# Patient Record
Sex: FEMALE | Race: WHITE | NOT HISPANIC OR LATINO | Employment: OTHER | URBAN - METROPOLITAN AREA
[De-identification: names, ages, dates, MRNs, and addresses within clinical notes are randomized per-mention and may not be internally consistent; named-entity substitution may affect disease eponyms.]

---

## 2017-01-24 ENCOUNTER — OFFICE VISIT (OUTPATIENT)
Dept: INTERNAL MEDICINE | Facility: CLINIC | Age: 82
End: 2017-01-24

## 2017-01-24 VITALS
WEIGHT: 128 LBS | RESPIRATION RATE: 16 BRPM | DIASTOLIC BLOOD PRESSURE: 62 MMHG | HEIGHT: 63 IN | TEMPERATURE: 96.6 F | BODY MASS INDEX: 22.68 KG/M2 | SYSTOLIC BLOOD PRESSURE: 158 MMHG | HEART RATE: 68 BPM

## 2017-01-24 DIAGNOSIS — J43.8 OTHER EMPHYSEMA (HCC): ICD-10-CM

## 2017-01-24 DIAGNOSIS — I65.23 BILATERAL CAROTID ARTERY STENOSIS: ICD-10-CM

## 2017-01-24 DIAGNOSIS — Z12.31 ENCOUNTER FOR SCREENING MAMMOGRAM FOR BREAST CANCER: Primary | ICD-10-CM

## 2017-01-24 DIAGNOSIS — I10 ESSENTIAL HYPERTENSION: ICD-10-CM

## 2017-01-24 PROCEDURE — G0438 PPPS, INITIAL VISIT: HCPCS | Performed by: FAMILY MEDICINE

## 2017-01-24 PROCEDURE — 99214 OFFICE O/P EST MOD 30 MIN: CPT | Performed by: FAMILY MEDICINE

## 2017-01-24 NOTE — MR AVS SNAPSHOT
Lizzy Hicks   1/24/2017 10:15 AM   Office Visit    Dept Phone:  [phone]   Encounter #:  [id number]    Provider:  Deric Mendosa Jr., MD   Department:  [facility] INTERNAL MEDICINE                Your Full Care Plan              Today's Medication Changes          These changes are accurate as of: 1/24/17 12:03 PM.  If you have any questions, ask your nurse or doctor.               Medication(s)that have changed:     * amLODIPine 5 MG tablet   Commonly known as:  NORVASC   Take 1.5 tablets daily   What changed:    - how much to take  - how to take this  - when to take this  - additional instructions       * amLODIPine 2.5 MG tablet   Commonly known as:  NORVASC   Take 1 tablet by mouth daily.   What changed:  Another medication with the same name was changed. Make sure you understand how and when to take each.       * MULTIVITAMIN ADULTS 50+ PO   Take  by mouth.   What changed:  Another medication with the same name was removed. Continue taking this medication, and follow the directions you see here.       * ICAPS AREDS FORMULA PO   Take  by mouth 2 (Two) Times a Day.   What changed:  Another medication with the same name was removed. Continue taking this medication, and follow the directions you see here.       * Notice:  This list has 4 medication(s) that are the same as other medications prescribed for you. Read the directions carefully, and ask your doctor or other care provider to review them with you.      Stop taking medication(s)listed here:     albuterol 108 (90 BASE) MCG/ACT inhaler   Commonly known as:  PROVENTIL HFA;VENTOLIN HFA           azithromycin 250 MG tablet   Commonly known as:  ZITHROMAX Z-PRESTON                      Your Updated Medication List          This list is accurate as of: 1/24/17 12:03 PM.  Always use your most recent med list.                ADVAIR DISKUS 100-50 MCG/DOSE DISKUS   Generic drug:  fluticasone-salmeterol   INHALE ONE PUFF BY  MOUTH TWICE A DAY       * amLODIPine 5 MG tablet   Commonly known as:  NORVASC   Take 1.5 tablets daily       * amLODIPine 2.5 MG tablet   Commonly known as:  NORVASC       aspirin 81 MG EC tablet       atenolol 50 MG tablet   Commonly known as:  TENORMIN   TAKE ONE TABLET BY MOUTH DAILY       ibandronate 150 MG tablet   Commonly known as:  BONIVA   TAKE ONE TABLET BY MOUTH ON THE SAME DATE EVERY MONTH, ON AN EMPTY STOMACH. REMAIN UPRIGHT FOR 60 MINUTES       losartan 100 MG tablet   Commonly known as:  COZAAR   TAKE ONE TABLET BY MOUTH DAILY       * MULTIVITAMIN ADULTS 50+ PO       * ICAPS AREDS FORMULA PO       oyster shell calcium/vitamin d 250-125 MG-UNIT tablet tablet       * Notice:  This list has 4 medication(s) that are the same as other medications prescribed for you. Read the directions carefully, and ask your doctor or other care provider to review them with you.            You Were Diagnosed With        Codes Comments    Encounter for screening mammogram for breast cancer    -  Primary ICD-10-CM: Z12.31  ICD-9-CM: V76.12       Instructions      Medicare Wellness  Personal Prevention Plan of Service     Date of Office Visit:  2017  Encounter Provider:  Deric Mendosa Jr., MD  Place of Service:  [facility] INTERNAL MEDICINE  Patient Name: Lizzy Hicks  :  1935    As part of the Medicare Wellness portion of your visit today, we are providing you with this personalized preventive plan of services (PPPS). This plan is based upon recommendations of the [place] Preventive Services Task Force (USPSTF) and the Advisory Committee on Immunization Practices (ACIP).    This lists the preventive care services that should be considered, and provides dates of when you are due. Items listed as completed are up-to-date and do not require any further intervention.    Health Maintenance   Topic Date Due   • PNEUMOCOCCAL VACCINES (65+ LOW/MEDIUM RISK) (2 of 2 - PPSV23) 2017   •  "INFLUENZA VACCINE  08/01/2016   • MEDICARE ANNUAL WELLNESS  01/20/2017   • MAMMOGRAM  01/28/2018   • DXA SCAN  10/10/2018   • TDAP/TD VACCINES (2 - Td) 09/20/2024   • ZOSTER VACCINE  Completed                 Patient Instructions History      Upcoming Appointments     Visit Type Date Time Department    SUBSEQUENT MEDICARE WELLNESS 1/24/2017 10:15 AM RANDOLPH PICKARD LUIS ARMANDO [phone]      MyChart Signup     Our records indicate that you have declined Cyt[employer]nhart signup. If you would like to sign up for wishkicker, please email [email or web address] or call [phone] to obtain an activation code.             Other Info from Your Visit           Allergies     No Known Allergies      Reason for Visit     Hypertension     COPD     Annual Exam           Vital Signs     Blood Pressure Pulse Temperature Respirations Height Weight    158/62 (BP Location: Left arm, Patient Position: Sitting, Cuff Size: Adult) 68 96.6 °F (35.9 °C) (Tympanic) 16 63\" (160 cm) 128 lb (58.1 kg)    Body Mass Index Smoking Status                22.67 kg/m2 Former Smoker          Problems and Diagnoses Noted     Encounter for screening mammogram for breast cancer    -  Primary        "

## 2017-01-24 NOTE — PROGRESS NOTES
Subjective   Lizzy Hicks is a 81 y.o. female.     Chief Complaint   Patient presents with   • Hypertension   • COPD   • Annual Exam         History of Present Illness   Patient is a new patient to the office after having seen Dr. Rodrigues with history of hypertension, carotid stenosis, COPD, osteoarthritis, she has evidence of hyperlipidemia on labs as well but is not been treated with a statin currently.      The following portions of the patient's history were reviewed and updated as appropriate: allergies, current medications, past social history and problem list.    Review of Systems   Constitutional: Negative.    HENT: Negative.    Eyes: Negative.    Respiratory: Positive for shortness of breath.    Cardiovascular: Negative.    Gastrointestinal: Negative.    Endocrine: Negative.    Genitourinary: Negative.    Musculoskeletal: Negative.    Skin: Negative.    Allergic/Immunologic: Negative.    Neurological: Negative.    Hematological: Negative.    Psychiatric/Behavioral: Negative.        Objective   Vitals:    01/24/17 1103   BP: 158/62   Pulse: 68   Resp: 16   Temp: 96.6 °F (35.9 °C)     Physical Exam   Constitutional: She is oriented to person, place, and time. She appears well-developed and well-nourished.   HENT:   Head: Normocephalic and atraumatic.   Right Ear: Tympanic membrane and external ear normal.   Left Ear: Tympanic membrane and external ear normal.   Nose: Nose normal.   Mouth/Throat: Oropharynx is clear and moist.   Eyes: Conjunctivae and EOM are normal. Pupils are equal, round, and reactive to light.   Neck: Normal range of motion. Neck supple. No JVD present. No thyromegaly present.   Cardiovascular: Normal rate, regular rhythm, normal heart sounds and intact distal pulses.    Pulmonary/Chest: Effort normal and breath sounds normal.   Abdominal: Soft. Bowel sounds are normal.   Musculoskeletal: Normal range of motion.   Lymphadenopathy:     She has no cervical adenopathy.   Neurological:  She is alert and oriented to person, place, and time. No cranial nerve deficit. Coordination normal.   Skin: Skin is warm and dry. No rash noted.   Psychiatric: She has a normal mood and affect. Her behavior is normal. Judgment and thought content normal.   Vitals reviewed.      Assessment/Plan   Problem List Items Addressed This Visit        Cardiovascular and Mediastinum    Essential hypertension    Carotid artery stenosis       Respiratory    COPD (chronic obstructive pulmonary disease)      Other Visit Diagnoses     Encounter for screening mammogram for breast cancer    -  Primary    Relevant Orders    Mammo Screening Bilateral With CAD       plan: Medications are continued recheck in 6 months.  Labs that time.  Referral for screening mammograms.  Discussion about routine medical follow-up.

## 2017-01-24 NOTE — PROGRESS NOTES
Subjective  here for establishment of new physician and also follow-up for COPD hypertension hyperlipidemia carotid stenosis and subsequent Medicare annual exam.    Lizzy Hicks is a 81 y.o. female who presents for an annual wellness visit as well as check up of COPD,hypertension hyperlipidemia carotid stenosis .      History of Present Illness   Patient is a new patient to the office after having seen Dr. Rodrigues with history of hypertension, carotid stenosis, COPD, osteoarthritis, she has evidence of hyperlipidemia on labs as well but is not been treated with a statin currently.    Review of Systems   Constitutional: Negative.    HENT: Negative.    Eyes: Negative.    Respiratory: Positive for shortness of breath.    Cardiovascular: Negative.    Gastrointestinal: Negative.    Endocrine: Negative.    Genitourinary: Negative.    Musculoskeletal: Negative.    Skin: Negative.    Allergic/Immunologic: Negative.    Neurological: Negative.    Hematological: Negative.    Psychiatric/Behavioral: Negative.        The following portions of the patient's history were reviewed and updated as appropriate: allergies, current medications, past family history, past medical history, past social history, past surgical history and problem list.     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 Baseline Na 128-130 for last 3 years.     • Microalbuminuria 08/15/2016   • Right carotid bruit 07/20/2016   • Essential hypertension 01/20/2016   • COPD (chronic obstructive pulmonary disease) 01/20/2016   • Carotid artery stenosis 01/20/2016   • Osteoarthritis 01/20/2016   • Osteoporosis 01/20/2016   • Swelling of right lower extremity 01/20/2016   • Seborrheic dermatitis 01/20/2016       Past Medical History   Diagnosis Date    • COPD (chronic obstructive pulmonary disease)    • Hypertension        No past surgical history on file.    No family history on file.    Social History     Social History   • Marital status:      Spouse name: N/A   • Number of children: N/A   • Years of education: N/A     Occupational History   • Not on file.     Social History Main Topics   • Smoking status: Former Smoker     Types: Cigarettes   • Smokeless tobacco: Never Used   • Alcohol use 4.8 oz/week     8 Cans of beer per week   • Drug use: No   • Sexual activity: Not on file     Other Topics Concern   • Not on file     Social History Narrative       Current Outpatient Prescriptions on File Prior to Visit   Medication Sig Dispense Refill   • ADVAIR DISKUS 100-50 MCG/DOSE DISKUS INHALE ONE PUFF BY MOUTH TWICE A DAY 60 each 5   • amLODIPine (NORVASC) 2.5 MG tablet Take 1 tablet by mouth daily.     • amLODIPine (NORVASC) 5 MG tablet Take 1.5 tablets daily (Patient taking differently: Take 5 mg by mouth daily. Take 1.5 tablets daily) 135 tablet 3   • aspirin 81 MG EC tablet Take 81 mg by mouth daily.     • atenolol (TENORMIN) 50 MG tablet TAKE ONE TABLET BY MOUTH DAILY 30 tablet 5   • ibandronate (BONIVA) 150 MG tablet TAKE ONE TABLET BY MOUTH ON THE SAME DATE EVERY MONTH, ON AN EMPTY STOMACH. REMAIN UPRIGHT FOR 60 MINUTES 1 tablet 5   • losartan (COZAAR) 100 MG tablet TAKE ONE TABLET BY MOUTH DAILY 90 tablet 3   • [DISCONTINUED] Multiple Vitamins-Minerals (OCUVITE PO) Take 1 tablet by mouth daily.     • [DISCONTINUED] albuterol (PROVENTIL HFA;VENTOLIN HFA) 108 (90 BASE) MCG/ACT inhaler Inhale 2 puffs every 4 (four) hours as needed for wheezing. 1 inhaler 11   • [DISCONTINUED] azithromycin (ZITHROMAX Z-PRESTON) 250 MG tablet Take 2 tablets the first day, then 1 tablet daily for 4 days. 1 tablet 0   • [DISCONTINUED] azithromycin (ZITHROMAX Z-PRESTON) 250 MG tablet Take 2 tablets the first day, then 1 tablet daily for 4 days. 1 tablet 0     No current  "facility-administered medications on file prior to visit.        No Known Allergies    Immunization History   Administered Date(s) Administered   • Influenza Split High Dose Preservative Free IM 10/02/2015   • Pneumococcal Conjugate 10/20/2003   • Pneumococcal Conjugate 13-Valent 01/21/2016   • Tdap 09/20/2014   • Zoster 10/20/2003       Objective     Visit Vitals   • /62 (BP Location: Left arm, Patient Position: Sitting, Cuff Size: Adult)   • Pulse 68   • Temp 96.6 °F (35.9 °C) (Tympanic)   • Resp 16   • Ht 63\" (160 cm)   • Wt 128 lb (58.1 kg)   • BMI 22.67 kg/m2       Physical Exam   Constitutional: She is oriented to person, place, and time. She appears well-developed and well-nourished.   HENT:   Head: Normocephalic and atraumatic.   Right Ear: Tympanic membrane and external ear normal.   Left Ear: Tympanic membrane and external ear normal.   Nose: Nose normal.   Mouth/Throat: Oropharynx is clear and moist.   Eyes: Conjunctivae and EOM are normal. Pupils are equal, round, and reactive to light.   Neck: Normal range of motion. Neck supple. No JVD present. No thyromegaly present.   Cardiovascular: Normal rate, regular rhythm, normal heart sounds and intact distal pulses.    Pulmonary/Chest: Effort normal and breath sounds normal.   Abdominal: Soft. Bowel sounds are normal.   Musculoskeletal: Normal range of motion.   Lymphadenopathy:     She has no cervical adenopathy.   Neurological: She is alert and oriented to person, place, and time. No cranial nerve deficit. Coordination normal.   Skin: Skin is warm and dry. No rash noted.   Psychiatric: She has a normal mood and affect. Her behavior is normal. Judgment and thought content normal.   Vitals reviewed.      Assessment/Plan   Lizzy was seen today for hypertension, copd and annual exam.    Diagnoses and all orders for this visit:    Encounter for screening mammogram for breast cancer  -     Mammo Screening Bilateral With CAD; Future    Essential " hypertension    Bilateral carotid artery stenosis    Other emphysema        Discussion  Referral for screening mammogram.  Discussion about routine medical follow-up.  Medications are reviewed.  Continue same medications for now.      AWV.  See scanned forms for morgan history, PHQ-9, functional ability questionnaire, cognitive impairment screening and preventive services check list.  These were all reviewed with the patient and the patient was provided with a copy of the preventative services checklist.            No future appointments.

## 2017-01-24 NOTE — PATIENT INSTRUCTIONS
Medicare Wellness  Personal Prevention Plan of Service     Date of Office Visit:  2017  Encounter Provider:  Deric Mendosa Jr., MD  Place of Service:  [facility] INTERNAL MEDICINE  Patient Name: Lizzy Hicks  :  1935    As part of the Medicare Wellness portion of your visit today, we are providing you with this personalized preventive plan of services (PPPS). This plan is based upon recommendations of the [place] Preventive Services Task Force (USPSTF) and the Advisory Committee on Immunization Practices (ACIP).    This lists the preventive care services that should be considered, and provides dates of when you are due. Items listed as completed are up-to-date and do not require any further intervention.    Health Maintenance   Topic Date Due   • PNEUMOCOCCAL VACCINES (65+ LOW/MEDIUM RISK) (2 of 2 - PPSV23) 2017   • INFLUENZA VACCINE  2016   • MEDICARE ANNUAL WELLNESS  2017   • MAMMOGRAM  2018   • DXA SCAN  10/10/2018   • TDAP/TD VACCINES (2 - Td) 2024   • ZOSTER VACCINE  Completed

## 2017-01-25 ENCOUNTER — TRANSCRIBE ORDERS (OUTPATIENT)
Dept: ADMINISTRATIVE | Facility: HOSPITAL | Age: 82
End: 2017-01-25

## 2017-01-25 DIAGNOSIS — Z12.31 VISIT FOR SCREENING MAMMOGRAM: Primary | ICD-10-CM

## 2017-02-21 ENCOUNTER — HOSPITAL ENCOUNTER (OUTPATIENT)
Dept: MAMMOGRAPHY | Facility: HOSPITAL | Age: 82
Discharge: HOME | End: 2017-02-21
Admitting: FAMILY MEDICINE

## 2017-02-21 DIAGNOSIS — Z12.31 VISIT FOR SCREENING MAMMOGRAM: ICD-10-CM

## 2017-02-21 PROCEDURE — G0202 SCR MAMMO BI INCL CAD: HCPCS

## 2017-02-21 PROCEDURE — 77063 BREAST TOMOSYNTHESIS BI: CPT

## 2017-02-28 DIAGNOSIS — Z00.00 MEDICARE ANNUAL WELLNESS VISIT, SUBSEQUENT: ICD-10-CM

## 2017-02-28 RX ORDER — IBANDRONATE SODIUM 150 MG/1
150 TABLET, FILM COATED ORAL
Qty: 1 TABLET | Refills: 3 | Status: SHIPPED | OUTPATIENT
Start: 2017-02-28 | End: 2017-06-26 | Stop reason: SDUPTHER

## 2017-03-21 DIAGNOSIS — I10 ESSENTIAL HYPERTENSION: ICD-10-CM

## 2017-03-21 RX ORDER — ATENOLOL 50 MG/1
50 TABLET ORAL DAILY
Qty: 30 TABLET | Refills: 5 | Status: SHIPPED | OUTPATIENT
Start: 2017-03-21 | End: 2017-09-17 | Stop reason: SDUPTHER

## 2017-06-26 DIAGNOSIS — Z00.00 MEDICARE ANNUAL WELLNESS VISIT, SUBSEQUENT: ICD-10-CM

## 2017-06-26 RX ORDER — IBANDRONATE SODIUM 150 MG/1
TABLET, FILM COATED ORAL
Qty: 1 TABLET | Refills: 2 | Status: SHIPPED | OUTPATIENT
Start: 2017-06-26 | End: 2017-10-23 | Stop reason: SDUPTHER

## 2017-07-17 RX ORDER — AMLODIPINE BESYLATE 2.5 MG/1
2.5 TABLET ORAL DAILY
Qty: 90 TABLET | Refills: 0 | Status: SHIPPED | OUTPATIENT
Start: 2017-07-17 | End: 2017-07-21

## 2017-07-21 DIAGNOSIS — I10 ESSENTIAL HYPERTENSION: ICD-10-CM

## 2017-07-21 RX ORDER — AMLODIPINE BESYLATE 5 MG/1
7.5 TABLET ORAL DAILY
Qty: 135 TABLET | Refills: 1 | Status: SHIPPED | OUTPATIENT
Start: 2017-07-21 | End: 2017-09-13 | Stop reason: DRUGHIGH

## 2017-09-13 ENCOUNTER — OFFICE VISIT (OUTPATIENT)
Dept: INTERNAL MEDICINE | Facility: CLINIC | Age: 82
End: 2017-09-13

## 2017-09-13 VITALS
HEART RATE: 85 BPM | OXYGEN SATURATION: 92 % | WEIGHT: 137 LBS | SYSTOLIC BLOOD PRESSURE: 166 MMHG | TEMPERATURE: 98.4 F | BODY MASS INDEX: 24.27 KG/M2 | DIASTOLIC BLOOD PRESSURE: 66 MMHG | HEIGHT: 63 IN

## 2017-09-13 DIAGNOSIS — E55.9 VITAMIN D DEFICIENCY: ICD-10-CM

## 2017-09-13 DIAGNOSIS — R63.5 WEIGHT GAIN: ICD-10-CM

## 2017-09-13 DIAGNOSIS — L30.8 OTHER ECZEMA: ICD-10-CM

## 2017-09-13 DIAGNOSIS — I10 ESSENTIAL HYPERTENSION: Primary | ICD-10-CM

## 2017-09-13 DIAGNOSIS — E87.1 HYPONATREMIA: ICD-10-CM

## 2017-09-13 PROCEDURE — 99214 OFFICE O/P EST MOD 30 MIN: CPT | Performed by: FAMILY MEDICINE

## 2017-09-13 RX ORDER — AMLODIPINE BESYLATE 10 MG/1
10 TABLET ORAL DAILY
Qty: 90 TABLET | Refills: 3 | Status: SHIPPED | OUTPATIENT
Start: 2017-09-13 | End: 2018-08-20 | Stop reason: DRUGHIGH

## 2017-09-13 NOTE — PROGRESS NOTES
Subjective   Lizzy Hicks is a 81 y.o. female.     Chief Complaint   Patient presents with   • Obesity   • Hypertension   • Eczema         History of Present Illness   Patient seen here with 70 pound weight gain over the past year also history of hypertension with isolated systolic hypertension itchy skin over the left anterior ankle.  She hasn't had bronchitis normal she's had a history of some chronic bronchitis.  We discussed possibilities on the weight gain.  Really get lab work and screening labs before we prescribe any new medicines..  Previous labs done in [place] reviewed from 2 years ago.      The following portions of the patient's history were reviewed and updated as appropriate: allergies, current medications, past social history and problem list.    Review of Systems   Constitutional: Positive for unexpected weight change.   HENT: Negative.    Eyes: Negative.    Respiratory: Negative.    Cardiovascular: Negative.    Gastrointestinal: Negative.    Endocrine: Negative.    Genitourinary: Negative.    Musculoskeletal: Negative.    Skin: Negative.    Allergic/Immunologic: Negative.    Neurological: Negative.    Hematological: Negative.    Psychiatric/Behavioral: Negative.        Objective   Vitals:    09/13/17 1308   BP: 166/66   Pulse:    Temp:    SpO2:      Physical Exam   Constitutional: She is oriented to person, place, and time. She appears well-developed and well-nourished.   HENT:   Head: Normocephalic and atraumatic.   Right Ear: Tympanic membrane and external ear normal.   Left Ear: Tympanic membrane and external ear normal.   Nose: Nose normal.   Mouth/Throat: Oropharynx is clear and moist.   Eyes: Conjunctivae and EOM are normal. Pupils are equal, round, and reactive to light.   Neck: Normal range of motion. Neck supple. No JVD present. No thyromegaly present.   Cardiovascular: Normal rate, regular rhythm, normal heart sounds and intact distal pulses.    Pulmonary/Chest: Effort normal and  breath sounds normal.   Abdominal: Soft. Bowel sounds are normal.   Musculoskeletal: Normal range of motion.   Lymphadenopathy:     She has no cervical adenopathy.   Neurological: She is alert and oriented to person, place, and time. No cranial nerve deficit. Coordination normal.   Skin: Skin is warm and dry. No rash noted.        Psychiatric: She has a normal mood and affect. Her behavior is normal. Judgment and thought content normal.   Vitals reviewed.      Assessment/Plan   Problem List Items Addressed This Visit        Cardiovascular and Mediastinum    Essential hypertension - Primary    Relevant Medications    amLODIPine (NORVASC) 10 MG tablet    Other Relevant Orders    CBC & Differential    Comprehensive Metabolic Panel    Lipid Panel With / Chol / HDL Ratio    TSH    T4, Free    T3, Free    Vitamin D 25 Hydroxy      Other Visit Diagnoses     Hyponatremia        Relevant Orders    CBC & Differential    Comprehensive Metabolic Panel    Lipid Panel With / Chol / HDL Ratio    TSH    T4, Free    T3, Free    Vitamin D 25 Hydroxy    Weight gain        Relevant Orders    CBC & Differential    Comprehensive Metabolic Panel    Lipid Panel With / Chol / HDL Ratio    TSH    T4, Free    T3, Free    Vitamin D 25 Hydroxy    Other eczema        Relevant Orders    CBC & Differential    Comprehensive Metabolic Panel    Lipid Panel With / Chol / HDL Ratio    TSH    T4, Free    T3, Free    Vitamin D 25 Hydroxy    Vitamin D deficiency         Relevant Orders    Vitamin D 25 Hydroxy      Plan: Screening labs and meds unchanged with the increase amlodipine 10 mg daily.  Follow-up in 6 months sooner if needed.  Try OTC moisturizing cream over the left ankle if no better consider dermatologic referral.

## 2017-09-14 LAB
25(OH)D3+25(OH)D2 SERPL-MCNC: 50.4 NG/ML (ref 30–100)
ALBUMIN SERPL-MCNC: 4.9 G/DL (ref 3.5–5.2)
ALBUMIN/GLOB SERPL: 1.6 G/DL
ALP SERPL-CCNC: 113 U/L (ref 39–117)
ALT SERPL-CCNC: 18 U/L (ref 1–33)
AST SERPL-CCNC: 27 U/L (ref 1–32)
BASOPHILS # BLD AUTO: 0.08 10*3/MM3 (ref 0–0.2)
BASOPHILS NFR BLD AUTO: 0.9 % (ref 0–1.5)
BILIRUB SERPL-MCNC: 0.5 MG/DL (ref 0.1–1.2)
BUN SERPL-MCNC: 11 MG/DL (ref 8–23)
BUN/CREAT SERPL: 14.9 (ref 7–25)
CALCIUM SERPL-MCNC: 10.6 MG/DL (ref 8.6–10.5)
CHLORIDE SERPL-SCNC: 88 MMOL/L (ref 98–107)
CHOLEST SERPL-MCNC: 296 MG/DL (ref 0–200)
CHOLEST/HDLC SERPL: 1.61 {RATIO}
CO2 SERPL-SCNC: 25.9 MMOL/L (ref 22–29)
CREAT SERPL-MCNC: 0.74 MG/DL (ref 0.57–1)
DIFFERENTIAL COMMENT: NORMAL
EOSINOPHIL # BLD AUTO: 0.28 10*3/MM3 (ref 0–0.7)
EOSINOPHIL NFR BLD AUTO: 3.2 % (ref 0.3–6.2)
ERYTHROCYTE [DISTWIDTH] IN BLOOD BY AUTOMATED COUNT: 13.9 % (ref 11.7–13)
GLOBULIN SER CALC-MCNC: 3 GM/DL
GLUCOSE SERPL-MCNC: 119 MG/DL (ref 65–99)
HCT VFR BLD AUTO: 38.4 % (ref 35.6–45.5)
HDLC SERPL-MCNC: 184 MG/DL (ref 40–60)
HGB BLD-MCNC: 13.1 G/DL (ref 11.9–15.5)
IMM GRANULOCYTES # BLD: 0.03 10*3/MM3 (ref 0–0.03)
IMM GRANULOCYTES NFR BLD: 0.3 % (ref 0–0.5)
LDLC SERPL CALC-MCNC: 98 MG/DL (ref 0–100)
LYMPHOCYTES # BLD AUTO: 1.66 10*3/MM3 (ref 0.9–4.8)
LYMPHOCYTES NFR BLD AUTO: 19 % (ref 19.6–45.3)
MCH RBC QN AUTO: 34.1 PG (ref 26.9–32)
MCHC RBC AUTO-ENTMCNC: 34.1 G/DL (ref 32.4–36.3)
MCV RBC AUTO: 100 FL (ref 80.5–98.2)
MONOCYTES # BLD AUTO: 0.78 10*3/MM3 (ref 0.2–1.2)
MONOCYTES NFR BLD AUTO: 8.9 % (ref 5–12)
NEUTROPHILS # BLD AUTO: 5.92 10*3/MM3 (ref 1.9–8.1)
NEUTROPHILS NFR BLD AUTO: 67.7 % (ref 42.7–76)
PLATELET # BLD AUTO: 290 10*3/MM3 (ref 140–500)
PLATELET BLD QL SMEAR: NORMAL
POTASSIUM SERPL-SCNC: 5.1 MMOL/L (ref 3.5–5.2)
PROT SERPL-MCNC: 7.9 G/DL (ref 6–8.5)
RBC # BLD AUTO: 3.84 10*6/MM3 (ref 3.9–5.2)
RBC MORPH BLD: NORMAL
SODIUM SERPL-SCNC: 133 MMOL/L (ref 136–145)
T3FREE SERPL-MCNC: 2.8 PG/ML (ref 2–4.4)
T4 FREE SERPL-MCNC: 1.26 NG/DL (ref 0.93–1.7)
TRIGL SERPL-MCNC: 68 MG/DL (ref 0–150)
TSH SERPL DL<=0.005 MIU/L-ACNC: 3.89 MIU/ML (ref 0.27–4.2)
VLDLC SERPL CALC-MCNC: 13.6 MG/DL (ref 5–40)
WBC # BLD AUTO: 8.75 10*3/MM3 (ref 4.5–10.7)

## 2017-09-17 DIAGNOSIS — I10 ESSENTIAL HYPERTENSION: ICD-10-CM

## 2017-09-18 RX ORDER — ATENOLOL 50 MG/1
TABLET ORAL
Qty: 30 TABLET | Refills: 4 | Status: SHIPPED | OUTPATIENT
Start: 2017-09-18 | End: 2018-02-13 | Stop reason: SDUPTHER

## 2017-10-13 RX ORDER — AMLODIPINE BESYLATE 2.5 MG/1
TABLET ORAL
Qty: 90 TABLET | Refills: 0 | Status: SHIPPED | OUTPATIENT
Start: 2017-10-13 | End: 2018-08-20

## 2017-10-23 DIAGNOSIS — Z00.00 MEDICARE ANNUAL WELLNESS VISIT, SUBSEQUENT: ICD-10-CM

## 2017-10-23 RX ORDER — IBANDRONATE SODIUM 150 MG/1
TABLET, FILM COATED ORAL
Qty: 1 TABLET | Refills: 1 | Status: SHIPPED | OUTPATIENT
Start: 2017-10-23 | End: 2017-12-23 | Stop reason: SDUPTHER

## 2017-11-16 DIAGNOSIS — I10 ESSENTIAL HYPERTENSION: ICD-10-CM

## 2017-11-16 RX ORDER — LOSARTAN POTASSIUM 100 MG/1
100 TABLET ORAL DAILY
Qty: 90 TABLET | Refills: 1 | Status: SHIPPED | OUTPATIENT
Start: 2017-11-16 | End: 2018-05-11 | Stop reason: SDUPTHER

## 2017-12-23 DIAGNOSIS — Z00.00 MEDICARE ANNUAL WELLNESS VISIT, SUBSEQUENT: ICD-10-CM

## 2017-12-24 RX ORDER — AZITHROMYCIN 250 MG/1
TABLET, FILM COATED ORAL
Qty: 6 TABLET | Refills: 0 | Status: SHIPPED | OUTPATIENT
Start: 2017-12-24 | End: 2018-07-03

## 2017-12-26 RX ORDER — IBANDRONATE SODIUM 150 MG/1
TABLET, FILM COATED ORAL
Qty: 1 TABLET | Refills: 0 | Status: SHIPPED | OUTPATIENT
Start: 2017-12-26 | End: 2018-01-21 | Stop reason: SDUPTHER

## 2018-01-17 DIAGNOSIS — J44.9 CHRONIC OBSTRUCTIVE PULMONARY DISEASE, UNSPECIFIED COPD TYPE (HCC): ICD-10-CM

## 2018-01-21 DIAGNOSIS — Z00.00 MEDICARE ANNUAL WELLNESS VISIT, SUBSEQUENT: ICD-10-CM

## 2018-01-22 RX ORDER — IBANDRONATE SODIUM 150 MG/1
TABLET, FILM COATED ORAL
Qty: 1 TABLET | Refills: 0 | Status: SHIPPED | OUTPATIENT
Start: 2018-01-22 | End: 2018-02-18 | Stop reason: SDUPTHER

## 2018-02-13 DIAGNOSIS — I10 ESSENTIAL HYPERTENSION: ICD-10-CM

## 2018-02-13 RX ORDER — ATENOLOL 50 MG/1
TABLET ORAL
Qty: 30 TABLET | Refills: 3 | Status: SHIPPED | OUTPATIENT
Start: 2018-02-13 | End: 2018-06-16 | Stop reason: SDUPTHER

## 2018-02-18 DIAGNOSIS — Z00.00 MEDICARE ANNUAL WELLNESS VISIT, SUBSEQUENT: ICD-10-CM

## 2018-02-19 RX ORDER — IBANDRONATE SODIUM 150 MG/1
TABLET, FILM COATED ORAL
Qty: 1 TABLET | Refills: 0 | Status: SHIPPED | OUTPATIENT
Start: 2018-02-19 | End: 2018-03-17 | Stop reason: SDUPTHER

## 2018-02-27 ENCOUNTER — TRANSCRIBE ORDERS (OUTPATIENT)
Dept: ADMINISTRATIVE | Facility: HOSPITAL | Age: 83
End: 2018-02-27

## 2018-02-27 DIAGNOSIS — Z12.31 VISIT FOR SCREENING MAMMOGRAM: Primary | ICD-10-CM

## 2018-03-17 DIAGNOSIS — Z00.00 MEDICARE ANNUAL WELLNESS VISIT, SUBSEQUENT: ICD-10-CM

## 2018-03-19 RX ORDER — IBANDRONATE SODIUM 150 MG/1
TABLET, FILM COATED ORAL
Qty: 1 TABLET | Refills: 2 | Status: SHIPPED | OUTPATIENT
Start: 2018-03-19 | End: 2018-06-16 | Stop reason: SDUPTHER

## 2018-03-20 ENCOUNTER — HOSPITAL ENCOUNTER (OUTPATIENT)
Dept: MAMMOGRAPHY | Facility: HOSPITAL | Age: 83
Discharge: HOME | End: 2018-03-20
Admitting: FAMILY MEDICINE

## 2018-03-20 DIAGNOSIS — Z12.31 VISIT FOR SCREENING MAMMOGRAM: ICD-10-CM

## 2018-03-20 PROCEDURE — 77063 BREAST TOMOSYNTHESIS BI: CPT

## 2018-03-20 PROCEDURE — 77067 SCR MAMMO BI INCL CAD: CPT

## 2018-05-11 DIAGNOSIS — I10 ESSENTIAL HYPERTENSION: ICD-10-CM

## 2018-05-11 RX ORDER — LOSARTAN POTASSIUM 100 MG/1
TABLET ORAL
Qty: 90 TABLET | Refills: 0 | Status: SHIPPED | OUTPATIENT
Start: 2018-05-11 | End: 2018-08-08 | Stop reason: SDUPTHER

## 2018-06-16 DIAGNOSIS — I10 ESSENTIAL HYPERTENSION: ICD-10-CM

## 2018-06-16 DIAGNOSIS — Z00.00 MEDICARE ANNUAL WELLNESS VISIT, SUBSEQUENT: ICD-10-CM

## 2018-06-18 RX ORDER — IBANDRONATE SODIUM 150 MG/1
TABLET, FILM COATED ORAL
Qty: 1 TABLET | Refills: 1 | Status: SHIPPED | OUTPATIENT
Start: 2018-06-18 | End: 2018-08-15 | Stop reason: SDUPTHER

## 2018-06-18 RX ORDER — ATENOLOL 50 MG/1
TABLET ORAL
Qty: 30 TABLET | Refills: 2 | Status: SHIPPED | OUTPATIENT
Start: 2018-06-18 | End: 2018-09-17 | Stop reason: SDUPTHER

## 2018-07-03 ENCOUNTER — OFFICE VISIT (OUTPATIENT)
Dept: INTERNAL MEDICINE | Facility: CLINIC | Age: 83
End: 2018-07-03

## 2018-07-03 VITALS
TEMPERATURE: 97.9 F | WEIGHT: 143.6 LBS | HEIGHT: 63 IN | OXYGEN SATURATION: 90 % | DIASTOLIC BLOOD PRESSURE: 65 MMHG | HEART RATE: 83 BPM | SYSTOLIC BLOOD PRESSURE: 166 MMHG | BODY MASS INDEX: 25.45 KG/M2

## 2018-07-03 DIAGNOSIS — E23.3 RESET OSMOSTAT SYNDROME (HCC): ICD-10-CM

## 2018-07-03 DIAGNOSIS — R63.5 WEIGHT GAIN: ICD-10-CM

## 2018-07-03 DIAGNOSIS — I10 ESSENTIAL HYPERTENSION: Primary | ICD-10-CM

## 2018-07-03 DIAGNOSIS — E66.09 CLASS 1 OBESITY DUE TO EXCESS CALORIES WITH SERIOUS COMORBIDITY IN ADULT, UNSPECIFIED BMI: ICD-10-CM

## 2018-07-03 DIAGNOSIS — E55.9 VITAMIN D DEFICIENCY: ICD-10-CM

## 2018-07-03 DIAGNOSIS — Z00.00 MEDICARE ANNUAL WELLNESS VISIT, SUBSEQUENT: ICD-10-CM

## 2018-07-03 DIAGNOSIS — J41.0 SIMPLE CHRONIC BRONCHITIS (HCC): ICD-10-CM

## 2018-07-03 DIAGNOSIS — I65.23 ATHEROSCLEROSIS OF BOTH CAROTID ARTERIES: ICD-10-CM

## 2018-07-03 PROCEDURE — 99214 OFFICE O/P EST MOD 30 MIN: CPT | Performed by: FAMILY MEDICINE

## 2018-07-03 PROCEDURE — G0439 PPPS, SUBSEQ VISIT: HCPCS | Performed by: FAMILY MEDICINE

## 2018-07-03 RX ORDER — HEPATITIS A VACCINE 1440 [IU]/ML
INJECTION, SUSPENSION INTRAMUSCULAR
COMMUNITY
Start: 2018-05-25 | End: 2018-08-20

## 2018-07-03 RX ORDER — PHENTERMINE HYDROCHLORIDE 15 MG/1
15 CAPSULE ORAL EVERY MORNING
Qty: 30 CAPSULE | Refills: 2 | Status: SHIPPED | OUTPATIENT
Start: 2018-07-03 | End: 2018-08-20

## 2018-07-03 NOTE — PATIENT INSTRUCTIONS
Medicare Wellness  Personal Prevention Plan of Service     Date of Office Visit:  2018  Encounter Provider:  Deric Mendosa Jr., MD  Place of Service:  [facility] INTERNAL MEDICINE  Patient Name: Lizzy Hicks  :  1935    As part of the Medicare Wellness portion of your visit today, we are providing you with this personalized preventive plan of services (PPPS). This plan is based upon recommendations of the [place] Preventive Services Task Force (USPSTF) and the Advisory Committee on Immunization Practices (ACIP).    This lists the preventive care services that should be considered, and provides dates of when you are due. Items listed as completed are up-to-date and do not require any further intervention.    Health Maintenance   Topic Date Due   • ZOSTER VACCINE (2 of 3) 12/15/2003   • PNEUMOCOCCAL VACCINES (65+ LOW/MEDIUM RISK) (2 of 2 - PPSV23) 2017   • MEDICARE ANNUAL WELLNESS  2018   • INFLUENZA VACCINE  2018   • DXA SCAN  10/10/2018   • MAMMOGRAM  2020   • TDAP/TD VACCINES (2 - Td) 2024       No orders of the defined types were placed in this encounter.      No Follow-up on file.

## 2018-07-03 NOTE — PROGRESS NOTES
Subjective   Lizzy Hicks is a 82 y.o. female.     Chief Complaint   Patient presents with   • Hyperlipidemia   • Hypertension   • Obesity   • Annual Exam         History of Present Illness   Patient is upset because she has steadily gained weight over the past 2 years.  We will recheck labs.  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  There is a question of minimal carotid stenosis in the past.  She is very upset about the weight gain.  We discussed options and will give her a very low-dose of phentermine 15 mg every morning with warnings precautions reviewed with her.  If she stays on this medicine she will #a controlled substance agreement.  Would like to see her back in 3 months.  Further management of hyperlipidemia hypertension is reviewed.      The following portions of the patient's history were reviewed and updated as appropriate: allergies, current medications, past social history and problem list.    Review of Systems   Constitutional: Positive for unexpected weight change.   HENT: Negative.    Respiratory: Negative.    Gastrointestinal: Negative.    All other systems reviewed and are negative.      Objective   Vitals:    07/03/18 1432   BP: 166/65   Pulse: 83   Temp: 97.9 °F (36.6 °C)   SpO2: 90%     Physical Exam   Constitutional: She is oriented to person, place, and time. She appears well-developed and well-nourished.   HENT:   Head: Normocephalic.   Right Ear: External ear normal.   Left Ear: External ear normal.   Mouth/Throat: Oropharynx is clear and moist.   Neck: Normal range of motion. Neck supple.   Cardiovascular: Normal rate, regular rhythm and normal heart sounds.    Pulmonary/Chest: Effort normal and breath sounds normal.   Abdominal: Soft. Bowel sounds are normal.   Musculoskeletal: Normal range of motion.   Neurological: She is alert and oriented to person, place, and time.   Skin: Skin is warm and dry.   Psychiatric: She has a normal mood and affect.   Vitals  reviewed.      Assessment/Plan   Problem List Items Addressed This Visit        Cardiovascular and Mediastinum    Atherosclerosis of both carotid arteries    Relevant Orders    CBC & Differential    Comprehensive Metabolic Panel    Lipid Panel With / Chol / HDL Ratio    TSH    T4, Free    T3, Free    Vitamin B12    Urinalysis With Culture If Indicated - Urine, Clean Catch    Essential hypertension - Primary    Relevant Orders    CBC & Differential    Comprehensive Metabolic Panel    Lipid Panel With / Chol / HDL Ratio    TSH    T4, Free    T3, Free    Vitamin B12    Urinalysis With Culture If Indicated - Urine, Clean Catch       Respiratory    COPD (chronic obstructive pulmonary disease) (CMS/[facility])    Relevant Orders    CBC & Differential    Comprehensive Metabolic Panel    Lipid Panel With / Chol / HDL Ratio    TSH    T4, Free    T3, Free    Vitamin B12    Urinalysis With Culture If Indicated - Urine, Clean Catch       Endocrine    Reset osmostat syndrome (CMS/[facility])    Relevant Orders    CBC & Differential    Comprehensive Metabolic Panel    Lipid Panel With / Chol / HDL Ratio    TSH    T4, Free    T3, Free    Vitamin B12    Urinalysis With Culture If Indicated - Urine, Clean Catch      Other Visit Diagnoses     Weight gain        Relevant Orders    CBC & Differential    Comprehensive Metabolic Panel    Lipid Panel With / Chol / HDL Ratio    TSH    T4, Free    T3, Free    Vitamin B12    Urinalysis With Culture If Indicated - Urine, Clean Catch    Class 1 obesity due to excess calories with serious comorbidity in adult, unspecified BMI        Vitamin D deficiency        Medicare annual wellness visit, subsequent          Plan: Screening labs recheck in 2 months trial of phentermine 15 mg daily instructions on diet and exercise.

## 2018-07-03 NOTE — PROGRESS NOTES
QUICK REFERENCE INFORMATION:  The ABCs of the Annual Wellness Visit    Subsequent Medicare Wellness Visit    HEALTH RISK ASSESSMENT    1935    Recent Hospitalizations:  No hospitalization(s) within the last year..        Current Medical Providers:  Patient Care Team:  Deric Mendosa Jr., MD as PCP - General (Family Medicine)        Smoking Status:  History   Smoking Status   • Former Smoker   • Types: Cigarettes   Smokeless Tobacco   • Never Used       Alcohol Consumption:  History   Alcohol Use   • 4.8 oz/week   • 8 Cans of beer per week       Depression Screen:   PHQ-2/PHQ-9 Depression Screening 7/3/2018   Little interest or pleasure in doing things 0   Feeling down, depressed, or hopeless 0   Trouble falling or staying asleep, or sleeping too much -   Feeling tired or having little energy -   Poor appetite or overeating -   Feeling bad about yourself - or that you are a failure or have let yourself or your family down -   Trouble concentrating on things, such as reading the newspaper or watching television -   Moving or speaking so slowly that other people could have noticed. Or the opposite - being so fidgety or restless that you have been moving around a lot more than usual -   Thoughts that you would be better off dead, or of hurting yourself in some way -   Total Score 0   If you checked off any problems, how difficult have these problems made it for you to do your work, take care of things at home, or get along with other people? -       Health Habits and Functional and Cognitive Screening:  Functional & Cognitive Status 7/3/2018   Do you have difficulty preparing food and eating? No   Do you have difficulty bathing yourself, getting dressed or grooming yourself? No   Do you have difficulty using the toilet? No   Do you have difficulty moving around from place to place? No   Do you have trouble with steps or getting out of a bed or a chair? No   In the past year have you fallen or experienced a near fall?  No   Current Diet Well Balanced Diet   Dental Exam Up to date   Eye Exam Up to date   Exercise (times per week) 4 times per week   Current Exercise Activities Include Walking   Do you need help using the phone?  No   Are you deaf or do you have serious difficulty hearing?  No   Do you need help with transportation? No   Do you need help shopping? No   Do you need help preparing meals?  No   Do you need help with housework?  No   Do you need help with laundry? No   Do you need help taking your medications? No   Do you need help managing money? No   Do you ever drive or ride in a car without wearing a seat belt? No   Do you have difficulty concentrating, remembering or making decisions? -           Does the patient have evidence of cognitive impairment? No    Aspirin use counseling: Taking ASA appropriately as indicated      Recent Lab Results:  CMP:  Lab Results   Component Value Date     (H) 09/13/2017    BUN 11 09/13/2017    CREATININE 0.74 09/13/2017    EGFRIFNONA 76 09/13/2017    EGFRIFAFRI 92 09/13/2017    BCR 14.9 09/13/2017     (L) 09/13/2017    K 5.1 09/13/2017    CO2 25.9 09/13/2017    CALCIUM 10.6 (H) 09/13/2017    PROTENTOTREF 7.9 09/13/2017    ALBUMIN 4.90 09/13/2017    LABGLOBREF 3.0 09/13/2017    LABIL2 1.6 09/13/2017    BILITOT 0.5 09/13/2017    ALKPHOS 113 09/13/2017    AST 27 09/13/2017    ALT 18 09/13/2017     Lipid Panel:  Lab Results   Component Value Date    TRIG 68 09/13/2017     (H) 09/13/2017    VLDL 13.6 09/13/2017     HbA1c:       Visual Acuity:  No exam data present    Age-appropriate Screening Schedule:  Refer to the list below for future screening recommendations based on patient's age, sex and/or medical conditions. Orders for these recommended tests are listed in the plan section. The patient has been provided with a written plan.    Health Maintenance   Topic Date Due   • ZOSTER VACCINE (2 of 3) 12/15/2003   • PNEUMOCOCCAL VACCINES (65+ LOW/MEDIUM RISK) (2 of 2 -  PPSV23) 01/21/2017   • INFLUENZA VACCINE  08/01/2018   • DXA SCAN  10/10/2018   • MAMMOGRAM  03/20/2020   • TDAP/TD VACCINES (2 - Td) 09/20/2024        Subjective   History of Present Illness    Lizzy Hicks is a 82 y.o. female who presents for an Subsequent Wellness Visit.    The following portions of the patient's history were reviewed and updated as appropriate: allergies, current medications, past family history, past medical history, past social history, past surgical history and problem list.    Outpatient Medications Prior to Visit   Medication Sig Dispense Refill   • ADVAIR DISKUS 100-50 MCG/DOSE DISKUS Inhale 1 puff 2 (Two) Times a Day. 60 each 5   • amLODIPine (NORVASC) 10 MG tablet Take 1 tablet by mouth Daily. 90 tablet 3   • amLODIPine (NORVASC) 2.5 MG tablet TAKE ONE TABLET BY MOUTH DAILY WITH 5MG 90 tablet 0   • aspirin 81 MG EC tablet Take 81 mg by mouth daily.     • atenolol (TENORMIN) 50 MG tablet TAKE ONE TABLET BY MOUTH DAILY 30 tablet 2   • Calcium Carb-Cholecalciferol (OYSTER SHELL CALCIUM/VITAMIN D) 250-125 MG-UNIT tablet tablet Take  by mouth 2 (Two) Times a Day.     • ibandronate (BONIVA) 150 MG tablet TAKE ONE TABLET BY MOUTH ON THE SAME DATE EVERY MONTH, ON AN EMPTY STOMACH. REMAIN UPRIGHT FOR 60 MINUTES 1 tablet 1   • losartan (COZAAR) 100 MG tablet TAKE ONE TABLET BY MOUTH DAILY 90 tablet 0   • Multiple Vitamins-Minerals (ICAPS AREDS FORMULA PO) Take  by mouth 2 (Two) Times a Day.     • Multiple Vitamins-Minerals (MULTIVITAMIN ADULTS 50+ PO) Take  by mouth.     • azithromycin (ZITHROMAX Z-PRESTON) 250 MG tablet Take 2 tablets the first day, then 1 tablet daily for 4 days. 6 tablet 0     No facility-administered medications prior to visit.        Patient Active Problem List   Diagnosis   • Essential hypertension   • COPD (chronic obstructive pulmonary disease) (CMS/[facility])   • Carotid artery stenosis   • Osteoarthritis   • Osteoporosis   • Swelling of right lower extremity   • Seborrheic  "dermatitis   • Right carotid bruit   • Atherosclerosis of both carotid arteries   • Reset osmostat syndrome (CMS/HCC)   • Microalbuminuria   • ERRONEOUS ENCOUNTER--DISREGARD       Advance Care Planning:  has an advance directive - a copy has been provided and is in file    Identification of Risk Factors:  Risk factors include: cardiovascular risk.    Review of Systems    Compared to one year ago, the patient feels her physical health is the same.  Compared to one year ago, the patient feels her mental health is the same.    Objective     Physical Exam    Vitals:    07/03/18 1432   BP: 166/65   Pulse: 83   Temp: 97.9 °F (36.6 °C)   SpO2: 90%   Weight: 65.1 kg (143 lb 9.6 oz)   Height: 160 cm (62.99\")   PainSc: 0-No pain       Patient's Body mass index is 25.44 kg/m². BMI is above normal parameters. Recommendations include: educational material.      Assessment/Plan   Patient Self-Management and Personalized Health Advice  The patient has been provided with information about: diet and preventive services including:   · Counseling for cardiovascular disease risk reduction.    Visit Diagnoses:    ICD-10-CM ICD-9-CM   1. Essential hypertension I10 401.9   2. Atherosclerosis of both carotid arteries I65.23 433.10     433.30   3. Simple chronic bronchitis (CMS/HCC) J41.0 491.0   4. Weight gain R63.5 783.1       No orders of the defined types were placed in this encounter.      Outpatient Encounter Prescriptions as of 7/3/2018   Medication Sig Dispense Refill   • ADVAIR DISKUS 100-50 MCG/DOSE DISKUS Inhale 1 puff 2 (Two) Times a Day. 60 each 5   • amLODIPine (NORVASC) 10 MG tablet Take 1 tablet by mouth Daily. 90 tablet 3   • amLODIPine (NORVASC) 2.5 MG tablet TAKE ONE TABLET BY MOUTH DAILY WITH 5MG 90 tablet 0   • aspirin 81 MG EC tablet Take 81 mg by mouth daily.     • atenolol (TENORMIN) 50 MG tablet TAKE ONE TABLET BY MOUTH DAILY 30 tablet 2   • Calcium Carb-Cholecalciferol (OYSTER SHELL CALCIUM/VITAMIN D) 250-125 " MG-UNIT tablet tablet Take  by mouth 2 (Two) Times a Day.     • ibandronate (BONIVA) 150 MG tablet TAKE ONE TABLET BY MOUTH ON THE SAME DATE EVERY MONTH, ON AN EMPTY STOMACH. REMAIN UPRIGHT FOR 60 MINUTES 1 tablet 1   • losartan (COZAAR) 100 MG tablet TAKE ONE TABLET BY MOUTH DAILY 90 tablet 0   • Multiple Vitamins-Minerals (ICAPS AREDS FORMULA PO) Take  by mouth 2 (Two) Times a Day.     • Multiple Vitamins-Minerals (MULTIVITAMIN ADULTS 50+ PO) Take  by mouth.     • [DISCONTINUED] azithromycin (ZITHROMAX Z-PRESTON) 250 MG tablet Take 2 tablets the first day, then 1 tablet daily for 4 days. 6 tablet 0   • HAVRIX 1440 EL U/ML vaccine      • SHINGRIX 50 MCG reconstituted suspension        No facility-administered encounter medications on file as of 7/3/2018.        Reviewed use of high risk medication in the elderly: not applicable  Reviewed for potential of harmful drug interactions in the elderly: not applicable    Follow Up:  No Follow-up on file.     An After Visit Summary and PPPS with all of these plans were given to the patient.

## 2018-07-04 LAB
ALBUMIN SERPL-MCNC: 4.9 G/DL (ref 3.5–5.2)
ALBUMIN/GLOB SERPL: 1.9 G/DL
ALP SERPL-CCNC: 114 U/L (ref 39–117)
ALT SERPL-CCNC: 21 U/L (ref 1–33)
AST SERPL-CCNC: 28 U/L (ref 1–32)
BASOPHILS # BLD AUTO: 0.05 10*3/MM3 (ref 0–0.2)
BASOPHILS NFR BLD AUTO: 0.5 % (ref 0–1.5)
BILIRUB SERPL-MCNC: 0.6 MG/DL (ref 0.1–1.2)
BUN SERPL-MCNC: 18 MG/DL (ref 8–23)
BUN/CREAT SERPL: 15.9 (ref 7–25)
CALCIUM SERPL-MCNC: 9.9 MG/DL (ref 8.6–10.5)
CHLORIDE SERPL-SCNC: 91 MMOL/L (ref 98–107)
CHOLEST SERPL-MCNC: 267 MG/DL (ref 0–200)
CHOLEST/HDLC SERPL: 1.84 {RATIO}
CO2 SERPL-SCNC: 24.1 MMOL/L (ref 22–29)
CREAT SERPL-MCNC: 1.13 MG/DL (ref 0.57–1)
EOSINOPHIL # BLD AUTO: 0.21 10*3/MM3 (ref 0–0.7)
EOSINOPHIL NFR BLD AUTO: 2 % (ref 0.3–6.2)
ERYTHROCYTE [DISTWIDTH] IN BLOOD BY AUTOMATED COUNT: 13.1 % (ref 11.7–13)
GLOBULIN SER CALC-MCNC: 2.6 GM/DL
GLUCOSE SERPL-MCNC: 110 MG/DL (ref 65–99)
HCT VFR BLD AUTO: 38.5 % (ref 35.6–45.5)
HDLC SERPL-MCNC: 145 MG/DL (ref 40–60)
HGB BLD-MCNC: 13.2 G/DL (ref 11.9–15.5)
IMM GRANULOCYTES # BLD: 0.03 10*3/MM3 (ref 0–0.03)
IMM GRANULOCYTES NFR BLD: 0.3 % (ref 0–0.5)
LDLC SERPL CALC-MCNC: 107 MG/DL (ref 0–100)
LYMPHOCYTES # BLD AUTO: 2.03 10*3/MM3 (ref 0.9–4.8)
LYMPHOCYTES NFR BLD AUTO: 19.8 % (ref 19.6–45.3)
MCH RBC QN AUTO: 33.5 PG (ref 26.9–32)
MCHC RBC AUTO-ENTMCNC: 34.3 G/DL (ref 32.4–36.3)
MCV RBC AUTO: 97.7 FL (ref 80.5–98.2)
MONOCYTES # BLD AUTO: 0.95 10*3/MM3 (ref 0.2–1.2)
MONOCYTES NFR BLD AUTO: 9.3 % (ref 5–12)
NEUTROPHILS # BLD AUTO: 7.02 10*3/MM3 (ref 1.9–8.1)
NEUTROPHILS NFR BLD AUTO: 68.4 % (ref 42.7–76)
PLATELET # BLD AUTO: 311 10*3/MM3 (ref 140–500)
POTASSIUM SERPL-SCNC: 4.4 MMOL/L (ref 3.5–5.2)
PROT SERPL-MCNC: 7.5 G/DL (ref 6–8.5)
RBC # BLD AUTO: 3.94 10*6/MM3 (ref 3.9–5.2)
SODIUM SERPL-SCNC: 132 MMOL/L (ref 136–145)
T3FREE SERPL-MCNC: 3.1 PG/ML (ref 2–4.4)
T4 FREE SERPL-MCNC: 1.28 NG/DL (ref 0.93–1.7)
TRIGL SERPL-MCNC: 76 MG/DL (ref 0–150)
TSH SERPL DL<=0.005 MIU/L-ACNC: 5.55 MIU/ML (ref 0.27–4.2)
VIT B12 SERPL-MCNC: 698 PG/ML (ref 211–946)
VLDLC SERPL CALC-MCNC: 15.2 MG/DL (ref 5–40)
WBC # BLD AUTO: 10.26 10*3/MM3 (ref 4.5–10.7)

## 2018-07-16 DIAGNOSIS — J44.9 CHRONIC OBSTRUCTIVE PULMONARY DISEASE, UNSPECIFIED COPD TYPE (HCC): ICD-10-CM

## 2018-08-08 DIAGNOSIS — I10 ESSENTIAL HYPERTENSION: ICD-10-CM

## 2018-08-08 RX ORDER — LOSARTAN POTASSIUM 100 MG/1
TABLET ORAL
Qty: 90 TABLET | Refills: 0 | Status: SHIPPED | OUTPATIENT
Start: 2018-08-08 | End: 2018-08-20 | Stop reason: SINTOL

## 2018-08-13 ENCOUNTER — TELEPHONE (OUTPATIENT)
Dept: INTERNAL MEDICINE | Facility: CLINIC | Age: 83
End: 2018-08-13

## 2018-08-13 NOTE — TELEPHONE ENCOUNTER
Pt states that she has been feeling weakness for a while she thinks it may be because of her phentermine

## 2018-08-15 DIAGNOSIS — Z00.00 MEDICARE ANNUAL WELLNESS VISIT, SUBSEQUENT: ICD-10-CM

## 2018-08-15 RX ORDER — IBANDRONATE SODIUM 150 MG/1
TABLET, FILM COATED ORAL
Qty: 1 TABLET | Refills: 0 | Status: SHIPPED | OUTPATIENT
Start: 2018-08-15 | End: 2018-09-11 | Stop reason: SDUPTHER

## 2018-08-20 ENCOUNTER — HOSPITAL ENCOUNTER (OUTPATIENT)
Dept: GENERAL RADIOLOGY | Facility: HOSPITAL | Age: 83
Discharge: HOME | End: 2018-08-20
Admitting: FAMILY MEDICINE

## 2018-08-20 ENCOUNTER — OFFICE VISIT (OUTPATIENT)
Dept: INTERNAL MEDICINE | Facility: CLINIC | Age: 83
End: 2018-08-20

## 2018-08-20 VITALS
DIASTOLIC BLOOD PRESSURE: 50 MMHG | BODY MASS INDEX: 23.92 KG/M2 | WEIGHT: 135 LBS | OXYGEN SATURATION: 94 % | TEMPERATURE: 96 F | SYSTOLIC BLOOD PRESSURE: 108 MMHG | HEART RATE: 82 BPM

## 2018-08-20 DIAGNOSIS — R07.81 PLEURITIC CHEST PAIN: ICD-10-CM

## 2018-08-20 DIAGNOSIS — I95.0 IDIOPATHIC HYPOTENSION: Primary | ICD-10-CM

## 2018-08-20 DIAGNOSIS — J41.0 SIMPLE CHRONIC BRONCHITIS (HCC): ICD-10-CM

## 2018-08-20 DIAGNOSIS — I10 ESSENTIAL HYPERTENSION: ICD-10-CM

## 2018-08-20 PROCEDURE — 71046 X-RAY EXAM CHEST 2 VIEWS: CPT

## 2018-08-20 PROCEDURE — 99214 OFFICE O/P EST MOD 30 MIN: CPT | Performed by: FAMILY MEDICINE

## 2018-08-20 PROCEDURE — 93000 ELECTROCARDIOGRAM COMPLETE: CPT | Performed by: FAMILY MEDICINE

## 2018-08-20 RX ORDER — AMLODIPINE BESYLATE 5 MG/1
5 TABLET ORAL DAILY
Qty: 30 TABLET | Refills: 2 | Status: SHIPPED | OUTPATIENT
Start: 2018-08-20 | End: 2019-08-04 | Stop reason: SDUPTHER

## 2018-08-20 NOTE — PROGRESS NOTES
Subjective   Lizzy Hicks is a 82 y.o. female.     Chief Complaint   Patient presents with   • Nausea     Weakness    History of Present Illness     Patient is seen with the nausea and lightheadedness for the past 2 weeks after taking some phentermine 15 mg daily.  She had no chest pain but has had some slight pleuritic pain on deep breathing on the left side.  With a history of COPD/asthma.  She denies chest pain sweating or syncope.  EKG performed in the office show some ST depression inferiorly and anterolaterally.  There are no Q waves.  She is not having chest pain at this moment.    The following portions of the patient's history were reviewed and updated as appropriate: allergies, current medications, past social history and problem list.    Review of Systems   Constitutional: Positive for fatigue.   HENT: Negative.    Eyes: Negative.    Respiratory: Negative.    Cardiovascular: Positive for chest pain.   Gastrointestinal: Negative.    Endocrine: Negative.    Genitourinary: Negative.    Skin: Negative.    Allergic/Immunologic: Negative.    Neurological: Positive for weakness and light-headedness.   Hematological: Negative.    Psychiatric/Behavioral: Negative.        Objective   Vitals:    08/20/18 1505   BP: 108/50   Pulse: 82   Temp: 96 °F (35.6 °C)   SpO2: 94%     Physical Exam   Constitutional: She is oriented to person, place, and time. She appears well-developed.   Patient is in no distress and states she has been feeling better since off the phentermine.  She has relatively low blood pressure versus diastolic of 50.   HENT:   Head: Normocephalic.   Right Ear: External ear normal.   Left Ear: External ear normal.   Mouth/Throat: Oropharynx is clear and moist.   Eyes: Pupils are equal, round, and reactive to light.   Neck: Normal range of motion. Neck supple.   Cardiovascular: Normal rate, regular rhythm and normal heart sounds.    Pulmonary/Chest: Effort normal and breath sounds normal.   Abdominal:  Soft. Bowel sounds are normal.   Musculoskeletal: Normal range of motion.   Neurological: She is alert and oriented to person, place, and time.   Skin: Skin is warm and dry.   Psychiatric: She has a normal mood and affect.   Vitals reviewed.      Assessment/Plan   Problem List Items Addressed This Visit        Cardiovascular and Mediastinum    Essential hypertension    Relevant Medications    amLODIPine (NORVASC) 5 MG tablet    Other Relevant Orders    Basic Metabolic Panel    CBC & Differential    Ambulatory Referral to Cardiology    ECG 12 Lead       Respiratory    COPD (chronic obstructive pulmonary disease) (CMS/HCC)    Relevant Orders    XR Chest 2 View      Other Visit Diagnoses     Idiopathic hypotension    -  Primary    Relevant Orders    Basic Metabolic Panel    CBC & Differential    Ambulatory Referral to Cardiology    ECG 12 Lead    Pleuritic chest pain        Relevant Orders    XR Chest 2 View    ECG 12 Lead      I'm concerned about the EKG with ST depression.  She is off phentermine low-dose.  She has a relative hypotension.  We'll get an urgent referral to cardiology for an echocardiogram and evaluation otherwise BMP CBC and chest x-ray PA lateral today.  Stop low Mari continue atenolol 50 mg daily reduce amlodipine to 5 mg daily.  Return visit about 2 weeks.  She has any further difficulty she is to get seen in the ER.

## 2018-08-20 NOTE — PROGRESS NOTES
Procedure     ECG 12 Lead  Date/Time: 8/20/2018 3:53 PM  Performed by: ADDY ESTEBAN JR.  Authorized by: ADDY ESTEBAN JR.   Comparison: not compared with previous ECG   Previous ECG: no previous ECG available  Rhythm: sinus rhythm  ST Depression: III, aVF, V3, V4, V5 and V6  T Waves: T waves normal  Other: no other findings  Clinical impression: abnormal ECG

## 2018-08-21 LAB
BASOPHILS # BLD AUTO: 0.1 X10E3/UL (ref 0–0.2)
BASOPHILS NFR BLD AUTO: 0 %
BUN SERPL-MCNC: 33 MG/DL (ref 8–27)
BUN/CREAT SERPL: 31 (ref 12–28)
CALCIUM SERPL-MCNC: 9.8 MG/DL (ref 8.7–10.3)
CHLORIDE SERPL-SCNC: 85 MMOL/L (ref 96–106)
CO2 SERPL-SCNC: 27 MMOL/L (ref 20–29)
CREAT SERPL-MCNC: 1.08 MG/DL (ref 0.57–1)
EOSINOPHIL # BLD AUTO: 0.3 X10E3/UL (ref 0–0.4)
EOSINOPHIL NFR BLD AUTO: 3 %
ERYTHROCYTE [DISTWIDTH] IN BLOOD BY AUTOMATED COUNT: 13.2 % (ref 12.3–15.4)
GLUCOSE SERPL-MCNC: 123 MG/DL (ref 65–99)
HCT VFR BLD AUTO: 35.1 % (ref 34–46.6)
HGB BLD-MCNC: 12.2 G/DL (ref 11.1–15.9)
IMM GRANULOCYTES # BLD: 0 X10E3/UL (ref 0–0.1)
IMM GRANULOCYTES NFR BLD: 0 %
LYMPHOCYTES # BLD AUTO: 1.6 X10E3/UL (ref 0.7–3.1)
LYMPHOCYTES NFR BLD AUTO: 13 %
MCH RBC QN AUTO: 32.7 PG (ref 26.6–33)
MCHC RBC AUTO-ENTMCNC: 34.8 G/DL (ref 31.5–35.7)
MCV RBC AUTO: 94 FL (ref 79–97)
MONOCYTES # BLD AUTO: 1.1 X10E3/UL (ref 0.1–0.9)
MONOCYTES NFR BLD AUTO: 9 %
NEUTROPHILS # BLD AUTO: 9.1 X10E3/UL (ref 1.4–7)
NEUTROPHILS NFR BLD AUTO: 75 %
PLATELET # BLD AUTO: 337 X10E3/UL (ref 150–379)
POTASSIUM SERPL-SCNC: 4.5 MMOL/L (ref 3.5–5.2)
RBC # BLD AUTO: 3.73 X10E6/UL (ref 3.77–5.28)
SODIUM SERPL-SCNC: 129 MMOL/L (ref 134–144)
WBC # BLD AUTO: 12.2 X10E3/UL (ref 3.4–10.8)

## 2018-08-21 NOTE — PROGRESS NOTES
Date of Office Visit: 2018  Encounter Provider: Emil Raymond MD  Place of Service: [facility] CARDIOLOGY  Patient Name: Lizzy Hicks  :1935    Chief Complaint   Patient presents with   • Hypertension   :     HPI: Lizzy Hicks is a 82 y.o. female who presents today at the request of Dr. Mendosa regarding an abnormal EKG and low blood pressure.  She previously lived in [place], and moved here a few years ago.  She has chronic hyponatremia, and reportedly underwent a thorough workup, with a subsequent diagnosis of reset osmostat.  She has chronic bronchitis.  She thinks she had an echocardiogram in .  She had a carotid duplex in 2016 that showed moderate disease on the left, and mild disease on the right.   She has never had a stroke or TIA.  She denies any history of CAD, CHF, or arrhythmia.  She has a long history of hypertension, which required progressive escalation of therapy over the last several years.    A few months ago, she was placed on phentermine.  Within a few weeks, she was unsteady on her feet, fatigued, mildly lightheaded, and just not herself.  She went to see Dr. Mendosa, and her SBP was 108 mm Hg.  Her losartan and phentermine were stopped, and amlodipine was cut in half.  She has progressively felt better each day since then and is almost back ot normal.    During that period, she had a sharp pain in the left shoulder and below the left breast, but only with deep breaths.  This has resolved.  She did not have exertional dyspnea or chest pain.  She denies orthopnea, PND, palpitations, syncope, or edema.     Dr. Mendosa checked an EKG, and it was abnormal.    Past Medical History:   Diagnosis Date   • Carotid artery disease (CMS/HCC)     10/2016: 16-49% right, 60-69% left   • COPD (chronic obstructive pulmonary disease) (CMS/HCC)    • Hypertension    • Osteoarthritis 2016   • Osteoporosis 2016   • Reset osmostat syndrome (CMS/HCC) 08/15/2016     Worked up by prior PCP in [facility]. Baseline Na 128-130 since 2013.   • Seborrheic dermatitis 1/20/2016       Past Surgical History:   Procedure Laterality Date   • TONSILLECTOMY         Social History     Social History   • Marital status:      Spouse name: N/A   • Number of children: N/A   • Years of education: N/A     Occupational History   • Not on file.     Social History Main Topics   • Smoking status: Former Smoker     Types: Cigarettes   • Smokeless tobacco: Never Used      Comment: caffeine use: 3 cups daily.    • Alcohol use 6.0 oz/week     8 Cans of beer, 1 Shots of liquor, 1 Glasses of wine per week      Comment: one alchollic beverage daily.    • Drug use: No   • Sexual activity: Not on file     Other Topics Concern   • Not on file     Social History Narrative   • No narrative on file       History reviewed. No pertinent family history.    Review of Systems   Constitution: Positive for malaise/fatigue and weight gain.   Cardiovascular: Negative for chest pain and palpitations.   Respiratory: Positive for cough.    Neurological: Negative for light-headedness.   All other systems reviewed and are negative.      No Known Allergies      Current Outpatient Prescriptions:   •  ADVAIR DISKUS 100-50 MCG/DOSE DISKUS, INHALE ONE PUFF BY MOUTH TWICE A DAY, Disp: 60 each, Rfl: 4  •  amLODIPine (NORVASC) 5 MG tablet, Take 1 tablet by mouth Daily., Disp: 30 tablet, Rfl: 2  •  aspirin 81 MG EC tablet, Take 81 mg by mouth daily., Disp: , Rfl:   •  atenolol (TENORMIN) 50 MG tablet, TAKE ONE TABLET BY MOUTH DAILY, Disp: 30 tablet, Rfl: 2  •  Calcium Carb-Cholecalciferol (OYSTER SHELL CALCIUM/VITAMIN D) 250-125 MG-UNIT tablet tablet, Take  by mouth 2 (Two) Times a Day., Disp: , Rfl:   •  ibandronate (BONIVA) 150 MG tablet, TAKE ONE TABLET BY MOUTH ON THE SAME DATE EVERY MONTH, ON AN EMPTY STOMACH. REMAIN UPRIGHT FOR 60 MINUES, Disp: 1 tablet, Rfl: 0  •  Multiple Vitamins-Minerals (ICAPS AREDS FORMULA PO), Take  by  "mouth 2 (Two) Times a Day., Disp: , Rfl:   •  Multiple Vitamins-Minerals (MULTIVITAMIN ADULTS 50+ PO), Take  by mouth., Disp: , Rfl:       Objective:     Vitals:    08/22/18 1415   BP: 130/80   BP Location: Left arm   Pulse: 82   Weight: 61.2 kg (135 lb)   Height: 160 cm (62.99\")     Body mass index is 23.92 kg/m².    Physical Exam   Constitutional: She is oriented to person, place, and time.   Obese   HENT:   Head: Normocephalic.   Nose: Nose normal.   Mouth/Throat: Oropharynx is clear and moist.   Eyes: Pupils are equal, round, and reactive to light. Conjunctivae and EOM are normal.   Neck: Normal range of motion.   Cannot assess for JVD due to habitus   Cardiovascular: Normal rate, regular rhythm, normal heart sounds and intact distal pulses.    No murmur heard.  Pulses:       Carotid pulses are on the right side with bruit, and on the left side with bruit.  Pulmonary/Chest: Effort normal.   Abdominal: Soft.   Obesity limits abdominal exam   Musculoskeletal: Normal range of motion. She exhibits no edema.   Neurological: She is alert and oriented to person, place, and time. No cranial nerve deficit.   Skin: Skin is warm and dry. No rash noted.   Psychiatric: She has a normal mood and affect. Her behavior is normal. Judgment and thought content normal.   Vitals reviewed.        ECG 12 Lead  Date/Time: 8/22/2018 2:19 PM  Performed by: DEYA SYLVESTER  Authorized by: DEYA SYLVESTER   Comparison: compared with previous ECG   Similar to previous ECG  Rhythm: sinus rhythm  Conduction: conduction normal  ST segment depression noted on lead: Diffuse ST depression, anterior and lateral leads.  T Waves: T waves normal  QRS axis: normal  Other: no other findings  Clinical impression: abnormal ECG              Assessment:       Diagnosis Plan   1. Tiredness     2. Pleuritic chest pain  Adult Transthoracic Echo Complete W/ Cont if Necessary Per Protocol   3. Abnormal EKG  Adult Transthoracic Echo Complete W/ Cont if Necessary Per " Protocol   4. Essential hypertension     5. Bilateral carotid artery stenosis  Duplex Carotid Ultrasound CAR          Plan:       She had generalized fatigue, pleuritic left sided thoracic pain, and low blood pressure of an unclear cause.  It started shortly after taking phentermine.  All symptoms have resolved since her antihypertensive regimen was decreased (her BP is now within goal) and since phentermine was discontinued. Her EKG is abnormal in that she has diffuse ST depressions, but an EKG today is unchanged from the one at Dr. Mendosa's office.  She isn't really having any true anginal complaints.    We will try to get a copy of the echo she had in [place] five years ago, and I will repeat an echocardiogram at this time.  She could potentially need a stress test in the future.    It has been almost two years since her last carotid duplex.  She has bilateral bruits. I will reassess this.     Sincerely,       Emil Raymond MD

## 2018-08-22 ENCOUNTER — OFFICE VISIT (OUTPATIENT)
Dept: CARDIOLOGY | Facility: CLINIC | Age: 83
End: 2018-08-22

## 2018-08-22 VITALS
WEIGHT: 135 LBS | SYSTOLIC BLOOD PRESSURE: 130 MMHG | HEART RATE: 82 BPM | DIASTOLIC BLOOD PRESSURE: 80 MMHG | HEIGHT: 63 IN | BODY MASS INDEX: 23.92 KG/M2

## 2018-08-22 DIAGNOSIS — I10 ESSENTIAL HYPERTENSION: ICD-10-CM

## 2018-08-22 DIAGNOSIS — R07.81 PLEURITIC CHEST PAIN: ICD-10-CM

## 2018-08-22 DIAGNOSIS — R94.31 ABNORMAL EKG: ICD-10-CM

## 2018-08-22 DIAGNOSIS — I65.23 BILATERAL CAROTID ARTERY STENOSIS: ICD-10-CM

## 2018-08-22 DIAGNOSIS — R53.83 TIREDNESS: Primary | ICD-10-CM

## 2018-08-22 PROCEDURE — 93000 ELECTROCARDIOGRAM COMPLETE: CPT | Performed by: INTERNAL MEDICINE

## 2018-08-22 PROCEDURE — 99204 OFFICE O/P NEW MOD 45 MIN: CPT | Performed by: INTERNAL MEDICINE

## 2018-08-31 ENCOUNTER — HOSPITAL ENCOUNTER (OUTPATIENT)
Dept: CARDIOLOGY | Facility: HOSPITAL | Age: 83
Discharge: HOME | End: 2018-08-31
Attending: INTERNAL MEDICINE

## 2018-08-31 ENCOUNTER — HOSPITAL ENCOUNTER (OUTPATIENT)
Dept: CARDIOLOGY | Facility: HOSPITAL | Age: 83
Discharge: HOME | End: 2018-08-31
Attending: INTERNAL MEDICINE | Admitting: INTERNAL MEDICINE

## 2018-08-31 VITALS
WEIGHT: 134.92 LBS | HEART RATE: 84 BPM | HEIGHT: 63 IN | BODY MASS INDEX: 23.91 KG/M2 | SYSTOLIC BLOOD PRESSURE: 120 MMHG | DIASTOLIC BLOOD PRESSURE: 40 MMHG

## 2018-08-31 DIAGNOSIS — I65.23 BILATERAL CAROTID ARTERY STENOSIS: ICD-10-CM

## 2018-08-31 DIAGNOSIS — I65.22 STENOSIS OF LEFT CAROTID ARTERY: Primary | ICD-10-CM

## 2018-08-31 DIAGNOSIS — R07.81 PLEURITIC CHEST PAIN: ICD-10-CM

## 2018-08-31 DIAGNOSIS — R94.31 ABNORMAL EKG: ICD-10-CM

## 2018-08-31 LAB
BH CV ECHO MEAS - ACS: 1.5 CM
BH CV ECHO MEAS - AO MAX PG (FULL): 6.3 MMHG
BH CV ECHO MEAS - AO MAX PG: 8.5 MMHG
BH CV ECHO MEAS - AO MEAN PG (FULL): 4 MMHG
BH CV ECHO MEAS - AO MEAN PG: 5 MMHG
BH CV ECHO MEAS - AO ROOT AREA: 9.1 CM^2
BH CV ECHO MEAS - AO ROOT DIAM: 3.4 CM
BH CV ECHO MEAS - AO V2 MAX: 146 CM/SEC
BH CV ECHO MEAS - AO V2 MEAN: 102 CM/SEC
BH CV ECHO MEAS - AO V2 VTI: 33.8 CM
BH CV ECHO MEAS - AVA(I,A): 1.3 CM^2
BH CV ECHO MEAS - AVA(I,D): 1.3 CM^2
BH CV ECHO MEAS - AVA(V,A): 1.2 CM^2
BH CV ECHO MEAS - AVA(V,D): 1.2 CM^2
BH CV ECHO MEAS - EDV(CUBED): 116.2 ML
BH CV ECHO MEAS - EDV(MOD-SP2): 49 ML
BH CV ECHO MEAS - EDV(MOD-SP4): 43 ML
BH CV ECHO MEAS - EDV(TEICH): 111.7 ML
BH CV ECHO MEAS - EF(CUBED): 86.7 %
BH CV ECHO MEAS - EF(MOD-BP): 63 %
BH CV ECHO MEAS - EF(MOD-SP2): 69.4 %
BH CV ECHO MEAS - EF(MOD-SP4): 62.8 %
BH CV ECHO MEAS - EF(TEICH): 80.2 %
BH CV ECHO MEAS - ESV(CUBED): 15.4 ML
BH CV ECHO MEAS - ESV(MOD-SP2): 15 ML
BH CV ECHO MEAS - ESV(MOD-SP4): 16 ML
BH CV ECHO MEAS - ESV(TEICH): 22.1 ML
BH CV ECHO MEAS - IVS/LVPW: 1
BH CV ECHO MEAS - IVSD: 0.92 CM
BH CV ECHO MEAS - LAT PEAK E' VEL: 8 CM/SEC
BH CV ECHO MEAS - LV MASS(C)D: 151.6 GRAMS
BH CV ECHO MEAS - LV MAX PG: 2.2 MMHG
BH CV ECHO MEAS - LV MEAN PG: 1 MMHG
BH CV ECHO MEAS - LV V1 MAX: 74.4 CM/SEC
BH CV ECHO MEAS - LV V1 MEAN: 54.7 CM/SEC
BH CV ECHO MEAS - LV V1 VTI: 19.1 CM
BH CV ECHO MEAS - LVIDD: 4.9 CM
BH CV ECHO MEAS - LVIDS: 2.5 CM
BH CV ECHO MEAS - LVLD AP2: 6.2 CM
BH CV ECHO MEAS - LVLD AP4: 5.9 CM
BH CV ECHO MEAS - LVLS AP2: 5 CM
BH CV ECHO MEAS - LVLS AP4: 5.2 CM
BH CV ECHO MEAS - LVOT AREA (M): 2.3 CM^2
BH CV ECHO MEAS - LVOT AREA: 2.3 CM^2
BH CV ECHO MEAS - LVOT DIAM: 1.7 CM
BH CV ECHO MEAS - LVPWD: 0.88 CM
BH CV ECHO MEAS - MED PEAK E' VEL: 7 CM/SEC
BH CV ECHO MEAS - MR MAX PG: 33.6 MMHG
BH CV ECHO MEAS - MR MAX VEL: 290 CM/SEC
BH CV ECHO MEAS - MV A DUR: 0.13 SEC
BH CV ECHO MEAS - MV A MAX VEL: 97.2 CM/SEC
BH CV ECHO MEAS - MV DEC SLOPE: 717.3 CM/SEC^2
BH CV ECHO MEAS - MV DEC TIME: 0.14 SEC
BH CV ECHO MEAS - MV E MAX VEL: 115 CM/SEC
BH CV ECHO MEAS - MV E/A: 1.2
BH CV ECHO MEAS - MV MAX PG: 5.6 MMHG
BH CV ECHO MEAS - MV MEAN PG: 3 MMHG
BH CV ECHO MEAS - MV P1/2T MAX VEL: 119 CM/SEC
BH CV ECHO MEAS - MV P1/2T: 48.6 MSEC
BH CV ECHO MEAS - MV V2 MAX: 118.5 CM/SEC
BH CV ECHO MEAS - MV V2 MEAN: 85.9 CM/SEC
BH CV ECHO MEAS - MV V2 VTI: 26.6 CM
BH CV ECHO MEAS - MVA P1/2T LCG: 1.8 CM^2
BH CV ECHO MEAS - MVA(P1/2T): 4.5 CM^2
BH CV ECHO MEAS - MVA(VTI): 1.6 CM^2
BH CV ECHO MEAS - PA MAX PG (FULL): 2.3 MMHG
BH CV ECHO MEAS - PA MAX PG: 3.6 MMHG
BH CV ECHO MEAS - PA V2 MAX: 95 CM/SEC
BH CV ECHO MEAS - PULM A REVS DUR: 0.17 SEC
BH CV ECHO MEAS - PULM A REVS VEL: 29.1 CM/SEC
BH CV ECHO MEAS - PULM DIAS VEL: 43.4 CM/SEC
BH CV ECHO MEAS - PULM S/D: 1
BH CV ECHO MEAS - PULM SYS VEL: 43.4 CM/SEC
BH CV ECHO MEAS - PVA(V,A): 1.2 CM^2
BH CV ECHO MEAS - PVA(V,D): 1.2 CM^2
BH CV ECHO MEAS - QP/QS: 0.54
BH CV ECHO MEAS - RAP SYSTOLE: 3 MMHG
BH CV ECHO MEAS - RV MAX PG: 1.3 MMHG
BH CV ECHO MEAS - RV MEAN PG: 1 MMHG
BH CV ECHO MEAS - RV V1 MAX: 56.4 CM/SEC
BH CV ECHO MEAS - RV V1 MEAN: 39.7 CM/SEC
BH CV ECHO MEAS - RV V1 VTI: 11.6 CM
BH CV ECHO MEAS - RVOT AREA: 2 CM^2
BH CV ECHO MEAS - RVOT DIAM: 1.6 CM
BH CV ECHO MEAS - RVSP: 45 MMHG
BH CV ECHO MEAS - SUP REN AO DIAM: 1.4 CM
BH CV ECHO MEAS - SV(AO): 306.9 ML
BH CV ECHO MEAS - SV(CUBED): 100.8 ML
BH CV ECHO MEAS - SV(LVOT): 43.4 ML
BH CV ECHO MEAS - SV(MOD-SP2): 34 ML
BH CV ECHO MEAS - SV(MOD-SP4): 27 ML
BH CV ECHO MEAS - SV(RVOT): 23.3 ML
BH CV ECHO MEAS - SV(TEICH): 89.6 ML
BH CV ECHO MEAS - TAPSE (>1.6): 2.3 CM2
BH CV ECHO MEAS - TR MAX VEL: 324 CM/SEC
BH CV ECHO MEASUREMENTS AVERAGE E/E' RATIO: 15.33
BH CV XLRA - RV BASE: 2.2 CM
BH CV XLRA - TDI S': 10 CM/SEC
BH CV XLRA MEAS LEFT DIST ICA EDV: 23 CM/SEC
BH CV XLRA MEAS LEFT DIST ICA PSV: 113 CM/SEC
BH CV XLRA MEAS LEFT ICA/CCA RATIO: 6.69
BH CV XLRA MEAS LEFT MID ICA EDV: 89 CM/SEC
BH CV XLRA MEAS LEFT MID ICA PSV: 352 CM/SEC
BH CV XLRA MEAS LEFT PROX CCA EDV: 12 CM/SEC
BH CV XLRA MEAS LEFT PROX CCA PSV: 68 CM/SEC
BH CV XLRA MEAS LEFT PROX ECA PSV: 74 CM/SEC
BH CV XLRA MEAS LEFT PROX ICA EDV: 84 CM/SEC
BH CV XLRA MEAS LEFT PROX ICA PSV: 455 CM/SEC
BH CV XLRA MEAS LEFT PROX SCLA PSV: 107 CM/SEC
BH CV XLRA MEAS RIGHT DIST ICA EDV: 18 CM/SEC
BH CV XLRA MEAS RIGHT DIST ICA PSV: 98 CM/SEC
BH CV XLRA MEAS RIGHT ICA/CCA RATIO: 1.65
BH CV XLRA MEAS RIGHT MID ICA EDV: 15 CM/SEC
BH CV XLRA MEAS RIGHT MID ICA PSV: 105 CM/SEC
BH CV XLRA MEAS RIGHT PROX CCA EDV: 9 CM/SEC
BH CV XLRA MEAS RIGHT PROX CCA PSV: 66 CM/SEC
BH CV XLRA MEAS RIGHT PROX ECA PSV: 309 CM/SEC
BH CV XLRA MEAS RIGHT PROX ICA EDV: 18 CM/SEC
BH CV XLRA MEAS RIGHT PROX ICA PSV: 109 CM/SEC
BH CV XLRA MEAS RIGHT PROX SCLA PSV: 90 CM/SEC
LEFT ATRIUM VOLUME INDEX: 16 ML/M2
LV EF 2D ECHO EST: 63 %
MAXIMAL PREDICTED HEART RATE: 140 BPM
STRESS TARGET HR: 119 BPM

## 2018-08-31 PROCEDURE — 93306 TTE W/DOPPLER COMPLETE: CPT | Performed by: INTERNAL MEDICINE

## 2018-08-31 PROCEDURE — 93306 TTE W/DOPPLER COMPLETE: CPT

## 2018-08-31 PROCEDURE — 93880 EXTRACRANIAL BILAT STUDY: CPT | Performed by: INTERNAL MEDICINE

## 2018-08-31 PROCEDURE — 93880 EXTRACRANIAL BILAT STUDY: CPT

## 2018-09-04 ENCOUNTER — TELEPHONE (OUTPATIENT)
Dept: CARDIOLOGY | Facility: CLINIC | Age: 83
End: 2018-09-04

## 2018-09-04 NOTE — TELEPHONE ENCOUNTER
Pt's daughter called regarding her results on her carotid test. She would like some more information about this and would like to speak to you about what they should do. Jasmin(daughter) c/b is [phone].

## 2018-09-06 ENCOUNTER — TRANSCRIBE ORDERS (OUTPATIENT)
Dept: ADMINISTRATIVE | Facility: HOSPITAL | Age: 83
End: 2018-09-06

## 2018-09-06 DIAGNOSIS — I65.23 CAROTID STENOSIS, NON-SYMPTOMATIC, BILATERAL: Primary | ICD-10-CM

## 2018-09-07 ENCOUNTER — HOSPITAL ENCOUNTER (OUTPATIENT)
Dept: CT IMAGING | Facility: HOSPITAL | Age: 83
Discharge: HOME | End: 2018-09-07
Admitting: UROLOGY

## 2018-09-07 DIAGNOSIS — I65.23 CAROTID STENOSIS, NON-SYMPTOMATIC, BILATERAL: ICD-10-CM

## 2018-09-07 LAB — CREAT BLDA-MCNC: 1 MG/DL (ref 0.6–1.3)

## 2018-09-07 PROCEDURE — 70498 CT ANGIOGRAPHY NECK: CPT

## 2018-09-07 PROCEDURE — 70496 CT ANGIOGRAPHY HEAD: CPT

## 2018-09-07 PROCEDURE — 25010000002 IOPAMIDOL 61 % SOLUTION: Performed by: UROLOGY

## 2018-09-07 PROCEDURE — 82565 ASSAY OF CREATININE: CPT

## 2018-09-07 RX ADMIN — IOPAMIDOL 95 ML: 612 INJECTION, SOLUTION INTRAVENOUS at 15:06

## 2018-09-11 DIAGNOSIS — Z00.00 MEDICARE ANNUAL WELLNESS VISIT, SUBSEQUENT: ICD-10-CM

## 2018-09-11 RX ORDER — IBANDRONATE SODIUM 150 MG/1
TABLET, FILM COATED ORAL
Qty: 1 TABLET | Refills: 0 | Status: SHIPPED | OUTPATIENT
Start: 2018-09-11 | End: 2018-09-19

## 2018-09-14 RX ORDER — AMLODIPINE BESYLATE 10 MG/1
TABLET ORAL
Qty: 90 TABLET | Refills: 2 | Status: SHIPPED | OUTPATIENT
Start: 2018-09-14 | End: 2018-09-14 | Stop reason: DRUGHIGH

## 2018-09-17 DIAGNOSIS — I10 ESSENTIAL HYPERTENSION: ICD-10-CM

## 2018-09-17 RX ORDER — ATENOLOL 50 MG/1
TABLET ORAL
Qty: 30 TABLET | Refills: 1 | Status: SHIPPED | OUTPATIENT
Start: 2018-09-17 | End: 2018-09-19

## 2018-09-19 ENCOUNTER — APPOINTMENT (OUTPATIENT)
Dept: PREADMISSION TESTING | Facility: HOSPITAL | Age: 83
End: 2018-09-19

## 2018-09-19 ENCOUNTER — HOSPITAL ENCOUNTER (OUTPATIENT)
Dept: GENERAL RADIOLOGY | Facility: HOSPITAL | Age: 83
Discharge: HOME | End: 2018-09-19
Admitting: SURGERY

## 2018-09-19 VITALS
HEART RATE: 94 BPM | RESPIRATION RATE: 16 BRPM | WEIGHT: 130 LBS | TEMPERATURE: 97.8 F | SYSTOLIC BLOOD PRESSURE: 149 MMHG | DIASTOLIC BLOOD PRESSURE: 71 MMHG | BODY MASS INDEX: 23.04 KG/M2 | OXYGEN SATURATION: 96 % | HEIGHT: 63 IN

## 2018-09-19 LAB
ALBUMIN SERPL-MCNC: 4.4 G/DL (ref 3.5–5.2)
ALBUMIN/GLOB SERPL: 1.3 G/DL
ALP SERPL-CCNC: 107 U/L (ref 39–117)
ALT SERPL W P-5'-P-CCNC: 16 U/L (ref 1–33)
ANION GAP SERPL CALCULATED.3IONS-SCNC: 17 MMOL/L
APTT PPP: 38.1 SECONDS (ref 22.7–35.4)
AST SERPL-CCNC: 21 U/L (ref 1–32)
BACTERIA UR QL AUTO: NORMAL /HPF
BILIRUB SERPL-MCNC: 0.6 MG/DL (ref 0.1–1.2)
BILIRUB UR QL STRIP: NEGATIVE
BUN BLD-MCNC: 13 MG/DL (ref 8–23)
BUN/CREAT SERPL: 15.1 (ref 7–25)
CALCIUM SPEC-SCNC: 9.8 MG/DL (ref 8.6–10.5)
CHLORIDE SERPL-SCNC: 90 MMOL/L (ref 98–107)
CLARITY UR: ABNORMAL
CO2 SERPL-SCNC: 25 MMOL/L (ref 22–29)
COLOR UR: YELLOW
CREAT BLD-MCNC: 0.86 MG/DL (ref 0.57–1)
DEPRECATED RDW RBC AUTO: 47.6 FL (ref 37–54)
ERYTHROCYTE [DISTWIDTH] IN BLOOD BY AUTOMATED COUNT: 13.3 % (ref 11.7–13)
GFR SERPL CREATININE-BSD FRML MDRD: 63 ML/MIN/1.73
GLOBULIN UR ELPH-MCNC: 3.4 GM/DL
GLUCOSE BLD-MCNC: 122 MG/DL (ref 65–99)
GLUCOSE UR STRIP-MCNC: NEGATIVE MG/DL
HCT VFR BLD AUTO: 36.2 % (ref 35.6–45.5)
HGB BLD-MCNC: 11.8 G/DL (ref 11.9–15.5)
HGB UR QL STRIP.AUTO: NEGATIVE
HYALINE CASTS UR QL AUTO: NORMAL /LPF
INR PPP: 1.07 (ref 0.9–1.1)
KETONES UR QL STRIP: NEGATIVE
LEUKOCYTE ESTERASE UR QL STRIP.AUTO: ABNORMAL
MCH RBC QN AUTO: 32 PG (ref 26.9–32)
MCHC RBC AUTO-ENTMCNC: 32.6 G/DL (ref 32.4–36.3)
MCV RBC AUTO: 98.1 FL (ref 80.5–98.2)
NITRITE UR QL STRIP: NEGATIVE
PH UR STRIP.AUTO: 6.5 [PH] (ref 5–8)
PLATELET # BLD AUTO: 279 10*3/MM3 (ref 140–500)
PMV BLD AUTO: 8.8 FL (ref 6–12)
POTASSIUM BLD-SCNC: 4.1 MMOL/L (ref 3.5–5.2)
PROT SERPL-MCNC: 7.8 G/DL (ref 6–8.5)
PROT UR QL STRIP: NEGATIVE
PROTHROMBIN TIME: 13.7 SECONDS (ref 11.7–14.2)
RBC # BLD AUTO: 3.69 10*6/MM3 (ref 3.9–5.2)
RBC # UR: NORMAL /HPF
REF LAB TEST METHOD: NORMAL
SODIUM BLD-SCNC: 132 MMOL/L (ref 136–145)
SP GR UR STRIP: 1.01 (ref 1–1.03)
SQUAMOUS #/AREA URNS HPF: NORMAL /HPF
UROBILINOGEN UR QL STRIP: ABNORMAL
WBC NRBC COR # BLD: 9.86 10*3/MM3 (ref 4.5–10.7)
WBC UR QL AUTO: NORMAL /HPF

## 2018-09-19 PROCEDURE — 81001 URINALYSIS AUTO W/SCOPE: CPT | Performed by: SURGERY

## 2018-09-19 PROCEDURE — 85610 PROTHROMBIN TIME: CPT | Performed by: SURGERY

## 2018-09-19 PROCEDURE — 71046 X-RAY EXAM CHEST 2 VIEWS: CPT

## 2018-09-19 PROCEDURE — 80053 COMPREHEN METABOLIC PANEL: CPT | Performed by: SURGERY

## 2018-09-19 PROCEDURE — 85027 COMPLETE CBC AUTOMATED: CPT | Performed by: SURGERY

## 2018-09-19 PROCEDURE — 36415 COLL VENOUS BLD VENIPUNCTURE: CPT

## 2018-09-19 PROCEDURE — 85730 THROMBOPLASTIN TIME PARTIAL: CPT | Performed by: SURGERY

## 2018-09-19 RX ORDER — IBANDRONATE SODIUM 150 MG/1
150 TABLET, FILM COATED ORAL
COMMUNITY
End: 2018-10-10 | Stop reason: SDUPTHER

## 2018-09-19 RX ORDER — ATENOLOL 50 MG/1
50 TABLET ORAL DAILY
COMMUNITY
End: 2019-04-15 | Stop reason: SDUPTHER

## 2018-09-19 NOTE — DISCHARGE INSTRUCTIONS
Take the following medications the morning of surgery with a small sip of water: ADVAIR, ATENOLOL, AMLODIPINE        General Instructions:  • Do not eat solid food after midnight the night before surgery.  • You may drink clear liquids day of surgery but must stop at least one hour before your hospital arrival time.  • It is beneficial for you to have a clear drink that contains carbohydrates the day of surgery.  We suggest a 12 to 20 ounce bottle of Gatorade or Powerade for non-diabetic patients or a 12 to 20 ounce bottle of G2 or Powerade Zero for diabetic patients.     Clear liquids are liquids you can see through.  Nothing red in color.     Plain water                               Sports drinks  Sodas                                   Gelatin (Jell-O)  Fruit juices without pulp such as white grape juice and apple juice  Popsicles that contain no fruit or yogurt  Tea or coffee (no cream or milk added)  Gatorade / Powerade  G2 / Powerade Zero    • Bring any papers given to you in the doctor’s office.  • Wear clean comfortable clothes and socks.  • Do not wear contact lenses or make-up.  Bring a case for your glasses.   • Remove all piercings.  Leave jewelry and any other valuables at home.  • The Pre-Admission Testing nurse will instruct you to bring medications if unable to obtain an accurate list in Pre-Admission Testing.            Preventing a Surgical Site Infection:  • For 2 to 3 days before surgery, avoid shaving with a razor because the razor can irritate skin and make it easier to develop an infection.    • Any areas of open skin can increase the risk of a post-operative wound infection by allowing bacteria to enter and travel throughout the body.  Notify your surgeon if you have any skin wounds / rashes even if it is not near the expected surgical site.  The area will need assessed to determine if surgery should be delayed until it is healed.  • The night prior to surgery sleep in a clean bed with clean  clothing.  Do not allow pets to sleep with you.  • Shower on the morning of surgery using a fresh bar of anti-bacterial soap (such as Dial) and clean washcloth.  Dry with a clean towel and dress in clean clothing.  • Ask your surgeon if you will be receiving antibiotics prior to surgery.  • Make sure you, your family, and all healthcare providers clean their hands with soap and water or an alcohol based hand  before caring for you or your wound.    Day of surgery: 9/24/2018. MAIN OR. ARRIVAL TIME 930 AM  Upon arrival, a Pre-op nurse and Anesthesiologist will review your health history, obtain vital signs, and answer questions you may have.  The only belongings needed at this time will be your home medications and if applicable your C-PAP/BI-PAP machine.  If you are staying overnight your family can leave the rest of your belongings in the car and bring them to your room later.  A Pre-op nurse will start an IV and you may receive medication in preparation for surgery, including something to help you relax.  Your family will be able to see you in the Pre-op area.  While you are in surgery your family should notify the waiting room  if they leave the waiting room area and provide a contact phone number.    Please be aware that surgery does come with discomfort.  We want to make every effort to control your discomfort so please discuss any uncontrolled symptoms with your nurse.   Your doctor will most likely have prescribed pain medications.          If you are staying overnight following surgery, you will be transported to your hospital room following the recovery period.  [facility] has all private rooms.    You have received a list of surgical assistants for your reference.  If you have any questions please call Pre-Admission Testing at [phone].  Deductibles and co-payments are collected on the day of service. Please be prepared to pay the required co-pay, deductible or deposit on  the day of service as defined by your plan.

## 2018-09-24 ENCOUNTER — APPOINTMENT (OUTPATIENT)
Dept: CARDIOLOGY | Facility: HOSPITAL | Age: 83
End: 2018-09-24

## 2018-09-24 ENCOUNTER — ANESTHESIA EVENT (OUTPATIENT)
Dept: PERIOP | Facility: HOSPITAL | Age: 83
End: 2018-09-24

## 2018-09-24 ENCOUNTER — HOSPITAL ENCOUNTER (INPATIENT)
Facility: HOSPITAL | Age: 83
LOS: 1 days | Discharge: HOME | End: 2018-09-25
Attending: SURGERY | Admitting: SURGERY

## 2018-09-24 ENCOUNTER — ANESTHESIA (OUTPATIENT)
Dept: PERIOP | Facility: HOSPITAL | Age: 83
End: 2018-09-24

## 2018-09-24 PROBLEM — I65.21 CAROTID STENOSIS, ASYMPTOMATIC, RIGHT: Status: ACTIVE | Noted: 2018-09-24

## 2018-09-24 LAB
BH CV XLRA MEAS RIGHT DIST CCA EDV: 8 CM/SEC
BH CV XLRA MEAS RIGHT DIST CCA PSV: 50 CM/SEC
BH CV XLRA MEAS RIGHT PROX ECA EDV: 1 CM/SEC
BH CV XLRA MEAS RIGHT PROX ECA PSV: 57 CM/SEC
BH CV XLRA MEAS RIGHT PROX ICA EDV: 17 CM/SEC
BH CV XLRA MEAS RIGHT PROX ICA PSV: 117 CM/SEC

## 2018-09-24 PROCEDURE — 25010000002 FENTANYL CITRATE (PF) 100 MCG/2ML SOLUTION: Performed by: ANESTHESIOLOGY

## 2018-09-24 PROCEDURE — 03CK0ZZ EXTIRPATION OF MATTER FROM RIGHT INTERNAL CAROTID ARTERY, OPEN APPROACH: ICD-10-PCS | Performed by: SURGERY

## 2018-09-24 PROCEDURE — 25010000002 PROPOFOL 10 MG/ML EMULSION: Performed by: NURSE ANESTHETIST, CERTIFIED REGISTERED

## 2018-09-24 PROCEDURE — 25010000002 HEPARIN (PORCINE) PER 1000 UNITS: Performed by: SURGERY

## 2018-09-24 PROCEDURE — C1768 GRAFT, VASCULAR: HCPCS | Performed by: SURGERY

## 2018-09-24 PROCEDURE — 25010000002 PROTAMINE SULFATE PER 10 MG: Performed by: NURSE ANESTHETIST, CERTIFIED REGISTERED

## 2018-09-24 PROCEDURE — 25010000002 HEPARIN (PORCINE) PER 1000 UNITS: Performed by: NURSE ANESTHETIST, CERTIFIED REGISTERED

## 2018-09-24 PROCEDURE — 25010000003 CEFAZOLIN IN DEXTROSE 2-4 GM/100ML-% SOLUTION: Performed by: SURGERY

## 2018-09-24 PROCEDURE — 93882 EXTRACRANIAL UNI/LTD STUDY: CPT

## 2018-09-24 PROCEDURE — 25010000002 ONDANSETRON PER 1 MG: Performed by: NURSE ANESTHETIST, CERTIFIED REGISTERED

## 2018-09-24 PROCEDURE — 25010000003 CEFAZOLIN PER 500 MG: Performed by: SURGERY

## 2018-09-24 PROCEDURE — 25010000002 DEXAMETHASONE PER 1 MG: Performed by: NURSE ANESTHETIST, CERTIFIED REGISTERED

## 2018-09-24 PROCEDURE — 85347 COAGULATION TIME ACTIVATED: CPT

## 2018-09-24 DEVICE — VASCU-GUARD PERIPHERAL VASCULAR PATCH IS PREPARED FROM BOVINE PERICARDIUM WHICH IS CROSS-LINKED WITH GLUTARALDEHYDE. THE PERICARDIUM IS PROCURED FROM CATTLE ORIGINATING IN THE UNITED STATES. VASCU-GUARD PERIPHERAL VASCULAR PATCH IS CHEMICALLY STERILIZED USING ETHANOL AND PROPYLENE OXIDE. VASCU-GUARD PERIPHERAL VASCULAR PATCH HAS BEEN TREATED WITH 1 MOLAR SODIUM HYDROXIDE FOR A MINIMUM OF 60 MINUTES AT 20 - 25°C.  VASCU-GUARD PERIPHERAL VASCULAR PATCH IS PACKAGED IN A CONTAINER FILLED WITH STERILE, NON-PYROGENIC WATER CONTAINING PROPYLENE OXIDE. THE CONTENTS OF THE UNOPENED, UNDAMAGED CONTAINER ARE STERILE.
Type: IMPLANTABLE DEVICE | Site: CAROTID | Status: FUNCTIONAL
Brand: VASCU-GUARD PERIPHERAL VASCULAR PATCH

## 2018-09-24 RX ORDER — DEXAMETHASONE SODIUM PHOSPHATE 10 MG/ML
INJECTION INTRAMUSCULAR; INTRAVENOUS AS NEEDED
Status: DISCONTINUED | OUTPATIENT
Start: 2018-09-24 | End: 2018-09-24 | Stop reason: SURG

## 2018-09-24 RX ORDER — FENTANYL CITRATE 50 UG/ML
50 INJECTION, SOLUTION INTRAMUSCULAR; INTRAVENOUS
Status: DISCONTINUED | OUTPATIENT
Start: 2018-09-24 | End: 2018-09-24 | Stop reason: HOSPADM

## 2018-09-24 RX ORDER — PROMETHAZINE HYDROCHLORIDE 25 MG/1
12.5 TABLET ORAL ONCE AS NEEDED
Status: DISCONTINUED | OUTPATIENT
Start: 2018-09-24 | End: 2018-09-24 | Stop reason: HOSPADM

## 2018-09-24 RX ORDER — SODIUM CHLORIDE 9 MG/ML
100 INJECTION, SOLUTION INTRAVENOUS CONTINUOUS
Status: DISCONTINUED | OUTPATIENT
Start: 2018-09-24 | End: 2018-09-25

## 2018-09-24 RX ORDER — CEFAZOLIN SODIUM 2 G/100ML
2 INJECTION, SOLUTION INTRAVENOUS EVERY 8 HOURS
Status: COMPLETED | OUTPATIENT
Start: 2018-09-24 | End: 2018-09-25

## 2018-09-24 RX ORDER — BUDESONIDE AND FORMOTEROL FUMARATE DIHYDRATE 80; 4.5 UG/1; UG/1
2 AEROSOL RESPIRATORY (INHALATION)
Status: DISCONTINUED | OUTPATIENT
Start: 2018-09-24 | End: 2018-09-25 | Stop reason: HOSPADM

## 2018-09-24 RX ORDER — PROMETHAZINE HYDROCHLORIDE 25 MG/ML
12.5 INJECTION, SOLUTION INTRAMUSCULAR; INTRAVENOUS ONCE AS NEEDED
Status: DISCONTINUED | OUTPATIENT
Start: 2018-09-24 | End: 2018-09-24 | Stop reason: HOSPADM

## 2018-09-24 RX ORDER — PROPOFOL 10 MG/ML
VIAL (ML) INTRAVENOUS AS NEEDED
Status: DISCONTINUED | OUTPATIENT
Start: 2018-09-24 | End: 2018-09-24 | Stop reason: SURG

## 2018-09-24 RX ORDER — BUPIVACAINE HYDROCHLORIDE AND EPINEPHRINE 2.5; 5 MG/ML; UG/ML
INJECTION, SOLUTION INFILTRATION; PERINEURAL AS NEEDED
Status: DISCONTINUED | OUTPATIENT
Start: 2018-09-24 | End: 2018-09-24 | Stop reason: HOSPADM

## 2018-09-24 RX ORDER — SODIUM CHLORIDE 0.9 % (FLUSH) 0.9 %
1-10 SYRINGE (ML) INJECTION AS NEEDED
Status: DISCONTINUED | OUTPATIENT
Start: 2018-09-24 | End: 2018-09-24 | Stop reason: HOSPADM

## 2018-09-24 RX ORDER — SODIUM CHLORIDE, SODIUM LACTATE, POTASSIUM CHLORIDE, CALCIUM CHLORIDE 600; 310; 30; 20 MG/100ML; MG/100ML; MG/100ML; MG/100ML
9 INJECTION, SOLUTION INTRAVENOUS CONTINUOUS
Status: DISCONTINUED | OUTPATIENT
Start: 2018-09-24 | End: 2018-09-25

## 2018-09-24 RX ORDER — AMLODIPINE BESYLATE 5 MG/1
5 TABLET ORAL DAILY
Status: DISCONTINUED | OUTPATIENT
Start: 2018-09-25 | End: 2018-09-25 | Stop reason: HOSPADM

## 2018-09-24 RX ORDER — HEPARIN SODIUM 1000 [USP'U]/ML
INJECTION, SOLUTION INTRAVENOUS; SUBCUTANEOUS AS NEEDED
Status: DISCONTINUED | OUTPATIENT
Start: 2018-09-24 | End: 2018-09-24 | Stop reason: SURG

## 2018-09-24 RX ORDER — TRAMADOL HYDROCHLORIDE 50 MG/1
50 TABLET ORAL EVERY 6 HOURS PRN
Status: DISCONTINUED | OUTPATIENT
Start: 2018-09-24 | End: 2018-09-25 | Stop reason: HOSPADM

## 2018-09-24 RX ORDER — LIDOCAINE HYDROCHLORIDE 20 MG/ML
INJECTION, SOLUTION INFILTRATION; PERINEURAL AS NEEDED
Status: DISCONTINUED | OUTPATIENT
Start: 2018-09-24 | End: 2018-09-24 | Stop reason: SURG

## 2018-09-24 RX ORDER — OXYCODONE AND ACETAMINOPHEN 7.5; 325 MG/1; MG/1
1 TABLET ORAL ONCE AS NEEDED
Status: DISCONTINUED | OUTPATIENT
Start: 2018-09-24 | End: 2018-09-24 | Stop reason: HOSPADM

## 2018-09-24 RX ORDER — PROMETHAZINE HYDROCHLORIDE 25 MG/1
25 TABLET ORAL ONCE AS NEEDED
Status: DISCONTINUED | OUTPATIENT
Start: 2018-09-24 | End: 2018-09-24 | Stop reason: HOSPADM

## 2018-09-24 RX ORDER — ONDANSETRON 4 MG/1
4 TABLET, FILM COATED ORAL EVERY 6 HOURS PRN
Status: DISCONTINUED | OUTPATIENT
Start: 2018-09-24 | End: 2018-09-25 | Stop reason: HOSPADM

## 2018-09-24 RX ORDER — HYDROCODONE BITARTRATE AND ACETAMINOPHEN 7.5; 325 MG/1; MG/1
1 TABLET ORAL ONCE AS NEEDED
Status: DISCONTINUED | OUTPATIENT
Start: 2018-09-24 | End: 2018-09-24 | Stop reason: HOSPADM

## 2018-09-24 RX ORDER — DOPAMINE HYDROCHLORIDE 160 MG/100ML
2-20 INJECTION, SOLUTION INTRAVENOUS
Status: DISCONTINUED | OUTPATIENT
Start: 2018-09-24 | End: 2018-09-25

## 2018-09-24 RX ORDER — SODIUM CHLORIDE, SODIUM LACTATE, POTASSIUM CHLORIDE, CALCIUM CHLORIDE 600; 310; 30; 20 MG/100ML; MG/100ML; MG/100ML; MG/100ML
INJECTION, SOLUTION INTRAVENOUS CONTINUOUS PRN
Status: DISCONTINUED | OUTPATIENT
Start: 2018-09-24 | End: 2018-09-24 | Stop reason: SURG

## 2018-09-24 RX ORDER — LABETALOL HYDROCHLORIDE 5 MG/ML
5 INJECTION, SOLUTION INTRAVENOUS
Status: DISCONTINUED | OUTPATIENT
Start: 2018-09-24 | End: 2018-09-24 | Stop reason: HOSPADM

## 2018-09-24 RX ORDER — ONDANSETRON 2 MG/ML
INJECTION INTRAMUSCULAR; INTRAVENOUS AS NEEDED
Status: DISCONTINUED | OUTPATIENT
Start: 2018-09-24 | End: 2018-09-24 | Stop reason: SURG

## 2018-09-24 RX ORDER — PROMETHAZINE HYDROCHLORIDE 25 MG/1
25 SUPPOSITORY RECTAL ONCE AS NEEDED
Status: DISCONTINUED | OUTPATIENT
Start: 2018-09-24 | End: 2018-09-24 | Stop reason: HOSPADM

## 2018-09-24 RX ORDER — CEFAZOLIN SODIUM 2 G/100ML
2 INJECTION, SOLUTION INTRAVENOUS ONCE
Status: DISCONTINUED | OUTPATIENT
Start: 2018-09-24 | End: 2018-09-25 | Stop reason: HOSPADM

## 2018-09-24 RX ORDER — LIDOCAINE HYDROCHLORIDE 40 MG/ML
SOLUTION TOPICAL AS NEEDED
Status: DISCONTINUED | OUTPATIENT
Start: 2018-09-24 | End: 2018-09-24 | Stop reason: SURG

## 2018-09-24 RX ORDER — ROCURONIUM BROMIDE 10 MG/ML
INJECTION, SOLUTION INTRAVENOUS AS NEEDED
Status: DISCONTINUED | OUTPATIENT
Start: 2018-09-24 | End: 2018-09-24 | Stop reason: SURG

## 2018-09-24 RX ORDER — ONDANSETRON 2 MG/ML
4 INJECTION INTRAMUSCULAR; INTRAVENOUS EVERY 6 HOURS PRN
Status: DISCONTINUED | OUTPATIENT
Start: 2018-09-24 | End: 2018-09-25 | Stop reason: HOSPADM

## 2018-09-24 RX ORDER — MORPHINE SULFATE 2 MG/ML
1 INJECTION, SOLUTION INTRAMUSCULAR; INTRAVENOUS EVERY 4 HOURS PRN
Status: DISCONTINUED | OUTPATIENT
Start: 2018-09-24 | End: 2018-09-25 | Stop reason: HOSPADM

## 2018-09-24 RX ORDER — FLUMAZENIL 0.1 MG/ML
0.2 INJECTION INTRAVENOUS AS NEEDED
Status: DISCONTINUED | OUTPATIENT
Start: 2018-09-24 | End: 2018-09-24 | Stop reason: HOSPADM

## 2018-09-24 RX ORDER — ONDANSETRON 4 MG/1
4 TABLET, ORALLY DISINTEGRATING ORAL EVERY 6 HOURS PRN
Status: DISCONTINUED | OUTPATIENT
Start: 2018-09-24 | End: 2018-09-25 | Stop reason: HOSPADM

## 2018-09-24 RX ORDER — NALOXONE HCL 0.4 MG/ML
0.2 VIAL (ML) INJECTION AS NEEDED
Status: DISCONTINUED | OUTPATIENT
Start: 2018-09-24 | End: 2018-09-24 | Stop reason: HOSPADM

## 2018-09-24 RX ORDER — FAMOTIDINE 10 MG/ML
20 INJECTION, SOLUTION INTRAVENOUS ONCE
Status: COMPLETED | OUTPATIENT
Start: 2018-09-24 | End: 2018-09-24

## 2018-09-24 RX ORDER — ASPIRIN 81 MG/1
81 TABLET ORAL DAILY
Status: DISCONTINUED | OUTPATIENT
Start: 2018-09-25 | End: 2018-09-25 | Stop reason: HOSPADM

## 2018-09-24 RX ORDER — PROTAMINE SULFATE 10 MG/ML
INJECTION, SOLUTION INTRAVENOUS AS NEEDED
Status: DISCONTINUED | OUTPATIENT
Start: 2018-09-24 | End: 2018-09-24 | Stop reason: SURG

## 2018-09-24 RX ORDER — LIDOCAINE HYDROCHLORIDE 10 MG/ML
0.5 INJECTION, SOLUTION EPIDURAL; INFILTRATION; INTRACAUDAL; PERINEURAL ONCE AS NEEDED
Status: DISCONTINUED | OUTPATIENT
Start: 2018-09-24 | End: 2018-09-24 | Stop reason: HOSPADM

## 2018-09-24 RX ORDER — EPHEDRINE SULFATE 50 MG/ML
5 INJECTION, SOLUTION INTRAVENOUS ONCE AS NEEDED
Status: DISCONTINUED | OUTPATIENT
Start: 2018-09-24 | End: 2018-09-24 | Stop reason: HOSPADM

## 2018-09-24 RX ORDER — SCOLOPAMINE TRANSDERMAL SYSTEM 1 MG/1
1 PATCH, EXTENDED RELEASE TRANSDERMAL ONCE
Status: DISCONTINUED | OUTPATIENT
Start: 2018-09-24 | End: 2018-09-25

## 2018-09-24 RX ORDER — NALOXONE HCL 0.4 MG/ML
0.4 VIAL (ML) INJECTION
Status: DISCONTINUED | OUTPATIENT
Start: 2018-09-24 | End: 2018-09-25 | Stop reason: HOSPADM

## 2018-09-24 RX ORDER — ATENOLOL 50 MG/1
50 TABLET ORAL DAILY
Status: DISCONTINUED | OUTPATIENT
Start: 2018-09-25 | End: 2018-09-25 | Stop reason: HOSPADM

## 2018-09-24 RX ORDER — ONDANSETRON 2 MG/ML
4 INJECTION INTRAMUSCULAR; INTRAVENOUS ONCE AS NEEDED
Status: DISCONTINUED | OUTPATIENT
Start: 2018-09-24 | End: 2018-09-24 | Stop reason: HOSPADM

## 2018-09-24 RX ADMIN — PROPOFOL 130 MG: 10 INJECTION, EMULSION INTRAVENOUS at 12:24

## 2018-09-24 RX ADMIN — SUGAMMADEX 200 MG: 100 INJECTION, SOLUTION INTRAVENOUS at 13:53

## 2018-09-24 RX ADMIN — SCOPALAMINE 1 PATCH: 1 PATCH, EXTENDED RELEASE TRANSDERMAL at 10:41

## 2018-09-24 RX ADMIN — FAMOTIDINE 20 MG: 10 INJECTION, SOLUTION INTRAVENOUS at 10:40

## 2018-09-24 RX ADMIN — BUDESONIDE AND FORMOTEROL FUMARATE DIHYDRATE 2 PUFF: 80; 4.5 AEROSOL RESPIRATORY (INHALATION) at 21:30

## 2018-09-24 RX ADMIN — FENTANYL CITRATE 25 MCG: 50 INJECTION INTRAMUSCULAR; INTRAVENOUS at 10:56

## 2018-09-24 RX ADMIN — FENTANYL CITRATE 25 MCG: 50 INJECTION INTRAMUSCULAR; INTRAVENOUS at 10:40

## 2018-09-24 RX ADMIN — PROPOFOL 40 MG: 10 INJECTION, EMULSION INTRAVENOUS at 12:41

## 2018-09-24 RX ADMIN — ONDANSETRON 4 MG: 2 INJECTION INTRAMUSCULAR; INTRAVENOUS at 13:46

## 2018-09-24 RX ADMIN — CEFAZOLIN SODIUM 2 G: 2 INJECTION, SOLUTION INTRAVENOUS at 20:18

## 2018-09-24 RX ADMIN — FENTANYL CITRATE 25 MCG: 50 INJECTION INTRAMUSCULAR; INTRAVENOUS at 12:24

## 2018-09-24 RX ADMIN — LIDOCAINE HYDROCHLORIDE 1 EACH: 40 SOLUTION TOPICAL at 12:26

## 2018-09-24 RX ADMIN — ROCURONIUM BROMIDE 30 MG: 10 INJECTION INTRAVENOUS at 12:24

## 2018-09-24 RX ADMIN — DEXAMETHASONE SODIUM PHOSPHATE 4 MG: 10 INJECTION INTRAMUSCULAR; INTRAVENOUS at 12:56

## 2018-09-24 RX ADMIN — LIDOCAINE HYDROCHLORIDE 80 MG: 20 INJECTION, SOLUTION INFILTRATION; PERINEURAL at 12:24

## 2018-09-24 RX ADMIN — SODIUM CHLORIDE, POTASSIUM CHLORIDE, SODIUM LACTATE AND CALCIUM CHLORIDE: 600; 310; 30; 20 INJECTION, SOLUTION INTRAVENOUS at 11:32

## 2018-09-24 RX ADMIN — FENTANYL CITRATE 25 MCG: 50 INJECTION INTRAMUSCULAR; INTRAVENOUS at 12:44

## 2018-09-24 RX ADMIN — PROTAMINE SULFATE 30 MG: 10 INJECTION, SOLUTION INTRAVENOUS at 13:46

## 2018-09-24 RX ADMIN — HEPARIN SODIUM 7500 UNITS: 1000 INJECTION, SOLUTION INTRAVENOUS; SUBCUTANEOUS at 12:46

## 2018-09-24 RX ADMIN — SODIUM CHLORIDE 100 ML/HR: 9 INJECTION, SOLUTION INTRAVENOUS at 21:30

## 2018-09-24 RX ADMIN — SODIUM CHLORIDE, POTASSIUM CHLORIDE, SODIUM LACTATE AND CALCIUM CHLORIDE 9 ML/HR: 600; 310; 30; 20 INJECTION, SOLUTION INTRAVENOUS at 10:30

## 2018-09-24 NOTE — ANESTHESIA POSTPROCEDURE EVALUATION
Patient: Lizzy Hicks    Procedure Summary     Date:  09/24/18 Room / Location:  [facility] OR 09 / [facility] MAIN OR    Anesthesia Start:  1218 Anesthesia Stop:  1414    Procedure:  RT CAROTID ENDARTERECTOMY WITH INTRA OPERATIVE CAROTID ARTERY DUPLEX SCAN (Right Neck) Diagnosis:      Surgeon:  Mikaela Galicia Jr., MD Provider:  Blaine Sotelo MD    Anesthesia Type:  general ASA Status:  3          Anesthesia Type: general  Last vitals  BP   108/51 (09/24/18 1740)   Temp   36.8 °C (98.3 °F) (09/24/18 1411)   Pulse   65 (09/24/18 1740)   Resp   14 (09/24/18 1740)     SpO2   98 % (09/24/18 1740)     Post Anesthesia Care and Evaluation    Patient location during evaluation: bedside  Patient participation: complete - patient participated  Level of consciousness: awake  Pain management: adequate  Airway patency: patent  Anesthetic complications: No anesthetic complications    Cardiovascular status: acceptable  Respiratory status: acceptable  Hydration status: acceptable    Comments: */51   Pulse 65   Temp 36.8 °C (98.3 °F) (Oral)   Resp 14   LMP  (LMP Unknown)   SpO2 98%

## 2018-09-24 NOTE — ANESTHESIA PREPROCEDURE EVALUATION
Anesthesia Evaluation     history of anesthetic complications: PONV  NPO Solid Status: > 8 hours             Airway   Mallampati: II  Dental    (+) edentulous    Pulmonary - normal exam   (+) a smoker Former, COPD,   (-) sleep apnea  Cardiovascular - normal exam    (+) hypertension, PVD,  carotid artery disease      Neuro/Psych  GI/Hepatic/Renal/Endo    (-) GERD    Musculoskeletal     Abdominal    Substance History      OB/GYN          Other                        Anesthesia Plan    ASA 3     general   (Fire safety precautions  )  Anesthetic plan, all risks, benefits, and alternatives have been provided, discussed and informed consent has been obtained with: patient.

## 2018-09-24 NOTE — ANESTHESIA PROCEDURE NOTES
Arterial Line      Patient location during procedure: holding area  Start time: 9/24/2018 10:52 AM  Stop Time:9/24/2018 11:06 AM       Line placed for hemodynamic monitoring.  Performed By   Anesthesiologist: GILLIAN MCLAUGHLIN  Preanesthetic Checklist  Completed: patient identified and risks and benefits discussed  Arterial Line Prep   Sterile Tech: gloves  Prep: ChloraPrep  Patient monitoring: blood pressure monitoring, continuous pulse oximetry and EKG  Arterial Line Procedure   Laterality:right  Location:  radial artery  Catheter size: 20 G   Guidance: ultrasound guided  PROCEDURE NOTE/ULTRASOUND INTERPRETATION.  Using ultrasound guidance the potential vascular sites for insertion of the catheter were visualized to determine the patency of the vessel to be used for vascular access.  After selecting the appropriate site for insertion, the needle was visualized under ultrasound being inserted into the radial artery, followed by ultrasound confirmation of wire and catheter placement. There were no abnormalities seen on ultrasound; an image was taken; and the patient tolerated the procedure with no complications.   Successful placement: yes          Post Assessment   Dressing Type: occlusive dressing applied and secured with tape.   Complications no  Patient Tolerance: patient tolerated the procedure well with no apparent complications  Additional Notes  Using ultrasound guidance the potential vascular sites for insertion of the catheter were visualized to determine the patency of the vessel to be used for vascular access.  After selecting the appropriate site for insertion, the needle was visualized under ultrasound being inserted into the radial artery, followed by ultrasound confirmation of wire and catheter placement.  There were no abnormalities seen on ultrasound; an image was taken/ and the patient tolerated the procedure with no complications.    1st attempt on left, unable to thread catheter

## 2018-09-24 NOTE — ANESTHESIA PROCEDURE NOTES
Airway  Urgency: elective    Date/Time: 9/24/2018 12:27 PM  Airway not difficult    General Information and Staff    Patient location during procedure: OR  Anesthesiologist: GILLIAN MCLAUGHLIN  CRNA: LAUREL FROST    Indications and Patient Condition    Preoxygenated: yes  MILS not maintained throughout  Mask difficulty assessment: 1 - vent by mask    Final Airway Details  Final airway type: endotracheal airway      Successful airway: ETT  Cuffed: yes   Successful intubation technique: direct laryngoscopy  Endotracheal tube insertion site: oral  Blade: Yulia  Blade size: 3  ETT size: 7.0 mm  Cormack-Lehane Classification: grade I - full view of glottis  Placement verified by: chest auscultation   Cuff volume (mL): 5  Measured from: lips  ETT to lips (cm): 19  Number of attempts at approach: 1    Additional Comments  Pre O2, SIAI

## 2018-09-25 VITALS
TEMPERATURE: 98.6 F | OXYGEN SATURATION: 97 % | HEART RATE: 72 BPM | DIASTOLIC BLOOD PRESSURE: 58 MMHG | RESPIRATION RATE: 18 BRPM | SYSTOLIC BLOOD PRESSURE: 134 MMHG

## 2018-09-25 PROCEDURE — 25010000003 CEFAZOLIN IN DEXTROSE 2-4 GM/100ML-% SOLUTION: Performed by: SURGERY

## 2018-09-25 RX ADMIN — CEFAZOLIN SODIUM 2 G: 2 INJECTION, SOLUTION INTRAVENOUS at 02:09

## 2018-09-25 RX ADMIN — ASPIRIN 81 MG: 81 TABLET, DELAYED RELEASE ORAL at 08:07

## 2018-09-25 RX ADMIN — ATENOLOL 50 MG: 50 TABLET ORAL at 08:07

## 2018-09-25 RX ADMIN — AMLODIPINE BESYLATE 5 MG: 5 TABLET ORAL at 08:07

## 2018-09-26 ENCOUNTER — EPISODE CHANGES (OUTPATIENT)
Dept: CASE MANAGEMENT | Facility: OTHER | Age: 83
End: 2018-09-26

## 2018-09-26 ENCOUNTER — READMISSION MANAGEMENT (OUTPATIENT)
Dept: CALL CENTER | Facility: HOSPITAL | Age: 83
End: 2018-09-26

## 2018-09-26 NOTE — OUTREACH NOTE
Prep Survey      Responses   Facility patient discharged from?  [facility]   Is patient eligible?  Yes   Discharge diagnosis  Carotid stenosis   Does the patient have one of the following disease processes/diagnoses(primary or secondary)?  General Surgery   Does the patient have Home health ordered?  No   Is there a DME ordered?  No   Prep survey completed?  Yes          Farida Bustos RN

## 2018-10-01 ENCOUNTER — READMISSION MANAGEMENT (OUTPATIENT)
Dept: CALL CENTER | Facility: HOSPITAL | Age: 83
End: 2018-10-01

## 2018-10-01 NOTE — OUTREACH NOTE
General Surgery Week 1 Survey      Responses   Facility patient discharged from?  [facility]   Does the patient have one of the following disease processes/diagnoses(primary or secondary)?  General Surgery   Is there a successful TCM telephone encounter documented?  No   Week 1 attempt successful?  Yes   Call start time  1203   Call end time  1211   Discharge diagnosis  Carotid stenosis   Meds reviewed with patient/caregiver?  Yes   Is the patient having any side effects they believe may be caused by any medication additions or changes?  No   Does the patient have all medications related to this admission filled (includes all antibiotics, pain medications, etc.)  Yes   Is the patient taking all medications as directed (includes completed medication regime)?  Yes   Does the patient have a follow up appointment scheduled with their surgeon?  Yes [Has appt on 10/10/2018 with PCP. ]   Has the patient kept scheduled appointments due by today?  Yes   Psychosocial issues?  No   Comments  Patient reports small amount of edema to surgical site. No redness or drainage. Reminded of lifting precautions . Patient verbalized understanding of above.    Did the patient receive a copy of their discharge instructions?  Yes   Nursing interventions  Reviewed instructions with patient   What is the patient's perception of their health status since discharge?  Improving   Nursing interventions  Nurse provided patient education   Is the patient /caregiver able to teach back basic post-op care?  Take showers only when approved by MD-sponge bathe until then, No tub bath, swimming, or hot tub until instructed by MD, Lifting as instructed by MD in discharge instructions, Drive as instructed by MD in discharge instructions, Keep incision areas clean,dry and protected   Is the patient/caregiver able to teach back signs and symptoms of incisional infection?  Increased redness, swelling or pain at the incisonal site, Increased drainage or  bleeding, Incisional warmth, Pus or odor from incision, Fever   Is the patient/caregiver able to teach back steps to recovery at home?  Set small, achievable goals for return to baseline health, Rest and rebuild strength, gradually increase activity, Make a list of questions for surgeon's appointment   Is the patient/caregiver able to teach back the hierarchy of who to call/visit for symptoms/problems? PCP, Specialist, Home health nurse, Urgent Care, ED, 911  Yes   Week 1 call completed?  Yes          Larry Nicholson RN

## 2018-10-10 ENCOUNTER — OFFICE VISIT (OUTPATIENT)
Dept: INTERNAL MEDICINE | Facility: CLINIC | Age: 83
End: 2018-10-10

## 2018-10-10 ENCOUNTER — READMISSION MANAGEMENT (OUTPATIENT)
Dept: CALL CENTER | Facility: HOSPITAL | Age: 83
End: 2018-10-10

## 2018-10-10 VITALS
WEIGHT: 134 LBS | SYSTOLIC BLOOD PRESSURE: 142 MMHG | DIASTOLIC BLOOD PRESSURE: 68 MMHG | HEART RATE: 90 BPM | BODY MASS INDEX: 23.74 KG/M2 | TEMPERATURE: 96.5 F | OXYGEN SATURATION: 97 %

## 2018-10-10 DIAGNOSIS — Z00.00 MEDICARE ANNUAL WELLNESS VISIT, SUBSEQUENT: ICD-10-CM

## 2018-10-10 DIAGNOSIS — I65.23 BILATERAL CAROTID ARTERY STENOSIS: Primary | ICD-10-CM

## 2018-10-10 DIAGNOSIS — I10 ESSENTIAL HYPERTENSION: ICD-10-CM

## 2018-10-10 PROCEDURE — G0008 ADMIN INFLUENZA VIRUS VAC: HCPCS | Performed by: FAMILY MEDICINE

## 2018-10-10 PROCEDURE — 90662 IIV NO PRSV INCREASED AG IM: CPT | Performed by: FAMILY MEDICINE

## 2018-10-10 PROCEDURE — 99213 OFFICE O/P EST LOW 20 MIN: CPT | Performed by: FAMILY MEDICINE

## 2018-10-10 RX ORDER — IBANDRONATE SODIUM 150 MG/1
TABLET, FILM COATED ORAL
Qty: 1 TABLET | Refills: 5 | Status: SHIPPED | OUTPATIENT
Start: 2018-10-10 | End: 2019-03-29

## 2018-10-10 NOTE — PROGRESS NOTES
Subjective   Lizzy Hicks is a 83 y.o. female.     Chief Complaint   Patient presents with   • Hypertension   Carotid surgery      History of Present Illness   Current outpatient and discharge medications have been reconciled for the patient.  Reviewed by: Deric Mendosa Jr., MD  Patient has done very well with right carotid endarterectomy is on less antihypertensive.  We'll continue on same medicines and see her back in 6 months she's given a flu shot today history of surgeries reviewed.  She's done very well.      The following portions of the patient's history were reviewed and updated as appropriate: allergies, current medications, past social history and problem list.    Review of Systems   Constitutional: Negative.    Eyes: Negative.    Respiratory: Negative.    Cardiovascular: Negative.    Gastrointestinal: Negative.    Endocrine: Negative.    Genitourinary: Negative.    Musculoskeletal: Negative.    Skin: Negative.    Allergic/Immunologic: Negative.    Neurological: Negative.    Hematological: Negative.    Psychiatric/Behavioral: Negative.        Objective   Vitals:    10/10/18 1351   BP: 142/68   Pulse: 90   Temp: 96.5 °F (35.8 °C)   SpO2: 97%     Physical Exam   Constitutional: She is oriented to person, place, and time. She appears well-developed and well-nourished.   HENT:   Head: Normocephalic.   Right Ear: External ear normal.   Left Ear: External ear normal.   Mouth/Throat: Oropharynx is clear and moist.   Eyes: Pupils are equal, round, and reactive to light.   Neck: Normal range of motion. Neck supple.       Cardiovascular: Normal rate, regular rhythm and normal heart sounds.    Pulmonary/Chest: Effort normal and breath sounds normal.   Abdominal: Soft. Bowel sounds are normal.   Musculoskeletal: Normal range of motion.   Neurological: She is alert and oriented to person, place, and time.   Skin: Skin is warm and dry.   Psychiatric: She has a normal mood and affect.   Vitals  reviewed.      Assessment/Plan   Problem List Items Addressed This Visit        Cardiovascular and Mediastinum    Essential hypertension    Carotid artery stenosis - Primary      Plan: Knee current medications high-dose flu shot today recheck in 6 months.

## 2018-10-10 NOTE — OUTREACH NOTE
General Surgery Week 2 Survey      Responses   Facility patient discharged from?  [place]   Does the patient have one of the following disease processes/diagnoses(primary or secondary)?  General Surgery   Week 2 attempt successful?  Yes   Call start time  1013   Call end time  1026   Discharge diagnosis  Carotid stenosis   Meds reviewed with patient/caregiver?  Yes   Is the patient taking all medications as directed (includes completed medication regime)?  Yes   Has the patient kept scheduled appointments due by today?  Yes   Psychosocial issues?  No   Comments  Appetite has returned to baseline. Incision is SUZETTE no s/sx of inf. Slight numb feeling on small area on right side chin, remains, MD aware per her report. Denies issues swallowing or speaking.    What is the patient's perception of their health status since discharge?  Returned to baseline/stable   Nursing interventions  Nurse provided patient education   Is the patient /caregiver able to teach back basic post-op care?  Drive as instructed by MD in discharge instructions, Take showers only when approved by MD-sponge bathe until then, Keep incision areas clean,dry and protected, Lifting as instructed by MD in discharge instructions   Is the patient/caregiver able to teach back signs and symptoms of incisional infection?  Increased redness, swelling or pain at the incisonal site, Increased drainage or bleeding, Incisional warmth, Pus or odor from incision, Fever   Is the patient/caregiver able to teach back the hierarchy of who to call/visit for symptoms/problems? PCP, Specialist, Home health nurse, Urgent Care, ED, 911  Yes   Week 2 call completed?  Yes   Revoked  No further contact(revokes)-requires comment   Graduated/Revoked comments  Pt feels returned to baseline.          Sadia Malhotra RN

## 2018-10-17 LAB
ACT BLD: 136 SECONDS (ref 82–152)
ACT BLD: 142 SECONDS (ref 82–152)
ACT BLD: 147 SECONDS (ref 82–152)
ACT BLD: 340 SECONDS (ref 82–152)

## 2018-10-19 ENCOUNTER — EPISODE CHANGES (OUTPATIENT)
Dept: CASE MANAGEMENT | Facility: OTHER | Age: 83
End: 2018-10-19

## 2018-11-10 DIAGNOSIS — I10 ESSENTIAL HYPERTENSION: ICD-10-CM

## 2018-11-12 RX ORDER — LOSARTAN POTASSIUM 100 MG/1
TABLET ORAL
Qty: 90 TABLET | Refills: 0 | Status: SHIPPED | OUTPATIENT
Start: 2018-11-12 | End: 2019-03-29

## 2018-11-16 DIAGNOSIS — I10 ESSENTIAL HYPERTENSION: ICD-10-CM

## 2018-11-19 RX ORDER — ATENOLOL 50 MG/1
TABLET ORAL
Qty: 30 TABLET | Refills: 4 | Status: SHIPPED | OUTPATIENT
Start: 2018-11-19 | End: 2019-03-29

## 2019-02-26 ENCOUNTER — TRANSCRIBE ORDERS (OUTPATIENT)
Dept: ADMINISTRATIVE | Facility: HOSPITAL | Age: 84
End: 2019-02-26

## 2019-02-26 DIAGNOSIS — Z12.31 VISIT FOR SCREENING MAMMOGRAM: Primary | ICD-10-CM

## 2019-03-28 ENCOUNTER — HOSPITAL ENCOUNTER (OUTPATIENT)
Dept: MAMMOGRAPHY | Facility: HOSPITAL | Age: 84
Discharge: HOME | End: 2019-03-28
Admitting: FAMILY MEDICINE

## 2019-03-28 DIAGNOSIS — Z12.31 VISIT FOR SCREENING MAMMOGRAM: ICD-10-CM

## 2019-03-28 PROCEDURE — 77063 BREAST TOMOSYNTHESIS BI: CPT

## 2019-03-28 PROCEDURE — 77067 SCR MAMMO BI INCL CAD: CPT

## 2019-03-29 ENCOUNTER — TELEPHONE (OUTPATIENT)
Dept: INTERNAL MEDICINE | Facility: CLINIC | Age: 84
End: 2019-03-29

## 2019-03-29 ENCOUNTER — APPOINTMENT (OUTPATIENT)
Dept: PREADMISSION TESTING | Facility: HOSPITAL | Age: 84
End: 2019-03-29

## 2019-03-29 ENCOUNTER — HOSPITAL ENCOUNTER (OUTPATIENT)
Dept: GENERAL RADIOLOGY | Facility: HOSPITAL | Age: 84
Discharge: HOME | End: 2019-03-29
Admitting: SURGERY

## 2019-03-29 VITALS
RESPIRATION RATE: 18 BRPM | DIASTOLIC BLOOD PRESSURE: 76 MMHG | TEMPERATURE: 100.4 F | BODY MASS INDEX: 24.1 KG/M2 | HEIGHT: 63 IN | HEART RATE: 95 BPM | WEIGHT: 136 LBS | SYSTOLIC BLOOD PRESSURE: 171 MMHG | OXYGEN SATURATION: 96 %

## 2019-03-29 LAB
ALBUMIN SERPL-MCNC: 4.5 G/DL (ref 3.5–5.2)
ALBUMIN/GLOB SERPL: 1.4 G/DL
ALP SERPL-CCNC: 103 U/L (ref 39–117)
ALT SERPL W P-5'-P-CCNC: 24 U/L (ref 1–33)
ANION GAP SERPL CALCULATED.3IONS-SCNC: 16.5 MMOL/L
APTT PPP: 35 SECONDS (ref 22.7–35.4)
AST SERPL-CCNC: 39 U/L (ref 1–32)
BACTERIA UR QL AUTO: ABNORMAL /HPF
BILIRUB SERPL-MCNC: 0.4 MG/DL (ref 0.2–1.2)
BILIRUB UR QL STRIP: NEGATIVE
BUN BLD-MCNC: 11 MG/DL (ref 8–23)
BUN/CREAT SERPL: 12.5 (ref 7–25)
CALCIUM SPEC-SCNC: 9.4 MG/DL (ref 8.6–10.5)
CHLORIDE SERPL-SCNC: 87 MMOL/L (ref 98–107)
CLARITY UR: CLEAR
CO2 SERPL-SCNC: 26.5 MMOL/L (ref 22–29)
COLOR UR: YELLOW
CREAT BLD-MCNC: 0.88 MG/DL (ref 0.57–1)
DEPRECATED RDW RBC AUTO: 50.5 FL (ref 37–54)
ERYTHROCYTE [DISTWIDTH] IN BLOOD BY AUTOMATED COUNT: 13.7 % (ref 12.3–15.4)
GFR SERPL CREATININE-BSD FRML MDRD: 62 ML/MIN/1.73
GLOBULIN UR ELPH-MCNC: 3.3 GM/DL
GLUCOSE BLD-MCNC: 130 MG/DL (ref 65–99)
GLUCOSE UR STRIP-MCNC: NEGATIVE MG/DL
HCT VFR BLD AUTO: 41.6 % (ref 34–46.6)
HGB BLD-MCNC: 14.2 G/DL (ref 12–15.9)
HGB UR QL STRIP.AUTO: NEGATIVE
HYALINE CASTS UR QL AUTO: ABNORMAL /LPF
INR PPP: 1.04 (ref 0.9–1.1)
KETONES UR QL STRIP: ABNORMAL
LEUKOCYTE ESTERASE UR QL STRIP.AUTO: ABNORMAL
MCH RBC QN AUTO: 33.6 PG (ref 26.6–33)
MCHC RBC AUTO-ENTMCNC: 34.1 G/DL (ref 31.5–35.7)
MCV RBC AUTO: 98.6 FL (ref 79–97)
NITRITE UR QL STRIP: NEGATIVE
PH UR STRIP.AUTO: 8 [PH] (ref 5–8)
PLATELET # BLD AUTO: 250 10*3/MM3 (ref 140–450)
PMV BLD AUTO: 9.3 FL (ref 6–12)
POTASSIUM BLD-SCNC: 4 MMOL/L (ref 3.5–5.2)
PROT SERPL-MCNC: 7.8 G/DL (ref 6–8.5)
PROT UR QL STRIP: ABNORMAL
PROTHROMBIN TIME: 13.4 SECONDS (ref 11.7–14.2)
RBC # BLD AUTO: 4.22 10*6/MM3 (ref 3.77–5.28)
RBC # UR: ABNORMAL /HPF
REF LAB TEST METHOD: ABNORMAL
SODIUM BLD-SCNC: 130 MMOL/L (ref 136–145)
SP GR UR STRIP: 1.01 (ref 1–1.03)
SQUAMOUS #/AREA URNS HPF: ABNORMAL /HPF
UROBILINOGEN UR QL STRIP: ABNORMAL
WBC NRBC COR # BLD: 5.84 10*3/MM3 (ref 3.4–10.8)
WBC UR QL AUTO: ABNORMAL /HPF

## 2019-03-29 PROCEDURE — 71046 X-RAY EXAM CHEST 2 VIEWS: CPT

## 2019-03-29 PROCEDURE — 85027 COMPLETE CBC AUTOMATED: CPT | Performed by: SURGERY

## 2019-03-29 PROCEDURE — 85610 PROTHROMBIN TIME: CPT | Performed by: SURGERY

## 2019-03-29 PROCEDURE — 93005 ELECTROCARDIOGRAM TRACING: CPT

## 2019-03-29 PROCEDURE — 81001 URINALYSIS AUTO W/SCOPE: CPT | Performed by: SURGERY

## 2019-03-29 PROCEDURE — 93010 ELECTROCARDIOGRAM REPORT: CPT | Performed by: INTERNAL MEDICINE

## 2019-03-29 PROCEDURE — 80053 COMPREHEN METABOLIC PANEL: CPT | Performed by: SURGERY

## 2019-03-29 PROCEDURE — 85730 THROMBOPLASTIN TIME PARTIAL: CPT | Performed by: SURGERY

## 2019-03-29 PROCEDURE — 36415 COLL VENOUS BLD VENIPUNCTURE: CPT

## 2019-03-29 RX ORDER — AZITHROMYCIN 250 MG/1
TABLET, FILM COATED ORAL
Qty: 6 TABLET | Refills: 0 | Status: SHIPPED | OUTPATIENT
Start: 2019-03-29 | End: 2019-04-11

## 2019-03-29 RX ORDER — IBANDRONATE SODIUM 150 MG/1
150 TABLET, FILM COATED ORAL
COMMUNITY
End: 2019-08-20 | Stop reason: SDUPTHER

## 2019-03-29 NOTE — TELEPHONE ENCOUNTER
Pt went for her pre-op testing and had a slight fever and today has a scratchy throat.  She is requesting a Zpack  I sent it to the pharmacy

## 2019-04-04 ENCOUNTER — HOSPITAL ENCOUNTER (INPATIENT)
Facility: HOSPITAL | Age: 84
LOS: 1 days | Discharge: HOME | End: 2019-04-05
Attending: SURGERY | Admitting: SURGERY

## 2019-04-04 ENCOUNTER — ANESTHESIA EVENT (OUTPATIENT)
Dept: PERIOP | Facility: HOSPITAL | Age: 84
End: 2019-04-04

## 2019-04-04 ENCOUNTER — APPOINTMENT (OUTPATIENT)
Dept: CARDIOLOGY | Facility: HOSPITAL | Age: 84
End: 2019-04-04

## 2019-04-04 ENCOUNTER — ANESTHESIA (OUTPATIENT)
Dept: PERIOP | Facility: HOSPITAL | Age: 84
End: 2019-04-04

## 2019-04-04 PROBLEM — I65.22 ASYMPTOMATIC CAROTID ARTERY STENOSIS, LEFT: Status: ACTIVE | Noted: 2019-04-04

## 2019-04-04 LAB
BH CV XLRA MEAS LEFT DIST CCA EDV: 6 CM/SEC
BH CV XLRA MEAS LEFT DIST CCA PSV: 58 CM/SEC
BH CV XLRA MEAS LEFT PROX ECA EDV: 4 CM/SEC
BH CV XLRA MEAS LEFT PROX ECA PSV: 38 CM/SEC
BH CV XLRA MEAS LEFT PROX ICA EDV: 16 CM/SEC
BH CV XLRA MEAS LEFT PROX ICA PSV: 69 CM/SEC

## 2019-04-04 PROCEDURE — 03UL0KZ SUPPLEMENT LEFT INTERNAL CAROTID ARTERY WITH NONAUTOLOGOUS TISSUE SUBSTITUTE, OPEN APPROACH: ICD-10-PCS | Performed by: SURGERY

## 2019-04-04 PROCEDURE — 25010000003 CEFAZOLIN PER 500 MG: Performed by: SURGERY

## 2019-04-04 PROCEDURE — 25010000002 FENTANYL CITRATE (PF) 100 MCG/2ML SOLUTION: Performed by: NURSE ANESTHETIST, CERTIFIED REGISTERED

## 2019-04-04 PROCEDURE — 25010000003 CEFAZOLIN IN DEXTROSE 2-4 GM/100ML-% SOLUTION: Performed by: SURGERY

## 2019-04-04 PROCEDURE — 25010000002 HEPARIN (PORCINE) PER 1000 UNITS: Performed by: SURGERY

## 2019-04-04 PROCEDURE — 85347 COAGULATION TIME ACTIVATED: CPT

## 2019-04-04 PROCEDURE — B347ZZ3 ULTRASONOGRAPHY OF LEFT INTERNAL CAROTID ARTERY, INTRAVASCULAR: ICD-10-PCS | Performed by: SURGERY

## 2019-04-04 PROCEDURE — 25010000002 DEXAMETHASONE PER 1 MG: Performed by: NURSE ANESTHETIST, CERTIFIED REGISTERED

## 2019-04-04 PROCEDURE — B344ZZ3 ULTRASONOGRAPHY OF LEFT COMMON CAROTID ARTERY, INTRAVASCULAR: ICD-10-PCS | Performed by: SURGERY

## 2019-04-04 PROCEDURE — 25010000002 PROPOFOL 10 MG/ML EMULSION: Performed by: NURSE ANESTHETIST, CERTIFIED REGISTERED

## 2019-04-04 PROCEDURE — 25010000002 PHENYLEPHRINE PER 1 ML: Performed by: NURSE ANESTHETIST, CERTIFIED REGISTERED

## 2019-04-04 PROCEDURE — 03CN0Z6 EXTIRPATE MATTER FROM L EXT CAROTID, BIFURC, OPEN: ICD-10-PCS | Performed by: SURGERY

## 2019-04-04 PROCEDURE — 25010000002 MIDAZOLAM PER 1 MG: Performed by: ANESTHESIOLOGY

## 2019-04-04 PROCEDURE — C1768 GRAFT, VASCULAR: HCPCS | Performed by: SURGERY

## 2019-04-04 PROCEDURE — 25010000002 FENTANYL CITRATE (PF) 100 MCG/2ML SOLUTION: Performed by: ANESTHESIOLOGY

## 2019-04-04 PROCEDURE — 25010000002 ONDANSETRON PER 1 MG: Performed by: NURSE ANESTHETIST, CERTIFIED REGISTERED

## 2019-04-04 PROCEDURE — 93882 EXTRACRANIAL UNI/LTD STUDY: CPT

## 2019-04-04 PROCEDURE — 25010000002 PROTAMINE SULFATE PER 10 MG: Performed by: NURSE ANESTHETIST, CERTIFIED REGISTERED

## 2019-04-04 PROCEDURE — 25010000002 HEPARIN (PORCINE) PER 1000 UNITS: Performed by: NURSE ANESTHETIST, CERTIFIED REGISTERED

## 2019-04-04 DEVICE — VASCU-GUARD PERIPHERAL VASCULAR PATCH IS PREPARED FROM BOVINE PERICARDIUM WHICH IS CROSS-LINKED WITH GLUTARALDEHYDE. THE PERICARDIUM IS PROCURED FROM CATTLE ORIGINATING IN THE UNITED STATES. VASCU-GUARD PERIPHERAL VASCULAR PATCH IS CHEMICALLY STERILIZED USING ETHANOL AND PROPYLENE OXIDE. VASCU-GUARD PERIPHERAL VASCULAR PATCH HAS BEEN TREATED WITH 1 MOLAR SODIUM HYDROXIDE FOR A MINIMUM OF 60 MINUTES AT 20 - 25°C.  VASCU-GUARD PERIPHERAL VASCULAR PATCH IS PACKAGED IN A CONTAINER FILLED WITH STERILE, NON-PYROGENIC WATER CONTAINING PROPYLENE OXIDE. THE CONTENTS OF THE UNOPENED, UNDAMAGED CONTAINER ARE STERILE.
Type: IMPLANTABLE DEVICE | Status: FUNCTIONAL
Brand: VASCU-GUARD PERIPHERAL VASCULAR PATCH

## 2019-04-04 RX ORDER — ONDANSETRON 2 MG/ML
4 INJECTION INTRAMUSCULAR; INTRAVENOUS EVERY 6 HOURS PRN
Status: DISCONTINUED | OUTPATIENT
Start: 2019-04-04 | End: 2019-04-05 | Stop reason: HOSPADM

## 2019-04-04 RX ORDER — ONDANSETRON 4 MG/1
4 TABLET, ORALLY DISINTEGRATING ORAL EVERY 6 HOURS PRN
Status: DISCONTINUED | OUTPATIENT
Start: 2019-04-04 | End: 2019-04-05 | Stop reason: HOSPADM

## 2019-04-04 RX ORDER — AZITHROMYCIN 250 MG/1
250 TABLET, FILM COATED ORAL
Status: DISCONTINUED | OUTPATIENT
Start: 2019-04-04 | End: 2019-04-05 | Stop reason: HOSPADM

## 2019-04-04 RX ORDER — PROMETHAZINE HYDROCHLORIDE 25 MG/1
25 TABLET ORAL ONCE AS NEEDED
Status: DISCONTINUED | OUTPATIENT
Start: 2019-04-04 | End: 2019-04-04 | Stop reason: HOSPADM

## 2019-04-04 RX ORDER — LABETALOL HYDROCHLORIDE 5 MG/ML
5 INJECTION, SOLUTION INTRAVENOUS
Status: DISCONTINUED | OUTPATIENT
Start: 2019-04-04 | End: 2019-04-04 | Stop reason: HOSPADM

## 2019-04-04 RX ORDER — DIPHENHYDRAMINE HYDROCHLORIDE 50 MG/ML
12.5 INJECTION INTRAMUSCULAR; INTRAVENOUS
Status: DISCONTINUED | OUTPATIENT
Start: 2019-04-04 | End: 2019-04-04 | Stop reason: HOSPADM

## 2019-04-04 RX ORDER — ONDANSETRON 4 MG/1
4 TABLET, FILM COATED ORAL EVERY 6 HOURS PRN
Status: DISCONTINUED | OUTPATIENT
Start: 2019-04-04 | End: 2019-04-05 | Stop reason: HOSPADM

## 2019-04-04 RX ORDER — PROMETHAZINE HYDROCHLORIDE 25 MG/1
25 SUPPOSITORY RECTAL ONCE AS NEEDED
Status: DISCONTINUED | OUTPATIENT
Start: 2019-04-04 | End: 2019-04-04 | Stop reason: HOSPADM

## 2019-04-04 RX ORDER — BUDESONIDE AND FORMOTEROL FUMARATE DIHYDRATE 80; 4.5 UG/1; UG/1
2 AEROSOL RESPIRATORY (INHALATION)
Status: DISCONTINUED | OUTPATIENT
Start: 2019-04-04 | End: 2019-04-05 | Stop reason: HOSPADM

## 2019-04-04 RX ORDER — SODIUM CHLORIDE 0.9 % (FLUSH) 0.9 %
1-10 SYRINGE (ML) INJECTION AS NEEDED
Status: DISCONTINUED | OUTPATIENT
Start: 2019-04-04 | End: 2019-04-04 | Stop reason: HOSPADM

## 2019-04-04 RX ORDER — FLUMAZENIL 0.1 MG/ML
0.2 INJECTION INTRAVENOUS AS NEEDED
Status: DISCONTINUED | OUTPATIENT
Start: 2019-04-04 | End: 2019-04-04 | Stop reason: HOSPADM

## 2019-04-04 RX ORDER — DEXAMETHASONE SODIUM PHOSPHATE 10 MG/ML
INJECTION INTRAMUSCULAR; INTRAVENOUS AS NEEDED
Status: DISCONTINUED | OUTPATIENT
Start: 2019-04-04 | End: 2019-04-04 | Stop reason: SURG

## 2019-04-04 RX ORDER — PROPOFOL 10 MG/ML
VIAL (ML) INTRAVENOUS AS NEEDED
Status: DISCONTINUED | OUTPATIENT
Start: 2019-04-04 | End: 2019-04-04 | Stop reason: SURG

## 2019-04-04 RX ORDER — LIDOCAINE HYDROCHLORIDE 20 MG/ML
INJECTION, SOLUTION INFILTRATION; PERINEURAL AS NEEDED
Status: DISCONTINUED | OUTPATIENT
Start: 2019-04-04 | End: 2019-04-04 | Stop reason: SURG

## 2019-04-04 RX ORDER — OXYCODONE AND ACETAMINOPHEN 7.5; 325 MG/1; MG/1
1 TABLET ORAL ONCE AS NEEDED
Status: DISCONTINUED | OUTPATIENT
Start: 2019-04-04 | End: 2019-04-04 | Stop reason: HOSPADM

## 2019-04-04 RX ORDER — CEFAZOLIN SODIUM 2 G/100ML
2 INJECTION, SOLUTION INTRAVENOUS EVERY 8 HOURS
Status: COMPLETED | OUTPATIENT
Start: 2019-04-04 | End: 2019-04-05

## 2019-04-04 RX ORDER — ASPIRIN 81 MG/1
81 TABLET ORAL DAILY
Status: DISCONTINUED | OUTPATIENT
Start: 2019-04-04 | End: 2019-04-05 | Stop reason: HOSPADM

## 2019-04-04 RX ORDER — EPHEDRINE SULFATE 50 MG/ML
INJECTION, SOLUTION INTRAVENOUS AS NEEDED
Status: DISCONTINUED | OUTPATIENT
Start: 2019-04-04 | End: 2019-04-04 | Stop reason: SURG

## 2019-04-04 RX ORDER — HEPARIN SODIUM 1000 [USP'U]/ML
INJECTION, SOLUTION INTRAVENOUS; SUBCUTANEOUS AS NEEDED
Status: DISCONTINUED | OUTPATIENT
Start: 2019-04-04 | End: 2019-04-04 | Stop reason: SURG

## 2019-04-04 RX ORDER — MIDAZOLAM HYDROCHLORIDE 1 MG/ML
1 INJECTION INTRAMUSCULAR; INTRAVENOUS
Status: DISCONTINUED | OUTPATIENT
Start: 2019-04-04 | End: 2019-04-04 | Stop reason: HOSPADM

## 2019-04-04 RX ORDER — HYDROCODONE BITARTRATE AND ACETAMINOPHEN 7.5; 325 MG/1; MG/1
1 TABLET ORAL ONCE AS NEEDED
Status: DISCONTINUED | OUTPATIENT
Start: 2019-04-04 | End: 2019-04-04 | Stop reason: HOSPADM

## 2019-04-04 RX ORDER — FENTANYL CITRATE 50 UG/ML
INJECTION, SOLUTION INTRAMUSCULAR; INTRAVENOUS AS NEEDED
Status: DISCONTINUED | OUTPATIENT
Start: 2019-04-04 | End: 2019-04-04 | Stop reason: SURG

## 2019-04-04 RX ORDER — ROCURONIUM BROMIDE 10 MG/ML
INJECTION, SOLUTION INTRAVENOUS AS NEEDED
Status: DISCONTINUED | OUTPATIENT
Start: 2019-04-04 | End: 2019-04-04 | Stop reason: SURG

## 2019-04-04 RX ORDER — ACETAMINOPHEN 325 MG/1
650 TABLET ORAL ONCE AS NEEDED
Status: DISCONTINUED | OUTPATIENT
Start: 2019-04-04 | End: 2019-04-04 | Stop reason: HOSPADM

## 2019-04-04 RX ORDER — BUPIVACAINE HYDROCHLORIDE 2.5 MG/ML
INJECTION, SOLUTION EPIDURAL; INFILTRATION; INTRACAUDAL AS NEEDED
Status: DISCONTINUED | OUTPATIENT
Start: 2019-04-04 | End: 2019-04-04 | Stop reason: HOSPADM

## 2019-04-04 RX ORDER — EPHEDRINE SULFATE 50 MG/ML
5 INJECTION, SOLUTION INTRAVENOUS ONCE AS NEEDED
Status: DISCONTINUED | OUTPATIENT
Start: 2019-04-04 | End: 2019-04-04 | Stop reason: HOSPADM

## 2019-04-04 RX ORDER — PROMETHAZINE HYDROCHLORIDE 25 MG/ML
12.5 INJECTION, SOLUTION INTRAMUSCULAR; INTRAVENOUS ONCE AS NEEDED
Status: DISCONTINUED | OUTPATIENT
Start: 2019-04-04 | End: 2019-04-04 | Stop reason: HOSPADM

## 2019-04-04 RX ORDER — HYDROMORPHONE HYDROCHLORIDE 1 MG/ML
0.5 INJECTION, SOLUTION INTRAMUSCULAR; INTRAVENOUS; SUBCUTANEOUS
Status: DISCONTINUED | OUTPATIENT
Start: 2019-04-04 | End: 2019-04-04 | Stop reason: HOSPADM

## 2019-04-04 RX ORDER — NALOXONE HCL 0.4 MG/ML
0.2 VIAL (ML) INJECTION AS NEEDED
Status: DISCONTINUED | OUTPATIENT
Start: 2019-04-04 | End: 2019-04-04 | Stop reason: HOSPADM

## 2019-04-04 RX ORDER — CEFAZOLIN SODIUM 2 G/100ML
2 INJECTION, SOLUTION INTRAVENOUS ONCE
Status: COMPLETED | OUTPATIENT
Start: 2019-04-04 | End: 2019-04-04

## 2019-04-04 RX ORDER — AMLODIPINE BESYLATE 5 MG/1
5 TABLET ORAL DAILY
Status: DISCONTINUED | OUTPATIENT
Start: 2019-04-04 | End: 2019-04-05 | Stop reason: HOSPADM

## 2019-04-04 RX ORDER — SODIUM CHLORIDE, SODIUM LACTATE, POTASSIUM CHLORIDE, CALCIUM CHLORIDE 600; 310; 30; 20 MG/100ML; MG/100ML; MG/100ML; MG/100ML
9 INJECTION, SOLUTION INTRAVENOUS CONTINUOUS
Status: DISCONTINUED | OUTPATIENT
Start: 2019-04-04 | End: 2019-04-05

## 2019-04-04 RX ORDER — NALOXONE HCL 0.4 MG/ML
0.4 VIAL (ML) INJECTION
Status: DISCONTINUED | OUTPATIENT
Start: 2019-04-04 | End: 2019-04-05 | Stop reason: HOSPADM

## 2019-04-04 RX ORDER — MORPHINE SULFATE 2 MG/ML
1 INJECTION, SOLUTION INTRAMUSCULAR; INTRAVENOUS EVERY 4 HOURS PRN
Status: DISCONTINUED | OUTPATIENT
Start: 2019-04-04 | End: 2019-04-05 | Stop reason: HOSPADM

## 2019-04-04 RX ORDER — FAMOTIDINE 10 MG/ML
20 INJECTION, SOLUTION INTRAVENOUS ONCE
Status: COMPLETED | OUTPATIENT
Start: 2019-04-04 | End: 2019-04-04

## 2019-04-04 RX ORDER — DIPHENHYDRAMINE HCL 25 MG
25 CAPSULE ORAL
Status: DISCONTINUED | OUTPATIENT
Start: 2019-04-04 | End: 2019-04-04 | Stop reason: HOSPADM

## 2019-04-04 RX ORDER — MIDAZOLAM HYDROCHLORIDE 1 MG/ML
2 INJECTION INTRAMUSCULAR; INTRAVENOUS
Status: DISCONTINUED | OUTPATIENT
Start: 2019-04-04 | End: 2019-04-04 | Stop reason: HOSPADM

## 2019-04-04 RX ORDER — FENTANYL CITRATE 50 UG/ML
50 INJECTION, SOLUTION INTRAMUSCULAR; INTRAVENOUS
Status: DISCONTINUED | OUTPATIENT
Start: 2019-04-04 | End: 2019-04-04 | Stop reason: HOSPADM

## 2019-04-04 RX ORDER — SODIUM CHLORIDE 9 MG/ML
100 INJECTION, SOLUTION INTRAVENOUS CONTINUOUS
Status: DISCONTINUED | OUTPATIENT
Start: 2019-04-04 | End: 2019-04-05

## 2019-04-04 RX ORDER — ATENOLOL 50 MG/1
50 TABLET ORAL DAILY
Status: DISCONTINUED | OUTPATIENT
Start: 2019-04-04 | End: 2019-04-05 | Stop reason: HOSPADM

## 2019-04-04 RX ORDER — ONDANSETRON 2 MG/ML
INJECTION INTRAMUSCULAR; INTRAVENOUS AS NEEDED
Status: DISCONTINUED | OUTPATIENT
Start: 2019-04-04 | End: 2019-04-04 | Stop reason: SURG

## 2019-04-04 RX ORDER — PROTAMINE SULFATE 10 MG/ML
INJECTION, SOLUTION INTRAVENOUS AS NEEDED
Status: DISCONTINUED | OUTPATIENT
Start: 2019-04-04 | End: 2019-04-04 | Stop reason: SURG

## 2019-04-04 RX ORDER — LIDOCAINE HYDROCHLORIDE 10 MG/ML
0.5 INJECTION, SOLUTION EPIDURAL; INFILTRATION; INTRACAUDAL; PERINEURAL ONCE AS NEEDED
Status: DISCONTINUED | OUTPATIENT
Start: 2019-04-04 | End: 2019-04-04 | Stop reason: HOSPADM

## 2019-04-04 RX ORDER — ONDANSETRON 2 MG/ML
4 INJECTION INTRAMUSCULAR; INTRAVENOUS ONCE AS NEEDED
Status: DISCONTINUED | OUTPATIENT
Start: 2019-04-04 | End: 2019-04-04 | Stop reason: HOSPADM

## 2019-04-04 RX ORDER — TRAMADOL HYDROCHLORIDE 50 MG/1
50 TABLET ORAL EVERY 6 HOURS PRN
Status: DISCONTINUED | OUTPATIENT
Start: 2019-04-04 | End: 2019-04-05 | Stop reason: HOSPADM

## 2019-04-04 RX ORDER — HYDRALAZINE HYDROCHLORIDE 20 MG/ML
5 INJECTION INTRAMUSCULAR; INTRAVENOUS
Status: DISCONTINUED | OUTPATIENT
Start: 2019-04-04 | End: 2019-04-04 | Stop reason: HOSPADM

## 2019-04-04 RX ORDER — DOPAMINE HYDROCHLORIDE 160 MG/100ML
2-20 INJECTION, SOLUTION INTRAVENOUS
Status: DISCONTINUED | OUTPATIENT
Start: 2019-04-04 | End: 2019-04-05

## 2019-04-04 RX ADMIN — PHENYLEPHRINE HYDROCHLORIDE 100 MCG: 10 INJECTION INTRAVENOUS at 11:11

## 2019-04-04 RX ADMIN — MIDAZOLAM 0.5 MG: 1 INJECTION INTRAMUSCULAR; INTRAVENOUS at 09:51

## 2019-04-04 RX ADMIN — SODIUM CHLORIDE, POTASSIUM CHLORIDE, SODIUM LACTATE AND CALCIUM CHLORIDE: 600; 310; 30; 20 INJECTION, SOLUTION INTRAVENOUS at 12:34

## 2019-04-04 RX ADMIN — FENTANYL CITRATE 25 MCG: 50 INJECTION INTRAMUSCULAR; INTRAVENOUS at 11:20

## 2019-04-04 RX ADMIN — SODIUM CHLORIDE, POTASSIUM CHLORIDE, SODIUM LACTATE AND CALCIUM CHLORIDE 9 ML/HR: 600; 310; 30; 20 INJECTION, SOLUTION INTRAVENOUS at 09:51

## 2019-04-04 RX ADMIN — SUGAMMADEX 130 MG: 100 INJECTION, SOLUTION INTRAVENOUS at 12:37

## 2019-04-04 RX ADMIN — HEPARIN SODIUM 7500 UNITS: 1000 INJECTION, SOLUTION INTRAVENOUS; SUBCUTANEOUS at 11:28

## 2019-04-04 RX ADMIN — PHENYLEPHRINE HYDROCHLORIDE 100 MCG: 10 INJECTION INTRAVENOUS at 11:32

## 2019-04-04 RX ADMIN — FAMOTIDINE 20 MG: 10 INJECTION INTRAVENOUS at 09:51

## 2019-04-04 RX ADMIN — FENTANYL CITRATE 50 MCG: 50 INJECTION INTRAMUSCULAR; INTRAVENOUS at 11:00

## 2019-04-04 RX ADMIN — FENTANYL CITRATE 50 MCG: 50 INJECTION, SOLUTION INTRAMUSCULAR; INTRAVENOUS at 10:15

## 2019-04-04 RX ADMIN — MIDAZOLAM 0.5 MG: 1 INJECTION INTRAMUSCULAR; INTRAVENOUS at 10:14

## 2019-04-04 RX ADMIN — CEFAZOLIN SODIUM 2 G: 2 INJECTION, SOLUTION INTRAVENOUS at 17:06

## 2019-04-04 RX ADMIN — PROPOFOL 30 MG: 10 INJECTION, EMULSION INTRAVENOUS at 12:35

## 2019-04-04 RX ADMIN — FENTANYL CITRATE 50 MCG: 50 INJECTION INTRAMUSCULAR; INTRAVENOUS at 13:37

## 2019-04-04 RX ADMIN — CEFAZOLIN SODIUM 2 G: 2 INJECTION, SOLUTION INTRAVENOUS at 11:06

## 2019-04-04 RX ADMIN — EPHEDRINE SULFATE 5 MG: 50 INJECTION INTRAMUSCULAR; INTRAVENOUS; SUBCUTANEOUS at 12:08

## 2019-04-04 RX ADMIN — ONDANSETRON 4 MG: 2 INJECTION INTRAMUSCULAR; INTRAVENOUS at 12:18

## 2019-04-04 RX ADMIN — PROTAMINE SULFATE 30 MG: 10 INJECTION, SOLUTION INTRAVENOUS at 12:15

## 2019-04-04 RX ADMIN — FENTANYL CITRATE 50 MCG: 50 INJECTION INTRAMUSCULAR; INTRAVENOUS at 13:16

## 2019-04-04 RX ADMIN — PROPOFOL 20 MG: 10 INJECTION, EMULSION INTRAVENOUS at 12:12

## 2019-04-04 RX ADMIN — LIDOCAINE HYDROCHLORIDE 60 MG: 20 INJECTION, SOLUTION INFILTRATION; PERINEURAL at 11:00

## 2019-04-04 RX ADMIN — PROPOFOL 30 MG: 10 INJECTION, EMULSION INTRAVENOUS at 12:03

## 2019-04-04 RX ADMIN — ROCURONIUM BROMIDE 35 MG: 10 INJECTION INTRAVENOUS at 11:00

## 2019-04-04 RX ADMIN — DEXAMETHASONE SODIUM PHOSPHATE 8 MG: 10 INJECTION INTRAMUSCULAR; INTRAVENOUS at 11:21

## 2019-04-04 RX ADMIN — EPHEDRINE SULFATE 5 MG: 50 INJECTION INTRAMUSCULAR; INTRAVENOUS; SUBCUTANEOUS at 12:28

## 2019-04-04 RX ADMIN — EPHEDRINE SULFATE 5 MG: 50 INJECTION INTRAMUSCULAR; INTRAVENOUS; SUBCUTANEOUS at 11:45

## 2019-04-04 RX ADMIN — PROPOFOL 150 MG: 10 INJECTION, EMULSION INTRAVENOUS at 11:00

## 2019-04-04 RX ADMIN — FENTANYL CITRATE 25 MCG: 50 INJECTION INTRAMUSCULAR; INTRAVENOUS at 11:22

## 2019-04-04 RX ADMIN — SODIUM CHLORIDE 100 ML/HR: 9 INJECTION, SOLUTION INTRAVENOUS at 14:52

## 2019-04-04 NOTE — OP NOTE
Brief op note    04/04/19   Mikaela Galicia Jr., MD      Preoperative Diagnosis: critical stenosis L ICA, asymptomatic    Postoperative Diagnosis: same as above    Procedure Performed: left carotid endarterectomy with bovine pericardial patch angioplasty and intraoperative duplex ultrasonography    Surgeon: Mikaela Galicia Jr., MD    Assistant: Salty DAVILA and they provided critical assistance during the case including suctioning, exposure, retraction, and reduction of blood loss.    Anesthesia: General Anesthesia via Endotracheal Tube    Estimated Blood Loss: 75-80 cc    Specimen: A: plaque, not sent    Complications: None    Dispo: awakened from anesthesia, extubated and taken to the recovery room in a stable condition, having suffered no apparent untoward event.    Indication for procedure: As in preoperative diagnosis          Procedure:  The patient was placed on the operating table in the supine position.  After satisfactory general endotracheal anesthesia was achieved a roll was placed under the patient's shoulder and his neck slightly hyperextended and the chin turned toward the left.  Anterior chest and neck were prepped using ChloraPrep and draped in the usual sterile fashion.  An incision was made along the anterior aspect of the sternocleidomastoid muscle and the electrocautery was used to dissected through the subcutaneous tissue and platysma muscle.  The sternocleidomastoid muscle was retracted laterally.  The common facial vein was identified, isolated, clamped divided and suture ligated with 3-0 silk suture.  The common carotid artery was then identified and isolated.  An umbilical tape was passed around this structure.  7500 units of heparin was then given intravenously.  An ACT was checked and was therapeutic.  Using meticulous sharp dissection the carotid bulb and internal carotid artery, external carotid artery, and superior thyroid arteries were all isolated and vessel  loops passed around these structures.  Of note, the hypoglossal nerve and vagus nerves were identified and care taken to preserve these.  After 5 minutes circulation time with a heparin vascular control was obtained by tightening the vessel loops and the common carotid artery was clamped.  A lateral arteriotomy was made in the common carotid artery and extended into the internal carotid artery.  A shunt was placed into the internal carotid artery, back flushed, placed into the common carotid artery.  Flow was established from the common carotid artery through the shunt and into the internal carotid artery.  The umbilical tape was secured around the common carotid artery.  An endarterectomy was then performed by transecting the plaque proximally in the common carotid artery and feathering off distally in the internal carotid artery.  An eversion endarterectomy was performed as far as possible up the external carotid artery.  All feathery media and debris were meticulously removed.  The lumen was irrigated heparinized saline solution and again all feathery media and debris were removed.  The bovine pericardial patch was brought into the field and cut to the appropriate length and shape and a patch angioplasty was performed using a running 6-0 Prolene suture beginning at each end and tying on the side.  Prior to completion of this the shunt was removed and the arteries were flushed in turn and the lumen irrigated with heparinized saline solution.  A partial occlusion clamp was then placed on the incompleted portion of the patch angioplasty and flow established from the common carotid artery into the external carotid artery and after several heartbeats into the internal carotid artery.  The patch angioplasty was then completed and the partial occlusion clamp removed.  Hemostasis was checked and noted be satisfactory.  Of note, clamp time for insertion of the shunt and removal of the shunt were less than 1 minute.  An  intraoperative duplex scan was performed and this demonstrated a widely patent endarterectomy site with normal flow in the common internal and external carotid arteries.   30 mg of intravenous protamine. was then given intravenously.  Hemostasis was once again checked and noted be satisfactory.  The skin is subcutaneous tissue were then injected with 0.25% Marcaine with epinephrine.  The wound was copiously irrigated with antibiotic solution and dried.  The platysma muscle was closed with a running 3-0 Vicryl suture and the skin closed with a running 4-0 Vicryl subcuticular fashion.  Dermabond was placed over this.  Sponge, needle, and instrument counts were all reported as correct.  The patient was extubated and transported to recovery room, moving all 4 extremities equally and tongue protrusion was midline.

## 2019-04-04 NOTE — INTERVAL H&P NOTE
H&P updated. The patient was examined and the following changes are noted:  EKG has been evaluated by her cardiologist, Dr Raymond, and felt to be not problematic.  She recommended to proceed as planned.

## 2019-04-04 NOTE — BRIEF OP NOTE
Urgency:  Elective    Anesthesia:  General    Type of endarterectomy performed:  Conventional    Side:  Left    Patch Type:  Prosthetic - Synovis ( Vascu-Guard Pericardial Bovine)    Shunt:  Yes (Routine)    Skin Prep:  Chlorhexidine with alcohol (ChloroPrep)    Drain:  No    Heparin:  Yes    Protamine:  Yes    Dextran:  No     Re-exploration after closure:  No    Completion Duplex:  Yes    Monitoring:  EEG, No and Stump pressures, No

## 2019-04-04 NOTE — ANESTHESIA PREPROCEDURE EVALUATION
Anesthesia Evaluation     Patient summary reviewed   NPO Solid Status: > 8 hours  NPO Liquid Status: > 2 hours           Airway   Mallampati: II  TM distance: >3 FB  Neck ROM: full  No difficulty expected  Dental    (+) upper dentures and lower dentures    Pulmonary     breath sounds clear to auscultation  Cardiovascular   Exercise tolerance: good (4-7 METS)    ECG reviewed  Rhythm: regular  Rate: normal        Neuro/Psych  GI/Hepatic/Renal/Endo      Musculoskeletal     Abdominal    Substance History      OB/GYN          Other                        Anesthesia Plan    ASA 3     general     intravenous induction   Anesthetic plan, all risks, benefits, and alternatives have been provided, discussed and informed consent has been obtained with: patient.

## 2019-04-04 NOTE — ANESTHESIA PROCEDURE NOTES
Airway  Urgency: elective    Airway not difficult    General Information and Staff    Patient location during procedure: OR  Anesthesiologist: Traci Bundy MD  CRNA: Pati Simms CRNA    Indications and Patient Condition  Indications for airway management: airway protection    Preoxygenated: yes  Mask difficulty assessment: 1 - vent by mask    Final Airway Details  Final airway type: endotracheal airway      Successful airway: ETT and WM tube  Cuffed: yes   Successful intubation technique: direct laryngoscopy  Facilitating devices/methods: intubating stylet  Endotracheal tube insertion site: oral  Blade: Parks  Blade size: 2  ETT size (mm): 7.0  Cormack-Lehane Classification: grade I - full view of glottis  Placement verified by: chest auscultation and capnometry   Measured from: lips  ETT to lips (cm): 20  Number of attempts at approach: 1    Additional Comments  Atraumatic, MOP to cuff, BSBE, no change to dentition, secured with tape

## 2019-04-04 NOTE — ANESTHESIA POSTPROCEDURE EVALUATION
"Patient: Lizzy Hicks    Procedure Summary     Date:  04/04/19 Room / Location:  [facility] OR [address] [place] [address] MAIN OR    Anesthesia Start:  1050 Anesthesia Stop:  1258    Procedure:  LEFT CAROTID ENDARTERECTOMY (Left Neck) Diagnosis:      Surgeon:  Mikaela Galicia Jr., MD Provider:  Traci Bundy MD    Anesthesia Type:  general ASA Status:  3          Anesthesia Type: general  Last vitals  BP   146/64 (04/04/19 1355)   Temp   36.9 °C (98.5 °F) (04/04/19 1254)   Pulse   62 (04/04/19 1355)   Resp   14 (04/04/19 1355)     SpO2   99 % (04/04/19 1355)     Post Anesthesia Care and Evaluation    Patient location during evaluation: PACU  Patient participation: complete - patient participated  Level of consciousness: awake and alert  Pain score: 0  Pain management: adequate  Airway patency: patent  Anesthetic complications: No anesthetic complications    Cardiovascular status: acceptable  Respiratory status: acceptable  Hydration status: acceptable    Comments: /64   Pulse 62   Temp 36.9 °C (98.5 °F) (Oral)   Resp 14   Ht 160 cm (63\")   Wt 61.5 kg (135 lb 8 oz)   LMP  (LMP Unknown)   SpO2 99%   BMI 24.00 kg/m²       "

## 2019-04-04 NOTE — ANESTHESIA PROCEDURE NOTES
Arterial Line    Pre-sedation assessment completed: 4/4/2019 10:25 AM    Start time: 4/4/2019 10:13 AM   Line placed for hemodynamic monitoring.  Performed By   Anesthesiologist: Yung Hair MD  Arterial Line Prep   Sterile Tech: gloves  Prep: ChloraPrep  Arterial Line Procedure   Laterality:right  Location:  radial artery  Catheter size: 20 G   Guidance: palpation technique  Number of attempts: 2  Successful placement: yes          Post Assessment   Dressing Type: occlusive dressing applied.   Complications yes

## 2019-04-04 NOTE — PLAN OF CARE
Problem: Patient Care Overview  Goal: Plan of Care Review  Outcome: Ongoing (interventions implemented as appropriate)   04/04/19 1804   Coping/Psychosocial   Plan of Care Reviewed With patient   OTHER   Outcome Summary VSS; Neuro intact. Patient post carotid endart. No complaints of pain. IV antibiotics given       Problem: Pain, Acute (Adult)  Goal: Identify Related Risk Factors and Signs and Symptoms  Outcome: Ongoing (interventions implemented as appropriate)   04/04/19 1804   Pain, Acute (Adult)   Related Risk Factors (Acute Pain) surgery   Signs and Symptoms (Acute Pain) verbalization of pain descriptors     Goal: Acceptable Pain Control/Comfort Level  Outcome: Ongoing (interventions implemented as appropriate)

## 2019-04-05 VITALS
TEMPERATURE: 97.3 F | SYSTOLIC BLOOD PRESSURE: 160 MMHG | OXYGEN SATURATION: 94 % | HEART RATE: 81 BPM | RESPIRATION RATE: 18 BRPM | HEIGHT: 63 IN | BODY MASS INDEX: 24.01 KG/M2 | WEIGHT: 135.5 LBS | DIASTOLIC BLOOD PRESSURE: 74 MMHG

## 2019-04-05 LAB
ACT BLD: 136 SECONDS (ref 82–152)
ACT BLD: 136 SECONDS (ref 82–152)
ACT BLD: 290 SECONDS (ref 82–152)

## 2019-04-05 PROCEDURE — 25010000003 CEFAZOLIN IN DEXTROSE 2-4 GM/100ML-% SOLUTION: Performed by: SURGERY

## 2019-04-05 RX ADMIN — CEFAZOLIN SODIUM 2 G: 2 INJECTION, SOLUTION INTRAVENOUS at 00:30

## 2019-04-05 NOTE — PROGRESS NOTES
Case Management Discharge Note    Final Note: Pt discharged home, no known needs.  LAN Chapa RN    Destination      No service has been selected for the patient.      Durable Medical Equipment      No service has been selected for the patient.      Dialysis/Infusion      No service has been selected for the patient.      Home Medical Care      No service has been selected for the patient.      Therapy      No service has been selected for the patient.      Community Resources      No service has been selected for the patient.        Transportation Services  Private: Car    Final Discharge Disposition Code: 01 - home or self-care

## 2019-04-05 NOTE — PLAN OF CARE
Problem: Patient Care Overview  Goal: Plan of Care Review  Outcome: Ongoing (interventions implemented as appropriate)   04/05/19 0501   Coping/Psychosocial   Plan of Care Reviewed With patient   OTHER   Outcome Summary VSS, no c/o pain. Incision CDI, some bruising. Trach midline no difficulty swallowing. Rested through night, will continue to monitor   Plan of Care Review   Progress improving       Problem: Pain, Acute (Adult)  Goal: Identify Related Risk Factors and Signs and Symptoms  Outcome: Ongoing (interventions implemented as appropriate)    Goal: Acceptable Pain Control/Comfort Level  Outcome: Ongoing (interventions implemented as appropriate)      Problem: Carotid Endarterectomy (Adult)  Goal: Signs and Symptoms of Listed Potential Problems Will be Absent, Minimized or Managed (Carotid Endarterectomy)  Outcome: Ongoing (interventions implemented as appropriate)

## 2019-04-05 NOTE — DISCHARGE SUMMARY
Name: Lizzy Hicks ADMIT: 2019   : 1935  PCP: Deric Mendosa Jr., MD    MRN: [id number] LOS: 1 days   AGE/SEX: 83 y.o. female  ROOM: [facility] [place]     Date of Admission: 2019  Date of Discharge:  2019    PCP: Deric Mendosa Jr., MD      DISCHARGE DIAGNOSIS  Active Hospital Problems    Diagnosis  POA   • Asymptomatic carotid artery stenosis, left [I65.22]  Yes      Resolved Hospital Problems   No resolved problems to display.       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. Baseline Na 128-130 since .   • Seborrheic dermatitis 2016   • Swallowing difficulty     D/T INADVERTANT REMOVAL OF UVULA AS CHILD WITH T AND A       CONSULTS   Consults     No orders found for last 30 day(s).          PROCEDURES PERFORMED    Date: 2019, left carotid endarterectomy with patch angioplasty, intraoperative duplex ultrasonography    HOSPITAL COURSE  Patient is a 83 y.o. female presented to [facility] admitted for asymptomatic critical left carotid stenosis.  Please see the admitting history and physical for further details.  Patient was admitted and taken to the operating room where she underwent the above-mentioned procedure.  Postoperative course following this was uneventful.  She maintained a normal neurologic examination normal hemodynamic status.  She was started on a diet which she tolerated well.  She was able to void adequately.  She was discharged home on her first postoperative day.      VITAL SIGNS  /50 (BP Location: Left arm, Patient  "Position: Lying)   Pulse 82   Temp 97.4 °F (36.3 °C) (Oral)   Resp 18   Ht 160 cm (63\")   Wt 61.5 kg (135 lb 8 oz)   LMP  (LMP Unknown)   SpO2 94%   BMI 24.00 kg/m²   Objective: Left neck incision is healing satisfactorily.  There is minimal ecchymosis along the incision line.  No hematoma noted.  Trachea is midline.  Neurologic examination is normal.  She has very slight pulling on the left side of the mouth secondary to her local anesthetic injection around her incision.  CONDITION ON DISCHARGE  Stable.      DISCHARGE DISPOSITION   Home or Self Care      DISCHARGE MEDICATIONS     Discharge Medications      Continue These Medications      Instructions Start Date   amLODIPine 5 MG tablet  Commonly known as:  NORVASC   5 mg, Oral, Daily      aspirin 81 MG EC tablet   81 mg, Oral, Daily, WILL FOLLOW MD ORDERS      atenolol 50 MG tablet  Commonly known as:  TENORMIN   50 mg, Oral, Daily      azithromycin 250 MG tablet  Commonly known as:  ZITHROMAX Z-PRESTON   Take 2 tablets the first day, then 1 tablet daily for 4 days.      calcium carbonate-cholecalciferol 500-400 MG-UNIT tablet tablet   1 tablet, Oral, 2 Times Daily      fluticasone-salmeterol 100-50 MCG/DOSE DISKUS  Commonly known as:  ADVAIR   1 puff, Inhalation, 2 Times Daily - RT      ibandronate 150 MG tablet  Commonly known as:  BONIVA   150 mg, Oral, Every 30 Days      ICAPS AREDS 2 PO   1 tablet, Oral, 2 Times Daily, HOLD PRIOR TO SURG      MULTIVITAMIN ADULTS 50+ PO   1 tablet, Oral, Daily, HOLD PRIOR TO SURG              Activity Instructions     Discharge Activity Restrictions      No lifting more than 20 pounds for 1 week.  No driving a car for 1 week.  Patient may begin showering today.        Future Appointments   Date Time Provider Department Center   4/11/2019 11:45 AM Deric Mendosa Jr., MD [id number] None     Additional Instructions for the Follow-ups that You Need to Schedule     Discharge Follow-up with Specified Provider: Have patient call " [phone] to arrange; 3 Weeks   As directed      To:  Have patient call [phone] to arrange    Follow Up:  3 Weeks           Follow-up Information     Deric Mendosa Jr., MD .    Specialty:  Family Medicine  Contact information:  [address] [address]  [phone]                   TEST  RESULTS PENDING AT DISCHARGE       [place], [facility]    [place] Jeff Galicia Jr., MD  Office Number [phone]    19  7:08 AM

## 2019-04-05 NOTE — PLAN OF CARE
Problem: Patient Care Overview  Goal: Plan of Care Review  Outcome: Outcome(s) achieved Date Met: 04/05/19      Problem: Pain, Acute (Adult)  Goal: Identify Related Risk Factors and Signs and Symptoms  Outcome: Outcome(s) achieved Date Met: 04/05/19    Goal: Acceptable Pain Control/Comfort Level  Outcome: Ongoing (interventions implemented as appropriate)

## 2019-04-05 NOTE — PROGRESS NOTES
The patient is 1 day status post left carotid endarterectomy with patch angioplasty for asymptomatic critical stenosis.  She is doing satisfactorily with no significant issues.  She has had no hemodynamic issues and no focal neurologic problems.  She was started on a diet which she tolerated well.  She is voiding adequately.    Left neck incision is healing satisfactorily.  Trachea is midline.  Tongue protrusion is midline.  There is a slight pulling on the left side of the mouth related to her local anesthesia injection.  This will resolve over the next few days to a week or so.    She is doing satisfactorily following her carotid endarterectomy.  She will be discharged home today.  Instructions have been given.

## 2019-04-06 ENCOUNTER — READMISSION MANAGEMENT (OUTPATIENT)
Dept: CALL CENTER | Facility: HOSPITAL | Age: 84
End: 2019-04-06

## 2019-04-06 NOTE — OUTREACH NOTE
Prep Survey      Responses   Facility patient discharged from?  [facility]   Is patient eligible?  Yes   Discharge diagnosis  Asymptomatic carotid artery stenosis,    left carotid endarterectomy with patch angioplasty, intraoperative duplex ultrasonography   Does the patient have one of the following disease processes/diagnoses(primary or secondary)?  Other   Does the patient have Home health ordered?  No   Is there a DME ordered?  No   Prep survey completed?  Yes          Kasey Garza RN

## 2019-04-10 ENCOUNTER — READMISSION MANAGEMENT (OUTPATIENT)
Dept: CALL CENTER | Facility: HOSPITAL | Age: 84
End: 2019-04-10

## 2019-04-10 NOTE — OUTREACH NOTE
Medical Week 1 Survey      Responses   Facility patient discharged from?  [facility]   Does the patient have one of the following disease processes/diagnoses(primary or secondary)?  Other   Is there a successful TCM telephone encounter documented?  No   Week 1 attempt successful?  Yes   Call start time  1207   Call end time  1214   Discharge diagnosis  Asymptomatic carotid artery stenosis,    left carotid endarterectomy with patch angioplasty, intraoperative duplex ultrasonography   Person spoke with today (if not patient) and relationship  kelli Castellanos reviewed with patient/caregiver?  Yes   Is the patient having any side effects they believe may be caused by any medication additions or changes?  No   Does the patient have all medications ordered at discharge?  N/A   Is the patient taking all medications as directed (includes completed medication regime)?  N/A   Does the patient have a primary care provider?   Yes   Does the patient have an appointment with their PCP within 7 days of discharge?  Yes   Has the patient kept scheduled appointments due by today?  N/A   Has home health visited the patient within 72 hours of discharge?  N/A   Psychosocial issues?  No   Comments  kelliFeliciaJasmin states pt's appetite is good, swallowing without difficulty, states righ side of mouth particularly  drooping, side effect of endarterectomy, will [facility]   Did the patient receive a copy of their discharge instructions?  Yes   Nursing interventions  Reviewed instructions with patient   What is the patient's perception of their health status since discharge?  Improving   Is the patient/caregiver able to teach back signs and symptoms related to disease process for when to call PCP?  Yes   Is the patient/caregiver able to teach back signs and symptoms related to disease process for when to call 911?  Yes   Is the patient/caregiver able to teach back the hierarchy of who to call/visit for  symptoms/problems? PCP, Specialist, Home health nurse, Urgent Care, ED, 911  Yes   Week 1 call completed?  Yes          Jennifer Brito RN

## 2019-04-11 ENCOUNTER — OFFICE VISIT (OUTPATIENT)
Dept: INTERNAL MEDICINE | Facility: CLINIC | Age: 84
End: 2019-04-11

## 2019-04-11 VITALS
OXYGEN SATURATION: 93 % | HEART RATE: 79 BPM | TEMPERATURE: 98.3 F | RESPIRATION RATE: 16 BRPM | DIASTOLIC BLOOD PRESSURE: 76 MMHG | BODY MASS INDEX: 24.45 KG/M2 | SYSTOLIC BLOOD PRESSURE: 170 MMHG | HEIGHT: 63 IN | WEIGHT: 138 LBS

## 2019-04-11 DIAGNOSIS — E23.3 RESET OSMOSTAT SYNDROME (HCC): ICD-10-CM

## 2019-04-11 DIAGNOSIS — R29.810 DROOPING OF MOUTH: ICD-10-CM

## 2019-04-11 DIAGNOSIS — I65.22 STENOSIS OF LEFT CAROTID ARTERY: ICD-10-CM

## 2019-04-11 DIAGNOSIS — I10 ESSENTIAL HYPERTENSION: Primary | ICD-10-CM

## 2019-04-11 PROCEDURE — 99213 OFFICE O/P EST LOW 20 MIN: CPT | Performed by: FAMILY MEDICINE

## 2019-04-15 DIAGNOSIS — I10 ESSENTIAL HYPERTENSION: ICD-10-CM

## 2019-04-15 RX ORDER — ATENOLOL 50 MG/1
TABLET ORAL
Qty: 30 TABLET | Refills: 3 | Status: SHIPPED | OUTPATIENT
Start: 2019-04-15 | End: 2019-08-18 | Stop reason: SDUPTHER

## 2019-04-19 ENCOUNTER — READMISSION MANAGEMENT (OUTPATIENT)
Dept: CALL CENTER | Facility: HOSPITAL | Age: 84
End: 2019-04-19

## 2019-04-19 NOTE — OUTREACH NOTE
Medical Week 2 Survey      Responses   Facility patient discharged from?  [facility]   Does the patient have one of the following disease processes/diagnoses(primary or secondary)?  Other   Week 2 attempt successful?  No   Unsuccessful attempts  Attempt 1          Farida Bustos RN

## 2019-04-24 ENCOUNTER — TELEPHONE (OUTPATIENT)
Dept: INTERNAL MEDICINE | Facility: CLINIC | Age: 84
End: 2019-04-24

## 2019-06-12 DIAGNOSIS — J44.9 CHRONIC OBSTRUCTIVE PULMONARY DISEASE, UNSPECIFIED COPD TYPE (HCC): ICD-10-CM

## 2019-08-05 RX ORDER — AMLODIPINE BESYLATE 5 MG/1
TABLET ORAL
Qty: 30 TABLET | Refills: 1 | Status: SHIPPED | OUTPATIENT
Start: 2019-08-05 | End: 2019-10-01 | Stop reason: SDUPTHER

## 2019-08-18 DIAGNOSIS — I10 ESSENTIAL HYPERTENSION: ICD-10-CM

## 2019-08-19 RX ORDER — ATENOLOL 50 MG/1
TABLET ORAL
Qty: 30 TABLET | Refills: 2 | Status: SHIPPED | OUTPATIENT
Start: 2019-08-19 | End: 2019-11-15 | Stop reason: SDUPTHER

## 2019-08-20 DIAGNOSIS — Z00.00 MEDICARE ANNUAL WELLNESS VISIT, SUBSEQUENT: ICD-10-CM

## 2019-08-20 RX ORDER — IBANDRONATE SODIUM 150 MG/1
TABLET, FILM COATED ORAL
Qty: 1 TABLET | Refills: 4 | Status: SHIPPED | OUTPATIENT
Start: 2019-08-20 | End: 2020-01-27

## 2019-10-01 RX ORDER — AMLODIPINE BESYLATE 5 MG/1
TABLET ORAL
Qty: 30 TABLET | Refills: 5 | Status: SHIPPED | OUTPATIENT
Start: 2019-10-01 | End: 2020-03-26

## 2019-10-11 ENCOUNTER — OFFICE VISIT (OUTPATIENT)
Dept: INTERNAL MEDICINE | Facility: CLINIC | Age: 84
End: 2019-10-11

## 2019-10-11 VITALS
HEART RATE: 86 BPM | OXYGEN SATURATION: 93 % | SYSTOLIC BLOOD PRESSURE: 134 MMHG | BODY MASS INDEX: 24.45 KG/M2 | WEIGHT: 138 LBS | TEMPERATURE: 97.1 F | DIASTOLIC BLOOD PRESSURE: 72 MMHG

## 2019-10-11 DIAGNOSIS — I49.1: ICD-10-CM

## 2019-10-11 DIAGNOSIS — E78.2 MIXED HYPERLIPIDEMIA: ICD-10-CM

## 2019-10-11 DIAGNOSIS — R73.09 ELEVATED GLUCOSE: ICD-10-CM

## 2019-10-11 DIAGNOSIS — E55.9 VITAMIN D DEFICIENCY: ICD-10-CM

## 2019-10-11 DIAGNOSIS — I10 ESSENTIAL HYPERTENSION: Primary | ICD-10-CM

## 2019-10-11 DIAGNOSIS — I65.22 STENOSIS OF LEFT CAROTID ARTERY: ICD-10-CM

## 2019-10-11 DIAGNOSIS — J41.0 SIMPLE CHRONIC BRONCHITIS (HCC): ICD-10-CM

## 2019-10-11 DIAGNOSIS — I49.1 PAC (PREMATURE ATRIAL CONTRACTION): ICD-10-CM

## 2019-10-11 PROCEDURE — 99214 OFFICE O/P EST MOD 30 MIN: CPT | Performed by: FAMILY MEDICINE

## 2019-10-11 PROCEDURE — 93000 ELECTROCARDIOGRAM COMPLETE: CPT | Performed by: FAMILY MEDICINE

## 2019-10-11 NOTE — PROGRESS NOTES
Subjective   Lizzy Hicks is a 84 y.o. female.     Chief Complaint   Patient presents with   • Hyperlipidemia   • Hypertension   Carotid stenosis.      History of Present Illness   Delightful lady with a history of carotid stenosis repaired with carotid endarterectomy in the left.  She is doing very well and asymptomatic.  [place] and health healthy living activities are discussed.  She has some extrasystoles which are checked with EKG today and found to be occasional PVCs.  She is asymptomatic in this regard.    Treatment of hypertension and COPD are reviewed with no changes.  We will get labs in the spring and review cardiovascular risk factors.      The following portions of the patient's history were reviewed and updated as appropriate: allergies, current medications, past social history and problem list.    Review of Systems   Constitutional: Positive for fatigue.   HENT: Negative.    Eyes: Negative.    Respiratory: Negative.    Cardiovascular: Negative.    Gastrointestinal: Negative.    Endocrine: Negative.    Genitourinary: Negative.    Musculoskeletal: Negative.    Skin: Negative.    Allergic/Immunologic: Negative.    Neurological: Negative.    Hematological: Negative.    Psychiatric/Behavioral: Negative.        Objective   Vitals:    10/11/19 1113   BP: 134/72   Pulse: 86   Temp: 97.1 °F (36.2 °C)   SpO2: 93%     Physical Exam   Constitutional: She is oriented to person, place, and time. She appears well-nourished.   HENT:   Head: Normocephalic and atraumatic.   Right Ear: Tympanic membrane and external ear normal.   Left Ear: Tympanic membrane and external ear normal.   Mouth/Throat: Oropharynx is clear and moist.   Eyes: Pupils are equal, round, and reactive to light.   Neck: Normal range of motion. Neck supple.   Cardiovascular: Regular rhythm and normal heart sounds. Frequent extrasystoles are present.   Pulmonary/Chest: Effort normal and breath sounds normal.   Abdominal: Soft. Bowel sounds  are normal. There is no tenderness.   Musculoskeletal: Normal range of motion.   Neurological: She is alert and oriented to person, place, and time. Coordination normal.   Skin: Skin is warm and dry.   Psychiatric: She has a normal mood and affect.   Vitals reviewed.      Assessment/Plan   Problem List Items Addressed This Visit        Cardiovascular and Mediastinum    Essential hypertension - Primary    Relevant Orders    CBC & Differential    Comprehensive Metabolic Panel    Hemoglobin A1c    Lipid Panel With / Chol / HDL Ratio    TSH    T4, Free    T3, Free    Urinalysis With Microscopic If Indicated (No Culture) - Urine, Clean Catch    Vitamin D 25 Hydroxy    Carotid artery stenosis    Relevant Orders    CBC & Differential    Comprehensive Metabolic Panel    Hemoglobin A1c    Lipid Panel With / Chol / HDL Ratio    TSH    T4, Free    T3, Free    Urinalysis With Microscopic If Indicated (No Culture) - Urine, Clean Catch    Vitamin D 25 Hydroxy       Respiratory    COPD (chronic obstructive pulmonary disease) (CMS/[facility])      Other Visit Diagnoses     PAC (premature atrial contraction)        Relevant Orders    ECG 12 Lead    CBC & Differential    Comprehensive Metabolic Panel    Hemoglobin A1c    Lipid Panel With / Chol / HDL Ratio    TSH    T4, Free    T3, Free    Urinalysis With Microscopic If Indicated (No Culture) - Urine, Clean Catch    Vitamin D 25 Hydroxy    Extrasystole, atrial        Relevant Orders    ECG 12 Lead    CBC & Differential    Comprehensive Metabolic Panel    Hemoglobin A1c    Lipid Panel With / Chol / HDL Ratio    TSH    T4, Free    T3, Free    Urinalysis With Microscopic If Indicated (No Culture) - Urine, Clean Catch    Vitamin D 25 Hydroxy    Mixed hyperlipidemia        Relevant Orders    CBC & Differential    Comprehensive Metabolic Panel    Hemoglobin A1c    Lipid Panel With / Chol / HDL Ratio    TSH    T4, Free    T3, Free    Urinalysis With Microscopic If Indicated (No Culture) - Urine,  Clean Catch    Vitamin D 25 Hydroxy    Elevated glucose         Relevant Orders    Hemoglobin A1c    Vitamin D deficiency         Relevant Orders    Vitamin D 25 Hydroxy      Plan: EKG shows occasional PVCs.  She is asymptomatic.  We will recheck labs in the spring with Medicare wellness.

## 2019-10-11 NOTE — PROGRESS NOTES
Procedure     ECG 12 Lead  Date/Time: 10/11/2019 11:57 AM  Performed by: Deric Mendosa Jr., MD  Authorized by: Deric Mendosa Jr., MD   Comparison: compared with previous ECG from 3/29/2019  Similar to previous ECG  Rhythm: sinus rhythm  Ectopy: multifocal PVCs  Rate: normal  Conduction: conduction normal  ST Segments: ST segments normal  T Waves: T waves normal  QRS axis: normal  Other: no other findings    Clinical impression: non-specific ECG

## 2019-11-15 DIAGNOSIS — I10 ESSENTIAL HYPERTENSION: ICD-10-CM

## 2019-11-15 RX ORDER — ATENOLOL 50 MG/1
TABLET ORAL
Qty: 30 TABLET | Refills: 4 | Status: SHIPPED | OUTPATIENT
Start: 2019-11-15 | End: 2020-02-17

## 2020-01-25 DIAGNOSIS — Z00.00 MEDICARE ANNUAL WELLNESS VISIT, SUBSEQUENT: ICD-10-CM

## 2020-01-27 RX ORDER — IBANDRONATE SODIUM 150 MG/1
TABLET, FILM COATED ORAL
Qty: 1 TABLET | Refills: 3 | Status: SHIPPED | OUTPATIENT
Start: 2020-01-27 | End: 2020-05-29 | Stop reason: SDUPTHER

## 2020-02-14 DIAGNOSIS — I10 ESSENTIAL HYPERTENSION: ICD-10-CM

## 2020-02-17 RX ORDER — ATENOLOL 50 MG/1
TABLET ORAL
Qty: 90 TABLET | Refills: 3 | Status: SHIPPED | OUTPATIENT
Start: 2020-02-17 | End: 2021-02-11

## 2020-03-16 DIAGNOSIS — J44.9 CHRONIC OBSTRUCTIVE PULMONARY DISEASE, UNSPECIFIED COPD TYPE (HCC): ICD-10-CM

## 2020-03-26 RX ORDER — AMLODIPINE BESYLATE 5 MG/1
TABLET ORAL
Qty: 30 TABLET | Refills: 4 | Status: SHIPPED | OUTPATIENT
Start: 2020-03-26 | End: 2020-08-24

## 2020-04-11 ENCOUNTER — RESULTS ENCOUNTER (OUTPATIENT)
Dept: INTERNAL MEDICINE | Facility: CLINIC | Age: 85
End: 2020-04-11

## 2020-04-11 DIAGNOSIS — I65.22 STENOSIS OF LEFT CAROTID ARTERY: ICD-10-CM

## 2020-04-11 DIAGNOSIS — I49.1: ICD-10-CM

## 2020-04-11 DIAGNOSIS — I49.1 PAC (PREMATURE ATRIAL CONTRACTION): ICD-10-CM

## 2020-04-11 DIAGNOSIS — I10 ESSENTIAL HYPERTENSION: ICD-10-CM

## 2020-04-11 DIAGNOSIS — R73.09 ELEVATED GLUCOSE: ICD-10-CM

## 2020-04-11 DIAGNOSIS — E78.2 MIXED HYPERLIPIDEMIA: ICD-10-CM

## 2020-04-11 DIAGNOSIS — E55.9 VITAMIN D DEFICIENCY: ICD-10-CM

## 2020-05-29 DIAGNOSIS — Z00.00 MEDICARE ANNUAL WELLNESS VISIT, SUBSEQUENT: ICD-10-CM

## 2020-05-29 RX ORDER — IBANDRONATE SODIUM 150 MG/1
150 TABLET, FILM COATED ORAL
Qty: 1 TABLET | Refills: 3 | Status: SHIPPED | OUTPATIENT
Start: 2020-05-29 | End: 2020-09-23

## 2020-07-11 DIAGNOSIS — J44.9 CHRONIC OBSTRUCTIVE PULMONARY DISEASE, UNSPECIFIED COPD TYPE (HCC): ICD-10-CM

## 2020-07-17 ENCOUNTER — OFFICE VISIT (OUTPATIENT)
Dept: INTERNAL MEDICINE | Facility: CLINIC | Age: 85
End: 2020-07-17

## 2020-07-17 VITALS
BODY MASS INDEX: 24.98 KG/M2 | OXYGEN SATURATION: 96 % | DIASTOLIC BLOOD PRESSURE: 80 MMHG | WEIGHT: 141 LBS | HEART RATE: 96 BPM | HEIGHT: 63 IN | SYSTOLIC BLOOD PRESSURE: 152 MMHG

## 2020-07-17 DIAGNOSIS — E55.9 VITAMIN D DEFICIENCY: ICD-10-CM

## 2020-07-17 DIAGNOSIS — J41.0 SIMPLE CHRONIC BRONCHITIS (HCC): ICD-10-CM

## 2020-07-17 DIAGNOSIS — Z00.00 MEDICARE ANNUAL WELLNESS VISIT, SUBSEQUENT: ICD-10-CM

## 2020-07-17 DIAGNOSIS — M15.3 OTHER SECONDARY OSTEOARTHRITIS OF MULTIPLE SITES: ICD-10-CM

## 2020-07-17 DIAGNOSIS — H35.30 MACULAR DEGENERATION OF BOTH EYES, UNSPECIFIED TYPE: ICD-10-CM

## 2020-07-17 DIAGNOSIS — Z12.31 ENCOUNTER FOR SCREENING MAMMOGRAM FOR BREAST CANCER: ICD-10-CM

## 2020-07-17 DIAGNOSIS — I65.29 STENOSIS OF CAROTID ARTERY, UNSPECIFIED LATERALITY: Primary | ICD-10-CM

## 2020-07-17 DIAGNOSIS — E78.2 MIXED HYPERLIPIDEMIA: ICD-10-CM

## 2020-07-17 DIAGNOSIS — I10 ESSENTIAL HYPERTENSION: ICD-10-CM

## 2020-07-17 DIAGNOSIS — R73.09 ELEVATED GLUCOSE: ICD-10-CM

## 2020-07-17 DIAGNOSIS — R63.5 WEIGHT GAIN: ICD-10-CM

## 2020-07-17 PROBLEM — I77.9 CAROTID ARTERY DISEASE: Status: ACTIVE | Noted: 2019-04-04

## 2020-07-17 PROCEDURE — G0439 PPPS, SUBSEQ VISIT: HCPCS | Performed by: FAMILY MEDICINE

## 2020-07-17 PROCEDURE — 99214 OFFICE O/P EST MOD 30 MIN: CPT | Performed by: FAMILY MEDICINE

## 2020-07-17 NOTE — PROGRESS NOTES
Subjective   Lizzy Hicks is a 84 y.o. female.     Chief Complaint   Patient presents with   • Medicare Wellness-subsequent         History of Present Illness   Very pleasant lady here for Medicare wellness.  Concerns include follow-up for carotid artery disease and history of carotid artery surgery.  She will call make an appoint with Dr. Mikaela Galicia.    Otherwise we will set her up for screening mammograms.    She is getting injections for macular degeneration and she is careful as far as fall risk and walking.    History of COPD appears stable with current dose of Advair.    Osteopenia/osteoporosis treated with Boniva 150 mg every 30 days.    We discussed supplements weight loss that are safe.      The following portions of the patient's history were reviewed and updated as appropriate: allergies, current medications, past social history and problem list.    Review of Systems   Constitutional: Negative.    HENT: Negative.    Eyes: Positive for visual disturbance.   Respiratory: Negative.    Cardiovascular: Negative.    Gastrointestinal: Negative.    Endocrine: Negative.    Genitourinary: Negative.    Musculoskeletal: Positive for arthralgias.   Skin: Negative.    Allergic/Immunologic: Negative.    Neurological: Negative.    Hematological: Negative.    Psychiatric/Behavioral: Negative.        Objective   Vitals:    07/17/20 0910   BP: 152/80   Pulse: 96   SpO2: 96%     Physical Exam   Constitutional: She is oriented to person, place, and time. She appears well-developed and well-nourished.   HENT:   Head: Normocephalic and atraumatic.   Right Ear: Tympanic membrane and external ear normal.   Left Ear: Tympanic membrane and external ear normal.   Nose: Nose normal.   Mouth/Throat: Oropharynx is clear and moist.   Eyes: Pupils are equal, round, and reactive to light. Conjunctivae and EOM are normal.   Neck: Normal range of motion. Neck supple. No JVD present. No thyromegaly present.   Cardiovascular: Normal  rate, regular rhythm, normal heart sounds and intact distal pulses.   Pulmonary/Chest: Effort normal and breath sounds normal.   Abdominal: Soft. Bowel sounds are normal.   Musculoskeletal:        Lumbar back: She exhibits decreased range of motion.   Lymphadenopathy:     She has no cervical adenopathy.   Neurological: She is alert and oriented to person, place, and time. No cranial nerve deficit. Coordination normal.   Skin: Skin is warm and dry. No rash noted.   Psychiatric: She has a normal mood and affect. Her behavior is normal. Judgment and thought content normal.   Vitals reviewed.      Assessment/Plan   Problem List Items Addressed This Visit        Cardiovascular and Mediastinum    Essential hypertension    Carotid artery stenosis - Primary       Respiratory    COPD (chronic obstructive pulmonary disease) (CMS/[facility])       Musculoskeletal and Integument    Osteoarthritis       Other    Macular degeneration     Currently seeing ophthalmology for injections            Other Visit Diagnoses     Medicare annual wellness visit, subsequent        Weight gain        Encounter for screening mammogram for breast cancer        Relevant Orders    Mammo Screening Bilateral With CAD      Plan: Labs are already in the computer system and are pending.  We will get labs today.  Recheck in year sooner if needed.  Follow-up with vascular surgery follow-up for mammograms.

## 2020-07-17 NOTE — PROGRESS NOTES
The ABCs of the Annual Wellness Visit  Subsequent Medicare Wellness Visit    Chief Complaint   Patient presents with   • Medicare Wellness-subsequent       Subjective   History of Present Illness:  Lizzy Hicks is a 84 y.o. female who presents for a Subsequent Medicare Wellness Visit.    HEALTH RISK ASSESSMENT    Recent Hospitalizations:  No hospitalization(s) within the last year.    Current Medical Providers:  Patient Care Team:  Deric Mendosa Jr., MD as PCP - General (Family Medicine)    Smoking Status:  Social History     Tobacco Use   Smoking Status Former Smoker   • Packs/day: 0.25   • Years: 40.00   • Pack years: 10.00   • Types: Cigarettes   Smokeless Tobacco Never Used   Tobacco Comment    quit 6 yr ago       Alcohol Consumption:  Social History     Substance and Sexual Activity   Alcohol Use Yes    Comment: 1-2 drinks day       Depression Screen:   PHQ-2/PHQ-9 Depression Screening 7/17/2020   Little interest or pleasure in doing things 0   Feeling down, depressed, or hopeless 0   Trouble falling or staying asleep, or sleeping too much 0   Feeling tired or having little energy 0   Poor appetite or overeating 0   Feeling bad about yourself - or that you are a failure or have let yourself or your family down 0   Trouble concentrating on things, such as reading the newspaper or watching television 0   Moving or speaking so slowly that other people could have noticed. Or the opposite - being so fidgety or restless that you have been moving around a lot more than usual 0   Thoughts that you would be better off dead, or of hurting yourself in some way 0   Total Score 0   If you checked off any problems, how difficult have these problems made it for you to do your work, take care of things at home, or get along with other people? -       Fall Risk Screen:  STEADI Fall Risk Assessment was completed, and patient is at LOW risk for falls.Assessment completed on:7/17/2020    Health Habits and Functional and  Cognitive Screening:  Functional & Cognitive Status 7/17/2020   Do you have difficulty preparing food and eating? No   Do you have difficulty bathing yourself, getting dressed or grooming yourself? No   Do you have difficulty using the toilet? No   Do you have difficulty moving around from place to place? No   Do you have trouble with steps or getting out of a bed or a chair? No   Current Diet Well Balanced Diet   Dental Exam Up to date   Eye Exam Up to date   Exercise (times per week) 0 times per week   Current Exercise Activities Include -   Do you need help using the phone?  No   Are you deaf or do you have serious difficulty hearing?  No   Do you need help with transportation? No   Do you need help shopping? No   Do you need help preparing meals?  No   Do you need help with housework?  No   Do you need help with laundry? No   Do you need help taking your medications? No   Do you need help managing money? No   Do you ever drive or ride in a car without wearing a seat belt? No   Have you felt unusual stress, anger or loneliness in the last month? No   Who do you live with? Community   If you need help, do you have trouble finding someone available to you? No   Have you been bothered in the last four weeks by sexual problems? No   Do you have difficulty concentrating, remembering or making decisions? No         Does the patient have evidence of cognitive impairment? No    Asprin use counseling:Taking ASA appropriately as indicated    Age-appropriate Screening Schedule:  Refer to the list below for future screening recommendations based on patient's age, sex and/or medical conditions. Orders for these recommended tests are listed in the plan section. The patient has been provided with a written plan.    Health Maintenance   Topic Date Due   • ZOSTER VACCINE (3 of 3) 09/19/2018   • DXA SCAN  10/10/2018   • INFLUENZA VACCINE  08/01/2020   • MAMMOGRAM  03/28/2021   • TDAP/TD VACCINES (2 - Td) 09/20/2024          The  following portions of the patient's history were reviewed and updated as appropriate: allergies, current medications, past family history, past medical history, past social history, past surgical history and problem list.    Outpatient Medications Prior to Visit   Medication Sig Dispense Refill   • amLODIPine (NORVASC) 5 MG tablet TAKE ONE TABLET BY MOUTH DAILY 30 tablet 4   • aspirin 81 MG EC tablet Take 81 mg by mouth Daily. WILL FOLLOW MD ORDERS     • atenolol (TENORMIN) 50 MG tablet TAKE ONE TABLET BY MOUTH DAILY 90 tablet 3   • calcium carbonate-cholecalciferol 500-400 MG-UNIT tablet tablet Take 1 tablet by mouth 2 (Two) Times a Day.     • fluticasone-salmeterol (ADVAIR) 100-50 MCG/DOSE DISKUS INHALE ONE PUFF BY MOUTH TWICE A DAY 60 each 2   • ibandronate (BONIVA) 150 MG tablet Take 1 tablet by mouth Every 30 (Thirty) Days. 1 tablet 3   • Multiple Vitamins-Minerals (ICAPS AREDS 2 PO) Take 1 tablet by mouth 2 (Two) Times a Day. HOLD PRIOR TO SURG     • Multiple Vitamins-Minerals (MULTIVITAMIN ADULTS 50+ PO) Take 1 tablet by mouth Daily. HOLD PRIOR TO SURG       No facility-administered medications prior to visit.        Patient Active Problem List   Diagnosis   • Essential hypertension   • COPD (chronic obstructive pulmonary disease) (CMS/HCC)   • Carotid artery stenosis   • Osteoarthritis   • Osteoporosis   • Reset osmostat syndrome (CMS/HCC)   • Carotid stenosis, asymptomatic, right   • Asymptomatic carotid artery stenosis, left       Advanced Care Planning:  ACP discussion was held with the patient during this visit. Patient has an advance directive in EMR which is still valid.     Review of Systems    Compared to one year ago, the patient feels her physical health is the same.  Compared to one year ago, the patient feels her mental health is the same.    Reviewed chart for potential of high risk medication in the elderly: not applicable  Reviewed chart for potential of harmful drug interactions in the  "elderly:not applicable    Objective         Vitals:    07/17/20 0910   BP: 152/80   Pulse: 96   SpO2: 96%   Weight: 64 kg (141 lb)   Height: 159.4 cm (62.75\")       Body mass index is 25.18 kg/m².  Discussed the patient's BMI with her. The BMI is in the acceptable range.    Physical Exam          Assessment/Plan   Medicare Risks and Personalized Health Plan  CMS Preventative Services Quick Reference  Cardiovascular risk  Fall Risk    The above risks/problems have been discussed with the patient.  Pertinent information has been shared with the patient in the After Visit Summary.  Follow up plans and orders are seen below in the Assessment/Plan Section.    There are no diagnoses linked to this encounter.  Follow Up:  No follow-ups on file.     An After Visit Summary and PPPS were given to the patient.             "

## 2020-07-18 LAB
25(OH)D3+25(OH)D2 SERPL-MCNC: 46.9 NG/ML (ref 30–100)
ALBUMIN SERPL-MCNC: 4.8 G/DL (ref 3.5–5.2)
ALBUMIN/GLOB SERPL: 1.8 G/DL
ALP SERPL-CCNC: 105 U/L (ref 39–117)
ALT SERPL-CCNC: 20 U/L (ref 1–33)
APPEARANCE UR: CLEAR
AST SERPL-CCNC: 30 U/L (ref 1–32)
BACTERIA #/AREA URNS HPF: NORMAL /HPF
BASOPHILS # BLD AUTO: ABNORMAL 10*3/UL
BILIRUB SERPL-MCNC: 0.5 MG/DL (ref 0–1.2)
BILIRUB UR QL STRIP: NEGATIVE
BUN SERPL-MCNC: 10 MG/DL (ref 8–23)
BUN/CREAT SERPL: 13.2 (ref 7–25)
CALCIUM SERPL-MCNC: 9.8 MG/DL (ref 8.6–10.5)
CASTS URNS MICRO: NORMAL
CHLORIDE SERPL-SCNC: 92 MMOL/L (ref 98–107)
CHOLEST SERPL-MCNC: 289 MG/DL (ref 0–200)
CHOLEST/HDLC SERPL: 1.84 {RATIO}
CO2 SERPL-SCNC: 25.4 MMOL/L (ref 22–29)
COLOR UR: YELLOW
CREAT SERPL-MCNC: 0.76 MG/DL (ref 0.57–1)
DIFFERENTIAL COMMENT: NORMAL
EOSINOPHIL # BLD AUTO: ABNORMAL 10*3/UL
EOSINOPHIL # BLD MANUAL: 0.17 10*3/MM3 (ref 0–0.4)
EOSINOPHIL NFR BLD AUTO: ABNORMAL %
EOSINOPHIL NFR BLD MANUAL: 3 % (ref 0.3–6.2)
EPI CELLS #/AREA URNS HPF: NORMAL /HPF
ERYTHROCYTE [DISTWIDTH] IN BLOOD BY AUTOMATED COUNT: 12.8 % (ref 12.3–15.4)
GLOBULIN SER CALC-MCNC: 2.6 GM/DL
GLUCOSE SERPL-MCNC: 122 MG/DL (ref 65–99)
GLUCOSE UR QL: NEGATIVE
HBA1C MFR BLD: 7.9 % (ref 4.8–5.6)
HCT VFR BLD AUTO: 33.8 % (ref 34–46.6)
HDLC SERPL-MCNC: 157 MG/DL (ref 40–60)
HGB BLD-MCNC: 13.5 G/DL (ref 12–15.9)
HGB UR QL STRIP: NEGATIVE
KETONES UR QL STRIP: ABNORMAL
LDLC SERPL CALC-MCNC: 116 MG/DL (ref 0–100)
LEUKOCYTE ESTERASE UR QL STRIP: NEGATIVE
LYMPHOCYTES # BLD AUTO: ABNORMAL 10*3/UL
LYMPHOCYTES # BLD MANUAL: 1.6 10*3/MM3 (ref 0.7–3.1)
LYMPHOCYTES NFR BLD AUTO: ABNORMAL %
LYMPHOCYTES NFR BLD MANUAL: 28.3 % (ref 19.6–45.3)
MCH RBC QN AUTO: 39.2 PG (ref 26.6–33)
MCHC RBC AUTO-ENTMCNC: 39.9 G/DL (ref 31.5–35.7)
MCV RBC AUTO: 98.3 FL (ref 79–97)
MONOCYTES # BLD MANUAL: 0.46 10*3/MM3 (ref 0.1–0.9)
MONOCYTES NFR BLD AUTO: ABNORMAL %
MONOCYTES NFR BLD MANUAL: 8.1 % (ref 5–12)
NEUTROPHILS # BLD MANUAL: 3.44 10*3/MM3 (ref 1.7–7)
NEUTROPHILS NFR BLD AUTO: ABNORMAL %
NEUTROPHILS NFR BLD MANUAL: 60.6 % (ref 42.7–76)
NITRITE UR QL STRIP: NEGATIVE
PH UR STRIP: 7 [PH] (ref 5–8)
PLATELET # BLD AUTO: 189 10*3/MM3 (ref 140–450)
PLATELET BLD QL SMEAR: NORMAL
POTASSIUM SERPL-SCNC: 4.6 MMOL/L (ref 3.5–5.2)
PROT SERPL-MCNC: 7.4 G/DL (ref 6–8.5)
PROT UR QL STRIP: ABNORMAL
RBC # BLD AUTO: 3.44 10*6/MM3 (ref 3.77–5.28)
RBC #/AREA URNS HPF: NORMAL /HPF
RBC MORPH BLD: NORMAL
SODIUM SERPL-SCNC: 135 MMOL/L (ref 136–145)
SP GR UR: 1.01 (ref 1–1.03)
T3FREE SERPL-MCNC: 2.6 PG/ML (ref 2–4.4)
T4 FREE SERPL-MCNC: 1.22 NG/DL (ref 0.93–1.7)
TRIGL SERPL-MCNC: 81 MG/DL (ref 0–150)
TSH SERPL DL<=0.005 MIU/L-ACNC: 4.03 UIU/ML (ref 0.27–4.2)
UROBILINOGEN UR STRIP-MCNC: ABNORMAL MG/DL
VLDLC SERPL CALC-MCNC: 16.2 MG/DL
WBC # BLD AUTO: 5.67 10*3/MM3 (ref 3.4–10.8)
WBC #/AREA URNS HPF: NORMAL /HPF

## 2020-08-24 RX ORDER — AMLODIPINE BESYLATE 5 MG/1
TABLET ORAL
Qty: 30 TABLET | Refills: 3 | Status: SHIPPED | OUTPATIENT
Start: 2020-08-24 | End: 2020-12-20

## 2020-09-22 DIAGNOSIS — Z00.00 MEDICARE ANNUAL WELLNESS VISIT, SUBSEQUENT: ICD-10-CM

## 2020-09-23 RX ORDER — IBANDRONATE SODIUM 150 MG/1
TABLET, FILM COATED ORAL
Qty: 1 TABLET | Refills: 2 | Status: SHIPPED | OUTPATIENT
Start: 2020-09-23 | End: 2020-12-20

## 2020-09-25 ENCOUNTER — HOSPITAL ENCOUNTER (OUTPATIENT)
Dept: MAMMOGRAPHY | Facility: HOSPITAL | Age: 85
Discharge: HOME | End: 2020-09-25
Admitting: FAMILY MEDICINE

## 2020-09-25 DIAGNOSIS — Z12.31 ENCOUNTER FOR SCREENING MAMMOGRAM FOR BREAST CANCER: ICD-10-CM

## 2020-09-25 PROCEDURE — 77063 BREAST TOMOSYNTHESIS BI: CPT

## 2020-09-25 PROCEDURE — 77067 SCR MAMMO BI INCL CAD: CPT

## 2020-10-12 DIAGNOSIS — J44.9 CHRONIC OBSTRUCTIVE PULMONARY DISEASE, UNSPECIFIED COPD TYPE (HCC): ICD-10-CM

## 2020-12-12 DIAGNOSIS — J44.9 CHRONIC OBSTRUCTIVE PULMONARY DISEASE, UNSPECIFIED COPD TYPE (HCC): ICD-10-CM

## 2020-12-20 DIAGNOSIS — Z00.00 MEDICARE ANNUAL WELLNESS VISIT, SUBSEQUENT: ICD-10-CM

## 2020-12-20 RX ORDER — IBANDRONATE SODIUM 150 MG/1
TABLET, FILM COATED ORAL
Qty: 1 TABLET | Refills: 1 | Status: SHIPPED | OUTPATIENT
Start: 2020-12-20 | End: 2021-02-15

## 2020-12-20 RX ORDER — AMLODIPINE BESYLATE 5 MG/1
TABLET ORAL
Qty: 30 TABLET | Refills: 2 | Status: SHIPPED | OUTPATIENT
Start: 2020-12-20 | End: 2021-03-23

## 2021-01-16 DIAGNOSIS — J44.9 CHRONIC OBSTRUCTIVE PULMONARY DISEASE, UNSPECIFIED COPD TYPE (HCC): ICD-10-CM

## 2021-02-11 DIAGNOSIS — I10 ESSENTIAL HYPERTENSION: ICD-10-CM

## 2021-02-11 RX ORDER — ATENOLOL 50 MG/1
TABLET ORAL
Qty: 90 TABLET | Refills: 2 | Status: SHIPPED | OUTPATIENT
Start: 2021-02-11 | End: 2021-11-08

## 2021-02-14 DIAGNOSIS — Z00.00 MEDICARE ANNUAL WELLNESS VISIT, SUBSEQUENT: ICD-10-CM

## 2021-02-15 RX ORDER — IBANDRONATE SODIUM 150 MG/1
TABLET, FILM COATED ORAL
Qty: 1 TABLET | Refills: 0 | Status: SHIPPED | OUTPATIENT
Start: 2021-02-15 | End: 2021-03-23

## 2021-03-22 DIAGNOSIS — Z00.00 MEDICARE ANNUAL WELLNESS VISIT, SUBSEQUENT: ICD-10-CM

## 2021-03-23 RX ORDER — AMLODIPINE BESYLATE 5 MG/1
TABLET ORAL
Qty: 30 TABLET | Refills: 1 | Status: SHIPPED | OUTPATIENT
Start: 2021-03-23 | End: 2021-05-21

## 2021-03-23 RX ORDER — IBANDRONATE SODIUM 150 MG/1
TABLET, FILM COATED ORAL
Qty: 1 TABLET | Refills: 0 | Status: SHIPPED | OUTPATIENT
Start: 2021-03-23 | End: 2021-04-19

## 2021-04-19 DIAGNOSIS — Z00.00 MEDICARE ANNUAL WELLNESS VISIT, SUBSEQUENT: ICD-10-CM

## 2021-04-19 RX ORDER — IBANDRONATE SODIUM 150 MG/1
TABLET, FILM COATED ORAL
Qty: 1 TABLET | Refills: 2 | Status: SHIPPED | OUTPATIENT
Start: 2021-04-19 | End: 2021-07-19

## 2021-05-21 RX ORDER — AMLODIPINE BESYLATE 5 MG/1
TABLET ORAL
Qty: 30 TABLET | Refills: 0 | Status: SHIPPED | OUTPATIENT
Start: 2021-05-21 | End: 2021-06-17

## 2021-06-17 RX ORDER — AMLODIPINE BESYLATE 5 MG/1
TABLET ORAL
Qty: 30 TABLET | Refills: 4 | Status: SHIPPED | OUTPATIENT
Start: 2021-06-17 | End: 2021-09-16

## 2021-07-18 DIAGNOSIS — Z00.00 MEDICARE ANNUAL WELLNESS VISIT, SUBSEQUENT: ICD-10-CM

## 2021-07-19 RX ORDER — IBANDRONATE SODIUM 150 MG/1
TABLET, FILM COATED ORAL
Qty: 1 TABLET | Refills: 1 | Status: SHIPPED | OUTPATIENT
Start: 2021-07-19 | End: 2021-09-15

## 2021-07-23 ENCOUNTER — OFFICE VISIT (OUTPATIENT)
Dept: INTERNAL MEDICINE | Facility: CLINIC | Age: 86
End: 2021-07-23

## 2021-07-23 VITALS
WEIGHT: 129 LBS | OXYGEN SATURATION: 91 % | DIASTOLIC BLOOD PRESSURE: 74 MMHG | HEART RATE: 81 BPM | BODY MASS INDEX: 22.86 KG/M2 | HEIGHT: 63 IN | TEMPERATURE: 98.2 F | SYSTOLIC BLOOD PRESSURE: 126 MMHG

## 2021-07-23 DIAGNOSIS — E55.9 VITAMIN D DEFICIENCY: ICD-10-CM

## 2021-07-23 DIAGNOSIS — R73.09 ELEVATED GLUCOSE: ICD-10-CM

## 2021-07-23 DIAGNOSIS — H35.30 MACULAR DEGENERATION OF BOTH EYES, UNSPECIFIED TYPE: Primary | ICD-10-CM

## 2021-07-23 DIAGNOSIS — J41.0 SIMPLE CHRONIC BRONCHITIS (HCC): ICD-10-CM

## 2021-07-23 DIAGNOSIS — I10 ESSENTIAL HYPERTENSION: ICD-10-CM

## 2021-07-23 DIAGNOSIS — Z00.00 MEDICARE ANNUAL WELLNESS VISIT, SUBSEQUENT: ICD-10-CM

## 2021-07-23 DIAGNOSIS — I77.9 CAROTID ARTERY DISEASE, UNSPECIFIED LATERALITY, UNSPECIFIED TYPE (HCC): ICD-10-CM

## 2021-07-23 DIAGNOSIS — E78.2 MIXED HYPERLIPIDEMIA: ICD-10-CM

## 2021-07-23 PROCEDURE — 99214 OFFICE O/P EST MOD 30 MIN: CPT | Performed by: FAMILY MEDICINE

## 2021-07-23 PROCEDURE — G0439 PPPS, SUBSEQ VISIT: HCPCS | Performed by: FAMILY MEDICINE

## 2021-07-23 NOTE — PROGRESS NOTES
"Chief Complaint  Medicare Wellness-subsequent    Subjective          Lizzy Hicks presents to [facility]  Delightful lady here with a history of fairly well-controlled COPD and precautions and fall risk.  She is treated for macular degeneration fairly successfully over the past 2 years and still able to drive and her vision has been certainly adequate.  Treatment includes active management hypertension and also monitoring of hyperlipidemia.  She receives Boniva for osteoporosis prevention.    Reviewed and near fall she experienced with some strain of the left wrist and a bruise of the left knee and that is all improved.      Objective   Vital Signs:   /74   Pulse 81   Temp 98.2 °F (36.8 °C)   Ht 158.8 cm (62.5\") Comment: current height  Wt 58.5 kg (129 lb)   SpO2 91%   BMI 23.22 kg/m²     Physical Exam  Vitals reviewed.   Constitutional:       Appearance: She is well-developed.   HENT:      Head: Normocephalic and atraumatic.      Right Ear: Tympanic membrane and external ear normal.      Left Ear: Tympanic membrane and external ear normal.      Nose: Nose normal.   Eyes:      Conjunctiva/sclera: Conjunctivae normal.      Pupils: Pupils are equal, round, and reactive to light.   Neck:      Thyroid: No thyromegaly.      Vascular: No JVD.   Cardiovascular:      Rate and Rhythm: Normal rate and regular rhythm.      Heart sounds: Normal heart sounds.   Pulmonary:      Effort: Pulmonary effort is normal.      Breath sounds: Normal breath sounds.   Abdominal:      General: Bowel sounds are normal.      Palpations: Abdomen is soft.   Musculoskeletal:      Left wrist: Decreased range of motion.      Cervical back: Normal range of motion and neck supple.   Lymphadenopathy:      Cervical: No cervical adenopathy.   Skin:     General: Skin is warm and dry.      Findings: No rash.   Neurological:      Mental Status: She is alert and oriented to person, place, and time.      Cranial " Nerves: No cranial nerve deficit.      Coordination: Coordination normal.   Psychiatric:         Behavior: Behavior normal.         Thought Content: Thought content normal.         Judgment: Judgment normal.        Result Review :                 Assessment and Plan {CC Problem List  Visit Diagnosis   ROS  Review (Popup)  Health Maintenance  Quality  BestPractice  Medications  SmartSets  SnapShot Encounters  Media :23}   Diagnoses and all orders for this visit:    1. Macular degeneration of both eyes, unspecified type (Primary)    2. Essential hypertension  Comments:  No change in treatment  Orders:  -     CBC & Differential  -     Comprehensive Metabolic Panel  -     Folate  -     Vitamin B12  -     Hemoglobin A1c  -     Lipid Panel With / Chol / HDL Ratio  -     Vitamin D 25 Hydroxy  -     Urinalysis With Microscopic If Indicated (No Culture) - Urine, Clean Catch  -     TSH  -     T4, Free  -     T3, Free    3. Carotid artery disease, unspecified laterality, unspecified type (CMS/[facility])  Comments:  Continue to monitor    4. Simple chronic bronchitis (CMS/[facility])  Comments:  Continue current inhalers    5. Medicare annual wellness visit, subsequent    6. Elevated glucose  Comments:  Recheck A1c  Orders:  -     CBC & Differential  -     Comprehensive Metabolic Panel  -     Folate  -     Vitamin B12  -     Hemoglobin A1c  -     Lipid Panel With / Chol / HDL Ratio  -     Vitamin D 25 Hydroxy  -     Urinalysis With Microscopic If Indicated (No Culture) - Urine, Clean Catch  -     TSH  -     T4, Free  -     T3, Free    7. Mixed hyperlipidemia  Comments:  Lipid panel pending    8. Vitamin D deficiency  -     Vitamin D 25 Hydroxy        Follow Up   Return in about 1 year (around 7/23/2022) for Recheck, Medicare Wellness.  Patient was given instructions and counseling regarding her condition or for health maintenance advice. Please see specific information pulled into the AVS if appropriate.

## 2021-07-23 NOTE — PROGRESS NOTES
The ABCs of the Annual Wellness Visit  Subsequent Medicare Wellness Visit    Chief Complaint   Patient presents with   • Medicare Wellness-subsequent       Subjective   History of Present Illness:  Lizzy Hicks is a 85 y.o. female who presents for a Subsequent Medicare Wellness Visit.    HEALTH RISK ASSESSMENT    Recent Hospitalizations:  No hospitalization(s) within the last year.    Current Medical Providers:  Patient Care Team:  Deric Mendosa Jr., MD as PCP - General (Family Medicine)    Smoking Status:  Social History     Tobacco Use   Smoking Status Former Smoker   • Packs/day: 0.25   • Years: 40.00   • Pack years: 10.00   • Types: Cigarettes   Smokeless Tobacco Never Used   Tobacco Comment    quit 6 yr ago       Alcohol Consumption:  Social History     Substance and Sexual Activity   Alcohol Use Yes    Comment: 1-2 drinks day       Depression Screen:   PHQ-2/PHQ-9 Depression Screening 7/23/2021   Little interest or pleasure in doing things 0   Feeling down, depressed, or hopeless 0   Trouble falling or staying asleep, or sleeping too much 0   Feeling tired or having little energy 0   Poor appetite or overeating 0   Feeling bad about yourself - or that you are a failure or have let yourself or your family down 0   Trouble concentrating on things, such as reading the newspaper or watching television 0   Moving or speaking so slowly that other people could have noticed. Or the opposite - being so fidgety or restless that you have been moving around a lot more than usual 0   Thoughts that you would be better off dead, or of hurting yourself in some way 0   Total Score 0   If you checked off any problems, how difficult have these problems made it for you to do your work, take care of things at home, or get along with other people? Not difficult at all       Fall Risk Screen:  STEADI Fall Risk Assessment has not been completed.    Health Habits and Functional and Cognitive Screening:  Functional & Cognitive  Status 7/23/2021   Do you have difficulty preparing food and eating? No   Do you have difficulty bathing yourself, getting dressed or grooming yourself? No   Do you have difficulty using the toilet? No   Do you have difficulty moving around from place to place? No   Do you have trouble with steps or getting out of a bed or a chair? No   Current Diet Well Balanced Diet   Dental Exam Up to date   Eye Exam Up to date   Exercise (times per week) 4 times per week        Exercise Frequency Comment -   Current Exercises Include Walking   Current Exercise Activities Include -   Do you need help using the phone?  No   Are you deaf or do you have serious difficulty hearing?  No   Do you need help with transportation? No   Do you need help shopping? No   Do you need help preparing meals?  No   Do you need help with housework?  No   Do you need help with laundry? No   Do you need help taking your medications? No   Do you need help managing money? No   Do you ever drive or ride in a car without wearing a seat belt? No   Have you felt unusual stress, anger or loneliness in the last month? No   Who do you live with? Alone   If you need help, do you have trouble finding someone available to you? No   Have you been bothered in the last four weeks by sexual problems? No   Do you have difficulty concentrating, remembering or making decisions? No         Does the patient have evidence of cognitive impairment? No    Asprin use counseling:Taking ASA appropriately as indicated    Age-appropriate Screening Schedule:  Refer to the list below for future screening recommendations based on patient's age, sex and/or medical conditions. Orders for these recommended tests are listed in the plan section. The patient has been provided with a written plan.    Health Maintenance   Topic Date Due   • ZOSTER VACCINE (3 of 3) 09/19/2018   • DXA SCAN  10/10/2018   • INFLUENZA VACCINE  10/01/2021   • MAMMOGRAM  09/25/2022   • TDAP/TD VACCINES (2 - Td or  Tdap) 09/20/2024          The following portions of the patient's history were reviewed and updated as appropriate: allergies, current medications, past family history, past medical history, past social history, past surgical history and problem list.    Outpatient Medications Prior to Visit   Medication Sig Dispense Refill   • amLODIPine (NORVASC) 5 MG tablet TAKE ONE TABLET BY MOUTH DAILY 30 tablet 4   • aspirin 81 MG EC tablet Take 81 mg by mouth Daily. WILL FOLLOW MD ORDERS     • atenolol (TENORMIN) 50 MG tablet TAKE ONE TABLET BY MOUTH DAILY 90 tablet 2   • calcium carbonate-cholecalciferol 500-400 MG-UNIT tablet tablet Take 1 tablet by mouth 2 (Two) Times a Day.     • fluticasone-salmeterol (ADVAIR) 100-50 MCG/DOSE DISKUS INHALE ONE PUFF BY MOUTH TWICE A DAY 60 each 2   • ibandronate (BONIVA) 150 MG tablet TAKE 1 TABLET BY MOUTH ONCE A MONTHON AN EMPTY STOMACH BEFORE BREAKFAST. REMAIN UPRIGHT FOR 30 MINUTES  TAKE WITH 8 OUNCES OF WATER 1 tablet 1   • Multiple Vitamins-Minerals (ICAPS AREDS 2 PO) Take 1 tablet by mouth 2 (Two) Times a Day. HOLD PRIOR TO SURG     • Multiple Vitamins-Minerals (MULTIVITAMIN ADULTS 50+ PO) Take 1 tablet by mouth Daily. HOLD PRIOR TO SURG       No facility-administered medications prior to visit.       Patient Active Problem List   Diagnosis   • Essential hypertension   • COPD (chronic obstructive pulmonary disease) (CMS/HCC)   • Carotid artery stenosis   • Osteoarthritis   • Osteoporosis   • Reset osmostat syndrome (CMS/HCC)   • Carotid stenosis, asymptomatic, right   • Carotid artery disease (CMS/HCC)   • Macular degeneration       Advanced Care Planning:  ACP discussion was held with the patient during this visit. Patient has an advance directive (not in EMR), copy requested.    Review of Systems    Compared to one year ago, the patient feels her physical health is the same.  Compared to one year ago, the patient feels her mental health is the same.    Reviewed chart for  "potential of high risk medication in the elderly: not applicable  Reviewed chart for potential of harmful drug interactions in the elderly:not applicable    Objective         Vitals:    07/23/21 1015   BP: 126/74   Pulse: 81   Temp: 98.2 °F (36.8 °C)   SpO2: 91%   Weight: 58.5 kg (129 lb)   Height: 158.8 cm (62.5\")  Comment: current height   PainSc: 0-No pain  Comment: left wrist hurts at times       Body mass index is 23.22 kg/m².  Discussed the patient's BMI with her. The BMI is in the acceptable range.    Physical Exam          Assessment/Plan   Medicare Risks and Personalized Health Plan  CMS Preventative Services Quick Reference  Cardiovascular risk  Fall Risk    The above risks/problems have been discussed with the patient.  Pertinent information has been shared with the patient in the After Visit Summary.  Follow up plans and orders are seen below in the Assessment/Plan Section.    There are no diagnoses linked to this encounter.  Follow Up:  No follow-ups on file.     An After Visit Summary and PPPS were given to the patient.               "

## 2021-07-23 NOTE — PATIENT INSTRUCTIONS
Medicare Wellness  Personal Prevention Plan of Service     Date of Office Visit:  2021  Encounter Provider:  Deric Mendosa Jr., MD  Place of Service:  [facility]  Patient Name: Lizzy Hicks  :  1935    As part of the Medicare Wellness portion of your visit today, we are providing you with this personalized preventive plan of services (PPPS). This plan is based upon recommendations of the [place] Preventive Services Task Force (USPSTF) and the Advisory Committee on Immunization Practices (ACIP).    This lists the preventive care services that should be considered, and provides dates of when you are due. Items listed as completed are up-to-date and do not require any further intervention.    Health Maintenance   Topic Date Due   • Pneumococcal Vaccine 65+ (1 of 2 - PPSV23) 2016   • ZOSTER VACCINE (3 of 3) 2018   • DXA SCAN  10/10/2018   • ANNUAL WELLNESS VISIT  2021   • LIPID PANEL  2021   • INFLUENZA VACCINE  10/01/2021   • MAMMOGRAM  2022   • TDAP/TD VACCINES (2 - Td or Tdap) 2024   • COVID-19 Vaccine  Completed       Orders Placed This Encounter   Procedures   • Comprehensive Metabolic Panel     Order Specific Question:   Release to patient     Answer:   Immediate   • Folate     Order Specific Question:   Release to patient     Answer:   Immediate   • Vitamin B12     Order Specific Question:   Release to patient     Answer:   Immediate   • Hemoglobin A1c     Order Specific Question:   Release to patient     Answer:   Immediate   • Lipid Panel With / Chol / HDL Ratio     Order Specific Question:   Release to patient     Answer:   Immediate   • Vitamin D 25 Hydroxy     Order Specific Question:   Release to patient     Answer:   Immediate   • Urinalysis With Microscopic If Indicated (No Culture) - Urine, Clean Catch     Order Specific Question:   Release to patient     Answer:   Immediate   • TSH     Order Specific Question:    Release to patient     Answer:   Immediate   • T4, Free     Order Specific Question:   Release to patient     Answer:   Immediate   • T3, Free     Order Specific Question:   Release to patient     Answer:   Immediate   • CBC & Differential     Order Specific Question:   Manual Differential     Answer:   No       Return in about 1 year (around 7/23/2022) for Recheck, Medicare Wellness.

## 2021-07-24 LAB
25(OH)D3+25(OH)D2 SERPL-MCNC: 48 NG/ML (ref 30–100)
ALBUMIN SERPL-MCNC: 4.5 G/DL (ref 3.6–4.6)
ALBUMIN/GLOB SERPL: 1.7 {RATIO} (ref 1.2–2.2)
ALP SERPL-CCNC: 98 IU/L (ref 48–121)
ALT SERPL-CCNC: 15 IU/L (ref 0–32)
APPEARANCE UR: CLEAR
AST SERPL-CCNC: 27 IU/L (ref 0–40)
BACTERIA #/AREA URNS HPF: NORMAL /[HPF]
BASOPHILS # BLD AUTO: 0.1 X10E3/UL (ref 0–0.2)
BASOPHILS NFR BLD AUTO: 1 %
BILIRUB SERPL-MCNC: 0.5 MG/DL (ref 0–1.2)
BILIRUB UR QL STRIP: NEGATIVE
BUN SERPL-MCNC: 9 MG/DL (ref 8–27)
BUN/CREAT SERPL: 11 (ref 12–28)
CALCIUM SERPL-MCNC: 10.3 MG/DL (ref 8.7–10.3)
CASTS URNS QL MICRO: NORMAL /LPF
CHLORIDE SERPL-SCNC: 90 MMOL/L (ref 96–106)
CHOLEST SERPL-MCNC: 291 MG/DL (ref 100–199)
CHOLEST/HDLC SERPL: 2 RATIO (ref 0–4.4)
CO2 SERPL-SCNC: 25 MMOL/L (ref 20–29)
COLOR UR: YELLOW
CREAT SERPL-MCNC: 0.79 MG/DL (ref 0.57–1)
EOSINOPHIL # BLD AUTO: 0.1 X10E3/UL (ref 0–0.4)
EOSINOPHIL NFR BLD AUTO: 1 %
EPI CELLS #/AREA URNS HPF: NORMAL /HPF (ref 0–10)
ERYTHROCYTE [DISTWIDTH] IN BLOOD BY AUTOMATED COUNT: 11.9 % (ref 11.7–15.4)
FOLATE SERPL-MCNC: >20 NG/ML
GLOBULIN SER CALC-MCNC: 2.6 G/DL (ref 1.5–4.5)
GLUCOSE SERPL-MCNC: 115 MG/DL (ref 65–99)
GLUCOSE UR QL: NEGATIVE
HBA1C MFR BLD: 5.6 % (ref 4.8–5.6)
HCT VFR BLD AUTO: 39 % (ref 34–46.6)
HDLC SERPL-MCNC: 147 MG/DL
HGB BLD-MCNC: 13.4 G/DL (ref 11.1–15.9)
HGB UR QL STRIP: NEGATIVE
IMM GRANULOCYTES # BLD AUTO: 0 X10E3/UL (ref 0–0.1)
IMM GRANULOCYTES NFR BLD AUTO: 0 %
KETONES UR QL STRIP: ABNORMAL
LDLC SERPL CALC-MCNC: 132 MG/DL (ref 0–99)
LEUKOCYTE ESTERASE UR QL STRIP: NEGATIVE
LYMPHOCYTES # BLD AUTO: 1.5 X10E3/UL (ref 0.7–3.1)
LYMPHOCYTES NFR BLD AUTO: 20 %
MCH RBC QN AUTO: 32.8 PG (ref 26.6–33)
MCHC RBC AUTO-ENTMCNC: 34.4 G/DL (ref 31.5–35.7)
MCV RBC AUTO: 96 FL (ref 79–97)
MICRO URNS: ABNORMAL
MONOCYTES # BLD AUTO: 0.8 X10E3/UL (ref 0.1–0.9)
MONOCYTES NFR BLD AUTO: 10 %
NEUTROPHILS # BLD AUTO: 5.2 X10E3/UL (ref 1.4–7)
NEUTROPHILS NFR BLD AUTO: 68 %
NITRITE UR QL STRIP: NEGATIVE
PH UR STRIP: 7.5 [PH] (ref 5–7.5)
PLATELET # BLD AUTO: 252 X10E3/UL (ref 150–450)
POTASSIUM SERPL-SCNC: 5 MMOL/L (ref 3.5–5.2)
PROT SERPL-MCNC: 7.1 G/DL (ref 6–8.5)
PROT UR QL STRIP: ABNORMAL
RBC # BLD AUTO: 4.08 X10E6/UL (ref 3.77–5.28)
RBC #/AREA URNS HPF: NORMAL /HPF (ref 0–2)
SODIUM SERPL-SCNC: 133 MMOL/L (ref 134–144)
SP GR UR: 1.01 (ref 1–1.03)
T3FREE SERPL-MCNC: 2.6 PG/ML (ref 2–4.4)
T4 FREE SERPL-MCNC: 1.3 NG/DL (ref 0.82–1.77)
TRIGL SERPL-MCNC: 81 MG/DL (ref 0–149)
TSH SERPL DL<=0.005 MIU/L-ACNC: 2.6 UIU/ML (ref 0.45–4.5)
UROBILINOGEN UR STRIP-MCNC: 0.2 MG/DL (ref 0.2–1)
VIT B12 SERPL-MCNC: 1095 PG/ML (ref 232–1245)
VLDLC SERPL CALC-MCNC: 12 MG/DL (ref 5–40)
WBC # BLD AUTO: 7.7 X10E3/UL (ref 3.4–10.8)
WBC #/AREA URNS HPF: NORMAL /HPF (ref 0–5)

## 2021-09-15 DIAGNOSIS — Z00.00 MEDICARE ANNUAL WELLNESS VISIT, SUBSEQUENT: ICD-10-CM

## 2021-09-15 RX ORDER — IBANDRONATE SODIUM 150 MG/1
TABLET, FILM COATED ORAL
Qty: 1 TABLET | Refills: 1 | Status: SHIPPED | OUTPATIENT
Start: 2021-09-15 | End: 2021-11-16

## 2021-09-16 RX ORDER — AMLODIPINE BESYLATE 5 MG/1
TABLET ORAL
Qty: 90 TABLET | Refills: 3 | Status: SHIPPED | OUTPATIENT
Start: 2021-09-16 | End: 2022-09-14

## 2021-10-14 ENCOUNTER — TELEPHONE (OUTPATIENT)
Dept: INTERNAL MEDICINE | Facility: CLINIC | Age: 86
End: 2021-10-14

## 2021-10-14 NOTE — TELEPHONE ENCOUNTER
Pt called back and she is willing to try a low dose of cholesterol medication and would like that sent to [facility]     She is also asking if she continue her low dose aspirin

## 2021-10-14 NOTE — TELEPHONE ENCOUNTER
Caller: Lizzy Hicks    Relationship: Self    Best call back number: [phone]     What is the best time to reach you: ANY TIME    Who are you requesting to speak with (clinical staff, provider,  specific staff member): DR. ESTEBAN OR FRANCISCO    What was the call regarding: PATIENT WOULD LIKE TO SPEAK WITH DR. ESTEBAN OR FRANCISCO REGARDING A MEDICATION QUESTION.     Do you require a callback: YES

## 2021-10-19 ENCOUNTER — TRANSCRIBE ORDERS (OUTPATIENT)
Dept: ADMINISTRATIVE | Facility: HOSPITAL | Age: 86
End: 2021-10-19

## 2021-10-19 DIAGNOSIS — Z12.31 SCREENING MAMMOGRAM, ENCOUNTER FOR: Primary | ICD-10-CM

## 2021-10-24 RX ORDER — ROSUVASTATIN CALCIUM 5 MG/1
5 TABLET, COATED ORAL DAILY
Qty: 90 TABLET | Refills: 3 | Status: SHIPPED | OUTPATIENT
Start: 2021-10-24 | End: 2022-09-20

## 2021-10-26 DIAGNOSIS — E78.2 MIXED HYPERLIPIDEMIA: Primary | ICD-10-CM

## 2021-11-07 DIAGNOSIS — I10 ESSENTIAL HYPERTENSION: ICD-10-CM

## 2021-11-08 RX ORDER — ATENOLOL 50 MG/1
TABLET ORAL
Qty: 90 TABLET | Refills: 2 | Status: SHIPPED | OUTPATIENT
Start: 2021-11-08 | End: 2022-07-29 | Stop reason: SDUPTHER

## 2021-11-16 DIAGNOSIS — Z00.00 MEDICARE ANNUAL WELLNESS VISIT, SUBSEQUENT: ICD-10-CM

## 2021-11-16 RX ORDER — IBANDRONATE SODIUM 150 MG/1
TABLET, FILM COATED ORAL
Qty: 1 TABLET | Refills: 1 | Status: SHIPPED | OUTPATIENT
Start: 2021-11-16 | End: 2022-01-17

## 2021-12-20 ENCOUNTER — TELEPHONE (OUTPATIENT)
Dept: INTERNAL MEDICINE | Facility: CLINIC | Age: 86
End: 2021-12-20

## 2021-12-20 NOTE — TELEPHONE ENCOUNTER
PATIENT HAS A COUGH. SHE WAS NEAR HER SON WHO HAD A COLD. SINCE SHE HAS COPD SHE WAS WANTING TO KNOW IF YOU COULD CALL IN A ZPAC AND SOMETHING FOR THE COUGH.    PLEASE ADVISE-OK TO LEAVE MESSAGE  [phone]     [facility] [place] [address] - [place], [place] - [facility]– [place] [address] AT [facility]. [address] - [phone]  - [phone]   [phone]

## 2021-12-20 NOTE — TELEPHONE ENCOUNTER
Pt advised she would need to be evaluated for her symptoms, she will proceed to her local urgent care

## 2022-01-15 DIAGNOSIS — Z00.00 MEDICARE ANNUAL WELLNESS VISIT, SUBSEQUENT: ICD-10-CM

## 2022-01-17 RX ORDER — IBANDRONATE SODIUM 150 MG/1
TABLET, FILM COATED ORAL
Qty: 1 TABLET | Refills: 1 | Status: SHIPPED | OUTPATIENT
Start: 2022-01-17 | End: 2022-01-21

## 2022-01-19 ENCOUNTER — TELEPHONE (OUTPATIENT)
Dept: INTERNAL MEDICINE | Facility: CLINIC | Age: 87
End: 2022-01-19

## 2022-01-19 NOTE — TELEPHONE ENCOUNTER
Caller: Lizzy Hicks    Relationship: Self    Best call back number:[phone]    What medication are you requesting: ALENDRONATE 70MG     What are your current symptoms: ibandronate (BONIVA) 150 MG tablet IS THE MEDICATION SHE IS CURRENTLY TAKING BUT IT WENT UP QUITE A BIT AND PATIENT CANNOT AFFORD THIS ANYMORE.       If a prescription is needed, what is your preferred pharmacy and phone number:    [facility] [place] [address] [address] AT [facility]. & [address] - [phone]  - [phone]   [phone]    Additional notes:PATIENT WOULD LIKE THIS CALLED IN NOT DUE TO TAKE FOR ANOTHER WEEK.

## 2022-01-21 RX ORDER — ALENDRONATE SODIUM 70 MG/1
70 TABLET ORAL
Qty: 13 TABLET | Refills: 3 | Status: SHIPPED | OUTPATIENT
Start: 2022-01-21 | End: 2022-12-22

## 2022-02-03 ENCOUNTER — HOSPITAL ENCOUNTER (OUTPATIENT)
Dept: MAMMOGRAPHY | Facility: HOSPITAL | Age: 87
Discharge: HOME | End: 2022-02-03
Admitting: FAMILY MEDICINE

## 2022-02-03 DIAGNOSIS — Z12.31 SCREENING MAMMOGRAM, ENCOUNTER FOR: ICD-10-CM

## 2022-02-03 PROCEDURE — 77067 SCR MAMMO BI INCL CAD: CPT

## 2022-02-03 PROCEDURE — 77063 BREAST TOMOSYNTHESIS BI: CPT

## 2022-02-07 DIAGNOSIS — J44.9 CHRONIC OBSTRUCTIVE PULMONARY DISEASE, UNSPECIFIED COPD TYPE: ICD-10-CM

## 2022-05-08 DIAGNOSIS — J44.9 CHRONIC OBSTRUCTIVE PULMONARY DISEASE, UNSPECIFIED COPD TYPE: ICD-10-CM

## 2022-05-09 RX ORDER — FLUTICASONE PROPIONATE AND SALMETEROL 100; 50 UG/1; UG/1
POWDER RESPIRATORY (INHALATION)
Qty: 60 EACH | Refills: 2 | Status: SHIPPED | OUTPATIENT
Start: 2022-05-09 | End: 2023-01-16

## 2022-07-29 ENCOUNTER — OFFICE VISIT (OUTPATIENT)
Dept: INTERNAL MEDICINE | Facility: CLINIC | Age: 87
End: 2022-07-29

## 2022-07-29 VITALS
OXYGEN SATURATION: 91 % | DIASTOLIC BLOOD PRESSURE: 70 MMHG | SYSTOLIC BLOOD PRESSURE: 168 MMHG | BODY MASS INDEX: 24.3 KG/M2 | WEIGHT: 135 LBS | HEART RATE: 90 BPM | TEMPERATURE: 96.9 F

## 2022-07-29 DIAGNOSIS — E78.89 ELEVATED HDL: ICD-10-CM

## 2022-07-29 DIAGNOSIS — R73.09 ELEVATED GLUCOSE: ICD-10-CM

## 2022-07-29 DIAGNOSIS — R79.89 LOW VITAMIN D LEVEL: ICD-10-CM

## 2022-07-29 DIAGNOSIS — I10 ESSENTIAL HYPERTENSION: Primary | ICD-10-CM

## 2022-07-29 DIAGNOSIS — I65.29 STENOSIS OF CAROTID ARTERY, UNSPECIFIED LATERALITY: ICD-10-CM

## 2022-07-29 DIAGNOSIS — M15.3 OTHER SECONDARY OSTEOARTHRITIS OF MULTIPLE SITES: ICD-10-CM

## 2022-07-29 DIAGNOSIS — E55.9 HYPOVITAMINOSIS D: ICD-10-CM

## 2022-07-29 PROCEDURE — 1170F FXNL STATUS ASSESSED: CPT | Performed by: FAMILY MEDICINE

## 2022-07-29 PROCEDURE — 1159F MED LIST DOCD IN RCRD: CPT | Performed by: FAMILY MEDICINE

## 2022-07-29 PROCEDURE — G0439 PPPS, SUBSEQ VISIT: HCPCS | Performed by: FAMILY MEDICINE

## 2022-07-29 PROCEDURE — 1126F AMNT PAIN NOTED NONE PRSNT: CPT | Performed by: FAMILY MEDICINE

## 2022-07-29 PROCEDURE — 99214 OFFICE O/P EST MOD 30 MIN: CPT | Performed by: FAMILY MEDICINE

## 2022-07-29 RX ORDER — ATENOLOL 50 MG/1
100 TABLET ORAL DAILY
Qty: 180 TABLET | Refills: 3 | Status: SHIPPED | OUTPATIENT
Start: 2022-07-29 | End: 2022-08-09

## 2022-07-29 NOTE — PROGRESS NOTES
The ABCs of the Annual Wellness Visit  Subsequent Medicare Wellness Visit    Chief Complaint   Patient presents with   • Medicare Wellness-subsequent      Subjective    History of Present Illness:  Lizzy Hicks is a 86 y.o. female who presents for a Subsequent Medicare Wellness Visit.    The following portions of the patient's history were reviewed and   updated as appropriate: allergies, current medications, past family history, past medical history, past social history, past surgical history and problem list.    Compared to one year ago, the patient feels her physical   health is the same.    Compared to one year ago, the patient feels her mental   health is the same.    Recent Hospitalizations:  She was not admitted to the hospital during the last year.       Current Medical Providers:  Patient Care Team:  Deric Mendosa MD as PCP - General (Family Medicine)    Outpatient Medications Prior to Visit   Medication Sig Dispense Refill   • alendronate (Fosamax) 70 MG tablet Take 1 tablet by mouth Every 7 (Seven) Days. 13 tablet 3   • amLODIPine (NORVASC) 5 MG tablet TAKE ONE TABLET BY MOUTH DAILY 90 tablet 3   • aspirin 81 MG EC tablet Take 81 mg by mouth Daily. WILL FOLLOW MD ORDERS     • calcium carbonate-cholecalciferol 500-400 MG-UNIT tablet tablet Take 1 tablet by mouth 2 (Two) Times a Day.     • Fluticasone-Salmeterol (ADVAIR) 100-50 MCG/ACT DISKUS INHALE ONE PUFF BY MOUTH TWICE A DAY 60 each 2   • Multiple Vitamins-Minerals (ICAPS AREDS 2 PO) Take 1 tablet by mouth 2 (Two) Times a Day. HOLD PRIOR TO SURG     • Multiple Vitamins-Minerals (MULTIVITAMIN ADULTS 50+ PO) Take 1 tablet by mouth Daily. HOLD PRIOR TO SURG     • rosuvastatin (Crestor) 5 MG tablet Take 1 tablet by mouth Daily. 90 tablet 3   • atenolol (TENORMIN) 50 MG tablet TAKE ONE TABLET BY MOUTH DAILY 90 tablet 2   • azithromycin (ZITHROMAX) 250 MG tablet Take 2 tablets the first day, then 1 tablet daily for 4 days. 6 tablet 0   •  "promethazine-dextromethorphan (PROMETHAZINE-DM) 6.25-15 MG/5ML solution Take 5 mL by mouth At Night As Needed for Cough. 118 mL 0     No facility-administered medications prior to visit.       No opioid medication identified on active medication list. I have reviewed chart for other potential  high risk medication/s and harmful drug interactions in the elderly.          Aspirin is on active medication list. Aspirin use is indicated based on review of current medical condition/s. Pros and cons of this therapy have been discussed today. Benefits of this medication outweigh potential harm.  Patient has been encouraged to continue taking this medication.  .      Patient Active Problem List   Diagnosis   • Essential hypertension   • COPD (chronic obstructive pulmonary disease) (HCC)   • Carotid artery stenosis   • Osteoarthritis   • Osteoporosis   • Reset osmostat syndrome (HCC)   • Carotid stenosis, asymptomatic, right   • Carotid artery disease (HCC)   • Macular degeneration     Advance Care Planning  Advance Directive is not on file.  ACP discussion was held with the patient during this visit. Patient has an advance directive (not in EMR), copy requested.          Objective    Vitals:    07/29/22 1158   BP: 168/70   BP Location: Left arm   Patient Position: Sitting   Cuff Size: Adult   Pulse: 90   Temp: 96.9 °F (36.1 °C)   TempSrc: Tympanic   SpO2: 91%   Weight: 61.2 kg (135 lb)   PainSc: 0-No pain     Estimated body mass index is 24.3 kg/m² as calculated from the following:    Height as of 7/23/21: 158.8 cm (62.5\").    Weight as of this encounter: 61.2 kg (135 lb).          Does the patient have evidence of cognitive impairment? No    Physical Exam            HEALTH RISK ASSESSMENT    Smoking Status:  Social History     Tobacco Use   Smoking Status Former Smoker   • Packs/day: 0.25   • Years: 40.00   • Pack years: 10.00   • Types: Cigarettes   Smokeless Tobacco Never Used   Tobacco Comment    quit 6 yr ago "     Alcohol Consumption:  Social History     Substance and Sexual Activity   Alcohol Use Yes    Comment: 1-2 drinks day     Fall Risk Screen:    DAVIDSON Fall Risk Assessment was completed, and patient is at LOW risk for falls.Assessment completed on:7/29/2022    Depression Screening:  PHQ-2/PHQ-9 Depression Screening 7/29/2022   Retired PHQ-9 Total Score -   Retired Total Score -   Little Interest or Pleasure in Doing Things 0-->not at all   Feeling Down, Depressed or Hopeless 0-->not at all   PHQ-9: Brief Depression Severity Measure Score 0       Health Habits and Functional and Cognitive Screening:  Functional & Cognitive Status 7/29/2022   Do you have difficulty preparing food and eating? No   Do you have difficulty bathing yourself, getting dressed or grooming yourself? No   Do you have difficulty using the toilet? No   Do you have difficulty moving around from place to place? No   Do you have trouble with steps or getting out of a bed or a chair? No   Current Diet Well Balanced Diet   Dental Exam Up to date   Eye Exam Up to date   Exercise (times per week) 0 times per week        Exercise Frequency Comment -   Current Exercises Include No Regular Exercise   Current Exercise Activities Include -   Do you need help using the phone?  No   Are you deaf or do you have serious difficulty hearing?  No   Do you need help with transportation? No   Do you need help shopping? No   Do you need help preparing meals?  No   Do you need help with housework?  No   Do you need help with laundry? No   Do you need help taking your medications? No   Do you need help managing money? No   Do you ever drive or ride in a car without wearing a seat belt? No   Have you felt unusual stress, anger or loneliness in the last month? No   Who do you live with? Community   If you need help, do you have trouble finding someone available to you? No   Have you been bothered in the last four weeks by sexual problems? No   Do you have difficulty  concentrating, remembering or making decisions? No       Age-appropriate Screening Schedule:  Refer to the list below for future screening recommendations based on patient's age, sex and/or medical conditions. Orders for these recommended tests are listed in the plan section. The patient has been provided with a written plan.    Health Maintenance   Topic Date Due   • ZOSTER VACCINE (3 of 3) 09/19/2018   • DXA SCAN  10/10/2018   • LIPID PANEL  07/23/2022   • INFLUENZA VACCINE  10/01/2022   • MAMMOGRAM  02/03/2024   • TDAP/TD VACCINES (2 - Td or Tdap) 09/20/2024              Assessment & Plan   CMS Preventative Services Quick Reference  Risk Factors Identified During Encounter  Cardiovascular Disease  Fall Risk-High or Moderate  The above risks/problems have been discussed with the patient.  Follow up actions/plans if indicated are seen below in the Assessment/Plan Section.  Pertinent information has been shared with the patient in the After Visit Summary.    Diagnoses and all orders for this visit:    1. Essential hypertension (Primary)  -     Urinalysis With Microscopic If Indicated (No Culture) - Urine, Clean Catch  -     TSH  -     T4, Free  -     Lipid Panel With / Chol / HDL Ratio  -     CBC & Differential  -     Comprehensive Metabolic Panel  -     Hemoglobin A1c  -     Vitamin B12  -     Vitamin D 25 Hydroxy  -     Folate  -     atenolol (TENORMIN) 50 MG tablet; Take 2 tablets by mouth Daily.  Dispense: 180 tablet; Refill: 3    2. Stenosis of carotid artery, unspecified laterality  -     Urinalysis With Microscopic If Indicated (No Culture) - Urine, Clean Catch  -     TSH  -     T4, Free  -     Lipid Panel With / Chol / HDL Ratio  -     CBC & Differential  -     Comprehensive Metabolic Panel  -     Hemoglobin A1c  -     Vitamin B12  -     Vitamin D 25 Hydroxy  -     Folate    3. Other secondary osteoarthritis of multiple sites  -     Urinalysis With Microscopic If Indicated (No Culture) - Urine, Clean  Catch  -     TSH  -     T4, Free  -     Lipid Panel With / Chol / HDL Ratio  -     CBC & Differential  -     Comprehensive Metabolic Panel  -     Hemoglobin A1c  -     Vitamin B12  -     Vitamin D 25 Hydroxy  -     Folate    4. Elevated HDL  -     Lipid Panel With / Chol / HDL Ratio    5. Low vitamin D level  -     Vitamin D 25 Hydroxy    6. Elevated glucose   -     Hemoglobin A1c    7. Hypovitaminosis D   -     Vitamin D 25 Hydroxy        Follow Up:   No follow-ups on file.     An After Visit Summary and PPPS were made available to the patient.

## 2022-07-29 NOTE — PATIENT INSTRUCTIONS
Medicare Wellness  Personal Prevention Plan of Service     Date of Office Visit:    Encounter Provider:  Deric Mendosa MD  Place of Service:  [facility]  Patient Name: Lizzy Hicks  :  1935    As part of the Medicare Wellness portion of your visit today, we are providing you with this personalized preventive plan of services (PPPS). This plan is based upon recommendations of the [place] Preventive Services Task Force (USPSTF) and the Advisory Committee on Immunization Practices (ACIP).    This lists the preventive care services that should be considered, and provides dates of when you are due. Items listed as completed are up-to-date and do not require any further intervention.    Health Maintenance   Topic Date Due    ZOSTER VACCINE (3 of 3) 2018    Pneumococcal Vaccine 65+ (2 - PPSV23 or PCV20) 2018    DXA SCAN  10/10/2018    ANNUAL WELLNESS VISIT  2022    LIPID PANEL  2022    INFLUENZA VACCINE  10/01/2022    MAMMOGRAM  2024    TDAP/TD VACCINES (2 - Td or Tdap) 2024    COVID-19 Vaccine  Completed       Orders Placed This Encounter   Procedures    Urinalysis With Microscopic If Indicated (No Culture) - Urine, Clean Catch     Order Specific Question:   Release to patient     Answer:   Immediate    TSH     Order Specific Question:   Release to patient     Answer:   Immediate    T4, Free     Order Specific Question:   Release to patient     Answer:   Immediate    Lipid Panel With / Chol / HDL Ratio     Order Specific Question:   Release to patient     Answer:   Immediate    Comprehensive Metabolic Panel     Order Specific Question:   Release to patient     Answer:   Immediate    Hemoglobin A1c     Order Specific Question:   Release to patient     Answer:   Immediate    Vitamin B12     Order Specific Question:   Release to patient     Answer:   Immediate    Vitamin D 25 Hydroxy     Order Specific Question:   Release to patient      Answer:   Immediate    Folate     Order Specific Question:   Release to patient     Answer:   Immediate    CBC & Differential     Order Specific Question:   Manual Differential     Answer:   No       Return in about 1 year (around 7/29/2023) for Medicare Wellness.

## 2022-07-29 NOTE — PROGRESS NOTES
"Chief Complaint  Medicare Wellness-subsequent    Subjective        Lizzy Hicks presents to [facility]  Delightful lady here for Medicare wellness and yearly follow-up.  Her blood pressure is not controlled and she agrees for  to increase atenolol to 100 mg daily taking to 50 mg tablets daily.  She has evidence of isolated systolic hypertension.    History of carotid artery disease reviewed as well as carotid stenosis.  Labs are pending including rechecking lipid panel.    She is also noted to have osteoarthritis in multiple sites.      Objective   Vital Signs:  /70 (BP Location: Left arm, Patient Position: Sitting, Cuff Size: Adult)   Pulse 90   Temp 96.9 °F (36.1 °C) (Tympanic)   Wt 61.2 kg (135 lb)   SpO2 91%   BMI 24.30 kg/m²   Estimated body mass index is 24.3 kg/m² as calculated from the following:    Height as of 7/23/21: 158.8 cm (62.5\").    Weight as of this encounter: 61.2 kg (135 lb).          Physical Exam  Vitals reviewed.   Constitutional:       Appearance: She is well-developed.   HENT:      Head: Normocephalic and atraumatic.      Right Ear: Tympanic membrane and external ear normal.      Left Ear: Tympanic membrane and external ear normal.   Eyes:      Conjunctiva/sclera: Conjunctivae normal.      Pupils: Pupils are equal, round, and reactive to light.   Neck:      Thyroid: No thyromegaly.      Vascular: No JVD.   Cardiovascular:      Rate and Rhythm: Normal rate and regular rhythm.      Heart sounds: Normal heart sounds.   Pulmonary:      Effort: Pulmonary effort is normal.      Breath sounds: Normal breath sounds.   Abdominal:      General: Bowel sounds are normal.      Palpations: Abdomen is soft.   Musculoskeletal:      Cervical back: Normal range of motion and neck supple.      Lumbar back: Decreased range of motion.   Lymphadenopathy:      Cervical: No cervical adenopathy.   Skin:     General: Skin is warm and dry.      Findings: No rash. "   Neurological:      Mental Status: She is alert and oriented to person, place, and time.      Motor: Motor function is intact.      Coordination: Coordination abnormal.   Psychiatric:         Behavior: Behavior normal.         Thought Content: Thought content normal.         Judgment: Judgment normal.        Result Review :  The following data was reviewed by: Deric Mendosa MD on 07/29/2022:                Assessment and Plan   Diagnoses and all orders for this visit:    1. Essential hypertension (Primary)  -     Urinalysis With Microscopic If Indicated (No Culture) - Urine, Clean Catch  -     TSH  -     T4, Free  -     Lipid Panel With / Chol / HDL Ratio  -     CBC & Differential  -     Comprehensive Metabolic Panel  -     Hemoglobin A1c  -     Vitamin B12  -     Vitamin D 25 Hydroxy  -     Folate  -     atenolol (TENORMIN) 50 MG tablet; Take 2 tablets by mouth Daily.  Dispense: 180 tablet; Refill: 3    2. Stenosis of carotid artery, unspecified laterality  -     Urinalysis With Microscopic If Indicated (No Culture) - Urine, Clean Catch  -     TSH  -     T4, Free  -     Lipid Panel With / Chol / HDL Ratio  -     CBC & Differential  -     Comprehensive Metabolic Panel  -     Hemoglobin A1c  -     Vitamin B12  -     Vitamin D 25 Hydroxy  -     Folate    3. Other secondary osteoarthritis of multiple sites  -     Urinalysis With Microscopic If Indicated (No Culture) - Urine, Clean Catch  -     TSH  -     T4, Free  -     Lipid Panel With / Chol / HDL Ratio  -     CBC & Differential  -     Comprehensive Metabolic Panel  -     Hemoglobin A1c  -     Vitamin B12  -     Vitamin D 25 Hydroxy  -     Folate    4. Elevated HDL  -     Lipid Panel With / Chol / HDL Ratio    5. Low vitamin D level  -     Vitamin D 25 Hydroxy    6. Elevated glucose   -     Hemoglobin A1c    7. Hypovitaminosis D   -     Vitamin D 25 Hydroxy             Follow Up   No follow-ups on file.  Patient was given instructions and counseling regarding her  condition or for health maintenance advice. Please see specific information pulled into the AVS if appropriate.

## 2022-07-30 LAB
25(OH)D3+25(OH)D2 SERPL-MCNC: 57.8 NG/ML (ref 30–100)
ALBUMIN SERPL-MCNC: 4.8 G/DL (ref 3.6–4.6)
ALBUMIN/GLOB SERPL: 1.6 {RATIO} (ref 1.2–2.2)
ALP SERPL-CCNC: 107 IU/L (ref 44–121)
ALT SERPL-CCNC: 20 IU/L (ref 0–32)
APPEARANCE UR: CLEAR
AST SERPL-CCNC: 32 IU/L (ref 0–40)
BACTERIA #/AREA URNS HPF: NORMAL /[HPF]
BASOPHILS # BLD AUTO: 0.1 X10E3/UL (ref 0–0.2)
BASOPHILS NFR BLD AUTO: 1 %
BILIRUB SERPL-MCNC: 0.6 MG/DL (ref 0–1.2)
BILIRUB UR QL STRIP: NEGATIVE
BUN SERPL-MCNC: 10 MG/DL (ref 8–27)
BUN/CREAT SERPL: 14 (ref 12–28)
CALCIUM SERPL-MCNC: 10.1 MG/DL (ref 8.7–10.3)
CASTS URNS QL MICRO: NORMAL /LPF
CHLORIDE SERPL-SCNC: 90 MMOL/L (ref 96–106)
CHOLEST SERPL-MCNC: 273 MG/DL (ref 100–199)
CHOLEST/HDLC SERPL: 1.7 RATIO (ref 0–4.4)
CO2 SERPL-SCNC: 24 MMOL/L (ref 20–29)
COLOR UR: YELLOW
CREAT SERPL-MCNC: 0.73 MG/DL (ref 0.57–1)
EGFRCR SERPLBLD CKD-EPI 2021: 81 ML/MIN/1.73
EOSINOPHIL # BLD AUTO: 0.1 X10E3/UL (ref 0–0.4)
EOSINOPHIL NFR BLD AUTO: 1 %
EPI CELLS #/AREA URNS HPF: NORMAL /HPF (ref 0–10)
ERYTHROCYTE [DISTWIDTH] IN BLOOD BY AUTOMATED COUNT: 12.5 % (ref 11.7–15.4)
FOLATE SERPL-MCNC: >20 NG/ML
GLOBULIN SER CALC-MCNC: 3 G/DL (ref 1.5–4.5)
GLUCOSE SERPL-MCNC: 121 MG/DL (ref 65–99)
GLUCOSE UR QL STRIP: NEGATIVE
HBA1C MFR BLD: 6 % (ref 4.8–5.6)
HCT VFR BLD AUTO: 41.3 % (ref 34–46.6)
HDLC SERPL-MCNC: 157 MG/DL
HGB BLD-MCNC: 14.2 G/DL (ref 11.1–15.9)
HGB UR QL STRIP: NEGATIVE
IMM GRANULOCYTES # BLD AUTO: 0 X10E3/UL (ref 0–0.1)
IMM GRANULOCYTES NFR BLD AUTO: 0 %
KETONES UR QL STRIP: NEGATIVE
LDLC SERPL CALC-MCNC: 104 MG/DL (ref 0–99)
LEUKOCYTE ESTERASE UR QL STRIP: NEGATIVE
LYMPHOCYTES # BLD AUTO: 2 X10E3/UL (ref 0.7–3.1)
LYMPHOCYTES NFR BLD AUTO: 24 %
MCH RBC QN AUTO: 32.6 PG (ref 26.6–33)
MCHC RBC AUTO-ENTMCNC: 34.4 G/DL (ref 31.5–35.7)
MCV RBC AUTO: 95 FL (ref 79–97)
MICRO URNS: ABNORMAL
MONOCYTES # BLD AUTO: 1 X10E3/UL (ref 0.1–0.9)
MONOCYTES NFR BLD AUTO: 11 %
NEUTROPHILS # BLD AUTO: 5.5 X10E3/UL (ref 1.4–7)
NEUTROPHILS NFR BLD AUTO: 63 %
NITRITE UR QL STRIP: NEGATIVE
PH UR STRIP: 7.5 [PH] (ref 5–7.5)
PLATELET # BLD AUTO: 252 X10E3/UL (ref 150–450)
POTASSIUM SERPL-SCNC: 4.3 MMOL/L (ref 3.5–5.2)
PROT SERPL-MCNC: 7.8 G/DL (ref 6–8.5)
PROT UR QL STRIP: ABNORMAL
RBC # BLD AUTO: 4.36 X10E6/UL (ref 3.77–5.28)
RBC #/AREA URNS HPF: NORMAL /HPF (ref 0–2)
SODIUM SERPL-SCNC: 132 MMOL/L (ref 134–144)
SP GR UR STRIP: 1.01 (ref 1–1.03)
T4 FREE SERPL-MCNC: 1.34 NG/DL (ref 0.82–1.77)
TRIGL SERPL-MCNC: 75 MG/DL (ref 0–149)
TSH SERPL DL<=0.005 MIU/L-ACNC: 4.09 UIU/ML (ref 0.45–4.5)
UROBILINOGEN UR STRIP-MCNC: 0.2 MG/DL (ref 0.2–1)
VIT B12 SERPL-MCNC: 949 PG/ML (ref 232–1245)
VLDLC SERPL CALC-MCNC: 12 MG/DL (ref 5–40)
WBC # BLD AUTO: 8.7 X10E3/UL (ref 3.4–10.8)
WBC #/AREA URNS HPF: NORMAL /HPF (ref 0–5)

## 2022-08-07 DIAGNOSIS — I10 ESSENTIAL HYPERTENSION: ICD-10-CM

## 2022-08-09 RX ORDER — ATENOLOL 50 MG/1
TABLET ORAL
Qty: 90 TABLET | Refills: 1 | Status: SHIPPED | OUTPATIENT
Start: 2022-08-09

## 2022-09-14 DIAGNOSIS — I10 ESSENTIAL HYPERTENSION: Primary | ICD-10-CM

## 2022-09-14 RX ORDER — AMLODIPINE BESYLATE 5 MG/1
TABLET ORAL
Qty: 90 TABLET | Refills: 3 | Status: SHIPPED | OUTPATIENT
Start: 2022-09-14

## 2022-09-20 DIAGNOSIS — E78.2 MIXED HYPERLIPIDEMIA: Primary | ICD-10-CM

## 2022-09-20 RX ORDER — ROSUVASTATIN CALCIUM 5 MG/1
TABLET, COATED ORAL
Qty: 90 TABLET | Refills: 3 | Status: SHIPPED | OUTPATIENT
Start: 2022-09-20

## 2022-12-02 ENCOUNTER — TELEPHONE (OUTPATIENT)
Dept: INTERNAL MEDICINE | Facility: CLINIC | Age: 87
End: 2022-12-02

## 2022-12-02 NOTE — TELEPHONE ENCOUNTER
Pt called to ask if she needs any Pneumonia vaccine, I advised her to get the Prevnar 20 at her pharmacy and also the Shingix vaccines.

## 2022-12-02 NOTE — TELEPHONE ENCOUNTER
Caller: Lizzy Hicks    Relationship: Self    Best call back number: [phone]    What is the best time to reach you: BEFORE 1:30 PM 12-2-22 IF POSSIBLE. BUT SHE IS IN AND OUT.    Who are you requesting to speak with (clinical staff, provider,  specific staff member): CLINICAL STAFF    Do you know the name of the person who called: PATIENT    What was the call regarding: VACCINATION STATUS, ALSO HAS QUESTIONS REGARDING ACTIVE MEDICATION LIST.    Do you require a callback: YES

## 2022-12-22 RX ORDER — ALENDRONATE SODIUM 70 MG/1
TABLET ORAL
Qty: 12 TABLET | Refills: 1 | Status: SHIPPED | OUTPATIENT
Start: 2022-12-22

## 2023-01-15 DIAGNOSIS — J44.9 CHRONIC OBSTRUCTIVE PULMONARY DISEASE, UNSPECIFIED COPD TYPE: ICD-10-CM

## 2023-01-16 RX ORDER — FLUTICASONE PROPIONATE AND SALMETEROL 100; 50 UG/1; UG/1
POWDER RESPIRATORY (INHALATION)
Qty: 60 EACH | Refills: 2 | Status: SHIPPED | OUTPATIENT
Start: 2023-01-16

## 2023-01-19 NOTE — PROGRESS NOTES
Subjective   Lizzy Hicks is a 83 y.o. female.     Chief Complaint   Patient presents with   • Hypertension   Drooping of the mouth on the right side, recent left carotid endarterectomy.      History of Present Illness   Current outpatient and discharge medications have been reconciled for the patient.  Reviewed by: Deric Mendosa Jr., MD  Patient has had a significant left carotid endarterectomy without obvious complication however she has developed some drooping of the lower lip on the right when she smiles.  She does not have any other symptoms on the right side.  She has evidence of weakness of the lower part of the orbicularis oris area on the right side of the mouth.  I discussed with her about workup but she is going to see Dr. Galicia postoperatively to discuss the symptom.  Otherwise she is maintained on aspirin 81 mg daily along with her antihypertensives.    She has had a history of borderline low sodium.      The following portions of the patient's history were reviewed and updated as appropriate: allergies, current medications, past social history and problem list.    Review of Systems   HENT: Negative.    Eyes: Negative.    Respiratory: Negative.    Cardiovascular: Negative.    Gastrointestinal: Negative.    Endocrine: Negative.    Genitourinary: Negative.    Musculoskeletal: Negative.    Skin: Negative.    Allergic/Immunologic: Negative.    Neurological: Positive for weakness.   Hematological: Negative.    Psychiatric/Behavioral: Negative.        Objective   Vitals:    04/11/19 1224   BP: 170/76   Pulse: 79   Resp: 16   Temp: 98.3 °F (36.8 °C)   SpO2: 93%     Physical Exam   Constitutional: She is oriented to person, place, and time. She appears well-developed and well-nourished.   HENT:   Head: Normocephalic and atraumatic.       Right Ear: Tympanic membrane and external ear normal.   Left Ear: Tympanic membrane and external ear normal.   Nose: Nose normal.   Mouth/Throat: Oropharynx is clear and  moist.   Eyes: Conjunctivae and EOM are normal. Pupils are equal, round, and reactive to light.   Neck: Normal range of motion. Neck supple. No JVD present. No thyromegaly present.       Cardiovascular: Normal rate, regular rhythm, normal heart sounds and intact distal pulses.   Pulmonary/Chest: Effort normal and breath sounds normal.   Abdominal: Soft. Bowel sounds are normal.   Musculoskeletal: Normal range of motion.   Lymphadenopathy:     She has no cervical adenopathy.   Neurological: She is alert and oriented to person, place, and time. No cranial nerve deficit. Coordination normal.   Drooping of left lower lip with smiling.   Skin: Skin is warm and dry. No rash noted.   Psychiatric: She has a normal mood and affect. Her behavior is normal. Judgment and thought content normal.   Vitals reviewed.      Assessment/Plan   Problem List Items Addressed This Visit        Cardiovascular and Mediastinum    Essential hypertension - Primary    Carotid artery stenosis       Endocrine    Reset osmostat syndrome (CMS/HCC)      Other Visit Diagnoses     Drooping of mouth          Patient will follow up with vascular surgery Dr. Galicia.  Follow-up here in 6 months if she requires neurologic referral will take care of it and consider getting an MRI of the brain.        no vomiting/no diarrhea/no abdominal pain

## 2023-01-27 ENCOUNTER — TRANSCRIBE ORDERS (OUTPATIENT)
Dept: ADMINISTRATIVE | Facility: HOSPITAL | Age: 88
End: 2023-01-27
Payer: MEDICARE

## 2023-01-27 DIAGNOSIS — R92.8 MAMMOGRAM ABNORMAL: Primary | ICD-10-CM

## 2023-02-08 ENCOUNTER — HOSPITAL ENCOUNTER (OUTPATIENT)
Dept: MAMMOGRAPHY | Facility: HOSPITAL | Age: 88
Discharge: HOME | End: 2023-02-08
Admitting: FAMILY MEDICINE
Payer: MEDICARE

## 2023-02-08 DIAGNOSIS — R92.8 MAMMOGRAM ABNORMAL: ICD-10-CM

## 2023-02-08 PROCEDURE — 77067 SCR MAMMO BI INCL CAD: CPT

## 2023-02-08 PROCEDURE — 77063 BREAST TOMOSYNTHESIS BI: CPT

## 2023-03-20 ENCOUNTER — TELEPHONE (OUTPATIENT)
Dept: INTERNAL MEDICINE | Facility: CLINIC | Age: 88
End: 2023-03-20
Payer: MEDICARE

## 2023-03-20 NOTE — TELEPHONE ENCOUNTER
Caller: Lizzy Hicks    Relationship: Self    Best call back number: [phone]    What medication are you requesting: Z-PACK    What are your current symptoms: CHEST CONGESTION    How long have you been experiencing symptoms: 3/18/21    Have you had these symptoms before:    [x] Yes  [] No    Have you been treated for these symptoms before:   [x] Yes  [] No    If a prescription is needed, what is your preferred pharmacy and phone number: [facility] [id number] - [place] [address] [address] AT [address] - [phone]  - [phone] FX     Additional notes: PATIENT STATES THAT SHE HAS BEEN TREATING SYMPTOMS WITH OVER THE COUNTER MEDICATION, BUT MENTIONED SHE HAS A HISTORY OF BRONCHITIS AND DR ESTEBAN USUALLY PRESCRIBES ANTIBIOTICS FOR THAT

## 2023-03-20 NOTE — TELEPHONE ENCOUNTER
Pt advised she would need to be evaluated-she declined appt with NP and advised she would go to the clinic near her home.

## 2023-05-01 ENCOUNTER — HOSPITAL ENCOUNTER (INPATIENT)
Facility: HOSPITAL | Age: 88
LOS: 3 days | Discharge: NURSING, REHAB OR LONG-TERM CARE | DRG: 190 | End: 2023-05-04
Attending: EMERGENCY MEDICINE | Admitting: INTERNAL MEDICINE
Payer: MEDICARE

## 2023-05-01 ENCOUNTER — APPOINTMENT (OUTPATIENT)
Dept: GENERAL RADIOLOGY | Facility: HOSPITAL | Age: 88
DRG: 190 | End: 2023-05-01
Payer: MEDICARE

## 2023-05-01 ENCOUNTER — OFFICE VISIT (OUTPATIENT)
Dept: INTERNAL MEDICINE | Facility: CLINIC | Age: 88
End: 2023-05-01
Payer: MEDICARE

## 2023-05-01 VITALS — WEIGHT: 128 LBS | HEART RATE: 101 BPM | OXYGEN SATURATION: 77 % | BODY MASS INDEX: 22.67 KG/M2

## 2023-05-01 DIAGNOSIS — B34.8 PARAINFLUENZA INFECTION: ICD-10-CM

## 2023-05-01 DIAGNOSIS — E87.1 HYPONATREMIA: ICD-10-CM

## 2023-05-01 DIAGNOSIS — R09.02 HYPOXIA: ICD-10-CM

## 2023-05-01 DIAGNOSIS — R06.03 ACUTE RESPIRATORY DISTRESS: Primary | ICD-10-CM

## 2023-05-01 DIAGNOSIS — J44.1 ACUTE EXACERBATION OF CHRONIC OBSTRUCTIVE PULMONARY DISEASE (COPD): Primary | ICD-10-CM

## 2023-05-01 LAB
ALBUMIN SERPL-MCNC: 4.4 G/DL (ref 3.5–5.2)
ALBUMIN/GLOB SERPL: 1.3 G/DL
ALP SERPL-CCNC: 107 U/L (ref 39–117)
ALT SERPL W P-5'-P-CCNC: 28 U/L (ref 1–33)
ANION GAP SERPL CALCULATED.3IONS-SCNC: 16 MMOL/L (ref 5–15)
AST SERPL-CCNC: 47 U/L (ref 1–32)
B PARAPERT DNA SPEC QL NAA+PROBE: NOT DETECTED
B PERT DNA SPEC QL NAA+PROBE: NOT DETECTED
BASOPHILS # BLD AUTO: 0.03 10*3/MM3 (ref 0–0.2)
BASOPHILS NFR BLD AUTO: 0.5 % (ref 0–1.5)
BILIRUB SERPL-MCNC: 0.3 MG/DL (ref 0–1.2)
BUN SERPL-MCNC: 17 MG/DL (ref 8–23)
BUN/CREAT SERPL: 27.4 (ref 7–25)
C PNEUM DNA NPH QL NAA+NON-PROBE: NOT DETECTED
CALCIUM SPEC-SCNC: 9.4 MG/DL (ref 8.6–10.5)
CHLORIDE SERPL-SCNC: 84 MMOL/L (ref 98–107)
CO2 SERPL-SCNC: 25 MMOL/L (ref 22–29)
CREAT SERPL-MCNC: 0.62 MG/DL (ref 0.57–1)
DEPRECATED RDW RBC AUTO: 43.4 FL (ref 37–54)
EGFRCR SERPLBLD CKD-EPI 2021: 87.4 ML/MIN/1.73
EOSINOPHIL # BLD AUTO: 0.01 10*3/MM3 (ref 0–0.4)
EOSINOPHIL NFR BLD AUTO: 0.2 % (ref 0.3–6.2)
ERYTHROCYTE [DISTWIDTH] IN BLOOD BY AUTOMATED COUNT: 12.6 % (ref 12.3–15.4)
FLUAV SUBTYP SPEC NAA+PROBE: NOT DETECTED
FLUBV RNA ISLT QL NAA+PROBE: NOT DETECTED
GEN 5 2HR TROPONIN T REFLEX: 12 NG/L
GLOBULIN UR ELPH-MCNC: 3.4 GM/DL
GLUCOSE SERPL-MCNC: 109 MG/DL (ref 65–99)
HADV DNA SPEC NAA+PROBE: NOT DETECTED
HCOV 229E RNA SPEC QL NAA+PROBE: NOT DETECTED
HCOV HKU1 RNA SPEC QL NAA+PROBE: NOT DETECTED
HCOV NL63 RNA SPEC QL NAA+PROBE: NOT DETECTED
HCOV OC43 RNA SPEC QL NAA+PROBE: NOT DETECTED
HCT VFR BLD AUTO: 40.5 % (ref 34–46.6)
HGB BLD-MCNC: 14.2 G/DL (ref 12–15.9)
HMPV RNA NPH QL NAA+NON-PROBE: NOT DETECTED
HPIV1 RNA ISLT QL NAA+PROBE: NOT DETECTED
HPIV2 RNA SPEC QL NAA+PROBE: NOT DETECTED
HPIV3 RNA NPH QL NAA+PROBE: DETECTED
HPIV4 P GENE NPH QL NAA+PROBE: NOT DETECTED
IMM GRANULOCYTES # BLD AUTO: 0.04 10*3/MM3 (ref 0–0.05)
IMM GRANULOCYTES NFR BLD AUTO: 0.6 % (ref 0–0.5)
LYMPHOCYTES # BLD AUTO: 1.1 10*3/MM3 (ref 0.7–3.1)
LYMPHOCYTES NFR BLD AUTO: 16.8 % (ref 19.6–45.3)
M PNEUMO IGG SER IA-ACNC: NOT DETECTED
MCH RBC QN AUTO: 32.4 PG (ref 26.6–33)
MCHC RBC AUTO-ENTMCNC: 35.1 G/DL (ref 31.5–35.7)
MCV RBC AUTO: 92.5 FL (ref 79–97)
MONOCYTES # BLD AUTO: 0.77 10*3/MM3 (ref 0.1–0.9)
MONOCYTES NFR BLD AUTO: 11.8 % (ref 5–12)
NEUTROPHILS NFR BLD AUTO: 4.6 10*3/MM3 (ref 1.7–7)
NEUTROPHILS NFR BLD AUTO: 70.1 % (ref 42.7–76)
NRBC BLD AUTO-RTO: 0 /100 WBC (ref 0–0.2)
NT-PROBNP SERPL-MCNC: 2370 PG/ML (ref 0–1800)
PLATELET # BLD AUTO: 203 10*3/MM3 (ref 140–450)
PMV BLD AUTO: 8.9 FL (ref 6–12)
POTASSIUM SERPL-SCNC: 3.8 MMOL/L (ref 3.5–5.2)
PROT SERPL-MCNC: 7.8 G/DL (ref 6–8.5)
RBC # BLD AUTO: 4.38 10*6/MM3 (ref 3.77–5.28)
RHINOVIRUS RNA SPEC NAA+PROBE: NOT DETECTED
RSV RNA NPH QL NAA+NON-PROBE: NOT DETECTED
SARS-COV-2 RNA NPH QL NAA+NON-PROBE: NOT DETECTED
SODIUM SERPL-SCNC: 125 MMOL/L (ref 136–145)
TROPONIN T DELTA: 0 NG/L
TROPONIN T SERPL HS-MCNC: 12 NG/L
WBC NRBC COR # BLD: 6.55 10*3/MM3 (ref 3.4–10.8)

## 2023-05-01 PROCEDURE — 93005 ELECTROCARDIOGRAM TRACING: CPT | Performed by: EMERGENCY MEDICINE

## 2023-05-01 PROCEDURE — 25010000002 METHYLPREDNISOLONE PER 125 MG: Performed by: EMERGENCY MEDICINE

## 2023-05-01 PROCEDURE — 94799 UNLISTED PULMONARY SVC/PX: CPT

## 2023-05-01 PROCEDURE — 94761 N-INVAS EAR/PLS OXIMETRY MLT: CPT

## 2023-05-01 PROCEDURE — 83880 ASSAY OF NATRIURETIC PEPTIDE: CPT | Performed by: EMERGENCY MEDICINE

## 2023-05-01 PROCEDURE — 25010000002 ENOXAPARIN PER 10 MG: Performed by: INTERNAL MEDICINE

## 2023-05-01 PROCEDURE — 84484 ASSAY OF TROPONIN QUANT: CPT | Performed by: EMERGENCY MEDICINE

## 2023-05-01 PROCEDURE — 80053 COMPREHEN METABOLIC PANEL: CPT | Performed by: EMERGENCY MEDICINE

## 2023-05-01 PROCEDURE — 99213 OFFICE O/P EST LOW 20 MIN: CPT | Performed by: FAMILY MEDICINE

## 2023-05-01 PROCEDURE — 93010 ELECTROCARDIOGRAM REPORT: CPT | Performed by: INTERNAL MEDICINE

## 2023-05-01 PROCEDURE — 94640 AIRWAY INHALATION TREATMENT: CPT

## 2023-05-01 PROCEDURE — 0202U NFCT DS 22 TRGT SARS-COV-2: CPT | Performed by: EMERGENCY MEDICINE

## 2023-05-01 PROCEDURE — 94760 N-INVAS EAR/PLS OXIMETRY 1: CPT

## 2023-05-01 PROCEDURE — 36415 COLL VENOUS BLD VENIPUNCTURE: CPT

## 2023-05-01 PROCEDURE — 71046 X-RAY EXAM CHEST 2 VIEWS: CPT

## 2023-05-01 PROCEDURE — 99285 EMERGENCY DEPT VISIT HI MDM: CPT

## 2023-05-01 PROCEDURE — 85025 COMPLETE CBC W/AUTO DIFF WBC: CPT | Performed by: EMERGENCY MEDICINE

## 2023-05-01 PROCEDURE — 94664 DEMO&/EVAL PT USE INHALER: CPT

## 2023-05-01 RX ORDER — IPRATROPIUM BROMIDE AND ALBUTEROL SULFATE 2.5; .5 MG/3ML; MG/3ML
3 SOLUTION RESPIRATORY (INHALATION)
Status: DISCONTINUED | OUTPATIENT
Start: 2023-05-01 | End: 2023-05-04 | Stop reason: HOSPADM

## 2023-05-01 RX ORDER — ONDANSETRON 2 MG/ML
4 INJECTION INTRAMUSCULAR; INTRAVENOUS EVERY 6 HOURS PRN
Status: DISCONTINUED | OUTPATIENT
Start: 2023-05-01 | End: 2023-05-04 | Stop reason: HOSPADM

## 2023-05-01 RX ORDER — PREDNISOLONE ACETATE 10 MG/ML
1 SUSPENSION/ DROPS OPHTHALMIC EVERY 12 HOURS SCHEDULED
Status: DISCONTINUED | OUTPATIENT
Start: 2023-05-01 | End: 2023-05-03

## 2023-05-01 RX ORDER — ASPIRIN 81 MG/1
81 TABLET ORAL DAILY
Status: DISCONTINUED | OUTPATIENT
Start: 2023-05-02 | End: 2023-05-04 | Stop reason: HOSPADM

## 2023-05-01 RX ORDER — ENOXAPARIN SODIUM 100 MG/ML
40 INJECTION SUBCUTANEOUS NIGHTLY
Status: DISCONTINUED | OUTPATIENT
Start: 2023-05-01 | End: 2023-05-04 | Stop reason: HOSPADM

## 2023-05-01 RX ORDER — ACETAMINOPHEN 325 MG/1
650 TABLET ORAL EVERY 4 HOURS PRN
Status: DISCONTINUED | OUTPATIENT
Start: 2023-05-01 | End: 2023-05-04 | Stop reason: HOSPADM

## 2023-05-01 RX ORDER — ROSUVASTATIN CALCIUM 5 MG/1
5 TABLET, COATED ORAL DAILY
Status: DISCONTINUED | OUTPATIENT
Start: 2023-05-02 | End: 2023-05-04 | Stop reason: HOSPADM

## 2023-05-01 RX ORDER — METHYLPREDNISOLONE SODIUM SUCCINATE 125 MG/2ML
125 INJECTION, POWDER, LYOPHILIZED, FOR SOLUTION INTRAMUSCULAR; INTRAVENOUS ONCE
Status: COMPLETED | OUTPATIENT
Start: 2023-05-01 | End: 2023-05-01

## 2023-05-01 RX ORDER — AMLODIPINE BESYLATE 5 MG/1
5 TABLET ORAL DAILY
Status: DISCONTINUED | OUTPATIENT
Start: 2023-05-02 | End: 2023-05-04 | Stop reason: HOSPADM

## 2023-05-01 RX ORDER — METHYLPREDNISOLONE SODIUM SUCCINATE 40 MG/ML
40 INJECTION, POWDER, LYOPHILIZED, FOR SOLUTION INTRAMUSCULAR; INTRAVENOUS EVERY 8 HOURS
Status: DISCONTINUED | OUTPATIENT
Start: 2023-05-02 | End: 2023-05-02

## 2023-05-01 RX ORDER — ALBUTEROL SULFATE 2.5 MG/3ML
2.5 SOLUTION RESPIRATORY (INHALATION) ONCE
Status: COMPLETED | OUTPATIENT
Start: 2023-05-01 | End: 2023-05-01

## 2023-05-01 RX ORDER — ONDANSETRON 4 MG/1
4 TABLET, FILM COATED ORAL EVERY 6 HOURS PRN
Status: DISCONTINUED | OUTPATIENT
Start: 2023-05-01 | End: 2023-05-04 | Stop reason: HOSPADM

## 2023-05-01 RX ORDER — CALCIUM CARBONATE 500(1250)
500 TABLET ORAL DAILY
Status: DISCONTINUED | OUTPATIENT
Start: 2023-05-02 | End: 2023-05-04 | Stop reason: HOSPADM

## 2023-05-01 RX ORDER — ATENOLOL 50 MG/1
50 TABLET ORAL DAILY
Status: DISCONTINUED | OUTPATIENT
Start: 2023-05-02 | End: 2023-05-04 | Stop reason: HOSPADM

## 2023-05-01 RX ORDER — UREA 10 %
3 LOTION (ML) TOPICAL NIGHTLY PRN
Status: DISCONTINUED | OUTPATIENT
Start: 2023-05-01 | End: 2023-05-04 | Stop reason: HOSPADM

## 2023-05-01 RX ORDER — MULTIPLE VITAMINS W/ MINERALS TAB 9MG-400MCG
1 TAB ORAL DAILY
Status: DISCONTINUED | OUTPATIENT
Start: 2023-05-02 | End: 2023-05-04 | Stop reason: HOSPADM

## 2023-05-01 RX ORDER — IPRATROPIUM BROMIDE AND ALBUTEROL SULFATE 2.5; .5 MG/3ML; MG/3ML
3 SOLUTION RESPIRATORY (INHALATION) ONCE
Status: COMPLETED | OUTPATIENT
Start: 2023-05-01 | End: 2023-05-01

## 2023-05-01 RX ORDER — BUDESONIDE AND FORMOTEROL FUMARATE DIHYDRATE 160; 4.5 UG/1; UG/1
1 AEROSOL RESPIRATORY (INHALATION)
Status: DISCONTINUED | OUTPATIENT
Start: 2023-05-02 | End: 2023-05-04 | Stop reason: HOSPADM

## 2023-05-01 RX ORDER — ALBUTEROL SULFATE 2.5 MG/3ML
2.5 SOLUTION RESPIRATORY (INHALATION) EVERY 6 HOURS PRN
Status: DISCONTINUED | OUTPATIENT
Start: 2023-05-01 | End: 2023-05-04 | Stop reason: HOSPADM

## 2023-05-01 RX ADMIN — IPRATROPIUM BROMIDE AND ALBUTEROL SULFATE 3 ML: .5; 3 SOLUTION RESPIRATORY (INHALATION) at 16:50

## 2023-05-01 RX ADMIN — ALBUTEROL SULFATE 2.5 MG: 2.5 SOLUTION RESPIRATORY (INHALATION) at 16:50

## 2023-05-01 RX ADMIN — IPRATROPIUM BROMIDE AND ALBUTEROL SULFATE 3 ML: .5; 3 SOLUTION RESPIRATORY (INHALATION) at 23:12

## 2023-05-01 RX ADMIN — ENOXAPARIN SODIUM 40 MG: 100 INJECTION SUBCUTANEOUS at 22:17

## 2023-05-01 RX ADMIN — METHYLPREDNISOLONE SODIUM SUCCINATE 125 MG: 125 INJECTION, POWDER, FOR SOLUTION INTRAMUSCULAR; INTRAVENOUS at 18:08

## 2023-05-01 NOTE — ED TRIAGE NOTES
Pt to ED via PV. Pt seen at PCP today and sent to ED for eval due to low O2. Pt 80% on RA on arrival to ED. Pt c/o SOB and cough that started on Saturday. Pt hx COPD.   MRI showed osteomyelitis distal fibula, synovitis, reactive osteitis  S/p debridement in OR, currently POD #5  Continue fluconazole 200mg PO daily, and Vancomycin  IR - Left Internal Jugular PICC line placed, ready for use  X Ray Right knee - Severe bicompartmental osteoarthritis  US R lower Ext: No evidence of DVT  PT with WBAT

## 2023-05-01 NOTE — ED PROVIDER NOTES
EMERGENCY DEPARTMENT ENCOUNTER    Room Number:  32/32  Date seen:  5/1/2023  PCP: Deric Mendosa MD  Historian: Patient      HPI:  Chief Complaint: Shortness of breath  A complete HPI/ROS/PMH/PSH/SH/FH are unobtainable due to: None  Context: Lizzy Hicks is a 87 y.o. female who presents to the ED c/o shortness of breath.  Patient states she has history of COPD.  Does not wear oxygen.  Patient states increasing shortness of breath since Saturday.  Cough.  Has had no fevers.  Did have 1 episode of dry heaves.  Patient has had no chest pain pressure tightness.  No lower extremity swelling.            PAST MEDICAL HISTORY  Active Ambulatory Problems     Diagnosis Date Noted   • Essential hypertension 01/20/2016   • COPD (chronic obstructive pulmonary disease) 01/20/2016   • Carotid artery stenosis 01/20/2016   • Osteoarthritis 01/20/2016   • Osteoporosis 01/20/2016   • Reset osmostat syndrome 08/15/2016   • Carotid stenosis, asymptomatic, right 09/24/2018   • Carotid artery disease 04/04/2019   • Macular degeneration 07/17/2020     Resolved Ambulatory Problems     Diagnosis Date Noted   • Cataract 01/20/2016   • Hip fracture 01/20/2016   • Hyponatremia 01/20/2016   • Medicare annual wellness visit, subsequent 01/20/2016   • Swelling of right lower extremity 01/20/2016   • Seborrheic dermatitis 01/20/2016     Past Medical History:   Diagnosis Date   • Anesthesia complication    • Carotid stenosis    • History of bronchitis    • Hypertension    • Swallowing difficulty          PAST SURGICAL HISTORY  Past Surgical History:   Procedure Laterality Date   • CAROTID ENDARTERECTOMY Right 9/24/2018    Procedure: RT CAROTID ENDARTERECTOMY WITH INTRA OPERATIVE CAROTID ARTERY DUPLEX SCAN;  Surgeon: Mikaela Galicia Jr., MD;  Location: [facility];  Service: Vascular   • CAROTID ENDARTERECTOMY Left 4/4/2019    Procedure: LEFT CAROTID ENDARTERECTOMY;  Surgeon: Mikaela Galicia Jr., MD;  Location: [facility];   Service: Vascular   • CATARACT EXTRACTION WITH INTRAOCULAR LENS IMPLANT      LEFT AND RIGHT   • ORIF FEMUR FRACTURE Right     HARDWARE   • TONSILLECTOMY AND ADENOIDECTOMY      INADVERTANTLY TOOK UVULA AT THIS TIME         FAMILY HISTORY  Family History   Problem Relation Age of Onset   • Malig Hyperthermia Neg Hx          SOCIAL HISTORY  Social History     Socioeconomic History   • Marital status:    Tobacco Use   • Smoking status: Former     Packs/day: 0.25     Years: 40.00     Pack years: 10.00     Types: Cigarettes   • Smokeless tobacco: Never   • Tobacco comments:     quit 6 yr ago   Vaping Use   • Vaping Use: Never used   Substance and Sexual Activity   • Alcohol use: Yes     Comment: 1-2 drinks day   • Drug use: No   • Sexual activity: Defer         ALLERGIES  Bee venom        REVIEW OF SYSTEMS  Review of Systems   Shortness of breath cough      PHYSICAL EXAM  ED Triage Vitals   Temp Heart Rate Resp BP SpO2   05/01/23 1558 05/01/23 1558 05/01/23 1558 05/01/23 1608 05/01/23 1558   96 °F (35.6 °C) 106 20 (!) 192/73 (!) 86 %      Temp src Heart Rate Source Patient Position BP Location FiO2 (%)   05/01/23 1558 -- -- -- --   Tympanic           Physical Exam      GENERAL: no acute distress  HENT: nares patent  EYES: no scleral icterus  CV: regular rhythm, normal rate  RESPIRATORY: Tachypnea, diminished breath sounds bilaterally  ABDOMEN: soft  MUSCULOSKELETAL: no deformity  NEURO: alert, moves all extremities, follows commands  PSYCH:  calm, cooperative  SKIN: warm, dry    Vital signs and nursing notes reviewed.          LAB RESULTS  Recent Results (from the past 24 hour(s))   Comprehensive Metabolic Panel    Collection Time: 05/01/23  4:20 PM    Specimen: Blood   Result Value Ref Range    Glucose 109 (H) 65 - 99 mg/dL    BUN 17 8 - 23 mg/dL    Creatinine 0.62 0.57 - 1.00 mg/dL    Sodium 125 (L) 136 - 145 mmol/L    Potassium 3.8 3.5 - 5.2 mmol/L    Chloride 84 (L) 98 - 107 mmol/L    CO2 25.0 22.0 - 29.0  mmol/L    Calcium 9.4 8.6 - 10.5 mg/dL    Total Protein 7.8 6.0 - 8.5 g/dL    Albumin 4.4 3.5 - 5.2 g/dL    ALT (SGPT) 28 1 - 33 U/L    AST (SGOT) 47 (H) 1 - 32 U/L    Alkaline Phosphatase 107 39 - 117 U/L    Total Bilirubin 0.3 0.0 - 1.2 mg/dL    Globulin 3.4 gm/dL    A/G Ratio 1.3 g/dL    BUN/Creatinine Ratio 27.4 (H) 7.0 - 25.0    Anion Gap 16.0 (H) 5.0 - 15.0 mmol/L    eGFR 87.4 >60.0 mL/min/1.73   BNP    Collection Time: 05/01/23  4:20 PM    Specimen: Blood   Result Value Ref Range    proBNP 2,370.0 (H) 0.0 - 1,800.0 pg/mL   High Sensitivity Troponin T    Collection Time: 05/01/23  4:20 PM    Specimen: Blood   Result Value Ref Range    HS Troponin T 12 (H) <10 ng/L   CBC Auto Differential    Collection Time: 05/01/23  4:20 PM    Specimen: Blood   Result Value Ref Range    WBC 6.55 3.40 - 10.80 10*3/mm3    RBC 4.38 3.77 - 5.28 10*6/mm3    Hemoglobin 14.2 12.0 - 15.9 g/dL    Hematocrit 40.5 34.0 - 46.6 %    MCV 92.5 79.0 - 97.0 fL    MCH 32.4 26.6 - 33.0 pg    MCHC 35.1 31.5 - 35.7 g/dL    RDW 12.6 12.3 - 15.4 %    RDW-SD 43.4 37.0 - 54.0 fl    MPV 8.9 6.0 - 12.0 fL    Platelets 203 140 - 450 10*3/mm3    Neutrophil % 70.1 42.7 - 76.0 %    Lymphocyte % 16.8 (L) 19.6 - 45.3 %    Monocyte % 11.8 5.0 - 12.0 %    Eosinophil % 0.2 (L) 0.3 - 6.2 %    Basophil % 0.5 0.0 - 1.5 %    Immature Grans % 0.6 (H) 0.0 - 0.5 %    Neutrophils, Absolute 4.60 1.70 - 7.00 10*3/mm3    Lymphocytes, Absolute 1.10 0.70 - 3.10 10*3/mm3    Monocytes, Absolute 0.77 0.10 - 0.90 10*3/mm3    Eosinophils, Absolute 0.01 0.00 - 0.40 10*3/mm3    Basophils, Absolute 0.03 0.00 - 0.20 10*3/mm3    Immature Grans, Absolute 0.04 0.00 - 0.05 10*3/mm3    nRBC 0.0 0.0 - 0.2 /100 WBC   Respiratory Panel PCR w/COVID-19(SARS-CoV-2) LUIS CARLOS/JAIMEE/JOI/PAD/COR/MAD/KARLEE In-House, NP Swab in UT/VTM, 3-4 HR TAT - Swab, Nasopharynx    Collection Time: 05/01/23  4:21 PM    Specimen: Nasopharynx; Swab   Result Value Ref Range    ADENOVIRUS, PCR Not Detected Not Detected     Coronavirus 229E Not Detected Not Detected    Coronavirus HKU1 Not Detected Not Detected    Coronavirus NL63 Not Detected Not Detected    Coronavirus OC43 Not Detected Not Detected    COVID19 Not Detected Not Detected - Ref. Range    Human Metapneumovirus Not Detected Not Detected    Human Rhinovirus/Enterovirus Not Detected Not Detected    Influenza A PCR Not Detected Not Detected    Influenza B PCR Not Detected Not Detected    Parainfluenza Virus 1 Not Detected Not Detected    Parainfluenza Virus 2 Not Detected Not Detected    Parainfluenza Virus 3 Detected (A) Not Detected    Parainfluenza Virus 4 Not Detected Not Detected    RSV, PCR Not Detected Not Detected    Bordetella pertussis pcr Not Detected Not Detected    Bordetella parapertussis PCR Not Detected Not Detected    Chlamydophila pneumoniae PCR Not Detected Not Detected    Mycoplasma pneumo by PCR Not Detected Not Detected   ECG 12 Lead Dyspnea    Collection Time: 05/01/23  4:21 PM   Result Value Ref Range    QT Interval 394 ms   High Sensitivity Troponin T 2Hr    Collection Time: 05/01/23  6:12 PM    Specimen: Blood   Result Value Ref Range    HS Troponin T 12 (H) <10 ng/L    Troponin T Delta 0 >=-4 - <+4 ng/L       Ordered the above labs and reviewed the results.        RADIOLOGY  XR Chest 2 View    Result Date: 5/1/2023  CHEST: 2 VIEWS  HISTORY: Shortness of air  COMPARISON: Two-view chest 03/29/2019.  FINDINGS:Cardiomediastinal silhouette is within normal limits. Mitral annulus calcifications are noted. There are also atherosclerotic vascular involving the thoracic aorta. There are chronic-appearing increased interstitial markings. The lungs appear clear of focal airspace disease and there is no evidence for pulmonary edema or pleural effusion. A calcified nodule superimposes the posterior lower lobes on the lateral view.      No evidence for active disease in the chest.  This report was finalized on 5/1/2023 5:59 PM by Dr. Isiah Fuentes M.D.         Ordered the above noted radiological studies.  Is fine chest x-ray independently interpreted by me and shows no evidence of pneumonia          PROCEDURES  Procedures      EKG          EKG time: 1621  Rhythm/Rate: Normal sinus rhythm 98  P waves and TX: Normal P waves  QRS, axis: Normal QRS  ST and T waves: Some ST depressions    Interpreted Contemporaneously by me, independently viewed  Changed compared to prior 3/29/2019.  ST depressions are little more prominent.      MEDICATIONS GIVEN IN ER  Medications   acetaminophen (TYLENOL) tablet 650 mg (has no administration in time range)   ondansetron (ZOFRAN) tablet 4 mg (has no administration in time range)     Or   ondansetron (ZOFRAN) injection 4 mg (has no administration in time range)   melatonin tablet 3 mg (has no administration in time range)   Pharmacy to Dose enoxaparin (LOVENOX) (has no administration in time range)   Enoxaparin Sodium (LOVENOX) syringe 40 mg (has no administration in time range)   ipratropium-albuterol (DUO-NEB) nebulizer solution 3 mL (3 mL Nebulization Given 5/1/23 1650)   albuterol (PROVENTIL) nebulizer solution 0.083% 2.5 mg/3mL (2.5 mg Nebulization Given 5/1/23 1650)   methylPREDNISolone sodium succinate (SOLU-Medrol) injection 125 mg (125 mg Intravenous Given 5/1/23 1808)                   MEDICAL DECISION MAKING, PROGRESS, and CONSULTS    Discussion below represents my analysis of pertinent findings related to patient's condition, differential diagnosis, treatment plan and final disposition.      Additional sources:  - Discussed/ obtained information from independent historians: None    - External (non-ED) record review: Epic reviewed and patient was seen 3/21/2023 in urgent care for acute exacerbation of COPD.    - Chronic or social conditions impacting care: None    - Shared decision making: None      Orders placed during this visit:  Orders Placed This Encounter   Procedures   • Respiratory Panel PCR w/COVID-19(SARS-CoV-2)  LUIS CARLOS/JAIMEE/JOI/PAD/COR/MAD/KARLEE In-House, NP Swab in UTM/VTM, 3-4 HR TAT - Swab, Nasopharynx   • XR Chest 2 View   • Comprehensive Metabolic Panel   • BNP   • High Sensitivity Troponin T   • CBC Auto Differential   • High Sensitivity Troponin T 2Hr   • Basic Metabolic Panel   • CBC (No Diff)   • Diet: Cardiac Diets; Healthy Heart (2-3 Na+); Texture: Regular Texture (IDDSI 7); Fluid Consistency: Thin (IDDSI 0)   • Vital Signs   • Cardiac Monitoring   • Up with assistance   • Daily Weights   • Strict Intake & Output   • Oral Care   • Code Status and Medical Interventions:   • LHA (on-call MD unless specified) Details   • ECG 12 Lead Dyspnea   • Inpatient Admission   • Inpatient Admission   • CBC & Differential         Additional orders considered but not ordered:  None        Differential diagnosis includes but is not limited to:    COPD exacerbation, pneumonia, CHF      Independent interpretation of labs, radiology studies, and discussions with consultants:  ED Course as of 05/01/23 1924   Mon May 01, 2023   1820 18:21 EDT  Patient presents for evaluation of shortness of breath.  Patient is hypoxic.  Patient has been given breathing treatments and steroids.  Patient is positive for parainfluenza.  No evidence of pneumonia.  Patient has been given albuterol and steroids.  Patient will be admitted.  Discussed with Dr. Guillen who will admit [SL]      ED Course User Index  [SL] Jose Alfredo Nieto MD                 DIAGNOSIS  Final diagnoses:   Acute exacerbation of chronic obstructive pulmonary disease (COPD)   Hypoxia   Hyponatremia         DISPOSITION  admit            Latest Documented Vital Signs:  As of 19:24 EDT  BP- 143/72 HR- 92 Temp- 96 °F (35.6 °C) (Tympanic) O2 sat- 95%              --    Please note that portions of this were completed with a voice recognition program.       Note Disclaimer: At [facility], we believe that sharing information builds trust and better relationships. You are receiving this note  because you are receiving care at [facility] or recently visited. It is possible you will see health information before a provider has talked with you about it. This kind of information can be easy to misunderstand. To help you fully understand what it means for your health, we urge you to discuss this note with your provider.           Jose Alfredo Nieto MD  05/01/23 1925

## 2023-05-01 NOTE — PROGRESS NOTES
"[facility] Clinical Pharmacy Services: Enoxaparin Consult    Lizzy Hicks has a pharmacy consult to dose prophylactic enoxaparin per Dr. Guillen's request.     Indication: VTE Prophylaxis  Home Anticoagulation: N/A     Relevant clinical data and objective history reviewed:  87 y.o. female 160 cm (63\") 63 kg (139 lb)   Body mass index is 24.62 kg/m².   Results from last 7 days   Lab Units 05/01/23  1620   PLATELETS 10*3/mm3 203     Estimated Creatinine Clearance: 59.4 mL/min (by C-G formula based on SCr of 0.62 mg/dL).    Assessment/Plan    Will start patient on 40mg subcutaneous every 24 hours, adjusted for renal function. Consult order will be discontinued but pharmacy will continue to follow.     Naga Lal III, [facility]  Clinical Pharmacist    "

## 2023-05-01 NOTE — Clinical Note
Level of Care: Telemetry [5]   Diagnosis: COPD exacerbation [403967]   Admitting Physician: ANGELA BARLOW [[id number]]   Attending Physician: ANGELA BARLOW [[id number]]   Certification: I certify that inpatient services are medically necessary for this patient for a duration of greater than two midnights. See H&P and MD Progress Notes for additional information about the patient's course of treatment.

## 2023-05-01 NOTE — ED NOTES
Pt c/o shortness of breath and cough that started a few days ago. Pt doesn't normally require oxygen. Pt arrived with low O2 sat, so O2 was placed on pt in triage. Pt has no other complaints. Pt A&Ox4.

## 2023-05-01 NOTE — ED NOTES
Nursing report ED to floor  Lizzy Hicks  87 y.o.  female    HPI :   Chief Complaint   Patient presents with    Shortness of Breath    Cough       Admitting doctor:   Shanelle Guillen MD    Admitting diagnosis:   The primary encounter diagnosis was Acute exacerbation of chronic obstructive pulmonary disease (COPD). Diagnoses of Hypoxia and Hyponatremia were also pertinent to this visit.    Code status:   Current Code Status       Date Active Code Status Order ID Comments User Context       5/1/2023 1813 CPR (Attempt to Resuscitate) [id number]  Shanelle Guillen MD ED        Question Answer    Code Status (Patient has no pulse and is not breathing) CPR (Attempt to Resuscitate)    Medical Interventions (Patient has pulse or is breathing) Full                    Allergies:   Bee venom    Isolation:   Enhanced Droplet/Contact     Intake and Output  No intake or output data in the 24 hours ending 05/01/23 1826    Weight:       05/01/23  1605   Weight: 63 kg (139 lb)       Most recent vitals:   Vitals:    05/01/23 1641 05/01/23 1653 05/01/23 1741 05/01/23 1811   BP: (!) 184/81  176/71 155/76   Pulse: 98 91 97 94   Resp:  20     Temp:       TempSrc:       SpO2: 98% 98% 95% 94%   Weight:       Height:           Active LDAs/IV Access:   Lines, Drains & Airways       Active LDAs       Name Placement date Placement time Site Days    Peripheral IV 05/01/23 1619 Right Antecubital 05/01/23  1619  Antecubital  less than 1                    Labs (abnormal labs have a star):   Labs Reviewed   RESPIRATORY PANEL PCR W/ COVID-19 (SARS-COV-2) LUIS CARLOS/JAIMEE/JOI/PAD/COR/MAD/KARLEE IN-HOUSE, NP SWAB IN UTM/VTP, 3-4 HR TAT - Abnormal; Notable for the following components:       Result Value    Parainfluenza Virus 3 Detected (*)     All other components within normal limits    Narrative:     In the setting of a positive respiratory panel with a viral infection PLUS a negative procalcitonin without other underlying concern for bacterial  infection, consider observing off antibiotics or discontinuation of antibiotics and continue supportive care. If the respiratory panel is positive for atypical bacterial infection (Bordetella pertussis, Chlamydophila pneumoniae, or Mycoplasma pneumoniae), consider antibiotic de-escalation to target atypical bacterial infection.   COMPREHENSIVE METABOLIC PANEL - Abnormal; Notable for the following components:    Glucose 109 (*)     Sodium 125 (*)     Chloride 84 (*)     AST (SGOT) 47 (*)     BUN/Creatinine Ratio 27.4 (*)     Anion Gap 16.0 (*)     All other components within normal limits    Narrative:     GFR Normal >60  Chronic Kidney Disease <60  Kidney Failure <15    The GFR formula is only valid for adults with stable renal function between ages 18 and 70.   BNP (IN-HOUSE) - Abnormal; Notable for the following components:    proBNP 2,370.0 (*)     All other components within normal limits    Narrative:     Among patients with dyspnea, NT-proBNP is highly sensitive for the detection of acute congestive heart failure. In addition NT-proBNP of <300 pg/ml effectively rules out acute congestive heart failure with 99% negative predictive value.    Results may be falsely decreased if patient taking Biotin.     TROPONIN - Abnormal; Notable for the following components:    HS Troponin T 12 (*)     All other components within normal limits    Narrative:     High Sensitive Troponin T Reference Range:  <10.0 ng/L- Negative Female for AMI  <15.0 ng/L- Negative Male for AMI  >=10 - Abnormal Female indicating possible myocardial injury.  >=15 - Abnormal Male indicating possible myocardial injury.   Clinicians would have to utilize clinical acumen, EKG, Troponin, and serial changes to determine if it is an Acute Myocardial Infarction or myocardial injury due to an underlying chronic condition.        CBC WITH AUTO DIFFERENTIAL - Abnormal; Notable for the following components:    Lymphocyte % 16.8 (*)     Eosinophil % 0.2 (*)      Immature Grans % 0.6 (*)     All other components within normal limits   HIGH SENSITIVITIY TROPONIN T 2HR   CBC AND DIFFERENTIAL    Narrative:     The following orders were created for panel order CBC & Differential.  Procedure                               Abnormality         Status                     ---------                               -----------         ------                     CBC Auto Differential[[id number]]        Abnormal            Final result                 Please view results for these tests on the individual orders.       EKG:   ECG 12 Lead Dyspnea   Preliminary Result   HEART RATE= 98  bpm   RR Interval= 612  ms   OH Interval= 177  ms   P Horizontal Axis= -22  deg   P Front Axis= 70  deg   QRSD Interval= 100  ms   QT Interval= 394  ms   QRS Axis= 78  deg   T Wave Axis= 42  deg   - ABNORMAL ECG -   Sinus rhythm   Probable left atrial enlargement   Borderline ST depression, diffuse leads   Prolonged QT interval   Electronically Signed By:    Date and Time of Study: 2023-05-01 16:21:13          Meds given in ED:   Medications   acetaminophen (TYLENOL) tablet 650 mg (has no administration in time range)   ondansetron (ZOFRAN) tablet 4 mg (has no administration in time range)     Or   ondansetron (ZOFRAN) injection 4 mg (has no administration in time range)   melatonin tablet 3 mg (has no administration in time range)   Pharmacy to Dose enoxaparin (LOVENOX) (has no administration in time range)   ipratropium-albuterol (DUO-NEB) nebulizer solution 3 mL (3 mL Nebulization Given 5/1/23 1650)   albuterol (PROVENTIL) nebulizer solution 0.083% 2.5 mg/3mL (2.5 mg Nebulization Given 5/1/23 1650)   methylPREDNISolone sodium succinate (SOLU-Medrol) injection 125 mg (125 mg Intravenous Given 5/1/23 1808)       Imaging results:  XR Chest 2 View    Result Date: 5/1/2023  No evidence for active disease in the chest.  This report was finalized on 5/1/2023 5:59 PM by Dr. Isiah Fuentes M.D.       Ambulatory  status:   - assist    Social issues:   Social History     Socioeconomic History    Marital status:    Tobacco Use    Smoking status: Former     Packs/day: 0.25     Years: 40.00     Pack years: 10.00     Types: Cigarettes    Smokeless tobacco: Never    Tobacco comments:     quit 6 yr ago   Vaping Use    Vaping Use: Never used   Substance and Sexual Activity    Alcohol use: Yes     Comment: 1-2 drinks day    Drug use: No    Sexual activity: Defer       NIH Stroke Scale:         Jacqueline Echavarria RN  05/01/23 18:26 EDT

## 2023-05-01 NOTE — PROGRESS NOTES
"Chief Complaint  Hoarse, Cough, Fatigue, and Shortness of Breath    Subjective        Lizzy Hicks presents to [facility]  History of Present Illness  Patient was seen briefly in the office but triage was aborted given the fact that we could not get her O2 sat above about 76.  She developed some shortness of breath Saturday evening going into Sunday and yesterday with today being the worst day.  She denies fever or chills.  He has difficulty breathing and is in some manner of respiratory distress in the office.  She is lucid and alert.  She is able to talk except for occasional dyspnea while talking.  We were unable to complete a diagnostic swab in the office and took her to the emergency room based on respiratory distress.  Her daughter is with her.  Hoarse  Associated symptoms include coughing and fatigue.   Cough  Associated symptoms include shortness of breath.   Fatigue  Associated symptoms include coughing and fatigue.   Shortness of Breath        Objective   Vital Signs:  Pulse 101   Wt 58.1 kg (128 lb)   SpO2 (!) 77%   BMI 22.67 kg/m²   Estimated body mass index is 22.67 kg/m² as calculated from the following:    Height as of 3/21/23: 160 cm (63\").    Weight as of this encounter: 58.1 kg (128 lb).       BMI is within normal parameters. No other follow-up for BMI required.      Physical Exam  Constitutional:       General: She is in acute distress.      Appearance: She is ill-appearing.   HENT:      Head: Normocephalic.      Right Ear: Tympanic membrane normal.   Cardiovascular:      Rate and Rhythm: Normal rate and regular rhythm.      Heart sounds: Normal heart sounds.   Pulmonary:      Breath sounds: Examination of the right-middle field reveals decreased breath sounds. Examination of the left-middle field reveals decreased breath sounds. Examination of the right-lower field reveals decreased breath sounds. Examination of the left-lower field reveals decreased breath " sounds. Decreased breath sounds present.   Neurological:      Mental Status: She is alert.      Cranial Nerves: Cranial nerves 2-12 are intact.        Result Review :                   Assessment and Plan   Diagnoses and all orders for this visit:    1. Acute respiratory distress (Primary)  Comments:  With persistent hypoxia    Patient is transferred via wheelchair to the emergency room for further work-up.         Follow Up   No follow-ups on file.  Patient was given instructions and counseling regarding her condition or for health maintenance advice. Please see specific information pulled into the AVS if appropriate.

## 2023-05-02 PROBLEM — B34.8 PARAINFLUENZA INFECTION: Status: ACTIVE | Noted: 2023-05-02

## 2023-05-02 LAB
ANION GAP SERPL CALCULATED.3IONS-SCNC: 17.1 MMOL/L (ref 5–15)
BUN SERPL-MCNC: 20 MG/DL (ref 8–23)
BUN/CREAT SERPL: 30.3 (ref 7–25)
CALCIUM SPEC-SCNC: 9.7 MG/DL (ref 8.6–10.5)
CHLORIDE SERPL-SCNC: 87 MMOL/L (ref 98–107)
CO2 SERPL-SCNC: 26.9 MMOL/L (ref 22–29)
CREAT SERPL-MCNC: 0.66 MG/DL (ref 0.57–1)
DEPRECATED RDW RBC AUTO: 40.9 FL (ref 37–54)
EGFRCR SERPLBLD CKD-EPI 2021: 86.1 ML/MIN/1.73
ERYTHROCYTE [DISTWIDTH] IN BLOOD BY AUTOMATED COUNT: 12.3 % (ref 12.3–15.4)
GLUCOSE SERPL-MCNC: 156 MG/DL (ref 65–99)
HCT VFR BLD AUTO: 39.1 % (ref 34–46.6)
HGB BLD-MCNC: 13.6 G/DL (ref 12–15.9)
MCH RBC QN AUTO: 31.9 PG (ref 26.6–33)
MCHC RBC AUTO-ENTMCNC: 34.8 G/DL (ref 31.5–35.7)
MCV RBC AUTO: 91.6 FL (ref 79–97)
PLATELET # BLD AUTO: 203 10*3/MM3 (ref 140–450)
PMV BLD AUTO: 9.2 FL (ref 6–12)
POTASSIUM SERPL-SCNC: 3.9 MMOL/L (ref 3.5–5.2)
QT INTERVAL: 394 MS
QT INTERVAL: 410 MS
RBC # BLD AUTO: 4.27 10*6/MM3 (ref 3.77–5.28)
SODIUM SERPL-SCNC: 131 MMOL/L (ref 136–145)
WBC NRBC COR # BLD: 3.04 10*3/MM3 (ref 3.4–10.8)

## 2023-05-02 PROCEDURE — 80048 BASIC METABOLIC PNL TOTAL CA: CPT | Performed by: INTERNAL MEDICINE

## 2023-05-02 PROCEDURE — 94799 UNLISTED PULMONARY SVC/PX: CPT

## 2023-05-02 PROCEDURE — 94664 DEMO&/EVAL PT USE INHALER: CPT

## 2023-05-02 PROCEDURE — 25010000002 ENOXAPARIN PER 10 MG: Performed by: INTERNAL MEDICINE

## 2023-05-02 PROCEDURE — 94761 N-INVAS EAR/PLS OXIMETRY MLT: CPT

## 2023-05-02 PROCEDURE — 93005 ELECTROCARDIOGRAM TRACING: CPT | Performed by: HOSPITALIST

## 2023-05-02 PROCEDURE — 93010 ELECTROCARDIOGRAM REPORT: CPT | Performed by: INTERNAL MEDICINE

## 2023-05-02 PROCEDURE — 94760 N-INVAS EAR/PLS OXIMETRY 1: CPT

## 2023-05-02 PROCEDURE — 25010000002 METHYLPREDNISOLONE PER 40 MG: Performed by: INTERNAL MEDICINE

## 2023-05-02 PROCEDURE — 85027 COMPLETE CBC AUTOMATED: CPT | Performed by: INTERNAL MEDICINE

## 2023-05-02 PROCEDURE — 97530 THERAPEUTIC ACTIVITIES: CPT

## 2023-05-02 PROCEDURE — 36415 COLL VENOUS BLD VENIPUNCTURE: CPT | Performed by: INTERNAL MEDICINE

## 2023-05-02 PROCEDURE — 97161 PT EVAL LOW COMPLEX 20 MIN: CPT

## 2023-05-02 PROCEDURE — 63710000001 PREDNISONE PER 1 MG: Performed by: HOSPITALIST

## 2023-05-02 RX ORDER — GUAIFENESIN 200 MG/10ML
100 LIQUID ORAL ONCE
Status: COMPLETED | OUTPATIENT
Start: 2023-05-02 | End: 2023-05-02

## 2023-05-02 RX ORDER — SILICONE ADHESIVE 1.5" X 3"
1 SHEET (EA) TOPICAL 4 TIMES DAILY
Status: DISCONTINUED | OUTPATIENT
Start: 2023-05-02 | End: 2023-05-04 | Stop reason: HOSPADM

## 2023-05-02 RX ORDER — MOXIFLOXACIN 5 MG/ML
1 SOLUTION/ DROPS OPHTHALMIC 2 TIMES DAILY
COMMUNITY

## 2023-05-02 RX ORDER — SILICONE ADHESIVE 1.5" X 3"
1 SHEET (EA) TOPICAL 4 TIMES DAILY
COMMUNITY

## 2023-05-02 RX ORDER — PREDNISONE 20 MG/1
40 TABLET ORAL
Status: DISCONTINUED | OUTPATIENT
Start: 2023-05-02 | End: 2023-05-04 | Stop reason: HOSPADM

## 2023-05-02 RX ORDER — BENZONATATE 100 MG/1
100 CAPSULE ORAL ONCE
Status: COMPLETED | OUTPATIENT
Start: 2023-05-02 | End: 2023-05-02

## 2023-05-02 RX ORDER — MOXIFLOXACIN 5 MG/ML
1 SOLUTION/ DROPS OPHTHALMIC 2 TIMES DAILY
Status: DISCONTINUED | OUTPATIENT
Start: 2023-05-02 | End: 2023-05-04 | Stop reason: HOSPADM

## 2023-05-02 RX ADMIN — ATENOLOL 50 MG: 50 TABLET ORAL at 08:57

## 2023-05-02 RX ADMIN — GUAIFENESIN 100 MG: 200 SOLUTION ORAL at 06:01

## 2023-05-02 RX ADMIN — IPRATROPIUM BROMIDE AND ALBUTEROL SULFATE 3 ML: .5; 3 SOLUTION RESPIRATORY (INHALATION) at 14:55

## 2023-05-02 RX ADMIN — PREDNISOLONE ACETATE 1 DROP: 10 SUSPENSION/ DROPS OPHTHALMIC at 20:53

## 2023-05-02 RX ADMIN — ROSUVASTATIN CALCIUM 5 MG: 5 TABLET, FILM COATED ORAL at 08:57

## 2023-05-02 RX ADMIN — METHYLPREDNISOLONE SODIUM SUCCINATE 40 MG: 40 INJECTION, POWDER, FOR SOLUTION INTRAMUSCULAR; INTRAVENOUS at 01:06

## 2023-05-02 RX ADMIN — METHYLPREDNISOLONE SODIUM SUCCINATE 40 MG: 40 INJECTION, POWDER, FOR SOLUTION INTRAMUSCULAR; INTRAVENOUS at 09:05

## 2023-05-02 RX ADMIN — SODIUM CHLORIDE 1 DROP: 50 SOLUTION OPHTHALMIC at 20:53

## 2023-05-02 RX ADMIN — SODIUM CHLORIDE 1 DROP: 50 SOLUTION OPHTHALMIC at 17:43

## 2023-05-02 RX ADMIN — ASPIRIN 81 MG: 81 TABLET, COATED ORAL at 08:57

## 2023-05-02 RX ADMIN — IPRATROPIUM BROMIDE AND ALBUTEROL SULFATE 3 ML: .5; 3 SOLUTION RESPIRATORY (INHALATION) at 11:05

## 2023-05-02 RX ADMIN — BENZONATATE 100 MG: 100 CAPSULE ORAL at 01:31

## 2023-05-02 RX ADMIN — ENOXAPARIN SODIUM 40 MG: 100 INJECTION SUBCUTANEOUS at 20:53

## 2023-05-02 RX ADMIN — Medication 500 MG: at 08:57

## 2023-05-02 RX ADMIN — PREDNISONE 40 MG: 20 TABLET ORAL at 15:12

## 2023-05-02 RX ADMIN — MULTIPLE VITAMINS W/ MINERALS TAB 1 TABLET: TAB at 08:57

## 2023-05-02 RX ADMIN — AMLODIPINE BESYLATE 5 MG: 5 TABLET ORAL at 08:57

## 2023-05-02 RX ADMIN — MOXIFLOXACIN HYDROCHLORIDE 0.05 ML: 5 SOLUTION/ DROPS OPHTHALMIC at 20:53

## 2023-05-02 RX ADMIN — BUDESONIDE AND FORMOTEROL FUMARATE DIHYDRATE 1 PUFF: 160; 4.5 AEROSOL RESPIRATORY (INHALATION) at 20:17

## 2023-05-02 RX ADMIN — BUDESONIDE AND FORMOTEROL FUMARATE DIHYDRATE 1 PUFF: 160; 4.5 AEROSOL RESPIRATORY (INHALATION) at 07:13

## 2023-05-02 NOTE — H&P
HISTORY AND PHYSICAL   [facility]        Date of Admission: 2023  Patient Identification:  Name: Lizzy Hicks  Age: 87 y.o.  Sex: female  :  1935  MRN: [id number]                     Primary Care Physician: Deric Mendosa MD    Chief Complaint:  87 year old female who presented to the emergency room with shortness of breath, cough which started two days ago; she was seen in her pcp office and hypoxic so she was transported to the ED; she denies fever or chills; she is feeling better after being placed on oxygen    History of Present Illness:   As above    Past Medical History:  Past Medical History:   Diagnosis Date   • Anesthesia complication     pt states was groggy for a week after surgery   • Carotid artery disease     left   • Carotid stenosis     RIGHT   • COPD (chronic obstructive pulmonary disease)    • History of bronchitis    • Hypertension    • Macular degeneration    • Osteoarthritis 2016   • Osteoporosis 2016   • 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. Baseline Na 128-130 since .   • Seborrheic dermatitis 2016   • Swallowing difficulty     D/T INADVERTANT REMOVAL OF UVULA AS CHILD WITH T AND A     Past Surgical History:  Past Surgical History:   Procedure Laterality Date   • CAROTID ENDARTERECTOMY Right 2018    Procedure: RT CAROTID ENDARTERECTOMY WITH INTRA OPERATIVE CAROTID ARTERY DUPLEX SCAN;  Surgeon: Mikaela Galicia Jr., MD;  Location: [facility];  Service: Vascular   • CAROTID ENDARTERECTOMY Left 2019    Procedure: LEFT CAROTID ENDARTERECTOMY;  Surgeon: Mikaela Galicia Jr., MD;  Location: [facility];  Service: Vascular   • CATARACT EXTRACTION WITH INTRAOCULAR LENS IMPLANT      LEFT AND RIGHT   • ORIF FEMUR FRACTURE Right     HARDWARE   • TONSILLECTOMY AND ADENOIDECTOMY      INADVERTANTLY TOOK UVULA AT THIS TIME      Home Meds:  Medications Prior to Admission   Medication Sig Dispense Refill  Last Dose   • alendronate (FOSAMAX) 70 MG tablet TAKE 1 TABLET BY MOUTH ONCE WEEKLY ON AN EMPTY STOMACH BEFORE BREAKFAST. REMAIN UPRIGHT FOR 30 MINUTES AND TAKE WITH 8 OUNCES OF WATER 12 tablet 1 Past Week   • amLODIPine (NORVASC) 5 MG tablet TAKE ONE TABLET BY MOUTH DAILY 90 tablet 3 5/1/2023   • aspirin 81 MG EC tablet Take 1 tablet by mouth Daily. WILL FOLLOW MD ORDERS   5/1/2023   • atenolol (TENORMIN) 50 MG tablet TAKE ONE TABLET BY MOUTH DAILY 90 tablet 1 5/1/2023   • calcium carbonate-cholecalciferol 500-400 MG-UNIT tablet tablet Take 1 tablet by mouth 2 (Two) Times a Day.   5/1/2023   • Fluticasone-Salmeterol (ADVAIR/WIXELA) 100-50 MCG/ACT DISKUS INHALE ONE PUFF BY MOUTH TWICE A DAY 60 each 2 5/1/2023   • Multiple Vitamins-Minerals (ICAPS AREDS 2 PO) Take 1 tablet by mouth 2 (Two) Times a Day. HOLD PRIOR TO SURG   5/1/2023   • Multiple Vitamins-Minerals (MULTIVITAMIN ADULTS 50+ PO) Take 1 tablet by mouth Daily. HOLD PRIOR TO SURG   5/1/2023   • prednisoLONE acetate (PRED FORTE) 1 % ophthalmic suspension    5/1/2023   • rosuvastatin (CRESTOR) 5 MG tablet TAKE ONE TABLET BY MOUTH DAILY 90 tablet 3 5/1/2023       Allergies:  Allergies   Allergen Reactions   • Bee Venom Swelling     Immunizations:  Immunization History   Administered Date(s) Administered   • COVID-19 (PFIZER) BIVALENT 12+YRS 10/31/2022   • COVID-19 (PFIZER) Purple Cap Monovalent 01/17/2021, 02/10/2021   • Fluzone High Dose =>65 Years ([employer] ONLY) 10/02/2015, 10/10/2018, 10/02/2019   • Fluzone High-Dose 65+yrs 09/21/2021   • Pneumococcal Conjugate 13-Valent (PCV13) 01/21/2016   • Pneumococcal, Unspecified 10/20/2003, 09/20/2017   • Shingrix 07/25/2018   • Tdap 09/20/2014   • Zostavax 10/20/2003     Social History:   Social History     Social History Narrative   • Not on file     Social History     Socioeconomic History   • Marital status:    Tobacco Use   • Smoking status: Former     Packs/day: 0.25     Years: 40.00     Pack years: 10.00  "    Types: Cigarettes   • Smokeless tobacco: Never   • Tobacco comments:     quit 6 yr ago   Vaping Use   • Vaping Use: Never used   Substance and Sexual Activity   • Alcohol use: Yes     Comment: 1-2 drinks day   • Drug use: No   • Sexual activity: Defer       Family History:  Family History   Problem Relation Age of Onset   • Malig Hyperthermia Neg Hx         Review of Systems  See history of present illness and past medical history.  Patient denies headache, dizziness, syncope, falls, trauma, change in vision, change in hearing, change in taste, changes in weight, changes in appetite, focal weakness, numbness, or paresthesia.  Patient denies chest pain, palpitations sinus pressure, rhinorrhea, epistaxis, hemoptysis, nausea, vomiting,hematemesis, diarrhea, constipation or hematchezia.  Denies cold or heat intolerance, polydipsia, polyuria, polyphagia. Denies hematuria, pyuria, dysuria, hesitancy, frequency or urgency. Denies consumption of raw and under cooked meats foods or change in water source.  Denies fever, chills, sweats, night sweats.  Denies missing any routine medications. Remainder of ROS is negative.    Objective:  T Max 24 hrs: Temp (24hrs), Av.3 °F (36.3 °C), Min:96 °F (35.6 °C), Max:98.2 °F (36.8 °C)    Vitals Ranges:   Temp:  [96 °F (35.6 °C)-98.2 °F (36.8 °C)] 97.7 °F (36.5 °C)  Heart Rate:  [] 91  Resp:  [16-20] 16  BP: (143-192)/(68-87) 169/68      Exam:  /68   Pulse 91   Temp 97.7 °F (36.5 °C) (Oral)   Resp 16   Ht 160 cm (63\")   Wt 57.9 kg (127 lb 10.3 oz)   LMP  (LMP Unknown)   SpO2 98%   BMI 22.61 kg/m²     General Appearance:    Alert, cooperative, no distress, appears stated age; chronically ill appearing   Head:    Normocephalic, without obvious abnormality, atraumatic   Eyes:    PERRL, conjunctivae/corneas clear, EOM's intact, both eyes   Ears:    Normal external ear canals, both ears   Nose:   Nares normal, septum midline, mucosa normal, no drainage    or sinus " tenderness   Throat:   Lips, mucosa, and tongue normal   Neck:   Supple, symmetrical, trachea midline, no adenopathy;     thyroid:  no enlargement/tenderness/nodules; no carotid    bruit or JVD   Back:     Symmetric, no curvature, ROM normal, no CVA tenderness   Lungs:     Decreased breath sounds bilaterally, respirations unlabored   Chest Wall:    No tenderness or deformity    Heart:    Regular rate and rhythm, S1 and S2 normal, no murmur, rub   or gallop   Abdomen:     Soft, nontender, bowel sounds active all four quadrants,     no masses, no hepatomegaly, no splenomegaly   Extremities:   Extremities normal, atraumatic, no cyanosis or edema   Pulses:   2+ and symmetric all extremities   Skin:   Skin color, texture, turgor normal, no rashes or lesions               .    Data Review:  Labs in chart were reviewed.  WBC   Date Value Ref Range Status   05/01/2023 6.55 3.40 - 10.80 10*3/mm3 Final     Hemoglobin   Date Value Ref Range Status   05/01/2023 14.2 12.0 - 15.9 g/dL Final     Hematocrit   Date Value Ref Range Status   05/01/2023 40.5 34.0 - 46.6 % Final     Platelets   Date Value Ref Range Status   05/01/2023 203 140 - 450 10*3/mm3 Final     Sodium   Date Value Ref Range Status   05/01/2023 125 (L) 136 - 145 mmol/L Final     Potassium   Date Value Ref Range Status   05/01/2023 3.8 3.5 - 5.2 mmol/L Final     Chloride   Date Value Ref Range Status   05/01/2023 84 (L) 98 - 107 mmol/L Final     CO2   Date Value Ref Range Status   05/01/2023 25.0 22.0 - 29.0 mmol/L Final     BUN   Date Value Ref Range Status   05/01/2023 17 8 - 23 mg/dL Final     Creatinine   Date Value Ref Range Status   05/01/2023 0.62 0.57 - 1.00 mg/dL Final     Glucose   Date Value Ref Range Status   05/01/2023 109 (H) 65 - 99 mg/dL Final     Calcium   Date Value Ref Range Status   05/01/2023 9.4 8.6 - 10.5 mg/dL Final     AST (SGOT)   Date Value Ref Range Status   05/01/2023 47 (H) 1 - 32 U/L Final     ALT (SGPT)   Date Value Ref Range Status    05/01/2023 28 1 - 33 U/L Final     Alkaline Phosphatase   Date Value Ref Range Status   05/01/2023 107 39 - 117 U/L Final                Imaging Results (All)     Procedure Component Value Units Date/Time    XR Chest 2 View [[id number]] Collected: 05/01/23 1658     Updated: 05/01/23 1802    Narrative:      CHEST: 2 VIEWS     HISTORY: Shortness of air     COMPARISON: Two-view chest 03/29/2019.      FINDINGS:Cardiomediastinal silhouette is within normal limits. Mitral  annulus calcifications are noted. There are also atherosclerotic  vascular involving the thoracic aorta. There are chronic-appearing  increased interstitial markings. The lungs appear clear of focal  airspace disease and there is no evidence for pulmonary edema or pleural  effusion. A calcified nodule superimposes the posterior lower lobes on  the lateral view.       Impression:      No evidence for active disease in the chest.     This report was finalized on 5/1/2023 5:59 PM by Dr. Isiah Fuentes M.D.               Assessment:  Active Hospital Problems    Diagnosis  POA   • **COPD exacerbation [J44.1]  Yes   • Acute exacerbation of chronic obstructive pulmonary disease (COPD) [J44.1]  Yes      Resolved Hospital Problems   No resolved problems to display.   acute hypoxic respiratory failure  Hyponatremia  hyperglycemia  Parainfluenza   Plan:  Will continue steroids mininebs  Trend sodium  Monitor on telemetry  Wean oxygen as tolerated  [place] patient, family and ED provider  Shanelle Guillen MD  5/1/2023  21:39 EDT

## 2023-05-02 NOTE — DISCHARGE PLACEMENT REQUEST
"Lizzy Hicks (87 y.o. Female)     Date of Birth   1935    Social Security Number       Address   [address] [facility] [place] [address]    Home Phone   [phone]    MRN   [id number]       [place]   None    Marital Status                               Admission Date   5/1/23    Admission Type   Emergency    Admitting Provider   Shanelle Guillen MD    Attending Provider   Jim Edge MD    Department, Room/Bed   [address], [address]/1       Discharge Date       Discharge Disposition       Discharge Destination                               Attending Provider: Jim Edge MD    Allergies: Bee Venom    Isolation: None   Infection: None   Code Status: CPR    Ht: 160 cm (63\")   Wt: 57.9 kg (127 lb 10.3 oz)    Admission Cmt: None   Principal Problem: COPD exacerbation [J44.1]                 Active Insurance as of 5/1/2023     Primary Coverage     Payor Plan Insurance Group Employer/Plan Group    MEDICARE MEDICARE A & B      Payor Plan Address Payor Plan Phone Number Payor Plan Fax Number Effective Dates    [address] [phone]  9/1/2002 - None Entered    [facility] [id number]       Subscriber Name Subscriber Birth Date Member ID       LIZZY HICKS W 1935 [id number]           Secondary Coverage     Payor Plan Insurance Group Employer/Plan Group    [facility] SUPP [id number]     Payor Plan Address Payor Plan Phone Number Payor Plan Fax Number Effective Dates    [address]   12/1/2016 - None Entered    [facility] [id number]       Subscriber Name Subscriber Birth Date Member ID       LIZZY HICKS W 1935 [id number]                 Emergency Contacts      (Rel.) Home Phone Work Phone Mobile Phone    Jasmin Lauren (Daughter) [phone] -- [phone]              "

## 2023-05-02 NOTE — PROGRESS NOTES
Name: Lizzy Hicks ADMIT: 2023   : 1935  PCP: Deric Mendosa MD    MRN: [id number] LOS: 1 days   AGE/SEX: 87 y.o. female  ROOM: [facility]     Subjective   Subjective   feeling overall better but had trouble sleeping due to cough.      Objective   Objective   Vital Signs  Temp:  [96 °F (35.6 °C)-98.2 °F (36.8 °C)] 97.7 °F (36.5 °C)  Heart Rate:  [] 79  Resp:  [16-20] 18  BP: (143-192)/(67-87) 164/67  SpO2:  [77 %-100 %] 97 %  on  Flow (L/min):  [2-3] 3;   Device (Oxygen Therapy): nasal cannula  Body mass index is 22.61 kg/m².    Physical Exam  Constitutional:       General: She is not in acute distress.     Appearance: Normal appearance. She is ill-appearing. She is not toxic-appearing.   HENT:      Head: Normocephalic and atraumatic.   Eyes:      Extraocular Movements: Extraocular movements intact.   Cardiovascular:      Rate and Rhythm: Normal rate and regular rhythm.   Pulmonary:      Effort: Pulmonary effort is normal. No respiratory distress.      Breath sounds: Decreased breath sounds present. No wheezing.   Abdominal:      General: Bowel sounds are normal.      Palpations: Abdomen is soft.      Tenderness: There is no abdominal tenderness.   Musculoskeletal:         General: No swelling.      Cervical back: Normal range of motion.      Right lower leg: No edema.      Left lower leg: No edema.   Skin:     General: Skin is warm and dry.   Neurological:      General: No focal deficit present.      Mental Status: She is alert and oriented to person, place, and time.   Psychiatric:         Mood and Affect: Mood normal.         Behavior: Behavior normal.         Thought Content: Thought content normal.     Results Review  I reviewed the patient's new clinical results.  Results from last 7 days   Lab Units 23  0638 23  1620   WBC 10*3/mm3 3.04* 6.55   HEMOGLOBIN g/dL 13.6 14.2   PLATELETS 10*3/mm3 203 203     Results from last 7 days   Lab Units 23  0638 23  1620   SODIUM  mmol/L 131* 125*   POTASSIUM mmol/L 3.9 3.8   CHLORIDE mmol/L 87* 84*   CO2 mmol/L 26.9 25.0   BUN mg/dL 20 17   CREATININE mg/dL 0.66 0.62   GLUCOSE mg/dL 156* 109*     Lab Results   Component Value Date    ANIONGAP 17.1 (H) 05/02/2023     Estimated Creatinine Clearance: 57 mL/min (by C-G formula based on SCr of 0.66 mg/dL).    Results from last 7 days   Lab Units 05/01/23  1620   ALBUMIN g/dL 4.4   BILIRUBIN mg/dL 0.3   ALK PHOS U/L 107   AST (SGOT) U/L 47*   ALT (SGPT) U/L 28     Results from last 7 days   Lab Units 05/02/23  0638 05/01/23  1620   CALCIUM mg/dL 9.7 9.4   ALBUMIN g/dL  --  4.4       No results found for: HGBA1C, POCGLU    XR Chest 2 View    Result Date: 5/1/2023  No evidence for active disease in the chest.  This report was finalized on 5/1/2023 5:59 PM by Dr. Isiha Feuntes M.D.        Scheduled Meds  amLODIPine, 5 mg, Oral, Daily  aspirin, 81 mg, Oral, Daily  atenolol, 50 mg, Oral, Daily  budesonide-formoterol, 1 puff, Inhalation, BID - RT  calcium carbonate (oyster shell), 500 mg, Oral, Daily  enoxaparin, 40 mg, Subcutaneous, Nightly  ipratropium-albuterol, 3 mL, Nebulization, 4x Daily - RT  methylPREDNISolone sodium succinate, 40 mg, Intravenous, Q8H  multivitamin with minerals, 1 tablet, Oral, Daily  prednisoLONE acetate, 1 drop, Both Eyes, Q12H  rosuvastatin, 5 mg, Oral, Daily    Continuous Infusions   PRN Meds  •  acetaminophen  •  albuterol  •  melatonin  •  ondansetron **OR** ondansetron     Diet  Diet: Cardiac Diets; Healthy Heart (2-3 Na+); Texture: Regular Texture (IDDSI 7); Fluid Consistency: Thin (IDDSI 0)    I have personally reviewed:  [x]  Medications  [x]  Laboratory   [x]  Microbiology   [x]  Radiology   [x]  EKG/Telemetry sinus on telemetry Will repeat EKG  []  Cardiology/Vascular   []  Pathology   []  Records     Assessment/Plan     Active Hospital Problems    Diagnosis  POA   • **Acute exacerbation of chronic obstructive pulmonary disease (COPD) [J44.1]  Yes   •  Parainfluenza infection [B34.8]  Unknown   • Acute respiratory failure with hypoxia [J96.01]  Unknown   • Hypertension [I10]  Unknown   • Hyponatremia [E87.1]  Unknown      Resolved Hospital Problems   No resolved problems to display.     87 y.o. female admitted with shortness of breath and hypoxia. History of COPD    Acute hypoxic respiratory failure  Acute exacerbation COPD  Parainfluenza infection  -Continue supportive care  -Flow (L/min):  [2-3] 3 wean as tolerated  -Currently not wheezing switch steroids to p.o.  -Discussed with family they would like to see pulmonology for follow-up    Hyponatremia  -Improved. Probably due to above  -Continue to monitor    Hypertension  Hyperlipidemia  -BP acceptable acutely  -Continue to monitor    Lovenox 40 mg SC daily for DVT prophylaxis    Discussed with patient, family, nursing staff and care team on multidisciplinary rounds    Discharge: Probably a couple of days    Jim Edge MD  [facility]  05/02/23

## 2023-05-02 NOTE — PLAN OF CARE
Goal Outcome Evaluation:         Pt. Arrived to this unit from ER with problems:Hoarse, cough, fatigue, and short of air, transferred to bed self from w/c with 2 staffs standby, alert, oriented x4, v/s stable, 02 sats 93 to 94% with o2 inhal. @3l/m per n/c, no voiced c/o pain, lungs not congested, breath sounds diminished, when encounter at first time, not much coughing noted, breathing tx done By RT around 2100, but coughing progressively worsening, started dry coughing, later slightly congested, no production noted, [facility] called , new order noted, Tessalon 100mg capsule given at 0131, but continuously coughing, noted Pt. Could not get into sleep, [facility] called again, new order noted, cough syrup 5ml may be given once, waiting for cough syrup from PCA now, no s/s acute distress noted, will continue to monitor          Please call the patient's insurance. I need to know why he still ARV's. Costella Daft and not being covered and need to appeal it.   Thanks

## 2023-05-02 NOTE — CONSULTS
"[facility]  [phone]  Dr. Nate Breen      Subjective   LOS: 1 day     Thank you for this consultation.  87-year-old female with a diagnosis of COPD for some length of time.  Follows with her primary care physician.  Smoker\" 2012 of half to 1 packet of cigarettes a day.  Started smoking age 24.  Approximately 30 pack years.  She has Advair prescribed and uses once a day.  No rescue inhaler.  States she has she gets bronchitis few times a year and usually treated with azithromycin with improvement.  From questioning her it does not appear that she has been on systemic steroids in the past.  She has never seen a pulmonologist.  She has not been admitted for her lungs in the past.  She did have an admission for carotid artery stenting by vascular surgery.  She is currently requiring oxygen.  She has been feeling unwell for the last 4 days and has been having cough with phlegm and increased shortness of breath.    Lizzy Hicks  reports current alcohol use.,  reports that she has quit smoking. Her smoking use included cigarettes. She has a 10.00 pack-year smoking history. She has never used smokeless tobacco.     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  Surg Hx:  has a past surgical history that includes Cataract extraction w/  intraocular lens implant; ORIF femur fracture (Right); Tonsillectomy and adenoidectomy; Carotid Endarterectomy (Right, 9/24/2018); and Carotid Endarterectomy (Left, 4/4/2019).  FH: family history is not on file.  SH:  reports that she has quit smoking. Her smoking use included cigarettes. She has a 10.00 pack-year smoking history. She has never used " smokeless tobacco. She reports current alcohol use. She reports that she does not use drugs.    Medications Prior to Admission   Medication Sig Dispense Refill Last Dose   • alendronate (FOSAMAX) 70 MG tablet TAKE 1 TABLET BY MOUTH ONCE WEEKLY ON AN EMPTY STOMACH BEFORE BREAKFAST. REMAIN UPRIGHT FOR 30 MINUTES AND TAKE WITH 8 OUNCES OF WATER 12 tablet 1 Past Week   • amLODIPine (NORVASC) 5 MG tablet TAKE ONE TABLET BY MOUTH DAILY 90 tablet 3 5/1/2023   • aspirin 81 MG EC tablet Take 1 tablet by mouth Daily. WILL FOLLOW MD ORDERS   5/1/2023   • atenolol (TENORMIN) 50 MG tablet TAKE ONE TABLET BY MOUTH DAILY 90 tablet 1 5/1/2023   • calcium carbonate-cholecalciferol 500-400 MG-UNIT tablet tablet Take 1 tablet by mouth 2 (Two) Times a Day.   5/1/2023   • Fluticasone-Salmeterol (ADVAIR/WIXELA) 100-50 MCG/ACT DISKUS INHALE ONE PUFF BY MOUTH TWICE A DAY 60 each 2 5/1/2023   • Multiple Vitamins-Minerals (ICAPS AREDS 2 PO) Take 1 tablet by mouth 2 (Two) Times a Day. HOLD PRIOR TO SURG   5/1/2023   • Multiple Vitamins-Minerals (MULTIVITAMIN ADULTS 50+ PO) Take 1 tablet by mouth Daily. HOLD PRIOR TO SURG   5/1/2023   • prednisoLONE acetate (PRED FORTE) 1 % ophthalmic suspension Administer  to the right eye 4 (Four) Times a Day.   5/1/2023   • rosuvastatin (CRESTOR) 5 MG tablet TAKE ONE TABLET BY MOUTH DAILY 90 tablet 3 5/1/2023   • moxifloxacin (VIGAMOX) 0.5 % ophthalmic solution Administer 1 drop to the right eye 2 (Two) Times a Day.      • sodium chloride (GREGORY 128) 5 % ophthalmic solution Administer 1 drop to the right eye 4 (Four) Times a Day.        Allergies   Allergen Reactions   • Bee Venom Swelling       Review of Systems   Constitutional: Negative for chills and fever.   HENT: Positive for congestion and sore throat.    Respiratory: Positive for cough and shortness of breath. Negative for wheezing.    Cardiovascular: Negative for chest pain and leg swelling.   Gastrointestinal: Negative for abdominal pain, nausea  and vomiting.   Genitourinary: Negative for dysuria and hematuria.   Musculoskeletal: Negative for arthralgias and back pain.   Skin: Negative for pallor and rash.   Neurological: Negative for seizures.   Psychiatric/Behavioral: Negative for agitation and confusion.     Vital Signs past 24hrs  BP range: BP: (143-176)/(67-76) 151/74  Pulse range: Heart Rate:  [79-97] 89  Resp rate range: Resp:  [16-18] 18  Temp range: Temp (24hrs), Av.2 °F (36.8 °C), Min:97.7 °F (36.5 °C), Max:98.6 °F (37 °C)    Oxygen range: SpO2:  [87 %-100 %] 87 %; Flow (L/min):  [3] 3;   Device (Oxygen Therapy): room air  57.9 kg (127 lb 10.3 oz); Body mass index is 22.61 kg/m².  Net IO Since Admission: No IO data has been entered for this period [23 1712]      Adult female who is sitting up in bed.  She seems to run out of air with longer conversations.  Currently on 3 L oxygen with adequate saturation of 94%.  Pupils equal and react to light.  Oropharynx moist class II Mallampati airway no posterior pharyngeal discharge.  Nasopharynx without discharge septum midline.  JVP not elevated trachea midline thyroid not enlarged.  Lungs reveal bilateral diminished breath sounds with very mild wheeze.  No rales.  Percussion note resonant chest expansion equal no chest wall deformity or tenderness.  Heart examination S1-S2 present rhythm regular.  No murmur noted.  No edema lower extremities.  Abdomen is soft nontender bowel sounds present no liver spleen enlargement.  No peripheral cyanosis clubbing.  Moves all 4 extremities sensorimotor intact.  No cervical, axillary, inguinal adenopathy.    Results Review:    I have reviewed the laboratory and imaging data from current admission. My annotations are as noted in assessment and plan.  Result Review:  I have personally reviewed the results from the time of this admission to 2023 17:12 EDT and agree with these findings:  [x]  Laboratory list / accordion  [x]  Microbiology  [x]  Radiology  []   EKG/Telemetry   []  Cardiology/Vascular   []  Pathology  [x]  Old records  []  Other:      Medication Review:  I have reviewed the current MAR. My annotations are as noted in assessment and plan.       Lines, Drains & Airways     Active LDAs     Name Placement date Placement time Site Days    Peripheral IV 05/01/23 1619 Right Antecubital 05/01/23  1619  Antecubital  1    Peripheral IV 05/02/23 1028 Anterior;Left Forearm 05/02/23  1028  Forearm  less than 1              Diet Orders (active) (From admission, onward)     Start     Ordered    05/01/23 1813  Diet: Cardiac Diets; Healthy Heart (2-3 Na+); Texture: Regular Texture (IDDSI 7); Fluid Consistency: Thin (IDDSI 0)  Diet Effective Now         05/01/23 1813              No active isolations  PCCM Problems  Acute exacerbation COPD  Parainfluenza virus infection  Ex cigarette smoker, 35 pack years  Acute hypoxic respiratory failure        Plan of Treatment    Treating for exacerbation COPD caused by viral bronchitis and now on oral prednisone.  Will require prednisone taper on discharge.  Continue with Lara and recommend prescription for nebulizer on discharge.  She is on Advair at home and needs to be instructed to use it twice a day.  She also requires an albuterol rescue inhaler on discharge.    Underlying COPD and may benefit from getting PFTs with regular follow-up with pulmonary if she so desires.  We will be happy to see in our office.    Hypoxia noted.  We will get ABG on room air in the morning.  Also get walking oximetry to determine home oxygen requirements.    Thank you for this consultation.  We will see again tomorrow    Electronically signed by Nate Breen MD, 05/02/23, 5:12 PM EDT.      Part of this note may be an electronic transcription/translation of spoken language to printed text using the Dragon Dictation System.

## 2023-05-02 NOTE — PLAN OF CARE
Goal Outcome Evaluation:   Denies pain, denies nausea, beatriz meals, vitals stable, 3L NC, steroids give per order

## 2023-05-02 NOTE — THERAPY EVALUATION
Patient Name: Lizzy Hicks  : 1935    MRN: [id number]                              Today's Date: 2023       Admit Date: 2023    Visit Dx:     ICD-10-CM ICD-9-CM   1. Acute exacerbation of chronic obstructive pulmonary disease (COPD)  J44.1 491.21   2. Hypoxia  R09.02 799.02   3. Hyponatremia  E87.1 276.1     Patient Active Problem List   Diagnosis   • Hypertension   • COPD (chronic obstructive pulmonary disease)   • Carotid artery stenosis   • Osteoarthritis   • Osteoporosis   • Hyponatremia   • Reset osmostat syndrome   • Carotid stenosis, asymptomatic, right   • Carotid artery disease   • Macular degeneration   • COPD exacerbation   • Acute exacerbation of chronic obstructive pulmonary disease (COPD)   • Parainfluenza infection   • Acute respiratory failure with hypoxia     Past Medical History:   Diagnosis Date   • Anesthesia complication     pt states was groggy for a week after surgery   • Carotid artery disease     left   • Carotid stenosis     RIGHT   • COPD (chronic obstructive pulmonary disease)    • History of bronchitis    • Hypertension    • Macular degeneration    • Osteoarthritis 2016   • Osteoporosis 2016   • Reset osmostat syndrome 08/15/2016    Worked up by prior PCP in [facility]. Baseline Na 128-130 since .   • Seborrheic dermatitis 2016   • Swallowing difficulty     D/T INADVERTANT REMOVAL OF UVULA AS CHILD WITH T AND A     Past Surgical History:   Procedure Laterality Date   • CAROTID ENDARTERECTOMY Right 2018    Procedure: RT CAROTID ENDARTERECTOMY WITH INTRA OPERATIVE CAROTID ARTERY DUPLEX SCAN;  Surgeon: Mikaela Galicia Jr., MD;  Location: [facility] OR;  Service: Vascular   • CAROTID ENDARTERECTOMY Left 2019    Procedure: LEFT CAROTID ENDARTERECTOMY;  Surgeon: Mikaela Galicia Jr., MD;  Location: [facility] OR;  Service: Vascular   • CATARACT EXTRACTION WITH INTRAOCULAR LENS IMPLANT      LEFT AND RIGHT   • ORIF FEMUR FRACTURE Right      HARDWARE   • TONSILLECTOMY AND ADENOIDECTOMY      INADVERTANTLY TOOK UVULA AT THIS TIME      General Information     Row Name 05/02/23 1520          Physical Therapy Time and Intention    Document Type evaluation  Pt. admitted with COPD exacerbation  -MS     Mode of Treatment physical therapy;individual therapy  -MS     Row Name 05/02/23 1520          General Information    Patient Profile Reviewed yes  -MS     Prior Level of Function independent:  Occ. use of cane and Rwx per pt. report  -MS     Existing Precautions/Restrictions fall;oxygen therapy device and L/min   Exit alarm  -MS     Barriers to Rehab none identified  -MS     Row Name 05/02/23 1520          Cognition    Orientation Status (Cognition) oriented x 3  -MS     Row Name 05/02/23 1520          Safety Issues, Functional Mobility    Comment, Safety Issues/Impairments (Mobility) Gait belt used for safety.  -MS           User Key  (r) = Recorded By, (t) = Taken By, (c) = Cosigned By    Initials Name Provider Type    MS HerbertIsidoro, PT Physical Therapist               Mobility     Row Name 05/02/23 1520          Bed Mobility    Bed Mobility supine-sit;sit-supine  -MS     Supine-Sit [place] (Bed Mobility) standby assist  -MS     Sit-Supine [place] (Bed Mobility) standby assist  -MS     Row Name 05/02/23 1520          Sit-Stand Transfer    Sit-Stand [place] (Transfers) contact guard  -MS     Row Name 05/02/23 1520          Gait/Stairs (Locomotion)    [place] Level (Gait) contact guard;2 person assist  -MS     Assistive Device (Gait) --  HHA x 1  -MS     Distance in Feet (Gait) 150 feet  -MS     Deviations/Abnormal Patterns (Gait) trav decreased  -MS     Bilateral Gait Deviations forward flexed posture  -MS     Comment, (Gait/Stairs) Verbal/tactile cues given for posture correction.  Limited in gait distance due to fatigue and SOA.  -MS           User Key  (r) = Recorded By, (t) = Taken By, (c) = Cosigned By    Initials Name  Provider Type    Marilee Liugeorges MCFADDEN, PT Physical Therapist               Obj/Interventions     Row Name 05/02/23 1521          Range of Motion Comprehensive    Comment, General Range of Motion BUE/LE (WFL's)  -MS     Row Name 05/02/23 1521          Strength Comprehensive (MMT)    Comment, General Manual Muscle Testing (MMT) Assessment BUE/LE (4-/5)  -MS           User Key  (r) = Recorded By, (t) = Taken By, (c) = Cosigned By    Initials Name Provider Type    Isidoro Liu LESA, PT Physical Therapist               Goals/Plan     Row Name 05/02/23 1522          Bed Mobility Goal 1 (PT)    Activity/Assistive Device (Bed Mobility Goal 1, PT) bed mobility activities, all  -MS     [place] Level/Cues Needed (Bed Mobility Goal 1, PT) independent  -MS     Time Frame (Bed Mobility Goal 1, PT) long term goal (LTG);1 week  -MS     Row Name 05/02/23 1522          Transfer Goal 1 (PT)    Activity/Assistive Device (Transfer Goal 1, PT) transfers, all  -MS     [place] Level/Cues Needed (Transfer Goal 1, PT) independent  -MS     Time Frame (Transfer Goal 1, PT) long term goal (LTG);1 week  -MS     Row Name 05/02/23 1522          Gait Training Goal 1 (PT)    Activity/Assistive Device (Gait Training Goal 1, PT) gait (walking locomotion)  With or without AAD  -MS     [place] Level (Gait Training Goal 1, PT) independent  -MS     Distance (Gait Training Goal 1, PT) 200 feet  -MS     Time Frame (Gait Training Goal 1, PT) long term goal (LTG);1 week  -MS     Row Name 05/02/23 1522          Therapy Assessment/Plan (PT)    Planned Therapy Interventions (PT) balance training;bed mobility training;gait training;home exercise program;patient/family education;postural re-education;transfer training;strengthening  -MS           User Key  (r) = Recorded By, (t) = Taken By, (c) = Cosigned By    Initials Name Provider Type    MS Godinez Isidoro MCFADDEN, PT Physical Therapist               Clinical Impression     Row Name 05/02/23 1522           Pain    Pretreatment Pain Rating 0/10 - no pain  -MS     Posttreatment Pain Rating 0/10 - no pain  -MS     Row Name 05/02/23 1522          Plan of Care Review    Plan of Care Reviewed With patient;family  -MS     Row Name 05/02/23 1522          Therapy Assessment/Plan (PT)    Rehab Potential (PT) good, to achieve stated therapy goals  -MS     Criteria for Skilled Interventions Met (PT) meets criteria  -MS     Therapy Frequency (PT) 6 times/wk  -MS     Row Name 05/02/23 1522          Positioning and Restraints    Pre-Treatment Position in bed  -MS     Post Treatment Position bed  -MS     In Bed notified nsg;supine;call light within reach;encouraged to call for assist;exit alarm on;with family/caregiver  All lines intact. V.S.S.  -MS           User Key  (r) = Recorded By, (t) = Taken By, (c) = Cosigned By    Initials Name Provider Type    Isidoro Liu PT Physical Therapist               Outcome Measures     Row Name 05/02/23 1523          How much help from another person do you currently need...    Turning from your back to your side while in flat bed without using bedrails? 3  -MS     Moving from lying on back to sitting on the side of a flat bed without bedrails? 3  -MS     Moving to and from a bed to a chair (including a wheelchair)? 3  -MS     Standing up from a chair using your arms (e.g., wheelchair, bedside chair)? 3  -MS     Climbing 3-5 steps with a railing? 3  -MS     To walk in hospital room? 3  -MS     AM-PAC 6 Clicks Score (PT) 18  -MS     Highest level of mobility 6 --> Walked 10 steps or more  -MS     Row Name 05/02/23 1523          Functional Assessment    Outcome Measure Options AM-PAC 6 Clicks Basic Mobility (PT)  -MS           User Key  (r) = Recorded By, (t) = Taken By, (c) = Cosigned By    Initials Name Provider Type    Isidoro Liu PT Physical Therapist                             Physical Therapy Education     Title: PT OT SLP Therapies (Done)     Topic: Physical  Therapy (Done)     Point: Mobility training (Done)     Learning Progress Summary           Patient Acceptance, E,D, VU,NR by MS at 5/2/2023 1523                   Point: Home exercise program (Done)     Learning Progress Summary           Patient Acceptance, E,D, VU,NR by MS at 5/2/2023 1523                   Point: Body mechanics (Done)     Learning Progress Summary           Patient Acceptance, E,D, VU,NR by MS at 5/2/2023 1523                   Point: Precautions (Done)     Learning Progress Summary           Patient Acceptance, E,D, VU,NR by MS at 5/2/2023 1523                               User Key     Initials Effective Dates Name Provider Type Discipline    MS 06/16/21 -  Isidoro Godinez, PT Physical Therapist PT              PT Recommendation and Plan  Planned Therapy Interventions (PT): balance training, bed mobility training, gait training, home exercise program, patient/family education, postural re-education, transfer training, strengthening  Plan of Care Reviewed With: patient  Outcome Evaluation: Pt. is an 87 year old Female admitted to the hospital with Acute excerbation of COPD.  Pt. reports that prior to admission she was independent with functional mobility and occ. used a cane/Rwx for ambulation.  Pt. currently presents with decreased strength, decreased balance, and decreased tolerance to functional activity. This PM, pt. able to ambulate 150 feet, CGA x 2, with HHA x 1.  Pt. requires SBA x 1 for bed mobility and CGA x 1 for sit <-> stand transfers. Pt. will benefit from skilled inpt. P.T. to address her functional deficits and to assist pt. in regaining her maximum level of independence with functional mobility.     Time Calculation:    PT Charges     Row Name 05/02/23 1526             Time Calculation    Start Time 1320  -MS      Stop Time 1340  -MS      Time Calculation (min) 20 min  -MS      PT Received On 05/02/23  -MS      PT - Next Appointment 05/03/23  -MS      PT Goal Re-Cert Due Date  05/09/23  -MS         Time Calculation- PT    Total Timed Code Minutes- PT 19 minute(s)  -MS            User Key  (r) = Recorded By, (t) = Taken By, (c) = Cosigned By    Initials Name Provider Type    Isidoro Liu, PT Physical Therapist              Therapy Charges for Today     Code Description Service Date Service Provider Modifiers Qty    01138584527 HC PT EVAL LOW COMPLEXITY 2 5/2/2023 Isidoro Godinez, PT GP 1    71267498768 HC PT THERAPEUTIC ACT EA 15 MIN 5/2/2023 Isidoro Godinez, PT GP 1          PT G-Codes  Outcome Measure Options: AM-PAC 6 Clicks Basic Mobility (PT)  AM-PAC 6 Clicks Score (PT): 18  PT Discharge Summary  Anticipated Discharge Disposition (PT): home with assist, home with home health    Isidoro Godinez, PT  5/2/2023

## 2023-05-02 NOTE — PLAN OF CARE
Goal Outcome Evaluation:  Plan of Care Reviewed With: patient           Outcome Evaluation: Pt. is an 87 year old Female admitted to the hospital with Acute excerbation of COPD.  Pt. reports that prior to admission she was independent with functional mobility and occ. used a cane/Rwx for ambulation.  Pt. currently presents with decreased strength, decreased balance, and decreased tolerance to functional activity. This PM, pt. able to ambulate 150 feet, CGA x 2, with HHA x 1.  Pt. requires SBA x 1 for bed mobility and CGA x 1 for sit <-> stand transfers. Pt. will benefit from skilled inpt. P.T. to address her functional deficits and to assist pt. in regaining her maximum level of independence with functional mobility.    Patient was not wearing a face mask during this therapy encounter. Therapist used appropriate personal protective equipment including eye protection, mask, and gloves.  Mask used was standard procedure mask. Appropriate PPE was worn during the entire therapy session. Hand hygiene was completed before and after therapy session. Patient is not in enhanced droplet precautions.

## 2023-05-02 NOTE — CASE MANAGEMENT/SOCIAL WORK
Discharge Planning Assessment  [facility]     Patient Name: Lizzy Hicks  MRN: [id number]  Today's Date: 5/2/2023    Admit Date: 5/1/2023    Plan: Plan home to [facility].    JODI Rowe RN   Discharge Needs Assessment     Row Name 05/02/23 1029       Living Environment    People in Home facility resident    Current Living Arrangements assisted living facility    Primary Care Provided by self    Provides Primary Care For no one    Family Caregiver if Needed child(ananda), adult    Family Caregiver Names Daughter  ( Jasmin [phone])    Quality of Family Relationships involved;supportive    Able to Return to Prior Arrangements yes    Living Arrangement Comments Pt lives alone in assisted living at [facility].       Resource/Environmental Concerns    Resource/Environmental Concerns none    Transportation Concerns none       Transition Planning    Patient/Family Anticipates Transition to home    Patient/Family Anticipated Services at Transition none    Transportation Anticipated family or friend will provide       Discharge Needs Assessment    Readmission Within the Last 30 Days no previous admission in last 30 days    Equipment Currently Used at Home cane, straight;grab bar;walker, rolling    Concerns to be Addressed no discharge needs identified;denies needs/concerns at this time    Anticipated Changes Related to Illness none    Equipment Needed After Discharge cane, straight;grab bar, tub/shower;walker, rolling               Discharge Plan     Row Name 05/02/23 1035       Plan    Plan Plan home to [facility].    JODI Rowe RN    Patient/Family in Agreement with Plan yes    Plan Comments FACE SHEET VERIFIED/ IM LETTER SIGNED.  Spoke with pt at bedside.  Pt's PCP is Dr. Deric Crook.  Pt's next of kin is her daughter  (Jasmin Lauren [phone]).  Pt lives at [facility] in the Independent Section.  Pt is independent with ADLs.  Pt has a  cane, grab bar, and rolling walker for home use if needed.  Pt gets her prescriptions at [facility] on [address].  Pt is not current with HH.  Pt has been in SNF in [place].  Pt denies any discharge needs.  Pt's family will transport pt home.  Plan home to [facility].  JODI Rowe RN              Continued Care and Services - Admitted Since 5/1/2023     Destination     Service Provider Request Status Selected Services Address Phone Fax Patient Preferred    [facility] Pending - Request Sent N/A [address] [facility] [address] [phone] --              Expected Discharge Date and Time     Expected Discharge Date Expected Discharge Time    May 8, 2023          Demographic Summary     Row Name 05/02/23 1029       General Information    Admission Type inpatient    Arrived From emergency department    Required Notices Provided Important Message from Medicare    Referral Source admission list    Reason for Consult discharge planning    Preferred Language English               Functional Status     Row Name 05/02/23 1029       Functional Status    Usual Activity Tolerance good    Current Activity Tolerance moderate       Functional Status, IADL    Medications independent    Meal Preparation assistive person    Housekeeping assistive person    Laundry assistive person    Shopping assistive person       Mental Status    General Appearance WDL WDL               Psychosocial    No documentation.                Abuse/Neglect    No documentation.                Legal    No documentation.                Substance Abuse    No documentation.                Patient Forms    No documentation.                   Kailey Rowe, SHAVON

## 2023-05-02 NOTE — CASE MANAGEMENT/SOCIAL WORK
Continued Stay Note  [facility]     Patient Name: Lizzy Hicks  MRN: [id number]  Today's Date: 5/2/2023    Admit Date: 5/1/2023    Plan: Plan home to [facility].    JODI Rowe RN   Discharge Plan     Row Name 05/02/23 1035       Plan    Plan Plan home to [facility].    JODI Rowe RN    Patient/Family in Agreement with Plan yes    Plan Comments FACE SHEET VERIFIED/ IM LETTER SIGNED.  Spoke with pt at bedside.  Pt's PCP is Dr. Deric Crook.  Pt's next of kin is her daughter  (Jasmin Lauren [phone]).  Pt lives at [facility] in the Independent Section.  Pt is independent with ADLs.  Pt has a cane, grab bar, and rolling walker for home use if needed.  Pt gets her prescriptions at [facility] on [address].  Pt is not current with .  Pt has been in SNF in [place].  Pt denies any discharge needs.  Pt's family will transport pt home.  Plan home to [facility].  JODI Rowe RN               Discharge Codes    No documentation.               Expected Discharge Date and Time     Expected Discharge Date Expected Discharge Time    May 8, 2023             Kailey Rowe RN

## 2023-05-03 LAB
ANION GAP SERPL CALCULATED.3IONS-SCNC: 11 MMOL/L (ref 5–15)
ARTERIAL PATENCY WRIST A: ABNORMAL
ATMOSPHERIC PRESS: 740 MMHG
BASE EXCESS BLDA CALC-SCNC: 8.6 MMOL/L (ref 0–2)
BDY SITE: ABNORMAL
BUN SERPL-MCNC: 19 MG/DL (ref 8–23)
BUN/CREAT SERPL: 27.9 (ref 7–25)
CALCIUM SPEC-SCNC: 9 MG/DL (ref 8.6–10.5)
CHLORIDE SERPL-SCNC: 88 MMOL/L (ref 98–107)
CO2 SERPL-SCNC: 30 MMOL/L (ref 22–29)
CREAT SERPL-MCNC: 0.68 MG/DL (ref 0.57–1)
DEPRECATED RDW RBC AUTO: 41.2 FL (ref 37–54)
EGFRCR SERPLBLD CKD-EPI 2021: 85.5 ML/MIN/1.73
ERYTHROCYTE [DISTWIDTH] IN BLOOD BY AUTOMATED COUNT: 12.3 % (ref 12.3–15.4)
GLUCOSE SERPL-MCNC: 203 MG/DL (ref 65–99)
HCO3 BLDA-SCNC: 34 MMOL/L (ref 22–28)
HCT VFR BLD AUTO: 39.8 % (ref 34–46.6)
HGB BLD-MCNC: 14.1 G/DL (ref 12–15.9)
INHALED O2 CONCENTRATION: 21 %
MCH RBC QN AUTO: 32.6 PG (ref 26.6–33)
MCHC RBC AUTO-ENTMCNC: 35.4 G/DL (ref 31.5–35.7)
MCV RBC AUTO: 92.1 FL (ref 79–97)
MODALITY: ABNORMAL
O2 A-A PPRESDIFF RESPIRATORY: 0.6 MMHG
PCO2 BLDA: 49 MM HG (ref 35–45)
PH BLDA: 7.45 PH UNITS (ref 7.35–7.45)
PLATELET # BLD AUTO: 203 10*3/MM3 (ref 140–450)
PMV BLD AUTO: 9.3 FL (ref 6–12)
PO2 BLDA: 56.7 MM HG (ref 80–100)
POTASSIUM SERPL-SCNC: 3.6 MMOL/L (ref 3.5–5.2)
RBC # BLD AUTO: 4.32 10*6/MM3 (ref 3.77–5.28)
SAO2 % BLDCOA: 89.8 % (ref 92–99)
SODIUM SERPL-SCNC: 129 MMOL/L (ref 136–145)
TOTAL RATE: 16 BREATHS/MINUTE
WBC NRBC COR # BLD: 16.2 10*3/MM3 (ref 3.4–10.8)

## 2023-05-03 PROCEDURE — 25010000002 ENOXAPARIN PER 10 MG: Performed by: INTERNAL MEDICINE

## 2023-05-03 PROCEDURE — 94799 UNLISTED PULMONARY SVC/PX: CPT

## 2023-05-03 PROCEDURE — 80048 BASIC METABOLIC PNL TOTAL CA: CPT | Performed by: HOSPITALIST

## 2023-05-03 PROCEDURE — 97530 THERAPEUTIC ACTIVITIES: CPT

## 2023-05-03 PROCEDURE — 94761 N-INVAS EAR/PLS OXIMETRY MLT: CPT

## 2023-05-03 PROCEDURE — 36600 WITHDRAWAL OF ARTERIAL BLOOD: CPT

## 2023-05-03 PROCEDURE — 94760 N-INVAS EAR/PLS OXIMETRY 1: CPT

## 2023-05-03 PROCEDURE — 94664 DEMO&/EVAL PT USE INHALER: CPT

## 2023-05-03 PROCEDURE — 97110 THERAPEUTIC EXERCISES: CPT

## 2023-05-03 PROCEDURE — 85027 COMPLETE CBC AUTOMATED: CPT | Performed by: HOSPITALIST

## 2023-05-03 PROCEDURE — 82803 BLOOD GASES ANY COMBINATION: CPT

## 2023-05-03 PROCEDURE — 63710000001 PREDNISONE PER 1 MG: Performed by: HOSPITALIST

## 2023-05-03 PROCEDURE — 25010000002 METHYLPREDNISOLONE PER 125 MG: Performed by: INTERNAL MEDICINE

## 2023-05-03 RX ORDER — METHYLPREDNISOLONE SODIUM SUCCINATE 125 MG/2ML
125 INJECTION, POWDER, LYOPHILIZED, FOR SOLUTION INTRAMUSCULAR; INTRAVENOUS ONCE
Status: COMPLETED | OUTPATIENT
Start: 2023-05-03 | End: 2023-05-03

## 2023-05-03 RX ORDER — PREDNISOLONE ACETATE 10 MG/ML
1 SUSPENSION/ DROPS OPHTHALMIC EVERY 6 HOURS SCHEDULED
Status: DISCONTINUED | OUTPATIENT
Start: 2023-05-03 | End: 2023-05-03

## 2023-05-03 RX ORDER — PREDNISOLONE ACETATE 10 MG/ML
1 SUSPENSION/ DROPS OPHTHALMIC 4 TIMES DAILY
Status: DISCONTINUED | OUTPATIENT
Start: 2023-05-03 | End: 2023-05-04 | Stop reason: HOSPADM

## 2023-05-03 RX ADMIN — ROSUVASTATIN CALCIUM 5 MG: 5 TABLET, FILM COATED ORAL at 08:12

## 2023-05-03 RX ADMIN — MULTIPLE VITAMINS W/ MINERALS TAB 1 TABLET: TAB at 08:12

## 2023-05-03 RX ADMIN — ASPIRIN 81 MG: 81 TABLET, COATED ORAL at 08:12

## 2023-05-03 RX ADMIN — SODIUM CHLORIDE 1 DROP: 50 SOLUTION OPHTHALMIC at 20:39

## 2023-05-03 RX ADMIN — PREDNISOLONE ACETATE 1 DROP: 10 SUSPENSION/ DROPS OPHTHALMIC at 08:12

## 2023-05-03 RX ADMIN — BUDESONIDE AND FORMOTEROL FUMARATE DIHYDRATE 1 PUFF: 160; 4.5 AEROSOL RESPIRATORY (INHALATION) at 20:31

## 2023-05-03 RX ADMIN — PREDNISOLONE ACETATE 1 DROP: 10 SUSPENSION/ DROPS OPHTHALMIC at 20:39

## 2023-05-03 RX ADMIN — IPRATROPIUM BROMIDE AND ALBUTEROL SULFATE 3 ML: .5; 3 SOLUTION RESPIRATORY (INHALATION) at 11:01

## 2023-05-03 RX ADMIN — AMLODIPINE BESYLATE 5 MG: 5 TABLET ORAL at 08:12

## 2023-05-03 RX ADMIN — MOXIFLOXACIN HYDROCHLORIDE 0.05 ML: 5 SOLUTION/ DROPS OPHTHALMIC at 20:39

## 2023-05-03 RX ADMIN — ENOXAPARIN SODIUM 40 MG: 100 INJECTION SUBCUTANEOUS at 20:38

## 2023-05-03 RX ADMIN — MOXIFLOXACIN HYDROCHLORIDE 0.05 ML: 5 SOLUTION/ DROPS OPHTHALMIC at 08:12

## 2023-05-03 RX ADMIN — Medication 500 MG: at 08:12

## 2023-05-03 RX ADMIN — IPRATROPIUM BROMIDE AND ALBUTEROL SULFATE 3 ML: .5; 3 SOLUTION RESPIRATORY (INHALATION) at 14:57

## 2023-05-03 RX ADMIN — PREDNISOLONE ACETATE 1 DROP: 10 SUSPENSION/ DROPS OPHTHALMIC at 17:29

## 2023-05-03 RX ADMIN — SODIUM CHLORIDE 1 DROP: 50 SOLUTION OPHTHALMIC at 17:29

## 2023-05-03 RX ADMIN — BUDESONIDE AND FORMOTEROL FUMARATE DIHYDRATE 1 PUFF: 160; 4.5 AEROSOL RESPIRATORY (INHALATION) at 06:31

## 2023-05-03 RX ADMIN — SODIUM CHLORIDE 1 DROP: 50 SOLUTION OPHTHALMIC at 08:12

## 2023-05-03 RX ADMIN — ATENOLOL 50 MG: 50 TABLET ORAL at 08:12

## 2023-05-03 RX ADMIN — PREDNISONE 40 MG: 20 TABLET ORAL at 08:12

## 2023-05-03 RX ADMIN — SODIUM CHLORIDE 1 DROP: 50 SOLUTION OPHTHALMIC at 12:41

## 2023-05-03 RX ADMIN — METHYLPREDNISOLONE SODIUM SUCCINATE 125 MG: 125 INJECTION, POWDER, FOR SOLUTION INTRAMUSCULAR; INTRAVENOUS at 10:45

## 2023-05-03 NOTE — PROGRESS NOTES
[facility]  [phone]  Dr. Nate Breen    Subjective:  LOS: 2    Chief Complaint:  COPD    She is concerned that she may be sent home too early. Denies new complaints.    Objective   Vital Signs past 24hrs  Temp range: Temp (24hrs), Av.9 °F (36.6 °C), Min:97.5 °F (36.4 °C), Max:98.6 °F (37 °C)    BP range: BP: ()/(67-90) 139/90  Pulse range: Heart Rate:  [68-89] 68  Resp rate range: Resp:  [16-18] 16  Device (Oxygen Therapy): nasal cannulaFlow (L/min):  [1-3] 1  Oxygen range:SpO2:  [87 %-97 %] 93 %     Physical Exam  Eyes:      Pupils: Pupils are equal, round, and reactive to light.   Cardiovascular:      Rate and Rhythm: Normal rate and regular rhythm.      Heart sounds: No murmur heard.  Pulmonary:      Effort: Pulmonary effort is normal.      Breath sounds: Decreased breath sounds and wheezing (scattered occasional) present.   Abdominal:      General: Bowel sounds are normal.      Palpations: Abdomen is soft. There is no mass.      Tenderness: There is no abdominal tenderness.   Musculoskeletal:         General: No swelling.   Neurological:      Mental Status: She is alert.       Results Review:    I have reviewed the laboratory and imaging data since the last note by [facility] physician.  My annotations are noted in assessment and plan.      Result Review:  I have personally reviewed the results from last note by [facility] physician to 5/3/2023 08:59 EDT and agree with these findings:  [x]  Laboratory list / accordion  [x]  Microbiology  [x]  Radiology  []  EKG/Telemetry   []  Cardiology/Vascular   []  Pathology  []  Old records  []  Other:    Medication Review:  I have reviewed the current MAR.  My annotations are noted in assessment and plan.    amLODIPine, 5 mg, Oral, Daily  aspirin, 81 mg, Oral, Daily  atenolol, 50 mg, Oral, Daily  budesonide-formoterol, 1 puff, Inhalation, BID - RT  calcium carbonate (oyster shell), 500 mg, Oral, Daily  enoxaparin, 40 mg, Subcutaneous,  Nightly  ipratropium-albuterol, 3 mL, Nebulization, 4x Daily - RT  moxifloxacin, 1 drop, Right Eye, BID  multivitamin with minerals, 1 tablet, Oral, Daily  prednisoLONE acetate, 1 drop, Both Eyes, Q12H  predniSONE, 40 mg, Oral, Daily With Breakfast  rosuvastatin, 5 mg, Oral, Daily  sodium chloride, 1 drop, Right Eye, 4x Daily           Lines, Drains & Airways     Active LDAs     Name Placement date Placement time Site Days    Peripheral IV 05/01/23 1619 Right Antecubital 05/01/23  1619  Antecubital  1    Peripheral IV 05/02/23 1028 Anterior;Left Forearm 05/02/23  1028  Forearm  less than 1              No active isolations  Diet Orders (active) (From admission, onward)     Start     Ordered    05/01/23 1813  Diet: Cardiac Diets; Healthy Heart (2-3 Na+); Texture: Regular Texture (IDDSI 7); Fluid Consistency: Thin (IDDSI 0)  Diet Effective Now         05/01/23 1813                PCCM Problems  Acute exacerbation COPD  Parainfluenza virus infection  Ex cigarette smoker, 35 pack years  Acute hypoxic respiratory failure    Plan of Treatment    Note ABG. Probably qualifies for O2 with exertion and sleep but does not need at rest.  Some hypercapnia noted, but presently compensated.    Wheezing still present. Now on oral prednisone. Will give x 1 dose iv solumedrol today. On nebs qid. Should improve but needs 10 day prednisone taper on discharge.    Supportive treatment for parainfluenza infection.    Patient does not wish to be discharged today.    Nate Breen MD  05/03/23  08:59 EDT      Part of this note may be an electronic transcription/translation of spoken language to printed text using the Dragon Dictation System.

## 2023-05-03 NOTE — PLAN OF CARE
Goal Outcome Evaluation:      Pt resting in bed quietly. VSS. NSR on tele. On 2.5L O2 this AM. ABGs on RA completed this AM. Up to the restroom w/ walker and assist x1. Will continue to monitor.

## 2023-05-03 NOTE — PLAN OF CARE
Goal Outcome Evaluation:  Plan of Care Reviewed With: patient        Progress: improving  Outcome Evaluation: pt aox4, vss, on 1L nc, family at bedside, no c/o pain at this time

## 2023-05-03 NOTE — PROGRESS NOTES
Name: Lizzy Hicks ADMIT: 2023   : 1935  PCP: Deric Mendosa MD    MRN: [id number] LOS: 2 days   AGE/SEX: 87 y.o. female  ROOM: [facility]     Subjective   Subjective   doesn't feel as well today and doesn't want to go home today.     Objective   Objective   Vital Signs  Temp:  [97.5 °F (36.4 °C)-97.9 °F (36.6 °C)] 97.9 °F (36.6 °C)  Heart Rate:  [68-89] 82  Resp:  [16-18] 18  BP: ()/(58-90) 142/58  SpO2:  [89 %-97 %] 92 %  on  Flow (L/min):  [1-3] 1;   Device (Oxygen Therapy): room air  Body mass index is 22.61 kg/m².    Physical Exam  Constitutional:       General: She is not in acute distress.     Appearance: Normal appearance. She is ill-appearing. She is not toxic-appearing.   HENT:      Head: Normocephalic and atraumatic.   Eyes:      Extraocular Movements: Extraocular movements intact.   Cardiovascular:      Rate and Rhythm: Normal rate and regular rhythm.   Pulmonary:      Effort: Pulmonary effort is normal. No respiratory distress.      Breath sounds: Decreased breath sounds and wheezing present.   Abdominal:      General: Bowel sounds are normal.      Palpations: Abdomen is soft.      Tenderness: There is no abdominal tenderness.   Musculoskeletal:         General: No swelling.      Cervical back: Normal range of motion.      Right lower leg: No edema.      Left lower leg: No edema.   Skin:     General: Skin is warm and dry.   Neurological:      General: No focal deficit present.      Mental Status: She is alert and oriented to person, place, and time.   Psychiatric:         Mood and Affect: Mood normal.         Behavior: Behavior normal.         Thought Content: Thought content normal.     Results Review  I reviewed the patient's new clinical results.  Results from last 7 days   Lab Units 23  0638 23  1620   WBC 10*3/mm3 3.04* 6.55   HEMOGLOBIN g/dL 13.6 14.2   PLATELETS 10*3/mm3 203 203     Results from last 7 days   Lab Units 23  0638 23  1620   SODIUM mmol/L  131* 125*   POTASSIUM mmol/L 3.9 3.8   CHLORIDE mmol/L 87* 84*   CO2 mmol/L 26.9 25.0   BUN mg/dL 20 17   CREATININE mg/dL 0.66 0.62   GLUCOSE mg/dL 156* 109*     Lab Results   Component Value Date    ANIONGAP 17.1 (H) 05/02/2023     Estimated Creatinine Clearance: 57 mL/min (by C-G formula based on SCr of 0.66 mg/dL).    Results from last 7 days   Lab Units 05/01/23  1620   ALBUMIN g/dL 4.4   BILIRUBIN mg/dL 0.3   ALK PHOS U/L 107   AST (SGOT) U/L 47*   ALT (SGPT) U/L 28     Results from last 7 days   Lab Units 05/02/23  0638 05/01/23  1620   CALCIUM mg/dL 9.7 9.4   ALBUMIN g/dL  --  4.4       No results found for: HGBA1C, POCGLU    XR Chest 2 View    Result Date: 5/1/2023  No evidence for active disease in the chest.  This report was finalized on 5/1/2023 5:59 PM by Dr. Isiah Fuentes M.D.        Scheduled Meds  amLODIPine, 5 mg, Oral, Daily  aspirin, 81 mg, Oral, Daily  atenolol, 50 mg, Oral, Daily  budesonide-formoterol, 1 puff, Inhalation, BID - RT  calcium carbonate (oyster shell), 500 mg, Oral, Daily  enoxaparin, 40 mg, Subcutaneous, Nightly  ipratropium-albuterol, 3 mL, Nebulization, 4x Daily - RT  moxifloxacin, 1 drop, Right Eye, BID  multivitamin with minerals, 1 tablet, Oral, Daily  prednisoLONE acetate, 1 drop, Right Eye, 4x Daily  predniSONE, 40 mg, Oral, Daily With Breakfast  rosuvastatin, 5 mg, Oral, Daily  sodium chloride, 1 drop, Right Eye, 4x Daily    Continuous Infusions   PRN Meds  •  acetaminophen  •  albuterol  •  melatonin  •  ondansetron **OR** ondansetron     Diet  Diet: Cardiac Diets; Healthy Heart (2-3 Na+); Texture: Regular Texture (IDDSI 7); Fluid Consistency: Thin (IDDSI 0)    I have personally reviewed:  [x]  Medications  [x]  Laboratory   [x]  Microbiology   [x]  Radiology   [x]  EKG/Telemetry sinus on telemetry Will repeat EKG  []  Cardiology/Vascular   []  Pathology   []  Records     Assessment/Plan     Active Hospital Problems    Diagnosis  POA   • **Acute exacerbation of chronic  obstructive pulmonary disease (COPD) [J44.1]  Yes   • Parainfluenza infection [B34.8]  Unknown   • Acute respiratory failure with hypoxia [J96.01]  Unknown   • COPD exacerbation [J44.1]  Yes   • Hypertension [I10]  Unknown   • Hyponatremia [E87.1]  Unknown      Resolved Hospital Problems   No resolved problems to display.     87 y.o. female admitted with shortness of breath and hypoxia. History of COPD    Acute hypoxic respiratory failure  Acute exacerbation COPD  Parainfluenza infection  -Continue supportive care  -Flow (L/min):  [1-3] 1 wean as tolerated probably needs O2 at dc  -Some mild wheezing today continue steroids. pulm ordering a dose of solumedrol  -Appreciate pulmonology    Hyponatremia  -Improved. Probably due to above  -Continue to monitor (lab pending)    Hypertension  Hyperlipidemia  -BP acceptable acutely  -Continue to monitor    Lovenox 40 mg SC daily for DVT prophylaxis    Discussed with patient, family, nursing staff and care team on multidisciplinary rounds    Discharge: Probably a couple of days. PT following prob need SNF    [facility] [place] MD  [facility]  05/03/23

## 2023-05-03 NOTE — PLAN OF CARE
Goal Outcome Evaluation:  Plan of Care Reviewed With: patient        Progress: improving  Outcome Evaluation: Pt seen by PT this date for treatment. Pt stood and amb around bottom of bed w/ unsteadiness when PTA entered room. FWW placed in front of pt w/ steadiness improving. Pt seated on bed for gait belt donning. Pt then stood and amb around nsg station w/ CGA and use of fww. Pt coughing freq w/ incr unsteadiness when doing and req CGA and use of fww. Pt returned to room to perform ther ex for strengthening. Pt returned to bed w/ SBA. PT will prog as pt beatriz. Rec SNF to address deficits after dc.

## 2023-05-03 NOTE — THERAPY TREATMENT NOTE
Patient Name: Lizzy Hicks  : 1935    MRN: [id number]                              Today's Date: 5/3/2023       Admit Date: 2023    Visit Dx:     ICD-10-CM ICD-9-CM   1. Acute exacerbation of chronic obstructive pulmonary disease (COPD)  J44.1 491.21   2. Hypoxia  R09.02 799.02   3. Hyponatremia  E87.1 276.1     Patient Active Problem List   Diagnosis   • Hypertension   • COPD (chronic obstructive pulmonary disease)   • Carotid artery stenosis   • Osteoarthritis   • Osteoporosis   • Hyponatremia   • Reset osmostat syndrome   • Carotid stenosis, asymptomatic, right   • Carotid artery disease   • Macular degeneration   • COPD exacerbation   • Acute exacerbation of chronic obstructive pulmonary disease (COPD)   • Parainfluenza infection   • Acute respiratory failure with hypoxia     Past Medical History:   Diagnosis Date   • Anesthesia complication     pt states was groggy for a week after surgery   • Carotid artery disease     left   • Carotid stenosis     RIGHT   • COPD (chronic obstructive pulmonary disease)    • History of bronchitis    • Hypertension    • Macular degeneration    • Osteoarthritis 2016   • Osteoporosis 2016   • Reset osmostat syndrome 08/15/2016    Worked up by prior PCP in [facility]. Baseline Na 128-130 since .   • Seborrheic dermatitis 2016   • Swallowing difficulty     D/T INADVERTANT REMOVAL OF UVULA AS CHILD WITH T AND A     Past Surgical History:   Procedure Laterality Date   • CAROTID ENDARTERECTOMY Right 2018    Procedure: RT CAROTID ENDARTERECTOMY WITH INTRA OPERATIVE CAROTID ARTERY DUPLEX SCAN;  Surgeon: Mikaela Galicia Jr., MD;  Location: [facility] OR;  Service: Vascular   • CAROTID ENDARTERECTOMY Left 2019    Procedure: LEFT CAROTID ENDARTERECTOMY;  Surgeon: Mikaela Galicia Jr., MD;  Location: [facility] OR;  Service: Vascular   • CATARACT EXTRACTION WITH INTRAOCULAR LENS IMPLANT      LEFT AND RIGHT   • ORIF FEMUR FRACTURE Right      HARDWARE   • TONSILLECTOMY AND ADENOIDECTOMY      INADVERTANTLY TOOK UVULA AT THIS TIME      General Information     Row Name 05/03/23 1542          Physical Therapy Time and Intention    Document Type therapy note (daily note)  -     Mode of Treatment physical therapy  -     Row Name 05/03/23 1542          General Information    Existing Precautions/Restrictions fall  -     Row Name 05/03/23 1542          Safety Issues, Functional Mobility    Impairments Affecting Function (Mobility) balance;endurance/activity tolerance;strength  -     Comment, Safety Issues/Impairments (Mobility) gt belt and non skid socks donned  -           User Key  (r) = Recorded By, (t) = Taken By, (c) = Cosigned By    Initials Name Provider Type    PH Miriam Sprague PTA Physical Therapist Assistant               Mobility     Row Name 05/03/23 1542          Bed Mobility    Sit-Supine [place] (Bed Mobility) standby assist  -[facility] Name 05/03/23 1542          Transfers    Comment, (Transfers) Pt stood at beg of session and beg amb toward PTA. Pt very unsteady and w/o gt belt req min A. Fww placed in front of pt w/ pt reseated for donning of gt belt.  -     Row Name 05/03/23 1542          Sit-Stand Transfer    Sit-Stand [place] (Transfers) contact guard;verbal cues  -PH     Assistive Device (Sit-Stand Transfers) walker, front-wheeled  -PH     Comment, (Sit-Stand Transfer) incr steadiness w/ use of fww when standing.  -     Row Name 05/03/23 1542          Gait/Stairs (Locomotion)    [place] Level (Gait) contact guard;verbal cues;nonverbal cues (demo/gesture)  -PH     Assistive Device (Gait) walker, front-wheeled  -PH     Distance in Feet (Gait) 150'  -PH     Deviations/Abnormal Patterns (Gait) trav decreased  -PH     Bilateral Gait Deviations forward flexed posture  -PH     Comment, (Gait/Stairs) freq coughing w/ pt becoming unsteady although no overt LOB. Pt limited in distance by fatigue and  reporting SOA. Incr steadiness w/ use of fww during gait.  -PH           User Key  (r) = Recorded By, (t) = Taken By, (c) = Cosigned By    Initials Name Provider Type    PH Miriam Sprague PTA Physical Therapist Assistant               Obj/Interventions     Row Name 05/03/23 1548          Motor Skills    Therapeutic Exercise other (see comments)  BAP, LAQ, seated march, punches; x 15-20 reps all  -PH     Row Name 05/03/23 1548          Balance    Balance Assessment sitting static balance;standing static balance  -PH     Static Sitting Balance standby assist  -PH     Static Standing Balance supervision  -PH     Position/Device Used, Standing Balance walker, front-wheeled  -PH     Comment, Balance stand bal steady w/ use of fww.  -PH           User Key  (r) = Recorded By, (t) = Taken By, (c) = Cosigned By    Initials Name Provider Type    PH Miriam Sprague PTA Physical Therapist Assistant               Goals/Plan    No documentation.                Clinical Impression     Row Name 05/03/23 1549          Pain    Pretreatment Pain Rating 0/10 - no pain  -PH     Posttreatment Pain Rating 0/10 - no pain  -PH     Additional Documentation Pain Scale: Numbers Pre/Post-Treatment (Group)  -PH     Row Name 05/03/23 1549          Plan of Care Review    Plan of Care Reviewed With patient  -PH     Progress improving  -PH     Outcome Evaluation Pt seen by PT this date for treatment. Pt stood and amb around bottom of bed w/ unsteadiness when PTA entered room. FWW placed in front of pt w/ steadiness improving. Pt seated on bed for gait belt donning. Pt then stood and amb around nsg station w/ CGA and use of fww. Pt coughing freq w/ incr unsteadiness when doing and req CGA and use of fww. Pt returned to room to perform ther ex for strengthening. Pt returned to bed w/ SBA. PT will prog as pt beatriz. Rec SNF to address deficits after dc.  -PH     Row Name 05/03/23 1549          Vital Signs    O2 Delivery Pre Treatment room  air  -PH     Intra SpO2 (%) 90  -PH     O2 Delivery Intra Treatment room air  -PH     Post SpO2 (%) 88  -PH     O2 Delivery Post Treatment room air  -PH     Row Name 05/03/23 1549          Positioning and Restraints    Pre-Treatment Position other (comment)  Sitting EOB  -PH     Post Treatment Position bed  -PH     In Bed fowlers;call light within reach;encouraged to call for assist;exit alarm on;with family/caregiver;notified nsg  -PH           User Key  (r) = Recorded By, (t) = Taken By, (c) = Cosigned By    Initials Name Provider Type     Miriam Sprague PTA Physical Therapist Assistant               Outcome Measures     Row Name 05/03/23 1553 05/03/23 0800       How much help from another person do you currently need...    Turning from your back to your side while in flat bed without using bedrails? 4  -PH 3  -JK    Moving from lying on back to sitting on the side of a flat bed without bedrails? 3  -PH 3  -JK    Moving to and from a bed to a chair (including a wheelchair)? 3  -PH 3  -JK    Standing up from a chair using your arms (e.g., wheelchair, bedside chair)? 3  -PH 3  -JK    Climbing 3-5 steps with a railing? 2  -PH 3  -JK    To walk in hospital room? 3  -PH 3  -JK    AM-PAC 6 Clicks Score (PT) 18  -PH 18  -JK    Highest level of mobility 6 --> Walked 10 steps or more  -PH 6 --> Walked 10 steps or more  -JK    Row Name 05/03/23 1553          Functional Assessment    Outcome Measure Options AM-PAC 6 Clicks Basic Mobility (PT)  -PH           User Key  (r) = Recorded By, (t) = Taken By, (c) = Cosigned By    Initials Name Provider Type     Miriam Sprague PTA Physical Therapist Assistant    Kelli Rose RN Registered Nurse                             Physical Therapy Education     Title: PT OT SLP Therapies (Done)     Topic: Physical Therapy (Done)     Point: Mobility training (Done)     Learning Progress Summary           Patient Acceptance, E,TB,D, VU,NR by  at 5/3/2023 1552     Acceptance, E,TB, VU by JK at 5/3/2023 0816    Acceptance, E,TB, VU by SS at 5/3/2023 0414    Acceptance, E,D, VU,NR by MS at 5/2/2023 1523                   Point: Home exercise program (Done)     Learning Progress Summary           Patient Acceptance, E,TB,D, VU,NR by  at 5/3/2023 1553    Acceptance, E,TB, VU by JK at 5/3/2023 0816    Acceptance, E,TB, VU by SS at 5/3/2023 0414    Acceptance, E,D, VU,NR by MS at 5/2/2023 1523                   Point: Body mechanics (Done)     Learning Progress Summary           Patient Acceptance, E,TB,D, VU,NR by  at 5/3/2023 1553    Acceptance, E,TB, VU by JK at 5/3/2023 0816    Acceptance, E,TB, VU by SS at 5/3/2023 0414    Acceptance, E,D, VU,NR by MS at 5/2/2023 1523                   Point: Precautions (Done)     Learning Progress Summary           Patient Acceptance, E,TB,D, VU,NR by  at 5/3/2023 1553    Acceptance, E,TB, VU by JK at 5/3/2023 0816    Acceptance, E,TB, VU by  at 5/3/2023 0414    Acceptance, E,D, VU,NR by MS at 5/2/2023 1523                               User Key     Initials Effective Dates Name Provider Type Discipline    MS 06/16/21 -  Isidoro Godinez PT Physical Therapist PT     06/16/21 -  Mriiam Sprague PTA Physical Therapist Assistant PT    JK 06/10/22 -  Kelli Cantu, RN Registered Nurse Nurse     02/03/22 -  Matyt Flaherty, RN Registered Nurse Nurse              PT Recommendation and Plan     Plan of Care Reviewed With: patient  Progress: improving  Outcome Evaluation: Pt seen by PT this date for treatment. Pt stood and amb around bottom of bed w/ unsteadiness when PTA entered room. FWW placed in front of pt w/ steadiness improving. Pt seated on bed for gait belt donning. Pt then stood and amb around nsg station w/ CGA and use of fww. Pt coughing freq w/ incr unsteadiness when doing and req CGA and use of fww. Pt returned to room to perform ther ex for strengthening. Pt returned to bed w/ SBA. PT will prog as pt beatriz. Rec  SNF to address deficits after dc.     Time Calculation:    PT Charges     Row Name 05/03/23 1554             Time Calculation    Start Time 1515  -PH      Stop Time 1540  -PH      Time Calculation (min) 25 min  -PH      PT Received On 05/03/23  -PH      PT - Next Appointment 05/04/23  -PH         Timed Charges    05881 - PT Therapeutic Exercise Minutes 10  -PH      42881 - PT Therapeutic Activity Minutes 15  -PH         Total Minutes    Timed Charges Total Minutes 25  -PH       Total Minutes 25  -PH            User Key  (r) = Recorded By, (t) = Taken By, (c) = Cosigned By    Initials Name Provider Type    PH Miriam Sprague PTA Physical Therapist Assistant              Therapy Charges for Today     Code Description Service Date Service Provider Modifiers Qty    21815345061 HC PT THER PROC EA 15 MIN 5/3/2023 Miriam Sprague PTA GP 1    66836569268 HC PT THERAPEUTIC ACT EA 15 MIN 5/3/2023 Miriam Sprague PTA GP 1          PT G-Codes  Outcome Measure Options: AM-PAC 6 Clicks Basic Mobility (PT)  AM-PAC 6 Clicks Score (PT): 18  PT Discharge Summary  Anticipated Discharge Disposition (PT): skilled nursing facility    Miriam Sprague PTA  5/3/2023

## 2023-05-03 NOTE — CASE MANAGEMENT/SOCIAL WORK
Continued Stay Note  [facility]     Patient Name: Lizzy Hicks  MRN: [id number]  Today's Date: 5/3/2023    Admit Date: 5/1/2023    Plan: Plan [facility] for skilled care.   JODI Rowe RN   Discharge Plan     Row Name 05/03/23 1225       Plan    Plan Plan [facility] for skilled care.   JODI Rowe RN    Patient/Family in Agreement with Plan yes    Plan Comments Spoke with pt , pt's son (Yung [phone]) and daughter  (Jasmin Laurne [phone]) at bedside.  Pt is requesting [facility] for skilled care.  Radha  ( [phone]) called and states she will have a bed for pt at discharge at [place].  Plan [facility] for skilled care.   JODI Rowe RN               Discharge Codes    No documentation.               Expected Discharge Date and Time     Expected Discharge Date Expected Discharge Time    May 4, 2023             Kailey Rowe RN

## 2023-05-04 ENCOUNTER — TELEPHONE (OUTPATIENT)
Dept: INTERNAL MEDICINE | Facility: CLINIC | Age: 88
End: 2023-05-04
Payer: MEDICARE

## 2023-05-04 VITALS
BODY MASS INDEX: 22.85 KG/M2 | HEART RATE: 74 BPM | SYSTOLIC BLOOD PRESSURE: 146 MMHG | DIASTOLIC BLOOD PRESSURE: 61 MMHG | WEIGHT: 128.97 LBS | RESPIRATION RATE: 16 BRPM | HEIGHT: 63 IN | TEMPERATURE: 98.1 F | OXYGEN SATURATION: 88 %

## 2023-05-04 LAB
ANION GAP SERPL CALCULATED.3IONS-SCNC: 10.8 MMOL/L (ref 5–15)
BUN SERPL-MCNC: 21 MG/DL (ref 8–23)
BUN/CREAT SERPL: 26.6 (ref 7–25)
CALCIUM SPEC-SCNC: 9.5 MG/DL (ref 8.6–10.5)
CHLORIDE SERPL-SCNC: 90 MMOL/L (ref 98–107)
CO2 SERPL-SCNC: 32.2 MMOL/L (ref 22–29)
CREAT SERPL-MCNC: 0.79 MG/DL (ref 0.57–1)
DEPRECATED RDW RBC AUTO: 39.7 FL (ref 37–54)
DEPRECATED RDW RBC AUTO: 41.3 FL (ref 37–54)
EGFRCR SERPLBLD CKD-EPI 2021: 73.4 ML/MIN/1.73
ERYTHROCYTE [DISTWIDTH] IN BLOOD BY AUTOMATED COUNT: 12.3 % (ref 12.3–15.4)
ERYTHROCYTE [DISTWIDTH] IN BLOOD BY AUTOMATED COUNT: 12.4 % (ref 12.3–15.4)
GLUCOSE SERPL-MCNC: 126 MG/DL (ref 65–99)
HCT VFR BLD AUTO: 38.1 % (ref 34–46.6)
HCT VFR BLD AUTO: 40.9 % (ref 34–46.6)
HGB BLD-MCNC: 12.9 G/DL (ref 12–15.9)
HGB BLD-MCNC: 14.3 G/DL (ref 12–15.9)
MCH RBC QN AUTO: 31.2 PG (ref 26.6–33)
MCH RBC QN AUTO: 31.6 PG (ref 26.6–33)
MCHC RBC AUTO-ENTMCNC: 33.9 G/DL (ref 31.5–35.7)
MCHC RBC AUTO-ENTMCNC: 35 G/DL (ref 31.5–35.7)
MCV RBC AUTO: 90.5 FL (ref 79–97)
MCV RBC AUTO: 92 FL (ref 79–97)
PLATELET # BLD AUTO: 203 10*3/MM3 (ref 140–450)
PLATELET # BLD AUTO: 237 10*3/MM3 (ref 140–450)
PMV BLD AUTO: 9.1 FL (ref 6–12)
PMV BLD AUTO: 9.3 FL (ref 6–12)
POTASSIUM SERPL-SCNC: 3.6 MMOL/L (ref 3.5–5.2)
RBC # BLD AUTO: 4.14 10*6/MM3 (ref 3.77–5.28)
RBC # BLD AUTO: 4.52 10*6/MM3 (ref 3.77–5.28)
SARS-COV-2 RNA RESP QL NAA+PROBE: NOT DETECTED
SODIUM SERPL-SCNC: 133 MMOL/L (ref 136–145)
WBC NRBC COR # BLD: 13.29 10*3/MM3 (ref 3.4–10.8)
WBC NRBC COR # BLD: 13.59 10*3/MM3 (ref 3.4–10.8)

## 2023-05-04 PROCEDURE — 94799 UNLISTED PULMONARY SVC/PX: CPT

## 2023-05-04 PROCEDURE — U0003 INFECTIOUS AGENT DETECTION BY NUCLEIC ACID (DNA OR RNA); SEVERE ACUTE RESPIRATORY SYNDROME CORONAVIRUS 2 (SARS-COV-2) (CORONAVIRUS DISEASE [COVID-19]), AMPLIFIED PROBE TECHNIQUE, MAKING USE OF HIGH THROUGHPUT TECHNOLOGIES AS DESCRIBED BY CMS-2020-01-R: HCPCS | Performed by: STUDENT IN AN ORGANIZED HEALTH CARE EDUCATION/TRAINING PROGRAM

## 2023-05-04 PROCEDURE — U0005 INFEC AGEN DETEC AMPLI PROBE: HCPCS | Performed by: STUDENT IN AN ORGANIZED HEALTH CARE EDUCATION/TRAINING PROGRAM

## 2023-05-04 PROCEDURE — 94618 PULMONARY STRESS TESTING: CPT

## 2023-05-04 PROCEDURE — 63710000001 PREDNISONE PER 1 MG: Performed by: HOSPITALIST

## 2023-05-04 PROCEDURE — 85027 COMPLETE CBC AUTOMATED: CPT | Performed by: STUDENT IN AN ORGANIZED HEALTH CARE EDUCATION/TRAINING PROGRAM

## 2023-05-04 PROCEDURE — 80048 BASIC METABOLIC PNL TOTAL CA: CPT | Performed by: HOSPITALIST

## 2023-05-04 PROCEDURE — 85027 COMPLETE CBC AUTOMATED: CPT | Performed by: HOSPITALIST

## 2023-05-04 PROCEDURE — 97530 THERAPEUTIC ACTIVITIES: CPT

## 2023-05-04 PROCEDURE — 94664 DEMO&/EVAL PT USE INHALER: CPT

## 2023-05-04 PROCEDURE — 94761 N-INVAS EAR/PLS OXIMETRY MLT: CPT

## 2023-05-04 RX ORDER — PREDNISONE 10 MG/1
TABLET ORAL
Qty: 20 TABLET | Refills: 0 | Status: SHIPPED | OUTPATIENT
Start: 2023-05-04 | End: 2023-05-14

## 2023-05-04 RX ADMIN — Medication 500 MG: at 09:15

## 2023-05-04 RX ADMIN — ATENOLOL 50 MG: 50 TABLET ORAL at 09:15

## 2023-05-04 RX ADMIN — ASPIRIN 81 MG: 81 TABLET, COATED ORAL at 09:15

## 2023-05-04 RX ADMIN — SODIUM CHLORIDE 1 DROP: 50 SOLUTION OPHTHALMIC at 09:15

## 2023-05-04 RX ADMIN — MOXIFLOXACIN HYDROCHLORIDE 0.05 ML: 5 SOLUTION/ DROPS OPHTHALMIC at 09:15

## 2023-05-04 RX ADMIN — BUDESONIDE AND FORMOTEROL FUMARATE DIHYDRATE 1 PUFF: 160; 4.5 AEROSOL RESPIRATORY (INHALATION) at 11:17

## 2023-05-04 RX ADMIN — SODIUM CHLORIDE 1 DROP: 50 SOLUTION OPHTHALMIC at 11:47

## 2023-05-04 RX ADMIN — AMLODIPINE BESYLATE 5 MG: 5 TABLET ORAL at 09:15

## 2023-05-04 RX ADMIN — MULTIPLE VITAMINS W/ MINERALS TAB 1 TABLET: TAB at 09:15

## 2023-05-04 RX ADMIN — ROSUVASTATIN CALCIUM 5 MG: 5 TABLET, FILM COATED ORAL at 09:15

## 2023-05-04 RX ADMIN — PREDNISOLONE ACETATE 1 DROP: 10 SUSPENSION/ DROPS OPHTHALMIC at 11:47

## 2023-05-04 RX ADMIN — PREDNISONE 40 MG: 20 TABLET ORAL at 09:15

## 2023-05-04 RX ADMIN — PREDNISOLONE ACETATE 1 DROP: 10 SUSPENSION/ DROPS OPHTHALMIC at 09:15

## 2023-05-04 RX ADMIN — IPRATROPIUM BROMIDE AND ALBUTEROL SULFATE 3 ML: .5; 3 SOLUTION RESPIRATORY (INHALATION) at 07:47

## 2023-05-04 NOTE — TELEPHONE ENCOUNTER
Patient's son called asking about pneumococcal vaccines, patient was going to be released from hospital they questioned if she needed one. I informed him she had one in 2003 and a booster 2017 and as well a prevnar 13 in 2016 she is up to date

## 2023-05-04 NOTE — DISCHARGE SUMMARY
Patient Name: Lizzy Hicks  : 1935  MRN: [id number]    Date of Admission: 2023  Date of Discharge:  2023  Primary Care Physician: Deric Mendosa MD      Chief Complaint:   Shortness of Breath and Cough      Discharge Diagnoses     Active Hospital Problems    Diagnosis  POA   • **Acute exacerbation of chronic obstructive pulmonary disease (COPD) [J44.1]  Yes   • Parainfluenza infection [B34.8]  Unknown   • Acute respiratory failure with hypoxia [J96.01]  Unknown   • COPD exacerbation [J44.1]  Yes   • Hypertension [I10]  Unknown   • Hyponatremia [E87.1]  Unknown      Resolved Hospital Problems   No resolved problems to display.        Brief Admitting HPI     87 year old female who presented to the emergency room with shortness of breath, cough which started two days ago; she was seen in her pcp office and hypoxic so she was transported to the ED; she denies fever or chills; she is feeling better after being placed on oxygen    Hospital Course     Pt admitted for COPD exacerbation/hypoxemia in setting of parainfluenza virus infection.  She was seen in consultation with pulmonology.  She received symptomatic treatment with steroids and nebulizers with improvement in her symptoms.  She was evaluated by physical therapy who recommend discharge to SNF.  She will discharge to SNF portion of [place], with hopes to eventually return to assisted living.  She was evaluated with walking oximetry prior to discharge and will have 2 L of oxygen with exertion at this time.  All of this was discussed with patient and her son and they were in agreement with treatment plan without additional questions at this time.    Discharge Plan     Hypoxemia  Acute exacerbation COPD  Parainfluenza infection  -Evaluated by pulmonology-recommend follow-up with PFTs  -10-day steroid taper on discharge per pulm    Day of Discharge     Subjective:  Resting comfortably in bed.  States that her shortness of breath has improved  considerably.    Physical Exam:  Temp:  [97.6 °F (36.4 °C)-98.1 °F (36.7 °C)] 98.1 °F (36.7 °C)  Heart Rate:  [71-89] 74  Resp:  [16-18] 16  BP: (135-146)/(58-69) 146/61  Body mass index is 22.85 kg/m².  Physical Exam  Constitutional:       Appearance: Normal appearance. She is normal weight.   Cardiovascular:      Rate and Rhythm: Normal rate and regular rhythm.      Pulses: Normal pulses.      Heart sounds: No murmur heard.    No friction rub.   Pulmonary:      Effort: Pulmonary effort is normal. No respiratory distress.      Breath sounds: No stridor. No wheezing or rhonchi.   Abdominal:      General: Abdomen is flat.      Palpations: Abdomen is soft.   Musculoskeletal:         General: Normal range of motion.      Right lower leg: No edema.      Left lower leg: No edema.   Neurological:      General: No focal deficit present.      Mental Status: She is alert.         Consultants     Consult Orders (all) (From admission, onward)     Start     Ordered    05/02/23 1245  Inpatient Pulmonology Consult  Once        Specialty:  Pulmonary Disease  Provider:  Giuliano Pathak Jr., MD    05/02/23 1247    05/01/23 1756  [facility] (on-call MD unless specified) Details  Once        Specialty:  Hospitalist  Provider:  Shanelle Guillen MD    05/01/23 1755              Procedures     * Surgery not found *      Imaging Results (All)     Procedure Component Value Units Date/Time    XR Chest 2 View [[id number]] Collected: 05/01/23 1658     Updated: 05/01/23 1802    Narrative:      CHEST: 2 VIEWS     HISTORY: Shortness of air     COMPARISON: Two-view chest 03/29/2019.      FINDINGS:Cardiomediastinal silhouette is within normal limits. Mitral  annulus calcifications are noted. There are also atherosclerotic  vascular involving the thoracic aorta. There are chronic-appearing  increased interstitial markings. The lungs appear clear of focal  airspace disease and there is no evidence for pulmonary edema or pleural  effusion. A  calcified nodule superimposes the posterior lower lobes on  the lateral view.       Impression:      No evidence for active disease in the chest.     This report was finalized on 5/1/2023 5:59 PM by Dr. Isiah Fuentes M.D.           Results for orders placed in visit on 09/25/19    SCANNED VASCULAR STUDIES    Results for orders placed during the hospital encounter of 08/31/18    Adult Transthoracic Echo Complete W/ Cont if Necessary Per Protocol    Interpretation Summary  · Calculated right ventricular systolic pressure from tricuspid regurgitation is 45 mmHg.  · Left ventricular systolic function is normal. Estimated EF = 63%.    Pertinent Labs     Results from last 7 days   Lab Units 05/04/23  0658 05/04/23  0116 05/03/23 1825 05/02/23 0638   WBC 10*3/mm3 13.59* 13.29* 16.20* 3.04*   HEMOGLOBIN g/dL 14.3 12.9 14.1 13.6   PLATELETS 10*3/mm3 237 203 203 203     Results from last 7 days   Lab Units 05/04/23  0658 05/03/23 1825 05/02/23 0638 05/01/23  1620   SODIUM mmol/L 133* 129* 131* 125*   POTASSIUM mmol/L 3.6 3.6 3.9 3.8   CHLORIDE mmol/L 90* 88* 87* 84*   CO2 mmol/L 32.2* 30.0* 26.9 25.0   BUN mg/dL 21 19 20 17   CREATININE mg/dL 0.79 0.68 0.66 0.62   GLUCOSE mg/dL 126* 203* 156* 109*   EGFR mL/min/1.73 73.4 85.5 86.1 87.4     Results from last 7 days   Lab Units 05/01/23  1620   ALBUMIN g/dL 4.4   BILIRUBIN mg/dL 0.3   ALK PHOS U/L 107   AST (SGOT) U/L 47*   ALT (SGPT) U/L 28     Results from last 7 days   Lab Units 05/04/23 0658 05/03/23 1825 05/02/23 0638 05/01/23  1620   CALCIUM mg/dL 9.5 9.0 9.7 9.4   ALBUMIN g/dL  --   --   --  4.4       Results from last 7 days   Lab Units 05/01/23  1812 05/01/23  1620   HSTROP T ng/L 12* 12*   PROBNP pg/mL  --  2,370.0*           Invalid input(s): LDLCALC      Results from last 7 days   Lab Units 05/01/23  1621   COVID19  Not Detected       Test Results Pending at Discharge       Discharge Details        Discharge Medications      New Medications       Instructions Start Date   predniSONE 10 MG tablet  Commonly known as: DELTASONE   Take 4 tablets by mouth Daily for 2 days, THEN 2 tablets Daily for 4 days, THEN 1 tablet Daily for 4 days.   Start Date: May 4, 2023        Continue These Medications      Instructions Start Date   alendronate 70 MG tablet  Commonly known as: FOSAMAX   TAKE 1 TABLET BY MOUTH ONCE WEEKLY ON AN EMPTY STOMACH BEFORE BREAKFAST. REMAIN UPRIGHT FOR 30 MINUTES AND TAKE WITH 8 OUNCES OF WATER      amLODIPine 5 MG tablet  Commonly known as: NORVASC   TAKE ONE TABLET BY MOUTH DAILY      aspirin 81 MG EC tablet   81 mg, Oral, Daily, WILL FOLLOW MD ORDERS      atenolol 50 MG tablet  Commonly known as: TENORMIN   TAKE ONE TABLET BY MOUTH DAILY      calcium carbonate-cholecalciferol 500-400 MG-UNIT tablet tablet   1 tablet, Oral, 2 Times Daily      Fluticasone-Salmeterol 100-50 MCG/ACT DISKUS  Commonly known as: ADVAIR/WIXELA   INHALE ONE PUFF BY MOUTH TWICE A DAY      moxifloxacin 0.5 % ophthalmic solution  Commonly known as: VIGAMOX   1 drop, Right Eye, 2 Times Daily      ICAPS AREDS 2 PO   1 tablet, Oral, 2 Times Daily, HOLD PRIOR TO SURG      multivitamin with minerals tablet tablet   1 tablet, Oral, Daily, HOLD PRIOR TO SURG      prednisoLONE acetate 1 % ophthalmic suspension  Commonly known as: PRED FORTE   Right Eye, 4 Times Daily      rosuvastatin 5 MG tablet  Commonly known as: CRESTOR   TAKE ONE TABLET BY MOUTH DAILY      sodium chloride 5 % ophthalmic solution  Commonly known as: GREGORY 128   1 drop, Right Eye, 4 Times Daily             Allergies   Allergen Reactions   • Bee Venom Swelling       Discharge Disposition:  Skilled Nursing Facility (DC - External)      Discharge Diet:  Diet Order   Procedures   • Diet: Cardiac Diets; Healthy Heart (2-3 Na+); Texture: Regular Texture (IDDSI 7); Fluid Consistency: Thin (IDDSI 0)       Discharge Activity:   Activity Instructions     Activity as Tolerated            CODE STATUS:    Code Status and  Medical Interventions:   Ordered at: 05/01/23 1813     Code Status (Patient has no pulse and is not breathing):    CPR (Attempt to Resuscitate)     Medical Interventions (Patient has pulse or is breathing):    Full       Future Appointments   Date Time Provider Department Center   8/4/2023 11:00 AM Deric Mendosa MD [facility]      Follow-up Information     Deric Mendosa MD .    Specialty: Family Medicine  Contact information:  [address]  [place] [id number] [phone]             Ntae Breen MD Follow up in 4 week(s).    Specialties: Sleep Medicine, Pulmonary Disease  Contact information:  [address] [facility] [address] [facility] [address]  [place] [id number] [phone]                         Time Spent on Discharge:  Greater than 30 minutes spent on discharge management including final examination, discussion of hospital stay and patient education, preparation of records, medication reconciliation, follow up planning      Parveen Lilly MD  [facility]  05/04/23  09:03 EDT

## 2023-05-04 NOTE — THERAPY TREATMENT NOTE
Patient Name: Lizzy Hicks  : 1935    MRN: [id number]                              Today's Date: 2023       Admit Date: 2023    Visit Dx:     ICD-10-CM ICD-9-CM   1. Acute exacerbation of chronic obstructive pulmonary disease (COPD)  J44.1 491.21   2. Hypoxia  R09.02 799.02   3. Hyponatremia  E87.1 276.1     Patient Active Problem List   Diagnosis   • Hypertension   • COPD (chronic obstructive pulmonary disease)   • Carotid artery stenosis   • Osteoarthritis   • Osteoporosis   • Hyponatremia   • Reset osmostat syndrome   • Carotid stenosis, asymptomatic, right   • Carotid artery disease   • Macular degeneration   • COPD exacerbation   • Acute exacerbation of chronic obstructive pulmonary disease (COPD)   • Parainfluenza infection   • Acute respiratory failure with hypoxia     Past Medical History:   Diagnosis Date   • Anesthesia complication     pt states was groggy for a week after surgery   • Carotid artery disease     left   • Carotid stenosis     RIGHT   • COPD (chronic obstructive pulmonary disease)    • History of bronchitis    • Hypertension    • Macular degeneration    • Osteoarthritis 2016   • Osteoporosis 2016   • Reset osmostat syndrome 08/15/2016    Worked up by prior PCP in [facility]. Baseline Na 128-130 since .   • Seborrheic dermatitis 2016   • Swallowing difficulty     D/T INADVERTANT REMOVAL OF [facility] AS CHILD WITH T AND A     Past Surgical History:   Procedure Laterality Date   • CAROTID ENDARTERECTOMY Right 2018    Procedure: RT CAROTID ENDARTERECTOMY WITH INTRA OPERATIVE CAROTID ARTERY DUPLEX SCAN;  Surgeon: Mikaela Galicia Jr., MD;  Location: [facility] OR;  Service: Vascular   • CAROTID ENDARTERECTOMY Left 2019    Procedure: LEFT CAROTID ENDARTERECTOMY;  Surgeon: iMkaela Galicia Jr., MD;  Location: [facility] OR;  Service: Vascular   • CATARACT EXTRACTION WITH INTRAOCULAR LENS IMPLANT      LEFT AND RIGHT   • ORIF FEMUR FRACTURE Right      HARDWARE   • TONSILLECTOMY AND ADENOIDECTOMY      INADVERTANTLY TOOK UV[facility]A AT THIS TIME      General Information     Row Name 05/04/23 0936          Physical Therapy Time and Intention    Document Type therapy note (daily note)  -MG     Mode of Treatment physical therapy  -MG     Row Name 05/04/23 0936          General Information    Patient Profile Reviewed yes  -MG     Existing Precautions/Restrictions fall;oxygen therapy device and L/min  Currently on 0.5L  -MG     Barriers to Rehab none identified  -MG     Row Name 05/04/23 0936          Cognition    Orientation Status (Cognition) oriented x 3  -MG     Row Name 05/04/23 0936          Safety Issues, Functional Mobility    Impairments Affecting Function (Mobility) balance;endurance/activity tolerance;strength  -MG     Comment, Safety Issues/Impairments (Mobility) Gait belt and non-skid socks donned  -MG           User Key  (r) = Recorded By, (t) = Taken By, (c) = Cosigned By    Initials Name Provider Type    MG Josseline Porras, PT Physical Therapist               Mobility     Row Name 05/04/23 0937          Bed Mobility    Supine-Sit [place] (Bed Mobility) standby assist  -MG     Sit-Supine [place] (Bed Mobility) standby assist  -MG     Assistive Device (Bed Mobility) head of bed elevated  -MG     Row Name 05/04/23 0937          Transfers    Comment, (Transfers) Pt was SBA for balance only during donning/doffing of LE clothing and pericare in bathroom.  -MG     Row Name 05/04/23 0937          Sit-Stand Transfer    Sit-Stand [place] (Transfers) contact guard;verbal cues  -MG     Assistive Device (Sit-Stand Transfers) walker, front-wheeled  -MG     Comment, (Sit-Stand Transfer) x1 EOB and x1 commode  -MG     Row Name 05/04/23 0937          Gait/Stairs (Locomotion)    [place] Level (Gait) contact guard;verbal cues;nonverbal cues (demo/gesture)  -MG     Assistive Device (Gait) walker, front-wheeled  -MG     Distance in Feet (Gait) 25'  -MG      "Deviations/Abnormal Patterns (Gait) trav decreased;gait speed decreased  -MG     Bilateral Gait Deviations forward flexed posture  -MG     Comment, (Gait/Stairs) Pt agreeable to ambulation from bed<>bathroom only as she is DCing soon and wanting to save energy. No overt LOB noted. Pt stated feeling \"weak\" on return to bed, but not lightheaded.  -MG           User Key  (r) = Recorded By, (t) = Taken By, (c) = Cosigned By    Initials Name Provider Type    MG Josseline Porras, PT Physical Therapist               Obj/Interventions     Row Name 05/04/23 0944          Motor Skills    Therapeutic Exercise other (see comments)  AP, LAQ, HF x20  -MG     Row Name 05/04/23 0944          Balance    Static Sitting Balance standby assist  -MG     Position, Sitting Balance unsupported;sitting edge of bed  -MG     Static Standing Balance supervision  -MG     Position/Device Used, Standing Balance supported;walker, front-wheeled  -MG     Comment, Balance No overt LOB noted.  -MG           User Key  (r) = Recorded By, (t) = Taken By, (c) = Cosigned By    Initials Name Provider Type    MG Josseline Porras, PT Physical Therapist               Goals/Plan    No documentation.                Clinical Impression     Row Name 05/04/23 0945          Pain    Pretreatment Pain Rating 0/10 - no pain  -MG     Posttreatment Pain Rating 0/10 - no pain  -MG     Pain Intervention(s) Medication (See MAR);Ambulation/increased activity;Repositioned;Rest  -MG     Row Name 05/04/23 0945          Plan of Care Review    Plan of Care Reviewed With patient  -MG     Progress improving  -MG     Outcome Evaluation Pt seen this date for PT tx, requesting to just ambulate to bathroom and perform seated exercises as she is DCing this date and wanting to conserve energy. Pt performed bed mobility w/ SBA, STS w/ CGA and use of RW, ambulation of 25' w/ CGA and RW and was SBA for balance only during donning/doffing of LE clothing and pericare. Pt performed LE ther-ex " while seated EOB prior to return to supine. Pt ambulated while on 0.5L O2 satting 92% prior to mobility, 88% post mobility and quick recovery to 93% w/ PLB and rest.  -MG     Row Name 05/04/23 0945          Therapy Assessment/Plan (PT)    Rehab Potential (PT) good, to achieve stated therapy goals  -MG     Criteria for Skilled Interventions Met (PT) meets criteria  -MG     Therapy Frequency (PT) 5 times/wk  -MG     Row Name 05/04/23 0945          Vital Signs    Pretreatment Heart Rate (beats/min) 93  -MG     Posttreatment Heart Rate (beats/min) 98  -MG     Pre SpO2 (%) 92  0.5L  -MG     O2 Delivery Pre Treatment nasal cannula  -MG     Intra SpO2 (%) 88  0.5L  -MG     O2 Delivery Intra Treatment nasal cannula  -MG     Post SpO2 (%) 93  0.5L  -MG     O2 Delivery Post Treatment nasal cannula  -MG     Pre Patient Position Supine  -MG     Intra Patient Position Standing  -MG     Post Patient Position Supine  -MG     Row Name 05/04/23 0945          Positioning and Restraints    Pre-Treatment Position in bed  -MG     Post Treatment Position bed  -MG     In Bed notified nsg;supine;fowlers;call light within reach;encouraged to call for assist;exit alarm on;with family/caregiver  -MG           User Key  (r) = Recorded By, (t) = Taken By, (c) = Cosigned By    Initials Name Provider Type    MG Josseline Porras, PT Physical Therapist               Outcome Measures     Row Name 05/04/23 0948          How much help from another person do you currently need...    Turning from your back to your side while in flat bed without using bedrails? 4  -MG     Moving from lying on back to sitting on the side of a flat bed without bedrails? 3  -MG     Moving to and from a bed to a chair (including a wheelchair)? 3  -MG     Standing up from a chair using your arms (e.g., wheelchair, bedside chair)? 3  -MG     Climbing 3-5 steps with a railing? 2  -MG     To walk in hospital room? 3  -MG     AM-PAC 6 Clicks Score (PT) 18  -MG     Highest level of  mobility 6 --> Walked 10 steps or more  -           User Key  (r) = Recorded By, (t) = Taken By, (c) = Cosigned By    Initials Name Provider Type    Josseline Ruiz, PT Physical Therapist                             Physical Therapy Education     Title: PT OT SLP Therapies (Done)     Topic: Physical Therapy (Done)     Point: Mobility training (Done)     Learning Progress Summary           Patient Acceptance, E,TB,D, VU,DU by MG at 5/4/2023 0948    Acceptance, E,TB, VU by SS at 5/4/2023 0357    Acceptance, E,TB,D, VU,NR by PH at 5/3/2023 1553    Acceptance, E,TB, VU by JK at 5/3/2023 0816    Acceptance, E,TB, VU by SS at 5/3/2023 0414    Acceptance, E,D, VU,NR by MS at 5/2/2023 1523                   Point: Home exercise program (Done)     Learning Progress Summary           Patient Acceptance, E,TB,D, VU,DU by MG at 5/4/2023 0948    Acceptance, E,TB, VU by SS at 5/4/2023 0357    Acceptance, E,TB,D, VU,NR by PH at 5/3/2023 1553    Acceptance, E,TB, VU by JK at 5/3/2023 0816    Acceptance, E,TB, VU by SS at 5/3/2023 0414    Acceptance, E,D, VU,NR by MS at 5/2/2023 1523                   Point: Body mechanics (Done)     Learning Progress Summary           Patient Acceptance, E,TB,D, VU,DU by MG at 5/4/2023 0948    Acceptance, E,TB, VU by SS at 5/4/2023 0357    Acceptance, E,TB,D, VU,NR by PH at 5/3/2023 1553    Acceptance, E,TB, VU by JK at 5/3/2023 0816    Acceptance, E,TB, VU by SS at 5/3/2023 0414    Acceptance, E,D, VU,NR by MS at 5/2/2023 1523                   Point: Precautions (Done)     Learning Progress Summary           Patient Acceptance, E,TB,D, VU,DU by MG at 5/4/2023 0948    Acceptance, E,TB, VU by SS at 5/4/2023 0357    Acceptance, E,TB,D, VU,NR by PH at 5/3/2023 1553    Acceptance, E,TB, VU by MEENA at 5/3/2023 0816    Acceptance, E,TB, VU by SS at 5/3/2023 0414    Acceptance, E,D, VU,NR by MS at 5/2/2023 1523                               User Key     Initials Effective Dates Name Provider Type  Discipline    MS 06/16/21 -  Isidoro Godinez PT Physical Therapist PT    MG 05/24/22 -  Josseline Porras, PT Physical Therapist PT    PH 06/16/21 -  Miriam Sprague PTA Physical Therapist Assistant PT    JK 06/10/22 -  Kelli Cantu, RN Registered Nurse Nurse    SS 02/03/22 -  Matty Flaherty, RN Registered Nurse Nurse              PT Recommendation and Plan     Plan of Care Reviewed With: patient  Progress: improving  Outcome Evaluation: Pt seen this date for PT tx, requesting to just ambulate to bathroom and perform seated exercises as she is DCing this date and wanting to conserve energy. Pt performed bed mobility w/ SBA, STS w/ CGA and use of RW, ambulation of 25' w/ CGA and RW and was SBA for balance only during donning/doffing of LE clothing and pericare. Pt performed LE ther-ex while seated EOB prior to return to supine. Pt ambulated while on 0.5L O2 satting 92% prior to mobility, 88% post mobility and quick recovery to 93% w/ PLB and rest.     Time Calculation:    PT Charges     Row Name 05/04/23 0948             Time Calculation    Start Time 0927  -MG      Stop Time 0941  -MG      Time Calculation (min) 14 min  -MG      PT Received On 05/04/23  -MG      PT - Next Appointment 05/05/23  -MG            User Key  (r) = Recorded By, (t) = Taken By, (c) = Cosigned By    Initials Name Provider Type     Josseline Porras, PT Physical Therapist              Therapy Charges for Today     Code Description Service Date Service Provider Modifiers Qty    [id number]  PT THERAPEUTIC ACT EA 15 MIN 5/4/2023 Josseline Porras, PT GP 1          PT G-Codes  Outcome Measure Options: AM-PAC 6 Clicks Basic Mobility (PT)  AM-PAC 6 Clicks Score (PT): 18  PT Discharge Summary  Anticipated Discharge Disposition (PT): skilled nursing facility    Josseline Porras PT  5/4/2023

## 2023-05-04 NOTE — PROGRESS NOTES
Patient required 2 L O2 to ambulate around nurses station 3 times to maintain sats at or above 90%.

## 2023-05-04 NOTE — PLAN OF CARE
Goal Outcome Evaluation:  Plan of Care Reviewed With: patient        Progress: improving  Outcome Evaluation: Pt seen this date for PT tx, requesting to just ambulate to bathroom and perform seated exercises as she is DCing this date and wanting to conserve energy. Pt performed bed mobility w/ SBA, STS w/ CGA and use of RW, ambulation of 25' w/ CGA and RW and was SBA for balance only during donning/doffing of LE clothing and pericare. Pt performed LE ther-ex while seated EOB prior to return to supine. Pt ambulated while on 0.5L O2 satting 92% prior to mobility, 88% post mobility and quick recovery to 93% w/ PLB and rest.

## 2023-05-04 NOTE — CASE MANAGEMENT/SOCIAL WORK
Continued Stay Note  [facility]     Patient Name: Lizzy Hicks  MRN: [id number]  Today's Date: 5/4/2023    Admit Date: 5/1/2023    Plan: Plan [facility] for skilled care.   JODI [facility] RN   Discharge Plan     Row Name 05/04/23 1236       Plan    Plan Plan [facility] for skilled care.   JODI [facility] RN    Patient/Family in Agreement with Plan yes    Plan Comments Per Radha  ( [phone]) pt has a bed and can be accepted at [facility] Room 115 after 1330 today.  Daughter will transport pt.  Discharge Summary to be printed per Radha.  Discharge Summary in packet.  Prescriptions sent to facility pharmacy.  Packet to RN.  Plan [facility] for skilled care.   JODI Row[facility] RN               Discharge Codes    No documentation.               Expected Discharge Date and Time     Expected Discharge Date Expected Discharge Time    May 4, 2023             Kailey [facility] RN

## 2023-05-04 NOTE — PLAN OF CARE
Goal Outcome Evaluation:      Pt resting in bed. VSS. On 0.5L O2 this am. No acute distress noted.

## 2023-05-04 NOTE — CASE MANAGEMENT/SOCIAL WORK
Case Management Discharge Note      Final Note: Pt discharged to [facility] for skilled care.   JODI [facility] RN         Selected Continued Care - Admitted Since 5/1/2023     Destination Coordination complete.    Service Provider Selected Services Address Phone Fax Patient Preferred    [facility] [address] [place] [address] DRIVE, [facility] [address] [phone] --          Durable Medical Equipment    No services have been selected for the patient.              Dialysis/Infusion    No services have been selected for the patient.              Home Medical Care    No services have been selected for the patient.              Therapy    No services have been selected for the patient.              Community Resources    No services have been selected for the patient.              Community & DME    No services have been selected for the patient.                  Transportation Services  Private: Car    Final Discharge Disposition Code: 03 - skilled nursing facility (SNF)

## 2023-05-21 ENCOUNTER — HOSPITAL ENCOUNTER (INPATIENT)
Facility: HOSPITAL | Age: 88
LOS: 9 days | Discharge: NURSING, REHAB OR LONG-TERM CARE | DRG: 190 | End: 2023-05-30
Attending: EMERGENCY MEDICINE | Admitting: INTERNAL MEDICINE
Payer: MEDICARE

## 2023-05-21 ENCOUNTER — APPOINTMENT (OUTPATIENT)
Dept: GENERAL RADIOLOGY | Facility: HOSPITAL | Age: 88
DRG: 190 | End: 2023-05-21
Payer: MEDICARE

## 2023-05-21 DIAGNOSIS — J96.01 ACUTE RESPIRATORY FAILURE WITH HYPOXIA AND HYPERCAPNIA: Primary | ICD-10-CM

## 2023-05-21 DIAGNOSIS — J96.02 ACUTE RESPIRATORY FAILURE WITH HYPOXIA AND HYPERCAPNIA: Primary | ICD-10-CM

## 2023-05-21 DIAGNOSIS — J44.9 CHRONIC OBSTRUCTIVE PULMONARY DISEASE (COPD): ICD-10-CM

## 2023-05-21 DIAGNOSIS — J44.1 COPD WITH ACUTE EXACERBATION: ICD-10-CM

## 2023-05-21 DIAGNOSIS — J96.10 CHRONIC RESPIRATORY FAILURE: ICD-10-CM

## 2023-05-21 PROBLEM — E78.2 MIXED HYPERLIPIDEMIA: Status: ACTIVE | Noted: 2023-05-21

## 2023-05-21 PROBLEM — J18.9 PNEUMONIA OF RIGHT LOWER LOBE DUE TO INFECTIOUS ORGANISM: Status: ACTIVE | Noted: 2023-05-21

## 2023-05-21 LAB
ALBUMIN SERPL-MCNC: 3.5 G/DL (ref 3.5–5.2)
ALBUMIN/GLOB SERPL: 1.2 G/DL
ALP SERPL-CCNC: 69 U/L (ref 39–117)
ALT SERPL W P-5'-P-CCNC: 20 U/L (ref 1–33)
ANION GAP SERPL CALCULATED.3IONS-SCNC: 4.6 MMOL/L (ref 5–15)
ANION GAP SERPL CALCULATED.3IONS-SCNC: 5.2 MMOL/L (ref 5–15)
APTT PPP: 29.9 SECONDS (ref 22.7–35.4)
ARTERIAL PATENCY WRIST A: POSITIVE
AST SERPL-CCNC: 15 U/L (ref 1–32)
ATMOSPHERIC PRESS: 752.2 MMHG
B PARAPERT DNA SPEC QL NAA+PROBE: NOT DETECTED
B PERT DNA SPEC QL NAA+PROBE: NOT DETECTED
BASE EXCESS BLDA CALC-SCNC: 11 MMOL/L (ref 0–2)
BASOPHILS # BLD AUTO: 0.06 10*3/MM3 (ref 0–0.2)
BASOPHILS NFR BLD AUTO: 0.5 % (ref 0–1.5)
BDY SITE: ABNORMAL
BILIRUB SERPL-MCNC: 0.3 MG/DL (ref 0–1.2)
BUN SERPL-MCNC: 23 MG/DL (ref 8–23)
BUN SERPL-MCNC: 25 MG/DL (ref 8–23)
BUN/CREAT SERPL: 40.3 (ref 7–25)
BUN/CREAT SERPL: 46 (ref 7–25)
C PNEUM DNA NPH QL NAA+NON-PROBE: NOT DETECTED
CALCIUM SPEC-SCNC: 10.3 MG/DL (ref 8.6–10.5)
CALCIUM SPEC-SCNC: 10.4 MG/DL (ref 8.6–10.5)
CHLORIDE SERPL-SCNC: 92 MMOL/L (ref 98–107)
CHLORIDE SERPL-SCNC: 92 MMOL/L (ref 98–107)
CO2 SERPL-SCNC: 36.8 MMOL/L (ref 22–29)
CO2 SERPL-SCNC: 37.4 MMOL/L (ref 22–29)
CREAT SERPL-MCNC: 0.5 MG/DL (ref 0.57–1)
CREAT SERPL-MCNC: 0.62 MG/DL (ref 0.57–1)
D-LACTATE SERPL-SCNC: 0.9 MMOL/L (ref 0.5–2)
DEPRECATED RDW RBC AUTO: 39.3 FL (ref 37–54)
DEPRECATED RDW RBC AUTO: 39.6 FL (ref 37–54)
EGFRCR SERPLBLD CKD-EPI 2021: 87.4 ML/MIN/1.73
EGFRCR SERPLBLD CKD-EPI 2021: 92 ML/MIN/1.73
EOSINOPHIL # BLD AUTO: 0.24 10*3/MM3 (ref 0–0.4)
EOSINOPHIL NFR BLD AUTO: 2 % (ref 0.3–6.2)
ERYTHROCYTE [DISTWIDTH] IN BLOOD BY AUTOMATED COUNT: 11.6 % (ref 12.3–15.4)
ERYTHROCYTE [DISTWIDTH] IN BLOOD BY AUTOMATED COUNT: 11.8 % (ref 12.3–15.4)
FLUAV SUBTYP SPEC NAA+PROBE: NOT DETECTED
FLUBV RNA ISLT QL NAA+PROBE: NOT DETECTED
GLOBULIN UR ELPH-MCNC: 3 GM/DL
GLUCOSE SERPL-MCNC: 142 MG/DL (ref 65–99)
GLUCOSE SERPL-MCNC: 157 MG/DL (ref 65–99)
HADV DNA SPEC NAA+PROBE: NOT DETECTED
HCO3 BLDA-SCNC: 39.4 MMOL/L (ref 22–28)
HCOV 229E RNA SPEC QL NAA+PROBE: NOT DETECTED
HCOV HKU1 RNA SPEC QL NAA+PROBE: NOT DETECTED
HCOV NL63 RNA SPEC QL NAA+PROBE: NOT DETECTED
HCOV OC43 RNA SPEC QL NAA+PROBE: NOT DETECTED
HCT VFR BLD AUTO: 33.4 % (ref 34–46.6)
HCT VFR BLD AUTO: 36.3 % (ref 34–46.6)
HGB BLD-MCNC: 11.6 G/DL (ref 12–15.9)
HGB BLD-MCNC: 12.4 G/DL (ref 12–15.9)
HMPV RNA NPH QL NAA+NON-PROBE: NOT DETECTED
HPIV1 RNA ISLT QL NAA+PROBE: NOT DETECTED
HPIV2 RNA SPEC QL NAA+PROBE: NOT DETECTED
HPIV3 RNA NPH QL NAA+PROBE: NOT DETECTED
HPIV4 P GENE NPH QL NAA+PROBE: NOT DETECTED
IMM GRANULOCYTES # BLD AUTO: 0.03 10*3/MM3 (ref 0–0.05)
IMM GRANULOCYTES NFR BLD AUTO: 0.2 % (ref 0–0.5)
INHALED O2 CONCENTRATION: 30 %
INR PPP: 0.99 (ref 0.9–1.1)
LYMPHOCYTES # BLD AUTO: 1.93 10*3/MM3 (ref 0.7–3.1)
LYMPHOCYTES NFR BLD AUTO: 15.8 % (ref 19.6–45.3)
M PNEUMO IGG SER IA-ACNC: NOT DETECTED
MCH RBC QN AUTO: 31.4 PG (ref 26.6–33)
MCH RBC QN AUTO: 31.7 PG (ref 26.6–33)
MCHC RBC AUTO-ENTMCNC: 34.2 G/DL (ref 31.5–35.7)
MCHC RBC AUTO-ENTMCNC: 34.7 G/DL (ref 31.5–35.7)
MCV RBC AUTO: 91.3 FL (ref 79–97)
MCV RBC AUTO: 91.9 FL (ref 79–97)
MODALITY: ABNORMAL
MONOCYTES # BLD AUTO: 1.06 10*3/MM3 (ref 0.1–0.9)
MONOCYTES NFR BLD AUTO: 8.7 % (ref 5–12)
NEUTROPHILS NFR BLD AUTO: 72.8 % (ref 42.7–76)
NEUTROPHILS NFR BLD AUTO: 8.9 10*3/MM3 (ref 1.7–7)
NRBC BLD AUTO-RTO: 0 /100 WBC (ref 0–0.2)
NT-PROBNP SERPL-MCNC: 1653 PG/ML (ref 0–1800)
O2 A-A PPRESDIFF RESPIRATORY: 0.8 MMHG
PCO2 BLDA: 67.1 MM HG (ref 35–45)
PH BLDA: 7.38 PH UNITS (ref 7.35–7.45)
PLATELET # BLD AUTO: 241 10*3/MM3 (ref 140–450)
PLATELET # BLD AUTO: 246 10*3/MM3 (ref 140–450)
PMV BLD AUTO: 8.7 FL (ref 6–12)
PMV BLD AUTO: 8.8 FL (ref 6–12)
PO2 BLDA: 113.8 MM HG (ref 80–100)
POTASSIUM SERPL-SCNC: 3.9 MMOL/L (ref 3.5–5.2)
POTASSIUM SERPL-SCNC: 4.4 MMOL/L (ref 3.5–5.2)
PROCALCITONIN SERPL-MCNC: 0.13 NG/ML (ref 0–0.25)
PROT SERPL-MCNC: 6.5 G/DL (ref 6–8.5)
PROTHROMBIN TIME: 13.2 SECONDS (ref 11.7–14.2)
RBC # BLD AUTO: 3.66 10*6/MM3 (ref 3.77–5.28)
RBC # BLD AUTO: 3.95 10*6/MM3 (ref 3.77–5.28)
RHINOVIRUS RNA SPEC NAA+PROBE: NOT DETECTED
RSV RNA NPH QL NAA+NON-PROBE: NOT DETECTED
SAO2 % BLDCOA: 98.1 % (ref 92–99)
SARS-COV-2 RNA NPH QL NAA+NON-PROBE: NOT DETECTED
SET MECH RESP RATE: 18
SODIUM SERPL-SCNC: 134 MMOL/L (ref 136–145)
SODIUM SERPL-SCNC: 134 MMOL/L (ref 136–145)
TOTAL RATE: 18 BREATHS/MINUTE
TROPONIN T SERPL HS-MCNC: 16 NG/L
WBC NRBC COR # BLD: 11.23 10*3/MM3 (ref 3.4–10.8)
WBC NRBC COR # BLD: 12.22 10*3/MM3 (ref 3.4–10.8)

## 2023-05-21 PROCEDURE — 94761 N-INVAS EAR/PLS OXIMETRY MLT: CPT

## 2023-05-21 PROCEDURE — 94799 UNLISTED PULMONARY SVC/PX: CPT

## 2023-05-21 PROCEDURE — 0202U NFCT DS 22 TRGT SARS-COV-2: CPT | Performed by: EMERGENCY MEDICINE

## 2023-05-21 PROCEDURE — 93005 ELECTROCARDIOGRAM TRACING: CPT | Performed by: EMERGENCY MEDICINE

## 2023-05-21 PROCEDURE — 99285 EMERGENCY DEPT VISIT HI MDM: CPT

## 2023-05-21 PROCEDURE — 85730 THROMBOPLASTIN TIME PARTIAL: CPT | Performed by: EMERGENCY MEDICINE

## 2023-05-21 PROCEDURE — 84145 PROCALCITONIN (PCT): CPT | Performed by: EMERGENCY MEDICINE

## 2023-05-21 PROCEDURE — 25010000002 CEFTRIAXONE PER 250 MG: Performed by: INTERNAL MEDICINE

## 2023-05-21 PROCEDURE — 93010 ELECTROCARDIOGRAM REPORT: CPT | Performed by: INTERNAL MEDICINE

## 2023-05-21 PROCEDURE — 94664 DEMO&/EVAL PT USE INHALER: CPT

## 2023-05-21 PROCEDURE — 83605 ASSAY OF LACTIC ACID: CPT | Performed by: EMERGENCY MEDICINE

## 2023-05-21 PROCEDURE — 25010000002 METHYLPREDNISOLONE PER 40 MG: Performed by: INTERNAL MEDICINE

## 2023-05-21 PROCEDURE — 80053 COMPREHEN METABOLIC PANEL: CPT | Performed by: EMERGENCY MEDICINE

## 2023-05-21 PROCEDURE — 85027 COMPLETE CBC AUTOMATED: CPT | Performed by: NURSE PRACTITIONER

## 2023-05-21 PROCEDURE — 85610 PROTHROMBIN TIME: CPT | Performed by: EMERGENCY MEDICINE

## 2023-05-21 PROCEDURE — 83880 ASSAY OF NATRIURETIC PEPTIDE: CPT | Performed by: EMERGENCY MEDICINE

## 2023-05-21 PROCEDURE — 85025 COMPLETE CBC W/AUTO DIFF WBC: CPT | Performed by: EMERGENCY MEDICINE

## 2023-05-21 PROCEDURE — 87040 BLOOD CULTURE FOR BACTERIA: CPT | Performed by: INTERNAL MEDICINE

## 2023-05-21 PROCEDURE — 36600 WITHDRAWAL OF ARTERIAL BLOOD: CPT

## 2023-05-21 PROCEDURE — 84484 ASSAY OF TROPONIN QUANT: CPT | Performed by: EMERGENCY MEDICINE

## 2023-05-21 PROCEDURE — 94640 AIRWAY INHALATION TREATMENT: CPT

## 2023-05-21 PROCEDURE — 82803 BLOOD GASES ANY COMBINATION: CPT

## 2023-05-21 PROCEDURE — 36415 COLL VENOUS BLD VENIPUNCTURE: CPT | Performed by: NURSE PRACTITIONER

## 2023-05-21 PROCEDURE — 94760 N-INVAS EAR/PLS OXIMETRY 1: CPT

## 2023-05-21 PROCEDURE — 71045 X-RAY EXAM CHEST 1 VIEW: CPT

## 2023-05-21 PROCEDURE — 94660 CPAP INITIATION&MGMT: CPT

## 2023-05-21 PROCEDURE — 25010000002 ENOXAPARIN PER 10 MG: Performed by: INTERNAL MEDICINE

## 2023-05-21 RX ORDER — CALCIUM CARBONATE 500 MG/1
2 TABLET, CHEWABLE ORAL 2 TIMES DAILY PRN
Status: DISCONTINUED | OUTPATIENT
Start: 2023-05-21 | End: 2023-05-30 | Stop reason: HOSPADM

## 2023-05-21 RX ORDER — ONDANSETRON 2 MG/ML
4 INJECTION INTRAMUSCULAR; INTRAVENOUS EVERY 6 HOURS PRN
Status: DISCONTINUED | OUTPATIENT
Start: 2023-05-21 | End: 2023-05-30 | Stop reason: HOSPADM

## 2023-05-21 RX ORDER — ACETAMINOPHEN 650 MG/1
650 SUPPOSITORY RECTAL EVERY 4 HOURS PRN
Status: DISCONTINUED | OUTPATIENT
Start: 2023-05-21 | End: 2023-05-30 | Stop reason: HOSPADM

## 2023-05-21 RX ORDER — METHYLPREDNISOLONE SODIUM SUCCINATE 125 MG/2ML
60 INJECTION, POWDER, LYOPHILIZED, FOR SOLUTION INTRAMUSCULAR; INTRAVENOUS EVERY 12 HOURS
Status: DISCONTINUED | OUTPATIENT
Start: 2023-05-21 | End: 2023-05-21

## 2023-05-21 RX ORDER — AMLODIPINE BESYLATE 5 MG/1
5 TABLET ORAL DAILY
Status: DISCONTINUED | OUTPATIENT
Start: 2023-05-21 | End: 2023-05-30 | Stop reason: HOSPADM

## 2023-05-21 RX ORDER — IPRATROPIUM BROMIDE AND ALBUTEROL SULFATE 2.5; .5 MG/3ML; MG/3ML
3 SOLUTION RESPIRATORY (INHALATION) EVERY 4 HOURS PRN
Status: DISCONTINUED | OUTPATIENT
Start: 2023-05-21 | End: 2023-05-30 | Stop reason: HOSPADM

## 2023-05-21 RX ORDER — SODIUM CHLORIDE 9 MG/ML
40 INJECTION, SOLUTION INTRAVENOUS AS NEEDED
Status: DISCONTINUED | OUTPATIENT
Start: 2023-05-21 | End: 2023-05-30 | Stop reason: HOSPADM

## 2023-05-21 RX ORDER — DOCUSATE SODIUM 100 MG/1
100 CAPSULE, LIQUID FILLED ORAL 2 TIMES DAILY PRN
Status: DISCONTINUED | OUTPATIENT
Start: 2023-05-21 | End: 2023-05-30 | Stop reason: HOSPADM

## 2023-05-21 RX ORDER — SODIUM CHLORIDE 0.9 % (FLUSH) 0.9 %
10 SYRINGE (ML) INJECTION AS NEEDED
Status: DISCONTINUED | OUTPATIENT
Start: 2023-05-21 | End: 2023-05-30 | Stop reason: HOSPADM

## 2023-05-21 RX ORDER — ASPIRIN 81 MG/1
81 TABLET ORAL DAILY
Status: DISCONTINUED | OUTPATIENT
Start: 2023-05-21 | End: 2023-05-30 | Stop reason: HOSPADM

## 2023-05-21 RX ORDER — SODIUM CHLORIDE 0.9 % (FLUSH) 0.9 %
3 SYRINGE (ML) INJECTION EVERY 12 HOURS SCHEDULED
Status: DISCONTINUED | OUTPATIENT
Start: 2023-05-21 | End: 2023-05-30 | Stop reason: HOSPADM

## 2023-05-21 RX ORDER — ONDANSETRON 4 MG/1
4 TABLET, FILM COATED ORAL EVERY 6 HOURS PRN
Status: DISCONTINUED | OUTPATIENT
Start: 2023-05-21 | End: 2023-05-30 | Stop reason: HOSPADM

## 2023-05-21 RX ORDER — ROSUVASTATIN CALCIUM 5 MG/1
5 TABLET, COATED ORAL DAILY
Status: DISCONTINUED | OUTPATIENT
Start: 2023-05-21 | End: 2023-05-30 | Stop reason: HOSPADM

## 2023-05-21 RX ORDER — IPRATROPIUM BROMIDE AND ALBUTEROL SULFATE 2.5; .5 MG/3ML; MG/3ML
3 SOLUTION RESPIRATORY (INHALATION)
Status: DISCONTINUED | OUTPATIENT
Start: 2023-05-21 | End: 2023-05-21

## 2023-05-21 RX ORDER — ALBUTEROL SULFATE 2.5 MG/3ML
2.5 SOLUTION RESPIRATORY (INHALATION)
Status: DISPENSED | OUTPATIENT
Start: 2023-05-21 | End: 2023-05-21

## 2023-05-21 RX ORDER — MULTIPLE VITAMINS W/ MINERALS TAB 9MG-400MCG
1 TAB ORAL DAILY
Status: DISCONTINUED | OUTPATIENT
Start: 2023-05-21 | End: 2023-05-30 | Stop reason: HOSPADM

## 2023-05-21 RX ORDER — ACETAMINOPHEN 325 MG/1
650 TABLET ORAL EVERY 4 HOURS PRN
Status: DISCONTINUED | OUTPATIENT
Start: 2023-05-21 | End: 2023-05-30 | Stop reason: HOSPADM

## 2023-05-21 RX ORDER — METHYLPREDNISOLONE SODIUM SUCCINATE 40 MG/ML
40 INJECTION, POWDER, LYOPHILIZED, FOR SOLUTION INTRAMUSCULAR; INTRAVENOUS EVERY 12 HOURS
Status: DISCONTINUED | OUTPATIENT
Start: 2023-05-21 | End: 2023-05-23

## 2023-05-21 RX ORDER — FAMOTIDINE 20 MG/1
20 TABLET, FILM COATED ORAL 2 TIMES DAILY PRN
Status: DISCONTINUED | OUTPATIENT
Start: 2023-05-21 | End: 2023-05-30 | Stop reason: HOSPADM

## 2023-05-21 RX ORDER — PREDNISOLONE ACETATE 10 MG/ML
1 SUSPENSION/ DROPS OPHTHALMIC EVERY 6 HOURS SCHEDULED
Status: DISCONTINUED | OUTPATIENT
Start: 2023-05-21 | End: 2023-05-30 | Stop reason: HOSPADM

## 2023-05-21 RX ORDER — SILICONE ADHESIVE 1.5" X 3"
1 SHEET (EA) TOPICAL 4 TIMES DAILY
Status: DISCONTINUED | OUTPATIENT
Start: 2023-05-21 | End: 2023-05-30 | Stop reason: HOSPADM

## 2023-05-21 RX ORDER — ACETAMINOPHEN 160 MG/5ML
650 SOLUTION ORAL EVERY 4 HOURS PRN
Status: DISCONTINUED | OUTPATIENT
Start: 2023-05-21 | End: 2023-05-30 | Stop reason: HOSPADM

## 2023-05-21 RX ORDER — UREA 10 %
3 LOTION (ML) TOPICAL NIGHTLY PRN
Status: DISCONTINUED | OUTPATIENT
Start: 2023-05-21 | End: 2023-05-30 | Stop reason: HOSPADM

## 2023-05-21 RX ORDER — ENOXAPARIN SODIUM 100 MG/ML
30 INJECTION SUBCUTANEOUS DAILY
Status: DISCONTINUED | OUTPATIENT
Start: 2023-05-21 | End: 2023-05-30 | Stop reason: HOSPADM

## 2023-05-21 RX ORDER — MOXIFLOXACIN 5 MG/ML
1 SOLUTION/ DROPS OPHTHALMIC 2 TIMES DAILY
Status: DISCONTINUED | OUTPATIENT
Start: 2023-05-21 | End: 2023-05-30 | Stop reason: HOSPADM

## 2023-05-21 RX ORDER — IPRATROPIUM BROMIDE AND ALBUTEROL SULFATE 2.5; .5 MG/3ML; MG/3ML
3 SOLUTION RESPIRATORY (INHALATION)
Status: DISCONTINUED | OUTPATIENT
Start: 2023-05-21 | End: 2023-05-30 | Stop reason: HOSPADM

## 2023-05-21 RX ORDER — NITROGLYCERIN 0.4 MG/1
0.4 TABLET SUBLINGUAL
Status: DISCONTINUED | OUTPATIENT
Start: 2023-05-21 | End: 2023-05-30 | Stop reason: HOSPADM

## 2023-05-21 RX ORDER — SODIUM CHLORIDE 0.9 % (FLUSH) 0.9 %
3-10 SYRINGE (ML) INJECTION AS NEEDED
Status: DISCONTINUED | OUTPATIENT
Start: 2023-05-21 | End: 2023-05-30 | Stop reason: HOSPADM

## 2023-05-21 RX ORDER — SODIUM CHLORIDE 0.9 % (FLUSH) 0.9 %
10 SYRINGE (ML) INJECTION EVERY 12 HOURS SCHEDULED
Status: DISCONTINUED | OUTPATIENT
Start: 2023-05-21 | End: 2023-05-30 | Stop reason: HOSPADM

## 2023-05-21 RX ORDER — ATENOLOL 50 MG/1
50 TABLET ORAL DAILY
Status: DISCONTINUED | OUTPATIENT
Start: 2023-05-21 | End: 2023-05-30 | Stop reason: HOSPADM

## 2023-05-21 RX ADMIN — MOXIFLOXACIN HYDROCHLORIDE 0.05 ML: 5 SOLUTION/ DROPS OPHTHALMIC at 22:07

## 2023-05-21 RX ADMIN — IPRATROPIUM BROMIDE AND ALBUTEROL SULFATE 3 ML: 2.5; .5 SOLUTION RESPIRATORY (INHALATION) at 15:31

## 2023-05-21 RX ADMIN — IPRATROPIUM BROMIDE AND ALBUTEROL SULFATE 3 ML: 2.5; .5 SOLUTION RESPIRATORY (INHALATION) at 10:58

## 2023-05-21 RX ADMIN — CEFTRIAXONE SODIUM 1 G: 1 INJECTION, POWDER, FOR SOLUTION INTRAMUSCULAR; INTRAVENOUS at 07:45

## 2023-05-21 RX ADMIN — PREDNISOLONE ACETATE 1 DROP: 10 SUSPENSION/ DROPS OPHTHALMIC at 15:21

## 2023-05-21 RX ADMIN — MULTIPLE VITAMINS W/ MINERALS TAB 1 TABLET: TAB at 15:21

## 2023-05-21 RX ADMIN — Medication 3 ML: at 13:17

## 2023-05-21 RX ADMIN — Medication 10 ML: at 22:07

## 2023-05-21 RX ADMIN — IPRATROPIUM BROMIDE AND ALBUTEROL SULFATE 3 ML: 2.5; .5 SOLUTION RESPIRATORY (INHALATION) at 21:22

## 2023-05-21 RX ADMIN — SODIUM CHLORIDE 1 DROP: 50 SOLUTION OPHTHALMIC at 22:16

## 2023-05-21 RX ADMIN — AMLODIPINE BESYLATE 5 MG: 5 TABLET ORAL at 15:21

## 2023-05-21 RX ADMIN — MOXIFLOXACIN HYDROCHLORIDE 0.05 ML: 5 SOLUTION/ DROPS OPHTHALMIC at 15:21

## 2023-05-21 RX ADMIN — IPRATROPIUM BROMIDE AND ALBUTEROL SULFATE 3 ML: 2.5; .5 SOLUTION RESPIRATORY (INHALATION) at 07:22

## 2023-05-21 RX ADMIN — METHYLPREDNISOLONE SODIUM SUCCINATE 40 MG: 40 INJECTION, POWDER, FOR SOLUTION INTRAMUSCULAR; INTRAVENOUS at 22:06

## 2023-05-21 RX ADMIN — ALBUTEROL SULFATE 2.5 MG: 2.5 SOLUTION RESPIRATORY (INHALATION) at 01:33

## 2023-05-21 RX ADMIN — ENOXAPARIN SODIUM 30 MG: 100 INJECTION SUBCUTANEOUS at 15:21

## 2023-05-21 RX ADMIN — METHYLPREDNISOLONE SODIUM SUCCINATE 40 MG: 40 INJECTION, POWDER, FOR SOLUTION INTRAMUSCULAR; INTRAVENOUS at 10:09

## 2023-05-21 RX ADMIN — PREDNISOLONE ACETATE 1 DROP: 10 SUSPENSION/ DROPS OPHTHALMIC at 22:16

## 2023-05-21 RX ADMIN — ALBUTEROL SULFATE 2.5 MG: 2.5 SOLUTION RESPIRATORY (INHALATION) at 01:28

## 2023-05-21 RX ADMIN — Medication 3 ML: at 22:11

## 2023-05-21 RX ADMIN — Medication 10 ML: at 10:09

## 2023-05-21 RX ADMIN — SODIUM CHLORIDE 1 DROP: 50 SOLUTION OPHTHALMIC at 15:21

## 2023-05-21 RX ADMIN — ASPIRIN 81 MG: 81 TABLET, COATED ORAL at 15:21

## 2023-05-21 RX ADMIN — ATENOLOL 50 MG: 50 TABLET ORAL at 15:21

## 2023-05-21 RX ADMIN — ROSUVASTATIN CALCIUM 5 MG: 5 TABLET, FILM COATED ORAL at 15:21

## 2023-05-21 NOTE — ED PROVIDER NOTES
EMERGENCY DEPARTMENT ENCOUNTER    Room Number:  11/11  Date seen:  5/21/2023  PCP: Deric Mendosa MD  Historian(s): Patient, paramedics      HPI:  Chief Complaint: Respiratory distress  A complete HPI/ROS/PMH/PSH/SH/FH are unobtainable / limited due to: Patient's respiratory distress  Context: Lizzy Hicks is a 87 y.o. female who presents to the ED via EMS from nursing home for evaluation of acute respiratory distress.  Patient has a history of COPD.  She has been wearing nasal cannula in the facility and has been trying to manage worsening COPD symptoms for the past 3 days.  However tonight over the course of 1 hour, she had a significant worsening in condition with signs of respiratory distress and worsening hypoxia.  When paramedics arrived, they found her to be 85% on her usual 2 L nasal cannula.  They placed her on CPAP and gave a DuoNeb therapy with Solu-Medrol IV.  This did seem to improve a little bit.  Patient denies any chest pain.  She says her breathing still feels labored and she has had some cough and congestion symptoms tonight.        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   • Carotid stenosis, asymptomatic, right 09/24/2018   • Carotid artery disease 04/04/2019   • Macular degeneration 07/17/2020   • COPD exacerbation 05/01/2023   • Acute exacerbation of chronic obstructive pulmonary disease (COPD) 05/01/2023   • Parainfluenza infection 05/02/2023   • Acute respiratory failure with hypoxia      Resolved Ambulatory Problems     Diagnosis Date Noted   • Cataract 01/20/2016   • Hip fracture 01/20/2016   • Medicare annual wellness visit, subsequent 01/20/2016   • Swelling of right lower extremity 01/20/2016   •  Seborrheic dermatitis 01/20/2016     Past Medical History:   Diagnosis Date   • Anesthesia complication    • Carotid stenosis    • History of bronchitis    • Swallowing difficulty          PAST SURGICAL HISTORY  Past Surgical History:   Procedure Laterality Date   • CAROTID ENDARTERECTOMY Right 9/24/2018    Procedure: RT CAROTID ENDARTERECTOMY WITH INTRA OPERATIVE CAROTID ARTERY DUPLEX SCAN;  Surgeon: Mikaela Galicia Jr., MD;  Location: [facility] OR;  Service: Vascular   • CAROTID ENDARTERECTOMY Left 4/4/2019    Procedure: LEFT CAROTID ENDARTERECTOMY;  Surgeon: Mikaela Galicia Jr., MD;  Location: [facility] OR;  Service: Vascular   • CATARACT EXTRACTION WITH INTRAOCULAR LENS IMPLANT      LEFT AND RIGHT   • ORIF FEMUR FRACTURE Right     HARDWARE   • TONSILLECTOMY AND ADENOIDECTOMY      INADVERTANTLY TOOK UVULA AT THIS TIME         FAMILY HISTORY  Family History   Problem Relation Age of Onset   • Malig Hyperthermia Neg Hx          SOCIAL HISTORY  Social History     Socioeconomic History   • Marital status:    Tobacco Use   • Smoking status: Former     Packs/day: 0.25     Years: 40.00     Pack years: 10.00     Types: Cigarettes   • Smokeless tobacco: Never   • Tobacco comments:     quit 6 yr ago   Vaping Use   • Vaping Use: Never used   Substance and Sexual Activity   • Alcohol use: Yes     Comment: 1-2 drinks day   • Drug use: No   • Sexual activity: Defer         ALLERGIES  Bee venom        REVIEW OF SYSTEMS  Review of Systems   Unable to perform ROS: Severe respiratory distress   HENT: Positive for congestion.    Respiratory: Positive for cough and shortness of breath.             PHYSICAL EXAM  ED Triage Vitals [05/21/23 0059]   Temp Heart Rate Resp BP SpO2   -- 97 22 (!) 188/81 97 %      Temp src Heart Rate Source Patient Position BP Location FiO2 (%)   -- Monitor Sitting Right arm --       Physical Exam      GENERAL: Elderly lady, appears ill no diaphoresis  HENT: nares patent,  normocephalic and atraumatic, mucous membranes dry  EYES: no scleral icterus, EOMI, normal conjunctiva  CV: regular rhythm, normal rate around 100 bpm, normal distal pulses  RESPIRATORY: Moderate accessory muscle use notable, diminished breath sounds bilaterally, no stridor, bilateral scattered wheezes and rhonchi notable.  ABDOMEN: soft, nontender in all quadrants  MUSCULOSKELETAL: no deformity, no asymmetry, no edema  NEURO: alert, moves all extremities, follows commands  PSYCH:  calm, cooperative  SKIN: warm, dry    Vital signs and nursing notes reviewed.        LAB RESULTS  Recent Results (from the past 24 hour(s))   ECG 12 Lead Dyspnea    Collection Time: 05/21/23  1:04 AM   Result Value Ref Range    QT Interval 354 ms   Comprehensive Metabolic Panel    Collection Time: 05/21/23  1:11 AM    Specimen: Blood   Result Value Ref Range    Glucose 142 (H) 65 - 99 mg/dL    BUN 25 (H) 8 - 23 mg/dL    Creatinine 0.62 0.57 - 1.00 mg/dL    Sodium 134 (L) 136 - 145 mmol/L    Potassium 3.9 3.5 - 5.2 mmol/L    Chloride 92 (L) 98 - 107 mmol/L    CO2 37.4 (H) 22.0 - 29.0 mmol/L    Calcium 10.4 8.6 - 10.5 mg/dL    Total Protein 6.5 6.0 - 8.5 g/dL    Albumin 3.5 3.5 - 5.2 g/dL    ALT (SGPT) 20 1 - 33 U/L    AST (SGOT) 15 1 - 32 U/L    Alkaline Phosphatase 69 39 - 117 U/L    Total Bilirubin 0.3 0.0 - 1.2 mg/dL    Globulin 3.0 gm/dL    A/G Ratio 1.2 g/dL    BUN/Creatinine Ratio 40.3 (H) 7.0 - 25.0    Anion Gap 4.6 (L) 5.0 - 15.0 mmol/L    eGFR 87.4 >60.0 mL/min/1.73   Protime-INR    Collection Time: 05/21/23  1:11 AM    Specimen: Blood   Result Value Ref Range    Protime 13.2 11.7 - 14.2 Seconds    INR 0.99 0.90 - 1.10   aPTT    Collection Time: 05/21/23  1:11 AM    Specimen: Blood   Result Value Ref Range    PTT 29.9 22.7 - 35.4 seconds   BNP    Collection Time: 05/21/23  1:11 AM    Specimen: Blood   Result Value Ref Range    proBNP 1,653.0 0.0 - 1,800.0 pg/mL   Single High Sensitivity Troponin T    Collection Time: 05/21/23   1:11 AM    Specimen: Blood   Result Value Ref Range    HS Troponin T 16 (H) <10 ng/L   CBC Auto Differential    Collection Time: 05/21/23  1:11 AM    Specimen: Blood   Result Value Ref Range    WBC 12.22 (H) 3.40 - 10.80 10*3/mm3    RBC 3.95 3.77 - 5.28 10*6/mm3    Hemoglobin 12.4 12.0 - 15.9 g/dL    Hematocrit 36.3 34.0 - 46.6 %    MCV 91.9 79.0 - 97.0 fL    MCH 31.4 26.6 - 33.0 pg    MCHC 34.2 31.5 - 35.7 g/dL    RDW 11.8 (L) 12.3 - 15.4 %    RDW-SD 39.6 37.0 - 54.0 fl    MPV 8.8 6.0 - 12.0 fL    Platelets 246 140 - 450 10*3/mm3    Neutrophil % 72.8 42.7 - 76.0 %    Lymphocyte % 15.8 (L) 19.6 - 45.3 %    Monocyte % 8.7 5.0 - 12.0 %    Eosinophil % 2.0 0.3 - 6.2 %    Basophil % 0.5 0.0 - 1.5 %    Immature Grans % 0.2 0.0 - 0.5 %    Neutrophils, Absolute 8.90 (H) 1.70 - 7.00 10*3/mm3    Lymphocytes, Absolute 1.93 0.70 - 3.10 10*3/mm3    Monocytes, Absolute 1.06 (H) 0.10 - 0.90 10*3/mm3    Eosinophils, Absolute 0.24 0.00 - 0.40 10*3/mm3    Basophils, Absolute 0.06 0.00 - 0.20 10*3/mm3    Immature Grans, Absolute 0.03 0.00 - 0.05 10*3/mm3    nRBC 0.0 0.0 - 0.2 /100 WBC   Procalcitonin    Collection Time: 05/21/23  1:11 AM    Specimen: Blood   Result Value Ref Range    Procalcitonin 0.13 0.00 - 0.25 ng/mL   Lactic Acid, Plasma    Collection Time: 05/21/23  1:11 AM    Specimen: Blood   Result Value Ref Range    Lactate 0.9 0.5 - 2.0 mmol/L   Blood Gas, Arterial -    Collection Time: 05/21/23  1:12 AM    Specimen: Arterial Blood   Result Value Ref Range    Site Arterial: left radial     Lukasz's Test Positive     pH, Arterial 7.377 7.350 - 7.450 pH units    pCO2, Arterial 67.1 (C) 35.0 - 45.0 mm Hg    pO2, Arterial 113.8 (H) 80.0 - 100.0 mm Hg    HCO3, Arterial 39.4 (H) 22.0 - 28.0 mmol/L    Base Excess, Arterial 11.0 (H) 0.0 - 2.0 mmol/L    O2 Saturation Calculated 98.1 92.0 - 99.0 %    A-a DO2 0.8 mmHg    Barometric Pressure for Blood Gas 752.2 mmHg    Modality BiPap     FIO2 30 %    [facility] 18     Rate 18  Breaths/minute       Ordered the above labs and reviewed the results.        RADIOLOGY  XR Chest 1 View    Result Date: 5/21/2023  Patient: ROCÍO REYNOSO  Time Out: 01:59 Exam(s): XR CXR 1 VIEW EXAM:   XR Chest, 1 View CLINICAL HISTORY:    Reason for exam: Dyspnea. TECHNIQUE:   Frontal view of the chest. COMPARISON:   No previous studies. FINDINGS:   Lungs:  Mild COPD.  No consolidative changes.   Pleural space:  Unremarkable.  No pneumothorax.  No pleural effusions.   Heart:  Cardiomegaly.   Mediastinum:  Unremarkable.   Bones joints:  Osteopenia. IMPRESSION:     1.  Mild COPD. 2.  Cardiomegaly. 3.  Osteopenia. 4.  Atherosclerotic disease.     Electronically signed by Abhijit Black MD on 05-21-23 at 0159      Ordered the above noted radiological studies. Reviewed by me in PACS.      PROCEDURES  Critical Care  Performed by: Franki Rodrigues MD  Authorized by: Jim Edge MD     Critical care provider statement:     Critical care time (minutes):  40    Critical care time was exclusive of:  Separately billable procedures and treating other patients and teaching time    Critical care was necessary to treat or prevent imminent or life-threatening deterioration of the following conditions:  Respiratory failure    Critical care was time spent personally by me on the following activities:  Development of treatment plan with patient or surrogate, discussions with consultants, evaluation of patient's response to treatment, examination of patient, interpretation of cardiac output measurements, obtaining history from patient or surrogate, ordering and performing treatments and interventions, ordering and review of laboratory studies, ordering and review of radiographic studies, pulse oximetry, re-evaluation of patient's condition and review of old charts    I assumed direction of critical care for this patient from another provider in my specialty: no      Care discussed with: admitting provider          EKG            EKG time/Interp time: 0104/0105  Rhythm/Rate: Sinus rhythm, 91 bpm  P waves and NM: Present, 159 ms  QRS, axis: 90 ms, borderline right axis deviation  ST and T waves: No ST segment elevations are notable.  However interpretation is somewhat limited because of respiratory motion artifact.    Independently interpreted by me contemporaneously with treatment      MEDICATIONS GIVEN IN ER  Medications   sodium chloride 0.9 % flush 10 mL (has no administration in time range)   albuterol (PROVENTIL) nebulizer solution 0.083% 2.5 mg/3mL (2.5 mg Nebulization Given 5/21/23 0133)           MEDICAL DECISION MAKING, PROGRESS, and CONSULTS    All labs have been independently reviewed by me.  All radiology studies have been reviewed by me and I have also reviewed the radiology report.   EKG's independently viewed and interpreted by me.  Discussion below represents my analysis of pertinent findings related to patient's condition, differential diagnosis, treatment plan and final disposition.      Additional sources:  - Discussed/ obtained information from independent historians:  I discussed with paramedics to receive report on patient's condition on arrival and treatments initiated in route to the hospital      - External (non-ED) record review: I reviewed most recent hospital discharge summary from May 4, 2023 when patient was admitted here for acute exacerbation of COPD in the context of parainfluenza infection.  Pulmonology was consulted at that time    - Chronic or social conditions impacting care: Elderly patient with COPD, currently in a nursing care facility        Orders placed during this visit:  Orders Placed This Encounter   Procedures   • Critical Care   • Respiratory Panel PCR w/COVID-19(SARS-CoV-2) LUIS CARLOS/JAIMEE/JOI/PAD/[facility]/KARLEE In-House, NP Swab in UTM/VTM, 3-4 HR TAT - Swab, Nasopharynx   • XR Chest 1 View   • Comprehensive Metabolic Panel   • Protime-INR   • aPTT   • BNP   • Blood Gas, Arterial -   • Single High  Sensitivity Troponin T   • CBC Auto Differential   • Procalcitonin   • Lactic Acid, Plasma   • Blood Gas, Arterial -   • Monitor Blood Pressure   • Pulse Oximetry, Continuous   • [facility] (on-call MD unless specified) Details   • ECG 12 Lead Dyspnea   • Insert Peripheral IV   • Inpatient Admission   • CBC & Differential           Differential diagnosis includes but is not limited to:    COPD exacerbation, pneumonia, pneumothorax, pulmonary embolus, respiratory failure, CHF exacerbation      Independent interpretation of labs, radiology studies, and discussions with consultants:  ED Course as of 05/21/23 0226   Sun May 21, 2023   0102 I just evaluated the patient in the room immediately after she arrived via EMS.  She shows signs of respiratory failure and therefore we are placing on BiPAP for positive pressure ventilation support. [SAMINA]   0103 pCO2, Arterial(!!): 67.1 [SAMINA]   0214 Patient doing much better clinically at this time on BiPAP.  I think the steroids and nebulized treatments have opened her up significantly.  Her work of breathing is much more normal now. [SAMINA]   0215 I just spoke with Jasmin from [facility] about this patient.  She agrees to accept her to the hospitalist service for further management tonight. [SAMINA]   0215 I independently interpreted the chest x-ray and my findings are: COPD features, no pneumothorax, no infiltrate [SAMINA]      ED Course User Index  [SAMINA] Franki Rodrigues MD         DIAGNOSIS  Final diagnoses:   Acute respiratory failure with hypoxia and hypercapnia   COPD with acute exacerbation         DISPOSITION  Admit to [facility], telemetry        Latest Documented Vital Signs:  As of 02:26 EDT  BP- 138/83 HR- 83 Temp- 96.9 °F (36.1 °C) (Tympanic) O2 sat- 98%              --    Please note that portions of this were completed with a voice recognition program.       Note Disclaimer: At [facility], we believe that sharing information builds trust and better relationships. You are receiving this note  because you are receiving care at [facility] or recently visited. It is possible you will see health information before a provider has talked with you about it. This kind of information can be easy to misunderstand. To help you fully understand what it means for your health, we urge you to discuss this note with your provider.           Franki Rodrigues MD  05/21/23 0226

## 2023-05-21 NOTE — CONSULTS
Patient Care Team:  Deric Mendosa MD as PCP - General (Family Medicine)      Subjective     Patient is a 87 y.o. female.  Asked to see for acute respiratory failure with hypoxia.  Former smoker who smoked for many years starting at age 24 quit in 2012 anywhere from a quarter to a pack a day.  She was admitted back earlier this month for an acute exacerbation with parainfluenza virus infection and respiratory failure.  Patient was brought in today to the ER by EMS from a nursing home for shortness of breath.  Apparently 3 days of worsening symptoms at the nursing home but more acute decline this evening apparently EMS found her 85% saturations on her home 2 L O2 they put her on CPAP and gave her a DuoNeb therapy and IV Solu-Medrol and brought her in.  Blood gas here on BiPAP showed a pH of 7.38 PCO2 67 PO2 of 114 looking back on her previous admission she also was CO2 retaining then some.  And it looked to be partly chronic because her serum bicarb was up then as well.  I found the patient up in her floor room on 100% nonrebreather facemask barely arousable mumbling and basically incoherent.  She had a noninvasive ventilator sitting next to her she been using it in the ER apparently they transported her up on a nonrebreather facemask and she was left on this.    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 Baseline Na 128-130 since 2013.   • Seborrheic dermatitis 01/20/2016   • Swallowing difficulty     D/T INADVERTANT REMOVAL OF UVULA AS CHILD WITH T AND A     Past  Surgical History:   Procedure Laterality Date   • CAROTID ENDARTERECTOMY Right 9/24/2018    Procedure: RT CAROTID ENDARTERECTOMY WITH INTRA OPERATIVE CAROTID ARTERY DUPLEX SCAN;  Surgeon: Mikaela Galicia Jr., MD;  Location: [facility] OR;  Service: Vascular   • CAROTID ENDARTERECTOMY Left 4/4/2019    Procedure: LEFT CAROTID ENDARTERECTOMY;  Surgeon: Mikaela Galicia Jr., MD;  Location: [facility] OR;  Service: Vascular   • CATARACT EXTRACTION WITH INTRAOCULAR LENS IMPLANT      LEFT AND RIGHT   • ORIF FEMUR FRACTURE Right     HARDWARE   • TONSILLECTOMY AND ADENOIDECTOMY      INADVERTANTLY TOOK UVULA AT THIS TIME     Social History     Socioeconomic History   • Marital status:    Tobacco Use   • Smoking status: Former     Packs/day: 0.25     Years: 40.00     Pack years: 10.00     Types: Cigarettes   • Smokeless tobacco: Never   • Tobacco comments:     quit 6 yr ago   Vaping Use   • Vaping Use: Never used   Substance and Sexual Activity   • Alcohol use: Yes     Comment: 1-2 drinks day   • Drug use: No   • Sexual activity: Defer     Family History   Problem Relation Age of Onset   • Malig Hyperthermia Neg Hx          Allergies:  Bee venom    Medications:  Prior to Admission medications    Medication Sig Start Date End Date Taking? Authorizing Provider   amLODIPine (NORVASC) 5 MG tablet TAKE ONE TABLET BY MOUTH DAILY 9/14/22  Yes Deric Mendosa MD   aspirin 81 MG EC tablet Take 1 tablet by mouth Daily. WILL FOLLOW MD ORDERS   Yes Angie Espino MD   atenolol (TENORMIN) 50 MG tablet TAKE ONE TABLET BY MOUTH DAILY 8/9/22  Yes Deric Mendosa MD   calcium carbonate-cholecalciferol 500-400 MG-UNIT tablet tablet Take 1 tablet by mouth 2 (Two) Times a Day.   Yes Angie Espino MD   Fluticasone-Salmeterol (ADVAIR/WIXELA) 100-50 MCG/ACT DISKUS INHALE ONE PUFF BY MOUTH TWICE A DAY 1/16/23  Yes Deric Mendosa MD   moxifloxacin (VIGAMOX) 0.5 % ophthalmic solution Administer 1 drop to the right eye 2  "(Two) Times a Day.   Yes Angie Espino MD   Multiple Vitamins-Minerals (ICAPS AREDS 2 PO) Take 1 tablet by mouth 2 (Two) Times a Day. HOLD PRIOR TO SURG   Yes Angie Espino MD   Multiple Vitamins-Minerals (MULTIVITAMIN ADULTS 50+ PO) Take 1 tablet by mouth Daily. HOLD PRIOR TO SURG   Yes Angie Espino MD   rosuvastatin (CRESTOR) 5 MG tablet TAKE ONE TABLET BY MOUTH DAILY 9/20/22  Yes Deric Mendosa MD   alendronate (FOSAMAX) 70 MG tablet TAKE 1 TABLET BY MOUTH ONCE WEEKLY ON AN EMPTY STOMACH BEFORE BREAKFAST. REMAIN UPRIGHT FOR 30 MINUTES AND TAKE WITH 8 OUNCES OF WATER 12/22/22   Deric Mendosa MD   prednisoLONE acetate (PRED FORTE) 1 % ophthalmic suspension Administer  to the right eye 4 (Four) Times a Day. 3/9/23   Angie Esipno MD   sodium chloride (GREGORY 128) 5 % ophthalmic solution Administer 1 drop to the right eye 4 (Four) Times a Day.    Angie Espino MD     ipratropium-albuterol, 3 mL, Nebulization, Q6H While Awake - RT  methylPREDNISolone sodium succinate, 60 mg, Intravenous, Q12H  sodium chloride, 10 mL, Intravenous, Q12H           Objective     Vital Signs  Vital Sign Min/Max for last 24 hours  Temp  Min: 96.9 °F (36.1 °C)  Max: 97.7 °F (36.5 °C)   BP  Min: 138/83  Max: 188/81   Pulse  Min: 82  Max: 97   Resp  Min: 19  Max: 24   SpO2  Min: 94 %  Max: 100 %   Flow (L/min)  Min: 15  Max: 15   Weight  Min: 57.2 kg (126 lb 1.7 oz)  Max: 58.1 kg (128 lb)     No intake or output data in the 24 hours ending 05/21/23 0501  No intake/output data recorded.  Last Weight and Admission Weight        05/21/23  0336   Weight: 57.2 kg (126 lb 1.7 oz)     Flowsheet Rows    Flowsheet Row First Filed Value   Admission Height 160 cm (63\") Documented at 05/21/2023 0059   Admission Weight 58.1 kg (128 lb) Documented at 05/21/2023 0059          Body mass index is 22.34 kg/m².           Physical Exam:  General Appearance: Elderly white female lying in bed on 100% nonrebreather facemask " saturations 100% extremely difficult to arouse and when awake just mumbles a little bit incoherently.  I did place her on noninvasive ventilation and discussed with the nurse.  Eyes: Conjunctiva are clear and anicteric pupils are about 4 mm equal reactive to light  ENT: Oral mucous membranes are little dry no erythema no exudates Mallampati type II airway and nasal septum midline  Neck: No adenopathy or thyromegaly no jugular venous tension, trachea midline  Lungs: She did have bilateral expiratory wheezing and she had some coarse crackles in the right lung base and maybe a few in the left.    Cardiac: Regular rate and rhythm no murmur  Abdomen: Soft no palpable hepatosplenomegaly or masses  : Not examined  Musculoskeletal: Moderate thoracic kyphosis  Skin: Warm and dry no jaundice no petechiae  Neuro: Again very somnolent arousable mumbles a few words she did reach up and pull her mask off but not really following commands  Extremities/P Vascular: No clubbing no cyanosis no edema palpable radial and dorsalis pedis pulses  MSE: Unable to assess      Labs:  Results from last 7 days   Lab Units 05/21/23  0111   GLUCOSE mg/dL 142*   SODIUM mmol/L 134*   POTASSIUM mmol/L 3.9   CO2 mmol/L 37.4*   CHLORIDE mmol/L 92*   ANION GAP mmol/L 4.6*   CREATININE mg/dL 0.62   BUN mg/dL 25*   BUN / CREAT RATIO  40.3*   CALCIUM mg/dL 10.4   ALK PHOS U/L 69   TOTAL PROTEIN g/dL 6.5   ALT (SGPT) U/L 20   AST (SGOT) U/L 15   BILIRUBIN mg/dL 0.3   ALBUMIN g/dL 3.5   GLOBULIN gm/dL 3.0     Estimated Creatinine Clearance: 59.9 mL/min (by C-G formula based on SCr of 0.62 mg/dL).      Results from last 7 days   Lab Units 05/21/23  0422 05/21/23  0111   WBC 10*3/mm3 11.23* 12.22*   RBC 10*6/mm3 3.66* 3.95   HEMOGLOBIN g/dL 11.6* 12.4   HEMATOCRIT % 33.4* 36.3   MCV fL 91.3 91.9   MCH pg 31.7 31.4   MCHC g/dL 34.7 34.2   RDW % 11.6* 11.8*   RDW-SD fl 39.3 39.6   MPV fL 8.7 8.8   PLATELETS 10*3/mm3 241 246   NEUTROPHIL % %  --  72.8    LYMPHOCYTE % %  --  15.8*   MONOCYTES % %  --  8.7   EOSINOPHIL % %  --  2.0   BASOPHIL % %  --  0.5   IMM GRAN % %  --  0.2   NEUTROS ABS 10*3/mm3  --  8.90*   LYMPHS ABS 10*3/mm3  --  1.93   MONOS ABS 10*3/mm3  --  1.06*   EOS ABS 10*3/mm3  --  0.24   BASOS ABS 10*3/mm3  --  0.06   IMMATURE GRANS (ABS) 10*3/mm3  --  0.03   NRBC /100 WBC  --  0.0     Results from last 7 days   Lab Units 05/21/23  0112   PH, ARTERIAL pH units 7.377   PO2 ART mm Hg 113.8*   PCO2, ARTERIAL mm Hg 67.1*   HCO3 ART mmol/L 39.4*     Results from last 7 days   Lab Units 05/21/23  0111   HSTROP T ng/L 16*     Results from last 7 days   Lab Units 05/21/23  0111   PROBNP pg/mL 1,653.0         Results from last 7 days   Lab Units 05/21/23  0111   LACTATE mmol/L 0.9   PROCALCITONIN ng/mL 0.13     Results from last 7 days   Lab Units 05/21/23  0111   INR  0.99     Microbiology Results (last 10 days)     ** No results found for the last 240 hours. **            Diagnostics:  XR Chest 2 View    Result Date: 5/1/2023  CHEST: 2 VIEWS  HISTORY: Shortness of air  COMPARISON: Two-view chest 03/29/2019.  FINDINGS:Cardiomediastinal silhouette is within normal limits. Mitral annulus calcifications are noted. There are also atherosclerotic vascular involving the thoracic aorta. There are chronic-appearing increased interstitial markings. The lungs appear clear of focal airspace disease and there is no evidence for pulmonary edema or pleural effusion. A calcified nodule superimposes the posterior lower lobes on the lateral view.      No evidence for active disease in the chest.  This report was finalized on 5/1/2023 5:59 PM by Dr. Isiah Fuentes M.D.      XR Chest 1 View    Result Date: 5/21/2023  Patient: ROCÍO REYNOSO  Time Out: 01:59 Exam(s): XR CXR 1 VIEW EXAM:   XR Chest, 1 View CLINICAL HISTORY:    Reason for exam: Dyspnea. TECHNIQUE:   Frontal view of the chest. COMPARISON:   No previous studies. FINDINGS:   Lungs:  Mild COPD.  No consolidative  changes.   Pleural space:  Unremarkable.  No pneumothorax.  No pleural effusions.   Heart:  Cardiomegaly.   Mediastinum:  Unremarkable.   Bones joints:  Osteopenia. IMPRESSION:     1.  Mild COPD. 2.  Cardiomegaly. 3.  Osteopenia. 4.  Atherosclerotic disease.     Electronically signed by Abhijit Black MD on 05-21-23 at 0159    Results for orders placed during the hospital encounter of 08/31/18    Adult Transthoracic Echo Complete W/ Cont if Necessary Per Protocol    Interpretation Summary  · Calculated right ventricular systolic pressure from tricuspid regurgitation is 45 mmHg.  · Left ventricular systolic function is normal. Estimated EF = 63%.      Chest x-ray reviewed and I am not sure I agree with radiology I think there is a little somewhat nodular density in the right base that is new from earlier this month.    Assessment & Plan     1. Acute on chronic hypoxemic and hypercapnic respiratory failure she was compensating fairly well on noninvasive ventilator in the emergency department.  I am not sure why she is not on that now the nonrebreather is about the worst delivery device for her with her CO2 retention.  I have her back on the noninvasive ventilator I tweak the settings slightly to improve minute ventilation even more.  I did try and discussed with nursing the dangers of this.  2. Encephalopathy probably metabolic secondary to CO2 retention I suspect she will improve once we get her off of the nonrebreather facemask and back on the noninvasive ventilator.  3. Acute exacerbation COPD she is wheezing significantly with a smoking history and history of COPD. I note admit medications list Advair 100-1 puff twice daily this is not a COPD dose , I also do not see that she is on any anticholinergics which should be a mainstay of COPD treatment she will need her home medications adjusted/corrected prior to discharge to try and reduce recurrences.  For now DuoNebs.  I agree with steroids I do not think she  needs superhigh dose steroids.  Just standard exacerbation dosing.  Her viral respiratory panel is negative this time as is her procalcitonin she did have a mild leukocytosis.  There is a little density on the chest x-ray in that right base and she definitely has some crackling in that right base and may be a little in the left base I think she probably should be on antibiotics for 2 reasons 1 possible pneumonia and 2 the data showing benefits with COPD exacerbations particularly if there is not at documented viral syndrome.  With a negative respiratory panel we probably do not need to cover atypical organisms.  I started Rocephin.  4. Hyponatremia very mild looks like she was hyponatremic previously and this is actually a little better I doubt its low enough to be causing any of her encephalopathy but it should be followed to ensure it does not worsen.      Giuliano Pathak Jr, MD  05/21/23  05:01 EDT    Time: Critical care time 48 minutes

## 2023-05-21 NOTE — PLAN OF CARE
Goal Outcome Evaluation:   Pt arrived 4E around 0330. On 15L HF non-rebreather. Admission done, pulmonology consulted per order. Dr. Pathak came and switched pt to BiPAP, order put in. VSS, no SOA and symptoms improved a lot per pt. Will continue to monitor.

## 2023-05-21 NOTE — ED TRIAGE NOTES
Pt presents to ED via [facility] from [place] with increased SOA x2-3 days with dramatic decline the past hour. Hx of COPD.     Upon EMS arrival-pt was 85% on 2L/min via nasal canula. EMS placed pt on CPAP with improvement-SPO2 of 96% on arrival to ED room.  Pt reports feeling terrible the past couple days-recently hospitalized with parainfluenza.

## 2023-05-21 NOTE — ED NOTES
"Flakita with [facility] called at this time stating \"Pt is being sent out for SOA. Pt has been on prednisone for the SOA since 5/10/23 and is normally on 2L NC. However, clarke Pt began experiencing extreme SOA her O2 sats were at 83% on 2L and we were unable to get them to come up. Pt requested to come back to [facility].\"  "

## 2023-05-21 NOTE — PLAN OF CARE
Goal Outcome Evaluation:      VSS. Pt took off bipap this morning and maintaining o2 on 1L. Conintue Iv ABX. PT OT and SLP consulted. Pt had no complaints, Daughter at bedside. Will continue to monitor.

## 2023-05-21 NOTE — H&P
[facility] Medicine Services  HISTORY AND PHYSICAL    Patient Name: Lizzy Hicks  : 1935  MRN: [id number]  Primary Care Physician: Deric Mendosa MD  Date of admission: 2023    Subjective   Subjective   Chief Complaint:  hypoxia    HPI:  Lizzy Hicks is a 87 y.o. female who presented to the emergency room department with EMS for difficulty breathing.  Patient has known history of COPD.  Patient reportedly was wearing her nasal cannula in a facility and trying to manage some worsening COPD symptoms for reportedly 3 days now.  Before admission on  she developed severe acute shortness of breath that was worse with any type of exertion, associated with cough, but not particularly improved at rest.  She was found to be hypoxic.  She notes significant mucus production.  Notably she was recently infected with parainfluenza.  Her ABG showed hypercapnia along with the hypoxemia.  She is being admitted for further medical management.  She is receiving BiPAP during my evaluation.  Patient appeared to somewhat lethargic and confusion but is speaking during my evaluation.      Review of Systems   Constitutional: Positive for fatigue. Negative for fever.   HENT: Negative.    Eyes: Negative.    Respiratory: Positive for cough, shortness of breath and wheezing.    Cardiovascular: Negative for chest pain and palpitations.   Gastrointestinal: Negative.    Endocrine: Negative.    Genitourinary: Negative.    Musculoskeletal: Negative.    Skin: Negative.    Allergic/Immunologic: Negative.    Neurological: Negative.    Hematological: Negative.    Psychiatric/Behavioral: Positive for confusion. Negative for agitation.        All other systems reviewed and are negative.     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 Baseline Na 128-130 since 2013.   • Seborrheic dermatitis 01/20/2016   • Swallowing difficulty     D/T INADVERTANT REMOVAL OF UVULA AS CHILD WITH T AND A       Past Surgical History:   Procedure Laterality Date   • CAROTID ENDARTERECTOMY Right 9/24/2018    Procedure: RT CAROTID ENDARTERECTOMY WITH INTRA OPERATIVE CAROTID ARTERY DUPLEX SCAN;  Surgeon: Mikaela Galicia Jr., MD;  Location: [facility];  Service: Vascular   • CAROTID ENDARTERECTOMY Left 4/4/2019    Procedure: LEFT CAROTID ENDARTERECTOMY;  Surgeon: Mikaela Galicia Jr., MD;  Location: [facility];  Service: Vascular   • CATARACT EXTRACTION WITH INTRAOCULAR LENS IMPLANT      LEFT AND RIGHT   • ORIF FEMUR FRACTURE Right     HARDWARE   • TONSILLECTOMY AND ADENOIDECTOMY      INADVERTANTLY TOOK UVULA AT THIS TIME       Family History: family history is not on file. Other pertinent FHx was reviewed and unremarkable.     Social History:  reports that she has quit smoking. Her smoking use included cigarettes. She has a 10.00 pack-year smoking history. She has never used smokeless tobacco. She reports current alcohol use. She reports that she does not use drugs.      Medications:  Available home medication information reviewed.    Allergies   Allergen Reactions   • Bee Venom Swelling       Objective   Objective   Vital Signs:   Temp:  [96.9 °F (36.1 °C)-97.7 °F (36.5 °C)] 97.7 °F (36.5 °C)  Heart Rate:  [79-97] 83  Resp:  [16-24] 16  BP: (138-188)/(44-83) 156/49        Physical Exam   Constitutional: Awake, alert, acute on chronically ill-appearing  Eyes: PERRLA, sclerae anicteric, no conjunctival injection  HENT: NCAT, mucous membranes moist  Neck: Supple, no thyromegaly, no lymphadenopathy, trachea midline  Respiratory: Cough,  coarse sounds, wheezes are present, patient with some tachypnea, breathing somewhat labored  Cardiovascular: Pulse rate is normal, palpable radial pulses bilaterally  Gastrointestinal: Positive bowel sounds, soft, nontender, nondistended  Musculoskeletal: Elderly frail and chronically debilitated in appearance, BMI is 22, mild lower extremity edema  Psychiatric: Anxious affect, cooperative  Neurologic: Somewhat lethargic but somewhat conversational, mentation is slow, partially oriented, able to follow simple commands  Skin: No rashes or jaundice, pale      Results from last 7 days   Lab Units 05/21/23  0422 05/21/23  0111   WBC 10*3/mm3 11.23* 12.22*   HEMOGLOBIN g/dL 11.6* 12.4   HEMATOCRIT % 33.4* 36.3   PLATELETS 10*3/mm3 241 246   INR   --  0.99     Results from last 7 days   Lab Units 05/21/23  0422 05/21/23  0111   SODIUM mmol/L 134* 134*   POTASSIUM mmol/L 4.4 3.9   CHLORIDE mmol/L 92* 92*   CO2 mmol/L 36.8* 37.4*   BUN mg/dL 23 25*   CREATININE mg/dL 0.50* 0.62   GLUCOSE mg/dL 157* 142*   CALCIUM mg/dL 10.3 10.4   ALT (SGPT) U/L  --  20   AST (SGOT) U/L  --  15   HSTROP T ng/L  --  16*   PROBNP pg/mL  --  1,653.0   LACTATE mmol/L  --  0.9   PROCALCITONIN ng/mL  --  0.13     Estimated Creatinine Clearance: 74.3 mL/min (A) (by C-G formula based on SCr of 0.5 mg/dL (L)).  Brief Urine Lab Results  (Last result in the past 365 days)      Color   Clarity   Blood   Leuk Est   Nitrite   Protein   CREAT   Urine HCG        07/29/22 1254 Yellow   Clear   Negative   Negative   Negative   2+               Imaging Results (Last 24 Hours)     Procedure Component Value Units Date/Time    XR Chest 1 View [[id number]] Collected: 05/21/23 0200     Updated: 05/21/23 0200    Narrative:        Patient: ROCÍO REYNOSO  Time Out: 01:59  Exam(s): XR CXR 1 VIEW     EXAM:    XR Chest, 1 View    CLINICAL HISTORY:     Reason for exam: Dyspnea.    TECHNIQUE:    Frontal view of the chest.    COMPARISON:    No previous  studies.    FINDINGS:    Lungs:  Mild COPD.  No consolidative changes.    Pleural space:  Unremarkable.  No pneumothorax.  No pleural effusions.    Heart:  Cardiomegaly.    Mediastinum:  Unremarkable.    Bones joints:  Osteopenia.    IMPRESSION:       1.  Mild COPD.  2.  Cardiomegaly.  3.  Osteopenia.  4.  Atherosclerotic disease.      Impression:          Electronically signed by Abhijit Black MD on 05-21-23 at 0159        Results for orders placed during the hospital encounter of 08/31/18    Adult Transthoracic Echo Complete W/ Cont if Necessary Per Protocol    Interpretation Summary  · Calculated right ventricular systolic pressure from tricuspid regurgitation is 45 mmHg.  · Left ventricular systolic function is normal. Estimated EF = 63%.      Assessment & Plan   Assessment & Plan     Active Hospital Problems    Diagnosis  POA   • **Acute respiratory failure with hypoxia and hypercapnia [J96.01, J96.02]  Yes   • Mixed hyperlipidemia [E78.2]  Yes   • Possible pneumonia of right lower lobe due to infectious organism [J18.9]  Yes   • Acute exacerbation of chronic obstructive pulmonary disease (COPD) [J44.1]  Yes   • Carotid artery disease [I77.9]  Yes   • Hypertension [I10]  Yes   • Osteoarthritis [M19.90]  Yes     87-year-old female presents to the hospital with acute hypoxic and hypercapnic respiratory failure following recent parainfluenza infection and has concern for possible postviral bacterial right lower lobe pneumonia.    Discussion/plan for today:  Pulmonology team has been consulted.  BiPAP intermittently if patient becomes lethargic.  Try to control oxygen levels to closer to 90% and avoid hyperoxia.  Supportive care and symptom treatment.  Chest x-ray images reviewed and possible right lower lobe infiltrate.  Ceftriaxone for antibacterial coverage.  Supportive care and symptom treatment.  Careful monitoring.  Duo nebulizers for now.  Systemic steroids.    Restart home blood pressure medications.   Monitor blood pressure and adjust as needed.  Previous echocardiogram from 2018 reviewed and normal EF of 63% but slightly elevated RVSP.    Continue statin for hyperlipidemia.  Previous lipids reviewed.    Tylenol as needed for arthritis.    Continue chronic eyedrops.    Treatment plan discussed with the patient who is in agreement.  She understands that due to her advanced age and comorbid conditions she is at higher risk for morbidity and mortality.    DVT prophylaxis: Lovenox    CODE STATUS:  Code Status and Medical Interventions:   Ordered at: 05/21/23 0312     Code Status (Patient has no pulse and is not breathing):    CPR (Attempt to Resuscitate)     Medical Interventions (Patient has pulse or is breathing):    Full Support         Osorio Schroeder MD  05/21/23

## 2023-05-22 LAB
ANION GAP SERPL CALCULATED.3IONS-SCNC: 6.3 MMOL/L (ref 5–15)
BUN SERPL-MCNC: 27 MG/DL (ref 8–23)
BUN/CREAT SERPL: 47.4 (ref 7–25)
CALCIUM SPEC-SCNC: 9.1 MG/DL (ref 8.6–10.5)
CHLORIDE SERPL-SCNC: 93 MMOL/L (ref 98–107)
CO2 SERPL-SCNC: 35.7 MMOL/L (ref 22–29)
CREAT SERPL-MCNC: 0.57 MG/DL (ref 0.57–1)
DEPRECATED RDW RBC AUTO: 42.5 FL (ref 37–54)
EGFRCR SERPLBLD CKD-EPI 2021: 89.2 ML/MIN/1.73
ERYTHROCYTE [DISTWIDTH] IN BLOOD BY AUTOMATED COUNT: 12.3 % (ref 12.3–15.4)
GLUCOSE SERPL-MCNC: 137 MG/DL (ref 65–99)
HCT VFR BLD AUTO: 30.8 % (ref 34–46.6)
HGB BLD-MCNC: 10.5 G/DL (ref 12–15.9)
MCH RBC QN AUTO: 32.1 PG (ref 26.6–33)
MCHC RBC AUTO-ENTMCNC: 34.1 G/DL (ref 31.5–35.7)
MCV RBC AUTO: 94.2 FL (ref 79–97)
PLATELET # BLD AUTO: 218 10*3/MM3 (ref 140–450)
PMV BLD AUTO: 8.9 FL (ref 6–12)
POTASSIUM SERPL-SCNC: 4.3 MMOL/L (ref 3.5–5.2)
QT INTERVAL: 354 MS
RBC # BLD AUTO: 3.27 10*6/MM3 (ref 3.77–5.28)
SODIUM SERPL-SCNC: 135 MMOL/L (ref 136–145)
WBC NRBC COR # BLD: 9.58 10*3/MM3 (ref 3.4–10.8)

## 2023-05-22 PROCEDURE — 94799 UNLISTED PULMONARY SVC/PX: CPT

## 2023-05-22 PROCEDURE — 97530 THERAPEUTIC ACTIVITIES: CPT

## 2023-05-22 PROCEDURE — 25010000002 ENOXAPARIN PER 10 MG: Performed by: INTERNAL MEDICINE

## 2023-05-22 PROCEDURE — 94660 CPAP INITIATION&MGMT: CPT

## 2023-05-22 PROCEDURE — 85027 COMPLETE CBC AUTOMATED: CPT | Performed by: INTERNAL MEDICINE

## 2023-05-22 PROCEDURE — 92610 EVALUATE SWALLOWING FUNCTION: CPT

## 2023-05-22 PROCEDURE — 97166 OT EVAL MOD COMPLEX 45 MIN: CPT

## 2023-05-22 PROCEDURE — 25010000002 METHYLPREDNISOLONE PER 40 MG: Performed by: INTERNAL MEDICINE

## 2023-05-22 PROCEDURE — 94664 DEMO&/EVAL PT USE INHALER: CPT

## 2023-05-22 PROCEDURE — 25010000002 CEFTRIAXONE PER 250 MG: Performed by: INTERNAL MEDICINE

## 2023-05-22 PROCEDURE — 97162 PT EVAL MOD COMPLEX 30 MIN: CPT

## 2023-05-22 PROCEDURE — 94761 N-INVAS EAR/PLS OXIMETRY MLT: CPT

## 2023-05-22 PROCEDURE — 80048 BASIC METABOLIC PNL TOTAL CA: CPT | Performed by: INTERNAL MEDICINE

## 2023-05-22 RX ORDER — LOSARTAN POTASSIUM 25 MG/1
25 TABLET ORAL
Status: DISCONTINUED | OUTPATIENT
Start: 2023-05-22 | End: 2023-05-24

## 2023-05-22 RX ADMIN — Medication 3 ML: at 09:49

## 2023-05-22 RX ADMIN — METHYLPREDNISOLONE SODIUM SUCCINATE 40 MG: 40 INJECTION, POWDER, FOR SOLUTION INTRAMUSCULAR; INTRAVENOUS at 09:49

## 2023-05-22 RX ADMIN — IPRATROPIUM BROMIDE AND ALBUTEROL SULFATE 3 ML: 2.5; .5 SOLUTION RESPIRATORY (INHALATION) at 15:51

## 2023-05-22 RX ADMIN — PREDNISOLONE ACETATE 1 DROP: 10 SUSPENSION/ DROPS OPHTHALMIC at 16:16

## 2023-05-22 RX ADMIN — IPRATROPIUM BROMIDE AND ALBUTEROL SULFATE 3 ML: 2.5; .5 SOLUTION RESPIRATORY (INHALATION) at 07:27

## 2023-05-22 RX ADMIN — PREDNISOLONE ACETATE 1 DROP: 10 SUSPENSION/ DROPS OPHTHALMIC at 03:10

## 2023-05-22 RX ADMIN — IPRATROPIUM BROMIDE AND ALBUTEROL SULFATE 3 ML: 2.5; .5 SOLUTION RESPIRATORY (INHALATION) at 22:09

## 2023-05-22 RX ADMIN — ATENOLOL 50 MG: 50 TABLET ORAL at 09:48

## 2023-05-22 RX ADMIN — Medication 3 ML: at 21:36

## 2023-05-22 RX ADMIN — SODIUM CHLORIDE 1 DROP: 50 SOLUTION OPHTHALMIC at 16:16

## 2023-05-22 RX ADMIN — IPRATROPIUM BROMIDE AND ALBUTEROL SULFATE 3 ML: 2.5; .5 SOLUTION RESPIRATORY (INHALATION) at 11:45

## 2023-05-22 RX ADMIN — LOSARTAN POTASSIUM 25 MG: 25 TABLET, FILM COATED ORAL at 13:30

## 2023-05-22 RX ADMIN — METHYLPREDNISOLONE SODIUM SUCCINATE 40 MG: 40 INJECTION, POWDER, FOR SOLUTION INTRAMUSCULAR; INTRAVENOUS at 21:54

## 2023-05-22 RX ADMIN — MOXIFLOXACIN HYDROCHLORIDE 0.05 ML: 5 SOLUTION/ DROPS OPHTHALMIC at 09:47

## 2023-05-22 RX ADMIN — MULTIPLE VITAMINS W/ MINERALS TAB 1 TABLET: TAB at 09:48

## 2023-05-22 RX ADMIN — MOXIFLOXACIN HYDROCHLORIDE 0.05 ML: 5 SOLUTION/ DROPS OPHTHALMIC at 21:37

## 2023-05-22 RX ADMIN — SODIUM CHLORIDE 1 DROP: 50 SOLUTION OPHTHALMIC at 09:47

## 2023-05-22 RX ADMIN — ROSUVASTATIN CALCIUM 5 MG: 5 TABLET, FILM COATED ORAL at 09:48

## 2023-05-22 RX ADMIN — PREDNISOLONE ACETATE 1 DROP: 10 SUSPENSION/ DROPS OPHTHALMIC at 09:47

## 2023-05-22 RX ADMIN — SODIUM CHLORIDE 1 DROP: 50 SOLUTION OPHTHALMIC at 21:54

## 2023-05-22 RX ADMIN — ASPIRIN 81 MG: 81 TABLET, COATED ORAL at 09:48

## 2023-05-22 RX ADMIN — AMLODIPINE BESYLATE 5 MG: 5 TABLET ORAL at 09:48

## 2023-05-22 RX ADMIN — Medication 10 ML: at 21:16

## 2023-05-22 RX ADMIN — SODIUM CHLORIDE 1 DROP: 50 SOLUTION OPHTHALMIC at 03:28

## 2023-05-22 RX ADMIN — PREDNISOLONE ACETATE 1 DROP: 10 SUSPENSION/ DROPS OPHTHALMIC at 21:16

## 2023-05-22 RX ADMIN — CEFTRIAXONE SODIUM 1 G: 1 INJECTION, POWDER, FOR SOLUTION INTRAMUSCULAR; INTRAVENOUS at 07:37

## 2023-05-22 RX ADMIN — Medication 10 ML: at 09:49

## 2023-05-22 RX ADMIN — ENOXAPARIN SODIUM 30 MG: 100 INJECTION SUBCUTANEOUS at 09:48

## 2023-05-22 NOTE — PLAN OF CARE
Goal Outcome Evaluation:  Plan of Care Reviewed With: patient           Outcome Evaluation: Pt is a 88 y/o F admitted to [facility] with c/o difficulty breathing. Work-up revealing acute respiratory failure with hypoxia and hypercapnia and possible PNA. Pt has a past med hx of COPD. Pt received [facility]IC upon arrival and agreeable to PT eval. Pt reports she currently resides at a SNF and ambulating using a RW with therapy. Pt presents to PT with dysnpea with exertion, generalized weakness, and decreased endurance. Pt stood and ambulated in hallway c RW requiring SBA/CGA. Pt demo's a slow pace but no LOB. Pt ambulated on 1L O2 with O2 sats at 82% following activity. Pt recovered to 89% on 2L and PLB. Pt placed back on 1L O2 after recovering. PT recommends SNF at D/C to address stated deficits.

## 2023-05-22 NOTE — PROGRESS NOTES
PROGRESS NOTE  Patient Name: Lizzy Hicks  Age/Sex: 87 y.o. female  : 1935  MRN: [id number]    Date of Admission: 2023  Date of Encounter Visit: 23   LOS: 1 day   Patient Care Team:  Deric Mendosa MD as PCP - General (Family Medicine)  Jennifer Porras, RN as Ambulatory  ([facility] [place])    Chief Complaint: COPD exacerbation, chronic hypercapnia    Hospital course: Patient is doing better compared to yesterday, her mental status has improved back to baseline, she is still on oxygen currently on 1 L/min.  Patient is refusing the BiPAP because she cannot tolerate the mask on her face and is not interested in any noninvasive nocturnal positive pressure ventilation after discharge.  She is still wheezing, she still tight but she have subjective improvement        REVIEW OF SYSTEMS:   CONSTITUTIONAL: no fever or chills  CARDIOVASCULAR: No chest pain, chest pressure or chest discomfort. No palpitations or edema.   RESPIRATORY: Improving shortness of breath, still oxygen dependent.   GASTROINTESTINAL: No anorexia, nausea, vomiting or diarrhea. No abdominal pain or blood.   HEMATOLOGIC: No bleeding or bruising.     Ventilator/Non-Invasive Ventilation Settings (From admission, onward)     Start     Ordered    23 0525  NIPPV (CPAP or BIPAP)  Until Discontinued        Question Answer Comment   Indication Acute Respiratory Failure    Type BIPAP    IPAP 18    EPAP 7    Breath Rate 20    Titrate Oxygen for SpO2 90 - 95%        23 0525                  Vital Signs  Temp:  [97.4 °F (36.3 °C)-98 °F (36.7 °C)] 97.7 °F (36.5 °C)  Heart Rate:  [78-98] 94  Resp:  [16-21] 21  BP: (134-175)/(51-74) 155/62  SpO2:  [89 %-100 %] 92 %  on  Flow (L/min):  [1-2] 1 Device (Oxygen Therapy): nasal cannula    Intake/Output Summary (Last 24 hours) at 2023 0855  Last data filed at 2023 0802  Gross per 24 hour   Intake 120 ml   Output --   Net 120 ml     Flowsheet Rows    Flowsheet Row First  "Filed Value   Admission Height 160 cm (63\") Documented at 05/21/2023 0059   Admission Weight 58.1 kg (128 lb) Documented at 05/21/2023 0059        Body mass index is 22.34 kg/m².      05/21/23  0059 05/21/23  0336   Weight: 58.1 kg (128 lb) 57.2 kg (126 lb 1.7 oz)       Physical Exam:  GEN:  No acute distress, alert, cooperative, well developed, chronically ill looking  EYES:   Sclerae clear. No icterus. PERRL. Normal EOM.  Ptosis of the right eye (baseline)  ENT:   External ears/nose normal, no oral lesions, no thrush, mucous membranes moist  NECK:  Supple, midline trachea, no JVD  LUNGS: Normal chest on inspection, significant diminished breath sounds bilaterally with mild positive expiratory wheezes and no significant prolongation of the expiratory phase.  No crackles. Respirations regular, even and minimally labored specially when she is trying to talk  CV:  Regular rhythm and rate. Normal S1/S2. No murmurs, gallops, or rubs noted.  ABD:  Soft, nontender and nondistended. Normal bowel sounds. No guarding  EXT:  Moves all extremities well. No cyanosis. No redness.  1+ pitting edema  Skin: Dry, intact, no bleeding    Results Review:    Results From Last 14 Days   Lab Units 05/21/23  0111   LACTATE mmol/L 0.9     Results from last 7 days   Lab Units 05/22/23  0423 05/21/23  0422 05/21/23 0111   SODIUM mmol/L 135* 134* 134*   POTASSIUM mmol/L 4.3 4.4 3.9   CHLORIDE mmol/L 93* 92* 92*   CO2 mmol/L 35.7* 36.8* 37.4*   BUN mg/dL 27* 23 25*   CREATININE mg/dL 0.57 0.50* 0.62   CALCIUM mg/dL 9.1 10.3 10.4   AST (SGOT) U/L  --   --  15   ALT (SGPT) U/L  --   --  20   ANION GAP mmol/L 6.3 5.2 4.6*   ALBUMIN g/dL  --   --  3.5     Results from last 7 days   Lab Units 05/21/23 0111   HSTROP T ng/L 16*         Results from last 7 days   Lab Units 05/21/23  0111   PROBNP pg/mL 1,653.0     Results from last 7 days   Lab Units 05/22/23  0422 05/21/23  0422 05/21/23  0111   WBC 10*3/mm3 9.58 11.23* 12.22*   HEMOGLOBIN g/dL " 10.5* 11.6* 12.4   HEMATOCRIT % 30.8* 33.4* 36.3   PLATELETS 10*3/mm3 218 241 246   MCV fL 94.2 91.3 91.9   NEUTROPHIL % %  --   --  72.8   LYMPHOCYTE % %  --   --  15.8*   MONOCYTES % %  --   --  8.7   EOSINOPHIL % %  --   --  2.0   BASOPHIL % %  --   --  0.5   IMM GRAN % %  --   --  0.2     Results from last 7 days   Lab Units 05/21/23  0111   INR  0.99   APTT seconds 29.9               Invalid input(s): LDLCALC  Results from last 7 days   Lab Units 05/21/23  0112   PH, ARTERIAL pH units 7.377   PCO2, ARTERIAL mm Hg 67.1*   PO2 ART mm Hg 113.8*   HCO3 ART mmol/L 39.4*         No results found for: POCGLU  Results from last 7 days   Lab Units 05/21/23  0111   PROCALCITONIN ng/mL 0.13   LACTATE mmol/L 0.9     Results from last 7 days   Lab Units 05/21/23  0742 05/21/23  0735   BLOODCX  No growth at 24 hours No growth at 24 hours         Results from last 7 days   Lab Units 05/21/23  0109   COVID19  Not Detected   ADENOVIRUS DETECTION BY PCR  Not Detected   CORONAVIRUS 229E  Not Detected   CORONAVIRUS HKU1  Not Detected   CORONAVIRUS NL63  Not Detected   CORONAVIRUS OC43  Not Detected   HUMAN METAPNEUMOVIRUS  Not Detected   HUMAN RHINOVIRUS/ENTEROVIRUS  Not Detected   INFLUENZA B PCR  Not Detected   PARAINFLUENZA 1  Not Detected   PARAINFLUENZA VIRUS 2  Not Detected   PARAINFLUENZA VIRUS 3  Not Detected   PARAINFLUENZA VIRUS 4  Not Detected   BORDETELLA PERTUSSIS PCR  Not Detected   BORDETELLA PARAPERTUSSIS PCR  Not Detected   CHLAMYDOPHILA PNEUMONIAE PCR  Not Detected   MYCOPLAMA PNEUMO PCR  Not Detected   RSV, PCR  Not Detected               Imaging:   Imaging Results (All)           I reviewed the patient's new clinical results.  I personally viewed and interpreted the patient's imaging results: Chronic COPD changes with no obvious infiltrate except for small area on the right lower lobe, unsure if this is a nipple shadow.        Medication Review:   amLODIPine, 5 mg, Oral, Daily  aspirin, 81 mg, Oral,  Daily  atenolol, 50 mg, Oral, Daily  cefTRIAXone, 1 g, Intravenous, Q24H  enoxaparin, 30 mg, Subcutaneous, Daily  ipratropium-albuterol, 3 mL, Nebulization, 4x Daily - RT  methylPREDNISolone sodium succinate, 40 mg, Intravenous, Q12H  moxifloxacin, 1 drop, Right Eye, BID  multivitamin with minerals, 1 tablet, Oral, Daily  prednisoLONE acetate, 1 drop, Right Eye, Q6H  rosuvastatin, 5 mg, Oral, Daily  sodium chloride, 1 drop, Right Eye, 4x Daily  sodium chloride, 10 mL, Intravenous, Q12H  sodium chloride, 3 mL, Intravenous, Q12H             ASSESSMENT:   1. Acute on chronic hypoxemic and hypercapnic respiratory failure  2. Encephalopathy probably CO2 narcosis  3. Acute exacerbation of COPD  4. Hyponatremia    PLAN:  Viral panel came back negative, white count was slightly elevated and it is trending down, ABG showed compensated chronic CO2 retention with PCO2 of 67 with near normal pH  Sodium is still borderline low with no clinical significance at 135.  The patient is afebrile and currently requiring only 1 L/min nasal cannula oxygen and not requiring the BiPAP  Unable to rule out any superimposed bacterial infection currently white count is trending down despite being on steroids  We will continue with the IV Solu-Medrol 1 more day and consider transitioning to prednisone tomorrow  Patient is not interested in the BiPAP and cannot have it on her face and refused noninvasive nocturnal positive pressure ventilation despite the education about the indication  May transition to oral antibiotic by tomorrow, consider switching to doxycycline p.o. if continues to improve  Repeat the chest x-ray in the check on the right lower lobe lesion, to be done with the nipples markers  Discussed with patient    Disposition: Home/assisted living    Ama Christopher MD  05/22/23  08:55 EDT        Dictated utilizing Dragon dictation

## 2023-05-22 NOTE — THERAPY EVALUATION
Acute Care - Speech Language Pathology   Swallow Initial Evaluation [facility]     Patient Name: Lizzy Hicks  : 1935  MRN: [id number]  Today's Date: 2023               Admit Date: 2023    Visit Dx:     ICD-10-CM ICD-9-CM   1. Acute respiratory failure with hypoxia and hypercapnia  J96.01 518.81    J96.02    2. COPD with acute exacerbation  J44.1 491.21     Patient Active Problem List   Diagnosis   • Hypertension   • COPD (chronic obstructive pulmonary disease)   • Carotid artery stenosis   • Osteoarthritis   • Osteoporosis   • Hyponatremia   • Reset osmostat syndrome   • Carotid stenosis, asymptomatic, right   • Carotid artery disease   • Macular degeneration   • COPD exacerbation   • Acute exacerbation of chronic obstructive pulmonary disease (COPD)   • Parainfluenza infection   • Acute respiratory failure with hypoxia   • Acute respiratory failure with hypoxia and hypercapnia   • Mixed hyperlipidemia   • Possible pneumonia of right lower lobe due to infectious organism     Past Medical History:   Diagnosis Date   • Anesthesia complication     pt states was groggy for a week after surgery   • Carotid artery disease     left   • Carotid stenosis     RIGHT   • COPD (chronic obstructive pulmonary disease)    • History of bronchitis    • Hypertension    • Macular degeneration    • Osteoarthritis 2016   • Osteoporosis 2016   • 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 Baseline Na 128-130 since .   • Seborrheic dermatitis 2016   • Swallowing difficulty     D/T INADVERTANT REMOVAL OF UVULA AS CHILD WITH T AND A     Past Surgical History:   Procedure Laterality Date   • CAROTID ENDARTERECTOMY Right 2018    Procedure: RT CAROTID ENDARTERECTOMY WITH INTRA OPERATIVE CAROTID ARTERY DUPLEX SCAN;  Surgeon: Mikaela Galicia Jr., MD;  Location: [facility];  Service: Vascular   • CAROTID ENDARTERECTOMY Left 2019    Procedure: LEFT CAROTID  ENDARTERECTOMY;  Surgeon: Mikaela Galicia Jr., MD;  Location: [facility] OR;  Service: Vascular   • CATARACT EXTRACTION WITH INTRAOCULAR LENS IMPLANT      LEFT AND RIGHT   • ORIF FEMUR FRACTURE Right     HARDWARE   • TONSILLECTOMY AND ADENOIDECTOMY      INADVERTANTLY TOOK UVULA AT THIS TIME       SLP Recommendation and Plan  SLP Swallowing Diagnosis: oral dysphagia, suspected pharyngeal dysphagia (05/22/23 1500)  SLP Diet Recommendation: regular textures, thin liquids (05/22/23 1500)  Recommended Precautions and Strategies: upright posture during/after eating, small bites of food and sips of liquid (05/22/23 1500)  SLP Rec. for Method of Medication Administration: meds whole, as tolerated (05/22/23 1500)     Monitor for Signs of Aspiration: yes (05/22/23 1500)  Recommended Diagnostics: VFSS (MBS) (05/22/23 1500)  Swallow Criteria for Skilled Therapeutic Interventions Met: demonstrates skilled criteria (05/22/23 1500)  Anticipated Discharge Disposition (SLP): home (05/22/23 1500)  Rehab Potential/Prognosis, Swallowing: good, to achieve stated therapy goals (05/22/23 1500)  Therapy Frequency (Swallow): PRN (05/22/23 1500)  Predicted Duration Therapy Intervention (Days): until discharge (05/22/23 1500)                                               SWALLOW EVALUATION (last 72 hours)     SLP Adult Swallow Evaluation     Row Name 05/22/23 1500                   Rehab Evaluation    Document Type evaluation  -AW        Subjective Information no complaints  -AW        Patient Observations alert;cooperative;agree to therapy  -AW        Patient Effort good  -AW        Symptoms Noted During/After Treatment none  -AW           General Information    Patient Profile Reviewed yes  -AW        Pertinent History Of Current Problem Pt admitted with respiratory failure, COPD exacerbation, concern for RLL PNA, and recent parainfluenza virus.  -AW        Current Method of Nutrition regular textures;thin liquids  -AW         Precautions/Limitations, Vision WFL;for purposes of eval  -AW        Precautions/Limitations, Hearing WFL;for purposes of eval  -AW        Prior Level of Function-Communication WFL  -AW        Prior Level of Function-Swallowing no diet consistency restrictions  -AW        Plans/Goals Discussed with patient;agreed upon  -AW        Barriers to Rehab medically complex  -AW        Patient's Goals for Discharge return home  -AW           Pain    Additional Documentation Pain Scale: Numbers Pre/Post-Treatment (Group)  -AW           Pain Scale: Numbers Pre/Post-Treatment    Pretreatment Pain Rating 0/10 - no pain  -AW        Posttreatment Pain Rating 0/10 - no pain  -AW           Oral Motor Structure and Function    Dentition Assessment upper dentures/partial in place;lower dentures/partial in place  -AW        Secretion Management WNL/WFL  -AW        Mucosal Quality moist, healthy  -AW           Oral Musculature and Cranial Nerve Assessment    Oral Motor General Assessment WFL  -AW           General Eating/Swallowing Observations    Respiratory Support Currently in Use nasal cannula  -AW        Eating/Swallowing Skills self-fed  -AW        Positioning During Eating upright in bed  -AW        Utensils Used spoon;cup;straw  -AW        Consistencies Trialed regular textures;soft to chew textures;mixed consistency;pureed;thin liquids  -AW           Clinical Swallow Eval    Clinical Swallow Evaluation Summary Swallow eval completed. Pt had a baseline weak, non-productive cough that was noted inconsistently during eval, not directly after trials. Pt tolerated all consistencies given including thin (cup/straw), pureed, soft, mixed, and regular. Mastication was slow. Pt does well ordering easy to chew consistencies. Laryngeal elevation was adequate with palpation. Pt stated she has had difficulty swallowing to some degree her whole life since she had a tonsilectomy at age 7 and her uvula was removed. Pt agreeable to VFSS to  further assess and r/o aspiration. Recommend continue with regular diet; meds as tolerated; upright for meals and 30 min after; slow rate; small bites/sips. ST to follow.  -AW           SLP Evaluation Clinical Impression    SLP Swallowing Diagnosis oral dysphagia;suspected pharyngeal dysphagia  -AW        Functional Impact risk of aspiration/pneumonia  -AW        Rehab Potential/Prognosis, Swallowing good, to achieve stated therapy goals  -AW        Swallow Criteria for Skilled Therapeutic Interventions Met demonstrates skilled criteria  -AW           Recommendations    Therapy Frequency (Swallow) PRN  -AW        Predicted Duration Therapy Intervention (Days) until discharge  -AW        SLP Diet Recommendation regular textures;thin liquids  -AW        Recommended Diagnostics VFSS (MBS)  -AW        Recommended Precautions and Strategies upright posture during/after eating;small bites of food and sips of liquid  -AW        Oral Care Recommendations Oral Care BID/PRN  -AW        SLP Rec. for Method of Medication Administration meds whole;as tolerated  -AW        Monitor for Signs of Aspiration yes  -AW        Anticipated Discharge Disposition (SLP) home  -AW              User Key  (r) = Recorded By, (t) = Taken By, (c) = Cosigned By    Initials Name Effective Dates    Mari Bacon MS CCC-SLP 06/16/21 -                 EDUCATION  The patient has been educated in the following areas:   Dysphagia (Swallowing Impairment) Oral Care/Hydration.              Time Calculation:    Time Calculation- SLP     Row Name 05/22/23 1607             Time Calculation- SLP    SLP Start Time 1500  -AW      SLP Received On 05/22/23  -AW            User Key  (r) = Recorded By, (t) = Taken By, (c) = Cosigned By    Initials Name Provider Type    Mari Bacon MS CCC-SLP Speech and Language Pathologist                Therapy Charges for Today     Code Description Service Date Service Provider Modifiers Qty    [id number] [facility] 4 5/22/2023 Mari Holder, MS CCC-SLP GN 1               Mari Holder, MS CCC-SLP  5/22/2023

## 2023-05-22 NOTE — THERAPY EVALUATION
Patient Name: Lizzy Hicks  : 1935    MRN: [id number]                              Today's Date: 2023       Admit Date: 2023    Visit Dx:     ICD-10-CM ICD-9-CM   1. Acute respiratory failure with hypoxia and hypercapnia  J96.01 518.81    J96.02    2. COPD with acute exacerbation  J44.1 491.21     Patient Active Problem List   Diagnosis   • Hypertension   • COPD (chronic obstructive pulmonary disease)   • Carotid artery stenosis   • Osteoarthritis   • Osteoporosis   • Hyponatremia   • Reset osmostat syndrome   • Carotid stenosis, asymptomatic, right   • Carotid artery disease   • Macular degeneration   • COPD exacerbation   • Acute exacerbation of chronic obstructive pulmonary disease (COPD)   • Parainfluenza infection   • Acute respiratory failure with hypoxia   • Acute respiratory failure with hypoxia and hypercapnia   • Mixed hyperlipidemia   • Possible pneumonia of right lower lobe due to infectious organism     Past Medical History:   Diagnosis Date   • Anesthesia complication     pt states was groggy for a week after surgery   • Carotid artery disease     left   • Carotid stenosis     RIGHT   • COPD (chronic obstructive pulmonary disease)    • History of bronchitis    • Hypertension    • Macular degeneration    • Osteoarthritis 2016   • Osteoporosis 2016   • Reset osmostat syndrome 08/15/2016    Worked up by prior PCP in [facility]. Baseline Na 128-130 since .   • Seborrheic dermatitis 2016   • Swallowing difficulty     D/T INADVERTANT REMOVAL OF UVULA AS CHILD WITH T AND A     Past Surgical History:   Procedure Laterality Date   • CAROTID ENDARTERECTOMY Right 2018    Procedure: RT CAROTID ENDARTERECTOMY WITH INTRA OPERATIVE CAROTID ARTERY DUPLEX SCAN;  Surgeon: Mikaela Galicia Jr., MD;  Location: [facility];  Service: Vascular   • CAROTID ENDARTERECTOMY Left 2019    Procedure: LEFT CAROTID ENDARTERECTOMY;  Surgeon: Mikaela Galicia Jr., MD;   Location: [facility] MAIN OR;  Service: Vascular   • CATARACT EXTRACTION WITH INTRAOCULAR LENS IMPLANT      LEFT AND RIGHT   • ORIF FEMUR FRACTURE Right     HARDWARE   • TONSILLECTOMY AND ADENOIDECTOMY      INADVERTANTLY TOOK UVULA AT THIS TIME      General Information     Row Name 05/22/23 1028          Physical Therapy Time and Intention    Document Type evaluation  -CS     Mode of Treatment individual therapy;physical therapy  -CS     Row Name 05/22/23 1028          General Information    Patient Profile Reviewed yes  -CS     Prior Level of Function min assist:;gait;transfer;w/c or scooter;bed mobility  ambulating with a RW with therapy & requires assist to WC at facility with other staff  -CS     Existing Precautions/Restrictions fall;oxygen therapy device and L/min  -CS     Barriers to Rehab medically complex  -CS     Row Name 05/22/23 1028          Living Environment    People in Home facility resident  -CS     Row Name 05/22/23 1028          Cognition    Orientation Status (Cognition) oriented x 3  -CS     Row Name 05/22/23 1028          Safety Issues, Functional Mobility    Impairments Affecting Function (Mobility) endurance/activity tolerance;shortness of breath;strength  -CS           User Key  (r) = Recorded By, (t) = Taken By, (c) = Cosigned By    Initials Name Provider Type    CS Angely Monique, PT Physical Therapist               Mobility     Row Name 05/22/23 1029          Bed Mobility    Comment, (Bed Mobility) NT; UIC  -CS     Row Name 05/22/23 1029          Sit-Stand Transfer    Sit-Stand [place] (Transfers) standby assist  -CS     Assistive Device (Sit-Stand Transfers) walker, 4-wheeled  -CS     Row Name 05/22/23 1029          Gait/Stairs (Locomotion)    [place] Level (Gait) contact guard  -CS     Assistive Device (Gait) walker, front-wheeled  -CS     Distance in Feet (Gait) 40' + 40'  -CS     Deviations/Abnormal Patterns (Gait) trav decreased;stride length decreased  -CS     Bilateral  Gait Deviations forward flexed posture;heel strike decreased  -     [place] Level (Stairs) not tested  -CS     Comment, (Gait/Stairs) slow pace but no LOB; ambulated on 1L O2 with O2 sats at 82% after activity - recovered to 89% on 2L with PLB requiring roughly 1 minute  -           User Key  (r) = Recorded By, (t) = Taken By, (c) = Cosigned By    Initials Name Provider Type     Angely Monique, PT Physical Therapist               Obj/Interventions     Row Name 05/22/23 1031          Range of Motion Comprehensive    General Range of Motion bilateral lower extremity ROM WFL  -     Row Name 05/22/23 1031          Strength Comprehensive (MMT)    General Manual Muscle Testing (MMT) Assessment other (see comments)  -CS     Comment, General Manual Muscle Testing (MMT) Assessment generalized weakness; B LE 4-/5  -     Row Name 05/22/23 1031          Balance    Balance Assessment sitting static balance;sitting dynamic balance;standing static balance;standing dynamic balance  -     Static Sitting Balance modified independence  -CS     Dynamic Sitting Balance modified independence  -CS     Position, Sitting Balance supported;sitting in chair  -     Static Standing Balance standby assist  -     Dynamic Standing Balance contact guard  -     Position/Device Used, Standing Balance supported;walker, front-wheeled  -           User Key  (r) = Recorded By, (t) = Taken By, (c) = Cosigned By    Initials Name Provider Type    CS Angely Monique, PT Physical Therapist               Goals/Plan     Row Name 05/22/23 1037          Bed Mobility Goal 1 (PT)    Activity/Assistive Device (Bed Mobility Goal 1, PT) sit to supine;supine to sit  -     [place] Level/Cues Needed (Bed Mobility Goal 1, PT) standby assist  -     Time Frame (Bed Mobility Goal 1, PT) 1 week  -     Row Name 05/22/23 1037          Transfer Goal 1 (PT)    Activity/Assistive Device (Transfer Goal 1, PT)  sit-to-stand/stand-to-sit;bed-to-chair/chair-to-bed  -CS     [place] Level/Cues Needed (Transfer Goal 1, PT) standby assist  -CS     Time Frame (Transfer Goal 1, PT) 1 week  -CS     Row Name 05/22/23 1037          Gait Training Goal 1 (PT)    Activity/Assistive Device (Gait Training Goal 1, PT) gait (walking locomotion);assistive device use;decrease fall risk;improve balance and speed;increase endurance/gait distance  -CS     [place] Level (Gait Training Goal 1, PT) contact guard required  -CS     Distance (Gait Training Goal 1, PT) 100'  -CS     Time Frame (Gait Training Goal 1, PT) 1 week  -CS           User Key  (r) = Recorded By, (t) = Taken By, (c) = Cosigned By    Initials Name Provider Type    CS Angely Monique, PT Physical Therapist               Clinical Impression     Row Name 05/22/23 1031          Pain    Pretreatment Pain Rating 0/10 - no pain  -CS     Posttreatment Pain Rating 0/10 - no pain  -CS     Row Name 05/22/23 1031          Plan of Care Review    Plan of Care Reviewed With patient  -CS     Outcome Evaluation Pt is a 86 y/o F admitted to [facility] with c/o difficulty breathing. Work-up revealing acute respiratory failure with hypoxia and hypercapnia and possible PNA. Pt has a past med hx of COPD. Pt received [facility] upon arrival and agreeable to PT eval. Pt reports she currently resides at a SNF and ambulating using a RW with therapy. Pt presents to PT with dysnpea with exertion, generalized weakness, and decreased endurance. Pt stood and ambulated in hallway c RW requiring SBA/CGA. Pt demo's a slow pace but no LOB. Pt ambulated on 1L O2 with O2 sats at 82% following activity. Pt recovered to 89% on 2L and PLB. Pt placed back on 1L O2 after recovering. PT recommends SNF at D/C to address stated deficits.  -CS     Row Name 05/22/23 1031          Therapy Assessment/Plan (PT)    Rehab Potential (PT) good, to achieve stated therapy goals  -CS     Criteria for Skilled Interventions Met (PT)  yes;meets criteria  -CS     Therapy Frequency (PT) 6 times/wk  -CS     Row Name 05/22/23 1031          Vital Signs    Pre SpO2 (%) 89  -CS     O2 Delivery Pre Treatment supplemental O2  1L  -CS     Intra SpO2 (%) 82  -CS     O2 Delivery Intra Treatment supplemental O2  2L  -CS     Post SpO2 (%) 89  -CS     O2 Delivery Post Treatment supplemental O2  1L  -CS     Row Name 05/22/23 1031          Positioning and Restraints    Pre-Treatment Position sitting in chair/recliner  -CS     Post Treatment Position chair  -CS     In Chair reclined;call light within reach;encouraged to call for assist;exit alarm on;heels elevated  -CS           User Key  (r) = Recorded By, (t) = Taken By, (c) = Cosigned By    Initials Name Provider Type    CS Angely Monique, PT Physical Therapist               Outcome Measures     Row Name 05/22/23 1037          How much help from another person do you currently need...    Turning from your back to your side while in flat bed without using bedrails? 3  -CS     Moving from lying on back to sitting on the side of a flat bed without bedrails? 3  -CS     Moving to and from a bed to a chair (including a wheelchair)? 3  -CS     Standing up from a chair using your arms (e.g., wheelchair, bedside chair)? 4  -CS     Climbing 3-5 steps with a railing? 2  -CS     To walk in hospital room? 3  -CS     AM-PAC 6 Clicks Score (PT) 18  -CS     Highest level of mobility 6 --> Walked 10 steps or more  -CS     Row Name 05/22/23 [facility]          Functional Assessment    Outcome Measure Options AM-PAC 6 Clicks Basic Mobility (PT)  -CS           User Key  (r) = Recorded By, (t) = Taken By, (c) = Cosigned By    Initials Name Provider Type    Angely Benedict, PT Physical Therapist                             Physical Therapy Education     Title: PT OT SLP Therapies (In Progress)     Topic: Physical Therapy (In Progress)     Point: Mobility training (Done)     Learning Progress Summary           Patient Acceptance, E,TB,  VU,DU by  at 5/22/2023 1038                   Point: Home exercise program (Not Started)     Learner Progress:  Not documented in this visit.          Point: Body mechanics (Done)     Learning Progress Summary           Patient Acceptance, E,TB, VU,DU by  at 5/22/2023 1038                   Point: Precautions (Done)     Learning Progress Summary           Patient Acceptance, E,TB, VU,DU by  at 5/22/2023 1038                               User Key     Initials Effective Dates Name Provider Type Discipline     09/22/22 -  Angely Monique, PT Physical Therapist PT              PT Recommendation and Plan     Plan of Care Reviewed With: patient  Outcome Evaluation: Pt is a 88 y/o F admitted to [facility] with c/o difficulty breathing. Work-up revealing acute respiratory failure with hypoxia and hypercapnia and possible PNA. Pt has a past med hx of COPD. Pt received [facility] upon arrival and agreeable to PT eval. Pt reports she currently resides at a SNF and ambulating using a RW with therapy. Pt presents to PT with dysnpea with exertion, generalized weakness, and decreased endurance. Pt stood and ambulated in hallway c RW requiring SBA/CGA. Pt demo's a slow pace but no LOB. Pt ambulated on 1L O2 with O2 sats at 82% following activity. Pt recovered to 89% on 2L and PLB. Pt placed back on 1L O2 after recovering. PT recommends SNF at D/C to address stated deficits.     Time Calculation:    PT Charges     Row Name 05/22/23 1038             Time Calculation    Start Time 0923  -CS      Stop Time 0940  -CS      Time Calculation (min) 17 min  -CS      PT Received On 05/22/23  -      PT - Next Appointment 05/23/23  -      PT Goal Re-Cert Due Date 05/29/23  -CS         Time Calculation- PT    Total Timed Code Minutes- PT 16 minute(s)  -CS         Timed Charges    69342 - PT Therapeutic Activity Minutes 16  -CS         Total Minutes    Timed Charges Total Minutes 16  -CS       Total Minutes 16  -CS            User Key  (r) =  Recorded By, (t) = Taken By, (c) = Cosigned By    Initials Name Provider Type    CS Angely Monique, PT Physical Therapist              Therapy Charges for Today     Code Description Service Date Service Provider Modifiers Qty    69092447936 HC PT THERAPEUTIC ACT EA 15 MIN 5/22/2023 Angely Monique, PT GP 1    44375734490 HC PT EVAL MOD COMPLEXITY 3 5/22/2023 Angely Monique, PT GP 1          PT G-Codes  Outcome Measure Options: AM-PAC 6 Clicks Basic Mobility (PT)  AM-PAC 6 Clicks Score (PT): 18  PT Discharge Summary  Anticipated Discharge Disposition (PT): skilled nursing facility    Angely Monique PT  5/22/2023

## 2023-05-22 NOTE — DISCHARGE PLACEMENT REQUEST
"Lizzy Hicks (87 y.o. Female)     Date of Birth   1935    Social Security Number       Address   [address] [place] [address]    Home Phone   [phone]    MRN   [id number]       Buddhism   None    Marital Status                               Admission Date   5/21/23    Admission Type   Emergency    Admitting Provider   Osorio Schroeder MD    Attending Provider   Osorio Schroeder MD    Department, Room/Bed   [address], [address]/1       Discharge Date       Discharge Disposition       Discharge Destination                               Attending Provider: Osorio Schroeder MD    Allergies: Bee Venom    Isolation: None   Infection: None   Code Status: CPR    Ht: 160 cm (63\")   Wt: 57.2 kg (126 lb 1.7 oz)    Admission Cmt: None   Principal Problem: Acute respiratory failure with hypoxia and hypercapnia [J96.01,J96.02]                 Active Insurance as of 5/21/2023     Primary Coverage     Payor Plan Insurance Group Employer/Plan Group    MEDICARE MEDICARE A & B      Payor Plan Address Payor Plan Phone Number Payor Plan Fax Number Effective Dates    [address] [phone]  9/1/2002 - None Entered    [facility] [id number]       Subscriber Name Subscriber Birth Date Member ID       LIZZY HICKS 1935 [id number]           Secondary Coverage     Payor Plan Insurance Group Employer/Plan Group    [facility] SUPP [id number]     Payor Plan Address Payor Plan Phone Number Payor Plan Fax Number Effective Dates    [address]   12/1/2016 - None Entered    [facility] [id number]       Subscriber Name Subscriber Birth Date Member ID       LIZZY HICKS 1935 [id number]                 Emergency Contacts      (Rel.) Home Phone Work Phone Mobile Phone    Jasmin Lauren (Daughter) [phone] -- [phone]              "

## 2023-05-22 NOTE — PLAN OF CARE
Goal Outcome Evaluation:      No acute changes. Multiple eye drops given as scheduled. Denies SOA, pain. Will monitor.

## 2023-05-22 NOTE — THERAPY EVALUATION
Patient Name: Lizzy Hicks  : 1935    MRN: [id number]                              Today's Date: 2023       Admit Date: 2023    Visit Dx:     ICD-10-CM ICD-9-CM   1. Acute respiratory failure with hypoxia and hypercapnia  J96.01 518.81    J96.02    2. COPD with acute exacerbation  J44.1 491.21     Patient Active Problem List   Diagnosis   • Hypertension   • COPD (chronic obstructive pulmonary disease)   • Carotid artery stenosis   • Osteoarthritis   • Osteoporosis   • Hyponatremia   • Reset osmostat syndrome   • Carotid stenosis, asymptomatic, right   • Carotid artery disease   • Macular degeneration   • COPD exacerbation   • Acute exacerbation of chronic obstructive pulmonary disease (COPD)   • Parainfluenza infection   • Acute respiratory failure with hypoxia   • Acute respiratory failure with hypoxia and hypercapnia   • Mixed hyperlipidemia   • Possible pneumonia of right lower lobe due to infectious organism     Past Medical History:   Diagnosis Date   • Anesthesia complication     pt states was groggy for a week after surgery   • Carotid artery disease     left   • Carotid stenosis     RIGHT   • COPD (chronic obstructive pulmonary disease)    • History of bronchitis    • Hypertension    • Macular degeneration    • Osteoarthritis 2016   • Osteoporosis 2016   • 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. Baseline Na 128-130 since .   • Seborrheic dermatitis 2016   • Swallowing difficulty     D/T INADVERTANT REMOVAL OF UVULA AS CHILD WITH T AND A     Past Surgical History:   Procedure Laterality Date   • CAROTID ENDARTERECTOMY Right 2018    Procedure: RT CAROTID ENDARTERECTOMY WITH INTRA OPERATIVE CAROTID ARTERY DUPLEX SCAN;  Surgeon: Mikaela Galicia Jr., MD;  Location: [facility];  Service: Vascular   • CAROTID ENDARTERECTOMY Left 2019    Procedure: LEFT CAROTID ENDARTERECTOMY;  Surgeon: Mikaela Galicia Jr., MD;   Location: [facility] MAIN OR;  Service: Vascular   • CATARACT EXTRACTION WITH INTRAOCULAR LENS IMPLANT      LEFT AND RIGHT   • ORIF FEMUR FRACTURE Right     HARDWARE   • TONSILLECTOMY AND ADENOIDECTOMY      INADVERTANTLY TOOK UVULA AT THIS TIME      General Information     Row Name 05/22/23 1030          OT Time and Intention    Document Type evaluation  -     Mode of Treatment occupational therapy;individual therapy  -     Row Name 05/22/23 1030          General Information    Patient Profile Reviewed yes  -     Prior Level of Function independent:;ADL's  has been in rehab since recent hospitalization, now requiring increased assist with ADLs.  -     Existing Precautions/Restrictions fall;oxygen therapy device and L/min  -     Row Name 05/22/23 1030          Occupational Profile    Environmental Supports and Barriers (Occupational Profile) Pt reports occasionally she uses a rwx at baseline.  -     Row Name 05/22/23 1030          Living Environment    People in Home alone  -     Row Name 05/22/23 1030          Cognition    Orientation Status (Cognition) oriented x 3  -     Row Name 05/22/23 1030          Safety Issues, Functional Mobility    Impairments Affecting Function (Mobility) endurance/activity tolerance;strength;shortness of breath  -           User Key  (r) = Recorded By, (t) = Taken By, (c) = Cosigned By    Initials Name Provider Type     aDrline Law OT Occupational Therapist                 Mobility/ADL's     Row Name 05/22/23 1031          Bed Mobility    Bed Mobility supine-sit  -     Supine-Sit [place] (Bed Mobility) minimum assist (75% patient effort);verbal cues  -     Assistive Device (Bed Mobility) head of bed elevated;bed rails  -     Row Name 05/22/23 1031          Sit-Stand Transfer    Sit-Stand [place] (Transfers) standby assist  -     Assistive Device (Sit-Stand Transfers) walker, 4-wheeled  -     Row Name 05/22/23 1031          Functional Mobility     Functional Mobility- Ind. Level contact guard assist  -SM     Functional Mobility-Distance (Feet) --  5  -SM     Row Name 05/22/23 1031          Activities of Daily Living    BADL Assessment/Intervention lower body dressing;feeding  -     Row Name 05/22/23 1031          Lower Body Dressing Assessment/Training    [place] Level (Lower Body Dressing) don;socks;moderate assist (50% patient effort)  -     Position (Lower Body Dressing) supported sitting  -     Row Name 05/22/23 1031          Self-Feeding Assessment/Training    [place] Level (Feeding) feeding skills;set up  -     Position (Self-Feeding) supported sitting  -SM     Row Name 05/22/23 1031          Toileting Assessment/Training    Comment, (Toileting) pt just returned to bed after transfering to [facility] with nsg, OT arrived. Pt SOB following, needs rest break. O2 sats at 80%.  -           User Key  (r) = Recorded By, (t) = Taken By, (c) = Cosigned By    Initials Name Provider Type    Darline Stein OT Occupational Therapist               Obj/Interventions     Row Name 05/22/23 1033          Range of Motion Comprehensive    General Range of Motion bilateral upper extremity ROM WFL  -[facility] Name 05/22/23 1033          Strength Comprehensive (MMT)    Comment, General Manual Muscle Testing (MMT) Assessment BUE 4-/5 grossly  -[facility] Name 05/22/23 1033          Motor Skills    Motor Skills functional endurance  -     Functional Endurance poor, limited by SOB  -[facility] Name 05/22/23 1033          Balance    Static Sitting Balance supervision  -     Position, Sitting Balance sitting edge of bed  -     Static Standing Balance standby assist  -     Dynamic Standing Balance contact guard  -     Position/Device Used, Standing Balance supported  -           User Key  (r) = Recorded By, (t) = Taken By, (c) = Cosigned By    Initials Name Provider Type    Darline Stein OT Occupational Therapist               Goals/Plan      Row Name 05/22/23 1212          Transfer Goal 1 (OT)    Activity/Assistive Device (Transfer Goal 1, OT) toilet  -SM     [place] Level/Cues Needed (Transfer Goal 1, OT) standby assist  -SM     Time Frame (Transfer Goal 1, OT) short term goal (STG);2 weeks  -SM     Progress/Outcome (Transfer Goal 1, OT) goal ongoing  -[facility] Name 05/22/23 1212          Bathing Goal 1 (OT)    Activity/Device (Bathing Goal 1, OT) bathing skills, all  -SM     [place] Level/Cues Needed (Bathing Goal 1, OT) standby assist  -SM     Time Frame (Bathing Goal 1, OT) short term goal (STG);2 weeks  -SM     Progress/Outcomes (Bathing Goal 1, OT) goal ongoing  -[facility] Name 05/22/23 1212          Dressing Goal 1 (OT)    Activity/Device (Dressing Goal 1, OT) dressing skills, all  -SM     [place]/Cues Needed (Dressing Goal 1, OT) standby assist  -SM     Time Frame (Dressing Goal 1, OT) short term goal (STG);2 weeks  -SM     Progress/Outcome (Dressing Goal 1, OT) goal ongoing  -SM     Row Name 05/22/23 1212          Toileting Goal 1 (OT)    Activity/Device (Toileting Goal 1, OT) toileting skills, all  -SM     [place] Level/Cues Needed (Toileting Goal 1, OT) standby assist  -SM     Time Frame (Toileting Goal 1, OT) short term goal (STG);2 weeks  -SM     Progress/Outcome (Toileting Goal 1, OT) goal ongoing  -[facility] Name 05/22/23 1212          Grooming Goal 1 (OT)    Activity/Device (Grooming Goal 1, OT) grooming skills, all  -SM     [place] (Grooming Goal 1, OT) standby assist  -SM     Time Frame (Grooming Goal 1, OT) short term goal (STG);2 weeks  -SM     Strategies/Barriers (Grooming Goal 1, OT) standing 3 min to increase standing endurance  -SM     Progress/Outcome (Grooming Goal 1, OT) goal ongoing  -[facility] Name 05/22/23 1212          Therapy Assessment/Plan (OT)    Planned Therapy Interventions (OT) activity tolerance training;adaptive equipment training;BADL retraining;functional balance  retraining;occupation/activity based interventions;patient/caregiver education/training;transfer/mobility retraining;strengthening exercise  -           User Key  (r) = Recorded By, (t) = Taken By, (c) = Cosigned By    Initials Name Provider Type    Darline Stein, ELIEZER Occupational Therapist               Clinical Impression     Row Name 05/22/23 1033          Pain Assessment    Pretreatment Pain Rating 0/10 - no pain  -SM     Posttreatment Pain Rating 0/10 - no pain  -SM     Row Name 05/22/23 1033          Plan of Care Review    Plan of Care Reviewed With patient  -     Outcome Evaluation Pt is a 87 y.o female admitted from [facility] with acute respiratory failure. She was recieving rehab following recent hospilization for parainfluenza. She has hx of COPD. She typically lives in independent living. She reports over the past few days at rehab she had been declining and was using a w/c to get to the bathroom due to SOB. Today she is able to transfer from bed to chair to eat breakfast. Initallly on 1L but needed to increase to 3L with activity, desat to low 80's with light activity. MD and RN present in room. Pt would benefit from continued OT to increase independence, endurance, strength. Recommend dc back to SNF for ongoing rehab.  -     Row Name 05/22/23 1033          Therapy Assessment/Plan (OT)    Rehab Potential (OT) good, to achieve stated therapy goals  -     Criteria for Skilled Therapeutic Interventions Met (OT) yes;skilled treatment is necessary  -     Therapy Frequency (OT) 5 times/wk  -     Row Name 05/22/23 1033          Therapy Plan Review/Discharge Plan (OT)    Anticipated Discharge Disposition (OT) skilled nursing facility  -     Row Name 05/22/23 1033          Vital Signs    Pre SpO2 (%) 80  getting back [place] [facility] [place], after a 1-2 min break returns to 90%  -SM     O2 Delivery Pre Treatment supplemental O2  -SM     Intra SpO2 (%) 81  3L  -SM     O2 Delivery Intra Treatment  supplemental O2  -SM     Post SpO2 (%) 92  1L  -SM     O2 Delivery Post Treatment supplemental O2  -SM     Row Name 05/22/23 1033          Positioning and Restraints    Pre-Treatment Position in bed  -SM     Post Treatment Position chair  -SM     In Chair sitting;with nsg;call light within reach;encouraged to call for assist;exit alarm on  -SM           User Key  (r) = Recorded By, (t) = Taken By, (c) = Cosigned By    Initials Name Provider Type    Darline Stein OT Occupational Therapist               Outcome Measures     Row Name 05/22/23 1213          How much help from another is currently needed...    Putting on and taking off regular lower body clothing? 2  -SM     Bathing (including washing, rinsing, and drying) 2  -SM     Toileting (which includes using toilet bed pan or urinal) 2  -SM     Putting on and taking off regular upper body clothing 3  -SM     Taking care of personal grooming (such as brushing teeth) 3  -SM     Eating meals 4  -SM     AM-PAC 6 Clicks Score (OT) 16  -     Row Name 05/22/23 1037          How much help from another person do you currently need...    Turning from your back to your side while in flat bed without using bedrails? 3  -CS     Moving from lying on back to sitting on the side of a flat bed without bedrails? 3  -CS     Moving to and from a bed to a chair (including a wheelchair)? 3  -CS     Standing up from a chair using your arms (e.g., wheelchair, bedside chair)? 4  -CS     Climbing 3-5 steps with a railing? 2  -CS     To walk in hospital room? 3  -CS     AM-PAC 6 Clicks Score (PT) 18  -CS     Highest level of mobility 6 --> Walked 10 steps or more  -CS     Row Name 05/22/23 1213 05/22/23 [facility]       Functional Assessment    Outcome Measure Options AM-PAC 6 Clicks Daily Activity (OT)  - AM-PAC 6 Clicks Basic Mobility (PT)  -CS          User Key  (r) = Recorded By, (t) = Taken By, (c) = Cosigned By    Initials Name Provider Type    Darline Stein OT  Occupational Therapist    Angely Benedict, PT Physical Therapist                Occupational Therapy Education     Title: PT OT SLP Therapies (In Progress)     Topic: Occupational Therapy (In Progress)     Point: ADL training (Done)     Description:   Instruct learner(s) on proper safety adaptation and remediation techniques during self care or transfers.   Instruct in proper use of assistive devices.              Learning Progress Summary           Patient Acceptance, E, VU by  at 5/22/2023 1213    Comment: OT goals, recommended up to chair X3 day/meals to increase OOB activity. Pt in agreement                   Point: Home exercise program (Not Started)     Description:   Instruct learner(s) on appropriate technique for monitoring, assisting and/or progressing therapeutic exercises/activities.              Learner Progress:  Not documented in this visit.          Point: Precautions (Not Started)     Description:   Instruct learner(s) on prescribed precautions during self-care and functional transfers.              Learner Progress:  Not documented in this visit.          Point: Body mechanics (Not Started)     Description:   Instruct learner(s) on proper positioning and spine alignment during self-care, functional mobility activities and/or exercises.              Learner Progress:  Not documented in this visit.                      User Key     Initials Effective Dates Name Provider Type Discipline     04/02/20 -  Darline Law OT Occupational Therapist OT              OT Recommendation and Plan  Planned Therapy Interventions (OT): activity tolerance training, adaptive equipment training, BADL retraining, functional balance retraining, occupation/activity based interventions, patient/caregiver education/training, transfer/mobility retraining, strengthening exercise  Therapy Frequency (OT): 5 times/wk  Plan of Care Review  Plan of Care Reviewed With: patient  Outcome Evaluation: Pt is a 87 y.o female  admitted from [facility] with acute respiratory failure. She was recieving rehab following recent hospilization for parainfluenza. She has hx of COPD. She typically lives in independent living. She reports over the past few days at rehab she had been declining and was using a w/c to get to the bathroom due to SOB. Today she is able to transfer from bed to chair to eat breakfast. Initallly on 1L but needed to increase to 3L with activity, desat to low 80's with light activity. MD and RN present in room. Pt would benefit from continued OT to increase independence, endurance, strength. Recommend dc back to SNF for ongoing rehab.     Time Calculation:    Time Calculation- OT     Row Name 05/22/23 1215             Time Calculation- OT    OT Start Time 0817  -      OT Stop Time 0838  -      OT Time Calculation (min) 21 min  -SM      Total Timed Code Minutes- OT 15 minute(s)  -SM      OT Received On 05/22/23  -      OT - Next Appointment 05/23/23  -SM      OT Goal Re-Cert Due Date 06/05/23  -SM         Timed Charges    85169 - OT Therapeutic Activity Minutes 15  -SM         Untimed Charges    OT Eval/Re-eval Minutes 6  -SM         Total Minutes    Timed Charges Total Minutes 15  -SM      Untimed Charges Total Minutes 6  -SM       Total Minutes 21  -SM            User Key  (r) = Recorded By, (t) = Taken By, (c) = Cosigned By    Initials Name Provider Type     Darline Law OT Occupational Therapist              Therapy Charges for Today     Code Description Service Date Service Provider Modifiers Qty    [id number]  OT THERAPEUTIC ACT EA 15 MIN 5/22/2023 Darline Law OT GO 1    [id number] HC OT EVAL MOD COMPLEXITY 2 5/22/2023 Darline Law OT GO 1               Darline Law OT  5/22/2023

## 2023-05-22 NOTE — PLAN OF CARE
Goal Outcome Evaluation:      Patient aerating better today, VS WNL. Continuing antibiotic and steroid therapy. Up in the chair during the day. Tolerated treatment well

## 2023-05-22 NOTE — PLAN OF CARE
Goal Outcome Evaluation:  Plan of Care Reviewed With: patient           Outcome Evaluation: Pt is a 87 y.o female admitted from [facility] with acute respiratory failure. She was recieving rehab following recent hospilization for parainfluenza. She has hx of COPD. She typically lives in independent living. She reports over the past few days at rehab she had been declining and was using a w/c to get to the bathroom due to SOB. Today she is able to transfer from bed to chair to eat breakfast. Initallly on 1L but needed to increase to 3L with activity, desat to low 80's with light activity. MD and RN present in room. Pt would benefit from continued OT to increase independence, endurance, strength. Recommend dc back to SNF for ongoing rehab.

## 2023-05-22 NOTE — PROGRESS NOTES
[facility] Medicine Services  PROGRESS NOTE    Patient Name: Lizzy Hicks  : 1935  MRN: [id number]    Date of Admission: 2023  Primary Care Physician: Deric Mendosa MD    Subjective   Subjective     CC:  Follow-up hypoxia    Subjective: Patient says she is unable to tolerate BiPAP any further.  Patient feeling much better.  She is much more conversational and less lethargic today.  She says she has poor memory of yesterday particularly in the morning.  She still notes cough.  No other new complaints    Review of Systems  No current fevers or chills  No current nausea, vomiting, or diarrhea  No current chest pain or palpitations      Objective   Objective     Vital Signs:   Temp:  [97.4 °F (36.3 °C)-98 °F (36.7 °C)] 97.7 °F (36.5 °C)  Heart Rate:  [78-98] 94  Resp:  [16-21] 21  BP: (134-175)/(51-74) 155/62        Physical Exam:  Constitutional:Awake, alert  HENT: NCAT, mucous membranes moist, neck supple  Respiratory: Cough, coarse sounds, wheezes are present, patient with improved tachypnea, breathing somewhat labored  Cardiovascular: Pulse rate is normal, palpable radial pulses bilaterally  Gastrointestinal: Positive bowel sounds, soft, nontender, nondistended  Musculoskeletal: Elderly frail and chronically debilitated in appearance, BMI is 22, mild lower extremity edema  Psychiatric: Anxious affect, cooperative  Neurologic: No slurred speech or facial droop, follows commands  Skin: No rashes or jaundice, warm      Results Reviewed:  Results from last 7 days   Lab Units 23  04223  0422 23  0111   WBC 10*3/mm3 9.58 11.23* 12.22*   HEMOGLOBIN g/dL 10.5* 11.6* 12.4   HEMATOCRIT % 30.8* 33.4* 36.3   PLATELETS 10*3/mm3 218 241 246   INR   --   --  0.99   PROCALCITONIN ng/mL  --   --  0.13     Results from last 7 days   Lab Units 23  0422 23  0111   SODIUM mmol/L 135* 134* 134*   POTASSIUM mmol/L 4.3 4.4 3.9   CHLORIDE mmol/L 93* 92* 92*   CO2 mmol/L  35.7* 36.8* 37.4*   BUN mg/dL 27* 23 25*   CREATININE mg/dL 0.57 0.50* 0.62   GLUCOSE mg/dL 137* 157* 142*   CALCIUM mg/dL 9.1 10.3 10.4   ALT (SGPT) U/L  --   --  20   AST (SGOT) U/L  --   --  15   HSTROP T ng/L  --   --  16*   PROBNP pg/mL  --   --  1,653.0     Estimated Creatinine Clearance: 65.2 mL/min (by C-G formula based on SCr of 0.57 mg/dL).    Microbiology Results Abnormal     Procedure Component Value - Date/Time    Blood Culture - Blood, Arm, Left [[id number]]  (Normal) Collected: 05/21/23 0735    Lab Status: Preliminary result Specimen: Blood from Arm, Left Updated: 05/22/23 0815     Blood Culture No growth at 24 hours    Blood Culture - Blood, Hand, Right [[id number]]  (Normal) Collected: 05/21/23 0742    Lab Status: Preliminary result Specimen: Blood from Hand, Right Updated: 05/22/23 0815     Blood Culture No growth at 24 hours    Narrative:      Less than seven (7) mL's of blood was collected.  Insufficient quantity may yield false negative results.    Respiratory Panel PCR w/COVID-19(SARS-CoV-2) LUIS CARLOS/JAIMEE/JOI/PAD/COR/MAD/KARLEE In-House, NP Swab in [facility], 3-4 HR TAT - Swab, Nasopharynx [[id number]]  (Normal) Collected: 05/21/23 0109    Lab Status: Final result Specimen: Swab from Nasopharynx Updated: 05/21/23 0517     ADENOVIRUS, PCR Not Detected     Coronavirus 229E Not Detected     Coronavirus HKU1 Not Detected     Coronavirus NL63 Not Detected     Coronavirus OC43 Not Detected     COVID19 Not Detected     Human Metapneumovirus Not Detected     Human Rhinovirus/Enterovirus Not Detected     Influenza A PCR Not Detected     Influenza B PCR Not Detected     Parainfluenza Virus 1 Not Detected     Parainfluenza Virus 2 Not Detected     Parainfluenza Virus 3 Not Detected     Parainfluenza Virus 4 Not Detected     RSV, PCR Not Detected     Bordetella pertussis pcr Not Detected     Bordetella parapertussis PCR Not Detected     Chlamydophila pneumoniae PCR Not Detected     Mycoplasma pneumo by PCR Not Detected     Narrative:      In the setting of a positive respiratory panel with a viral infection PLUS a negative procalcitonin without other underlying concern for bacterial infection, consider observing off antibiotics or discontinuation of antibiotics and continue supportive care. If the respiratory panel is positive for atypical bacterial infection (Bordetella pertussis, Chlamydophila pneumoniae, or Mycoplasma pneumoniae), consider antibiotic de-escalation to target atypical bacterial infection.          Imaging Results (Last 24 Hours)     ** No results found for the last 24 hours. **          Results for orders placed during the hospital encounter of 08/31/18    Adult Transthoracic Echo Complete W/ Cont if Necessary Per Protocol    Interpretation Summary  · Calculated right ventricular systolic pressure from tricuspid regurgitation is 45 mmHg.  · Left ventricular systolic function is normal. Estimated EF = 63%.      I have reviewed the medications:  Scheduled Meds:amLODIPine, 5 mg, Oral, Daily  aspirin, 81 mg, Oral, Daily  atenolol, 50 mg, Oral, Daily  cefTRIAXone, 1 g, Intravenous, Q24H  enoxaparin, 30 mg, Subcutaneous, Daily  ipratropium-albuterol, 3 mL, Nebulization, 4x Daily - RT  methylPREDNISolone sodium succinate, 40 mg, Intravenous, Q12H  moxifloxacin, 1 drop, Right Eye, BID  multivitamin with minerals, 1 tablet, Oral, Daily  prednisoLONE acetate, 1 drop, Right Eye, Q6H  rosuvastatin, 5 mg, Oral, Daily  sodium chloride, 1 drop, Right Eye, 4x Daily  sodium chloride, 10 mL, Intravenous, Q12H  sodium chloride, 3 mL, Intravenous, Q12H      Continuous Infusions:   PRN Meds:.•  acetaminophen **OR** acetaminophen **OR** acetaminophen  •  calcium carbonate  •  docusate sodium  •  famotidine  •  ipratropium-albuterol  •  melatonin  •  nitroglycerin  •  ondansetron **OR** ondansetron  •  ondansetron **OR** ondansetron  •  [COMPLETED] Insert Peripheral IV **AND** sodium chloride  •  sodium chloride  •  sodium chloride  •   sodium chloride  •  sodium chloride    Assessment & Plan   Assessment & Plan     Active Hospital Problems    Diagnosis  POA   • **Acute respiratory failure with hypoxia and hypercapnia [J96.01, J96.02]  Yes   • Mixed hyperlipidemia [E78.2]  Yes   • Possible pneumonia of right lower lobe due to infectious organism [J18.9]  Yes   • Acute exacerbation of chronic obstructive pulmonary disease (COPD) [J44.1]  Yes   • Carotid artery disease [I77.9]  Yes   • Hypertension [I10]  Yes   • Osteoarthritis [M19.90]  Yes      Resolved Hospital Problems   No resolved problems to display.        Brief Hospital Course to date:  Lizzy Hicks is a 87 y.o. female presents to the hospital with acute hypoxic and hypercapnic respiratory failure following recent parainfluenza infection and has concern for possible postviral bacterial right lower lobe pneumonia.     Discussion/plan for today:  Appears clinically improved.  Consult PT, OT, speech therapy.  Case discussed with pulmonologist and we will continue current care.  It appears that patient is intolerant of BiPAP so likely no further BiPAP unless patient has another acute episode of lethargy.  Continue to titrate oxygen with goal around 90% and avoid hyperoxia.  Symptomatic treatment and supportive care.  Chest x-ray images reviewed and infiltrate noted.  Systemic steroids.    Add low-dose losartan.  Continue home blood pressure medications.  Monitor blood pressure and adjust as needed.  Previous echocardiogram from 2018 reviewed and normal EF of 63% but slightly elevated RVSP.     Continue statin for hyperlipidemia.  Previous lipids reviewed.     Tylenol as needed for arthritis.     Continue chronic eyedrops.     Treatment plan discussed with the patient who is in agreement.  She understands that due to her advanced age and comorbid conditions she is at higher risk for morbidity and mortality.     DVT prophylaxis: Lovenox    Disposition: Pending clinical course    CODE STATUS:    Code Status and Medical Interventions:   Ordered at: 05/21/23 0312     Code Status (Patient has no pulse and is not breathing):    CPR (Attempt to Resuscitate)     Medical Interventions (Patient has pulse or is breathing):    Full Support       Osorio Schroeder MD  05/22/23

## 2023-05-22 NOTE — PLAN OF CARE
Goal Outcome Evaluation: Swallow eval completed. Pt had a baseline weak, non-productive cough that was noted inconsistently during eval, not directly after trials. Pt tolerated all consistencies given including thin (cup/straw), pureed, soft, mixed, and regular. Mastication was slow. Pt does well ordering easy to chew consistencies. Laryngeal elevation was adequate with palpation. Pt stated she has had difficulty swallowing to some degree her whole life since she had a tonsilectomy at age 7 and her uvula was removed. Pt agreeable to VFSS to further assess and r/o aspiration. Recommend continue with regular diet; meds as tolerated; upright for meals and 30 min after; slow rate; small bites/sips. ST to follow.

## 2023-05-22 NOTE — CASE MANAGEMENT/SOCIAL WORK
Discharge Planning Assessment  [facility]     Patient Name: Lizzy Hicks  MRN: [id number]  Today's Date: 5/22/2023    Admit Date: 5/21/2023    Plan: Return to [facility] to complete short term rehab   Discharge Needs Assessment     Row Name 05/22/23 1236       Living Environment    People in Home alone    Current Living Arrangements assisted living facility    Potentially Unsafe Housing Conditions none    Primary Care Provided by self    Provides Primary Care For no one    Family Caregiver if Needed child(ananda), adult    Quality of Family Relationships helpful;involved;supportive    Able to Return to Prior Arrangements yes       Resource/Environmental Concerns    Resource/Environmental Concerns none    Transportation Concerns none       Transition Planning    Patient/Family Anticipates Transition to inpatient rehabilitation facility    Patient/Family Anticipated Services at Transition rehabilitation services    Transportation Anticipated family or friend will provide;health plan transportation       Discharge Needs Assessment    Equipment Currently Used at Home cane, straight;walker, standard    Concerns to be Addressed no discharge needs identified;denies needs/concerns at this time    Anticipated Changes Related to Illness none    Equipment Needed After Discharge none    Outpatient/Agency/Support Group Needs inpatient rehabilitation facility    Discharge Facility/Level of Care Needs rehabilitation facility    Provided Post Acute Provider List? N/A    Provided Post Acute Provider Quality & Resource List? N/A    Patient's Choice of Community Agency(s) [facility]               Discharge Plan     Row Name 05/22/23 1237       Plan    Plan Return to [facility] to complete short term rehab    Patient/Family in Agreement with Plan yes    Plan Comments IMM noted. CCP met with patient and her daughter Jasmin, introduced self and explained role. Patient confirmed information on her face sheet  is accurate. Patient states that she lives in an independent living apartment at [facility] but prior to this admission she was at [facility] for short term rehab. Patient states her plan is to return to [facility] to complete her short-term rehab before returning to her independent living apartment. CCP made outbound call to Radha with [facility] who states patient is able to return at discharge and will need a RAPID COVID done before she can return. Patient currently on O2 and is not normally on O2 therefore she may need transport with O2 arrangements at discharge. CCP to follow.              Continued Care and Services - Admitted Since 5/21/2023    Coordination has not been started for this encounter.     Selected Continued Care - Episodes Includes continued care and service providers with selected services from the active episodes listed below    Chronic Care Management Episode start date: 5/22/2023   There are no active outsourced providers for this episode.             Selected Continued Care - Prior Encounters Includes continued care and service providers with selected services from prior encounters from 2/20/2023 to 5/22/2023    Discharged on 5/4/2023 Admission date: 5/1/2023 - Discharge disposition: Skilled Nursing Facility (DC - External)    Destination     Service Provider Selected Services Address Phone Fax Patient Preferred    [facility] [address] [place] [address] [id number] [phone] --                       Demographic Summary     Row Name 05/22/23 1234       General Information    Admission Type inpatient    Arrived From emergency department    Required Notices Provided Important Message from Medicare    Referral Source admission list    Reason for Consult discharge planning    Preferred Language English               Functional Status     Row Name 05/22/23 1235       Functional Status    Usual Activity Tolerance  good    Current Activity Tolerance moderate       Functional Status, IADL    Medications assistive equipment    Meal Preparation assistive equipment    Housekeeping assistive equipment    Laundry assistive equipment    Shopping assistive equipment       Mental Status    General Appearance WDL WDL       Mental Status Summary    Recent Changes in Mental Status/Cognitive Functioning no changes       Employment/    Employment Status retired               Psychosocial    No documentation.                Abuse/Neglect    No documentation.                Legal    No documentation.                Substance Abuse    No documentation.                Patient Forms    No documentation.

## 2023-05-23 ENCOUNTER — APPOINTMENT (OUTPATIENT)
Dept: GENERAL RADIOLOGY | Facility: HOSPITAL | Age: 88
DRG: 190 | End: 2023-05-23
Payer: MEDICARE

## 2023-05-23 LAB
ANION GAP SERPL CALCULATED.3IONS-SCNC: 6 MMOL/L (ref 5–15)
BUN SERPL-MCNC: 25 MG/DL (ref 8–23)
BUN/CREAT SERPL: 42.4 (ref 7–25)
CALCIUM SPEC-SCNC: 8.6 MG/DL (ref 8.6–10.5)
CHLORIDE SERPL-SCNC: 92 MMOL/L (ref 98–107)
CO2 SERPL-SCNC: 35 MMOL/L (ref 22–29)
CREAT SERPL-MCNC: 0.59 MG/DL (ref 0.57–1)
DEPRECATED RDW RBC AUTO: 41.4 FL (ref 37–54)
EGFRCR SERPLBLD CKD-EPI 2021: 88.4 ML/MIN/1.73
ERYTHROCYTE [DISTWIDTH] IN BLOOD BY AUTOMATED COUNT: 12 % (ref 12.3–15.4)
GLUCOSE SERPL-MCNC: 126 MG/DL (ref 65–99)
HCT VFR BLD AUTO: 35.3 % (ref 34–46.6)
HGB BLD-MCNC: 11.7 G/DL (ref 12–15.9)
MCH RBC QN AUTO: 31.1 PG (ref 26.6–33)
MCHC RBC AUTO-ENTMCNC: 33.1 G/DL (ref 31.5–35.7)
MCV RBC AUTO: 93.9 FL (ref 79–97)
PLATELET # BLD AUTO: 271 10*3/MM3 (ref 140–450)
PMV BLD AUTO: 9.3 FL (ref 6–12)
POTASSIUM SERPL-SCNC: 4.3 MMOL/L (ref 3.5–5.2)
RBC # BLD AUTO: 3.76 10*6/MM3 (ref 3.77–5.28)
SODIUM SERPL-SCNC: 133 MMOL/L (ref 136–145)
WBC NRBC COR # BLD: 11.38 10*3/MM3 (ref 3.4–10.8)

## 2023-05-23 PROCEDURE — 80048 BASIC METABOLIC PNL TOTAL CA: CPT | Performed by: INTERNAL MEDICINE

## 2023-05-23 PROCEDURE — 74230 X-RAY XM SWLNG FUNCJ C+: CPT

## 2023-05-23 PROCEDURE — 94664 DEMO&/EVAL PT USE INHALER: CPT

## 2023-05-23 PROCEDURE — 25010000002 CEFTRIAXONE PER 250 MG: Performed by: INTERNAL MEDICINE

## 2023-05-23 PROCEDURE — 94799 UNLISTED PULMONARY SVC/PX: CPT

## 2023-05-23 PROCEDURE — 25010000002 ENOXAPARIN PER 10 MG: Performed by: INTERNAL MEDICINE

## 2023-05-23 PROCEDURE — 85027 COMPLETE CBC AUTOMATED: CPT | Performed by: INTERNAL MEDICINE

## 2023-05-23 PROCEDURE — 92611 MOTION FLUOROSCOPY/SWALLOW: CPT | Performed by: SPEECH-LANGUAGE PATHOLOGIST

## 2023-05-23 PROCEDURE — 97535 SELF CARE MNGMENT TRAINING: CPT

## 2023-05-23 PROCEDURE — 63710000001 PREDNISONE PER 1 MG: Performed by: INTERNAL MEDICINE

## 2023-05-23 PROCEDURE — 71046 X-RAY EXAM CHEST 2 VIEWS: CPT

## 2023-05-23 PROCEDURE — 94761 N-INVAS EAR/PLS OXIMETRY MLT: CPT

## 2023-05-23 RX ORDER — PREDNISONE 20 MG/1
40 TABLET ORAL
Status: DISCONTINUED | OUTPATIENT
Start: 2023-05-23 | End: 2023-05-24

## 2023-05-23 RX ADMIN — ENOXAPARIN SODIUM 30 MG: 100 INJECTION SUBCUTANEOUS at 08:51

## 2023-05-23 RX ADMIN — ATENOLOL 50 MG: 50 TABLET ORAL at 08:52

## 2023-05-23 RX ADMIN — PREDNISOLONE ACETATE 1 DROP: 10 SUSPENSION/ DROPS OPHTHALMIC at 21:06

## 2023-05-23 RX ADMIN — ASPIRIN 81 MG: 81 TABLET, COATED ORAL at 08:52

## 2023-05-23 RX ADMIN — Medication 10 ML: at 08:54

## 2023-05-23 RX ADMIN — ROSUVASTATIN CALCIUM 5 MG: 5 TABLET, FILM COATED ORAL at 08:52

## 2023-05-23 RX ADMIN — SODIUM CHLORIDE 1 DROP: 50 SOLUTION OPHTHALMIC at 15:58

## 2023-05-23 RX ADMIN — BARIUM SULFATE 55 ML: 0.81 POWDER, FOR SUSPENSION ORAL at 12:18

## 2023-05-23 RX ADMIN — SODIUM CHLORIDE 1 DROP: 50 SOLUTION OPHTHALMIC at 08:53

## 2023-05-23 RX ADMIN — IPRATROPIUM BROMIDE AND ALBUTEROL SULFATE 3 ML: 2.5; .5 SOLUTION RESPIRATORY (INHALATION) at 16:02

## 2023-05-23 RX ADMIN — PREDNISOLONE ACETATE 1 DROP: 10 SUSPENSION/ DROPS OPHTHALMIC at 08:53

## 2023-05-23 RX ADMIN — Medication 10 ML: at 20:44

## 2023-05-23 RX ADMIN — AMLODIPINE BESYLATE 5 MG: 5 TABLET ORAL at 08:52

## 2023-05-23 RX ADMIN — CEFTRIAXONE SODIUM 1 G: 1 INJECTION, POWDER, FOR SOLUTION INTRAMUSCULAR; INTRAVENOUS at 06:07

## 2023-05-23 RX ADMIN — MOXIFLOXACIN HYDROCHLORIDE 0.05 ML: 5 SOLUTION/ DROPS OPHTHALMIC at 20:44

## 2023-05-23 RX ADMIN — IPRATROPIUM BROMIDE AND ALBUTEROL SULFATE 3 ML: 2.5; .5 SOLUTION RESPIRATORY (INHALATION) at 10:18

## 2023-05-23 RX ADMIN — BARIUM SULFATE 1 TEASPOON(S): 0.6 CREAM ORAL at 12:19

## 2023-05-23 RX ADMIN — BARIUM SULFATE 4 ML: 980 POWDER, FOR SUSPENSION ORAL at 12:19

## 2023-05-23 RX ADMIN — IPRATROPIUM BROMIDE AND ALBUTEROL SULFATE 3 ML: 2.5; .5 SOLUTION RESPIRATORY (INHALATION) at 06:45

## 2023-05-23 RX ADMIN — Medication 3 ML: at 08:54

## 2023-05-23 RX ADMIN — IPRATROPIUM BROMIDE AND ALBUTEROL SULFATE 3 ML: 2.5; .5 SOLUTION RESPIRATORY (INHALATION) at 19:49

## 2023-05-23 RX ADMIN — SODIUM CHLORIDE 1 DROP: 50 SOLUTION OPHTHALMIC at 04:29

## 2023-05-23 RX ADMIN — PREDNISOLONE ACETATE 1 DROP: 10 SUSPENSION/ DROPS OPHTHALMIC at 02:45

## 2023-05-23 RX ADMIN — PREDNISOLONE ACETATE 1 DROP: 10 SUSPENSION/ DROPS OPHTHALMIC at 15:57

## 2023-05-23 RX ADMIN — MOXIFLOXACIN HYDROCHLORIDE 0.05 ML: 5 SOLUTION/ DROPS OPHTHALMIC at 08:53

## 2023-05-23 RX ADMIN — PREDNISONE 40 MG: 20 TABLET ORAL at 08:52

## 2023-05-23 RX ADMIN — MULTIPLE VITAMINS W/ MINERALS TAB 1 TABLET: TAB at 08:52

## 2023-05-23 RX ADMIN — LOSARTAN POTASSIUM 25 MG: 25 TABLET, FILM COATED ORAL at 08:52

## 2023-05-23 RX ADMIN — SODIUM CHLORIDE 1 DROP: 50 SOLUTION OPHTHALMIC at 21:32

## 2023-05-23 RX ADMIN — Medication 3 ML: at 21:06

## 2023-05-23 NOTE — PROGRESS NOTES
[facility] Medicine Services  PROGRESS NOTE    Patient Name: Lizzy Hicks  : 1935  MRN: [id number]    Date of Admission: 2023  Primary Care Physician: Deric Mendosa MD    Subjective   Subjective     CC:  Follow-up hypoxia    Subjective:   Patient feels she is gradually improving.  She is currently on 1 L nasal cannula.  No new complaints.    Review of Systems  No current fevers or chills  No current nausea, vomiting, or diarrhea  No current chest pain or palpitations      Objective   Objective     Vital Signs:   Temp:  [97.5 °F (36.4 °C)-97.8 °F (36.6 °C)] 97.7 °F (36.5 °C)  Heart Rate:  [79-93] 93  Resp:  [18-20] 18  BP: (117-170)/(43-74) 162/66        Physical Exam:  Constitutional:Awake, alert  HENT: NCAT, mucous membranes moist, neck supple  Respiratory: Cough, coarse sounds, wheezes are present, patient with improved tachypnea, breathing somewhat labored  Cardiovascular: Pulse rate is normal, palpable radial pulses bilaterally  Gastrointestinal: Positive bowel sounds, soft, nontender, nondistended  Musculoskeletal: Elderly frail and chronically debilitated in appearance, BMI is 22, mild lower extremity edema  Psychiatric: Anxious affect, cooperative  Neurologic: No slurred speech or facial droop, follows commands  Skin: No rashes or jaundice, warm      Results Reviewed:  Results from last 7 days   Lab Units 23  0111   WBC 10*3/mm3 11.38* 9.58 11.23* 12.22*   HEMOGLOBIN g/dL 11.7* 10.5* 11.6* 12.4   HEMATOCRIT % 35.3 30.8* 33.4* 36.3   PLATELETS 10*3/mm3 271 218 241 246   INR   --   --   --  0.99   PROCALCITONIN ng/mL  --   --   --  0.13     Results from last 7 days   Lab Units 232 23  0111   SODIUM mmol/L 133* 135* 134* 134*   POTASSIUM mmol/L 4.3 4.3 4.4 3.9   CHLORIDE mmol/L 92* 93* 92* 92*   CO2 mmol/L 35.0* 35.7* 36.8* 37.4*   BUN mg/dL 25* 27* 23 25*   CREATININE mg/dL 0.59 0.57 0.50*  0.62   GLUCOSE mg/dL 126* 137* 157* 142*   CALCIUM mg/dL 8.6 9.1 10.3 10.4   ALT (SGPT) U/L  --   --   --  20   AST (SGOT) U/L  --   --   --  15   HSTROP T ng/L  --   --   --  16*   PROBNP pg/mL  --   --   --  1,653.0     Estimated Creatinine Clearance: 62.9 mL/min (by C-G formula based on SCr of 0.59 mg/dL).    Microbiology Results Abnormal     Procedure Component Value - Date/Time    Blood Culture - Blood, Arm, Left [[id number]]  (Normal) Collected: 05/21/23 0735    Lab Status: Preliminary result Specimen: Blood from Arm, Left Updated: 05/23/23 0816     Blood Culture No growth at 2 days    Blood Culture - Blood, Hand, Right [[id number]]  (Normal) Collected: 05/21/23 0742    Lab Status: Preliminary result Specimen: Blood from Hand, Right Updated: 05/23/23 0816     Blood Culture No growth at 2 days    Narrative:      Less than seven (7) mL's of blood was collected.  Insufficient quantity may yield false negative results.    Respiratory Panel PCR w/COVID-19(SARS-CoV-2) LUIS CARLOS/JAIMEE/JOI/PAD/COR/MAD/KARLEE In-House, NP Swab in UTM/VTM, 3-4 HR TAT - Swab, Nasopharynx [[id number]]  (Normal) Collected: 05/21/23 0109    Lab Status: Final result Specimen: Swab from Nasopharynx Updated: 05/21/23 0517     ADENOVIRUS, PCR Not Detected     Coronavirus 229E Not Detected     Coronavirus HKU1 Not Detected     Coronavirus NL63 Not Detected     Coronavirus OC43 Not Detected     COVID19 Not Detected     Human Metapneumovirus Not Detected     Human Rhinovirus/Enterovirus Not Detected     Influenza A PCR Not Detected     Influenza B PCR Not Detected     Parainfluenza Virus 1 Not Detected     Parainfluenza Virus 2 Not Detected     Parainfluenza Virus 3 Not Detected     Parainfluenza Virus 4 Not Detected     RSV, PCR Not Detected     Bordetella pertussis pcr Not Detected     Bordetella parapertussis PCR Not Detected     Chlamydophila pneumoniae PCR Not Detected     Mycoplasma pneumo by PCR Not Detected    Narrative:      In the setting of a  positive respiratory panel with a viral infection PLUS a negative procalcitonin without other underlying concern for bacterial infection, consider observing off antibiotics or discontinuation of antibiotics and continue supportive care. If the respiratory panel is positive for atypical bacterial infection (Bordetella pertussis, Chlamydophila pneumoniae, or Mycoplasma pneumoniae), consider antibiotic de-escalation to target atypical bacterial infection.          Imaging Results (Last 24 Hours)     ** No results found for the last 24 hours. **          Results for orders placed during the hospital encounter of 08/31/18    Adult Transthoracic Echo Complete W/ Cont if Necessary Per Protocol    Interpretation Summary  · Calculated right ventricular systolic pressure from tricuspid regurgitation is 45 mmHg.  · Left ventricular systolic function is normal. Estimated EF = 63%.      I have reviewed the medications:  Scheduled Meds:amLODIPine, 5 mg, Oral, Daily  aspirin, 81 mg, Oral, Daily  atenolol, 50 mg, Oral, Daily  cefTRIAXone, 1 g, Intravenous, Q24H  enoxaparin, 30 mg, Subcutaneous, Daily  ipratropium-albuterol, 3 mL, Nebulization, 4x Daily - RT  losartan, 25 mg, Oral, Q24H  moxifloxacin, 1 drop, Right Eye, BID  multivitamin with minerals, 1 tablet, Oral, Daily  prednisoLONE acetate, 1 drop, Right Eye, Q6H  predniSONE, 40 mg, Oral, Daily With Breakfast  rosuvastatin, 5 mg, Oral, Daily  sodium chloride, 1 drop, Right Eye, 4x Daily  sodium chloride, 10 mL, Intravenous, Q12H  sodium chloride, 3 mL, Intravenous, Q12H      Continuous Infusions:   PRN Meds:.•  acetaminophen **OR** acetaminophen **OR** acetaminophen  •  calcium carbonate  •  docusate sodium  •  famotidine  •  ipratropium-albuterol  •  melatonin  •  nitroglycerin  •  ondansetron **OR** ondansetron  •  ondansetron **OR** ondansetron  •  [COMPLETED] Insert Peripheral IV **AND** sodium chloride  •  sodium chloride  •  sodium chloride  •  sodium chloride  •  sodium  chloride    Assessment & Plan   Assessment & Plan     Active Hospital Problems    Diagnosis  POA   • **Acute respiratory failure with hypoxia and hypercapnia [J96.01, J96.02]  Yes   • Mixed hyperlipidemia [E78.2]  Yes   • Possible pneumonia of right lower lobe due to infectious organism [J18.9]  Yes   • Acute exacerbation of chronic obstructive pulmonary disease (COPD) [J44.1]  Yes   • Carotid artery disease [I77.9]  Yes   • Hypertension [I10]  Yes   • Osteoarthritis [M19.90]  Yes      Resolved Hospital Problems   No resolved problems to display.        Brief Hospital Course to date:  Lizzy Hicks is a 87 y.o. female presents to the hospital with acute hypoxic and hypercapnic respiratory failure following recent parainfluenza infection and has concern for possible postviral bacterial right lower lobe pneumonia.     Discussion/plan for today:  Patient continued to improve.       I have switched her over to oral steroids.  Possible discharge on Omnicef or doxycycline.      Patient intolerant to BiPAP therapy.  Case has been discussed with pulmonologist.      Symptomatic treatment and supportive care.  Chest x-ray images reviewed and infiltrate noted.  Systemic steroids.    Add low-dose losartan.  Continue home blood pressure medications.  Monitor blood pressure and adjust as needed.  Previous echocardiogram from 2018 reviewed and normal EF of 63% but slightly elevated RVSP.     Continue statin for hyperlipidemia.  Previous lipids reviewed.     Tylenol as needed for arthritis.     Continue chronic eyedrops.     Treatment plan discussed with the patient who is in agreement.  She understands that due to her advanced age and comorbid conditions she is at higher risk for morbidity and mortality.     DVT prophylaxis: Lovenox    Disposition: Pending clinical course    CODE STATUS:   Code Status and Medical Interventions:   Ordered at: 05/21/23 0312     Code Status (Patient has no pulse and is not breathing):    CPR  (Attempt to Resuscitate)     Medical Interventions (Patient has pulse or is breathing):    Full Support       Osorio Schroeder MD  05/23/23

## 2023-05-23 NOTE — MBS/VFSS/FEES
Acute Care - Speech Language Pathology   Swallow Initial Evaluation [facility]     Patient Name: Lizzy Hicks  : 1935  MRN: [id number]  Today's Date: 2023               Admit Date: 2023    Visit Dx:     ICD-10-CM ICD-9-CM   1. Acute respiratory failure with hypoxia and hypercapnia  J96.01 518.81    J96.02    2. COPD with acute exacerbation  J44.1 491.21     Patient Active Problem List   Diagnosis    Hypertension    COPD (chronic obstructive pulmonary disease)    Carotid artery stenosis    Osteoarthritis    Osteoporosis    Hyponatremia    Reset osmostat syndrome    Carotid stenosis, asymptomatic, right    Carotid artery disease    Macular degeneration    COPD exacerbation    Acute exacerbation of chronic obstructive pulmonary disease (COPD)    Parainfluenza infection    Acute respiratory failure with hypoxia    Acute respiratory failure with hypoxia and hypercapnia    Mixed hyperlipidemia    Possible pneumonia of right lower lobe due to infectious organism     Past Medical History:   Diagnosis Date    Anesthesia complication     pt states was groggy for a week after surgery    Carotid artery disease     left    Carotid stenosis     RIGHT    COPD (chronic obstructive pulmonary disease)     History of bronchitis     Hypertension     Macular degeneration     Osteoarthritis 2016    Osteoporosis 2016    Reset osmostat syndrome 08/15/2016    Worked up by prior PCP in [facility]. Baseline Na 128-130 since .    Seborrheic dermatitis 2016    Swallowing difficulty     D/T INADVERTANT REMOVAL OF UVULA AS CHILD WITH T AND A     Past Surgical History:   Procedure Laterality Date    CAROTID ENDARTERECTOMY Right 2018    Procedure: RT CAROTID ENDARTERECTOMY WITH INTRA OPERATIVE CAROTID ARTERY DUPLEX SCAN;  Surgeon: Mikaela Galicia Jr., MD;  Location: [facility] OR;  Service: Vascular    CAROTID ENDARTERECTOMY Left 2019    Procedure: LEFT CAROTID ENDARTERECTOMY;  Surgeon: Grayson  Mikaela Aguilar Jr., MD;  Location: [facility] MAIN OR;  Service: Vascular    CATARACT EXTRACTION WITH INTRAOCULAR LENS IMPLANT      LEFT AND RIGHT    ORIF FEMUR FRACTURE Right     HARDWARE    TONSILLECTOMY AND ADENOIDECTOMY      INADVERTANTLY TOOK UVULA AT THIS TIME       SLP Recommendation and Plan  SLP Swallowing Diagnosis: mild, oral dysphagia, pharyngeal dysphagia (05/23/23 1200)  SLP Diet Recommendation: soft to chew textures, whole, thin liquids (05/23/23 1200)  Recommended Precautions and Strategies: upright posture during/after eating, small bites of food and sips of liquid, multiple swallows per bite of food, multiple swallows per sip of liquid, alternate between small bites of food and sips of liquid (05/23/23 1200)  SLP Rec. for Method of Medication Administration: meds whole, as tolerated (05/23/23 1200)     Monitor for Signs of Aspiration: yes (05/23/23 1200)     Swallow Criteria for Skilled Therapeutic Interventions Met: demonstrates skilled criteria (05/23/23 1200)  Anticipated Discharge Disposition (SLP): home (05/23/23 1200)  Rehab Potential/Prognosis, Swallowing: good, to achieve stated therapy goals (05/23/23 1200)  Therapy Frequency (Swallow): PRN (05/23/23 1200)  Predicted Duration Therapy Intervention (Days): until discharge (05/23/23 1200)                                        Plan of Care Reviewed With: patient  Outcome Evaluation: VFSS completed patient demonstrates mild oropharyngeal dysphagia recommend mechanical soft whole with thin liquids. Meds whole as tolerated and small bites and sips, multiple swallows, and alternate liquids and solids.      SWALLOW EVALUATION (last 72 hours)       SLP Adult Swallow Evaluation       Row Name 05/23/23 1200 05/22/23 1500                Rehab Evaluation    Document Type evaluation  -KA evaluation  -AW       Subjective Information no complaints  -KA no complaints  -AW       Patient Observations alert;cooperative  -KA alert;cooperative;agree to therapy  -AW        Patient Effort good  -KA good  -AW       Symptoms Noted During/After Treatment none  -KA none  -AW          General Information    Patient Profile Reviewed yes  -KA yes  -AW       Pertinent History Of Current Problem -- Pt admitted with respiratory failure, COPD exacerbation, concern for RLL PNA, and recent parainfluenza virus.  -AW       Current Method of Nutrition -- regular textures;thin liquids  -AW       Precautions/Limitations, Vision -- WFL;for purposes of eval  -AW       Precautions/Limitations, Hearing -- WFL;for purposes of eval  -AW       Prior Level of Function-Communication -- WFL  -AW       Prior Level of Function-Swallowing -- no diet consistency restrictions  -AW       Plans/Goals Discussed with -- patient;agreed upon  -AW       Barriers to Rehab -- medically complex  -AW       Patient's Goals for Discharge -- return home  -AW          Pain    Additional Documentation -- Pain Scale: Numbers Pre/Post-Treatment (Group)  -AW          Pain Scale: Numbers Pre/Post-Treatment    Pretreatment Pain Rating -- 0/10 - no pain  -AW       Posttreatment Pain Rating -- 0/10 - no pain  -AW          Oral Motor Structure and Function    Dentition Assessment -- upper dentures/partial in place;lower dentures/partial in place  -AW       Secretion Management -- WNL/WFL  -AW       Mucosal Quality -- moist, healthy  -AW          Oral Musculature and Cranial Nerve Assessment    Oral Motor General Assessment -- WFL  -AW          General Eating/Swallowing Observations    Respiratory Support Currently in Use -- nasal cannula  -AW       Eating/Swallowing Skills -- self-fed  -AW       Positioning During Eating -- upright in bed  -AW       Utensils Used -- spoon;cup;straw  -AW       Consistencies Trialed -- regular textures;soft to chew textures;mixed consistency;pureed;thin liquids  -AW          Clinical Swallow Eval    Clinical Swallow Evaluation Summary -- Swallow eval completed. Pt had a baseline weak, non-productive cough  that was noted inconsistently during eval, not directly after trials. Pt tolerated all consistencies given including thin (cup/straw), pureed, soft, mixed, and regular. Mastication was slow. Pt does well ordering easy to chew consistencies. Laryngeal elevation was adequate with palpation. Pt stated she has had difficulty swallowing to some degree her whole life since she had a tonsilectomy at age 7 and her uvula was removed. Pt agreeable to VFSS to further assess and r/o aspiration. Recommend continue with regular diet; meds as tolerated; upright for meals and 30 min after; slow rate; small bites/sips. ST to follow.  -AW          MBS/VFSS    Utensils Used spoon;cup  -KA --       Consistencies Trialed regular textures;soft to chew textures;chopped;mixed consistency;pureed;thin liquids  -KA --          MBS/VFSS Interpretation    VFSS Summary Radiologist Dr Garcia present: Patient demonstrates mild oropharyngeal dysphagia characterized by premature spillage, reduced pharyngeal constriction and BOT retraction. Of note patient presented with alot of baseline laryngeal shadowing making it difficult to r/o trace penetration/aspiration. No suspected penetration on all tested consistencies. Mild vallaculae residue with puree and moderate with cracker, clears with liquid wash.  -KA --          SLP Communication to Radiology    Summary Statement Radiologist Dr Garcia present: Patient demonstrates mild oropharyngeal dysphagia characterized by premature spillage, reduced pharyngeal constriction and BOT retraction. Of note patient presented with alot of baseline laryngeal shadowing making it difficult to r/o trace penetration/aspiration. No suspected penetration on all tested consistencies. Mild vallaculae residue with puree and moderate with cracker, clears with liquid wash.  -KA --          SLP Evaluation Clinical Impression    SLP Swallowing Diagnosis mild;oral dysphagia;pharyngeal dysphagia  -KA oral dysphagia;suspected  pharyngeal dysphagia  -AW       Functional Impact risk of aspiration/pneumonia  -KA risk of aspiration/pneumonia  -AW       Rehab Potential/Prognosis, Swallowing good, to achieve stated therapy goals  -KA good, to achieve stated therapy goals  -AW       Swallow Criteria for Skilled Therapeutic Interventions Met demonstrates skilled criteria  -KA demonstrates skilled criteria  -AW          Recommendations    Therapy Frequency (Swallow) PRN  -KA PRN  -AW       Predicted Duration Therapy Intervention (Days) until discharge  -KA until discharge  -AW       SLP Diet Recommendation soft to chew textures;whole;thin liquids  -KA regular textures;thin liquids  -AW       Recommended Diagnostics -- VFSS (MBS)  -AW       Recommended Precautions and Strategies upright posture during/after eating;small bites of food and sips of liquid;multiple swallows per bite of food;multiple swallows per sip of liquid;alternate between small bites of food and sips of liquid  -KA upright posture during/after eating;small bites of food and sips of liquid  -AW       Oral Care Recommendations Oral Care BID/PRN  -KA Oral Care BID/PRN  -AW       SLP Rec. for Method of Medication Administration meds whole;as tolerated  -KA meds whole;as tolerated  -AW       Monitor for Signs of Aspiration yes  -KA yes  -AW       Anticipated Discharge Disposition (SLP) home  -KA home  -AW                 User Key  (r) = Recorded By, (t) = Taken By, (c) = Cosigned By      Initials Name Effective Dates    KA Partha Upton MA,[facility]-SLP 06/02/22 -     Mari Bacon MS [facility]-SLP 06/16/21 -                     EDUCATION  The patient has been educated in the following areas:   Dysphagia (Swallowing Impairment).              Time Calculation:    Time Calculation- SLP       Row Name 05/23/23 1253             Time Calculation- SLP    SLP Start Time 1145  -KA      SLP Received On 05/23/23  -KA         Untimed Charges    93572-[employer] Flu[employer] Eval Swallow Minutes 75  -KA          Total Minutes    Untimed Charges Total Minutes 75  -KA       Total Minutes 75  -KA                User Key  (r) = Recorded By, (t) = Taken By, (c) = Cosigned By      Initials Name Provider Type    Partha Palacio MA,CCC-SLP Speech and Language Pathologist                    Therapy Charges for Today       Code Description Service Date Service Provider Modifiers Qty    92911038058 HC [facility] [employer] FLUORO EVAL SWALLOW 5 5/23/2023 Partha Upton MA,CCC-SLP GN 1                 Partha Upton MA,CCC-SLP  5/23/2023

## 2023-05-23 NOTE — PLAN OF CARE
Goal Outcome Evaluation:  Plan of Care Reviewed With: patient           Outcome Evaluation: VFSS completed patient demonstrates mild oropharyngeal dysphagia recommend mechanical soft whole with thin liquids. Meds whole as tolerated and small bites and sips, multiple swallows, and alternate liquids and solids.

## 2023-05-23 NOTE — PROGRESS NOTES
"  PROGRESS NOTE  Patient Name: Lizzy Hicks  Age/Sex: 87 y.o. female  : 1935  MRN: [id number]    Date of Admission: 2023  Date of Encounter Visit: 23   LOS: 2 days   Patient Care Team:  Deric Mendosa MD as PCP - General (Family Medicine)  Jennifer Porras, RN as Ambulatory  ([facility])    Chief Complaint: COPD exacerbation, chronic hypercapnia    Hospital course: Patient did not want to use the noninvasive positive pressure ventilation at night and is not interested in having one at home at this point but may reconsider that down the road.  She is down to 1 L/min and she feels better today.  She was on IV steroids and on IV Rocephin      REVIEW OF SYSTEMS:   CONSTITUTIONAL: no fever or chills  CARDIOVASCULAR: No chest pain, chest pressure or chest discomfort. No palpitations or edema.   RESPIRATORY: Improving shortness of breath, still oxygen dependent.   GASTROINTESTINAL: No anorexia, nausea, vomiting or diarrhea. No abdominal pain or blood.   HEMATOLOGIC: No bleeding or bruising.     Ventilator/Non-Invasive Ventilation Settings (From admission, onward)     Start     Ordered    23 0525  NIPPV (CPAP or BIPAP)  Until Discontinued        Question Answer Comment   Indication Acute Respiratory Failure    Type BIPAP    IPAP 18    EPAP 7    Breath Rate 20    Titrate Oxygen for SpO2 90 - 95%        23 0525                  Vital Signs  Temp:  [97.5 °F (36.4 °C)-97.8 °F (36.6 °C)] 97.7 °F (36.5 °C)  Heart Rate:  [79-93] 93  Resp:  [18-20] 18  BP: (117-170)/(43-74) 162/66  SpO2:  [91 %-100 %] 95 %  on  Flow (L/min):  [1-2] 1 Device (Oxygen Therapy): nasal cannula    Intake/Output Summary (Last 24 hours) at 2023 0849  Last data filed at 2023 0720  Gross per 24 hour   Intake 540 ml   Output --   Net 540 ml     Flowsheet Rows    Flowsheet Row First Filed Value   Admission Height 160 cm (63\") Documented at 2023 0059   Admission Weight 58.1 kg (128 lb) " Documented at 05/21/2023 0059        Body mass index is 22.34 kg/m².      05/21/23  0059 05/21/23  0336   Weight: 58.1 kg (128 lb) 57.2 kg (126 lb 1.7 oz)       Physical Exam:  GEN:  No acute distress, alert, cooperative, well developed, chronically ill looking  EYES:   Sclerae clear. No icterus. PERRL. Normal EOM.  Ptosis of the right eye (baseline)  ENT:   External ears/nose normal, no oral lesions, no thrush, mucous membranes moist  NECK:  Supple, midline trachea, no JVD  LUNGS: Normal chest on inspection, wheezing is nearly gone, she has still diminished breath sounds but better compared to earlier probably back to her baseline . Respirations regular, even and nonlabored  CV:  Regular rhythm and rate. Normal S1/S2. No murmurs, gallops, or rubs noted.  ABD:  Soft, nontender and nondistended. Normal bowel sounds. No guarding  EXT:  Moves all extremities well. No cyanosis. No redness.  Trace pitting edema  Skin: Dry, intact, no bleeding    Results Review:    Results From Last 14 Days   Lab Units 05/21/23  0111   LACTATE mmol/L 0.9     Results from last 7 days   Lab Units 05/23/23  0528 05/22/23  0423 05/21/23  0422 05/21/23 0111   SODIUM mmol/L 133* 135* 134* 134*   POTASSIUM mmol/L 4.3 4.3 4.4 3.9   CHLORIDE mmol/L 92* 93* 92* 92*   CO2 mmol/L 35.0* 35.7* 36.8* 37.4*   BUN mg/dL 25* 27* 23 25*   CREATININE mg/dL 0.59 0.57 0.50* 0.62   CALCIUM mg/dL 8.6 9.1 10.3 10.4   AST (SGOT) U/L  --   --   --  15   ALT (SGPT) U/L  --   --   --  20   ANION GAP mmol/L 6.0 6.3 5.2 4.6*   ALBUMIN g/dL  --   --   --  3.5     Results from last 7 days   Lab Units 05/21/23  0111   HSTROP T ng/L 16*         Results from last 7 days   Lab Units 05/21/23  0111   PROBNP pg/mL 1,653.0     Results from last 7 days   Lab Units 05/23/23  0528 05/22/23  0422 05/21/23  0422 05/21/23  0111   WBC 10*3/mm3 11.38* 9.58 11.23* 12.22*   HEMOGLOBIN g/dL 11.7* 10.5* 11.6* 12.4   HEMATOCRIT % 35.3 30.8* 33.4* 36.3   PLATELETS 10*3/mm3 271 218 241 246    MCV fL 93.9 94.2 91.3 91.9   NEUTROPHIL % %  --   --   --  72.8   LYMPHOCYTE % %  --   --   --  15.8*   MONOCYTES % %  --   --   --  8.7   EOSINOPHIL % %  --   --   --  2.0   BASOPHIL % %  --   --   --  0.5   IMM GRAN % %  --   --   --  0.2     Results from last 7 days   Lab Units 05/21/23  0111   INR  0.99   APTT seconds 29.9               Invalid input(s): LDLCALC  Results from last 7 days   Lab Units 05/21/23  0112   PH, ARTERIAL pH units 7.377   PCO2, ARTERIAL mm Hg 67.1*   PO2 ART mm Hg 113.8*   HCO3 ART mmol/L 39.4*         No results found for: POCGLU  Results from last 7 days   Lab Units 05/21/23  0111   PROCALCITONIN ng/mL 0.13   LACTATE mmol/L 0.9     Results from last 7 days   Lab Units 05/21/23  0742 05/21/23  0735   BLOODCX  No growth at 2 days No growth at 2 days         Results from last 7 days   Lab Units 05/21/23  0109   COVID19  Not Detected   ADENOVIRUS DETECTION BY PCR  Not Detected   CORONAVIRUS 229E  Not Detected   CORONAVIRUS HKU1  Not Detected   CORONAVIRUS NL63  Not Detected   CORONAVIRUS OC43  Not Detected   HUMAN METAPNEUMOVIRUS  Not Detected   HUMAN RHINOVIRUS/ENTEROVIRUS  Not Detected   INFLUENZA B PCR  Not Detected   PARAINFLUENZA 1  Not Detected   PARAINFLUENZA VIRUS 2  Not Detected   PARAINFLUENZA VIRUS 3  Not Detected   PARAINFLUENZA VIRUS 4  Not Detected   BORDETELLA PERTUSSIS PCR  Not Detected   BORDETELLA PARAPERTUSSIS PCR  Not Detected   CHLAMYDOPHILA PNEUMONIAE PCR  Not Detected   MYCOPLAMA PNEUMO PCR  Not Detected   RSV, PCR  Not Detected               Imaging:   Imaging Results (All)           I reviewed the patient's new clinical results.  I personally viewed and interpreted the patient's imaging results: Chronic COPD changes with no obvious infiltrate except for small area on the right lower lobe, unsure if this is a nipple shadow.        Medication Review:   amLODIPine, 5 mg, Oral, Daily  aspirin, 81 mg, Oral, Daily  atenolol, 50 mg, Oral, Daily  cefTRIAXone, 1 g,  Intravenous, Q24H  enoxaparin, 30 mg, Subcutaneous, Daily  ipratropium-albuterol, 3 mL, Nebulization, 4x Daily - RT  losartan, 25 mg, Oral, Q24H  moxifloxacin, 1 drop, Right Eye, BID  multivitamin with minerals, 1 tablet, Oral, Daily  prednisoLONE acetate, 1 drop, Right Eye, Q6H  predniSONE, 40 mg, Oral, Daily With Breakfast  rosuvastatin, 5 mg, Oral, Daily  sodium chloride, 1 drop, Right Eye, 4x Daily  sodium chloride, 10 mL, Intravenous, Q12H  sodium chloride, 3 mL, Intravenous, Q12H             ASSESSMENT:   1. Acute on chronic hypoxemic and hypercapnic respiratory failure  2. Encephalopathy probably CO2 narcosis  3. Acute exacerbation of COPD  4. Hyponatremia    PLAN:  Patient may benefit from using noninvasive nocturnal positive pressure ventilation even after discharge however she is not interested in that at this point, she is actually not using it while acutely in the hospital  She i was already transition to oral steroids by primary team at 40 mg  She is on the IV Rocephin and may transition to oral Vantin (200 mg p.o. twice daily, not available on inpatient but can be arranged at discharge) to finish the 7 days course.  Chest x-ray done to follow-up on the right lower lobe area of infiltrate has not been done yet and we will review it once available  Discussed with the patient    Disposition: Home/assisted living    Ama Christopher MD  05/23/23  08:49 EDT        Dictated utilizing Dragon dictation

## 2023-05-23 NOTE — PLAN OF CARE
Goal Outcome Evaluation:  Plan of Care Reviewed With: patient           Outcome Evaluation: Pt seen for OT session this AM. Sitting up in bed upon arrival finishing breakfast. Worked on participation with ADLs and functional endurance. Transferred to [facility] with CGA. Declined ambulating to the toilet due to feeling SOB. Pt on 2L upon arrival. Did agree to perform functional mobility in room to chair with CGA and use of rwx. Pt's O2 ~83% after ADL and mobility. Pt required education on breathing techniques and O2 titrated to 3L for pt to recover. Pt continues to benefit from skilled OT.

## 2023-05-23 NOTE — THERAPY TREATMENT NOTE
Patient Name: Lizzy Hicks  : 1935    MRN: [id number]                              Today's Date: 2023       Admit Date: 2023    Visit Dx:     ICD-10-CM ICD-9-CM   1. Acute respiratory failure with hypoxia and hypercapnia  J96.01 518.81    J96.02    2. COPD with acute exacerbation  J44.1 491.21     Patient Active Problem List   Diagnosis   • Hypertension   • COPD (chronic obstructive pulmonary disease)   • Carotid artery stenosis   • Osteoarthritis   • Osteoporosis   • Hyponatremia   • Reset osmostat syndrome   • Carotid stenosis, asymptomatic, right   • Carotid artery disease   • Macular degeneration   • COPD exacerbation   • Acute exacerbation of chronic obstructive pulmonary disease (COPD)   • Parainfluenza infection   • Acute respiratory failure with hypoxia   • Acute respiratory failure with hypoxia and hypercapnia   • Mixed hyperlipidemia   • Possible pneumonia of right lower lobe due to infectious organism     Past Medical History:   Diagnosis Date   • Anesthesia complication     pt states was groggy for a week after surgery   • Carotid artery disease     left   • Carotid stenosis     RIGHT   • COPD (chronic obstructive pulmonary disease)    • History of bronchitis    • Hypertension    • Macular degeneration    • Osteoarthritis 2016   • Osteoporosis 2016   • Reset osmostat syndrome 08/15/2016    Worked up by prior PCP in [facility]. Baseline Na 128-130 since .   • Seborrheic dermatitis 2016   • Swallowing difficulty     D/T INADVERTANT REMOVAL OF UVULA AS CHILD WITH T AND A     Past Surgical History:   Procedure Laterality Date   • CAROTID ENDARTERECTOMY Right 2018    Procedure: RT CAROTID ENDARTERECTOMY WITH INTRA OPERATIVE CAROTID ARTERY DUPLEX SCAN;  Surgeon: Mikaela Galicia Jr., MD;  Location: [facility];  Service: Vascular   • CAROTID ENDARTERECTOMY Left 2019    Procedure: LEFT CAROTID ENDARTERECTOMY;  Surgeon: Mikaela Galicia Jr., MD;   Location: [facility] MAIN OR;  Service: Vascular   • CATARACT EXTRACTION WITH INTRAOCULAR LENS IMPLANT      LEFT AND RIGHT   • ORIF FEMUR FRACTURE Right     HARDWARE   • TONSILLECTOMY AND ADENOIDECTOMY      INADVERTANTLY TOOK UVULA AT THIS TIME      General Information     Row Name 05/23/23 1042          OT Time and Intention    Document Type therapy note (daily note)  -     Mode of Treatment individual therapy;occupational therapy  -     Row Name 05/23/23 1042          General Information    Patient Profile Reviewed yes  -     Existing Precautions/Restrictions fall;oxygen therapy device and L/min  -     Row Name 05/23/23 1042          Cognition    Orientation Status (Cognition) oriented x 3  -     Row Name 05/23/23 1042          Safety Issues, Functional Mobility    Impairments Affecting Function (Mobility) endurance/activity tolerance;strength;shortness of breath  -           User Key  (r) = Recorded By, (t) = Taken By, (c) = Cosigned By    Initials Name Provider Type    Kiara Perry OT Occupational Therapist                 Mobility/ADL's     Row Name 05/23/23 1042          Bed Mobility    Bed Mobility supine-sit  -     Supine-Sit [place] (Bed Mobility) minimum assist (75% patient effort);verbal cues  -     Assistive Device (Bed Mobility) head of bed elevated;bed rails  -LORIE     Comment, (Bed Mobility) UI at the end of session  -     Row Name 05/23/23 1042          Transfers    Transfers toilet transfer;sit-stand transfer  -     Row Name 05/23/23 1042          Sit-Stand Transfer    Sit-Stand [place] (Transfers) standby assist  -     Assistive Device (Sit-Stand Transfers) walker, front-wheeled  -     Row Name 05/23/23 1042          Toilet Transfer    Type (Toilet Transfer) sit-stand;stand-sit  -     [place] Level (Toilet Transfer) contact guard  -     Assistive Device (Toilet Transfer) walker, front-wheeled;commode, 3-in-1  -LORIE     Comment, (Toilet Transfer)  [place] [facility] [place] feeling fatigued/SOB and not wanting to attempt to ambulate to toilet this AM  -     Row Name 05/23/23 1042          Functional Mobility    Functional Mobility- Ind. Level contact guard assist  -     Functional Mobility- Safety Issues supplemental O2  -     Functional Mobility- Comment [place] [facility] [place] bed to chair with CGA and use of rwx. Pt on 2L of O2 upon arrival and O2 was 83% after ambulation. Had to be titrated up to 3L for pt to recover to low 90s.  -     Row Name 05/23/23 1042          Activities of Daily Living    BADL Assessment/Intervention toileting  -     Row Name 05/23/23 1042          Toileting Assessment/Training    [place] Level (Toileting) toileting skills;perform perineal hygiene;maximum assist (25% patient effort);adjust/manage clothing  -     Position (Toileting) supported sitting;supported standing  -     Comment, (Toileting) Due to pts decreased endurance. Pt focused on holding to rwx for balance and pursed lip breathing techniques while therapist assisted  -           User Key  (r) = Recorded By, (t) = Taken By, (c) = Cosigned By    Initials Name Provider Type    Kiara Perry OT Occupational Therapist               Obj/Interventions     Row Name 05/23/23 1045          Motor Skills    Motor Skills functional endurance  -     Functional Endurance Poor limited by SOB and fatigue  -     Row Name 05/23/23 1045          Balance    Static Sitting Balance supervision  -     Position, Sitting Balance sitting edge of bed  -     Static Standing Balance standby assist  -     Dynamic Standing Balance contact guard  -     Position/Device Used, Standing Balance walker, front-wheeled  -     Balance Interventions sitting;standing;occupation based/functional task  -           User Key  (r) = Recorded By, (t) = Taken By, (c) = Cosigned By    Initials Name Provider Type    Kiara Perry, ELIEZER Occupational Therapist                Goals/Plan    No documentation.                Clinical Impression     Row Name 05/23/23 1046          Pain Assessment    Pretreatment Pain Rating 0/10 - no pain  -KA     Posttreatment Pain Rating 0/10 - no pain  -KA     Row Name 05/23/23 1046          Plan of Care Review    Plan of Care Reviewed With patient  -     Outcome Evaluation Pt seen for OT session this AM. Sitting up in bed upon arrival finishing breakfast. Worked on participation with ADLs and functional endurance. [place] [facility] [place] Declined ambulating to the toilet due to feeling SOB. Pt on 2L upon arrival. Did agree to perform functional mobility in room to chair with CGA and use of rwx. Pt's O2 ~83% after ADL and mobility. Pt required education on breathing techniques and O2 titrated to 3L for pt to recover. Pt continues to benefit from skilled OT.  -     Row Name 05/23/23 1046          Therapy Plan Review/Discharge Plan (OT)    Anticipated Discharge Disposition (OT) [facility]  -     Row Name 05/23/23 1046          Vital Signs    Pre SpO2 (%) 91  -KA     O2 Delivery Pre Treatment supplemental O2  -KA     Intra SpO2 (%) 83  -KA     O2 Delivery Intra Treatment supplemental O2  -KA     Post SpO2 (%) 92  -KA     O2 Delivery Post Treatment supplemental O2  -KA     Pre Patient Position Supine  -KA     Intra Patient Position Standing  -KA     Post Patient Position Sitting  -KA     Row Name 05/23/23 1046          Positioning and Restraints    Pre-Treatment Position in bed  -KA     Post Treatment Position chair  -KA     In Chair notified nsg;reclined;call light within reach;encouraged to call for assist  -           User Key  (r) = Recorded By, (t) = Taken By, (c) = Cosigned By    Initials Name Provider Type    Kiara Perry, OT Occupational Therapist               Outcome Measures     Row Name 05/23/23 1049          How much help from another is currently needed...    Putting on and taking off regular lower body  clothing? 2  -KA     Bathing (including washing, rinsing, and drying) 2  -KA     Toileting (which includes using toilet bed pan or urinal) 2  -KA     Putting on and taking off regular upper body clothing 3  -KA     Taking care of personal grooming (such as brushing teeth) 3  -KA     Eating meals 4  -KA     AM-PAC 6 Clicks Score (OT) 16  -KA     Row Name 05/23/23 1049          Functional Assessment    Outcome Measure Options AM-PAC 6 Clicks Daily Activity (OT)  -KA           User Key  (r) = Recorded By, (t) = Taken By, (c) = Cosigned By    Initials Name Provider Type    Kiara Perry OT Occupational Therapist                Occupational Therapy Education     Title: PT OT SLP Therapies (In Progress)     Topic: Occupational Therapy (In Progress)     Point: ADL training (Done)     Description:   Instruct learner(s) on proper safety adaptation and remediation techniques during self care or transfers.   Instruct in proper use of assistive devices.              Learning Progress Summary           Patient Acceptance, E, VU by  at 5/22/2023 1213    Comment: OT goals, recommended up to chair X3 day/meals to increase OOB activity. Pt in agreement                   Point: Home exercise program (Not Started)     Description:   Instruct learner(s) on appropriate technique for monitoring, assisting and/or progressing therapeutic exercises/activities.              Learner Progress:  Not documented in this visit.          Point: Precautions (Not Started)     Description:   Instruct learner(s) on prescribed precautions during self-care and functional transfers.              Learner Progress:  Not documented in this visit.          Point: Body mechanics (Not Started)     Description:   Instruct learner(s) on proper positioning and spine alignment during self-care, functional mobility activities and/or exercises.              Learner Progress:  Not documented in this visit.                      User Key     Initials Effective  Dates Name Provider Type Discipline     04/02/20 -  Darline Law OT Occupational Therapist OT              OT Recommendation and Plan     Plan of Care Review  Plan of Care Reviewed With: patient  Outcome Evaluation: Pt seen for OT session this AM. Sitting up in bed upon arrival finishing breakfast. Worked on participation with ADLs and functional endurance. Transferred to [facility] with CGA. Declined ambulating to the toilet due to feeling SOB. Pt on 2L upon arrival. Did agree to perform functional mobility in room to chair with CGA and use of rwx. Pt's O2 ~83% after ADL and mobility. Pt required education on breathing techniques and O2 titrated to 3L for pt to recover. Pt continues to benefit from skilled OT.     Time Calculation:    Time Calculation- OT     Row Name 05/23/23 1049             Time Calculation- OT    OT Start Time 0911  -KA      OT Stop Time 0938  -KA      OT Time Calculation (min) 27 min  -KA      Total Timed Code Minutes- OT 27 minute(s)  -KA      OT Received On 05/23/23  -KA      OT - Next Appointment 05/24/23  -         Timed Charges    44372 - OT Self Care/Mgmt Minutes 27  -KA         Total Minutes    Timed Charges Total Minutes 27  -KA       Total Minutes 27  -KA            User Key  (r) = Recorded By, (t) = Taken By, (c) = Cosigned By    Initials Name Provider Type    Kiara Perry OT Occupational Therapist              Therapy Charges for Today     Code Description Service Date Service Provider Modifiers Qty    [id number] HC OT SELF CARE/MGMT/TRAIN EA 15 MIN 5/23/2023 Kiara Doe OT GO 2               Kiara Doe OT  5/23/2023

## 2023-05-23 NOTE — CASE MANAGEMENT/SOCIAL WORK
Continued Stay Note  [facility]     Patient Name: Lizzy Hicks  MRN: [id number]  Today's Date: 5/23/2023    Admit Date: 5/21/2023    Plan: [facility]. [employer] van setup with O2 to transport tomorrow 5/24 at 1430. Covid test ordered and pending   Notified pt's daughter Jasmin of D/C plan.   Discharge Plan     Row Name 05/23/23 1640       Plan    Plan [facility]. [employer] [facility] van setup with O2 to transport tomorrow 5/24 at 1430. Covid test ordered and pending   Notified pt's daughter Jasmin of D/C plan.    Patient/Family in Agreement with Plan yes    Plan Comments Discussed in huddle and plan for D/C tomorrow. Notified [facility]Noe and they can accept tomorrow. Needs Covid test prior to coming. (Covid test ordered). Transport setup with [employer] Van with O2 for tomorrow 5/23 at 1430. Discussed D/C plan with pt at bedside. Called and notified Jasmin/pt's daughter of D/C plan and she is in agreement. Natalio Barr RN-BC               Discharge Codes    No documentation.               Expected Discharge Date and Time     Expected Discharge Date Expected Discharge Time    May 24, 2023             Natalio Barr RN

## 2023-05-23 NOTE — PLAN OF CARE
Goal Outcome Evaluation:  Plan of Care Reviewed With: patient          Problem: Adult Inpatient Plan of Care  Goal: Plan of Care Review  Outcome: Ongoing, Progressing  Flowsheets (Taken 5/23/2023 1710)  Progress: improving  Plan of Care Reviewed With: patient  Outcome Evaluation: Vitals stable. Pt maintained O2 sat on 2L NC. Pt worked w PT today and up to chair and ambulated in room. Possible dc tomorrow 5/24, transport set up for 1430 via "[employer]er WC van tomorrow. Pt needs met and questions answered. Will continue to monitor.

## 2023-05-24 PROBLEM — R13.12 OROPHARYNGEAL DYSPHAGIA: Status: ACTIVE | Noted: 2023-05-24

## 2023-05-24 LAB
ANION GAP SERPL CALCULATED.3IONS-SCNC: 8.6 MMOL/L (ref 5–15)
ARTERIAL PATENCY WRIST A: POSITIVE
ATMOSPHERIC PRESS: 749.4 MMHG
BASE EXCESS BLDA CALC-SCNC: 11 MMOL/L (ref 0–2)
BDY SITE: ABNORMAL
BUN SERPL-MCNC: 22 MG/DL (ref 8–23)
BUN/CREAT SERPL: 45.8 (ref 7–25)
CALCIUM SPEC-SCNC: 8.8 MG/DL (ref 8.6–10.5)
CHLORIDE SERPL-SCNC: 94 MMOL/L (ref 98–107)
CO2 SERPL-SCNC: 33.4 MMOL/L (ref 22–29)
CREAT SERPL-MCNC: 0.48 MG/DL (ref 0.57–1)
DEPRECATED RDW RBC AUTO: 40.5 FL (ref 37–54)
EGFRCR SERPLBLD CKD-EPI 2021: 93 ML/MIN/1.73
ERYTHROCYTE [DISTWIDTH] IN BLOOD BY AUTOMATED COUNT: 11.8 % (ref 12.3–15.4)
GLUCOSE BLDC GLUCOMTR-MCNC: 134 MG/DL (ref 70–130)
GLUCOSE SERPL-MCNC: 109 MG/DL (ref 65–99)
HCO3 BLDA-SCNC: 36.7 MMOL/L (ref 22–28)
HCT VFR BLD AUTO: 38.1 % (ref 34–46.6)
HGB BLD-MCNC: 12.7 G/DL (ref 12–15.9)
INHALED O2 CONCENTRATION: 35 %
MCH RBC QN AUTO: 31 PG (ref 26.6–33)
MCHC RBC AUTO-ENTMCNC: 33.3 G/DL (ref 31.5–35.7)
MCV RBC AUTO: 92.9 FL (ref 79–97)
MODALITY: ABNORMAL
O2 A-A PPRESDIFF RESPIRATORY: 0.5 MMHG
PCO2 BLDA: 52 MM HG (ref 35–45)
PEEP RESPIRATORY: 5 CM[H2O]
PH BLDA: 7.46 PH UNITS (ref 7.35–7.45)
PLATELET # BLD AUTO: 302 10*3/MM3 (ref 140–450)
PMV BLD AUTO: 9.1 FL (ref 6–12)
PO2 BLDA: 91.6 MM HG (ref 80–100)
POTASSIUM SERPL-SCNC: 4 MMOL/L (ref 3.5–5.2)
RBC # BLD AUTO: 4.1 10*6/MM3 (ref 3.77–5.28)
SAO2 % BLDCOA: 97.3 % (ref 92–99)
SARS-COV-2 RNA RESP QL NAA+PROBE: NOT DETECTED
SET MECH RESP RATE: 20
SODIUM SERPL-SCNC: 136 MMOL/L (ref 136–145)
TOTAL RATE: 26 BREATHS/MINUTE
VT ON VENT VENT: 379 ML
WBC NRBC COR # BLD: 12.35 10*3/MM3 (ref 3.4–10.8)

## 2023-05-24 PROCEDURE — 94761 N-INVAS EAR/PLS OXIMETRY MLT: CPT

## 2023-05-24 PROCEDURE — 82948 REAGENT STRIP/BLOOD GLUCOSE: CPT

## 2023-05-24 PROCEDURE — 87635 SARS-COV-2 COVID-19 AMP PRB: CPT | Performed by: INTERNAL MEDICINE

## 2023-05-24 PROCEDURE — 25010000002 ENOXAPARIN PER 10 MG: Performed by: INTERNAL MEDICINE

## 2023-05-24 PROCEDURE — 94664 DEMO&/EVAL PT USE INHALER: CPT

## 2023-05-24 PROCEDURE — 94660 CPAP INITIATION&MGMT: CPT

## 2023-05-24 PROCEDURE — 94799 UNLISTED PULMONARY SVC/PX: CPT

## 2023-05-24 PROCEDURE — 82803 BLOOD GASES ANY COMBINATION: CPT

## 2023-05-24 PROCEDURE — 25010000002 CEFTRIAXONE PER 250 MG: Performed by: INTERNAL MEDICINE

## 2023-05-24 PROCEDURE — 63710000001 PREDNISONE PER 1 MG: Performed by: INTERNAL MEDICINE

## 2023-05-24 PROCEDURE — 85027 COMPLETE CBC AUTOMATED: CPT | Performed by: INTERNAL MEDICINE

## 2023-05-24 PROCEDURE — 80048 BASIC METABOLIC PNL TOTAL CA: CPT | Performed by: INTERNAL MEDICINE

## 2023-05-24 PROCEDURE — 25010000002 METHYLPREDNISOLONE PER 125 MG: Performed by: INTERNAL MEDICINE

## 2023-05-24 PROCEDURE — 25010000002 HYDRALAZINE PER 20 MG: Performed by: INTERNAL MEDICINE

## 2023-05-24 PROCEDURE — 36600 WITHDRAWAL OF ARTERIAL BLOOD: CPT

## 2023-05-24 RX ORDER — DEXMEDETOMIDINE HYDROCHLORIDE 4 UG/ML
.2-1.5 INJECTION, SOLUTION INTRAVENOUS
Status: DISCONTINUED | OUTPATIENT
Start: 2023-05-24 | End: 2023-05-25

## 2023-05-24 RX ORDER — LOSARTAN POTASSIUM 25 MG/1
12.5 TABLET ORAL
Status: DISCONTINUED | OUTPATIENT
Start: 2023-05-25 | End: 2023-05-30 | Stop reason: HOSPADM

## 2023-05-24 RX ORDER — METHYLPREDNISOLONE SODIUM SUCCINATE 125 MG/2ML
60 INJECTION, POWDER, LYOPHILIZED, FOR SOLUTION INTRAMUSCULAR; INTRAVENOUS EVERY 12 HOURS
Status: DISCONTINUED | OUTPATIENT
Start: 2023-05-24 | End: 2023-05-25

## 2023-05-24 RX ORDER — HYDRALAZINE HYDROCHLORIDE 20 MG/ML
10 INJECTION INTRAMUSCULAR; INTRAVENOUS EVERY 4 HOURS PRN
Status: DISCONTINUED | OUTPATIENT
Start: 2023-05-24 | End: 2023-05-30 | Stop reason: HOSPADM

## 2023-05-24 RX ADMIN — SODIUM CHLORIDE 1 DROP: 50 SOLUTION OPHTHALMIC at 09:09

## 2023-05-24 RX ADMIN — SODIUM CHLORIDE 1 DROP: 50 SOLUTION OPHTHALMIC at 04:08

## 2023-05-24 RX ADMIN — PREDNISOLONE ACETATE 1 DROP: 10 SUSPENSION/ DROPS OPHTHALMIC at 21:21

## 2023-05-24 RX ADMIN — PREDNISOLONE ACETATE 1 DROP: 10 SUSPENSION/ DROPS OPHTHALMIC at 15:11

## 2023-05-24 RX ADMIN — IPRATROPIUM BROMIDE AND ALBUTEROL SULFATE 3 ML: 2.5; .5 SOLUTION RESPIRATORY (INHALATION) at 19:25

## 2023-05-24 RX ADMIN — METHYLPREDNISOLONE SODIUM SUCCINATE 60 MG: 125 INJECTION, POWDER, FOR SOLUTION INTRAMUSCULAR; INTRAVENOUS at 21:30

## 2023-05-24 RX ADMIN — MOXIFLOXACIN HYDROCHLORIDE 0.05 ML: 5 SOLUTION/ DROPS OPHTHALMIC at 09:09

## 2023-05-24 RX ADMIN — Medication 10 ML: at 09:12

## 2023-05-24 RX ADMIN — HYDRALAZINE HYDROCHLORIDE 10 MG: 20 INJECTION INTRAMUSCULAR; INTRAVENOUS at 09:45

## 2023-05-24 RX ADMIN — METHYLPREDNISOLONE SODIUM SUCCINATE 60 MG: 125 INJECTION, POWDER, FOR SOLUTION INTRAMUSCULAR; INTRAVENOUS at 09:44

## 2023-05-24 RX ADMIN — Medication 10 ML: at 21:20

## 2023-05-24 RX ADMIN — Medication 3 ML: at 09:12

## 2023-05-24 RX ADMIN — IPRATROPIUM BROMIDE AND ALBUTEROL SULFATE 3 ML: 2.5; .5 SOLUTION RESPIRATORY (INHALATION) at 16:10

## 2023-05-24 RX ADMIN — IPRATROPIUM BROMIDE AND ALBUTEROL SULFATE 3 ML: 2.5; .5 SOLUTION RESPIRATORY (INHALATION) at 12:55

## 2023-05-24 RX ADMIN — ENOXAPARIN SODIUM 30 MG: 100 INJECTION SUBCUTANEOUS at 09:10

## 2023-05-24 RX ADMIN — SODIUM CHLORIDE 1 DROP: 50 SOLUTION OPHTHALMIC at 21:25

## 2023-05-24 RX ADMIN — SODIUM CHLORIDE 1 DROP: 50 SOLUTION OPHTHALMIC at 15:11

## 2023-05-24 RX ADMIN — Medication 3 ML: at 21:20

## 2023-05-24 RX ADMIN — PREDNISOLONE ACETATE 1 DROP: 10 SUSPENSION/ DROPS OPHTHALMIC at 09:09

## 2023-05-24 RX ADMIN — IPRATROPIUM BROMIDE AND ALBUTEROL SULFATE 3 ML: 2.5; .5 SOLUTION RESPIRATORY (INHALATION) at 06:53

## 2023-05-24 RX ADMIN — IPRATROPIUM BROMIDE AND ALBUTEROL SULFATE 3 ML: 2.5; .5 SOLUTION RESPIRATORY (INHALATION) at 05:15

## 2023-05-24 RX ADMIN — CEFTRIAXONE SODIUM 1 G: 1 INJECTION, POWDER, FOR SOLUTION INTRAMUSCULAR; INTRAVENOUS at 05:52

## 2023-05-24 RX ADMIN — PREDNISOLONE ACETATE 1 DROP: 10 SUSPENSION/ DROPS OPHTHALMIC at 03:19

## 2023-05-24 RX ADMIN — MOXIFLOXACIN HYDROCHLORIDE 0.05 ML: 5 SOLUTION/ DROPS OPHTHALMIC at 21:14

## 2023-05-24 RX ADMIN — DEXMEDETOMIDINE HYDROCHLORIDE 0.8 MCG/KG/HR: 4 INJECTION, SOLUTION INTRAVENOUS at 10:25

## 2023-05-24 NOTE — PROGRESS NOTES
PROGRESS NOTE  Patient Name: Lizzy Hicks  Age/Sex: 87 y.o. female  : 1935  MRN: [id number]    Date of Admission: 2023  Date of Encounter Visit: 23   LOS: 3 days   Patient Care Team:  Deric Mendosa MD as PCP - General (Family Medicine)  Jennifer Porras, SHAVON as Ambulatory  ([facility])    Chief Complaint: COPD exacerbation, chronic hypercapnia    Hospital course: Patient was doing better yesterday and were hoping that she can be discharged home today however this morning she had progressive decline in her status with increase in the work of breathing and increased oxygen requirements.  Patient could not tolerate the BiPAP at all.  She was tapered from IV to oral steroid yesterday  She had a chest x-ray done yesterday to follow-up on that right lower lobe shadow and there was no evidence of any pneumonia or mass or nodules on the chest x-ray, nipple markers were used on this 1 to rule out any nipple shadow.  She is currently on 2 L nasal cannula  Patient is in distress  Family at the bedside  n      REVIEW OF SYSTEMS:   CONSTITUTIONAL: no fever or chills  CARDIOVASCULAR: No chest pain, chest pressure or chest discomfort. No palpitations or edema.   RESPIRATORY: Respiratory distress, labored breathing  GASTROINTESTINAL: No anorexia, nausea, vomiting or diarrhea. No abdominal pain or blood.   HEMATOLOGIC: No bleeding or bruising.  Anxious    Ventilator/Non-Invasive Ventilation Settings (From admission, onward)     Start     Ordered    23 0525  NIPPV (CPAP or BIPAP)  Until Discontinued        Question Answer Comment   Indication Acute Respiratory Failure    Type BIPAP    IPAP 18    EPAP 7    Breath Rate 20    Titrate Oxygen for SpO2 90 - 95%        23 0525                  Vital Signs  Temp:  [97.1 °F (36.2 °C)-97.6 °F (36.4 °C)] 97.6 °F (36.4 °C)  Heart Rate:  [77-98] 98  Resp:  [16-24] 22  BP: (146-178)/(61-82) 163/79  SpO2:  [88 %-97 %] 88 %  on  Flow (L/min):  [2]  "2 Device (Oxygen Therapy): nasal cannula    Intake/Output Summary (Last 24 hours) at 5/24/2023 0914  Last data filed at 5/24/2023 0716  Gross per 24 hour   Intake 240 ml   Output --   Net 240 ml     Flowsheet Rows    Flowsheet Row First Filed Value   Admission Height 160 cm (63\") Documented at 05/21/2023 0059   Admission Weight 58.1 kg (128 lb) Documented at 05/21/2023 0059        Body mass index is 22.34 kg/m².      05/21/23  0059 05/21/23  0336   Weight: 58.1 kg (128 lb) 57.2 kg (126 lb 1.7 oz)       Physical Exam:  GEN: In obvious respiratory distress, cannot lay down, sitting up in the bed, using accessory muscles   EYES:   Sclerae clear. No icterus. PERRL. Normal EOM.  Ptosis of the right eye (baseline)  ENT:   External ears/nose normal, no oral lesions, no thrush, mucous membranes moist  NECK:  Supple, midline trachea, no JVD  LUNGS: Normal chest on inspection, louder wheezing, use of accessory muscles with labored breathing, no crackles, significantly diminished airflow bilaterally.  CV:  Regular rhythm and mild tachycardia. Normal S1/S2. No murmurs, gallops, or rubs noted.  ABD:  Soft, nontender and nondistended. Normal bowel sounds. No guarding  EXT:  Moves all extremities well. No cyanosis. No redness.  Trace pitting edema  Skin: Dry, intact, no bleeding    Results Review:    Results From Last 14 Days   Lab Units 05/21/23  0111   LACTATE mmol/L 0.9     Results from last 7 days   Lab Units 05/24/23  0414 05/23/23  0528 05/22/23  0423 05/21/23  0422 05/21/23  0111   SODIUM mmol/L 136 133* 135* 134* 134*   POTASSIUM mmol/L 4.0 4.3 4.3 4.4 3.9   CHLORIDE mmol/L 94* 92* 93* 92* 92*   CO2 mmol/L 33.4* 35.0* 35.7* 36.8* 37.4*   BUN mg/dL 22 25* 27* 23 25*   CREATININE mg/dL 0.48* 0.59 0.57 0.50* 0.62   CALCIUM mg/dL 8.8 8.6 9.1 10.3 10.4   AST (SGOT) U/L  --   --   --   --  15   ALT (SGPT) U/L  --   --   --   --  20   ANION GAP mmol/L 8.6 6.0 6.3 5.2 4.6*   ALBUMIN g/dL  --   --   --   --  3.5     Results from " last 7 days   Lab Units 05/21/23  0111   HSTROP T ng/L 16*         Results from last 7 days   Lab Units 05/21/23  0111   PROBNP pg/mL 1,653.0     Results from last 7 days   Lab Units 05/24/23  0414 05/23/23  0528 05/22/23  0422 05/21/23  0422 05/21/23  0111   WBC 10*3/mm3 12.35* 11.38* 9.58 11.23* 12.22*   HEMOGLOBIN g/dL 12.7 11.7* 10.5* 11.6* 12.4   HEMATOCRIT % 38.1 35.3 30.8* 33.4* 36.3   PLATELETS 10*3/mm3 302 271 218 241 246   MCV fL 92.9 93.9 94.2 91.3 91.9   NEUTROPHIL % %  --   --   --   --  72.8   LYMPHOCYTE % %  --   --   --   --  15.8*   MONOCYTES % %  --   --   --   --  8.7   EOSINOPHIL % %  --   --   --   --  2.0   BASOPHIL % %  --   --   --   --  0.5   IMM GRAN % %  --   --   --   --  0.2     Results from last 7 days   Lab Units 05/21/23  0111   INR  0.99   APTT seconds 29.9               Invalid input(s): LDLCALC  Results from last 7 days   Lab Units 05/21/23  0112   PH, ARTERIAL pH units 7.377   PCO2, ARTERIAL mm Hg 67.1*   PO2 ART mm Hg 113.8*   HCO3 ART mmol/L 39.4*         No results found for: POCGLU  Results from last 7 days   Lab Units 05/21/23  0111   PROCALCITONIN ng/mL 0.13   LACTATE mmol/L 0.9     Results from last 7 days   Lab Units 05/21/23  0742 05/21/23  0735   BLOODCX  No growth at 3 days No growth at 3 days         Results from last 7 days   Lab Units 05/24/23  0013 05/21/23  0109   COVID19  Not Detected Not Detected   ADENOVIRUS DETECTION BY PCR   --  Not Detected   CORONAVIRUS 229E   --  Not Detected   CORONAVIRUS HKU1   --  Not Detected   CORONAVIRUS NL63   --  Not Detected   CORONAVIRUS OC43   --  Not Detected   HUMAN METAPNEUMOVIRUS   --  Not Detected   HUMAN RHINOVIRUS/ENTEROVIRUS   --  Not Detected   INFLUENZA B PCR   --  Not Detected   PARAINFLUENZA 1   --  Not Detected   PARAINFLUENZA VIRUS 2   --  Not Detected   PARAINFLUENZA VIRUS 3   --  Not Detected   PARAINFLUENZA VIRUS 4   --  Not Detected   BORDETELLA PERTUSSIS PCR   --  Not Detected   BORDETELLA PARAPERTUSSIS PCR    --  Not Detected   CHLAMYDOPHILA PNEUMONIAE PCR   --  Not Detected   MYCOPLAMA PNEUMO PCR   --  Not Detected   RSV, PCR   --  Not Detected               Imaging:   Imaging Results (All)             I reviewed the patient's new clinical results.  I personally viewed and interpreted the patient's imaging results: Chronic COPD changes with no obvious infiltrate except for small area on the right lower lobe, unsure if this is a nipple shadow.        Medication Review:   amLODIPine, 5 mg, Oral, Daily  aspirin, 81 mg, Oral, Daily  atenolol, 50 mg, Oral, Daily  cefTRIAXone, 1 g, Intravenous, Q24H  enoxaparin, 30 mg, Subcutaneous, Daily  ipratropium-albuterol, 3 mL, Nebulization, 4x Daily - RT  losartan, 25 mg, Oral, Q24H  moxifloxacin, 1 drop, Right Eye, BID  multivitamin with minerals, 1 tablet, Oral, Daily  prednisoLONE acetate, 1 drop, Right Eye, Q6H  predniSONE, 40 mg, Oral, Daily With Breakfast  rosuvastatin, 5 mg, Oral, Daily  sodium chloride, 1 drop, Right Eye, 4x Daily  sodium chloride, 10 mL, Intravenous, Q12H  sodium chloride, 3 mL, Intravenous, Q12H             ASSESSMENT:   1. Acute on chronic hypoxemic and hypercapnic respiratory failure  2. Encephalopathy probably CO2 narcosis  3. Acute exacerbation of COPD  4. Hyponatremia    PLAN:  Patient has a significant decline in her status this morning and needed to be on the BiPAP however she cannot tolerate it  She will be transferred to the intensive care unit so that we can start the patient on the BiPAP with the help of the Precedex IV drip  We will check an ABG after being on the BiPAP for a while  Discussed the CODE STATUS with the daughter, she will consult with her brother before deciding, until then the patient is considered full code  Transition from oral steroids back to the IV Solu-Medrol and increase the dose  Continue with the bronchodilators  Continue with the antibiotic as ordered  Discussed with the patient and with the family, discussed with the  nursing staff, discussed with Dr. Schroeder  We will continue to monitor in the ICU    Disposition:  transfer to ICU    Ama Christopher MD  05/24/23  09:14 EDT  Total critical care time 36 minutes      Dictated utilizing Dragon dictation

## 2023-05-24 NOTE — CASE MANAGEMENT/SOCIAL WORK
Continued Stay Note  [facility]     Patient Name: Lizzy Hicks  MRN: [id number]  Today's Date: 5/24/2023    Admit Date: 5/21/2023    Plan: [facility]   Discharge Plan     Row Name 05/24/23 1003       Plan    Plan [facility]    Plan Comments CCP spoke with RN; D/C canceled and patient being transferred to ICU. CCP notified Pat/Noe of d/c being canceled. CCP canceled Yesy roger. CCP will follow for clinical course and assist as needed. Carmina MOELLER               Discharge Codes    No documentation.               Expected Discharge Date and Time     Expected Discharge Date Expected Discharge Time    May 24, 2023             SUNIN Meredith

## 2023-05-24 NOTE — PLAN OF CARE
Goal Outcome Evaluation:  Plan of Care Reviewed With: patient           Outcome Evaluation: Negative result on covid test. Plan for d/c. Patient aware. BP elevated. Rechecked. Anxious. Denies pain. Will monitor

## 2023-05-24 NOTE — SIGNIFICANT NOTE
05/24/23 1010   OTHER   Discipline physical therapist   Rehab Time/Intention   Session Not Performed unable to treat, medical status change;other (see comments)  (progressive decline in her status with increase in the work of breathing and increased oxygen requirements; transferring to ICU; will f/u tomorrow if appropriate)   Therapy Assessment/Plan (PT)   Criteria for Skilled Interventions Met (PT) yes   Recommendation   PT - Next Appointment 05/25/23

## 2023-05-24 NOTE — PROGRESS NOTES
[facility] Medicine Services  PROGRESS NOTE    Patient Name: Lizzy Hicks  : 1935  MRN: [id number]    Date of Admission: 2023  Primary Care Physician: Deric Mendosa MD    Subjective   Subjective     CC:  Follow-up hypoxia    Subjective:   Patient had acute decline in respiratory status with increased cough and increased shortness of breath.  She had persistent worsening hypoxia and oxygen was increased.  She had worsening mental status.  Pulmonology and myself were notified.  Gradually transient hypoxia was improved.  Confusion persisted though.  Patient has been intolerant of BiPAP and appears to need BiPAP as she is having further encephalopathy on hypercapnia.  Treatment plan was discussed with her family at the bedside.    Review of Systems  No current fevers or chills  No current nausea, vomiting, or diarrhea  No current chest pain or palpitations      Objective   Objective     Vital Signs:   Temp:  [97.1 °F (36.2 °C)-97.8 °F (36.6 °C)] 97.8 °F (36.6 °C)  Heart Rate:  [] 84  Resp:  [16-24] 24  BP: ()/(49-94) 115/61        Physical Exam:  Constitutional:Awake, alert  HENT: NCAT, mucous membranes moist, neck supple  Respiratory: Increasing cough, coarse sounds, wheezes are present,  tachypnea, breathing labored   Cardiovascular: Pulse rate is normal, palpable radial pulses bilaterally  Gastrointestinal: Positive bowel sounds, soft, nontender, nondistended  Musculoskeletal: Elderly frail and chronically debilitated in appearance, BMI is 22, mild lower extremity edema  Psychiatric: Anxious affect, cooperative  Neurologic: confused no slurred speech or facial droop, follows commands  Skin: No rashes or jaundice, warm      Results Reviewed:  Results from last 7 days   Lab Units 23  0414 23  0528 23  0422 23  0422 23  0111   WBC 10*3/mm3 12.35* 11.38* 9.58   < > 12.22*   HEMOGLOBIN g/dL 12.7 11.7* 10.5*   < > 12.4   HEMATOCRIT % 38.1 35.3 30.8*   < >  36.3   PLATELETS 10*3/mm3 302 271 218   < > 246   INR   --   --   --   --  0.99   PROCALCITONIN ng/mL  --   --   --   --  0.13    < > = values in this interval not displayed.     Results from last 7 days   Lab Units 05/24/23  0414 05/23/23  0528 05/22/23  0423 05/21/23  0422 05/21/23  0111   SODIUM mmol/L 136 133* 135*   < > 134*   POTASSIUM mmol/L 4.0 4.3 4.3   < > 3.9   CHLORIDE mmol/L 94* 92* 93*   < > 92*   CO2 mmol/L 33.4* 35.0* 35.7*   < > 37.4*   BUN mg/dL 22 25* 27*   < > 25*   CREATININE mg/dL 0.48* 0.59 0.57   < > 0.62   GLUCOSE mg/dL 109* 126* 137*   < > 142*   CALCIUM mg/dL 8.8 8.6 9.1   < > 10.4   ALT (SGPT) U/L  --   --   --   --  20   AST (SGOT) U/L  --   --   --   --  15   HSTROP T ng/L  --   --   --   --  16*   PROBNP pg/mL  --   --   --   --  1,653.0    < > = values in this interval not displayed.     Estimated Creatinine Clearance: 77.4 mL/min (A) (by C-G formula based on SCr of 0.48 mg/dL (L)).    Microbiology Results Abnormal     Procedure Component Value - Date/Time    Blood Culture - Blood, Hand, Right [[id number]]  (Normal) Collected: 05/21/23 0742    Lab Status: Preliminary result Specimen: Blood from Hand, Right Updated: 05/24/23 0815     Blood Culture No growth at 3 days    Narrative:      Less than seven (7) mL's of blood was collected.  Insufficient quantity may yield false negative results.    Blood Culture - Blood, Arm, Left [[id number]]  (Normal) Collected: 05/21/23 0735    Lab Status: Preliminary result Specimen: Blood from Arm, Left Updated: 05/24/23 0815     Blood Culture No growth at 3 days    COVID PRE-OP / PRE-PROCEDURE SCREENING ORDER (NO ISOLATION) - Swab, Nasopharynx [[id number]]  (Normal) Collected: 05/24/23 0013    Lab Status: Final result Specimen: Swab from Nasopharynx Updated: 05/24/23 0103    Narrative:      The following orders were created for panel order COVID PRE-OP / PRE-PROCEDURE SCREENING ORDER (NO ISOLATION) - Swab, Nasopharynx.  Procedure                                Abnormality         Status                     ---------                               -----------         ------                     COVID-19,[facility]...[[id number]]  Normal              Final result                 Please view results for these tests on the individual orders.    COVID-19,[facility] CEPHEID/FERNIE NP SWAB IN TRANSPORT MEDIA 8-12 HR TAT - Swab, Nasopharynx [[id number]]  (Normal) Collected: 05/24/23 0013    Lab Status: Final result Specimen: Swab from Nasopharynx Updated: 05/24/23 0103     COVID19 Not Detected    Narrative:      Fact sheet for providers: https://www.fda.gov/media/861871/download     Fact sheet for patients: https://www.fda.gov/media/395361/download    Respiratory Panel PCR w/COVID-19(SARS-CoV-2) [facility]/JAIMEE/JOI/[facility]LEE In-House, NP Swab in UTM/VTM, 3-4 HR TAT - Swab, Nasopharynx [[id number]]  (Normal) Collected: 05/21/23 0109    Lab Status: Final result Specimen: Swab from Nasopharynx Updated: 05/21/23 0517     ADENOVIRUS, PCR Not Detected     Coronavirus 229E Not Detected     Coronavirus HKU1 Not Detected     Coronavirus NL63 Not Detected     Coronavirus OC43 Not Detected     COVID19 Not Detected     Human Metapneumovirus Not Detected     Human Rhinovirus/Enterovirus Not Detected     Influenza A PCR Not Detected     Influenza B PCR Not Detected     Parainfluenza Virus 1 Not Detected     Parainfluenza Virus 2 Not Detected     Parainfluenza Virus 3 Not Detected     Parainfluenza Virus 4 Not Detected     RSV, PCR Not Detected     Bordetella pertussis pcr Not Detected     Bordetella parapertussis PCR Not Detected     Chlamydophila pneumoniae PCR Not Detected     Mycoplasma pneumo by PCR Not Detected    Narrative:      In the setting of a positive respiratory panel with a viral infection PLUS a negative procalcitonin without other underlying concern for bacterial infection, consider observing off antibiotics or discontinuation of antibiotics and continue supportive care.  If the respiratory panel is positive for atypical bacterial infection (Bordetella pertussis, Chlamydophila pneumoniae, or Mycoplasma pneumoniae), consider antibiotic de-escalation to target atypical bacterial infection.          Imaging Results (Last 24 Hours)     Procedure Component Value Units Date/Time    FL Video Swallow With Speech Single Contrast [[id number]] Collected: 05/23/23 1629     Updated: 05/23/23 1633    Narrative:      VIDEO SWALLOWING EXAMINATION BY SPEECH PATHOLOGY     Clinical: Dysphasia     Video swallowing examination performed under the direction of speech  pathology. Imaging reviewed by radiologist who concurs with the  findings.     Speech pathology summary:Radiologist Dr Garcia present: Patient  demonstrates mild oropharyngeal dysphagia characterized by premature  spillage, reduced pharyngeal constriction and base of tongue retraction.  Of note patient presented with a lot of baseline laryngeal shadowing  making it difficult to r/o trace penetration/aspiration. No suspected  penetration on all tested consistencies. Mild valleculae residue with  puree and moderate with cracker, clears with liquid wash.     FLUOROSCOPY TIME: 1 minute 32 seconds, 2184 images.     This report was finalized on 5/23/2023 4:30 PM by Dr. Hernandez Sen M.D.       XR Chest PA & Lateral [[id number]] Collected: 05/23/23 1512     Updated: 05/23/23 1555    Narrative:      EXAMINATION: PA AND LATERAL CHEST RADIOGRAPHS     HISTORY: Suspected right lower lobe infiltrate.     FINDINGS: PA and lateral chest radiographs were obtained. Comparison is  made to a chest radiograph dated 05/21/2023. The lungs are normal in  volume and are clear. Mild atheromatous calcification seen in the aortic  arch. The cardiac silhouette is within normal limits. Mild atheromatous  calcification of the aortic arch is noted. Osteopenia is appreciated.       Impression:      There is no evidence for an active or acute abnormality of  the chest.      This report was finalized on 5/23/2023 3:52 PM by Dr. Allen Rodriguez M.D.             Results for orders placed during the hospital encounter of 08/31/18    Adult Transthoracic Echo Complete W/ Cont if Necessary Per Protocol    Interpretation Summary  · Calculated right ventricular systolic pressure from tricuspid regurgitation is 45 mmHg.  · Left ventricular systolic function is normal. Estimated EF = 63%.      I have reviewed the medications:  Scheduled Meds:amLODIPine, 5 mg, Oral, Daily  aspirin, 81 mg, Oral, Daily  atenolol, 50 mg, Oral, Daily  cefTRIAXone, 1 g, Intravenous, Q24H  enoxaparin, 30 mg, Subcutaneous, Daily  ipratropium-albuterol, 3 mL, Nebulization, 4x Daily - RT  losartan, 25 mg, Oral, Q24H  methylPREDNISolone sodium succinate, 60 mg, Intravenous, Q12H  moxifloxacin, 1 drop, Right Eye, BID  multivitamin with minerals, 1 tablet, Oral, Daily  prednisoLONE acetate, 1 drop, Right Eye, Q6H  rosuvastatin, 5 mg, Oral, Daily  sodium chloride, 1 drop, Right Eye, 4x Daily  sodium chloride, 10 mL, Intravenous, Q12H  sodium chloride, 3 mL, Intravenous, Q12H      Continuous Infusions:dexmedetomidine, 0.2-1.5 mcg/kg/hr, Last Rate: 0.8 mcg/kg/hr (05/24/23 1025)      PRN Meds:.•  acetaminophen **OR** acetaminophen **OR** acetaminophen  •  calcium carbonate  •  docusate sodium  •  famotidine  •  hydrALAZINE  •  ipratropium-albuterol  •  melatonin  •  nitroglycerin  •  ondansetron **OR** ondansetron  •  ondansetron **OR** ondansetron  •  [COMPLETED] Insert Peripheral IV **AND** sodium chloride  •  sodium chloride  •  sodium chloride  •  sodium chloride  •  sodium chloride    Assessment & Plan   Assessment & Plan     Active Hospital Problems    Diagnosis  POA   • **Acute respiratory failure with hypoxia and hypercapnia [J96.01, J96.02]  Yes   • Oropharyngeal dysphagia [R13.12]  Yes   • Mixed hyperlipidemia [E78.2]  Yes   • Possible pneumonia of right lower lobe due to infectious organism [J18.9]  Yes   • Acute  exacerbation of chronic obstructive pulmonary disease (COPD) [J44.1]  Yes   • Carotid artery disease [I77.9]  Yes   • Hypertension [I10]  Yes   • Osteoarthritis [M19.90]  Yes      Resolved Hospital Problems   No resolved problems to display.        Brief Hospital Course to date:  Lizzy Hicks is a 87 y.o. female presents to the hospital with acute hypoxic and hypercapnic respiratory failure following recent parainfluenza infection and has concern for possible postviral bacterial right lower lobe pneumonia.     Discussion/plan for today:  Oxygenation will be adjusted to pulse oximetry between 89% and 95% to avoid hyperoxia.  Patient is hypercapnic and confused.  She is intolerant of BiPAP and will need to transfer to ICU on a Precedex drip in order to be placed on BiPAP.  ABG needed.    Continue ceftriaxone for treatment of possible pneumonia.  Blood cultures reviewed and negative thus far.  Repeat chest x-ray images reviewed and is similar to previous x-ray.    Symptomatic treatment and supportive care.  Chest x-ray images reviewed and infiltrate noted.  Systemic steroids.    Decrease losartan.  Hold parameters added.  Blood pressure has been labile.  Continue home blood pressure medications.  Monitor blood pressure and adjust as needed.  Previous echocardiogram from 2018 reviewed and normal EF of 63% but slightly elevated RVSP.     Continue statin for hyperlipidemia.  Previous lipids reviewed.     Tylenol as needed for arthritis.     Continue chronic eyedrops.     Treatment plan discussed with the patient and family who is in agreement.  She understands that due to her advanced age and comorbid conditions she is at higher risk for morbidity and mortality.       DVT prophylaxis: Lovenox    Disposition: Pending clinical course    CODE STATUS:   Code Status and Medical Interventions:   Ordered at: 05/24/23 1121     Medical Intervention Limits:    Other     Level Of Support Discussed With:    Patient    Next of Kin  (If No Surrogate)     Code Status (Patient has no pulse and is not breathing):    No CPR (Do Not Attempt to Resuscitate)     Medical Interventions (Patient has pulse or is breathing):    Limited Support     Comments:    ok to intubate. stanislaw brown at [facility] for this conversation     Additional Medical Interventions Limits:    .     Release to patient:    Routine Release       Osorio Schroeder MD  05/24/23

## 2023-05-25 LAB
ANION GAP SERPL CALCULATED.3IONS-SCNC: 8 MMOL/L (ref 5–15)
BUN SERPL-MCNC: 28 MG/DL (ref 8–23)
BUN/CREAT SERPL: 45.2 (ref 7–25)
CALCIUM SPEC-SCNC: 7.9 MG/DL (ref 8.6–10.5)
CHLORIDE SERPL-SCNC: 97 MMOL/L (ref 98–107)
CO2 SERPL-SCNC: 33 MMOL/L (ref 22–29)
CREAT SERPL-MCNC: 0.62 MG/DL (ref 0.57–1)
DEPRECATED RDW RBC AUTO: 40.1 FL (ref 37–54)
EGFRCR SERPLBLD CKD-EPI 2021: 87.4 ML/MIN/1.73
ERYTHROCYTE [DISTWIDTH] IN BLOOD BY AUTOMATED COUNT: 12 % (ref 12.3–15.4)
GLUCOSE SERPL-MCNC: 124 MG/DL (ref 65–99)
HCT VFR BLD AUTO: 32.9 % (ref 34–46.6)
HGB BLD-MCNC: 11.3 G/DL (ref 12–15.9)
MCH RBC QN AUTO: 31.7 PG (ref 26.6–33)
MCHC RBC AUTO-ENTMCNC: 34.3 G/DL (ref 31.5–35.7)
MCV RBC AUTO: 92.2 FL (ref 79–97)
PLATELET # BLD AUTO: 252 10*3/MM3 (ref 140–450)
PMV BLD AUTO: 9.1 FL (ref 6–12)
POTASSIUM SERPL-SCNC: 4.6 MMOL/L (ref 3.5–5.2)
RBC # BLD AUTO: 3.57 10*6/MM3 (ref 3.77–5.28)
SODIUM SERPL-SCNC: 138 MMOL/L (ref 136–145)
WBC NRBC COR # BLD: 6.92 10*3/MM3 (ref 3.4–10.8)

## 2023-05-25 PROCEDURE — 85027 COMPLETE CBC AUTOMATED: CPT | Performed by: INTERNAL MEDICINE

## 2023-05-25 PROCEDURE — 80048 BASIC METABOLIC PNL TOTAL CA: CPT | Performed by: INTERNAL MEDICINE

## 2023-05-25 PROCEDURE — 94760 N-INVAS EAR/PLS OXIMETRY 1: CPT

## 2023-05-25 PROCEDURE — 94799 UNLISTED PULMONARY SVC/PX: CPT

## 2023-05-25 PROCEDURE — 25010000002 ENOXAPARIN PER 10 MG: Performed by: INTERNAL MEDICINE

## 2023-05-25 PROCEDURE — 92526 ORAL FUNCTION THERAPY: CPT | Performed by: SPEECH-LANGUAGE PATHOLOGIST

## 2023-05-25 PROCEDURE — 25010000002 CEFTRIAXONE PER 250 MG: Performed by: INTERNAL MEDICINE

## 2023-05-25 PROCEDURE — 97530 THERAPEUTIC ACTIVITIES: CPT

## 2023-05-25 PROCEDURE — 94664 DEMO&/EVAL PT USE INHALER: CPT

## 2023-05-25 PROCEDURE — 97110 THERAPEUTIC EXERCISES: CPT

## 2023-05-25 PROCEDURE — 25010000002 METHYLPREDNISOLONE PER 125 MG: Performed by: INTERNAL MEDICINE

## 2023-05-25 PROCEDURE — 97535 SELF CARE MNGMENT TRAINING: CPT

## 2023-05-25 PROCEDURE — 25010000002 METHYLPREDNISOLONE PER 40 MG: Performed by: INTERNAL MEDICINE

## 2023-05-25 PROCEDURE — 94761 N-INVAS EAR/PLS OXIMETRY MLT: CPT

## 2023-05-25 RX ORDER — METHYLPREDNISOLONE SODIUM SUCCINATE 40 MG/ML
40 INJECTION, POWDER, LYOPHILIZED, FOR SOLUTION INTRAMUSCULAR; INTRAVENOUS EVERY 12 HOURS
Status: COMPLETED | OUTPATIENT
Start: 2023-05-25 | End: 2023-05-27

## 2023-05-25 RX ADMIN — IPRATROPIUM BROMIDE AND ALBUTEROL SULFATE 3 ML: 2.5; .5 SOLUTION RESPIRATORY (INHALATION) at 20:01

## 2023-05-25 RX ADMIN — METHYLPREDNISOLONE SODIUM SUCCINATE 60 MG: 125 INJECTION, POWDER, FOR SOLUTION INTRAMUSCULAR; INTRAVENOUS at 11:04

## 2023-05-25 RX ADMIN — LOSARTAN POTASSIUM 12.5 MG: 25 TABLET, FILM COATED ORAL at 08:37

## 2023-05-25 RX ADMIN — MULTIPLE VITAMINS W/ MINERALS TAB 1 TABLET: TAB at 08:37

## 2023-05-25 RX ADMIN — PREDNISOLONE ACETATE 1 DROP: 10 SUSPENSION/ DROPS OPHTHALMIC at 03:19

## 2023-05-25 RX ADMIN — MOXIFLOXACIN HYDROCHLORIDE 0.05 ML: 5 SOLUTION/ DROPS OPHTHALMIC at 08:38

## 2023-05-25 RX ADMIN — Medication 10 ML: at 08:37

## 2023-05-25 RX ADMIN — ENOXAPARIN SODIUM 30 MG: 100 INJECTION SUBCUTANEOUS at 08:36

## 2023-05-25 RX ADMIN — SODIUM CHLORIDE 1 DROP: 50 SOLUTION OPHTHALMIC at 14:30

## 2023-05-25 RX ADMIN — Medication 10 ML: at 21:05

## 2023-05-25 RX ADMIN — SODIUM CHLORIDE 1 DROP: 50 SOLUTION OPHTHALMIC at 21:16

## 2023-05-25 RX ADMIN — Medication 3 ML: at 21:05

## 2023-05-25 RX ADMIN — PREDNISOLONE ACETATE 1 DROP: 10 SUSPENSION/ DROPS OPHTHALMIC at 08:37

## 2023-05-25 RX ADMIN — IPRATROPIUM BROMIDE AND ALBUTEROL SULFATE 3 ML: 2.5; .5 SOLUTION RESPIRATORY (INHALATION) at 11:38

## 2023-05-25 RX ADMIN — PREDNISOLONE ACETATE 1 DROP: 10 SUSPENSION/ DROPS OPHTHALMIC at 21:10

## 2023-05-25 RX ADMIN — IPRATROPIUM BROMIDE AND ALBUTEROL SULFATE 3 ML: 2.5; .5 SOLUTION RESPIRATORY (INHALATION) at 07:45

## 2023-05-25 RX ADMIN — ASPIRIN 81 MG: 81 TABLET, COATED ORAL at 08:37

## 2023-05-25 RX ADMIN — ATENOLOL 50 MG: 50 TABLET ORAL at 08:37

## 2023-05-25 RX ADMIN — AMLODIPINE BESYLATE 5 MG: 5 TABLET ORAL at 08:37

## 2023-05-25 RX ADMIN — IPRATROPIUM BROMIDE AND ALBUTEROL SULFATE 3 ML: 2.5; .5 SOLUTION RESPIRATORY (INHALATION) at 15:44

## 2023-05-25 RX ADMIN — Medication 3 ML: at 08:37

## 2023-05-25 RX ADMIN — METHYLPREDNISOLONE SODIUM SUCCINATE 40 MG: 40 INJECTION, POWDER, FOR SOLUTION INTRAMUSCULAR; INTRAVENOUS at 21:59

## 2023-05-25 RX ADMIN — MOXIFLOXACIN HYDROCHLORIDE 0.05 ML: 5 SOLUTION/ DROPS OPHTHALMIC at 21:04

## 2023-05-25 RX ADMIN — SODIUM CHLORIDE 1 DROP: 50 SOLUTION OPHTHALMIC at 03:24

## 2023-05-25 RX ADMIN — PREDNISOLONE ACETATE 1 DROP: 10 SUSPENSION/ DROPS OPHTHALMIC at 14:30

## 2023-05-25 RX ADMIN — SODIUM CHLORIDE 1 DROP: 50 SOLUTION OPHTHALMIC at 08:37

## 2023-05-25 RX ADMIN — CEFTRIAXONE SODIUM 1 G: 1 INJECTION, POWDER, FOR SOLUTION INTRAMUSCULAR; INTRAVENOUS at 06:46

## 2023-05-25 RX ADMIN — ROSUVASTATIN CALCIUM 5 MG: 5 TABLET, FILM COATED ORAL at 08:37

## 2023-05-25 NOTE — PROGRESS NOTES
"                                              LOS: 4 days   Patient Care Team:  Deric Mendosa MD as PCP - General (Family Medicine)  Jennifer Porras, SHAVON as Ambulatory  ([facility])    Chief Complaint:  F/up respiratory failure, encephalopathy and COPD    Subjective   Interval History  I reviewed the admission note, progress notes, PMH, PSH, Family hx, social history, imagings and prior records on this admission, summarized the finding in my note and formulated a transition of care plan.      On NIPPV overnight.  Used Precedex for short time as well.    This morning she appeared to be doing well.  She denied shortness of breath or cough.        REVIEW OF SYSTEMS:   CARDIOVASCULAR: No chest pain, chest pressure or chest discomfort. No palpitations or edema.   GASTROINTESTINAL: No anorexia, nausea, vomiting or diarrhea. No abdominal pain.  CONSTITUTIONAL: No fever or chills.     Ventilator/Non-Invasive Ventilation Settings (From admission, onward)     Start     Ordered    05/21/23 0525  NIPPV (CPAP or BIPAP)  Until Discontinued        Question Answer Comment   Indication Acute Respiratory Failure    Type BIPAP    IPAP 18    EPAP 7    Breath Rate 20    Titrate Oxygen for SpO2 90 - 95%        05/21/23 0525                      Physical Exam:     Vital Signs  Temp:  [97.5 °F (36.4 °C)-98.1 °F (36.7 °C)] 97.9 °F (36.6 °C)  Heart Rate:  [] 89  Resp:  [18-31] 22  BP: ()/(39-94) 144/62    Intake/Output Summary (Last 24 hours) at 5/25/2023 0831  Last data filed at 5/25/2023 0500  Gross per 24 hour   Intake 22.12 ml   Output 25 ml   Net -2.88 ml     Flowsheet Rows    Flowsheet Row First Filed Value   Admission Height 160 cm (63\") Documented at 05/21/2023 0059   Admission Weight 58.1 kg (128 lb) Documented at 05/21/2023 0059          PPE used per hospital policy    General Appearance:   Alert, cooperative, in no acute distress   ENMT:  Mallampati score 3, moist mucous membrane.  Missing teeth. "   Eyes:  Pupils equal and reactive to light. EOMI   Neck:    Trachea midline. No thyromegaly.   Lungs:    Faint end expiratory wheezing in the bases.  No crackles.  No labored breathing.    Heart:   Regular rhythm and normal rate, normal S1 and S2, no         murmur   Skin:   No rash or ecchymosis   Abdomen:    Soft. No tenderness. No HSM.   Neuro/psych:  Conscious, alert, oriented x3. Strength 5/5 in upper and lower  ext.  Appropriate mood and affect   Extremities:  No cyanosis, clubbing or edema.  Warm extremities and well-perfused          Results Review:        Results from last 7 days   Lab Units 05/25/23  0731 05/24/23  0414 05/23/23 0528   SODIUM mmol/L 138 136 133*   POTASSIUM mmol/L 4.6 4.0 4.3   CHLORIDE mmol/L 97* 94* 92*   CO2 mmol/L 33.0* 33.4* 35.0*   BUN mg/dL 28* 22 25*   CREATININE mg/dL 0.62 0.48* 0.59   GLUCOSE mg/dL 124* 109* 126*   CALCIUM mg/dL 7.9* 8.8 8.6     Results from last 7 days   Lab Units 05/21/23  0111   HSTROP T ng/L 16*     Results from last 7 days   Lab Units 05/25/23  0731 05/24/23  0414 05/23/23 0528   WBC 10*3/mm3 6.92 12.35* 11.38*   HEMOGLOBIN g/dL 11.3* 12.7 11.7*   HEMATOCRIT % 32.9* 38.1 35.3   PLATELETS 10*3/mm3 252 302 271     Results from last 7 days   Lab Units 05/21/23  0111   INR  0.99   APTT seconds 29.9     Results from last 7 days   Lab Units 05/21/23  0111   PROBNP pg/mL 1,653.0                   Results from last 7 days   Lab Units 05/24/23  1248 05/21/23  0112   PH, ARTERIAL pH units 7.457* 7.377   PCO2, ARTERIAL mm Hg 52.0* 67.1*   PO2 ART mm Hg 91.6 113.8*   MODALITY  BiPap BiPap   O2 SATURATION CALC % 97.3 98.1         I reviewed the patient's new clinical results.        Medication Review:   amLODIPine, 5 mg, Oral, Daily  aspirin, 81 mg, Oral, Daily  atenolol, 50 mg, Oral, Daily  cefTRIAXone, 1 g, Intravenous, Q24H  enoxaparin, 30 mg, Subcutaneous, Daily  ipratropium-albuterol, 3 mL, Nebulization, 4x Daily - RT  losartan, 12.5 mg, Oral,  Q24H  methylPREDNISolone sodium succinate, 60 mg, Intravenous, Q12H  moxifloxacin, 1 drop, Right Eye, BID  multivitamin with minerals, 1 tablet, Oral, Daily  prednisoLONE acetate, 1 drop, Right Eye, Q6H  rosuvastatin, 5 mg, Oral, Daily  sodium chloride, 1 drop, Right Eye, 4x Daily  sodium chloride, 10 mL, Intravenous, Q12H  sodium chloride, 3 mL, Intravenous, Q12H        dexmedetomidine, 0.2-1.5 mcg/kg/hr, Last Rate: Stopped (05/25/23 0819)        Diagnostic imaging:  I personally and independently reviewed the following images:  CXR 5/24/2023: Emphysema.  No ischemic changes.      Assessment     1. Acute hypoxic and hypercapnic resp failure  2. COPD exacerbation  3. Hyponatremia, resolved  4. Pulmonary hypertension, RVSP 45 on echo 8/31/2018, likely group 3      · HTN  · Recent parainfluenza infection    All problems new to me    Plan     · DuoNeb 4 times a day.  · NIPPV: Settings reviewed and adjusted.  AVAPS PS, FiO2 35% PEEP 5.  To be used at night. Precedex drip as needed to improve tolerance to NIPPV.   · Decrease Solu-Medrol to 40 mg twice daily  · Finish Rocephin  · Amlodipine 5 mg and losartan 12.5 mg for HTN  · DVT prophylaxis with Lovenox    Discussed with ICU staff during multidisciplinary round.  Discussed with the patient's family.    Dispo: Can probably be transferred to the floor tomorrow.  We will reassess again in AM.    Felix Muro MD  05/25/23  08:31 EDT      Time: Critical care 32 min      This note was dictated utilizing [employer] dictation

## 2023-05-25 NOTE — THERAPY TREATMENT NOTE
Patient Name: Lizzy Hicks  : 1935    MRN: [id number]                              Today's Date: 2023       Admit Date: 2023    Visit Dx:     ICD-10-CM ICD-9-CM   1. Acute respiratory failure with hypoxia and hypercapnia  J96.01 518.81    J96.02    2. COPD with acute exacerbation  J44.1 491.21     Patient Active Problem List   Diagnosis   • Hypertension   • COPD (chronic obstructive pulmonary disease)   • Carotid artery stenosis   • Osteoarthritis   • Osteoporosis   • Hyponatremia   • Reset osmostat syndrome   • Carotid stenosis, asymptomatic, right   • Carotid artery disease   • Macular degeneration   • COPD exacerbation   • Acute exacerbation of chronic obstructive pulmonary disease (COPD)   • Parainfluenza infection   • Acute respiratory failure with hypoxia   • Acute respiratory failure with hypoxia and hypercapnia   • Mixed hyperlipidemia   • Possible pneumonia of right lower lobe due to infectious organism   • Oropharyngeal dysphagia     Past Medical History:   Diagnosis Date   • Anesthesia complication     pt states was groggy for a week after surgery   • Carotid artery disease     left   • Carotid stenosis     RIGHT   • COPD (chronic obstructive pulmonary disease)    • History of bronchitis    • Hypertension    • Macular degeneration    • Osteoarthritis 2016   • Osteoporosis 2016   • Reset osmostat syndrome 08/15/2016    Worked up by prior PCP in [facility]. Baseline Na 128-130 since .   • Seborrheic dermatitis 2016   • Swallowing difficulty     D/T INADVERTANT REMOVAL OF UVULA AS CHILD WITH T AND A     Past Surgical History:   Procedure Laterality Date   • CAROTID ENDARTERECTOMY Right 2018    Procedure: RT CAROTID ENDARTERECTOMY WITH INTRA OPERATIVE CAROTID ARTERY DUPLEX SCAN;  Surgeon: Mikaela Galicia Jr., MD;  Location: [facility];  Service: Vascular   • CAROTID ENDARTERECTOMY Left 2019    Procedure: LEFT CAROTID ENDARTERECTOMY;  Surgeon: Grayson  Mikaela Aguilar Jr., MD;  Location: [facility] MAIN OR;  Service: Vascular   • CATARACT EXTRACTION WITH INTRAOCULAR LENS IMPLANT      LEFT AND RIGHT   • ORIF FEMUR FRACTURE Right     HARDWARE   • TONSILLECTOMY AND ADENOIDECTOMY      INADVERTANTLY TOOK UVULA AT THIS TIME      General Information     Row Name 05/25/23 Critical access hospital          OT Time and Intention    Document Type therapy note (daily note)  -     Mode of Treatment occupational therapy;individual therapy  -     Row Name 05/25/23 124          General Information    Patient Profile Reviewed yes  -     Existing Precautions/Restrictions fall;oxygen therapy device and L/min  -     Row Name 05/25/23 124          Cognition    Orientation Status (Cognition) oriented x 3  -     Row Name 05/25/23 124          Safety Issues, Functional Mobility    Impairments Affecting Function (Mobility) endurance/activity tolerance;strength;shortness of breath;balance  -           User Key  (r) = Recorded By, (t) = Taken By, (c) = Cosigned By    Initials Name Provider Type     Darline Law, ELIEZER Occupational Therapist                 Mobility/ADL's     Row Name 05/25/23 124          Bed Mobility    Supine-Sit [place] (Bed Mobility) minimum assist (75% patient effort);verbal cues  -     Row Name 05/25/23 124          Transfers    Transfers toilet transfer;sit-stand transfer  -     Row Name 05/25/23 124          Sit-Stand Transfer    Sit-Stand [place] (Transfers) contact guard  -     Assistive Device (Sit-Stand Transfers) walker, front-wheeled  -     Row Name 05/25/23 124          Toilet Transfer    [place] Level (Toilet Transfer) contact guard  -     Assistive Device (Toilet Transfer) walker, front-wheeled;commode, 3-in-1  -     Row Name 05/25/23 124          Functional Mobility    Functional Mobility- Ind. Level contact guard assist  -     Functional Mobility-Distance (Feet) --  5+5  -     Row Name 05/25/23 1241          Toileting  Assessment/Training    [place] Level (Toileting) perform perineal hygiene;maximum assist (25% patient effort);adjust/manage clothing  -     Assistive Devices (Toileting) commode, bedside without drop arms  -           User Key  (r) = Recorded By, (t) = Taken By, (c) = Cosigned By    Initials Name Provider Type    Darline Stein OT Occupational Therapist               Obj/Interventions     Row Name 05/25/23 1242          Shoulder (Therapeutic Exercise)    Shoulder (Therapeutic Exercise) AROM (active range of motion)  -     Shoulder AROM (Therapeutic Exercise) bilateral;flexion;sitting  +forward press X5 reps, poor endurance for ther ex  -     Row Name 05/25/23 1242          Motor Skills    Therapeutic Exercise shoulder  -     Row Name 05/25/23 1242          Balance    Static Sitting Balance supervision  -     Position, Sitting Balance sitting edge of bed  -     Static Standing Balance contact guard  -     Dynamic Standing Balance contact guard  -     Position/Device Used, Standing Balance supported;walker, rolling  -           User Key  (r) = Recorded By, (t) = Taken By, (c) = Cosigned By    Initials Name Provider Type    Darline Stein OT Occupational Therapist               Goals/Plan    No documentation.                Clinical Impression     Row Name 05/25/23 1243          Pain Assessment    Pretreatment Pain Rating 0/10 - no pain  -     Posttreatment Pain Rating 0/10 - no pain  -SM     Row Name 05/25/23 1243          Plan of Care Review    Plan of Care Reviewed With patient  -     Outcome Evaluation Pt participated in OT today, she was transfered to CCU for worsening respiratory failure, hypercapnic + confused. Today RN reports pt appropriate to resume therapy. She is on 1L O2. She is able to transfer to [facility] and to chair today with rwx. She requires max A for toileting 2/2 poor endurance and fear of removing UE support from rwx. She continues to have poor activity  tolerance limiting ADLs, needs rest breaks during all activites, and would benefit from SNF at [place].  -     Row Name 05/25/23 1243          Therapy Plan Review/Discharge Plan (OT)    Anticipated Discharge Disposition (OT) skilled nursing facility  -     Row Name 05/25/23 1243          Vital Signs    Pre SpO2 (%) 96  -SM     O2 Delivery Pre Treatment supplemental O2  -SM     Intra SpO2 (%) 87  -SM     O2 Delivery Intra Treatment supplemental O2  -SM     Post SpO2 (%) 92  -SM     O2 Delivery Post Treatment supplemental O2  -SM     Row Name 05/25/23 1243          Positioning and Restraints    Pre-Treatment Position in bed  -SM     Post Treatment Position chair  -SM     In Chair reclined;call light within reach;encouraged to call for assist;exit alarm on;notified nsg;with family/caregiver  -           User Key  (r) = Recorded By, (t) = Taken By, (c) = Cosigned By    Initials Name Provider Type    Darline Stein OT Occupational Therapist               Outcome Measures     Row Name 05/25/23 1246          How much help from another is currently needed...    Putting on and taking off regular lower body clothing? 2  -SM     Bathing (including washing, rinsing, and drying) 2  -SM     Toileting (which includes using toilet bed pan or urinal) 2  -SM     Putting on and taking off regular upper body clothing 3  -SM     Taking care of personal grooming (such as brushing teeth) 3  -SM     Eating meals 4  -SM     AM-PAC 6 Clicks Score (OT) 16  -SM     Row Name 05/25/23 1246          Functional Assessment    Outcome Measure Options AM-PAC 6 Clicks Daily Activity (OT)  -           User Key  (r) = Recorded By, (t) = Taken By, (c) = Cosigned By    Initials Name Provider Type    Darline Stein OT Occupational Therapist                Occupational Therapy Education     Title: PT OT SLP Therapies (In Progress)     Topic: Occupational Therapy (In Progress)     Point: ADL training (Done)     Description:   Instruct  learner(s) on proper safety adaptation and remediation techniques during self care or transfers.   Instruct in proper use of assistive devices.              Learning Progress Summary           Patient Acceptance, E, VU by  at 5/22/2023 1213    Comment: OT goals, recommended up to chair X3 day/meals to increase OOB activity. Pt in agreement                   Point: Home exercise program (Not Started)     Description:   Instruct learner(s) on appropriate technique for monitoring, assisting and/or progressing therapeutic exercises/activities.              Learner Progress:  Not documented in this visit.          Point: Precautions (Not Started)     Description:   Instruct learner(s) on prescribed precautions during self-care and functional transfers.              Learner Progress:  Not documented in this visit.          Point: Body mechanics (Not Started)     Description:   Instruct learner(s) on proper positioning and spine alignment during self-care, functional mobility activities and/or exercises.              Learner Progress:  Not documented in this visit.                      User Key     Initials Effective Dates Name Provider Type Discipline     04/02/20 -  Darline Law OT Occupational Therapist OT              OT Recommendation and Plan  Planned Therapy Interventions (OT): activity tolerance training, adaptive equipment training, BADL retraining, functional balance retraining, occupation/activity based interventions, patient/caregiver education/training, transfer/mobility retraining, strengthening exercise  Therapy Frequency (OT): 5 times/wk  Plan of Care Review  Plan of Care Reviewed With: patient  Outcome Evaluation: Pt participated in OT today, she was transfered to CCU for worsening respiratory failure, hypercapnic + confused. Today RN reports pt appropriate to resume therapy. She is on 1L O2. She is able to transfer to [facility] and to chair today with rwx. She requires max A for toileting 2/2 poor  endurance and fear of removing UE support from rwx. She continues to have poor activity tolerance limiting ADLs, needs rest breaks during all activites, and would benefit from SNF at [place].     Time Calculation:    Time Calculation- OT     Row Name 05/25/23 1246             Time Calculation- OT    OT Start Time 1018  -SM      OT Stop Time 1044  -SM      OT Time Calculation (min) 26 min  -SM      Total Timed Code Minutes- OT 26 minute(s)  -SM      OT Received On 05/25/23  -      OT - Next Appointment 05/26/23  -SM         Timed Charges    79168 - OT Therapeutic Activity Minutes 13  -SM      50295 - OT Self Care/Mgmt Minutes 13  -SM         Total Minutes    Timed Charges Total Minutes 26  -SM       Total Minutes 26  -SM            User Key  (r) = Recorded By, (t) = Taken By, (c) = Cosigned By    Initials Name Provider Type    Darline Stein OT Occupational Therapist              Therapy Charges for Today     Code Description Service Date Service Provider Modifiers Qty    99820054509 HC OT THERAPEUTIC ACT EA 15 MIN 5/25/2023 Darline Law OT GO 1    14813450201 HC OT SELF CARE/MGMT/TRAIN EA 15 MIN 5/25/2023 Darline Law OT GO 1               Darline Law OT  5/25/2023

## 2023-05-25 NOTE — THERAPY TREATMENT NOTE
Patient Name: Lizzy Hicks  : 1935    MRN: [id number]                              Today's Date: 2023       Admit Date: 2023    Visit Dx:     ICD-10-CM ICD-9-CM   1. Acute respiratory failure with hypoxia and hypercapnia  J96.01 518.81    J96.02    2. COPD with acute exacerbation  J44.1 491.21     Patient Active Problem List   Diagnosis   • Hypertension   • COPD (chronic obstructive pulmonary disease)   • Carotid artery stenosis   • Osteoarthritis   • Osteoporosis   • Hyponatremia   • Reset osmostat syndrome   • Carotid stenosis, asymptomatic, right   • Carotid artery disease   • Macular degeneration   • COPD exacerbation   • Acute exacerbation of chronic obstructive pulmonary disease (COPD)   • Parainfluenza infection   • Acute respiratory failure with hypoxia   • Acute respiratory failure with hypoxia and hypercapnia   • Mixed hyperlipidemia   • Possible pneumonia of right lower lobe due to infectious organism   • Oropharyngeal dysphagia     Past Medical History:   Diagnosis Date   • Anesthesia complication     pt states was groggy for a week after surgery   • Carotid artery disease     left   • Carotid stenosis     RIGHT   • COPD (chronic obstructive pulmonary disease)    • History of bronchitis    • Hypertension    • Macular degeneration    • Osteoarthritis 2016   • Osteoporosis 2016   • Reset osmostat syndrome 08/15/2016    Worked up by prior PCP in [facility]. Baseline Na 128-130 since .   • Seborrheic dermatitis 2016   • Swallowing difficulty     D/T INADVERTANT REMOVAL OF UVULA AS CHILD WITH T AND A     Past Surgical History:   Procedure Laterality Date   • CAROTID ENDARTERECTOMY Right 2018    Procedure: RT CAROTID ENDARTERECTOMY WITH INTRA OPERATIVE CAROTID ARTERY DUPLEX SCAN;  Surgeon: Mikaela Galicia Jr., MD;  Location: [facility];  Service: Vascular   • CAROTID ENDARTERECTOMY Left 2019    Procedure: LEFT CAROTID ENDARTERECTOMY;  Surgeon: Grayson  Mikaela Aguilar Jr., MD;  Location: [facility] OR;  Service: Vascular   • CATARACT EXTRACTION WITH INTRAOCULAR LENS IMPLANT      LEFT AND RIGHT   • ORIF FEMUR FRACTURE Right     HARDWARE   • TONSILLECTOMY AND ADENOIDECTOMY      INADVERTANTLY TOOK UVULA AT THIS TIME      General Information     Row Name 05/25/23 1517          Physical Therapy Time and Intention    Document Type therapy note (daily note)  -EJ     Mode of Treatment physical therapy  -EJ     Row Name 05/25/23 1517          General Information    Existing Precautions/Restrictions fall;oxygen therapy device and L/min  -EJ           User Key  (r) = Recorded By, (t) = Taken By, (c) = Cosigned By    Initials Name Provider Type    EJ Nubia Rivera, PT Physical Therapist               Mobility     Row Name 05/25/23 1517          Bed Mobility    Comment, (Bed Mobility) up in chair  -EJ     Row Name 05/25/23 1517          Sit-Stand Transfer    Sit-Stand [place] (Transfers) verbal cues;contact guard  -EJ     Assistive Device (Sit-Stand Transfers) walker, front-wheeled  -EJ     Row Name 05/25/23 1517          Gait/Stairs (Locomotion)    [place] Level (Gait) verbal cues;contact guard  -EJ     Assistive Device (Gait) walker, front-wheeled  -EJ     Distance in Feet (Gait) 40' x 2  -EJ     Deviations/Abnormal Patterns (Gait) trav decreased;stride length decreased  -EJ     Bilateral Gait Deviations forward flexed posture;heel strike decreased  -EJ     Comment, (Gait/Stairs) standing rest break required, cues for pursed lip breathing, ambulating on room air  -EJ           User Key  (r) = Recorded By, (t) = Taken By, (c) = Cosigned By    Initials Name Provider Type    EJ Nubia Rivera, PT Physical Therapist               Obj/Interventions    No documentation.                Goals/Plan    No documentation.                Clinical Impression     Row Name 05/25/23 1521          Pain    Pretreatment Pain Rating 0/10 - no pain  -EJ     Posttreatment Pain  Rating 0/10 - no pain  -EJ     Row Name 05/25/23 1521          Plan of Care Review    Plan of Care Reviewed With patient  -EJ     Outcome Evaluation Pt agreeable to PT this afternoon. She is up in chair upon entry to room. She is feeling well w no complaints at this time. Pt able to stand w CGA using Rwx. She ambulated approx 40 ft w Rwx and CGA. After standing rest break, she was able to ambulate another 40 ft back to room. Pt limited by fatigue and SOA. Her O2 dropped to 84% after ambulating. She was able to recover to upper 80s/low 90s after several minutes. Pt then transferred to [facility] and was left w call light and nsg assistant. Recommend SNU at [place]. Will continue to progress as tolerated.  -EJ     Row Name 05/25/23 1521          Vital Signs    O2 Delivery Pre Treatment room air  -EJ     O2 Delivery Intra Treatment room air  -EJ     Post SpO2 (%) 84  -EJ     O2 Delivery Post Treatment room air  -EJ     Pre Patient Position Sitting  -EJ     Intra Patient Position Standing  -EJ     Post Patient Position Sitting  -EJ     Row Name 05/25/23 1521          Positioning and Restraints    Pre-Treatment Position in bed  -EJ     Post Treatment Position bsc  -EJ     On  commode notified nsg;sitting;call light within reach;encouraged to call for assist  -EJ           User Key  (r) = Recorded By, (t) = Taken By, (c) = Cosigned By    Initials Name Provider Type    Nubia Zambrano, PT Physical Therapist               Outcome Measures     Row Name 05/25/23 1529          How much help from another person do you currently need...    Turning from your back to your side while in flat bed without using bedrails? 3  -EJ     Moving from lying on back to sitting on the side of a flat bed without bedrails? 3  -EJ     Moving to and from a bed to a chair (including a wheelchair)? 3  -EJ     Standing up from a chair using your arms (e.g., wheelchair, bedside chair)? 3  -EJ     Climbing 3-5 steps with a railing? 2  -EJ     To walk in  hospital room? 3  -EJ     AM-PAC 6 Clicks Score (PT) 17  -EJ     Highest level of mobility 5 --> Static standing  -EJ     Row Name 05/25/23 1246          Functional Assessment    Outcome Measure Options AM-PAC 6 Clicks Daily Activity (OT)  -           User Key  (r) = Recorded By, (t) = Taken By, (c) = Cosigned By    Initials Name Provider Type    EJ Nubia Rivera, PT Physical Therapist    Darline Stein, OT Occupational Therapist                             Physical Therapy Education     Title: PT OT SLP Therapies (In Progress)     Topic: Physical Therapy (In Progress)     Point: Mobility training (Done)     Learning Progress Summary           Patient Acceptance, E,TB, VU,DU by  at 5/22/2023 1038                   Point: Home exercise program (Not Started)     Learner Progress:  Not documented in this visit.          Point: Body mechanics (Done)     Learning Progress Summary           Patient Acceptance, E,TB, VU,DU by  at 5/22/2023 1038                   Point: Precautions (Done)     Learning Progress Summary           Patient Acceptance, E,TB, VU,DU by  at 5/22/2023 1038                               User Key     Initials Effective Dates Name Provider Type Discipline     09/22/22 -  Angely Monique, PT Physical Therapist PT              PT Recommendation and Plan     Plan of Care Reviewed With: patient  Outcome Evaluation: Pt agreeable to PT this afternoon. She is up in chair upon entry to room. She is feeling well w no complaints at this time. Pt able to stand w CGA using Rwx. She ambulated approx 40 ft w Rwx and CGA. After standing rest break, she was able to ambulate another 40 ft back to room. Pt limited by fatigue and SOA. Her O2 dropped to 84% after ambulating. She was able to recover to upper 80s/low 90s after several minutes. Pt then transferred to [facility] and was left w call light and nsg assistant. Recommend SNU at [place]. Will continue to progress as tolerated.     Time Calculation:    PT  Charges     Row Name 05/25/23 1530             Time Calculation    Start Time 1451  -EJ      Stop Time 1505  -EJ      Time Calculation (min) 14 min  -EJ      PT Received On 05/25/23  -EJ      PT - Next Appointment 05/26/23  -EJ            User Key  (r) = Recorded By, (t) = Taken By, (c) = Cosigned By    Initials Name Provider Type    EJ Nubia Rivera, PT Physical Therapist              Therapy Charges for Today     Code Description Service Date Service Provider Modifiers Qty    75420058420 HC PT THER PROC EA 15 MIN 5/25/2023 Nubia Rivera, PT GP 1          PT G-Codes  Outcome Measure Options: AM-PAC 6 Clicks Daily Activity (OT)  AM-PAC 6 Clicks Score (PT): 17  AM-PAC 6 Clicks Score (OT): 16       Nubia Rivera, PT  5/25/2023

## 2023-05-25 NOTE — PLAN OF CARE
Goal Outcome Evaluation:  Plan of Care Reviewed With: patient           Outcome Evaluation: Pt agreeable to PT this afternoon. She is up in chair upon entry to room. She is feeling well w no complaints at this time. Pt able to stand w CGA using Rwx. She ambulated approx 40 ft w Rwx and CGA. After standing rest break, she was able to ambulate another 40 ft back to room. Pt limited by fatigue and SOA. Her O2 dropped to 84% after ambulating. She was able to recover to upper 80s/low 90s after several minutes. Pt then transferred to [facility] and was left w call light and nsg assistant. Recommend SNU at [place]. Will continue to progress as tolerated.

## 2023-05-25 NOTE — PLAN OF CARE
Goal Outcome Evaluation:            Patient A&Ox'4 all night, able to move all extremities and follow commands, did well on bipap all shift, patient stated that shes feeling better. Urine output was poor, vitals were stable.   Problem: Fall Injury Risk  Goal: Absence of Fall and Fall-Related Injury  Outcome: Ongoing, Progressing  Intervention: Identify and Manage Contributors  Recent Flowsheet Documentation  Taken 5/25/2023 0600 by Columba Manzano RN  Medication Review/Management: medications reviewed  Taken 5/25/2023 0500 by Columba Manzano RN  Medication Review/Management: medications reviewed  Taken 5/25/2023 0400 by Columba Manzano RN  Medication Review/Management: medications reviewed  Taken 5/25/2023 0300 by Columba Manzano RN  Medication Review/Management: medications reviewed  Taken 5/25/2023 0200 by Columba Manzano RN  Medication Review/Management: medications reviewed  Taken 5/25/2023 0100 by Columba Manzano RN  Medication Review/Management: medications reviewed  Taken 5/25/2023 0000 by Columba Manzano RN  Medication Review/Management: medications reviewed  Taken 5/24/2023 2300 by Columba Manzano RN  Medication Review/Management: medications reviewed  Taken 5/24/2023 2200 by Columba Manzano RN  Medication Review/Management: medications reviewed  Taken 5/24/2023 2100 by Columba Manzano RN  Medication Review/Management: medications reviewed  Taken 5/24/2023 2000 by Columba Manzano RN  Medication Review/Management: medications reviewed  Taken 5/24/2023 1930 by Columba Manzano RN  Medication Review/Management: medications reviewed  Intervention: Promote Injury-Free Environment  Recent Flowsheet Documentation  Taken 5/25/2023 0600 by Columba Manzano RN  Safety Promotion/Fall Prevention:   safety round/check completed   room organization consistent   lighting adjusted   clutter free environment maintained  Taken 5/25/2023 0500 by Columba Manzano RN  Safety Promotion/Fall Prevention:   safety round/check completed   room  organization consistent   lighting adjusted   clutter free environment maintained  Taken 5/25/2023 0400 by Columba Manzano RN  Safety Promotion/Fall Prevention:   safety round/check completed   room organization consistent   lighting adjusted   clutter free environment maintained  Taken 5/25/2023 0300 by Columba Manzano RN  Safety Promotion/Fall Prevention:   safety round/check completed   room organization consistent   lighting adjusted   clutter free environment maintained  Taken 5/25/2023 0200 by Columba Manzano RN  Safety Promotion/Fall Prevention:   safety round/check completed   room organization consistent   lighting adjusted   clutter free environment maintained  Taken 5/25/2023 0100 by Columba Manzano RN  Safety Promotion/Fall Prevention:   safety round/check completed   room organization consistent   lighting adjusted   clutter free environment maintained  Taken 5/25/2023 0000 by Columba Manzano RN  Safety Promotion/Fall Prevention:   safety round/check completed   room organization consistent   lighting adjusted   clutter free environment maintained  Taken 5/24/2023 2300 by Columba Manzano RN  Safety Promotion/Fall Prevention:   safety round/check completed   room organization consistent   lighting adjusted   clutter free environment maintained  Taken 5/24/2023 2200 by Columba Manzano RN  Safety Promotion/Fall Prevention:   safety round/check completed   room organization consistent   lighting adjusted   clutter free environment maintained  Taken 5/24/2023 2100 by Columba Manzano RN  Safety Promotion/Fall Prevention:   safety round/check completed   room organization consistent   lighting adjusted   clutter free environment maintained  Taken 5/24/2023 2000 by Columba Manzano RN  Safety Promotion/Fall Prevention:   safety round/check completed   room organization consistent   lighting adjusted   clutter free environment maintained  Taken 5/24/2023 1930 by Columba Manzano RN  Safety Promotion/Fall Prevention:    safety round/check completed   room organization consistent   lighting adjusted   clutter free environment maintained     Problem: Adjustment to Illness (Sepsis/Septic Shock)  Goal: Optimal Coping  Outcome: Ongoing, Progressing  Intervention: Optimize Psychosocial Adjustment to Illness  Recent Flowsheet Documentation  Taken 5/25/2023 0400 by Columba Manzano RN  Family/Support System Care:   self-care encouraged   support provided  Taken 5/25/2023 0000 by Columba Manzano RN  Family/Support System Care:   support provided   self-care encouraged  Taken 5/24/2023 2000 by Columba Manzano RN  Family/Support System Care:   self-care encouraged   support provided     Problem: Bleeding (Sepsis/Septic Shock)  Goal: Absence of Bleeding  Outcome: Ongoing, Progressing     Problem: Glycemic Control Impaired (Sepsis/Septic Shock)  Goal: Blood Glucose Level Within Desired Range  Outcome: Ongoing, Progressing     Problem: Infection Progression (Sepsis/Septic Shock)  Goal: Absence of Infection Signs and Symptoms  Outcome: Ongoing, Progressing  Intervention: Initiate Sepsis Management  Recent Flowsheet Documentation  Taken 5/25/2023 0600 by Columba Manzano RN  Infection Prevention:   hand hygiene promoted   personal protective equipment utilized   environmental surveillance performed   equipment surfaces disinfected   rest/sleep promoted   single patient room provided  Taken 5/25/2023 0500 by Columba Manzano RN  Infection Prevention:   environmental surveillance performed   equipment surfaces disinfected   hand hygiene promoted   personal protective equipment utilized   rest/sleep promoted   single patient room provided  Taken 5/25/2023 0400 by Columba Manzano RN  Infection Prevention:   environmental surveillance performed   equipment surfaces disinfected   hand hygiene promoted   personal protective equipment utilized   rest/sleep promoted   single patient room provided  Taken 5/25/2023 0300 by Columba Manzano RN  Infection Prevention:    environmental surveillance performed   equipment surfaces disinfected   hand hygiene promoted   personal protective equipment utilized   rest/sleep promoted   single patient room provided  Taken 5/25/2023 0200 by Columba Manzano RN  Infection Prevention:   environmental surveillance performed   equipment surfaces disinfected   hand hygiene promoted   personal protective equipment utilized   rest/sleep promoted   single patient room provided  Taken 5/25/2023 0100 by Columba Manzano RN  Infection Prevention:   environmental surveillance performed   equipment surfaces disinfected   hand hygiene promoted   personal protective equipment utilized   rest/sleep promoted   single patient room provided  Taken 5/25/2023 0000 by Columba Manzano RN  Infection Prevention:   environmental surveillance performed   equipment surfaces disinfected   hand hygiene promoted   personal protective equipment utilized   rest/sleep promoted   single patient room provided  Taken 5/24/2023 2300 by Columba Manzano RN  Infection Prevention:   environmental surveillance performed   equipment surfaces disinfected   hand hygiene promoted   personal protective equipment utilized   rest/sleep promoted   single patient room provided  Taken 5/24/2023 2200 by Columba Manzano RN  Infection Prevention:   environmental surveillance performed   equipment surfaces disinfected   hand hygiene promoted   personal protective equipment utilized   rest/sleep promoted   single patient room provided  Taken 5/24/2023 2100 by Columba Manzano RN  Infection Prevention:   environmental surveillance performed   equipment surfaces disinfected   single patient room provided   rest/sleep promoted   personal protective equipment utilized   hand hygiene promoted  Taken 5/24/2023 2000 by Columba Manzano RN  Infection Prevention:   environmental surveillance performed   equipment surfaces disinfected   hand hygiene promoted   personal protective equipment utilized   rest/sleep  promoted   single patient room provided  Taken 5/24/2023 1930 by Columba Manzano RN  Infection Prevention:   environmental surveillance performed   equipment surfaces disinfected   hand hygiene promoted   personal protective equipment utilized   rest/sleep promoted   single patient room provided     Problem: Nutrition Impaired (Sepsis/Septic Shock)  Goal: Optimal Nutrition Intake  Outcome: Ongoing, Progressing     Problem: Gas Exchange Impaired  Goal: Optimal Gas Exchange  Outcome: Ongoing, Progressing     Problem: Activity Intolerance (Pulmonary Impairment)  Goal: Improved Activity Tolerance  Outcome: Ongoing, Progressing     Problem: Airway Clearance Ineffective (Pulmonary Impairment)  Goal: Effective Airway Clearance  Outcome: Ongoing, Progressing     Problem: Gas Exchange Impaired (Pulmonary Impairment)  Goal: Optimal Gas Exchange  Outcome: Ongoing, Progressing     Problem: Skin Injury Risk Increased  Goal: Skin Health and Integrity  Outcome: Ongoing, Progressing     Problem: Adult Inpatient Plan of Care  Goal: Plan of Care Review  Outcome: Ongoing, Progressing  Goal: Patient-Specific Goal (Individualized)  Outcome: Ongoing, Progressing  Goal: Absence of Hospital-Acquired Illness or Injury  Outcome: Ongoing, Progressing  Intervention: Identify and Manage Fall Risk  Recent Flowsheet Documentation  Taken 5/25/2023 0600 by Columba Manzano RN  Safety Promotion/Fall Prevention:   safety round/check completed   room organization consistent   lighting adjusted   clutter free environment maintained  Taken 5/25/2023 0500 by Columba Manzano RN  Safety Promotion/Fall Prevention:   safety round/check completed   room organization consistent   lighting adjusted   clutter free environment maintained  Taken 5/25/2023 0400 by Columba Manzano RN  Safety Promotion/Fall Prevention:   safety round/check completed   room organization consistent   lighting adjusted   clutter free environment maintained  Taken 5/25/2023 0300 by Jose Juan  SHAVON Waterman  Safety Promotion/Fall Prevention:   safety round/check completed   room organization consistent   lighting adjusted   clutter free environment maintained  Taken 5/25/2023 0200 by Columba Manzano RN  Safety Promotion/Fall Prevention:   safety round/check completed   room organization consistent   lighting adjusted   clutter free environment maintained  Taken 5/25/2023 0100 by Columba Manzano RN  Safety Promotion/Fall Prevention:   safety round/check completed   room organization consistent   lighting adjusted   clutter free environment maintained  Taken 5/25/2023 0000 by Columba Manzano RN  Safety Promotion/Fall Prevention:   safety round/check completed   room organization consistent   lighting adjusted   clutter free environment maintained  Taken 5/24/2023 2300 by Columba Manzano RN  Safety Promotion/Fall Prevention:   safety round/check completed   room organization consistent   lighting adjusted   clutter free environment maintained  Taken 5/24/2023 2200 by Columba Manzano RN  Safety Promotion/Fall Prevention:   safety round/check completed   room organization consistent   lighting adjusted   clutter free environment maintained  Taken 5/24/2023 2100 by Columba Manzano RN  Safety Promotion/Fall Prevention:   safety round/check completed   room organization consistent   lighting adjusted   clutter free environment maintained  Taken 5/24/2023 2000 by Columba Manzano RN  Safety Promotion/Fall Prevention:   safety round/check completed   room organization consistent   lighting adjusted   clutter free environment maintained  Taken 5/24/2023 1930 by Columba Manzano RN  Safety Promotion/Fall Prevention:   safety round/check completed   room organization consistent   lighting adjusted   clutter free environment maintained  Intervention: Prevent and Manage VTE (Venous Thromboembolism) Risk  Recent Flowsheet Documentation  Taken 5/25/2023 0000 by Columba Manzano RN  Range of Motion: ROM (range of motion) performed  Taken  5/24/2023 2000 by Columba Manzano RN  Range of Motion: ROM (range of motion) performed  Intervention: Prevent Infection  Recent Flowsheet Documentation  Taken 5/25/2023 0600 by Columba Manzano RN  Infection Prevention:   hand hygiene promoted   personal protective equipment utilized   environmental surveillance performed   equipment surfaces disinfected   rest/sleep promoted   single patient room provided  Taken 5/25/2023 0500 by Columba Manzano RN  Infection Prevention:   environmental surveillance performed   equipment surfaces disinfected   hand hygiene promoted   personal protective equipment utilized   rest/sleep promoted   single patient room provided  Taken 5/25/2023 0400 by Columba Manzano RN  Infection Prevention:   environmental surveillance performed   equipment surfaces disinfected   hand hygiene promoted   personal protective equipment utilized   rest/sleep promoted   single patient room provided  Taken 5/25/2023 0300 by Columba Manzano RN  Infection Prevention:   environmental surveillance performed   equipment surfaces disinfected   hand hygiene promoted   personal protective equipment utilized   rest/sleep promoted   single patient room provided  Taken 5/25/2023 0200 by Columba Manzano RN  Infection Prevention:   environmental surveillance performed   equipment surfaces disinfected   hand hygiene promoted   personal protective equipment utilized   rest/sleep promoted   single patient room provided  Taken 5/25/2023 0100 by Columba Manzano RN  Infection Prevention:   environmental surveillance performed   equipment surfaces disinfected   hand hygiene promoted   personal protective equipment utilized   rest/sleep promoted   single patient room provided  Taken 5/25/2023 0000 by Columba Manzano RN  Infection Prevention:   environmental surveillance performed   equipment surfaces disinfected   hand hygiene promoted   personal protective equipment utilized   rest/sleep promoted   single patient room provided  Taken  5/24/2023 2300 by Columba Manzano RN  Infection Prevention:   environmental surveillance performed   equipment surfaces disinfected   hand hygiene promoted   personal protective equipment utilized   rest/sleep promoted   single patient room provided  Taken 5/24/2023 2200 by Columba Manzano RN  Infection Prevention:   environmental surveillance performed   equipment surfaces disinfected   hand hygiene promoted   personal protective equipment utilized   rest/sleep promoted   single patient room provided  Taken 5/24/2023 2100 by Columba Manzano RN  Infection Prevention:   environmental surveillance performed   equipment surfaces disinfected   single patient room provided   rest/sleep promoted   personal protective equipment utilized   hand hygiene promoted  Taken 5/24/2023 2000 by Columba Manzano RN  Infection Prevention:   environmental surveillance performed   equipment surfaces disinfected   hand hygiene promoted   personal protective equipment utilized   rest/sleep promoted   single patient room provided  Taken 5/24/2023 1930 by Columba Manzano RN  Infection Prevention:   environmental surveillance performed   equipment surfaces disinfected   hand hygiene promoted   personal protective equipment utilized   rest/sleep promoted   single patient room provided  Goal: Optimal Comfort and Wellbeing  Outcome: Ongoing, Progressing  Intervention: Provide Person-Centered Care  Recent Flowsheet Documentation  Taken 5/25/2023 0400 by Columba Manzano RN  Trust Relationship/Rapport:   care explained   choices provided   emotional support provided   empathic listening provided   questions answered   questions encouraged   reassurance provided   thoughts/feelings acknowledged  Taken 5/25/2023 0000 by Columba Manzano RN  Trust Relationship/Rapport:   care explained   choices provided   emotional support provided   empathic listening provided   questions answered   questions encouraged   reassurance provided   thoughts/feelings  acknowledged  Taken 5/24/2023 2000 by Columba Manzano, RN  Trust Relationship/Rapport:   care explained   choices provided   emotional support provided   empathic listening provided   questions answered   questions encouraged   reassurance provided   thoughts/feelings acknowledged  Goal: Readiness for Transition of Care  Outcome: Ongoing, Progressing

## 2023-05-25 NOTE — THERAPY TREATMENT NOTE
Acute Care - Speech Language Pathology   Swallow Treatment Note [facility]     Patient Name: Lizzy Hicks  : 1935  MRN: [id number]  Today's Date: 2023               Admit Date: 2023    Visit Dx:     ICD-10-CM ICD-9-CM   1. Acute respiratory failure with hypoxia and hypercapnia  J96.01 518.81    J96.02    2. COPD with acute exacerbation  J44.1 491.21     Patient Active Problem List   Diagnosis    Hypertension    COPD (chronic obstructive pulmonary disease)    Carotid artery stenosis    Osteoarthritis    Osteoporosis    Hyponatremia    Reset osmostat syndrome    Carotid stenosis, asymptomatic, right    Carotid artery disease    Macular degeneration    COPD exacerbation    Acute exacerbation of chronic obstructive pulmonary disease (COPD)    Parainfluenza infection    Acute respiratory failure with hypoxia    Acute respiratory failure with hypoxia and hypercapnia    Mixed hyperlipidemia    Possible pneumonia of right lower lobe due to infectious organism    Oropharyngeal dysphagia     Past Medical History:   Diagnosis Date    Anesthesia complication     pt states was groggy for a week after surgery    Carotid artery disease     left    Carotid stenosis     RIGHT    COPD (chronic obstructive pulmonary disease)     History of bronchitis     Hypertension     Macular degeneration     Osteoarthritis 2016    Osteoporosis 2016    Reset osmostat syndrome 08/15/2016    Worked up by prior PCP in [facility]. Baseline Na 128-130 since .    Seborrheic dermatitis 2016    Swallowing difficulty     D/T INADVERTANT REMOVAL OF UVULA AS CHILD WITH T AND A     Past Surgical History:   Procedure Laterality Date    CAROTID ENDARTERECTOMY Right 2018    Procedure: RT CAROTID ENDARTERECTOMY WITH INTRA OPERATIVE CAROTID ARTERY DUPLEX SCAN;  Surgeon: Mikaela Galicia Jr., MD;  Location: [facility] OR;  Service: Vascular    CAROTID ENDARTERECTOMY Left 2019    Procedure: LEFT CAROTID  ENDARTERECTOMY;  Surgeon: Mikaela Galicia Jr., MD;  Location: [facility] MAIN OR;  Service: Vascular    CATARACT EXTRACTION WITH INTRAOCULAR LENS IMPLANT      LEFT AND RIGHT    ORIF FEMUR FRACTURE Right     HARDWARE    TONSILLECTOMY AND ADENOIDECTOMY      INADVERTANTLY TOOK UVULA AT THIS TIME       SLP Recommendation and Plan                                                                            Plan of Care Reviewed With: patient  Outcome Evaluation: VFSS f/u and education completed. Recommend meds crushed with puree followed by liquid wash. Continue mechanical soft ground with thins, multiple swallows and liquid wash.      SWALLOW EVALUATION (last 72 hours)       SLP Adult Swallow Evaluation       Row Name 05/25/23 1100 05/23/23 1200 05/22/23 1500             Rehab Evaluation    Document Type therapy note (daily note)  -KA evaluation  -KA evaluation  -AW      Subjective Information no complaints  -KA no complaints  -KA no complaints  -AW      Patient Observations alert;cooperative  -KA alert;cooperative  -KA alert;cooperative;agree to therapy  -AW      Patient Effort good  -KA good  -KA good  -AW      Symptoms Noted During/After Treatment none  -KA none  -KA none  -AW         General Information    Patient Profile Reviewed yes  -KA yes  -KA yes  -AW      Pertinent History Of Current Problem -- -- Pt admitted with respiratory failure, COPD exacerbation, concern for RLL PNA, and recent parainfluenza virus.  -AW      Current Method of Nutrition -- -- regular textures;thin liquids  -AW      Precautions/Limitations, Vision -- -- WFL;for purposes of eval  -AW      Precautions/Limitations, Hearing -- -- WFL;for purposes of eval  -AW      Prior Level of Function-Communication -- -- WFL  -AW      Prior Level of Function-Swallowing -- -- no diet consistency restrictions  -AW      Plans/Goals Discussed with -- -- patient;agreed upon  -AW      Barriers to Rehab -- -- medically complex  -AW      Patient's Goals for  Discharge -- -- return home  -AW         Pain    Additional Documentation -- -- Pain Scale: Numbers Pre/Post-Treatment (Group)  -AW         Pain Scale: Numbers Pre/Post-Treatment    Pretreatment Pain Rating -- -- 0/10 - no pain  -AW      Posttreatment Pain Rating -- -- 0/10 - no pain  -AW         Oral Motor Structure and Function    Dentition Assessment -- -- upper dentures/partial in place;lower dentures/partial in place  -AW      Secretion Management -- -- WNL/WFL  -AW      Mucosal Quality -- -- moist, healthy  -AW         Oral Musculature and Cranial Nerve Assessment    Oral Motor General Assessment -- -- WFL  -AW         General Eating/Swallowing Observations    Respiratory Support Currently in Use -- -- nasal cannula  -AW      Eating/Swallowing Skills -- -- self-fed  -AW      Positioning During Eating -- -- upright in bed  -AW      Utensils Used -- -- spoon;cup;straw  -AW      Consistencies Trialed -- -- regular textures;soft to chew textures;mixed consistency;pureed;thin liquids  -AW         Clinical Swallow Eval    Clinical Swallow Evaluation Summary -- -- Swallow eval completed. Pt had a baseline weak, non-productive cough that was noted inconsistently during eval, not directly after trials. Pt tolerated all consistencies given including thin (cup/straw), pureed, soft, mixed, and regular. Mastication was slow. Pt does well ordering easy to chew consistencies. Laryngeal elevation was adequate with palpation. Pt stated she has had difficulty swallowing to some degree her whole life since she had a tonsilectomy at age 7 and her uvula was removed. Pt agreeable to VFSS to further assess and r/o aspiration. Recommend continue with regular diet; meds as tolerated; upright for meals and 30 min after; slow rate; small bites/sips. ST to follow.  -AW         MBS/VFSS    Utensils Used -- spoon;cup  -KA --      Consistencies Trialed -- regular textures;soft to chew textures;chopped;mixed consistency;pureed;thin liquids   -KA --         MBS/VFSS Interpretation    VFSS Summary -- Radiologist Dr Garcia present: Patient demonstrates mild oropharyngeal dysphagia characterized by premature spillage, reduced pharyngeal constriction and BOT retraction. Of note patient presented with alot of baseline laryngeal shadowing making it difficult to r/o trace penetration/aspiration. No suspected penetration on all tested consistencies. Mild vallaculae residue with puree and moderate with cracker, clears with liquid wash.  -KA --         SLP Communication to Radiology    Summary Statement -- Radiologist Dr Garcia present: Patient demonstrates mild oropharyngeal dysphagia characterized by premature spillage, reduced pharyngeal constriction and BOT retraction. Of note patient presented with alot of baseline laryngeal shadowing making it difficult to r/o trace penetration/aspiration. No suspected penetration on all tested consistencies. Mild vallaculae residue with puree and moderate with cracker, clears with liquid wash.  -KA --         SLP Evaluation Clinical Impression    SLP Swallowing Diagnosis -- mild;oral dysphagia;pharyngeal dysphagia  -KA oral dysphagia;suspected pharyngeal dysphagia  -AW      Functional Impact -- risk of aspiration/pneumonia  -KA risk of aspiration/pneumonia  -AW      Rehab Potential/Prognosis, Swallowing -- good, to achieve stated therapy goals  -KA good, to achieve stated therapy goals  -AW      Swallow Criteria for Skilled Therapeutic Interventions Met -- demonstrates skilled criteria  -KA demonstrates skilled criteria  -AW         Recommendations    Therapy Frequency (Swallow) -- PRN  -KA PRN  -AW      Predicted Duration Therapy Intervention (Days) -- until discharge  -KA until discharge  -AW      SLP Diet Recommendation -- soft to chew textures;whole;thin liquids  -KA regular textures;thin liquids  -AW      Recommended Diagnostics -- -- VFSS (MBS)  -AW      Recommended Precautions and Strategies -- upright posture  during/after eating;small bites of food and sips of liquid;multiple swallows per bite of food;multiple swallows per sip of liquid;alternate between small bites of food and sips of liquid  -KA upright posture during/after eating;small bites of food and sips of liquid  -AW      Oral Care Recommendations -- Oral Care BID/PRN  -KA Oral Care BID/PRN  -AW      SLP Rec. for Method of Medication Administration -- meds whole;as tolerated  -KA meds whole;as tolerated  -AW      Monitor for Signs of Aspiration -- yes  -KA yes  -AW      Anticipated Discharge Disposition (SLP) -- home  -KA home  -AW         Swallow Goals (SLP)    Swallow STGs diet tolerance goal selection (SLP)  -KA -- --      Diet Tolerance Goal Selection (SLP) Patient will tolerate trials of  -KA -- --         (STG) Patient will tolerate trials of    Consistencies Trialed (Tolerate trials) thin liquids;soft to chew (ground) textures  -KA -- --      Desired Outcome (Tolerate trials) without signs/symptoms of aspiration  -KA -- --      [place] (Tolerate trials) independently (over 90% accuracy)  -KA -- --      Progress/Outcomes (Tolerate trials) good progress toward goal  -KA -- --      Comment (Tolerate trials) SLP completed VFSS f/u and education today. Patient with two family members present. Discussed results of VFSS and overall mild oropharyngeal dysphagia and mild to moderate amount of pharyngeal residue and clears with liquid wash and double swallow. SLP reviewed handout that was provided after VFSS and importance of safe swallow precautions including small  bites and sips, liquid wash and double swallow to clear residue. Pt and family voiced understanding of education. No overt s/s of pen/asp with thins and puree. RN reports difficulty with PO meds, discussed recommend meds crushed with puree followed by liquid wash.  -KA -- --                User Key  (r) = Recorded By, (t) = Taken By, (c) = Cosigned By      Initials Name Effective Dates    KA  Partha Upton MA,[facility]-SLP 06/02/22 -     AW Mari Holder, MS CCC-SLP 06/16/21 -                     EDUCATION  The patient has been educated in the following areas:   Dysphagia (Swallowing Impairment).        SLP GOALS       Row Name 05/25/23 1100             (STG) Patient will tolerate trials of    Consistencies Trialed (Tolerate trials) thin liquids;soft to chew (ground) textures  -KA      Desired Outcome (Tolerate trials) without signs/symptoms of aspiration  -KA      [place] (Tolerate trials) independently (over 90% accuracy)  -KA      Progress/Outcomes (Tolerate trials) good progress toward goal  -KA      Comment (Tolerate trials) SLP completed VFSS f/u and education today. Patient with two family members present. Discussed results of VFSS and overall mild oropharyngeal dysphagia and mild to moderate amount of pharyngeal residue and clears with liquid wash and double swallow. SLP reviewed handout that was provided after VFSS and importance of safe swallow precautions including small  bites and sips, liquid wash and double swallow to clear residue. Pt and family voiced understanding of education. No overt s/s of pen/asp with thins and puree. RN reports difficulty with PO meds, discussed recommend meds crushed with puree followed by liquid wash.  -KA                User Key  (r) = Recorded By, (t) = Taken By, (c) = Cosigned By      Initials Name Provider Type    Partha Palacio MA,[facility]-SLP Speech and Language Pathologist                       Time Calculation:    Time Calculation- SLP       Row Name 05/25/23 1203             Time Calculation- SLP    SLP Start Time 1000  -KA      SLP Received On 05/25/23  -KA         Untimed Charges    25901-EU Treatment Swallow Minutes 30  -KA         Total Minutes    Untimed Charges Total Minutes 30  -KA       Total Minutes 30  -KA                User Key  (r) = Recorded By, (t) = Taken By, (c) = Cosigned By      Initials Name Provider Type    Partha Palacio  MA,CCC-SLP Speech and Language Pathologist                    Therapy Charges for Today       Code Description Service Date Service Provider Modifiers Qty    06752323265 HC ST TREATMENT SWALLOW 2 5/25/2023 Partha Upton MA,CCC-SLP GN 1                 Partha Upton MA,INES-SLP  5/25/2023

## 2023-05-25 NOTE — PROGRESS NOTES
[facility] Medicine Services  PROGRESS NOTE    Patient Name: Lizzy Hicks  : 1935  MRN: [id number]    Date of Admission: 2023  Primary Care Physician: Deric Mendosa MD    Subjective   Subjective     CC:  Follow-up hypoxia    Subjective:   Patient was able to get orientation questions correctly this morning.  She was still a poor historian.  Reportedly she had worn her BiPAP last night.     Review of Systems  No current fevers or chills  No current nausea, vomiting, or diarrhea  No current chest pain or palpitations      Objective   Objective     Vital Signs:   Temp:  [97.5 °F (36.4 °C)-98.2 °F (36.8 °C)] 98.2 °F (36.8 °C)  Heart Rate:  [] 93  Resp:  [18-31] 22  BP: ()/() 136/57        Physical Exam:  Constitutional:Awake, alert  HENT: NCAT, mucous membranes moist, neck supple  Respiratory: Improved cough, coarse sounds, rare wheezes are present,  tachypnea, breathing labored   Cardiovascular: Pulse rate is normal, palpable radial pulses bilaterally  Gastrointestinal: Positive bowel sounds, soft, nontender, nondistended  Musculoskeletal: Elderly frail and chronically debilitated in appearance, BMI is 22, mild lower extremity edema  Psychiatric: Anxious affect, cooperative  Neurologic: confused no slurred speech or facial droop, follows commands  Skin: No rashes or jaundice, warm      Results Reviewed:  Results from last 7 days   Lab Units 23  0731 23  0111   WBC 10*3/mm3 6.92 12.35* 11.38*   < > 12.22*   HEMOGLOBIN g/dL 11.3* 12.7 11.7*   < > 12.4   HEMATOCRIT % 32.9* 38.1 35.3   < > 36.3   PLATELETS 10*3/mm3 252 302 271   < > 246   INR   --   --   --   --  0.99   PROCALCITONIN ng/mL  --   --   --   --  0.13    < > = values in this interval not displayed.     Results from last 7 days   Lab Units 23  0731 234 23  0523  04223  0111   SODIUM mmol/L 138 136 133*   < > 134*   POTASSIUM  mmol/L 4.6 4.0 4.3   < > 3.9   CHLORIDE mmol/L 97* 94* 92*   < > 92*   CO2 mmol/L 33.0* 33.4* 35.0*   < > 37.4*   BUN mg/dL 28* 22 25*   < > 25*   CREATININE mg/dL 0.62 0.48* 0.59   < > 0.62   GLUCOSE mg/dL 124* 109* 126*   < > 142*   CALCIUM mg/dL 7.9* 8.8 8.6   < > 10.4   ALT (SGPT) U/L  --   --   --   --  20   AST (SGOT) U/L  --   --   --   --  15   HSTROP T ng/L  --   --   --   --  16*   PROBNP pg/mL  --   --   --   --  1,653.0    < > = values in this interval not displayed.     Estimated Creatinine Clearance: 60 mL/min (by C-G formula based on SCr of 0.62 mg/dL).    Microbiology Results Abnormal     Procedure Component Value - Date/Time    Blood Culture - Blood, Arm, Left [[id number]]  (Normal) Collected: 05/21/23 0735    Lab Status: Preliminary result Specimen: Blood from Arm, Left Updated: 05/25/23 0815     Blood Culture No growth at 4 days    Blood Culture - Blood, Hand, Right [[id number]]  (Normal) Collected: 05/21/23 0742    Lab Status: Preliminary result Specimen: Blood from Hand, Right Updated: 05/25/23 0815     Blood Culture No growth at 4 days    Narrative:      Less than seven (7) mL's of blood was collected.  Insufficient quantity may yield false negative results.    COVID PRE-OP / PRE-PROCEDURE SCREENING ORDER (NO ISOLATION) - Swab, Nasopharynx [[id number]]  (Normal) Collected: 05/24/23 0013    Lab Status: Final result Specimen: Swab from Nasopharynx Updated: 05/24/23 0103    Narrative:      The following orders were created for panel order COVID PRE-OP / PRE-PROCEDURE SCREENING ORDER (NO ISOLATION) - Swab, Nasopharynx.  Procedure                               Abnormality         Status                     ---------                               -----------         ------                     COVID-DORY Frausto IN-HOUSE...[[id number]]  Normal              Final result                 Please view results for these tests on the individual orders.    COVID-19,[facility] CARLOS IN-HOUSE CEPHEID/FERNIE NP SWAB IN TRANSPORT  MEDIA 8-12 HR TAT - Swab, Nasopharynx [[id number]]  (Normal) Collected: 05/24/23 0013    Lab Status: Final result Specimen: Swab from Nasopharynx Updated: 05/24/23 0103     COVID19 Not Detected    Narrative:      Fact sheet for providers: https://www.fda.gov/media/306836/download     Fact sheet for patients: https://www.fda.gov/media/065617/download    Respiratory Panel PCR w/COVID-19(SARS-CoV-2) LUIS CARLOS/JAIMEE/JOI/PAD/COR/MAD/KARLEE In-House, NP Swab in UTM/VTM, 3-4 HR TAT - Swab, Nasopharynx [[id number]]  (Normal) Collected: 05/21/23 0109    Lab Status: Final result Specimen: Swab from Nasopharynx Updated: 05/21/23 0517     ADENOVIRUS, PCR Not Detected     Coronavirus 229E Not Detected     Coronavirus HKU1 Not Detected     Coronavirus NL63 Not Detected     Coronavirus OC43 Not Detected     COVID19 Not Detected     Human Metapneumovirus Not Detected     Human Rhinovirus/Enterovirus Not Detected     Influenza A PCR Not Detected     Influenza B PCR Not Detected     Parainfluenza Virus 1 Not Detected     Parainfluenza Virus 2 Not Detected     Parainfluenza Virus 3 Not Detected     Parainfluenza Virus 4 Not Detected     RSV, PCR Not Detected     Bordetella pertussis pcr Not Detected     Bordetella parapertussis PCR Not Detected     Chlamydophila pneumoniae PCR Not Detected     Mycoplasma pneumo by PCR Not Detected    Narrative:      In the setting of a positive respiratory panel with a viral infection PLUS a negative procalcitonin without other underlying concern for bacterial infection, consider observing off antibiotics or discontinuation of antibiotics and continue supportive care. If the respiratory panel is positive for atypical bacterial infection (Bordetella pertussis, Chlamydophila pneumoniae, or Mycoplasma pneumoniae), consider antibiotic de-escalation to target atypical bacterial infection.          Imaging Results (Last 24 Hours)     ** No results found for the last 24 hours. **          Results for orders placed during  the hospital encounter of 08/31/18    Adult Transthoracic Echo Complete W/ Cont if Necessary Per Protocol    Interpretation Summary  · Calculated right ventricular systolic pressure from tricuspid regurgitation is 45 mmHg.  · Left ventricular systolic function is normal. Estimated EF = 63%.      I have reviewed the medications:  Scheduled Meds:amLODIPine, 5 mg, Oral, Daily  aspirin, 81 mg, Oral, Daily  atenolol, 50 mg, Oral, Daily  cefTRIAXone, 1 g, Intravenous, Q24H  enoxaparin, 30 mg, Subcutaneous, Daily  ipratropium-albuterol, 3 mL, Nebulization, 4x Daily - RT  losartan, 12.5 mg, Oral, Q24H  methylPREDNISolone sodium succinate, 60 mg, Intravenous, Q12H  moxifloxacin, 1 drop, Right Eye, BID  multivitamin with minerals, 1 tablet, Oral, Daily  prednisoLONE acetate, 1 drop, Right Eye, Q6H  rosuvastatin, 5 mg, Oral, Daily  sodium chloride, 1 drop, Right Eye, 4x Daily  sodium chloride, 10 mL, Intravenous, Q12H  sodium chloride, 3 mL, Intravenous, Q12H      Continuous Infusions:dexmedetomidine, 0.2-1.5 mcg/kg/hr, Last Rate: Stopped (05/25/23 0819)      PRN Meds:.•  acetaminophen **OR** acetaminophen **OR** acetaminophen  •  calcium carbonate  •  docusate sodium  •  famotidine  •  hydrALAZINE  •  ipratropium-albuterol  •  melatonin  •  nitroglycerin  •  ondansetron **OR** ondansetron  •  ondansetron **OR** ondansetron  •  [COMPLETED] Insert Peripheral IV **AND** sodium chloride  •  sodium chloride  •  sodium chloride  •  sodium chloride  •  sodium chloride    Assessment & Plan   Assessment & Plan     Active Hospital Problems    Diagnosis  POA   • **Acute respiratory failure with hypoxia and hypercapnia [J96.01, J96.02]  Yes   • Oropharyngeal dysphagia [R13.12]  Yes   • Mixed hyperlipidemia [E78.2]  Yes   • Possible pneumonia of right lower lobe due to infectious organism [J18.9]  Yes   • Acute exacerbation of chronic obstructive pulmonary disease (COPD) [J44.1]  Yes   • Carotid artery disease [I77.9]  Yes   •  Hypertension [I10]  Yes   • Osteoarthritis [M19.90]  Yes      Resolved Hospital Problems   No resolved problems to display.        Brief Hospital Course to date:  Lizzy Hicks is a 87 y.o. female presents to the hospital with acute hypoxic and hypercapnic respiratory failure following recent parainfluenza infection and has concern for possible postviral bacterial right lower lobe pneumonia.     Discussion/plan for today:  Patient remains in critical care with intermittent BiPAP.  When off BiPAP recommend she be adjusted to pulse oximetry between 89% and 95% to avoid hyperoxia.  Issues with previous BiPAP intolerance are not a problem.  ABG needed.    Continue ceftriaxone for treatment of possible pneumonia.  Blood cultures reviewed and negative thus far.  Repeat chest x-ray images reviewed and is similar to previous x-ray.    Symptomatic treatment and supportive care.  Chest x-ray images reviewed and infiltrate noted.  Systemic steroids now IV.    Decreased losartan.  Hold parameters added.  Blood pressure has been labile.  Continue home blood pressure medications.  Monitor blood pressure and adjust as needed.  Previous echocardiogram from 2018 reviewed and normal EF of 63% but slightly elevated RVSP.     Continue statin for hyperlipidemia.  Previous lipids reviewed.     Tylenol as needed for arthritis.     Continue chronic eyedrops.     She understands that due to her advanced age and comorbid conditions she is at higher risk for morbidity and mortality.  Long-term prognosis guarded       DVT prophylaxis: Lovenox    Disposition: Pending clinical course    CODE STATUS:   Code Status and Medical Interventions:   Ordered at: 05/24/23 1121     Medical Intervention Limits:    Other     Level Of Support Discussed With:    Patient    Next of Kin (If No Surrogate)     Code Status (Patient has no pulse and is not breathing):    No CPR (Do Not Attempt to Resuscitate)     Medical Interventions (Patient has pulse or is  breathing):    Limited Support     Comments:    ok to intubate. stanislaw brown at [facility] for this conversation     Additional Medical Interventions Limits:    .     Release to patient:    Routine Release       Osorio Schroeder MD  05/25/23

## 2023-05-25 NOTE — PLAN OF CARE
Goal Outcome Evaluation:  Plan of Care Reviewed With: patient           Outcome Evaluation: Pt participated in OT today, she was transfered to CCU for worsening respiratory failure, hypercapnic + confused. Today RN reports pt appropriate to resume therapy. She is on 1L O2. She is able to transfer to BSC and to chair today with rwx. She requires max A for toileting 2/2 poor endurance and fear of removing UE support from rwx. She continues to have poor activity tolerance limiting ADLs, needs rest breaks during all activites, and would benefit from SNF at [place].

## 2023-05-25 NOTE — PLAN OF CARE
Goal Outcome Evaluation:  Plan of Care Reviewed With: patient           Outcome Evaluation: VFSS f/u and education completed. Recommend meds crushed with puree followed by liquid wash. Continue mechanical soft ground with thins, multiple swallows and liquid wash.

## 2023-05-25 NOTE — PLAN OF CARE
Goal Outcome Evaluation:                     Pt remains in CCU. Pt weaned down to RA from 2L NC. Pt had good appetite. Pt wore bipap overnight and into morning. Pt off precedex entire shift.     Pt and family updated at bedside by RN throughout shift.

## 2023-05-26 LAB
ANION GAP SERPL CALCULATED.3IONS-SCNC: 12.8 MMOL/L (ref 5–15)
BACTERIA SPEC AEROBE CULT: NORMAL
BACTERIA SPEC AEROBE CULT: NORMAL
BUN SERPL-MCNC: 30 MG/DL (ref 8–23)
BUN/CREAT SERPL: 44.8 (ref 7–25)
CALCIUM SPEC-SCNC: 8.4 MG/DL (ref 8.6–10.5)
CHLORIDE SERPL-SCNC: 98 MMOL/L (ref 98–107)
CO2 SERPL-SCNC: 29.2 MMOL/L (ref 22–29)
CREAT SERPL-MCNC: 0.67 MG/DL (ref 0.57–1)
DEPRECATED RDW RBC AUTO: 40.7 FL (ref 37–54)
EGFRCR SERPLBLD CKD-EPI 2021: 85.8 ML/MIN/1.73
ERYTHROCYTE [DISTWIDTH] IN BLOOD BY AUTOMATED COUNT: 12.1 % (ref 12.3–15.4)
GLUCOSE SERPL-MCNC: 141 MG/DL (ref 65–99)
HCT VFR BLD AUTO: 37.6 % (ref 34–46.6)
HGB BLD-MCNC: 12.9 G/DL (ref 12–15.9)
MCH RBC QN AUTO: 31.5 PG (ref 26.6–33)
MCHC RBC AUTO-ENTMCNC: 34.3 G/DL (ref 31.5–35.7)
MCV RBC AUTO: 91.9 FL (ref 79–97)
PLATELET # BLD AUTO: 309 10*3/MM3 (ref 140–450)
PMV BLD AUTO: 9 FL (ref 6–12)
POTASSIUM SERPL-SCNC: 4 MMOL/L (ref 3.5–5.2)
RBC # BLD AUTO: 4.09 10*6/MM3 (ref 3.77–5.28)
SODIUM SERPL-SCNC: 140 MMOL/L (ref 136–145)
WBC NRBC COR # BLD: 10.75 10*3/MM3 (ref 3.4–10.8)

## 2023-05-26 PROCEDURE — 94799 UNLISTED PULMONARY SVC/PX: CPT

## 2023-05-26 PROCEDURE — 94664 DEMO&/EVAL PT USE INHALER: CPT

## 2023-05-26 PROCEDURE — 25010000002 HYDRALAZINE PER 20 MG: Performed by: INTERNAL MEDICINE

## 2023-05-26 PROCEDURE — 85027 COMPLETE CBC AUTOMATED: CPT | Performed by: INTERNAL MEDICINE

## 2023-05-26 PROCEDURE — 80048 BASIC METABOLIC PNL TOTAL CA: CPT | Performed by: INTERNAL MEDICINE

## 2023-05-26 PROCEDURE — 25010000002 ENOXAPARIN PER 10 MG: Performed by: INTERNAL MEDICINE

## 2023-05-26 PROCEDURE — 94660 CPAP INITIATION&MGMT: CPT

## 2023-05-26 PROCEDURE — 25010000002 METHYLPREDNISOLONE PER 40 MG: Performed by: INTERNAL MEDICINE

## 2023-05-26 PROCEDURE — 94761 N-INVAS EAR/PLS OXIMETRY MLT: CPT

## 2023-05-26 PROCEDURE — 97535 SELF CARE MNGMENT TRAINING: CPT

## 2023-05-26 PROCEDURE — 25010000002 CEFTRIAXONE PER 250 MG: Performed by: INTERNAL MEDICINE

## 2023-05-26 RX ADMIN — MULTIPLE VITAMINS W/ MINERALS TAB 1 TABLET: TAB at 09:13

## 2023-05-26 RX ADMIN — METHYLPREDNISOLONE SODIUM SUCCINATE 40 MG: 40 INJECTION, POWDER, FOR SOLUTION INTRAMUSCULAR; INTRAVENOUS at 09:54

## 2023-05-26 RX ADMIN — SODIUM CHLORIDE 1 DROP: 50 SOLUTION OPHTHALMIC at 16:19

## 2023-05-26 RX ADMIN — Medication 3 ML: at 21:15

## 2023-05-26 RX ADMIN — HYDRALAZINE HYDROCHLORIDE 10 MG: 20 INJECTION INTRAMUSCULAR; INTRAVENOUS at 00:42

## 2023-05-26 RX ADMIN — PREDNISOLONE ACETATE 1 DROP: 10 SUSPENSION/ DROPS OPHTHALMIC at 09:11

## 2023-05-26 RX ADMIN — IPRATROPIUM BROMIDE AND ALBUTEROL SULFATE 3 ML: 2.5; .5 SOLUTION RESPIRATORY (INHALATION) at 10:54

## 2023-05-26 RX ADMIN — ASPIRIN 81 MG: 81 TABLET, COATED ORAL at 09:12

## 2023-05-26 RX ADMIN — ROSUVASTATIN CALCIUM 5 MG: 5 TABLET, FILM COATED ORAL at 09:12

## 2023-05-26 RX ADMIN — IPRATROPIUM BROMIDE AND ALBUTEROL SULFATE 3 ML: 2.5; .5 SOLUTION RESPIRATORY (INHALATION) at 06:55

## 2023-05-26 RX ADMIN — Medication 10 ML: at 21:15

## 2023-05-26 RX ADMIN — SODIUM CHLORIDE 1 DROP: 50 SOLUTION OPHTHALMIC at 21:09

## 2023-05-26 RX ADMIN — HYDRALAZINE HYDROCHLORIDE 10 MG: 20 INJECTION INTRAMUSCULAR; INTRAVENOUS at 04:25

## 2023-05-26 RX ADMIN — MOXIFLOXACIN HYDROCHLORIDE 0.05 ML: 5 SOLUTION/ DROPS OPHTHALMIC at 09:12

## 2023-05-26 RX ADMIN — PREDNISOLONE ACETATE 1 DROP: 10 SUSPENSION/ DROPS OPHTHALMIC at 04:24

## 2023-05-26 RX ADMIN — METHYLPREDNISOLONE SODIUM SUCCINATE 40 MG: 40 INJECTION, POWDER, FOR SOLUTION INTRAMUSCULAR; INTRAVENOUS at 23:19

## 2023-05-26 RX ADMIN — PREDNISOLONE ACETATE 1 DROP: 10 SUSPENSION/ DROPS OPHTHALMIC at 21:09

## 2023-05-26 RX ADMIN — ATENOLOL 50 MG: 50 TABLET ORAL at 09:13

## 2023-05-26 RX ADMIN — AMLODIPINE BESYLATE 5 MG: 5 TABLET ORAL at 09:13

## 2023-05-26 RX ADMIN — Medication 10 ML: at 09:13

## 2023-05-26 RX ADMIN — Medication 3 ML: at 09:13

## 2023-05-26 RX ADMIN — MOXIFLOXACIN HYDROCHLORIDE 0.05 ML: 5 SOLUTION/ DROPS OPHTHALMIC at 21:09

## 2023-05-26 RX ADMIN — ENOXAPARIN SODIUM 30 MG: 100 INJECTION SUBCUTANEOUS at 09:12

## 2023-05-26 RX ADMIN — LOSARTAN POTASSIUM 12.5 MG: 25 TABLET, FILM COATED ORAL at 09:12

## 2023-05-26 RX ADMIN — IPRATROPIUM BROMIDE AND ALBUTEROL SULFATE 3 ML: 2.5; .5 SOLUTION RESPIRATORY (INHALATION) at 19:03

## 2023-05-26 RX ADMIN — PREDNISOLONE ACETATE 1 DROP: 10 SUSPENSION/ DROPS OPHTHALMIC at 16:17

## 2023-05-26 RX ADMIN — SODIUM CHLORIDE 1 DROP: 50 SOLUTION OPHTHALMIC at 09:12

## 2023-05-26 RX ADMIN — CEFTRIAXONE SODIUM 1 G: 1 INJECTION, POWDER, FOR SOLUTION INTRAMUSCULAR; INTRAVENOUS at 06:37

## 2023-05-26 RX ADMIN — SODIUM CHLORIDE 1 DROP: 50 SOLUTION OPHTHALMIC at 04:24

## 2023-05-26 NOTE — SIGNIFICANT NOTE
05/26/23 1609   OTHER   Discipline physical therapist   Rehab Time/Intention   Session Not Performed other (see comments)  (attempted to see this afternoon after being transferred up to [facility]. pt is just now getting settled in her chair and politely asks therapy to come back tomorrow.)   Recommendation   PT - Next Appointment 05/27/23

## 2023-05-26 NOTE — NURSING NOTE
CWOCN- consult for redness/skin discoloration under BIPAP mask. Patient states that one mask she was using was very uncomfortable but they brought another one to her and it was much better; she really likes the new one.  Noted pink skin on the forehead and a pink area on the nose- all areas are blanching and appear to be fading.   Patient agrees the skin is better now.   No further recs except to continue to monitor. Patient understands to let RT or RN know if it is bothering her.

## 2023-05-26 NOTE — PLAN OF CARE
Goal Outcome Evaluation:  Plan of Care Reviewed With: patient           Outcome Evaluation: Pt able to increase activity tolerance and functional mobility for ADLs today. She continues to fatqiue quickly and need multiple rest breaks during as well as modifying ADLs to be completed in seated position. Pt on RA today and O2 dropped to 87% at the lowest reading with recovery < 30 seconds. OT continues to recommend SNF at [place].

## 2023-05-26 NOTE — THERAPY TREATMENT NOTE
Patient Name: Lizzy Hicks  : 1935    MRN: [id number]                              Today's Date: 2023       Admit Date: 2023    Visit Dx:     ICD-10-CM ICD-9-CM   1. Acute respiratory failure with hypoxia and hypercapnia  J96.01 518.81    J96.02    2. COPD with acute exacerbation  J44.1 491.21     Patient Active Problem List   Diagnosis   • Hypertension   • COPD (chronic obstructive pulmonary disease)   • Carotid artery stenosis   • Osteoarthritis   • Osteoporosis   • Hyponatremia   • Reset osmostat syndrome   • Carotid stenosis, asymptomatic, right   • Carotid artery disease   • Macular degeneration   • COPD exacerbation   • Acute exacerbation of chronic obstructive pulmonary disease (COPD)   • Parainfluenza infection   • Acute respiratory failure with hypoxia   • Acute respiratory failure with hypoxia and hypercapnia   • Mixed hyperlipidemia   • Possible pneumonia of right lower lobe due to infectious organism   • Oropharyngeal dysphagia     Past Medical History:   Diagnosis Date   • Anesthesia complication     pt states was groggy for a week after surgery   • Carotid artery disease     left   • Carotid stenosis     RIGHT   • COPD (chronic obstructive pulmonary disease)    • History of bronchitis    • Hypertension    • Macular degeneration    • Osteoarthritis 2016   • Osteoporosis 2016   • 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. Baseline Na 128-130 since .   • Seborrheic dermatitis 2016   • Swallowing difficulty     D/T INADVERTANT REMOVAL OF UVULA AS CHILD WITH T AND A     Past Surgical History:   Procedure Laterality Date   • CAROTID ENDARTERECTOMY Right 2018    Procedure: RT CAROTID ENDARTERECTOMY WITH INTRA OPERATIVE CAROTID ARTERY DUPLEX SCAN;  Surgeon: Mikaela Galicia Jr., MD;  Location: [facility];  Service: Vascular   • CAROTID ENDARTERECTOMY Left 2019    Procedure: LEFT CAROTID ENDARTERECTOMY;  Surgeon: Grayson  Mikaela Aguilar Jr., MD;  Location: [facility] OR;  Service: Vascular   • CATARACT EXTRACTION WITH INTRAOCULAR LENS IMPLANT      LEFT AND RIGHT   • ORIF FEMUR FRACTURE Right     HARDWARE   • TONSILLECTOMY AND ADENOIDECTOMY      INADVERTANTLY TOOK UVULA AT THIS TIME      General Information     Row Name 05/26/23 [facility]          OT Time and Intention    Document Type therapy note (daily note)  -     Mode of Treatment occupational therapy;individual therapy  -     Row Name 05/26/23 1306          General Information    Patient Profile Reviewed yes  -     Existing Precautions/Restrictions fall  -     Row Name 05/26/23 1306          Cognition    Orientation Status (Cognition) oriented x 3  -     Row Name 05/26/23 1306          Safety Issues, Functional Mobility    Impairments Affecting Function (Mobility) endurance/activity tolerance;strength;shortness of breath;balance  -           User Key  (r) = Recorded By, (t) = Taken By, (c) = Cosigned By    Initials Name Provider Type     Darline Law, OT Occupational Therapist                 Mobility/ADL's     Row Name 05/26/23 1306          Bed Mobility    Supine-Sit Young (Bed Mobility) minimum assist (75% patient effort);verbal cues  -     Row Name 05/26/23 1306          Transfers    Comment, (Transfers) multiple sit to stands completed during ADLs, at times needs increased assist depending on chair height  -     Row Name 05/26/23 1306          Sit-Stand Transfer    Sit-Stand Young (Transfers) contact guard;minimum assist (75% patient effort)  -     Assistive Device (Sit-Stand Transfers) walker, front-wheeled  -     Row Name 05/26/23 1306          Toilet Transfer    [place] Level (Toilet Transfer) minimum assist (75% patient effort)  -     Assistive Device (Toilet Transfer) grab bars/safety frame;walker, front-wheeled  -     Row Name 05/26/23 1306          Functional Mobility    Functional Mobility- Ind. Level contact guard assist   -     Functional Mobility- Device walker, front-wheeled  -     Functional Mobility-Distance (Feet) --  25  -SM     Row Name 05/26/23 1306          Lower Body Dressing Assessment/Training    [place] Level (Lower Body Dressing) doff;don;pants/bottoms;moderate assist (50% patient effort)  -     Position (Lower Body Dressing) supported sitting  -     Row Name 05/26/23 1306          Toileting Assessment/Training    [place] Level (Toileting) perform perineal hygiene;maximum assist (25% patient effort);adjust/manage clothing  -     Assistive Devices (Toileting) grab bar/safety frame  -     Row Name 05/26/23 1306          Grooming Assessment/Training    [place] Level (Grooming) oral care regimen;set up;supervision  -     Position (Grooming) supported sitting;sink side  -           User Key  (r) = Recorded By, (t) = Taken By, (c) = Cosigned By    Initials Name Provider Type    Darline Stein OT Occupational Therapist               Obj/Interventions     Row Name 05/26/23 1308          Shoulder (Therapeutic Exercise)    Shoulder AROM (Therapeutic Exercise) bilateral;flexion;extension;aBduction;aDduction  forward pucnch X5 reps each sitting EOB  -     Row Name 05/26/23 1308          Balance    Static Sitting Balance supervision  -     Position, Sitting Balance sitting edge of bed  -     Static Standing Balance contact guard  -     Dynamic Standing Balance contact guard  -     Position/Device Used, Standing Balance supported;walker, rolling  -           User Key  (r) = Recorded By, (t) = Taken By, (c) = Cosigned By    Initials Name Provider Type    Darline Stein OT Occupational Therapist               Goals/Plan    No documentation.                Clinical Impression     Row Name 05/26/23 1309          Pain Assessment    Pretreatment Pain Rating 0/10 - no pain  -SM     Posttreatment Pain Rating 0/10 - no pain  -     Row Name 05/26/23 1309          Plan of Care Review     Plan of Care Reviewed With patient  -     Outcome Evaluation Pt able to increase activity tolerance and functional mobility for ADLs today. She continues to fatqiue quickly and need multiple rest breaks during as well as modifying ADLs to be completed in seated position. Pt on RA today and O2 dropped to 87% at the lowest reading with recovery < 30 seconds. OT continues to recommend SNF at [place].  -     Row Name 05/26/23 1309          Therapy Plan Review/Discharge Plan (OT)    Anticipated Discharge Disposition (OT) skilled nursing facility  -     Row Name 05/26/23 1309          Vital Signs    Pre SpO2 (%) 91  -SM     O2 Delivery Pre Treatment room air  -SM     Intra SpO2 (%) 87  -SM     O2 Delivery Intra Treatment room air  -SM     Post SpO2 (%) 92  -SM     O2 Delivery Post Treatment room air  -SM     Row Name 05/26/23 1309          Positioning and Restraints    Pre-Treatment Position in bed  -SM     Post Treatment Position chair  -SM     In Chair reclined;call light within reach;encouraged to call for assist;exit alarm on;notified nsg  -           User Key  (r) = Recorded By, (t) = Taken By, (c) = Cosigned By    Initials Name Provider Type    Darline Stein, ELIEZER Occupational Therapist               Outcome Measures     Row Name 05/26/23 1311          How much help from another is currently needed...    Putting on and taking off regular lower body clothing? 2  -SM     Bathing (including washing, rinsing, and drying) 2  -SM     Toileting (which includes using toilet bed pan or urinal) 2  -SM     Putting on and taking off regular upper body clothing 3  -SM     Taking care of personal grooming (such as brushing teeth) 3  -SM     Eating meals 4  -SM     AM-PAC 6 Clicks Score (OT) 16  -SM     Row Name 05/26/23 1311          Functional Assessment    Outcome Measure Options AM-PAC 6 Clicks Daily Activity (OT)  -           User Key  (r) = Recorded By, (t) = Taken By, (c) = Cosigned By    Initials Name Provider  Type     Darline Law OT Occupational Therapist                Occupational Therapy Education     Title: PT OT SLP Therapies (In Progress)     Topic: Occupational Therapy (In Progress)     Point: ADL training (Done)     Description:   Instruct learner(s) on proper safety adaptation and remediation techniques during self care or transfers.   Instruct in proper use of assistive devices.              Learning Progress Summary           Patient Acceptance, E, VU by  at 5/22/2023 1213    Comment: OT goals, recommended up to chair X3 day/meals to increase OOB activity. Pt in agreement                   Point: Home exercise program (Done)     Description:   Instruct learner(s) on appropriate technique for monitoring, assisting and/or progressing therapeutic exercises/activities.              Learning Progress Summary           Patient Acceptance, E,D, DU,VU by  at 5/26/2023 1311    Comment: ther ex for BUE pt can complete independently in room.                   Point: Precautions (Not Started)     Description:   Instruct learner(s) on prescribed precautions during self-care and functional transfers.              Learner Progress:  Not documented in this visit.          Point: Body mechanics (Not Started)     Description:   Instruct learner(s) on proper positioning and spine alignment during self-care, functional mobility activities and/or exercises.              Learner Progress:  Not documented in this visit.                      User Key     Initials Effective Dates Name Provider Type Discipline     04/02/20 -  Darline Law OT Occupational Therapist OT              OT Recommendation and Plan  Planned Therapy Interventions (OT): activity tolerance training, adaptive equipment training, BADL retraining, functional balance retraining, occupation/activity based interventions, patient/caregiver education/training, transfer/mobility retraining, strengthening exercise  Therapy Frequency (OT): 5  times/wk  Plan of Care Review  Plan of Care Reviewed With: patient  Outcome Evaluation: Pt able to increase activity tolerance and functional mobility for ADLs today. She continues to fatqiue quickly and need multiple rest breaks during as well as modifying ADLs to be completed in seated position. Pt on RA today and O2 dropped to 87% at the lowest reading with recovery < 30 seconds. OT continues to recommend SNF at [place].     Time Calculation:    Time Calculation- OT     Row Name 05/26/23 1312             Time Calculation- OT    OT Start Time 0919  -SM      OT Stop Time 0943  -SM      OT Time Calculation (min) 24 min  -SM      Total Timed Code Minutes- OT 24 minute(s)  -SM      OT Received On 05/26/23  -      OT - Next Appointment 05/29/23  -         Timed Charges    79347 - OT Self Care/Mgmt Minutes 24  -SM         Total Minutes    Timed Charges Total Minutes 24  -SM       Total Minutes 24  -SM            User Key  (r) = Recorded By, (t) = Taken By, (c) = Cosigned By    Initials Name Provider Type    Darline Stein OT Occupational Therapist              Therapy Charges for Today     Code Description Service Date Service Provider Modifiers Qty    49554970266 HC OT THERAPEUTIC ACT EA 15 MIN 5/25/2023 Darline Law OT GO 1    61594314128 HC OT SELF CARE/MGMT/TRAIN EA 15 MIN 5/25/2023 Darline Law OT GO 1    02628348795 HC OT SELF CARE/MGMT/TRAIN EA 15 MIN 5/26/2023 Darline Law OT GO 2               Darline Law OT  5/26/2023

## 2023-05-26 NOTE — PROGRESS NOTES
[facility] Medicine Services  PROGRESS NOTE    Patient Name: Lizzy Hicks  : 1935  MRN: [id number]    Date of Admission: 2023  Primary Care Physician: Deric Mendosa MD    Subjective   Subjective     CC:  Follow-up hypoxia    Subjective:   Patient tolerating BiPAP.  She is found a mass she tolerates and is pleased with her progress.  She did not require any sedation last night for her mask.      Review of Systems  No current fevers or chills  No current nausea, vomiting, or diarrhea  No current chest pain or palpitations      Objective   Objective     Vital Signs:   Temp:  [97.5 °F (36.4 °C)-98.1 °F (36.7 °C)] 97.9 °F (36.6 °C)  Heart Rate:  [] 82  Resp:  [18-26] 18  BP: (119-178)/() 146/59        Physical Exam:  Constitutional:Awake, alert  HENT: NCAT, mucous membranes moist, neck supple  Respiratory: Resolving cough, coarse sounds, rare wheezes are present, resolving tachypnea, breathing less labored  Cardiovascular: Pulse rate is normal, palpable radial pulses bilaterally  Gastrointestinal: Positive bowel sounds, soft, nontender, nondistended  Musculoskeletal: Elderly frail and chronically debilitated in appearance, BMI is 22, mild lower extremity edema  Psychiatric: Anxious affect, cooperative  Neurologic: Now oriented, no slurred speech or facial droop, follows commands  Skin: No rashes or jaundice, warm      Results Reviewed:  Results from last 7 days   Lab Units 23  0854 23  0731 234 23  0422 23  0111   WBC 10*3/mm3 10.75 6.92 12.35*   < > 12.22*   HEMOGLOBIN g/dL 12.9 11.3* 12.7   < > 12.4   HEMATOCRIT % 37.6 32.9* 38.1   < > 36.3   PLATELETS 10*3/mm3 309 252 302   < > 246   INR   --   --   --   --  0.99   PROCALCITONIN ng/mL  --   --   --   --  0.13    < > = values in this interval not displayed.     Results from last 7 days   Lab Units 23  0854 23  0731 23  0414 23  0422 23  0111   SODIUM mmol/L 140 138 136    < > 134*   POTASSIUM mmol/L 4.0 4.6 4.0   < > 3.9   CHLORIDE mmol/L 98 97* 94*   < > 92*   CO2 mmol/L 29.2* 33.0* 33.4*   < > 37.4*   BUN mg/dL 30* 28* 22   < > 25*   CREATININE mg/dL 0.67 0.62 0.48*   < > 0.62   GLUCOSE mg/dL 141* 124* 109*   < > 142*   CALCIUM mg/dL 8.4* 7.9* 8.8   < > 10.4   ALT (SGPT) U/L  --   --   --   --  20   AST (SGOT) U/L  --   --   --   --  15   HSTROP T ng/L  --   --   --   --  16*   PROBNP pg/mL  --   --   --   --  1,653.0    < > = values in this interval not displayed.     Estimated Creatinine Clearance: 55.5 mL/min (by C-G formula based on SCr of 0.67 mg/dL).    Microbiology Results Abnormal     Procedure Component Value - Date/Time    Blood Culture - Blood, Hand, Right [[id number]]  (Normal) Collected: 05/21/23 0742    Lab Status: Final result Specimen: Blood from Hand, Right Updated: 05/26/23 0815     Blood Culture No growth at 5 days    Narrative:      Less than seven (7) mL's of blood was collected.  Insufficient quantity may yield false negative results.    Blood Culture - Blood, Arm, Left [[id number]]  (Normal) Collected: 05/21/23 0735    Lab Status: Final result Specimen: Blood from Arm, Left Updated: 05/26/23 0815     Blood Culture No growth at 5 days    COVID PRE-OP / PRE-PROCEDURE SCREENING ORDER (NO ISOLATION) - Swab, Nasopharynx [[id number]]  (Normal) Collected: 05/24/23 0013    Lab Status: Final result Specimen: Swab from Nasopharynx Updated: 05/24/23 0103    Narrative:      The following orders were created for panel order COVID PRE-OP / PRE-PROCEDURE SCREENING ORDER (NO ISOLATION) - Swab, Nasopharynx.  Procedure                               Abnormality         Status                     ---------                               -----------         ------                     COVID-19, [facility] IN-HOUSE...[[id number]]  Normal              Final result                 Please view results for these tests on the individual orders.    COVID-19, [facility] IN-HOUSE CEPHEID/FERNIE NP SWAB IN  TRANSPORT MEDIA 8-12 HR TAT - Swab, Nasopharynx [[id number]]  (Normal) Collected: 05/24/23 0013    Lab Status: Final result Specimen: Swab from Nasopharynx Updated: 05/24/23 0103     COVID19 Not Detected    Narrative:      Fact sheet for providers: https://www.fda.gov/media/321482/download     Fact sheet for patients: https://www.fda.gov/media/392098/download    Respiratory Panel PCR w/COVID-19(SARS-CoV-2) LU[facility] CARLOS/JAIMEE/JOI/PAD/COR/MAD/KARLEE In-House, NP Swab in UTM/VTM, 3-4 HR TAT - Swab, Nasopharynx [[id number]]  (Normal) Collected: 05/21/23 0109    Lab Status: Final result Specimen: Swab from Nasopharynx Updated: 05/21/23 0517     ADENOVIRUS, PCR Not Detected     Coronavirus 229E Not Detected     Coronavirus HKU1 Not Detected     Coronavirus NL63 Not Detected     Coronavirus OC43 Not Detected     COVID19 Not Detected     Human Metapneumovirus Not Detected     Human Rhinovirus/Enterovirus Not Detected     Influenza A PCR Not Detected     Influenza B PCR Not Detected     Parainfluenza Virus 1 Not Detected     Parainfluenza Virus 2 Not Detected     Parainfluenza Virus 3 Not Detected     Parainfluenza Virus 4 Not Detected     RSV, PCR Not Detected     Bordetella pertussis pcr Not Detected     Bordetella parapertussis PCR Not Detected     Chlamydophila pneumoniae PCR Not Detected     Mycoplasma pneumo by PCR Not Detected    Narrative:      In the setting of a positive respiratory panel with a viral infection PLUS a negative procalcitonin without other underlying concern for bacterial infection, consider observing off antibiotics or discontinuation of antibiotics and continue supportive care. If the respiratory panel is positive for atypical bacterial infection (Bordetella pertussis, Chlamydophila pneumoniae, or Mycoplasma pneumoniae), consider antibiotic de-escalation to target atypical bacterial infection.          Imaging Results (Last 24 Hours)     ** No results found for the last 24 hours. **          Results for orders  placed during the hospital encounter of 08/31/18    Adult Transthoracic Echo Complete W/ Cont if Necessary Per Protocol    Interpretation Summary  · Calculated right ventricular systolic pressure from tricuspid regurgitation is 45 mmHg.  · Left ventricular systolic function is normal. Estimated EF = 63%.      I have reviewed the medications:  Scheduled Meds:amLODIPine, 5 mg, Oral, Daily  aspirin, 81 mg, Oral, Daily  atenolol, 50 mg, Oral, Daily  cefTRIAXone, 1 g, Intravenous, Q24H  enoxaparin, 30 mg, Subcutaneous, Daily  ipratropium-albuterol, 3 mL, Nebulization, 4x Daily - RT  losartan, 12.5 mg, Oral, Q24H  methylPREDNISolone sodium succinate, 40 mg, Intravenous, Q12H  moxifloxacin, 1 drop, Right Eye, BID  multivitamin with minerals, 1 tablet, Oral, Daily  prednisoLONE acetate, 1 drop, Right Eye, Q6H  rosuvastatin, 5 mg, Oral, Daily  sodium chloride, 1 drop, Right Eye, 4x Daily  sodium chloride, 10 mL, Intravenous, Q12H  sodium chloride, 3 mL, Intravenous, Q12H      Continuous Infusions:   PRN Meds:.•  acetaminophen **OR** acetaminophen **OR** acetaminophen  •  calcium carbonate  •  docusate sodium  •  famotidine  •  hydrALAZINE  •  ipratropium-albuterol  •  melatonin  •  nitroglycerin  •  ondansetron **OR** ondansetron  •  ondansetron **OR** ondansetron  •  [COMPLETED] Insert Peripheral IV **AND** sodium chloride  •  sodium chloride  •  sodium chloride  •  sodium chloride  •  sodium chloride    Assessment & Plan   Assessment & Plan     Active Hospital Problems    Diagnosis  POA   • **Acute respiratory failure with hypoxia and hypercapnia [J96.01, J96.02]  Yes   • Oropharyngeal dysphagia [R13.12]  Yes   • Mixed hyperlipidemia [E78.2]  Yes   • Possible pneumonia of right lower lobe due to infectious organism [J18.9]  Yes   • Acute exacerbation of chronic obstructive pulmonary disease (COPD) [J44.1]  Yes   • Carotid artery disease [I77.9]  Yes   • Hypertension [I10]  Yes   • Osteoarthritis [M19.90]  Yes      Resolved  Hospital Problems   No resolved problems to display.        Brief Hospital Course to date:  Lizzy Hicks is a 87 y.o. female presents to the hospital with acute hypoxic and hypercapnic respiratory failure following recent parainfluenza infection and has concern for possible postviral bacterial right lower lobe pneumonia.     Discussion/plan for today:    Respiratory failure with recurrent metabolic encephalopathy due to hypercapnia  Case discussed with pulmonologist.  I am transferring out of ICU.  Patient tolerating BiPAP with new mask.  She will need to continue with this mask.  Pulmonology is arranging for BiPAP at SNF.  Patient will need to have BiPAP or trilogy machine indefinitely for her recurrent hypercapnia.  Plan to monitor out of the ICU and if she does well tonight could possibly go to SNF tomorrow.     Possible pneumonia and COPD exacerbation  Continue ceftriaxone for treatment of possible pneumonia.  Blood cultures reviewed and negative thus far.  Repeat chest x-ray images reviewed and is similar to previous x-ray.  Seems to be responding to therapy. Symptomatic treatment and supportive care.  Chest x-ray images reviewed and possible infiltrate noted.  See pulmonology note from 5/23) systemic steroids now IV.  But possibly can switch to prednisone tomorrow.    Essential hypertension  Increase losartan back as blood pressure has improved. Continue home blood pressure medications.  Monitor blood pressure and adjust as needed.  Previous echocardiogram from 2018 reviewed and normal EF of 63% but slightly elevated RVSP.     Continue statin for hyperlipidemia.  Previous lipids reviewed.     Tylenol as needed for arthritis.     Continue chronic eyedrops.     She understands that due to her advanced age and comorbid conditions she is at higher risk for morbidity and mortality.  Long-term prognosis guarded    DVT prophylaxis: Lovenox    Disposition: Possible transfer to SNF in 1 to 2 days    CODE STATUS:    Code Status and Medical Interventions:   Ordered at: 05/24/23 1121     Medical Intervention Limits:    Other     Level Of Support Discussed With:    Patient    Next of Kin (If No Surrogate)     Code Status (Patient has no pulse and is not breathing):    No CPR (Do Not Attempt to Resuscitate)     Medical Interventions (Patient has pulse or is breathing):    Limited Support     Comments:    ok to intubate. stanislaw brown at [facility] for this conversation     Additional Medical Interventions Limits:    .     Release to patient:    Routine Release       Osorio Schroeder MD  05/26/23

## 2023-05-26 NOTE — DISCHARGE PLACEMENT REQUEST
"Lizzy Hicks (87 y.o. Female)     Date of Birth   1935    Social Security Number       Address   [address] [facility] [place] [address]    Home Phone   [phone]    MRN   [id number]       Zoroastrianism   None    Marital Status                               Admission Date   5/21/23    Admission Type   Emergency    Admitting Provider   Osorio Schroeder MD    Attending Provider   Osorio Schroeder MD    Department, Room/Bed   [address], [address]/1       Discharge Date       Discharge Disposition       Discharge Destination                               Attending Provider: Osorio Schroeder MD    Allergies: Bee Venom    Isolation: None   Infection: None   Code Status: No CPR    Ht: 160 cm (63\")   Wt: 64.7 kg (142 lb 10.2 oz)    Admission Cmt: None   Principal Problem: Acute respiratory failure with hypoxia and hypercapnia [J96.01,J96.02]                 Active Insurance as of 5/21/2023     Primary Coverage     Payor Plan Insurance Group Employer/Plan Group    MEDICARE MEDICARE A & B      Payor Plan Address Payor Plan Phone Number Payor Plan Fax Number Effective Dates    [address] [phone]  9/1/2002 - None Entered    [facility] [id number]       Subscriber Name Subscriber Birth Date Member ID       LIZZY HICKS 1935 [id number]           Secondary Coverage     Payor Plan Insurance Group Employer/Plan Group    [facility] SUPP [id number]     Payor Plan Address Payor Plan Phone Number Payor Plan Fax Number Effective Dates    [address]   12/1/2016 - None Entered    [facility] [id number]       Subscriber Name Subscriber Birth Date Member ID       LIZZY HICKS 1935 [id number]                 Emergency Contacts      (Rel.) Home Phone Work Phone Mobile Phone    Jasmin Lauren (Daughter) [phone] -- [phone]              "

## 2023-05-26 NOTE — PROGRESS NOTES
"                                              LOS: 5 days   Patient Care Team:  Deric Mendosa MD as PCP - General (Family Medicine)  Jennifer Porras, SHAVON as Ambulatory  ([facility])    Chief Complaint:  F/up respiratory failure, encephalopathy and COPD    Subjective   Interval History  .    Use the NIPPV overnight.  During the day she is on 2 L oxygen.  She did not require Precedex.    She reported improved breathing.  No cough        REVIEW OF SYSTEMS:   CARDIOVASCULAR: No chest pain, chest pressure or chest discomfort. No palpitations or edema.   GASTROINTESTINAL: No anorexia, nausea, vomiting or diarrhea. No abdominal pain.  CONSTITUTIONAL: No fever or chills.     Ventilator/Non-Invasive Ventilation Settings (From admission, onward)     Start     Ordered    05/21/23 0525  NIPPV (CPAP or BIPAP)  Until Discontinued        Question Answer Comment   Indication Acute Respiratory Failure    Type BIPAP    IPAP 18    EPAP 7    Breath Rate 20    Titrate Oxygen for SpO2 90 - 95%        05/21/23 0525                      Physical Exam:     Vital Signs  Temp:  [97.5 °F (36.4 °C)-98.2 °F (36.8 °C)] 97.6 °F (36.4 °C)  Heart Rate:  [] 96  Resp:  [18-26] 18  BP: (119-178)/() 136/81  No intake or output data in the 24 hours ending 05/26/23 0922  Flowsheet Rows    Flowsheet Row First Filed Value   Admission Height 160 cm (63\") Documented at 05/21/2023 0059   Admission Weight 58.1 kg (128 lb) Documented at 05/21/2023 0059          PPE used per hospital policy    General Appearance:   Alert, cooperative, in no acute distress   ENMT:  Mallampati score 3, moist mucous membrane.  Missing teeth.   Eyes:  Pupils equal and reactive to light. EOMI   Neck:    Trachea midline. No thyromegaly.   Lungs:    Equal but diminished air entry throughout anterior auscultation.  No audible crackles or wheezing    Heart:   Regular rhythm and normal rate, normal S1 and S2, no         murmur   Skin:   No rash or ecchymosis "   Abdomen:    Soft. No tenderness. No HSM.   Neuro/psych:  Conscious, alert, oriented x3. Strength 5/5 in upper and lower  ext.  Appropriate mood and affect   Extremities:  No cyanosis, clubbing or edema.  Warm extremities and well-perfused          Results Review:        Results from last 7 days   Lab Units 05/25/23  0731 05/24/23  0414 05/23/23  0528   SODIUM mmol/L 138 136 133*   POTASSIUM mmol/L 4.6 4.0 4.3   CHLORIDE mmol/L 97* 94* 92*   CO2 mmol/L 33.0* 33.4* 35.0*   BUN mg/dL 28* 22 25*   CREATININE mg/dL 0.62 0.48* 0.59   GLUCOSE mg/dL 124* 109* 126*   CALCIUM mg/dL 7.9* 8.8 8.6     Results from last 7 days   Lab Units 05/21/23  0111   HSTROP T ng/L 16*     Results from last 7 days   Lab Units 05/25/23  0731 05/24/23  0414 05/23/23  0528   WBC 10*3/mm3 6.92 12.35* 11.38*   HEMOGLOBIN g/dL 11.3* 12.7 11.7*   HEMATOCRIT % 32.9* 38.1 35.3   PLATELETS 10*3/mm3 252 302 271     Results from last 7 days   Lab Units 05/21/23  0111   INR  0.99   APTT seconds 29.9     Results from last 7 days   Lab Units 05/21/23  0111   PROBNP pg/mL 1,653.0                   Results from last 7 days   Lab Units 05/24/23  1248 05/21/23  0112   PH, ARTERIAL pH units 7.457* 7.377   PCO2, ARTERIAL mm Hg 52.0* 67.1*   PO2 ART mm Hg 91.6 113.8*   MODALITY  BiPap BiPap   O2 SATURATION CALC % 97.3 98.1         I reviewed the patient's new clinical results.        Medication Review:   amLODIPine, 5 mg, Oral, Daily  aspirin, 81 mg, Oral, Daily  atenolol, 50 mg, Oral, Daily  cefTRIAXone, 1 g, Intravenous, Q24H  enoxaparin, 30 mg, Subcutaneous, Daily  ipratropium-albuterol, 3 mL, Nebulization, 4x Daily - RT  losartan, 12.5 mg, Oral, Q24H  methylPREDNISolone sodium succinate, 40 mg, Intravenous, Q12H  moxifloxacin, 1 drop, Right Eye, BID  multivitamin with minerals, 1 tablet, Oral, Daily  prednisoLONE acetate, 1 drop, Right Eye, Q6H  rosuvastatin, 5 mg, Oral, Daily  sodium chloride, 1 drop, Right Eye, 4x Daily  sodium chloride, 10 mL,  Intravenous, Q12H  sodium chloride, 3 mL, Intravenous, Q12H             Diagnostic imaging:  I personally and independently reviewed the following images:  CXR 5/24/2023: Emphysema.  No ischemic changes.      Assessment     1. Acute hypoxic and hypercapnic resp failure  2. COPD exacerbation  3. Hyponatremia, resolved  4. Pulmonary hypertension, RVSP 45 on echo 8/31/2018, likely group 3      · HTN  · Recent parainfluenza infection    All problems new to me    Plan     · DuoNeb 4 times a day.  · NIPPV at night and as needed during the day.  She would benefit from long-term NIPPV management due to significant hypercarbia and respiratory failure secondary to COPD.  She is going to be discharged to a skilled nursing facility where she could get NIPPV arranged over there.  · DC Solu-Medrol start prednisone.  · Can discontinue antibiotics  · DVT prophylaxis with Lovenox    Discussed with ICU staff during multidisciplinary round and Dr. Schroeder.    Dispo: Transfer to telemetry..    Felix Muro MD  05/26/23  09:22 EDT      Time>35 min face to face and on the lopes >1/2 directed toward counseling and coordination of care      This note was dictated utilizing Dragon dictation

## 2023-05-26 NOTE — PROGRESS NOTES
Upon removing Bi-Pap under the nose mask from patient indentations were visible on fore head with ecchymosis visible.  Family expressed patient is finding mask uncomfortable.  Wound consult was placed to try and prevent pressure injury.

## 2023-05-27 LAB
ANION GAP SERPL CALCULATED.3IONS-SCNC: 6.1 MMOL/L (ref 5–15)
BUN SERPL-MCNC: 31 MG/DL (ref 8–23)
BUN/CREAT SERPL: 47.7 (ref 7–25)
CALCIUM SPEC-SCNC: 8.5 MG/DL (ref 8.6–10.5)
CHLORIDE SERPL-SCNC: 99 MMOL/L (ref 98–107)
CO2 SERPL-SCNC: 33.9 MMOL/L (ref 22–29)
CREAT SERPL-MCNC: 0.65 MG/DL (ref 0.57–1)
DEPRECATED RDW RBC AUTO: 40 FL (ref 37–54)
EGFRCR SERPLBLD CKD-EPI 2021: 86.4 ML/MIN/1.73
ERYTHROCYTE [DISTWIDTH] IN BLOOD BY AUTOMATED COUNT: 12.1 % (ref 12.3–15.4)
GLUCOSE SERPL-MCNC: 124 MG/DL (ref 65–99)
HCT VFR BLD AUTO: 35.4 % (ref 34–46.6)
HGB BLD-MCNC: 12.4 G/DL (ref 12–15.9)
MCH RBC QN AUTO: 32 PG (ref 26.6–33)
MCHC RBC AUTO-ENTMCNC: 35 G/DL (ref 31.5–35.7)
MCV RBC AUTO: 91.2 FL (ref 79–97)
PLATELET # BLD AUTO: 280 10*3/MM3 (ref 140–450)
PMV BLD AUTO: 8.9 FL (ref 6–12)
POTASSIUM SERPL-SCNC: 4 MMOL/L (ref 3.5–5.2)
RBC # BLD AUTO: 3.88 10*6/MM3 (ref 3.77–5.28)
SODIUM SERPL-SCNC: 139 MMOL/L (ref 136–145)
WBC NRBC COR # BLD: 10.16 10*3/MM3 (ref 3.4–10.8)

## 2023-05-27 PROCEDURE — 94799 UNLISTED PULMONARY SVC/PX: CPT

## 2023-05-27 PROCEDURE — 25010000002 CEFTRIAXONE PER 250 MG: Performed by: INTERNAL MEDICINE

## 2023-05-27 PROCEDURE — 94660 CPAP INITIATION&MGMT: CPT

## 2023-05-27 PROCEDURE — 97116 GAIT TRAINING THERAPY: CPT

## 2023-05-27 PROCEDURE — 94664 DEMO&/EVAL PT USE INHALER: CPT

## 2023-05-27 PROCEDURE — 85027 COMPLETE CBC AUTOMATED: CPT | Performed by: INTERNAL MEDICINE

## 2023-05-27 PROCEDURE — 80048 BASIC METABOLIC PNL TOTAL CA: CPT | Performed by: INTERNAL MEDICINE

## 2023-05-27 PROCEDURE — 97530 THERAPEUTIC ACTIVITIES: CPT

## 2023-05-27 PROCEDURE — 94761 N-INVAS EAR/PLS OXIMETRY MLT: CPT

## 2023-05-27 PROCEDURE — 94760 N-INVAS EAR/PLS OXIMETRY 1: CPT

## 2023-05-27 PROCEDURE — 25010000002 ENOXAPARIN PER 10 MG: Performed by: INTERNAL MEDICINE

## 2023-05-27 PROCEDURE — 25010000002 METHYLPREDNISOLONE PER 40 MG: Performed by: INTERNAL MEDICINE

## 2023-05-27 RX ORDER — PREDNISONE 20 MG/1
40 TABLET ORAL
Status: DISCONTINUED | OUTPATIENT
Start: 2023-05-28 | End: 2023-05-28

## 2023-05-27 RX ADMIN — PREDNISOLONE ACETATE 1 DROP: 10 SUSPENSION/ DROPS OPHTHALMIC at 13:36

## 2023-05-27 RX ADMIN — SODIUM CHLORIDE 1 DROP: 50 SOLUTION OPHTHALMIC at 17:52

## 2023-05-27 RX ADMIN — ASPIRIN 81 MG: 81 TABLET, COATED ORAL at 09:35

## 2023-05-27 RX ADMIN — MOXIFLOXACIN HYDROCHLORIDE 0.05 ML: 5 SOLUTION/ DROPS OPHTHALMIC at 09:36

## 2023-05-27 RX ADMIN — CEFTRIAXONE SODIUM 1 G: 1 INJECTION, POWDER, FOR SOLUTION INTRAMUSCULAR; INTRAVENOUS at 06:48

## 2023-05-27 RX ADMIN — IPRATROPIUM BROMIDE AND ALBUTEROL SULFATE 3 ML: 2.5; .5 SOLUTION RESPIRATORY (INHALATION) at 20:27

## 2023-05-27 RX ADMIN — SODIUM CHLORIDE 1 DROP: 50 SOLUTION OPHTHALMIC at 09:36

## 2023-05-27 RX ADMIN — IPRATROPIUM BROMIDE AND ALBUTEROL SULFATE 3 ML: 2.5; .5 SOLUTION RESPIRATORY (INHALATION) at 15:37

## 2023-05-27 RX ADMIN — PREDNISOLONE ACETATE 1 DROP: 10 SUSPENSION/ DROPS OPHTHALMIC at 21:47

## 2023-05-27 RX ADMIN — METHYLPREDNISOLONE SODIUM SUCCINATE 40 MG: 40 INJECTION, POWDER, FOR SOLUTION INTRAMUSCULAR; INTRAVENOUS at 09:35

## 2023-05-27 RX ADMIN — IPRATROPIUM BROMIDE AND ALBUTEROL SULFATE 3 ML: 2.5; .5 SOLUTION RESPIRATORY (INHALATION) at 11:17

## 2023-05-27 RX ADMIN — SODIUM CHLORIDE 1 DROP: 50 SOLUTION OPHTHALMIC at 21:47

## 2023-05-27 RX ADMIN — AMLODIPINE BESYLATE 5 MG: 5 TABLET ORAL at 09:35

## 2023-05-27 RX ADMIN — LOSARTAN POTASSIUM 12.5 MG: 25 TABLET, FILM COATED ORAL at 09:35

## 2023-05-27 RX ADMIN — METHYLPREDNISOLONE SODIUM SUCCINATE 40 MG: 40 INJECTION, POWDER, FOR SOLUTION INTRAMUSCULAR; INTRAVENOUS at 22:31

## 2023-05-27 RX ADMIN — PREDNISOLONE ACETATE 1 DROP: 10 SUSPENSION/ DROPS OPHTHALMIC at 17:53

## 2023-05-27 RX ADMIN — MOXIFLOXACIN HYDROCHLORIDE 0.05 ML: 5 SOLUTION/ DROPS OPHTHALMIC at 21:47

## 2023-05-27 RX ADMIN — Medication 10 ML: at 21:47

## 2023-05-27 RX ADMIN — IPRATROPIUM BROMIDE AND ALBUTEROL SULFATE 3 ML: 2.5; .5 SOLUTION RESPIRATORY (INHALATION) at 07:46

## 2023-05-27 RX ADMIN — Medication 10 ML: at 09:37

## 2023-05-27 RX ADMIN — ENOXAPARIN SODIUM 30 MG: 100 INJECTION SUBCUTANEOUS at 09:35

## 2023-05-27 RX ADMIN — ATENOLOL 50 MG: 50 TABLET ORAL at 09:35

## 2023-05-27 RX ADMIN — MULTIPLE VITAMINS W/ MINERALS TAB 1 TABLET: TAB at 09:35

## 2023-05-27 RX ADMIN — SODIUM CHLORIDE 1 DROP: 50 SOLUTION OPHTHALMIC at 13:36

## 2023-05-27 RX ADMIN — PREDNISOLONE ACETATE 1 DROP: 10 SUSPENSION/ DROPS OPHTHALMIC at 09:36

## 2023-05-27 RX ADMIN — ROSUVASTATIN CALCIUM 5 MG: 5 TABLET, FILM COATED ORAL at 09:35

## 2023-05-27 RX ADMIN — Medication 3 ML: at 21:47

## 2023-05-27 NOTE — THERAPY TREATMENT NOTE
Patient Name: Lizzy Hicks  : 1935    MRN: [id number]                              Today's Date: 2023       Admit Date: 2023    Visit Dx:     ICD-10-CM ICD-9-CM   1. Acute respiratory failure with hypoxia and hypercapnia  J96.01 518.81    J96.02    2. COPD with acute exacerbation  J44.1 491.21     Patient Active Problem List   Diagnosis   • Hypertension   • COPD (chronic obstructive pulmonary disease)   • Carotid artery stenosis   • Osteoarthritis   • Osteoporosis   • Hyponatremia   • Reset osmostat syndrome   • Carotid stenosis, asymptomatic, right   • Carotid artery disease   • Macular degeneration   • COPD exacerbation   • Acute exacerbation of chronic obstructive pulmonary disease (COPD)   • Parainfluenza infection   • Acute respiratory failure with hypoxia   • Acute respiratory failure with hypoxia and hypercapnia   • Mixed hyperlipidemia   • Possible pneumonia of right lower lobe due to infectious organism   • Oropharyngeal dysphagia     Past Medical History:   Diagnosis Date   • Anesthesia complication     pt states was groggy for a week after surgery   • Carotid artery disease     left   • Carotid stenosis     RIGHT   • COPD (chronic obstructive pulmonary disease)    • History of bronchitis    • Hypertension    • Macular degeneration    • Osteoarthritis 2016   • Osteoporosis 2016   • Reset osmostat syndrome 08/15/2016    Worked up by prior PCP in [facility]. Baseline Na 128-130 since .   • Seborrheic dermatitis 2016   • Swallowing difficulty     D/T INADVERTANT REMOVAL OF UVULA AS CHILD WITH T AND A     Past Surgical History:   Procedure Laterality Date   • CAROTID ENDARTERECTOMY Right 2018    Procedure: RT CAROTID ENDARTERECTOMY WITH INTRA OPERATIVE CAROTID ARTERY DUPLEX SCAN;  Surgeon: Mikaela Galicia Jr., MD;  Location: [facility];  Service: Vascular   • CAROTID ENDARTERECTOMY Left 2019    Procedure: LEFT CAROTID ENDARTERECTOMY;  Surgeon: Grayson  Mikaela Aguilar Jr., MD;  Location: [facility] OR;  Service: Vascular   • CATARACT EXTRACTION WITH INTRAOCULAR LENS IMPLANT      LEFT AND RIGHT   • ORIF FEMUR FRACTURE Right     HARDWARE   • TONSILLECTOMY AND ADENOIDECTOMY      INADVERTANTLY TOOK UVULA AT THIS TIME      General Information     Row Name 05/27/23 1443          Physical Therapy Time and Intention    Document Type therapy note (daily note)  -CW     Mode of Treatment physical therapy;individual therapy  -CW     Row Name 05/27/23 1443          General Information    Patient Profile Reviewed yes  -CW     Existing Precautions/Restrictions fall  -CW     Row Name 05/27/23 1443          Cognition    Orientation Status (Cognition) oriented x 3  -CW     Row Name 05/27/23 1443          Safety Issues, Functional Mobility    Impairments Affecting Function (Mobility) endurance/activity tolerance;strength;shortness of breath;balance  -CW           User Key  (r) = Recorded By, (t) = Taken By, (c) = Cosigned By    Initials Name Provider Type    CW Hillary Sebastian PT Physical Therapist               Mobility     Row Name 05/27/23 1510          Bed Mobility    Bed Mobility supine-sit  -CW     Supine-Sit [place] (Bed Mobility) minimum assist (75% patient effort);verbal cues  -CW     Assistive Device (Bed Mobility) head of bed elevated;bed rails  -CW     Row Name 05/27/23 1510          Bed-Chair Transfer    Bed-Chair [place] (Transfers) contact guard;verbal cues  -CW     Assistive Device (Bed-Chair Transfers) walker, front-wheeled  -CW     Row Name 05/27/23 1510          Sit-Stand Transfer    Sit-Stand [place] (Transfers) contact guard;verbal cues  -CW     Assistive Device (Sit-Stand Transfers) walker, front-wheeled  -CW     Row Name 05/27/23 1510          Gait/Stairs (Locomotion)    [place]d Level (Gait) contact guard  -CW     Assistive Device (Gait) walker, front-wheeled  -CW     Distance in Feet (Gait) 30' x4 trials  -CW     Deviations/Abnormal  Patterns (Gait) trav decreased;stride length decreased;gait speed decreased  -CW     Bilateral Gait Deviations forward flexed posture  -CW     Comment, (Gait/Stairs) Mltiple standing rest breaks due to feeling SOA, resting forearms on walker during standing breaks  -CW           User Key  (r) = Recorded By, (t) = Taken By, (c) = Cosigned By    Initials Name Provider Type    Hillary Henriquez, REINIER Physical Therapist               Obj/Interventions     Row Name 05/27/23 1517          Motor Skills    Therapeutic Exercise other (see comments)  B jose tuttle, AP x10  -CW           User Key  (r) = Recorded By, (t) = Taken By, (c) = Cosigned By    Initials Name Provider Type    CW Hillary Sebastian, REINIER Physical Therapist               Goals/Plan    No documentation.                Clinical Impression     Row Name 05/27/23 1512          Pain    Pretreatment Pain Rating 0/10 - no pain  -CW     Posttreatment Pain Rating 0/10 - no pain  -CW     Row Name 05/27/23 1512          Plan of Care Review    Plan of Care Reviewed With patient  -CW     Outcome Evaluation Pt participated with PT this PM. Pt increased total ambulation distance but does require multiple standing rest breaks due to feeling SOA. She uses a walker and requries cga,does not demo any overt loss of balance.  -CW     Row Name 05/27/23 1512          Therapy Assessment/Plan (PT)    Therapy Frequency (PT) 5 times/wk  -CW     Row Name 05/27/23 1512          Vital Signs    Pre SpO2 (%) 90  -CW     O2 Delivery Pre Treatment room air  -CW     Intra SpO2 (%) 87  -CW     O2 Delivery Intra Treatment room air  -CW     Post SpO2 (%) 91  -CW     O2 Delivery Post Treatment room air  -CW     Row Name 05/27/23 1512          Positioning and Restraints    Pre-Treatment Position in bed  -CW     Post Treatment Position chair  -CW     In Chair sitting;encouraged to call for assist;call light within reach;exit alarm on;notified nsg;legs elevated  -CW           User Key  (r) =  Recorded By, (t) = Taken By, (c) = Cosigned By    Initials Name Provider Type    Hillary Henriquez, REINIER Physical Therapist               Outcome Measures     Row Name 05/27/23 1515 05/27/23 0829       How much help from another person do you currently need...    Turning from your back to your side while in flat bed without using bedrails? 3  -CW 3  -VH    Moving from lying on back to sitting on the side of a flat bed without bedrails? 3  -CW 3  -VH    Moving to and from a bed to a chair (including a wheelchair)? 3  -CW 3  -VH    Standing up from a chair using your arms (e.g., wheelchair, bedside chair)? 3  -CW 3  -VH    Climbing 3-5 steps with a railing? 2  -CW 2  -VH    To walk in hospital room? 3  -CW 3  -VH    AM-PAC 6 Clicks Score (PT) 17  -CW 17  -VH    Highest level of mobility 5 --> Static standing  -CW 5 --> Static standing  -VH    Row Name 05/27/23 1515          Functional Assessment    Outcome Measure Options AM-PAC 6 Clicks Basic Mobility (PT)  -CW           User Key  (r) = Recorded By, (t) = Taken By, (c) = Cosigned By    Initials Name Provider Type    Hillary Henriquez PT Physical Therapist     Ayanna Johnson, RN Registered Nurse                             Physical Therapy Education     Title: PT OT SLP Therapies (In Progress)     Topic: Physical Therapy (In Progress)     Point: Mobility training (Done)     Learning Progress Summary           Patient Acceptance, E, VU by  at 5/27/2023 1516    Acceptance, E,TB, VU,DU by  at 5/22/2023 1038                   Point: Home exercise program (Not Started)     Learner Progress:  Not documented in this visit.          Point: Body mechanics (Done)     Learning Progress Summary           Patient Acceptance, E,TB, VU,DU by CS at 5/22/2023 1038                   Point: Precautions (Done)     Learning Progress Summary           Patient Acceptance, E,TB, VU,DU by CS at 5/22/2023 1038                               User Key     Initials Effective Dates  Name Provider Type Discipline    CW 12/13/22 -  Hillary Sebastian, PT Physical Therapist PT    CS 09/22/22 -  Angely Monique PT Physical Therapist PT              PT Recommendation and Plan     Plan of Care Reviewed With: patient  Outcome Evaluation: Pt participated with PT this PM. Pt increased total ambulation distance but does require multiple standing rest breaks due to feeling SOA. She uses a walker and requries cga,does not demo any overt loss of balance.     Time Calculation:    PT Charges     Row Name 05/27/23 1443             Time Calculation    Start Time 1434  -CW      Stop Time 1502  -CW      Time Calculation (min) 28 min  -CW      PT Received On 05/27/23  -CW      PT - Next Appointment 05/30/23  -CW            User Key  (r) = Recorded By, (t) = Taken By, (c) = Cosigned By    Initials Name Provider Type    CW Hillary Sebastian, PT Physical Therapist              Therapy Charges for Today     Code Description Service Date Service Provider Modifiers Qty    79179062739 HC PT THERAPEUTIC ACT EA 15 MIN 5/27/2023 Hillary Sebastian, PT GP 1    83105712656 HC PT THERAPEUTIC ACT EA 15 MIN 5/27/2023 Hillary Sebastian, PT GP 1    53488710663 HC GAIT TRAINING EA 15 MIN 5/27/2023 Hillary Sebastian, PT GP 1          PT G-Codes  Outcome Measure Options: AM-PAC 6 Clicks Basic Mobility (PT)  AM-PAC 6 Clicks Score (PT): 17  AM-PAC 6 Clicks Score (OT): 16  PT Discharge Summary  Anticipated Discharge Disposition (PT): skilled nursing facility    Hillary Sebastian PT  5/27/2023

## 2023-05-27 NOTE — PLAN OF CARE
Goal Outcome Evaluation:  Plan of Care Reviewed With: patient         A&Ox4. RA during day. Assist x1. Worked with PT, up to chair today. SR on monitor. Eye drops given. Meds crushed with applesauce, patient tolerated. Discussed discharge with CCP, patient will needs NPPV at SNF. Unable to obtain until Tuesday d/t holiday weekend. Patient and daughter aware. Notified physician. No patient complaints at this time. VSS. All needs met.

## 2023-05-27 NOTE — CASE MANAGEMENT/SOCIAL WORK
Continued Stay Note  [facility]     Patient Name: Lizzy Hicks  MRN: [id number]  Today's Date: 5/27/2023    Admit Date: 5/21/2023    Plan: [facility] pending [facility]   Discharge Plan     Row Name 05/27/23 1241       Plan    Plan [facility] pending [facility]    Patient/Family in Agreement with Plan yes    Plan Comments Inbound call received from RN asking for update on discharge to [facility] with [facility]. Spoke with [facility], they are unable to do anything until Tuesday after the holiday weekend. Spoke with Fabi/Noe with update, this is also what she had heard and will follow up on Tuesday, but pt will still have bed available. Spoke with pt's daughter Jasmin over the phone to give update, introduced self and explained CCP role, she also wanted to be sure it was noted that pt prefers to use the under the nose mask. CCP to follow. Darrion VACA               Discharge Codes    No documentation.               Expected Discharge Date and Time     Expected Discharge Date Expected Discharge Time    May 24, 2023             Darrion Cade, SHAVON

## 2023-05-27 NOTE — PROGRESS NOTES
[facility] Medicine Services  PROGRESS NOTE    Patient Name: Lizzy Hicks  : 1935  MRN: [id number]    Date of Admission: 2023  Primary Care Physician: Deric Mendosa MD    Subjective   Subjective     CC:  Follow-up hypoxia    Subjective:   Patient tolerating BiPAP.    Breathing better  No complaints  gen weakness       Review of Systems  No current fevers or chills  No current nausea, vomiting, or diarrhea  No current chest pain or palpitations      Objective   Objective     Vital Signs:   Temp:  [97.6 °F (36.4 °C)-98.2 °F (36.8 °C)] 97.8 °F (36.6 °C)  Heart Rate:  [] 86  Resp:  [16-20] 20  BP: (121-180)/(55-77) 121/77        Physical Exam:  Constitutional:Awake, alert  HENT: NCAT, mucous membranes moist, neck supple  Respiratory:fairly clear. On bipap during exam   Cardiovascular: Pulse rate is normal, palpable radial pulses bilaterally  Gastrointestinal: Positive bowel sounds, soft, nontender, nondistended  Musculoskeletal: Elderly frail and chronically debilitated in appearance, BMI is 22, mild lower extremity edema  Psychiatric: Anxious affect, cooperative  Neurologic: follows commands  Skin: No rashes or jaundice, warm  chronically ill 86yo      Results Reviewed:  Results from last 7 days   Lab Units 23  0653 23  0854 23  0731 23  0111   WBC 10*3/mm3 10.16 10.75 6.92   < > 12.22*   HEMOGLOBIN g/dL 12.4 12.9 11.3*   < > 12.4   HEMATOCRIT % 35.4 37.6 32.9*   < > 36.3   PLATELETS 10*3/mm3 280 309 252   < > 246   INR   --   --   --   --  0.99   PROCALCITONIN ng/mL  --   --   --   --  0.13    < > = values in this interval not displayed.     Results from last 7 days   Lab Units 23  0653 23  0854 23  0731 23  0422 23  0111   SODIUM mmol/L 139 140 138   < > 134*   POTASSIUM mmol/L 4.0 4.0 4.6   < > 3.9   CHLORIDE mmol/L 99 98 97*   < > 92*   CO2 mmol/L 33.9* 29.2* 33.0*   < > 37.4*   BUN mg/dL 31* 30* 28*   < > 25*    CREATININE mg/dL 0.65 0.67 0.62   < > 0.62   GLUCOSE mg/dL 124* 141* 124*   < > 142*   CALCIUM mg/dL 8.5* 8.4* 7.9*   < > 10.4   ALT (SGPT) U/L  --   --   --   --  20   AST (SGOT) U/L  --   --   --   --  15   HSTROP T ng/L  --   --   --   --  16*   PROBNP pg/mL  --   --   --   --  1,653.0    < > = values in this interval not displayed.     Estimated Creatinine Clearance: 57.2 mL/min (by C-G formula based on SCr of 0.65 mg/dL).    Microbiology Results Abnormal     Procedure Component Value - Date/Time    Blood Culture - Blood, Hand, Right [[id number]]  (Normal) Collected: 05/21/23 0742    Lab Status: Final result Specimen: Blood from Hand, Right Updated: 05/26/23 0815     Blood Culture No growth at 5 days    Narrative:      Less than seven (7) mL's of blood was collected.  Insufficient quantity may yield false negative results.    Blood Culture - Blood, Arm, Left [[id number]]  (Normal) Collected: 05/21/23 0735    Lab Status: Final result Specimen: Blood from Arm, Left Updated: 05/26/23 0815     Blood Culture No growth at 5 days    COVID PRE-OP / PRE-PROCEDURE SCREENING ORDER (NO ISOLATION) - Swab, Nasopharynx [[id number]]  (Normal) Collected: 05/24/23 0013    Lab Status: Final result Specimen: Swab from Nasopharynx Updated: 05/24/23 0103    Narrative:      The following orders were created for panel order COVID PRE-OP / PRE-PROCEDURE SCREENING ORDER (NO ISOLATION) - Swab, Nasopharynx.  Procedure                               Abnormality         Status                     ---------                               -----------         ------                     COVID-19,[facility]...[[id number]]  Normal              Final result                 Please view results for these tests on the individual orders.    COVID-19,[facility] CEPHEID/FERNIE NP SWAB IN TRANSPORT MEDIA 8-12 HR TAT - Swab, Nasopharynx [[id number]]  (Normal) Collected: 05/24/23 0013    Lab Status: Final result Specimen: Swab from Nasopharynx Updated:  05/24/23 0103     COVID19 Not Detected    Narrative:      Fact sheet for providers: https://www.fda.gov/media/299668/download     Fact sheet for patients: https://www.fda.gov/media/829975/download    Respiratory Panel PCR w/COVID-19(SARS-CoV-2) [facility]/JAIMEE/JOI/PAD/[facility]/KARLEE In-House, NP Swab in UTM/VTM, 3-4 HR TAT - Swab, Nasopharynx [[id number]]  (Normal) Collected: 05/21/23 0109    Lab Status: Final result Specimen: Swab from Nasopharynx Updated: 05/21/23 0517     ADENOVIRUS, PCR Not Detected     Coronavirus 229E Not Detected     Coronavirus HKU1 Not Detected     Coronavirus NL63 Not Detected     Coronavirus OC43 Not Detected     COVID19 Not Detected     Human Metapneumovirus Not Detected     Human Rhinovirus/Enterovirus Not Detected     Influenza A PCR Not Detected     Influenza B PCR Not Detected     Parainfluenza Virus 1 Not Detected     Parainfluenza Virus 2 Not Detected     Parainfluenza Virus 3 Not Detected     Parainfluenza Virus 4 Not Detected     RSV, PCR Not Detected     Bordetella pertussis pcr Not Detected     Bordetella parapertussis PCR Not Detected     Chlamydophila pneumoniae PCR Not Detected     Mycoplasma pneumo by PCR Not Detected    Narrative:      In the setting of a positive respiratory panel with a viral infection PLUS a negative procalcitonin without other underlying concern for bacterial infection, consider observing off antibiotics or discontinuation of antibiotics and continue supportive care. If the respiratory panel is positive for atypical bacterial infection (Bordetella pertussis, Chlamydophila pneumoniae, or Mycoplasma pneumoniae), consider antibiotic de-escalation to target atypical bacterial infection.          Imaging Results (Last 24 Hours)     ** No results found for the last 24 hours. **          Results for orders placed during the hospital encounter of 08/31/18    Adult Transthoracic Echo Complete W/ Cont if Necessary Per Protocol    Interpretation Summary  · Calculated right  ventricular systolic pressure from tricuspid regurgitation is 45 mmHg.  · Left ventricular systolic function is normal. Estimated EF = 63%.      I have reviewed the medications:  Scheduled Meds:amLODIPine, 5 mg, Oral, Daily  aspirin, 81 mg, Oral, Daily  atenolol, 50 mg, Oral, Daily  enoxaparin, 30 mg, Subcutaneous, Daily  ipratropium-albuterol, 3 mL, Nebulization, 4x Daily - RT  losartan, 12.5 mg, Oral, Q24H  methylPREDNISolone sodium succinate, 40 mg, Intravenous, Q12H  moxifloxacin, 1 drop, Right Eye, BID  multivitamin with minerals, 1 tablet, Oral, Daily  prednisoLONE acetate, 1 drop, Right Eye, Q6H  rosuvastatin, 5 mg, Oral, Daily  sodium chloride, 1 drop, Right Eye, 4x Daily  sodium chloride, 10 mL, Intravenous, Q12H  sodium chloride, 3 mL, Intravenous, Q12H      Continuous Infusions:   PRN Meds:.•  acetaminophen **OR** acetaminophen **OR** acetaminophen  •  calcium carbonate  •  docusate sodium  •  famotidine  •  hydrALAZINE  •  ipratropium-albuterol  •  melatonin  •  nitroglycerin  •  ondansetron **OR** ondansetron  •  ondansetron **OR** ondansetron  •  [COMPLETED] Insert Peripheral IV **AND** sodium chloride  •  sodium chloride  •  sodium chloride  •  sodium chloride  •  sodium chloride    Assessment & Plan   Assessment & Plan     Active Hospital Problems    Diagnosis  POA   • **Acute respiratory failure with hypoxia and hypercapnia [J96.01, J96.02]  Yes   • Oropharyngeal dysphagia [R13.12]  Yes   • Mixed hyperlipidemia [E78.2]  Yes   • Possible pneumonia of right lower lobe due to infectious organism [J18.9]  Yes   • Acute exacerbation of chronic obstructive pulmonary disease (COPD) [J44.1]  Yes   • Carotid artery disease [I77.9]  Yes   • Hypertension [I10]  Yes   • Osteoarthritis [M19.90]  Yes      Resolved Hospital Problems   No resolved problems to display.        Brief Hospital Course to date:  Lizzy Hicks is a 87 y.o. female presents to the hospital with acute hypoxic and hypercapnic respiratory  failure following recent parainfluenza infection and has concern for possible postviral bacterial right lower lobe pneumonia.     Discussion/plan for today:    Respiratory failure with recurrent metabolic encephalopathy due to hypercapnia   transferred out of ICU.  Patient tolerating BiPAP with new mask.  She will need to continue with this mask.  Pulmonology is arranging for BiPAP at SNF.  Patient will need to have BiPAP or trilogy machine indefinitely for her recurrent hypercapnia.      Possible pneumonia and COPD exacerbation  Finished ceftriaxone  On IV steroids    Essential hypertension   Continue blood pressure medications.  Monitor blood pressure and adjust as needed.  Previous echocardiogram from 2018 normal EF of 63% but slightly elevated RVSP.     Continue statin for hyperlipidemia     Tylenol as needed for arthritis.     Continue chronic eyedrops.     She understands that due to her advanced age and comorbid conditions she is at higher risk for morbidity and mortality.  Long-term prognosis guarded    DVT prophylaxis: Lovenox    Disposition: Possible transfer to SNF in 2 days. Won't have bipap available there until Tuesday    CODE STATUS:   Code Status and Medical Interventions:   Ordered at: 05/24/23 1121     Medical Intervention Limits:    Other     Level Of Support Discussed With:    Patient    Next of Kin (If No Surrogate)     Code Status (Patient has no pulse and is not breathing):    No CPR (Do Not Attempt to Resuscitate)     Medical Interventions (Patient has pulse or is breathing):    Limited Support     Comments:    ok to intubate. stanislaw brown at [facility] for this conversation     Additional Medical Interventions Limits:    .     Release to patient:    Routine Release       Kyle Galarza MD  05/27/23

## 2023-05-27 NOTE — PROGRESS NOTES
"                                              LOS: 6 days   Patient Care Team:  Deric Mendosa MD as PCP - General (Family Medicine)  Jennifer Porras, SHAVON as Ambulatory  ([facility])    Chief Complaint:  F/up respiratory failure, encephalopathy and COPD    Subjective   Interval History  .    Used NIPPV overnight.    Denies cough or trouble breathing at rest.        REVIEW OF SYSTEMS:     GASTROINTESTINAL: No anorexia, nausea, vomiting or diarrhea. No abdominal pain.  CONSTITUTIONAL: No fever or chills.     Ventilator/Non-Invasive Ventilation Settings (From admission, onward)     Start     Ordered    05/21/23 0525  NIPPV (CPAP or BIPAP)  Until Discontinued        Question Answer Comment   Indication Acute Respiratory Failure    Type BIPAP    IPAP 18    EPAP 7    Breath Rate 20    Titrate Oxygen for SpO2 90 - 95%        05/21/23 0525                      Physical Exam:     Vital Signs  Temp:  [97.6 °F (36.4 °C)-98.2 °F (36.8 °C)] 97.8 °F (36.6 °C)  Heart Rate:  [] 86  Resp:  [16-20] 20  BP: (121-180)/(55-77) 121/77    Intake/Output Summary (Last 24 hours) at 5/27/2023 1637  Last data filed at 5/27/2023 1337  Gross per 24 hour   Intake 240 ml   Output --   Net 240 ml     Flowsheet Rows    Flowsheet Row First Filed Value   Admission Height 160 cm (63\") Documented at 05/21/2023 0059   Admission Weight 58.1 kg (128 lb) Documented at 05/21/2023 0059          PPE used per hospital policy    General Appearance:   Alert, cooperative, in no acute distress   ENMT:  Mallampati score 3, moist mucous membrane.  Missing teeth.   Eyes:  Pupils equal and reactive to light. EOMI   Neck:    Trachea midline. No thyromegaly.   Lungs:    Equal but diminished air entry throughout anterior auscultation.  No audible crackles or wheezing.  No change    Heart:   Regular rhythm and normal rate, normal S1 and S2, no         murmur   Skin:   No rash or ecchymosis   Abdomen:    Soft. No tenderness. No HSM.   Neuro/psych:  " Conscious, alert, oriented x3. Strength 5/5 in upper and lower  ext.  Appropriate mood and affect   Extremities:  No cyanosis, clubbing or edema.  Warm extremities and well-perfused          Results Review:        Results from last 7 days   Lab Units 05/27/23  0653 05/26/23  0854 05/25/23  0731   SODIUM mmol/L 139 140 138   POTASSIUM mmol/L 4.0 4.0 4.6   CHLORIDE mmol/L 99 98 97*   CO2 mmol/L 33.9* 29.2* 33.0*   BUN mg/dL 31* 30* 28*   CREATININE mg/dL 0.65 0.67 0.62   GLUCOSE mg/dL 124* 141* 124*   CALCIUM mg/dL 8.5* 8.4* 7.9*     Results from last 7 days   Lab Units 05/21/23  0111   HSTROP T ng/L 16*     Results from last 7 days   Lab Units 05/27/23  0653 05/26/23  0854 05/25/23  0731   WBC 10*3/mm3 10.16 10.75 6.92   HEMOGLOBIN g/dL 12.4 12.9 11.3*   HEMATOCRIT % 35.4 37.6 32.9*   PLATELETS 10*3/mm3 280 309 252     Results from last 7 days   Lab Units 05/21/23  0111   INR  0.99   APTT seconds 29.9     Results from last 7 days   Lab Units 05/21/23  0111   PROBNP pg/mL 1,653.0                   Results from last 7 days   Lab Units 05/24/23  1248 05/21/23  0112   PH, ARTERIAL pH units 7.457* 7.377   PCO2, ARTERIAL mm Hg 52.0* 67.1*   PO2 ART mm Hg 91.6 113.8*   MODALITY  BiPap BiPap   O2 SATURATION CALC % 97.3 98.1         I reviewed the patient's new clinical results.        Medication Review:   amLODIPine, 5 mg, Oral, Daily  aspirin, 81 mg, Oral, Daily  atenolol, 50 mg, Oral, Daily  enoxaparin, 30 mg, Subcutaneous, Daily  ipratropium-albuterol, 3 mL, Nebulization, 4x Daily - RT  losartan, 12.5 mg, Oral, Q24H  methylPREDNISolone sodium succinate, 40 mg, Intravenous, Q12H  moxifloxacin, 1 drop, Right Eye, BID  multivitamin with minerals, 1 tablet, Oral, Daily  prednisoLONE acetate, 1 drop, Right Eye, Q6H  rosuvastatin, 5 mg, Oral, Daily  sodium chloride, 1 drop, Right Eye, 4x Daily  sodium chloride, 10 mL, Intravenous, Q12H  sodium chloride, 3 mL, Intravenous, Q12H             Diagnostic imaging:  I personally and  independently reviewed the following images:  CXR 5/24/2023: Emphysema.  No ischemic changes.      Assessment     1. Acute hypoxic and hypercapnic resp failure  2. COPD exacerbation  3. Hyponatremia, resolved  4. Pulmonary hypertension, RVSP 45 on echo 8/31/2018, likely group 3      · HTN  · Recent parainfluenza infection        Plan     · DuoNeb 4 times a day.  · NIPPV at night and as needed during the day.  She would benefit from long-term NIPPV management due to significant hypercarbia and respiratory failure secondary to COPD.  She is going to be discharged to a skilled nursing facility where she could get NIPPV arranged over there.  · Prednisone 40 mg today   · Rocephin stopped  · DVT prophylaxis with Lovenox    .    Felix Muro MD  05/27/23  16:37 EDT            This note was dictated utilizing Dragon dictation

## 2023-05-27 NOTE — PLAN OF CARE
Goal Outcome Evaluation:  Plan of Care Reviewed With: patient           Outcome Evaluation: Pt participated with PT this PM. Pt increased total ambulation distance but does require multiple standing rest breaks due to feeling SOA. She uses a walker and requries cga,does not demo any overt loss of balance.

## 2023-05-28 LAB
ANION GAP SERPL CALCULATED.3IONS-SCNC: 10 MMOL/L (ref 5–15)
BUN SERPL-MCNC: 38 MG/DL (ref 8–23)
BUN/CREAT SERPL: 53.5 (ref 7–25)
CALCIUM SPEC-SCNC: 7.9 MG/DL (ref 8.6–10.5)
CHLORIDE SERPL-SCNC: 100 MMOL/L (ref 98–107)
CO2 SERPL-SCNC: 26 MMOL/L (ref 22–29)
CREAT SERPL-MCNC: 0.71 MG/DL (ref 0.57–1)
DEPRECATED RDW RBC AUTO: 40.9 FL (ref 37–54)
EGFRCR SERPLBLD CKD-EPI 2021: 83.4 ML/MIN/1.73
ERYTHROCYTE [DISTWIDTH] IN BLOOD BY AUTOMATED COUNT: 12.1 % (ref 12.3–15.4)
GLUCOSE SERPL-MCNC: 110 MG/DL (ref 65–99)
HCT VFR BLD AUTO: 35 % (ref 34–46.6)
HGB BLD-MCNC: 11.8 G/DL (ref 12–15.9)
MCH RBC QN AUTO: 31.6 PG (ref 26.6–33)
MCHC RBC AUTO-ENTMCNC: 33.7 G/DL (ref 31.5–35.7)
MCV RBC AUTO: 93.8 FL (ref 79–97)
PLATELET # BLD AUTO: 256 10*3/MM3 (ref 140–450)
PMV BLD AUTO: 9.1 FL (ref 6–12)
POTASSIUM SERPL-SCNC: 4.4 MMOL/L (ref 3.5–5.2)
RBC # BLD AUTO: 3.73 10*6/MM3 (ref 3.77–5.28)
SODIUM SERPL-SCNC: 136 MMOL/L (ref 136–145)
WBC NRBC COR # BLD: 13.29 10*3/MM3 (ref 3.4–10.8)

## 2023-05-28 PROCEDURE — 94761 N-INVAS EAR/PLS OXIMETRY MLT: CPT

## 2023-05-28 PROCEDURE — 94799 UNLISTED PULMONARY SVC/PX: CPT

## 2023-05-28 PROCEDURE — 25010000002 ENOXAPARIN PER 10 MG: Performed by: INTERNAL MEDICINE

## 2023-05-28 PROCEDURE — 80048 BASIC METABOLIC PNL TOTAL CA: CPT | Performed by: INTERNAL MEDICINE

## 2023-05-28 PROCEDURE — 94760 N-INVAS EAR/PLS OXIMETRY 1: CPT

## 2023-05-28 PROCEDURE — 63710000001 PREDNISONE PER 1 MG: Performed by: INTERNAL MEDICINE

## 2023-05-28 PROCEDURE — 94664 DEMO&/EVAL PT USE INHALER: CPT

## 2023-05-28 PROCEDURE — 85027 COMPLETE CBC AUTOMATED: CPT | Performed by: INTERNAL MEDICINE

## 2023-05-28 PROCEDURE — 94660 CPAP INITIATION&MGMT: CPT

## 2023-05-28 RX ORDER — PREDNISONE 20 MG/1
20 TABLET ORAL
Status: DISCONTINUED | OUTPATIENT
Start: 2023-05-29 | End: 2023-05-30 | Stop reason: HOSPADM

## 2023-05-28 RX ADMIN — PREDNISOLONE ACETATE 1 DROP: 10 SUSPENSION/ DROPS OPHTHALMIC at 17:34

## 2023-05-28 RX ADMIN — MULTIPLE VITAMINS W/ MINERALS TAB 1 TABLET: TAB at 09:34

## 2023-05-28 RX ADMIN — PREDNISOLONE ACETATE 1 DROP: 10 SUSPENSION/ DROPS OPHTHALMIC at 09:34

## 2023-05-28 RX ADMIN — MOXIFLOXACIN HYDROCHLORIDE 0.05 ML: 5 SOLUTION/ DROPS OPHTHALMIC at 21:36

## 2023-05-28 RX ADMIN — ENOXAPARIN SODIUM 30 MG: 100 INJECTION SUBCUTANEOUS at 09:34

## 2023-05-28 RX ADMIN — LOSARTAN POTASSIUM 12.5 MG: 25 TABLET, FILM COATED ORAL at 09:33

## 2023-05-28 RX ADMIN — Medication 3 ML: at 21:36

## 2023-05-28 RX ADMIN — MOXIFLOXACIN HYDROCHLORIDE 0.05 ML: 5 SOLUTION/ DROPS OPHTHALMIC at 09:34

## 2023-05-28 RX ADMIN — ATENOLOL 50 MG: 50 TABLET ORAL at 09:33

## 2023-05-28 RX ADMIN — IPRATROPIUM BROMIDE AND ALBUTEROL SULFATE 3 ML: 2.5; .5 SOLUTION RESPIRATORY (INHALATION) at 20:06

## 2023-05-28 RX ADMIN — PREDNISONE 40 MG: 20 TABLET ORAL at 09:33

## 2023-05-28 RX ADMIN — SODIUM CHLORIDE 1 DROP: 50 SOLUTION OPHTHALMIC at 09:34

## 2023-05-28 RX ADMIN — SODIUM CHLORIDE 1 DROP: 50 SOLUTION OPHTHALMIC at 17:34

## 2023-05-28 RX ADMIN — IPRATROPIUM BROMIDE AND ALBUTEROL SULFATE 3 ML: 2.5; .5 SOLUTION RESPIRATORY (INHALATION) at 14:54

## 2023-05-28 RX ADMIN — ROSUVASTATIN CALCIUM 5 MG: 5 TABLET, FILM COATED ORAL at 09:33

## 2023-05-28 RX ADMIN — Medication 10 ML: at 09:35

## 2023-05-28 RX ADMIN — SODIUM CHLORIDE 1 DROP: 50 SOLUTION OPHTHALMIC at 21:36

## 2023-05-28 RX ADMIN — Medication 10 ML: at 21:36

## 2023-05-28 RX ADMIN — ASPIRIN 81 MG: 81 TABLET, COATED ORAL at 09:33

## 2023-05-28 RX ADMIN — PREDNISOLONE ACETATE 1 DROP: 10 SUSPENSION/ DROPS OPHTHALMIC at 14:00

## 2023-05-28 RX ADMIN — IPRATROPIUM BROMIDE AND ALBUTEROL SULFATE 3 ML: 2.5; .5 SOLUTION RESPIRATORY (INHALATION) at 08:25

## 2023-05-28 RX ADMIN — IPRATROPIUM BROMIDE AND ALBUTEROL SULFATE 3 ML: 2.5; .5 SOLUTION RESPIRATORY (INHALATION) at 11:15

## 2023-05-28 RX ADMIN — AMLODIPINE BESYLATE 5 MG: 5 TABLET ORAL at 09:33

## 2023-05-28 RX ADMIN — Medication 3 ML: at 09:36

## 2023-05-28 RX ADMIN — PREDNISOLONE ACETATE 1 DROP: 10 SUSPENSION/ DROPS OPHTHALMIC at 21:36

## 2023-05-28 RX ADMIN — SODIUM CHLORIDE 1 DROP: 50 SOLUTION OPHTHALMIC at 14:00

## 2023-05-28 NOTE — PROGRESS NOTES
"                                              LOS: 7 days   Patient Care Team:  Deric Mendosa MD as PCP - General (Family Medicine)  Jennifer Porras, SHAVON as Ambulatory  ([facility])    Chief Complaint:  F/up respiratory failure, encephalopathy and COPD    Subjective   Interval History  .    Used NIPPV overnight.    No cough.  On RA.        REVIEW OF SYSTEMS:     GASTROINTESTINAL: No anorexia, nausea, vomiting or diarrhea. No abdominal pain.  CONSTITUTIONAL: No fever or chills.     Ventilator/Non-Invasive Ventilation Settings (From admission, onward)     Start     Ordered    05/21/23 0525  NIPPV (CPAP or BIPAP)  Until Discontinued        Question Answer Comment   Indication Acute Respiratory Failure    Type BIPAP    IPAP 18    EPAP 7    Breath Rate 20    Titrate Oxygen for SpO2 90 - 95%        05/21/23 0525                      Physical Exam:     Vital Signs  Temp:  [97.3 °F (36.3 °C)-98.1 °F (36.7 °C)] 98.1 °F (36.7 °C)  Heart Rate:  [64-95] 95  Resp:  [16-21] 20  BP: (125-174)/(48-66) 143/53    Intake/Output Summary (Last 24 hours) at 5/28/2023 1557  Last data filed at 5/28/2023 1400  Gross per 24 hour   Intake 470 ml   Output --   Net 470 ml     Flowsheet Rows    Flowsheet Row First Filed Value   Admission Height 160 cm (63\") Documented at 05/21/2023 0059   Admission Weight 58.1 kg (128 lb) Documented at 05/21/2023 0059          PPE used per hospital policy    General Appearance:   Alert, cooperative, in no acute distress   ENMT:  Mallampati score 3, moist mucous membrane.  Missing teeth.   Eyes:  Pupils equal and reactive to light. EOMI   Neck:    Trachea midline. No thyromegaly.   Lungs:    Equal but diminished air entry throughout anterior auscultation.  No audible crackles or wheezing.  No change    Heart:   Regular rhythm and normal rate, mild murmur over the left sternal border, systolic   Skin:   No rash or ecchymosis   Abdomen:    Soft. No tenderness. No HSM.   Neuro/psych:  Conscious, " alert, oriented x3. Strength 5/5 in upper and lower  ext.  Appropriate mood and affect   Extremities:  No cyanosis, clubbing or edema.  Warm extremities and well-perfused          Results Review:        Results from last 7 days   Lab Units 05/28/23 0449 05/27/23  0653 05/26/23  0854   SODIUM mmol/L 136 139 140   POTASSIUM mmol/L 4.4 4.0 4.0   CHLORIDE mmol/L 100 99 98   CO2 mmol/L 26.0 33.9* 29.2*   BUN mg/dL 38* 31* 30*   CREATININE mg/dL 0.71 0.65 0.67   GLUCOSE mg/dL 110* 124* 141*   CALCIUM mg/dL 7.9* 8.5* 8.4*         Results from last 7 days   Lab Units 05/28/23 0449 05/27/23  0653 05/26/23  0854   WBC 10*3/mm3 13.29* 10.16 10.75   HEMOGLOBIN g/dL 11.8* 12.4 12.9   HEMATOCRIT % 35.0 35.4 37.6   PLATELETS 10*3/mm3 256 280 309                           Results from last 7 days   Lab Units 05/24/23  1248   PH, ARTERIAL pH units 7.457*   PCO2, ARTERIAL mm Hg 52.0*   PO2 ART mm Hg 91.6   MODALITY  BiPap   O2 SATURATION CALC % 97.3         I reviewed the patient's new clinical results.        Medication Review:   amLODIPine, 5 mg, Oral, Daily  aspirin, 81 mg, Oral, Daily  atenolol, 50 mg, Oral, Daily  enoxaparin, 30 mg, Subcutaneous, Daily  ipratropium-albuterol, 3 mL, Nebulization, 4x Daily - RT  losartan, 12.5 mg, Oral, Q24H  moxifloxacin, 1 drop, Right Eye, BID  multivitamin with minerals, 1 tablet, Oral, Daily  prednisoLONE acetate, 1 drop, Right Eye, Q6H  predniSONE, 40 mg, Oral, Daily With Breakfast  rosuvastatin, 5 mg, Oral, Daily  sodium chloride, 1 drop, Right Eye, 4x Daily  sodium chloride, 10 mL, Intravenous, Q12H  sodium chloride, 3 mL, Intravenous, Q12H             Diagnostic imaging:  I personally and independently reviewed the following images:  CXR 5/24/2023: Emphysema.  No ischemic changes.      Assessment     1. Acute hypoxic and hypercapnic resp failure  2. COPD exacerbation  3. Hyponatremia, resolved  4. Pulmonary hypertension, RVSP 45 on echo 8/31/2018, likely group 3      · HTN  · Recent  parainfluenza infection        Plan     · DuoNeb 4 times a day.  · NIPPV at night and as needed during the day.  She would benefit from long-term NIPPV management due to significant hypercarbia and respiratory failure secondary to COPD.  She is going to be discharged to a skilled nursing facility where she could get NIPPV arranged over there.  · Change Prednisone to 20 mg daily  · Rocephin stopped  · DVT prophylaxis with Lovenox 30 mg subcu daily    .    Felix Muro MD  05/28/23  15:57 EDT            This note was dictated utilizing Dragon dictation

## 2023-05-28 NOTE — PROGRESS NOTES
[facility] Medicine Services  PROGRESS NOTE    Patient Name: Lizzy Hicks  : 1935  MRN: [id number]    Date of Admission: 2023  Primary Care Physician: Deric Mendosa MD    Subjective   Subjective     CC:  Follow-up hypoxia    Subjective:   Patient tolerating BiPAP.    Breathing better  No complaints  gen weakness   Hopeful to get to rehab soon  On steroids      Review of Systems  No current fevers or chills  No current nausea, vomiting, or diarrhea  No current chest pain or palpitations      Objective   Objective     Vital Signs:   Temp:  [97.3 °F (36.3 °C)-97.8 °F (36.6 °C)] 97.3 °F (36.3 °C)  Heart Rate:  [64-93] 74  Resp:  [16-21] 20  BP: (121-174)/(48-77) 174/62        Physical Exam:  Constitutional:Awake, alert  HENT: NCAT, mucous membranes moist, neck supple  Respiratory:fairly clear.   Cardiovascular: Pulse rate is normal, palpable radial pulses bilaterally  Gastrointestinal: Positive bowel sounds, soft, nontender, nondistended  Musculoskeletal: Elderly frail and chronically debilitated in appearance, BMI is 22, mild lower extremity edema  Psychiatric: Anxious affect, cooperative  Neurologic: follows commands  Skin: No rashes or jaundice, warm  chronically ill 88yo, more alert today sitting up in bed/chair      Results Reviewed:  Results from last 7 days   Lab Units 23  0653 23  0854   WBC 10*3/mm3 13.29* 10.16 10.75   HEMOGLOBIN g/dL 11.8* 12.4 12.9   HEMATOCRIT % 35.0 35.4 37.6   PLATELETS 10*3/mm3 256 280 309     Results from last 7 days   Lab Units 23  0653 23  0854   SODIUM mmol/L 136 139 140   POTASSIUM mmol/L 4.4 4.0 4.0   CHLORIDE mmol/L 100 99 98   CO2 mmol/L 26.0 33.9* 29.2*   BUN mg/dL 38* 31* 30*   CREATININE mg/dL 0.71 0.65 0.67   GLUCOSE mg/dL 110* 124* 141*   CALCIUM mg/dL 7.9* 8.5* 8.4*     Estimated Creatinine Clearance: 53.8 mL/min (by C-G formula based on SCr of 0.71 mg/dL).    Microbiology Results Abnormal      Procedure Component Value - Date/Time    Blood Culture - Blood, Hand, Right [[id number]]  (Normal) Collected: 05/21/23 0742    Lab Status: Final result Specimen: Blood from Hand, Right Updated: 05/26/23 0815     Blood Culture No growth at 5 days    Narrative:      Less than seven (7) mL's of blood was collected.  Insufficient quantity may yield false negative results.    Blood Culture - Blood, Arm, Left [[id number]]  (Normal) Collected: 05/21/23 0735    Lab Status: Final result Specimen: Blood from Arm, Left Updated: 05/26/23 0815     Blood Culture No growth at 5 days    COVID PRE-OP / PRE-PROCEDURE SCREENING ORDER (NO ISOLATION) - Swab, Nasopharynx [[id number]]  (Normal) Collected: 05/24/23 0013    Lab Status: Final result Specimen: Swab from Nasopharynx Updated: 05/24/23 0103    Narrative:      The following orders were created for panel order COVID PRE-OP / PRE-PROCEDURE SCREENING ORDER (NO ISOLATION) - Swab, Nasopharynx.  Procedure                               Abnormality         Status                     ---------                               -----------         ------                     COVID-19,[facility]...[[id number]]  Normal              Final result                 Please view results for these tests on the individual orders.    COVID-19,[facility] CEPHEID/FERNIE NP SWAB IN TRANSPORT MEDIA 8-12 HR TAT - Swab, Nasopharynx [[id number]]  (Normal) Collected: 05/24/23 0013    Lab Status: Final result Specimen: Swab from Nasopharynx Updated: 05/24/23 0103     COVID19 Not Detected    Narrative:      Fact sheet for providers: https://www.fda.gov/media/585605/download     Fact sheet for patients: https://www.fda.gov/media/959985/download    Respiratory Panel PCR w/COVID-19(SARS-CoV-2) [facility]/JAIMEE/JOI/PAD/COR/MAD/KARLEE In-House, NP Swab in UTM/VTM, 3-4 HR TAT - Swab, Nasopharynx [[id number]]  (Normal) Collected: 05/21/23 0109    Lab Status: Final result Specimen: Swab from Nasopharynx Updated: 05/21/23 0517      ADENOVIRUS, PCR Not Detected     Coronavirus 229E Not Detected     Coronavirus HKU1 Not Detected     Coronavirus NL63 Not Detected     Coronavirus OC43 Not Detected     COVID19 Not Detected     Human Metapneumovirus Not Detected     Human Rhinovirus/Enterovirus Not Detected     Influenza A PCR Not Detected     Influenza B PCR Not Detected     Parainfluenza Virus 1 Not Detected     Parainfluenza Virus 2 Not Detected     Parainfluenza Virus 3 Not Detected     Parainfluenza Virus 4 Not Detected     RSV, PCR Not Detected     Bordetella pertussis pcr Not Detected     Bordetella parapertussis PCR Not Detected     Chlamydophila pneumoniae PCR Not Detected     Mycoplasma pneumo by PCR Not Detected    Narrative:      In the setting of a positive respiratory panel with a viral infection PLUS a negative procalcitonin without other underlying concern for bacterial infection, consider observing off antibiotics or discontinuation of antibiotics and continue supportive care. If the respiratory panel is positive for atypical bacterial infection (Bordetella pertussis, Chlamydophila pneumoniae, or Mycoplasma pneumoniae), consider antibiotic de-escalation to target atypical bacterial infection.          Imaging Results (Last 24 Hours)     ** No results found for the last 24 hours. **          Results for orders placed during the hospital encounter of 08/31/18    Adult Transthoracic Echo Complete W/ Cont if Necessary Per Protocol    Interpretation Summary  · Calculated right ventricular systolic pressure from tricuspid regurgitation is 45 mmHg.  · Left ventricular systolic function is normal. Estimated EF = 63%.      I have reviewed the medications:  Scheduled Meds:amLODIPine, 5 mg, Oral, Daily  aspirin, 81 mg, Oral, Daily  atenolol, 50 mg, Oral, Daily  enoxaparin, 30 mg, Subcutaneous, Daily  ipratropium-albuterol, 3 mL, Nebulization, 4x Daily - RT  losartan, 12.5 mg, Oral, Q24H  moxifloxacin, 1 drop, Right Eye, BID  multivitamin with  minerals, 1 tablet, Oral, Daily  prednisoLONE acetate, 1 drop, Right Eye, Q6H  predniSONE, 40 mg, Oral, Daily With Breakfast  rosuvastatin, 5 mg, Oral, Daily  sodium chloride, 1 drop, Right Eye, 4x Daily  sodium chloride, 10 mL, Intravenous, Q12H  sodium chloride, 3 mL, Intravenous, Q12H      Continuous Infusions:   PRN Meds:.•  acetaminophen **OR** acetaminophen **OR** acetaminophen  •  calcium carbonate  •  docusate sodium  •  famotidine  •  hydrALAZINE  •  ipratropium-albuterol  •  melatonin  •  nitroglycerin  •  ondansetron **OR** ondansetron  •  ondansetron **OR** ondansetron  •  [COMPLETED] Insert Peripheral IV **AND** sodium chloride  •  sodium chloride  •  sodium chloride  •  sodium chloride  •  sodium chloride    Assessment & Plan   Assessment & Plan     Active Hospital Problems    Diagnosis  POA   • **Acute respiratory failure with hypoxia and hypercapnia [J96.01, J96.02]  Yes   • Oropharyngeal dysphagia [R13.12]  Yes   • Mixed hyperlipidemia [E78.2]  Yes   • Possible pneumonia of right lower lobe due to infectious organism [J18.9]  Yes   • Acute exacerbation of chronic obstructive pulmonary disease (COPD) [J44.1]  Yes   • Carotid artery disease [I77.9]  Yes   • Hypertension [I10]  Yes   • Osteoarthritis [M19.90]  Yes      Resolved Hospital Problems   No resolved problems to display.        Brief Hospital Course to date:  Lizzy Hicks is a 87 y.o. female presents to the hospital with acute hypoxic and hypercapnic respiratory failure following recent parainfluenza infection and has concern for possible postviral bacterial right lower lobe pneumonia.       Respiratory failure with recurrent metabolic encephalopathy due to hypercapnia   transferred out of ICU.  Patient tolerating BiPAP with new mask.  She will need to continue with this mask.  Pulmonology is arranging for BiPAP at SNF.  Patient will need to have BiPAP or trilogy machine indefinitely for her recurrent hypercapnia.      Possible pneumonia  and COPD exacerbation  Finished ceftriaxone  On steroids    Essential hypertension   Continue blood pressure medications.  Monitor blood pressure and adjust as needed.  Previous echocardiogram from 2018 normal EF of 63% but slightly elevated RVSP.     Continue statin for hyperlipidemia     Tylenol as needed for arthritis.     Continue chronic eyedrops.     She understands that due to her advanced age and comorbid conditions she is at higher risk for morbidity and mortality.  Long-term prognosis guarded    DVT prophylaxis: Lovenox    Disposition: Possible transfer to SNF in 2 days. Won't have bipap available there until Tuesday it seems like. Continue tx while in hospital     CODE STATUS:   Code Status and Medical Interventions:   Ordered at: 05/24/23 1121     Medical Intervention Limits:    Other     Level Of Support Discussed With:    Patient    Next of Kin (If No Surrogate)     Code Status (Patient has no pulse and is not breathing):    No CPR (Do Not Attempt to Resuscitate)     Medical Interventions (Patient has pulse or is breathing):    Limited Support     Comments:    ok to intubate. stanislaw brown at [facility] for this conversation     Additional Medical Interventions Limits:    .     Release to patient:    Routine Release       Kyle Galarza MD  05/28/23

## 2023-05-28 NOTE — PLAN OF CARE
Goal Outcome Evaluation:  Plan of Care Reviewed With: patient        Progress: no change     A&Ox4. Forgetful at times. RA. SR on monitor. Assist x1 to BSC. Labs monitored. Eye drops given. Patient independence encouraged. VSS. All needs met. Awaiting trilogy for discharge to SNF.

## 2023-05-28 NOTE — PLAN OF CARE
Goal Outcome Evaluation:  Plan of Care Reviewed With: patient        Progress: no change  Outcome Evaluation: Pt on BIPAP during sleep. No complaints this shift. Rested well. IV steroid given as ordered. VSS. Will continue to monitor.

## 2023-05-29 LAB
ANION GAP SERPL CALCULATED.3IONS-SCNC: 7.6 MMOL/L (ref 5–15)
BUN SERPL-MCNC: 33 MG/DL (ref 8–23)
BUN/CREAT SERPL: 45.2 (ref 7–25)
CALCIUM SPEC-SCNC: 8.5 MG/DL (ref 8.6–10.5)
CHLORIDE SERPL-SCNC: 101 MMOL/L (ref 98–107)
CO2 SERPL-SCNC: 31.4 MMOL/L (ref 22–29)
CREAT SERPL-MCNC: 0.73 MG/DL (ref 0.57–1)
DEPRECATED RDW RBC AUTO: 39.7 FL (ref 37–54)
EGFRCR SERPLBLD CKD-EPI 2021: 80.7 ML/MIN/1.73
ERYTHROCYTE [DISTWIDTH] IN BLOOD BY AUTOMATED COUNT: 12 % (ref 12.3–15.4)
GLUCOSE SERPL-MCNC: 92 MG/DL (ref 65–99)
HCT VFR BLD AUTO: 35.7 % (ref 34–46.6)
HGB BLD-MCNC: 12.4 G/DL (ref 12–15.9)
MCH RBC QN AUTO: 31.6 PG (ref 26.6–33)
MCHC RBC AUTO-ENTMCNC: 34.7 G/DL (ref 31.5–35.7)
MCV RBC AUTO: 90.8 FL (ref 79–97)
PLATELET # BLD AUTO: 283 10*3/MM3 (ref 140–450)
PMV BLD AUTO: 9.2 FL (ref 6–12)
POTASSIUM SERPL-SCNC: 4 MMOL/L (ref 3.5–5.2)
RBC # BLD AUTO: 3.93 10*6/MM3 (ref 3.77–5.28)
SODIUM SERPL-SCNC: 140 MMOL/L (ref 136–145)
WBC NRBC COR # BLD: 14.15 10*3/MM3 (ref 3.4–10.8)

## 2023-05-29 PROCEDURE — 63710000001 PREDNISONE PER 1 MG: Performed by: INTERNAL MEDICINE

## 2023-05-29 PROCEDURE — 94799 UNLISTED PULMONARY SVC/PX: CPT

## 2023-05-29 PROCEDURE — 94664 DEMO&/EVAL PT USE INHALER: CPT

## 2023-05-29 PROCEDURE — 85027 COMPLETE CBC AUTOMATED: CPT | Performed by: INTERNAL MEDICINE

## 2023-05-29 PROCEDURE — 80048 BASIC METABOLIC PNL TOTAL CA: CPT | Performed by: INTERNAL MEDICINE

## 2023-05-29 PROCEDURE — 94761 N-INVAS EAR/PLS OXIMETRY MLT: CPT

## 2023-05-29 PROCEDURE — 94660 CPAP INITIATION&MGMT: CPT

## 2023-05-29 PROCEDURE — 25010000002 ENOXAPARIN PER 10 MG: Performed by: INTERNAL MEDICINE

## 2023-05-29 RX ADMIN — PREDNISONE 20 MG: 20 TABLET ORAL at 08:51

## 2023-05-29 RX ADMIN — ENOXAPARIN SODIUM 30 MG: 100 INJECTION SUBCUTANEOUS at 08:51

## 2023-05-29 RX ADMIN — ATENOLOL 50 MG: 50 TABLET ORAL at 08:51

## 2023-05-29 RX ADMIN — PREDNISOLONE ACETATE 1 DROP: 10 SUSPENSION/ DROPS OPHTHALMIC at 20:21

## 2023-05-29 RX ADMIN — ROSUVASTATIN CALCIUM 5 MG: 5 TABLET, FILM COATED ORAL at 08:51

## 2023-05-29 RX ADMIN — SODIUM CHLORIDE 1 DROP: 50 SOLUTION OPHTHALMIC at 12:22

## 2023-05-29 RX ADMIN — SODIUM CHLORIDE 1 DROP: 50 SOLUTION OPHTHALMIC at 20:21

## 2023-05-29 RX ADMIN — SODIUM CHLORIDE 1 DROP: 50 SOLUTION OPHTHALMIC at 08:53

## 2023-05-29 RX ADMIN — IPRATROPIUM BROMIDE AND ALBUTEROL SULFATE 3 ML: 2.5; .5 SOLUTION RESPIRATORY (INHALATION) at 15:31

## 2023-05-29 RX ADMIN — SODIUM CHLORIDE 1 DROP: 50 SOLUTION OPHTHALMIC at 16:41

## 2023-05-29 RX ADMIN — IPRATROPIUM BROMIDE AND ALBUTEROL SULFATE 3 ML: 2.5; .5 SOLUTION RESPIRATORY (INHALATION) at 11:33

## 2023-05-29 RX ADMIN — MOXIFLOXACIN HYDROCHLORIDE 0.05 ML: 5 SOLUTION/ DROPS OPHTHALMIC at 20:21

## 2023-05-29 RX ADMIN — LOSARTAN POTASSIUM 12.5 MG: 25 TABLET, FILM COATED ORAL at 08:51

## 2023-05-29 RX ADMIN — IPRATROPIUM BROMIDE AND ALBUTEROL SULFATE 3 ML: 2.5; .5 SOLUTION RESPIRATORY (INHALATION) at 20:43

## 2023-05-29 RX ADMIN — PREDNISOLONE ACETATE 1 DROP: 10 SUSPENSION/ DROPS OPHTHALMIC at 16:31

## 2023-05-29 RX ADMIN — Medication 3 ML: at 20:21

## 2023-05-29 RX ADMIN — Medication 10 ML: at 20:22

## 2023-05-29 RX ADMIN — IPRATROPIUM BROMIDE AND ALBUTEROL SULFATE 3 ML: 2.5; .5 SOLUTION RESPIRATORY (INHALATION) at 08:03

## 2023-05-29 RX ADMIN — PREDNISOLONE ACETATE 1 DROP: 10 SUSPENSION/ DROPS OPHTHALMIC at 12:30

## 2023-05-29 RX ADMIN — Medication 3 ML: at 08:52

## 2023-05-29 RX ADMIN — Medication 10 ML: at 08:52

## 2023-05-29 RX ADMIN — MULTIPLE VITAMINS W/ MINERALS TAB 1 TABLET: TAB at 08:51

## 2023-05-29 RX ADMIN — MOXIFLOXACIN HYDROCHLORIDE 0.05 ML: 5 SOLUTION/ DROPS OPHTHALMIC at 08:53

## 2023-05-29 RX ADMIN — ASPIRIN 81 MG: 81 TABLET, COATED ORAL at 08:51

## 2023-05-29 RX ADMIN — AMLODIPINE BESYLATE 5 MG: 5 TABLET ORAL at 08:51

## 2023-05-29 RX ADMIN — PREDNISOLONE ACETATE 1 DROP: 10 SUSPENSION/ DROPS OPHTHALMIC at 08:53

## 2023-05-29 NOTE — PROGRESS NOTES
"                                              LOS: 8 days   Patient Care Team:  Deric Mendosa MD as PCP - General (Family Medicine)  Jennifer Porras, SHAVON as Ambulatory  ([facility])    Chief Complaint:  F/up respiratory failure, encephalopathy and COPD    Subjective   Interval History  .    Used NIPPV overnight.    Minimal nonproductive cough occasionally..  On RA.        REVIEW OF SYSTEMS:     GASTROINTESTINAL: No anorexia, nausea, vomiting or diarrhea. No abdominal pain.  CONSTITUTIONAL: No fever or chills.     Ventilator/Non-Invasive Ventilation Settings (From admission, onward)     Start     Ordered    05/21/23 0525  NIPPV (CPAP or BIPAP)  Until Discontinued        Question Answer Comment   Indication Acute Respiratory Failure    Type BIPAP    IPAP 18    EPAP 7    Breath Rate 20    Titrate Oxygen for SpO2 90 - 95%        05/21/23 0525                      Physical Exam:     Vital Signs  Temp:  [97.2 °F (36.2 °C)-98.2 °F (36.8 °C)] 98.2 °F (36.8 °C)  Heart Rate:  [79-95] 79  Resp:  [16-27] 16  BP: (126-164)/(56-70) 138/56    Intake/Output Summary (Last 24 hours) at 5/29/2023 1401  Last data filed at 5/29/2023 0547  Gross per 24 hour   Intake 0 ml   Output --   Net 0 ml     Flowsheet Rows    Flowsheet Row First Filed Value   Admission Height 160 cm (63\") Documented at 05/21/2023 0059   Admission Weight 58.1 kg (128 lb) Documented at 05/21/2023 0059          PPE used per hospital policy    General Appearance:   Alert, cooperative, in no acute distress   ENMT:  Mallampati score 3, moist mucous membrane.  Missing teeth.   Eyes:  Pupils equal and reactive to light. EOMI   Neck:    Trachea midline. No thyromegaly.   Lungs:    Equal but diminished air entry throughout anterior auscultation.  No audible crackles or wheezing.  No change    Heart:   Regular rhythm and normal rate, mild murmur over the left sternal border, systolic   Skin:   No rash or ecchymosis   Abdomen:    Soft. No tenderness. No HSM. "   Neuro/psych:  Conscious, alert, oriented x3. Strength 5/5 in upper and lower  ext.  Appropriate mood and affect   Extremities:  No cyanosis, clubbing or edema.  Warm extremities and well-perfused          Results Review:        Results from last 7 days   Lab Units 05/29/23  0613 05/28/23 0449 05/27/23  0653   SODIUM mmol/L 140 136 139   POTASSIUM mmol/L 4.0 4.4 4.0   CHLORIDE mmol/L 101 100 99   CO2 mmol/L 31.4* 26.0 33.9*   BUN mg/dL 33* 38* 31*   CREATININE mg/dL 0.73 0.71 0.65   GLUCOSE mg/dL 92 110* 124*   CALCIUM mg/dL 8.5* 7.9* 8.5*         Results from last 7 days   Lab Units 05/29/23  0613 05/28/23 0449 05/27/23  0653   WBC 10*3/mm3 14.15* 13.29* 10.16   HEMOGLOBIN g/dL 12.4 11.8* 12.4   HEMATOCRIT % 35.7 35.0 35.4   PLATELETS 10*3/mm3 283 256 280                           Results from last 7 days   Lab Units 05/24/23  1248   PH, ARTERIAL pH units 7.457*   PCO2, ARTERIAL mm Hg 52.0*   PO2 ART mm Hg 91.6   MODALITY  BiPap   O2 SATURATION CALC % 97.3         I reviewed the patient's new clinical results.        Medication Review:   amLODIPine, 5 mg, Oral, Daily  aspirin, 81 mg, Oral, Daily  atenolol, 50 mg, Oral, Daily  enoxaparin, 30 mg, Subcutaneous, Daily  ipratropium-albuterol, 3 mL, Nebulization, 4x Daily - RT  losartan, 12.5 mg, Oral, Q24H  moxifloxacin, 1 drop, Right Eye, BID  multivitamin with minerals, 1 tablet, Oral, Daily  prednisoLONE acetate, 1 drop, Right Eye, Q6H  predniSONE, 20 mg, Oral, Daily With Breakfast  rosuvastatin, 5 mg, Oral, Daily  sodium chloride, 1 drop, Right Eye, 4x Daily  sodium chloride, 10 mL, Intravenous, Q12H  sodium chloride, 3 mL, Intravenous, Q12H            Assessment     1. Acute hypoxic and hypercapnic resp failure  2. COPD exacerbation  3. Hyponatremia, resolved  4. Pulmonary hypertension, RVSP 45 on echo 8/31/2018, likely group 3      · HTN  · Recent parainfluenza infection        Plan     · DuoNeb 4 times a day.  · NIPPV at night and as needed during the day.   She would benefit from long-term NIPPV management due to significant hypercarbia and respiratory failure secondary to COPD.    · She has prednisone to 10 mg daily for 2 days then stop  · Rocephin stopped  · DVT prophylaxis with Lovenox 30 mg subcu daily    .    Felix Muro MD  05/29/23  14:01 EDT            This note was dictated utilizing Dragon dictation

## 2023-05-29 NOTE — PLAN OF CARE
Goal Outcome Evaluation:      A/O x4, room air, NSR. No complaints of pain. Meds crushed in applesauce. Possible discharge tomorrow.

## 2023-05-29 NOTE — PROGRESS NOTES
[facility] Medicine Services  PROGRESS NOTE    Patient Name: Lizzy Hicks  : 1935  MRN: [id number]    Date of Admission: 2023  Primary Care Physician: Deric Mendosa MD    Subjective   Subjective     CC:  Follow-up hypoxia    Subjective:   Patient tolerating BiPAP at night  Breathing better  No complaints  gen weakness   Hopeful to get to rehab soon- trying to get stronger while here  On steroids      Review of Systems  No current fevers or chills  No current nausea, vomiting, or diarrhea  No current chest pain or palpitations      Objective   Objective     Vital Signs:   Temp:  [97.2 °F (36.2 °C)-98.1 °F (36.7 °C)] 97.5 °F (36.4 °C)  Heart Rate:  [79-95] 79  Resp:  [16-27] 16  BP: (126-164)/(53-70) 142/60        Physical Exam:  Constitutional:Awake, alert  HENT: NCAT, mucous membranes moist, neck supple  Respiratory:fairly clear.   Cardiovascular: Pulse rate is normal, palpable radial pulses bilaterally  Gastrointestinal: Positive bowel sounds, soft, nontender, nondistended  Musculoskeletal: Elderly frail and chronically debilitated in appearance, BMI is 22, mild lower extremity edema  Psychiatric: Anxious affect, cooperative  Neurologic: follows commands  Skin: No rashes or jaundice, warm  chronically ill 86yo, more alert today sitting up in bed/chair    Similar exam to     Results Reviewed:  Results from last 7 days   Lab Units 23  0613 23  0653   WBC 10*3/mm3 14.15* 13.29* 10.16   HEMOGLOBIN g/dL 12.4 11.8* 12.4   HEMATOCRIT % 35.7 35.0 35.4   PLATELETS 10*3/mm3 283 256 280     Results from last 7 days   Lab Units 23  0613 23  04423  0653   SODIUM mmol/L 140 136 139   POTASSIUM mmol/L 4.0 4.4 4.0   CHLORIDE mmol/L 101 100 99   CO2 mmol/L 31.4* 26.0 33.9*   BUN mg/dL 33* 38* 31*   CREATININE mg/dL 0.73 0.71 0.65   GLUCOSE mg/dL 92 110* 124*   CALCIUM mg/dL 8.5* 7.9* 8.5*     Estimated Creatinine Clearance: 52.3 mL/min (by C-G formula based  on SCr of 0.73 mg/dL).    Microbiology Results Abnormal     Procedure Component Value - Date/Time    Blood Culture - Blood, Hand, Right [[id number]]  (Normal) Collected: 05/21/23 0742    Lab Status: Final result Specimen: Blood from Hand, Right Updated: 05/26/23 0815     Blood Culture No growth at 5 days    Narrative:      Less than seven (7) mL's of blood was collected.  Insufficient quantity may yield false negative results.    Blood Culture - Blood, Arm, Left [[id number]]  (Normal) Collected: 05/21/23 0735    Lab Status: Final result Specimen: Blood from Arm, Left Updated: 05/26/23 0815     Blood Culture No growth at 5 days    COVID PRE-OP / PRE-PROCEDURE SCREENING ORDER (NO ISOLATION) - Swab, Nasopharynx [[id number]]  (Normal) Collected: 05/24/23 0013    Lab Status: Final result Specimen: Swab from Nasopharynx Updated: 05/24/23 0103    Narrative:      The following orders were created for panel order COVID PRE-OP / PRE-PROCEDURE SCREENING ORDER (NO ISOLATION) - Swab, Nasopharynx.  Procedure                               Abnormality         Status                     ---------                               -----------         ------                     COVID-19,[facility]...[[id number]]  Normal              Final result                 Please view results for these tests on the individual orders.    COVID-19,[facility] CEPHEID/FERNIE NP SWAB IN TRANSPORT MEDIA 8-12 HR TAT - Swab, Nasopharynx [[id number]]  (Normal) Collected: 05/24/23 0013    Lab Status: Final result Specimen: Swab from Nasopharynx Updated: 05/24/23 0103     COVID19 Not Detected    Narrative:      Fact sheet for providers: https://www.fda.gov/media/306311/download     Fact sheet for patients: https://www.fda.gov/media/971600/download    Respiratory Panel PCR w/COVID-19(SARS-CoV-2) [facility]/JAIMEE/JOI/PAD/COR/MAD/KARLEE In-House, NP Swab in UTM/VTM, 3-4 HR TAT - Swab, Nasopharynx [[id number]]  (Normal) Collected: 05/21/23 0109    Lab Status: Final result  Specimen: Swab from Nasopharynx Updated: 05/21/23 0517     ADENOVIRUS, PCR Not Detected     Coronavirus 229E Not Detected     Coronavirus HKU1 Not Detected     Coronavirus NL63 Not Detected     Coronavirus OC43 Not Detected     COVID19 Not Detected     Human Metapneumovirus Not Detected     Human Rhinovirus/Enterovirus Not Detected     Influenza A PCR Not Detected     Influenza B PCR Not Detected     Parainfluenza Virus 1 Not Detected     Parainfluenza Virus 2 Not Detected     Parainfluenza Virus 3 Not Detected     Parainfluenza Virus 4 Not Detected     RSV, PCR Not Detected     Bordetella pertussis pcr Not Detected     Bordetella parapertussis PCR Not Detected     Chlamydophila pneumoniae PCR Not Detected     Mycoplasma pneumo by PCR Not Detected    Narrative:      In the setting of a positive respiratory panel with a viral infection PLUS a negative procalcitonin without other underlying concern for bacterial infection, consider observing off antibiotics or discontinuation of antibiotics and continue supportive care. If the respiratory panel is positive for atypical bacterial infection (Bordetella pertussis, Chlamydophila pneumoniae, or Mycoplasma pneumoniae), consider antibiotic de-escalation to target atypical bacterial infection.          Imaging Results (Last 24 Hours)     ** No results found for the last 24 hours. **          Results for orders placed during the hospital encounter of 08/31/18    Adult Transthoracic Echo Complete W/ Cont if Necessary Per Protocol    Interpretation Summary  · Calculated right ventricular systolic pressure from tricuspid regurgitation is 45 mmHg.  · Left ventricular systolic function is normal. Estimated EF = 63%.      I have reviewed the medications:  Scheduled Meds:amLODIPine, 5 mg, Oral, Daily  aspirin, 81 mg, Oral, Daily  atenolol, 50 mg, Oral, Daily  enoxaparin, 30 mg, Subcutaneous, Daily  ipratropium-albuterol, 3 mL, Nebulization, 4x Daily - RT  losartan, 12.5 mg, Oral,  Q24H  moxifloxacin, 1 drop, Right Eye, BID  multivitamin with minerals, 1 tablet, Oral, Daily  prednisoLONE acetate, 1 drop, Right Eye, Q6H  predniSONE, 20 mg, Oral, Daily With Breakfast  rosuvastatin, 5 mg, Oral, Daily  sodium chloride, 1 drop, Right Eye, 4x Daily  sodium chloride, 10 mL, Intravenous, Q12H  sodium chloride, 3 mL, Intravenous, Q12H      Continuous Infusions:   PRN Meds:.•  acetaminophen **OR** acetaminophen **OR** acetaminophen  •  calcium carbonate  •  docusate sodium  •  famotidine  •  hydrALAZINE  •  ipratropium-albuterol  •  melatonin  •  nitroglycerin  •  ondansetron **OR** ondansetron  •  ondansetron **OR** ondansetron  •  [COMPLETED] Insert Peripheral IV **AND** sodium chloride  •  sodium chloride  •  sodium chloride  •  sodium chloride  •  sodium chloride    Assessment & Plan   Assessment & Plan     Active Hospital Problems    Diagnosis  POA   • **Acute respiratory failure with hypoxia and hypercapnia [J96.01, J96.02]  Yes   • Oropharyngeal dysphagia [R13.12]  Yes   • Mixed hyperlipidemia [E78.2]  Yes   • Possible pneumonia of right lower lobe due to infectious organism [J18.9]  Yes   • Acute exacerbation of chronic obstructive pulmonary disease (COPD) [J44.1]  Yes   • Carotid artery disease [I77.9]  Yes   • Hypertension [I10]  Yes   • Osteoarthritis [M19.90]  Yes      Resolved Hospital Problems   No resolved problems to display.        Brief Hospital Course to date:  Lizzy Hicks is a 87 y.o. female presents to the hospital with acute hypoxic and hypercapnic respiratory failure following recent parainfluenza infection and has concern for possible postviral bacterial right lower lobe pneumonia.       Respiratory failure with recurrent metabolic encephalopathy due to hypercapnia   transferred out of ICU.  Patient tolerating BiPAP with new mask.  She will need to continue with this mask.  Pulmonology is arranging for BiPAP at SNF.  Patient will need to have BiPAP or trilogy machine  indefinitely for her recurrent hypercapnia.      Possible pneumonia and COPD exacerbation  Finished ceftriaxone  On steroids    Essential hypertension   Continue blood pressure medications.  Monitor blood pressure and adjust as needed.  Previous echocardiogram from 2018 normal EF of 63% but slightly elevated RVSP.     Continue statin for hyperlipidemia     Tylenol as needed for arthritis.     Continue chronic eyedrops.     She understands that due to her advanced age and comorbid conditions she is at higher risk for morbidity and mortality.  Long-term prognosis guarded    DVT prophylaxis: Lovenox    Disposition: Possible transfer to SNF tomorrow Won't have bipap available there until Tuesday it seems like. Continue tx while in hospital     CODE STATUS:   Code Status and Medical Interventions:   Ordered at: 05/24/23 1121     Medical Intervention Limits:    Other     Level Of Support Discussed With:    Patient    Next of Kin (If No Surrogate)     Code Status (Patient has no pulse and is not breathing):    No CPR (Do Not Attempt to Resuscitate)     Medical Interventions (Patient has pulse or is breathing):    Limited Support     Comments:    ok to intubate. stanislaw brown at [facility] for this conversation     Additional Medical Interventions Limits:    .     Release to patient:    Routine Release       Kyle Galarza MD  05/29/23

## 2023-05-30 VITALS
BODY MASS INDEX: 22.97 KG/M2 | RESPIRATION RATE: 18 BRPM | TEMPERATURE: 97.5 F | DIASTOLIC BLOOD PRESSURE: 75 MMHG | HEIGHT: 63 IN | SYSTOLIC BLOOD PRESSURE: 168 MMHG | WEIGHT: 129.63 LBS | OXYGEN SATURATION: 92 % | HEART RATE: 89 BPM

## 2023-05-30 PROCEDURE — 94664 DEMO&/EVAL PT USE INHALER: CPT

## 2023-05-30 PROCEDURE — 63710000001 PREDNISONE PER 1 MG: Performed by: INTERNAL MEDICINE

## 2023-05-30 PROCEDURE — 94799 UNLISTED PULMONARY SVC/PX: CPT

## 2023-05-30 PROCEDURE — 94761 N-INVAS EAR/PLS OXIMETRY MLT: CPT

## 2023-05-30 PROCEDURE — 94660 CPAP INITIATION&MGMT: CPT

## 2023-05-30 PROCEDURE — 25010000002 ENOXAPARIN PER 10 MG: Performed by: INTERNAL MEDICINE

## 2023-05-30 RX ORDER — ACETAMINOPHEN 325 MG/1
650 TABLET ORAL EVERY 4 HOURS PRN
Start: 2023-05-30

## 2023-05-30 RX ORDER — CALCIUM CARBONATE 500 MG/1
2 TABLET, CHEWABLE ORAL 2 TIMES DAILY PRN
Start: 2023-05-30

## 2023-05-30 RX ORDER — PREDNISONE 10 MG/1
10 TABLET ORAL
Qty: 3 TABLET | Refills: 0
Start: 2023-05-31 | End: 2023-06-03

## 2023-05-30 RX ORDER — FAMOTIDINE 20 MG/1
20 TABLET, FILM COATED ORAL 2 TIMES DAILY PRN
Start: 2023-05-30

## 2023-05-30 RX ORDER — IPRATROPIUM BROMIDE AND ALBUTEROL SULFATE 2.5; .5 MG/3ML; MG/3ML
3 SOLUTION RESPIRATORY (INHALATION) EVERY 4 HOURS PRN
Qty: 360 ML
Start: 2023-05-30

## 2023-05-30 RX ORDER — LOSARTAN POTASSIUM 25 MG/1
12.5 TABLET ORAL
Qty: 30 TABLET | Refills: 0
Start: 2023-05-31

## 2023-05-30 RX ORDER — PSEUDOEPHEDRINE HCL 30 MG
100 TABLET ORAL 2 TIMES DAILY PRN
Start: 2023-05-30

## 2023-05-30 RX ORDER — IPRATROPIUM BROMIDE AND ALBUTEROL SULFATE 2.5; .5 MG/3ML; MG/3ML
3 SOLUTION RESPIRATORY (INHALATION)
Qty: 360 ML
Start: 2023-05-30

## 2023-05-30 RX ADMIN — LOSARTAN POTASSIUM 12.5 MG: 25 TABLET, FILM COATED ORAL at 08:44

## 2023-05-30 RX ADMIN — PREDNISONE 20 MG: 20 TABLET ORAL at 08:44

## 2023-05-30 RX ADMIN — SODIUM CHLORIDE 1 DROP: 50 SOLUTION OPHTHALMIC at 08:44

## 2023-05-30 RX ADMIN — IPRATROPIUM BROMIDE AND ALBUTEROL SULFATE 3 ML: 2.5; .5 SOLUTION RESPIRATORY (INHALATION) at 16:04

## 2023-05-30 RX ADMIN — Medication 3 ML: at 08:44

## 2023-05-30 RX ADMIN — AMLODIPINE BESYLATE 5 MG: 5 TABLET ORAL at 08:44

## 2023-05-30 RX ADMIN — ASPIRIN 81 MG: 81 TABLET, COATED ORAL at 08:44

## 2023-05-30 RX ADMIN — MULTIPLE VITAMINS W/ MINERALS TAB 1 TABLET: TAB at 08:44

## 2023-05-30 RX ADMIN — PREDNISOLONE ACETATE 1 DROP: 10 SUSPENSION/ DROPS OPHTHALMIC at 13:00

## 2023-05-30 RX ADMIN — IPRATROPIUM BROMIDE AND ALBUTEROL SULFATE 3 ML: 2.5; .5 SOLUTION RESPIRATORY (INHALATION) at 08:01

## 2023-05-30 RX ADMIN — Medication 10 ML: at 08:45

## 2023-05-30 RX ADMIN — PREDNISOLONE ACETATE 1 DROP: 10 SUSPENSION/ DROPS OPHTHALMIC at 08:44

## 2023-05-30 RX ADMIN — MOXIFLOXACIN HYDROCHLORIDE 0.05 ML: 5 SOLUTION/ DROPS OPHTHALMIC at 08:44

## 2023-05-30 RX ADMIN — ENOXAPARIN SODIUM 30 MG: 100 INJECTION SUBCUTANEOUS at 08:43

## 2023-05-30 RX ADMIN — ATENOLOL 50 MG: 50 TABLET ORAL at 08:44

## 2023-05-30 RX ADMIN — ROSUVASTATIN CALCIUM 5 MG: 5 TABLET, FILM COATED ORAL at 08:43

## 2023-05-30 RX ADMIN — IPRATROPIUM BROMIDE AND ALBUTEROL SULFATE 3 ML: 2.5; .5 SOLUTION RESPIRATORY (INHALATION) at 12:10

## 2023-05-30 RX ADMIN — SODIUM CHLORIDE 1 DROP: 50 SOLUTION OPHTHALMIC at 13:00

## 2023-05-30 NOTE — DISCHARGE SUMMARY
NAME: Lizzy Hicks ADMIT: 2023   : 1935  PCP: Deric Mendosa MD    MRN: [id number] LOS: 9 days   AGE/SEX: 87 y.o. female  ROOM: [facility]     Date of Admission:  2023  Date of Discharge:  2023    PCP: Deric Mendosa MD    CHIEF COMPLAINT  Shortness of Breath      DISCHARGE DIAGNOSIS  Active Hospital Problems    Diagnosis  POA   • **Acute respiratory failure with hypoxia and hypercapnia [J96.01, J96.02]  Yes   • Oropharyngeal dysphagia [R13.12]  Yes   • Mixed hyperlipidemia [E78.2]  Yes   • Possible pneumonia of right lower lobe due to infectious organism [J18.9]  Yes   • Acute exacerbation of chronic obstructive pulmonary disease (COPD) [J44.1]  Yes   • Carotid artery disease [I77.9]  Yes   • Hypertension [I10]  Yes   • Osteoarthritis [M19.90]  Yes      Resolved Hospital Problems   No resolved problems to display.       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 Baseline Na 128-130 since .   • Seborrheic dermatitis 2016   • Swallowing difficulty     D/T INADVERTANT REMOVAL OF UVULA AS CHILD WITH T AND A       CONSULTS   Pulmonary    HOSPITAL COURSE  87-year-old female with recurrent hypercapnia and recurrent episodes of encephalopathy due to hypercapnia.  She was treated with BiPAP as well is steroids for COPD exacerbation.  She improved greatly with treatment.  She was transferred out of the ICU.  She can go to nursing facility with noninvasive NIPPV at night and as needed during the day.  She would benefit from long-term NIPPV management due to significant hypercarbia  and respiratory failure secondary to COPD.  Should continue prednisone 10mg for 3 more days then stop.     DIAGNOSTICS    XR Chest PA & Lateral [[id number]] Sonny as Reviewed   Order Status: Completed Collected: 05/23/23 1512    Updated: 05/23/23 1555   Narrative:     EXAMINATION: PA AND LATERAL CHEST RADIOGRAPHS       HISTORY: Suspected right lower lobe infiltrate.       FINDINGS: PA and lateral chest radiographs were obtained. Comparison is   made to a chest radiograph dated 05/21/2023. The lungs are normal in   volume and are clear. Mild atheromatous calcification seen in the aortic   arch. The cardiac silhouette is within normal limits. Mild atheromatous   calcification of the aortic arch is noted. Osteopenia is appreciated.       Impression:     There is no evidence for an active or acute abnormality of   the chest.       This report was finalized on 5/23/2023 3:52 PM by Dr. Allen Rodriguez M.D.        FL Video Swallow With Speech Single Contrast [[id number]] Sonny as Reviewed   Order Status: Completed Collected: 05/23/23 1629    Updated: 05/23/23 1633   Narrative:     VIDEO SWALLOWING EXAMINATION BY SPEECH PATHOLOGY       Clinical: Dysphasia       Video swallowing examination performed under the direction of speech   pathology. Imaging reviewed by radiologist who concurs with the   findings.       Speech pathology summary:Radiologist Dr Garcia present: Patient   demonstrates mild oropharyngeal dysphagia characterized by premature   spillage, reduced pharyngeal constriction and base of tongue retraction.   Of note patient presented with a lot of baseline laryngeal shadowing   making it difficult to r/o trace penetration/aspiration. No suspected   penetration on all tested consistencies. Mild valleculae residue with   puree and moderate with cracker, clears with liquid wash.       FLUOROSCOPY TIME: 1 minute 32 seconds, 2184 images.       This report was finalized on 5/23/2023 4:30 PM by Dr. Hernandez Sen M.D.         XR Chest 1 View [[id number]] Sonny as Reviewed   Order Status: Completed Collected: 05/21/23 0200    Updated: 05/21/23 0200   Narrative:       Patient: ROCÍO REYNOSO  Time Out: 01:59   Exam(s): XR CXR 1 VIEW     EXAM:     XR Chest, 1 View     CLINICAL HISTORY:      Reason for exam: Dyspnea.     TECHNIQUE:     Frontal view of the chest.     COMPARISON:     No previous studies.     FINDINGS:     Lungs:  Mild COPD.  No consolidative changes.     Pleural space:  Unremarkable.  No pneumothorax.  No pleural effusions.     Heart:  Cardiomegaly.     Mediastinum:  Unremarkable.     Bones joints:  Osteopenia.     IMPRESSION:       1.  Mild COPD.   2.  Cardiomegaly.   3.  Osteopenia.   4.  Atherosclerotic disease.    Impression:       05/29/2023 0613 05/29/2023 0708 Basic Metabolic Panel [[id number]]    (Abnormal)   Blood    Final result Component Value Units   Glucose 92 mg/dL   BUN 33 High  mg/dL   Creatinine 0.73 mg/dL   Sodium 140 mmol/L   Potassium 4.0 mmol/L   Chloride 101 mmol/L   CO2 31.4 High  mmol/L   Calcium 8.5 Low  mg/dL   BUN/Creatinine Ratio 45.2 High     Anion Gap 7.6 mmol/L   eGFR 80.7 mL/min/1.73          05/29/2023 0613 05/29/2023 0656 CBC (No Diff) [[id number]]   (Abnormal)   Blood    Final result Component Value Units   WBC 14.15 High  10*3/mm3   RBC 3.93 10*6/mm3   Hemoglobin 12.4 g/dL   Hematocrit 35.7 %   MCV 90.8 fL   MCH 31.6 pg   MCHC 34.7 g/dL   RDW 12.0 Low  %   RDW-SD 39.7 fl   MPV 9.2 fL   Platelets 283 10*3/mm3                PHYSICAL EXAM  Objective    Alert  nad  No resp distress  Chronically ill    CONDITION ON DISCHARGE  Stable.      DISCHARGE DISPOSITION   Skilled Nursing Facility (DC - External)      DISCHARGE MEDICATIONS       Your medication list      START taking these medications      Instructions Last Dose Given Next Dose Due   acetaminophen 325 MG tablet  Commonly known as: TYLENOL      Take 2 tablets by mouth Every 4 (Four) Hours As Needed for Mild Pain.       calcium carbonate  500 MG chewable tablet  Commonly known as: TUMS      Chew 2 tablets 2 (Two) Times a Day As Needed for Heartburn.       docusate sodium 100 MG capsule      Take 1 capsule by mouth 2 (Two) Times a Day As Needed for Constipation.       famotidine 20 MG tablet  Commonly known as: PEPCID      Take 1 tablet by mouth 2 (Two) Times a Day As Needed for Heartburn.       ipratropium-albuterol 0.5-2.5 mg/3 ml nebulizer  Commonly known as: DUO-NEB      Take 3 mL by nebulization Every 4 (Four) Hours As Needed for Shortness of Air or Wheezing.       ipratropium-albuterol 0.5-2.5 mg/3 ml nebulizer  Commonly known as: DUO-NEB      Take 3 mL by nebulization 4 (Four) Times a Day.       losartan 25 MG tablet  Commonly known as: COZAAR  Start taking on: May 31, 2023      Take 0.5 tablets by mouth Daily.       predniSONE 10 MG tablet  Commonly known as: DELTASONE  Start taking on: May 31, 2023      Take 1 tablet by mouth Daily With Breakfast for 3 days.          CONTINUE taking these medications      Instructions Last Dose Given Next Dose Due   alendronate 70 MG tablet  Commonly known as: FOSAMAX      TAKE 1 TABLET BY MOUTH ONCE WEEKLY ON AN EMPTY STOMACH BEFORE BREAKFAST. REMAIN UPRIGHT FOR 30 MINUTES AND TAKE WITH 8 OUNCES OF WATER       amLODIPine 5 MG tablet  Commonly known as: NORVASC      TAKE ONE TABLET BY MOUTH DAILY       aspirin 81 MG EC tablet      Take 1 tablet by mouth Daily. WILL FOLLOW MD ORDERS       atenolol 50 MG tablet  Commonly known as: TENORMIN      TAKE ONE TABLET BY MOUTH DAILY       calcium carbonate-cholecalciferol 500-400 MG-UNIT tablet tablet      Take 1 tablet by mouth 2 (Two) Times a Day.       Fluticasone-Salmeterol 100-50 MCG/ACT DISKUS  Commonly known as: ADVAIR/WIXELA      INHALE ONE PUFF BY MOUTH TWICE A DAY       moxifloxacin 0.5 % ophthalmic solution  Commonly known as: VIGAMOX      Administer 1 drop to the right eye 2 (Two) Times a Day.       ICAPS AREDS 2 PO      Take 1 tablet by mouth 2 (Two) Times  a Day. HOLD PRIOR TO SURG       multivitamin with minerals tablet tablet      Take 1 tablet by mouth Daily. HOLD PRIOR TO SURG       prednisoLONE acetate 1 % ophthalmic suspension  Commonly known as: PRED FORTE      Administer  to the right eye 4 (Four) Times a Day.       rosuvastatin 5 MG tablet  Commonly known as: CRESTOR      TAKE ONE TABLET BY MOUTH DAILY       sodium chloride 5 % ophthalmic solution  Commonly known as: GREGORY 128      Administer 1 drop to the right eye 4 (Four) Times a Day.             Where to Get Your Medications      Information about where to get these medications is not yet available    Ask your nurse or doctor about these medications  · acetaminophen 325 MG tablet  · calcium carbonate 500 MG chewable tablet  · docusate sodium 100 MG capsule  · famotidine 20 MG tablet  · ipratropium-albuterol 0.5-2.5 mg/3 ml nebulizer  · ipratropium-albuterol 0.5-2.5 mg/3 ml nebulizer  · losartan 25 MG tablet  · predniSONE 10 MG tablet          Future Appointments   Date Time Provider Department Center   8/4/2023 11:00 AM Deric Mendosa MD [facility]     Additional Instructions for the Follow-ups that You Need to Schedule     Discharge Follow-up with Specified Provider: Rehab physician this week, PCP after dc from rehab, Pulmonary in 3-4 weeks   As directed      To: Rehab physician this week, PCP after dc from rehab, Pulmonary in 3-4 weeks            Contact information for follow-up providers     Deric Mendosa MD .    Specialty: Family Medicine  Contact information:  [address] [address] [phone]                   Contact information for after-discharge care     Destination     [facility] .    Service: Skilled Nursing  Contact information:  [address]  [place] [address] [phone]                             TEST  RESULTS PENDING AT DISCHARGE         Kyle Galarza MD  [facility]  05/30/23  09:32  EDT      Time: greater than 32 minutes on discharge  It was a pleasure taking care of this patient while in the hospital.

## 2023-05-30 NOTE — CASE MANAGEMENT/SOCIAL WORK
Continued Stay Note  [facility]     Patient Name: Lizzy Hicks  MRN: [id number]  Today's Date: 5/30/2023    Admit Date: 5/21/2023    Plan: [facility] waiting for Trilogy setup   Discharge Plan     Row Name 05/30/23 1301       Plan    Plan [facility] waiting for Trilogy setup    Patient/Family in Agreement with Plan yes    Plan Comments DC orders in EPIC.  Call to Pat and then Pina/Noe to be sure Trilogy is arranged.  Also spoke with Tom/Ronnie and they are working on arrangements.  Spoke with daughter Jasmin by telephone and she is aware.  [facility] scheduled for 5:00 p.m.  CCP following.  Tim SANDS                   Expected Discharge Date and Time     Expected Discharge Date Expected Discharge Time    May 30, 2023             Becky S. [place], RN

## 2023-05-30 NOTE — PLAN OF CARE
Goal Outcome Evaluation:           Progress: no change  Outcome Evaluation: vss, no complaints of pain. pt has rested well during shift. pt didnt want to put bipap until 2am. will contiue to monitor. poss d/c to snf today.

## 2023-05-30 NOTE — CASE MANAGEMENT/SOCIAL WORK
Continued Stay Note  [facility]     Patient Name: Lizzy Hicks  MRN: [id number]  Today's Date: 5/30/2023    Admit Date: 5/21/2023    Plan: [facility] via [facility]   Discharge Plan     Row Name 05/30/23 1503       Plan    Plan [facility] via [facility]    Plan Comments Spoke with Jase, and Mamie is here setting up Trilogy.  Daughter is here. Ambulance disclaimer given.   Packet to RN.  Tim SANDS    Row Name 05/30/23 1301       Plan    Plan [facility] waiting for Trilogy setup    Patient/Family in Agreement with Plan yes    Plan Comments DC orders in EPIC.  Call to Pat and then Jase to be sure Trilogy is arranged.  Also spoke with Spencer and they are working on arrangements.  Spoke with daughter Jasmin by telephone and she is aware.  [facility] scheduled for 5:00 p.m.  CCP following.  Tim SANDS                    Expected Discharge Date and Time     Expected Discharge Date Expected Discharge Time    May 30, 2023             Becky S. [place], SHVAON

## 2023-05-30 NOTE — PLAN OF CARE
Goal Outcome Evaluation:  Plan of Care Reviewed With: patient, daughter        Progress: improving  Outcome Evaluation: Patient discharged to facility.  Trilogy orders in place.

## 2023-05-30 NOTE — PROGRESS NOTES
"                                              LOS: 9 days   Patient Care Team:  Deric Mendosa MD as PCP - General (Family Medicine)  Jennifer Porras, SHAVON as Ambulatory  ([facility])    Chief Complaint:  F/up respiratory failure, encephalopathy and COPD    Subjective   Interval History  .    Used NIPPV overnight.    Minimal nonproductive cough occasionally..  On RA.  Breathing stable.        REVIEW OF SYSTEMS:     GASTROINTESTINAL: No anorexia, nausea, vomiting or diarrhea. No abdominal pain.  CONSTITUTIONAL: No fever or chills.     Ventilator/Non-Invasive Ventilation Settings (From admission, onward)     Start     Ordered    05/21/23 0525  NIPPV (CPAP or BIPAP)  Until Discontinued        Question Answer Comment   Indication Acute Respiratory Failure    Type BIPAP    IPAP 18    EPAP 7    Breath Rate 20    Titrate Oxygen for SpO2 90 - 95%        05/21/23 0525                      Physical Exam:     Vital Signs  Temp:  [97 °F (36.1 °C)-98.2 °F (36.8 °C)] 97.5 °F (36.4 °C)  Heart Rate:  [75-94] 81  Resp:  [16-18] 18  BP: (134-167)/(43-61) 167/43    Intake/Output Summary (Last 24 hours) at 5/30/2023 1205  Last data filed at 5/30/2023 0844  Gross per 24 hour   Intake 480 ml   Output --   Net 480 ml     Flowsheet Rows    Flowsheet Row First Filed Value   Admission Height 160 cm (63\") Documented at 05/21/2023 0059   Admission Weight 58.1 kg (128 lb) Documented at 05/21/2023 0059          PPE used per hospital policy    General Appearance:   Alert, cooperative, in no acute distress   ENMT:  Mallampati score 3, moist mucous membrane.  Missing teeth.   Eyes:  Pupils equal and reactive to light. EOMI   Neck:    Trachea midline. No thyromegaly.   Lungs:    Equal but diminished air entry throughout anterior auscultation.  No audible crackles or wheezing.  No change    Heart:   Regular rhythm and normal rate, mild murmur over the left sternal border, systolic   Skin:   No rash or ecchymosis   Abdomen:    Soft. No " tenderness. No HSM.   Neuro/psych:  Conscious, alert, oriented x3. Strength 5/5 in upper and lower  ext.  Appropriate mood and affect   Extremities:  No cyanosis, clubbing or edema.  Warm extremities and well-perfused          Results Review:        Results from last 7 days   Lab Units 05/29/23  0613 05/28/23 0449 05/27/23  0653   SODIUM mmol/L 140 136 139   POTASSIUM mmol/L 4.0 4.4 4.0   CHLORIDE mmol/L 101 100 99   CO2 mmol/L 31.4* 26.0 33.9*   BUN mg/dL 33* 38* 31*   CREATININE mg/dL 0.73 0.71 0.65   GLUCOSE mg/dL 92 110* 124*   CALCIUM mg/dL 8.5* 7.9* 8.5*         Results from last 7 days   Lab Units 05/29/23  0613 05/28/23 0449 05/27/23  0653   WBC 10*3/mm3 14.15* 13.29* 10.16   HEMOGLOBIN g/dL 12.4 11.8* 12.4   HEMATOCRIT % 35.7 35.0 35.4   PLATELETS 10*3/mm3 283 256 280                           Results from last 7 days   Lab Units 05/24/23  1248   PH, ARTERIAL pH units 7.457*   PCO2, ARTERIAL mm Hg 52.0*   PO2 ART mm Hg 91.6   MODALITY  BiPap   O2 SATURATION CALC % 97.3         I reviewed the patient's new clinical results.        Medication Review:   amLODIPine, 5 mg, Oral, Daily  aspirin, 81 mg, Oral, Daily  atenolol, 50 mg, Oral, Daily  enoxaparin, 30 mg, Subcutaneous, Daily  ipratropium-albuterol, 3 mL, Nebulization, 4x Daily - RT  losartan, 12.5 mg, Oral, Q24H  moxifloxacin, 1 drop, Right Eye, BID  multivitamin with minerals, 1 tablet, Oral, Daily  prednisoLONE acetate, 1 drop, Right Eye, Q6H  predniSONE, 20 mg, Oral, Daily With Breakfast  rosuvastatin, 5 mg, Oral, Daily  sodium chloride, 1 drop, Right Eye, 4x Daily  sodium chloride, 10 mL, Intravenous, Q12H  sodium chloride, 3 mL, Intravenous, Q12H            Assessment     1. Acute hypoxic and hypercapnic resp failure  2. COPD exacerbation  3. Hyponatremia, resolved  4. Pulmonary hypertension, RVSP 45 on echo 8/31/2018, likely group 3      · HTN  · Recent parainfluenza infection        Plan     · DuoNeb 4 times a day.  · NIPPV at night and as needed  during the day.  She would benefit from long-term.  Noninvasive/trilogy ordered by . NIPPV management due to significant hypercarbia and respiratory failure secondary to COPD.    · She has prednisone to 10 mg daily for 2 days then stop  · Rocephin stopped  · DVT prophylaxis with Lovenox 30 mg subcu daily  · No objections to discharge  · BiPAP and CPAP has been considered and ruled out because of diagnosis and that the patient would benefit from noninvasive positive pressure ventilation.  The mechanical noninvasive ventilation is required to decrease work of breathing improved pulmonary status.  The NI PPV would be required for continuous use at home due to the patient's severity of chronic respiratory failure.  Any interruption of respiratory support could lead to serious harm or death.    .    Angelina Castanon MD  05/30/23  12:05 EDT            This note was dictated utilizing Dragon dictation

## 2023-06-12 RX ORDER — ALENDRONATE SODIUM 70 MG/1
TABLET ORAL
Qty: 12 TABLET | Refills: 1 | Status: SHIPPED | OUTPATIENT
Start: 2023-06-12

## 2023-06-12 NOTE — TELEPHONE ENCOUNTER
Rx Refill Note  Requested Prescriptions     Pending Prescriptions Disp Refills    alendronate (FOSAMAX) 70 MG tablet [Pharmacy Med Name: ALENDRONATE SODIUM 70 MG TAB] 12 tablet 1     Sig: TAKE 1 TABLET BY MOUTH ONCE WEEKLY ON AN EMPTY STOMACH BEFORE BREAKFAST. REMAIN UPRIGHT FOR 30 MINUTES AND TAKE WITH 8 OUNCES OF WATER      Last office visit with prescribing clinician: 5/1/2023   Last telemedicine visit with prescribing clinician: Visit date not found   Next office visit with prescribing clinician: 8/4/2023                         Would you like a call back once the refill request has been completed: [] Yes [] No    If the office needs to give you a call back, can they leave a voicemail: [] Yes [] No    Pauly Laguerre  06/12/23, 12:31 EDT

## 2023-07-07 PROBLEM — J96.02 ACUTE RESPIRATORY FAILURE WITH HYPERCAPNIA: Status: ACTIVE | Noted: 2023-07-07

## 2023-07-07 PROBLEM — R06.89 RESPIRATORY INSUFFICIENCY: Status: ACTIVE | Noted: 2023-07-07

## 2023-07-30 ENCOUNTER — HOSPITAL ENCOUNTER (INPATIENT)
Facility: HOSPITAL | Age: 88
LOS: 3 days | Discharge: HOME | DRG: 190 | End: 2023-08-03
Attending: EMERGENCY MEDICINE | Admitting: INTERNAL MEDICINE
Payer: MEDICARE

## 2023-07-30 ENCOUNTER — APPOINTMENT (OUTPATIENT)
Dept: CT IMAGING | Facility: HOSPITAL | Age: 88
DRG: 190 | End: 2023-07-30
Payer: MEDICARE

## 2023-07-30 ENCOUNTER — APPOINTMENT (OUTPATIENT)
Dept: GENERAL RADIOLOGY | Facility: HOSPITAL | Age: 88
DRG: 190 | End: 2023-07-30
Payer: MEDICARE

## 2023-07-30 DIAGNOSIS — J44.1 COPD WITH ACUTE EXACERBATION: ICD-10-CM

## 2023-07-30 DIAGNOSIS — J44.1 COPD EXACERBATION: Primary | ICD-10-CM

## 2023-07-30 PROBLEM — G47.30 SLEEP APNEA: Status: ACTIVE | Noted: 2023-07-30

## 2023-07-30 LAB
ALBUMIN SERPL-MCNC: 3.4 G/DL (ref 3.5–5.2)
ALBUMIN/GLOB SERPL: 1.1 G/DL
ALP SERPL-CCNC: 61 U/L (ref 39–117)
ALT SERPL W P-5'-P-CCNC: 16 U/L (ref 1–33)
ANION GAP SERPL CALCULATED.3IONS-SCNC: 11.9 MMOL/L (ref 5–15)
ANION GAP SERPL CALCULATED.3IONS-SCNC: 9.9 MMOL/L (ref 5–15)
ARTERIAL PATENCY WRIST A: POSITIVE
AST SERPL-CCNC: 18 U/L (ref 1–32)
ATMOSPHERIC PRESS: 751.6 MMHG
B PARAPERT DNA SPEC QL NAA+PROBE: NOT DETECTED
B PERT DNA SPEC QL NAA+PROBE: NOT DETECTED
BASE EXCESS BLDA CALC-SCNC: 6.9 MMOL/L (ref 0–2)
BASOPHILS # BLD AUTO: 0.07 10*3/MM3 (ref 0–0.2)
BASOPHILS NFR BLD AUTO: 0.5 % (ref 0–1.5)
BDY SITE: ABNORMAL
BILIRUB SERPL-MCNC: 0.2 MG/DL (ref 0–1.2)
BUN SERPL-MCNC: 17 MG/DL (ref 8–23)
BUN SERPL-MCNC: 18 MG/DL (ref 8–23)
BUN/CREAT SERPL: 19.1 (ref 7–25)
BUN/CREAT SERPL: 19.5 (ref 7–25)
C PNEUM DNA NPH QL NAA+NON-PROBE: NOT DETECTED
CALCIUM SPEC-SCNC: 9.4 MG/DL (ref 8.6–10.5)
CALCIUM SPEC-SCNC: 9.9 MG/DL (ref 8.6–10.5)
CHLORIDE SERPL-SCNC: 96 MMOL/L (ref 98–107)
CHLORIDE SERPL-SCNC: 97 MMOL/L (ref 98–107)
CO2 SERPL-SCNC: 31.1 MMOL/L (ref 22–29)
CO2 SERPL-SCNC: 32.1 MMOL/L (ref 22–29)
CREAT SERPL-MCNC: 0.87 MG/DL (ref 0.57–1)
CREAT SERPL-MCNC: 0.94 MG/DL (ref 0.57–1)
D DIMER PPP FEU-MCNC: 1.95 MCGFEU/ML (ref 0–0.85)
DEPRECATED RDW RBC AUTO: 41.2 FL (ref 37–54)
DEPRECATED RDW RBC AUTO: 41.7 FL (ref 37–54)
EGFRCR SERPLBLD CKD-EPI 2021: 59.6 ML/MIN/1.73
EGFRCR SERPLBLD CKD-EPI 2021: 65.4 ML/MIN/1.73
EOSINOPHIL # BLD AUTO: 0.25 10*3/MM3 (ref 0–0.4)
EOSINOPHIL NFR BLD AUTO: 1.9 % (ref 0.3–6.2)
ERYTHROCYTE [DISTWIDTH] IN BLOOD BY AUTOMATED COUNT: 12.4 % (ref 12.3–15.4)
ERYTHROCYTE [DISTWIDTH] IN BLOOD BY AUTOMATED COUNT: 12.7 % (ref 12.3–15.4)
FLUAV SUBTYP SPEC NAA+PROBE: NOT DETECTED
FLUBV RNA ISLT QL NAA+PROBE: NOT DETECTED
GAS FLOW AIRWAY: 4 LPM
GEN 5 2HR TROPONIN T REFLEX: 27 NG/L
GLOBULIN UR ELPH-MCNC: 3 GM/DL
GLUCOSE SERPL-MCNC: 115 MG/DL (ref 65–99)
GLUCOSE SERPL-MCNC: 134 MG/DL (ref 65–99)
HADV DNA SPEC NAA+PROBE: NOT DETECTED
HCO3 BLDA-SCNC: 33.5 MMOL/L (ref 22–28)
HCOV 229E RNA SPEC QL NAA+PROBE: NOT DETECTED
HCOV HKU1 RNA SPEC QL NAA+PROBE: NOT DETECTED
HCOV NL63 RNA SPEC QL NAA+PROBE: NOT DETECTED
HCOV OC43 RNA SPEC QL NAA+PROBE: NOT DETECTED
HCT VFR BLD AUTO: 31.7 % (ref 34–46.6)
HCT VFR BLD AUTO: 32.5 % (ref 34–46.6)
HGB BLD-MCNC: 10.6 G/DL (ref 12–15.9)
HGB BLD-MCNC: 10.8 G/DL (ref 12–15.9)
HMPV RNA NPH QL NAA+NON-PROBE: NOT DETECTED
HPIV1 RNA ISLT QL NAA+PROBE: NOT DETECTED
HPIV2 RNA SPEC QL NAA+PROBE: NOT DETECTED
HPIV3 RNA NPH QL NAA+PROBE: NOT DETECTED
HPIV4 P GENE NPH QL NAA+PROBE: NOT DETECTED
IMM GRANULOCYTES # BLD AUTO: 0.05 10*3/MM3 (ref 0–0.05)
IMM GRANULOCYTES NFR BLD AUTO: 0.4 % (ref 0–0.5)
INR PPP: 0.99 (ref 0.9–1.1)
LYMPHOCYTES # BLD AUTO: 2.33 10*3/MM3 (ref 0.7–3.1)
LYMPHOCYTES NFR BLD AUTO: 17.3 % (ref 19.6–45.3)
M PNEUMO IGG SER IA-ACNC: NOT DETECTED
MCH RBC QN AUTO: 30.2 PG (ref 26.6–33)
MCH RBC QN AUTO: 30.6 PG (ref 26.6–33)
MCHC RBC AUTO-ENTMCNC: 33.2 G/DL (ref 31.5–35.7)
MCHC RBC AUTO-ENTMCNC: 33.4 G/DL (ref 31.5–35.7)
MCV RBC AUTO: 90.8 FL (ref 79–97)
MCV RBC AUTO: 91.6 FL (ref 79–97)
MODALITY: ABNORMAL
MONOCYTES # BLD AUTO: 1.09 10*3/MM3 (ref 0.1–0.9)
MONOCYTES NFR BLD AUTO: 8.1 % (ref 5–12)
NEUTROPHILS NFR BLD AUTO: 71.8 % (ref 42.7–76)
NEUTROPHILS NFR BLD AUTO: 9.68 10*3/MM3 (ref 1.7–7)
NRBC BLD AUTO-RTO: 0.1 /100 WBC (ref 0–0.2)
NT-PROBNP SERPL-MCNC: 2561 PG/ML (ref 0–1800)
PCO2 BLDA: 56.5 MM HG (ref 35–45)
PH BLDA: 7.38 PH UNITS (ref 7.35–7.45)
PLATELET # BLD AUTO: 234 10*3/MM3 (ref 140–450)
PLATELET # BLD AUTO: 253 10*3/MM3 (ref 140–450)
PMV BLD AUTO: 8.8 FL (ref 6–12)
PMV BLD AUTO: 8.9 FL (ref 6–12)
PO2 BLDA: 75.5 MM HG (ref 80–100)
POTASSIUM SERPL-SCNC: 4.1 MMOL/L (ref 3.5–5.2)
POTASSIUM SERPL-SCNC: 4.3 MMOL/L (ref 3.5–5.2)
PROCALCITONIN SERPL-MCNC: 0.12 NG/ML (ref 0–0.25)
PROT SERPL-MCNC: 6.4 G/DL (ref 6–8.5)
PROTHROMBIN TIME: 13.2 SECONDS (ref 11.7–14.2)
QT INTERVAL: 336 MS
RBC # BLD AUTO: 3.46 10*6/MM3 (ref 3.77–5.28)
RBC # BLD AUTO: 3.58 10*6/MM3 (ref 3.77–5.28)
RHINOVIRUS RNA SPEC NAA+PROBE: NOT DETECTED
RSV RNA NPH QL NAA+NON-PROBE: NOT DETECTED
SAO2 % BLDCOA: 94.3 % (ref 92–99)
SARS-COV-2 RNA NPH QL NAA+NON-PROBE: NOT DETECTED
SODIUM SERPL-SCNC: 138 MMOL/L (ref 136–145)
SODIUM SERPL-SCNC: 140 MMOL/L (ref 136–145)
TOTAL RATE: 22 BREATHS/MINUTE
TROPONIN T DELTA: 5 NG/L
TROPONIN T SERPL HS-MCNC: 17 NG/L
TROPONIN T SERPL HS-MCNC: 22 NG/L
WBC NRBC COR # BLD: 12.43 10*3/MM3 (ref 3.4–10.8)
WBC NRBC COR # BLD: 13.47 10*3/MM3 (ref 3.4–10.8)

## 2023-07-30 PROCEDURE — 0202U NFCT DS 22 TRGT SARS-COV-2: CPT | Performed by: EMERGENCY MEDICINE

## 2023-07-30 PROCEDURE — 94799 UNLISTED PULMONARY SVC/PX: CPT

## 2023-07-30 PROCEDURE — 84145 PROCALCITONIN (PCT): CPT | Performed by: EMERGENCY MEDICINE

## 2023-07-30 PROCEDURE — 36415 COLL VENOUS BLD VENIPUNCTURE: CPT | Performed by: NURSE PRACTITIONER

## 2023-07-30 PROCEDURE — 99285 EMERGENCY DEPT VISIT HI MDM: CPT

## 2023-07-30 PROCEDURE — 93010 ELECTROCARDIOGRAM REPORT: CPT | Performed by: STUDENT IN AN ORGANIZED HEALTH CARE EDUCATION/TRAINING PROGRAM

## 2023-07-30 PROCEDURE — 84484 ASSAY OF TROPONIN QUANT: CPT | Performed by: NURSE PRACTITIONER

## 2023-07-30 PROCEDURE — 93005 ELECTROCARDIOGRAM TRACING: CPT | Performed by: EMERGENCY MEDICINE

## 2023-07-30 PROCEDURE — G0378 HOSPITAL OBSERVATION PER HR: HCPCS

## 2023-07-30 PROCEDURE — 85027 COMPLETE CBC AUTOMATED: CPT | Performed by: NURSE PRACTITIONER

## 2023-07-30 PROCEDURE — 25510000001 IOPAMIDOL PER 1 ML: Performed by: INTERNAL MEDICINE

## 2023-07-30 PROCEDURE — 25010000002 FUROSEMIDE PER 20 MG: Performed by: INTERNAL MEDICINE

## 2023-07-30 PROCEDURE — 84484 ASSAY OF TROPONIN QUANT: CPT | Performed by: EMERGENCY MEDICINE

## 2023-07-30 PROCEDURE — 85025 COMPLETE CBC W/AUTO DIFF WBC: CPT | Performed by: EMERGENCY MEDICINE

## 2023-07-30 PROCEDURE — 71045 X-RAY EXAM CHEST 1 VIEW: CPT

## 2023-07-30 PROCEDURE — 80053 COMPREHEN METABOLIC PANEL: CPT | Performed by: EMERGENCY MEDICINE

## 2023-07-30 PROCEDURE — 25010000002 METHYLPREDNISOLONE PER 125 MG: Performed by: EMERGENCY MEDICINE

## 2023-07-30 PROCEDURE — 71275 CT ANGIOGRAPHY CHEST: CPT

## 2023-07-30 PROCEDURE — 36600 WITHDRAWAL OF ARTERIAL BLOOD: CPT

## 2023-07-30 PROCEDURE — 25010000002 METHYLPREDNISOLONE PER 40 MG: Performed by: NURSE PRACTITIONER

## 2023-07-30 PROCEDURE — 94761 N-INVAS EAR/PLS OXIMETRY MLT: CPT

## 2023-07-30 PROCEDURE — 82803 BLOOD GASES ANY COMBINATION: CPT

## 2023-07-30 PROCEDURE — 85379 FIBRIN DEGRADATION QUANT: CPT | Performed by: INTERNAL MEDICINE

## 2023-07-30 PROCEDURE — 83880 ASSAY OF NATRIURETIC PEPTIDE: CPT | Performed by: EMERGENCY MEDICINE

## 2023-07-30 PROCEDURE — 25010000002 FUROSEMIDE PER 20 MG: Performed by: NURSE PRACTITIONER

## 2023-07-30 PROCEDURE — 85610 PROTHROMBIN TIME: CPT | Performed by: EMERGENCY MEDICINE

## 2023-07-30 PROCEDURE — 94660 CPAP INITIATION&MGMT: CPT

## 2023-07-30 RX ORDER — FAMOTIDINE 20 MG/1
20 TABLET, FILM COATED ORAL 2 TIMES DAILY PRN
Status: DISCONTINUED | OUTPATIENT
Start: 2023-07-30 | End: 2023-08-03 | Stop reason: HOSPADM

## 2023-07-30 RX ORDER — LOSARTAN POTASSIUM 25 MG/1
12.5 TABLET ORAL
Status: DISCONTINUED | OUTPATIENT
Start: 2023-07-30 | End: 2023-07-31

## 2023-07-30 RX ORDER — IPRATROPIUM BROMIDE AND ALBUTEROL SULFATE 2.5; .5 MG/3ML; MG/3ML
3 SOLUTION RESPIRATORY (INHALATION)
Status: DISCONTINUED | OUTPATIENT
Start: 2023-07-30 | End: 2023-08-03 | Stop reason: HOSPADM

## 2023-07-30 RX ORDER — GUAIFENESIN 600 MG/1
1200 TABLET, EXTENDED RELEASE ORAL EVERY 12 HOURS SCHEDULED
Status: DISCONTINUED | OUTPATIENT
Start: 2023-07-30 | End: 2023-08-03 | Stop reason: HOSPADM

## 2023-07-30 RX ORDER — PREDNISOLONE ACETATE 10 MG/ML
1 SUSPENSION/ DROPS OPHTHALMIC 4 TIMES DAILY
Status: DISCONTINUED | OUTPATIENT
Start: 2023-07-30 | End: 2023-08-03 | Stop reason: HOSPADM

## 2023-07-30 RX ORDER — ACETAMINOPHEN 650 MG/1
650 SUPPOSITORY RECTAL EVERY 4 HOURS PRN
Status: DISCONTINUED | OUTPATIENT
Start: 2023-07-30 | End: 2023-08-03 | Stop reason: HOSPADM

## 2023-07-30 RX ORDER — FUROSEMIDE 10 MG/ML
60 INJECTION INTRAMUSCULAR; INTRAVENOUS EVERY 12 HOURS
Status: DISCONTINUED | OUTPATIENT
Start: 2023-07-30 | End: 2023-07-31

## 2023-07-30 RX ORDER — CALCIUM CARBONATE 500 MG/1
2 TABLET, CHEWABLE ORAL 2 TIMES DAILY PRN
Status: DISCONTINUED | OUTPATIENT
Start: 2023-07-30 | End: 2023-08-03 | Stop reason: HOSPADM

## 2023-07-30 RX ORDER — SODIUM CHLORIDE 0.9 % (FLUSH) 0.9 %
10 SYRINGE (ML) INJECTION EVERY 12 HOURS SCHEDULED
Status: DISCONTINUED | OUTPATIENT
Start: 2023-07-30 | End: 2023-08-03 | Stop reason: HOSPADM

## 2023-07-30 RX ORDER — FUROSEMIDE 10 MG/ML
60 INJECTION INTRAMUSCULAR; INTRAVENOUS ONCE
Status: COMPLETED | OUTPATIENT
Start: 2023-07-30 | End: 2023-07-30

## 2023-07-30 RX ORDER — BUDESONIDE AND FORMOTEROL FUMARATE DIHYDRATE 160; 4.5 UG/1; UG/1
1 AEROSOL RESPIRATORY (INHALATION)
Status: DISCONTINUED | OUTPATIENT
Start: 2023-07-30 | End: 2023-08-03 | Stop reason: HOSPADM

## 2023-07-30 RX ORDER — ACETAMINOPHEN 325 MG/1
650 TABLET ORAL EVERY 4 HOURS PRN
Status: DISCONTINUED | OUTPATIENT
Start: 2023-07-30 | End: 2023-08-03 | Stop reason: HOSPADM

## 2023-07-30 RX ORDER — SODIUM CHLORIDE 0.9 % (FLUSH) 0.9 %
10 SYRINGE (ML) INJECTION AS NEEDED
Status: DISCONTINUED | OUTPATIENT
Start: 2023-07-30 | End: 2023-08-03 | Stop reason: HOSPADM

## 2023-07-30 RX ORDER — METHYLPREDNISOLONE SODIUM SUCCINATE 40 MG/ML
40 INJECTION, POWDER, LYOPHILIZED, FOR SOLUTION INTRAMUSCULAR; INTRAVENOUS EVERY 12 HOURS
Status: DISCONTINUED | OUTPATIENT
Start: 2023-07-30 | End: 2023-08-01

## 2023-07-30 RX ORDER — METHYLPREDNISOLONE SODIUM SUCCINATE 125 MG/2ML
60 INJECTION, POWDER, LYOPHILIZED, FOR SOLUTION INTRAMUSCULAR; INTRAVENOUS ONCE
Status: COMPLETED | OUTPATIENT
Start: 2023-07-30 | End: 2023-07-30

## 2023-07-30 RX ORDER — IPRATROPIUM BROMIDE AND ALBUTEROL SULFATE 2.5; .5 MG/3ML; MG/3ML
3 SOLUTION RESPIRATORY (INHALATION) EVERY 4 HOURS PRN
Status: DISCONTINUED | OUTPATIENT
Start: 2023-07-30 | End: 2023-07-30

## 2023-07-30 RX ORDER — IPRATROPIUM BROMIDE AND ALBUTEROL SULFATE 2.5; .5 MG/3ML; MG/3ML
3 SOLUTION RESPIRATORY (INHALATION) ONCE
Status: COMPLETED | OUTPATIENT
Start: 2023-07-30 | End: 2023-07-30

## 2023-07-30 RX ORDER — ONDANSETRON 2 MG/ML
4 INJECTION INTRAMUSCULAR; INTRAVENOUS EVERY 6 HOURS PRN
Status: DISCONTINUED | OUTPATIENT
Start: 2023-07-30 | End: 2023-08-03 | Stop reason: HOSPADM

## 2023-07-30 RX ORDER — IPRATROPIUM BROMIDE AND ALBUTEROL SULFATE 2.5; .5 MG/3ML; MG/3ML
3 SOLUTION RESPIRATORY (INHALATION)
Status: DISCONTINUED | OUTPATIENT
Start: 2023-07-30 | End: 2023-07-30

## 2023-07-30 RX ORDER — ALBUTEROL SULFATE 2.5 MG/3ML
2.5 SOLUTION RESPIRATORY (INHALATION) EVERY 6 HOURS PRN
Status: DISCONTINUED | OUTPATIENT
Start: 2023-07-30 | End: 2023-08-03 | Stop reason: HOSPADM

## 2023-07-30 RX ORDER — SODIUM CHLORIDE 9 MG/ML
40 INJECTION, SOLUTION INTRAVENOUS AS NEEDED
Status: DISCONTINUED | OUTPATIENT
Start: 2023-07-30 | End: 2023-08-03 | Stop reason: HOSPADM

## 2023-07-30 RX ORDER — ATENOLOL 50 MG/1
50 TABLET ORAL DAILY
Status: DISCONTINUED | OUTPATIENT
Start: 2023-07-30 | End: 2023-08-03 | Stop reason: HOSPADM

## 2023-07-30 RX ORDER — NITROGLYCERIN 0.4 MG/1
0.4 TABLET SUBLINGUAL
Status: DISCONTINUED | OUTPATIENT
Start: 2023-07-30 | End: 2023-08-03 | Stop reason: HOSPADM

## 2023-07-30 RX ORDER — ROSUVASTATIN CALCIUM 40 MG/1
40 TABLET, COATED ORAL NIGHTLY
Status: DISCONTINUED | OUTPATIENT
Start: 2023-07-30 | End: 2023-08-03 | Stop reason: HOSPADM

## 2023-07-30 RX ORDER — ASPIRIN 81 MG/1
81 TABLET ORAL DAILY
Status: DISCONTINUED | OUTPATIENT
Start: 2023-07-30 | End: 2023-08-02

## 2023-07-30 RX ORDER — AMLODIPINE BESYLATE 5 MG/1
5 TABLET ORAL DAILY
Status: DISCONTINUED | OUTPATIENT
Start: 2023-07-30 | End: 2023-08-03 | Stop reason: HOSPADM

## 2023-07-30 RX ORDER — ONDANSETRON 4 MG/1
4 TABLET, FILM COATED ORAL EVERY 6 HOURS PRN
Status: DISCONTINUED | OUTPATIENT
Start: 2023-07-30 | End: 2023-08-03 | Stop reason: HOSPADM

## 2023-07-30 RX ORDER — ACETAMINOPHEN 160 MG/5ML
650 SOLUTION ORAL EVERY 4 HOURS PRN
Status: DISCONTINUED | OUTPATIENT
Start: 2023-07-30 | End: 2023-08-03 | Stop reason: HOSPADM

## 2023-07-30 RX ORDER — ROSUVASTATIN CALCIUM 5 MG/1
5 TABLET, COATED ORAL DAILY
Status: DISCONTINUED | OUTPATIENT
Start: 2023-07-30 | End: 2023-07-30

## 2023-07-30 RX ORDER — CALCIUM CARBONATE 500(1250)
500 TABLET ORAL DAILY
Status: DISCONTINUED | OUTPATIENT
Start: 2023-07-30 | End: 2023-08-03 | Stop reason: HOSPADM

## 2023-07-30 RX ORDER — SILICONE ADHESIVE 1.5" X 3"
1 SHEET (EA) TOPICAL 4 TIMES DAILY
Status: DISCONTINUED | OUTPATIENT
Start: 2023-07-30 | End: 2023-08-03 | Stop reason: HOSPADM

## 2023-07-30 RX ADMIN — AMLODIPINE BESYLATE 5 MG: 5 TABLET ORAL at 13:45

## 2023-07-30 RX ADMIN — FUROSEMIDE 60 MG: 10 INJECTION, SOLUTION INTRAMUSCULAR; INTRAVENOUS at 04:58

## 2023-07-30 RX ADMIN — PREDNISOLONE ACETATE 1 DROP: 10 SUSPENSION/ DROPS OPHTHALMIC at 21:26

## 2023-07-30 RX ADMIN — GUAIFENESIN 1200 MG: 600 TABLET, EXTENDED RELEASE ORAL at 21:25

## 2023-07-30 RX ADMIN — GUAIFENESIN 1200 MG: 600 TABLET, EXTENDED RELEASE ORAL at 08:54

## 2023-07-30 RX ADMIN — METOPROLOL TARTRATE 5 MG: 1 INJECTION, SOLUTION INTRAVENOUS at 02:32

## 2023-07-30 RX ADMIN — SODIUM CHLORIDE 1 DROP: 50 SOLUTION OPHTHALMIC at 13:55

## 2023-07-30 RX ADMIN — IPRATROPIUM BROMIDE AND ALBUTEROL SULFATE 3 ML: .5; 3 SOLUTION RESPIRATORY (INHALATION) at 18:52

## 2023-07-30 RX ADMIN — IPRATROPIUM BROMIDE AND ALBUTEROL SULFATE 3 ML: .5; 2.5 SOLUTION RESPIRATORY (INHALATION) at 01:27

## 2023-07-30 RX ADMIN — IOPAMIDOL 95 ML: 755 INJECTION, SOLUTION INTRAVENOUS at 19:25

## 2023-07-30 RX ADMIN — METHYLPREDNISOLONE SODIUM SUCCINATE 60 MG: 125 INJECTION, POWDER, FOR SOLUTION INTRAMUSCULAR; INTRAVENOUS at 01:35

## 2023-07-30 RX ADMIN — Medication 500 MG: at 13:45

## 2023-07-30 RX ADMIN — METHYLPREDNISOLONE SODIUM SUCCINATE 40 MG: 40 INJECTION, POWDER, LYOPHILIZED, FOR SOLUTION INTRAMUSCULAR; INTRAVENOUS at 21:25

## 2023-07-30 RX ADMIN — IPRATROPIUM BROMIDE AND ALBUTEROL SULFATE 3 ML: .5; 3 SOLUTION RESPIRATORY (INHALATION) at 07:44

## 2023-07-30 RX ADMIN — PREDNISOLONE ACETATE 1 DROP: 10 SUSPENSION/ DROPS OPHTHALMIC at 13:46

## 2023-07-30 RX ADMIN — IPRATROPIUM BROMIDE AND ALBUTEROL SULFATE 3 ML: .5; 3 SOLUTION RESPIRATORY (INHALATION) at 10:42

## 2023-07-30 RX ADMIN — ROSUVASTATIN CALCIUM 40 MG: 40 TABLET, FILM COATED ORAL at 21:25

## 2023-07-30 RX ADMIN — SODIUM CHLORIDE 1 DROP: 50 SOLUTION OPHTHALMIC at 18:25

## 2023-07-30 RX ADMIN — ROSUVASTATIN CALCIUM 5 MG: 5 TABLET, FILM COATED ORAL at 13:45

## 2023-07-30 RX ADMIN — Medication 10 ML: at 21:26

## 2023-07-30 RX ADMIN — METHYLPREDNISOLONE SODIUM SUCCINATE 40 MG: 40 INJECTION, POWDER, LYOPHILIZED, FOR SOLUTION INTRAMUSCULAR; INTRAVENOUS at 08:54

## 2023-07-30 RX ADMIN — ASPIRIN 81 MG: 81 TABLET, COATED ORAL at 13:45

## 2023-07-30 RX ADMIN — LOSARTAN POTASSIUM 12.5 MG: 25 TABLET, FILM COATED ORAL at 18:20

## 2023-07-30 RX ADMIN — Medication 10 ML: at 04:59

## 2023-07-30 RX ADMIN — ATENOLOL 50 MG: 50 TABLET ORAL at 13:45

## 2023-07-30 RX ADMIN — Medication 10 ML: at 08:55

## 2023-07-30 RX ADMIN — FUROSEMIDE 60 MG: 10 INJECTION, SOLUTION INTRAMUSCULAR; INTRAVENOUS at 21:25

## 2023-07-30 RX ADMIN — BUDESONIDE AND FORMOTEROL FUMARATE DIHYDRATE 1 PUFF: 160; 4.5 AEROSOL RESPIRATORY (INHALATION) at 18:56

## 2023-07-30 RX ADMIN — SODIUM CHLORIDE 1 DROP: 50 SOLUTION OPHTHALMIC at 21:26

## 2023-07-30 RX ADMIN — IPRATROPIUM BROMIDE AND ALBUTEROL SULFATE 3 ML: .5; 3 SOLUTION RESPIRATORY (INHALATION) at 14:12

## 2023-07-30 RX ADMIN — PREDNISOLONE ACETATE 1 DROP: 10 SUSPENSION/ DROPS OPHTHALMIC at 18:20

## 2023-07-30 NOTE — PLAN OF CARE
Goal Outcome Evaluation:  Plan of Care Reviewed With: patient, daughter        Progress: no change  Outcome Evaluation: A/O, very short of breath, placed on 4LNC, purewick placed, pulmonology consulted, here to see patient, Bipap orderer, respiratory here to put on bipap, covid negative, edema noted to BLE, has skin tear to LLE, will continue to monitor.

## 2023-07-30 NOTE — ED PROVIDER NOTES
EMERGENCY DEPARTMENT ENCOUNTER    Room Number:  3115/1  Date seen:  7/31/2023  PCP: Traci Hoyos MD  Historian: Patient and daughter  Relevant information provided by sources other than the patient, review of external records, and social determinants of health may be included in the HPI section.      HPI:  Chief Complaint: Shortness of breath  Additional historical features obtained directly from: Patient's daughter states that she saw her mom earlier this evening and said that she looked fine, but then about bedtime the nurse at [facility] said that she looked quite short of breath and was hypoxic with O2 sats of 80%.  They were then able to give her breathing treatment after which time she improved but her heart rate was up as high as 140 which prompted them to send her to the ED.  Context: Lizzy Hicks is a 87 y.o. female who presents to the ED c/o shortness of breath which is a chronic issue for her she is on 2 L chronically by nasal cannula as well as CPAP at night.  She is in the [facility], and I reviewed the discharge summary from 2 weeks ago in epic where she was here for very similar.  Right now she states she feels better but is still short of breath and she does appear to be in some minimal distress      Initial impression including social determinants which will impact the assessment patient's daughter at the bedside, and again said that she saw her earlier today and she seemed fine but this is definitely different.          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    Carotid stenosis, asymptomatic, right 09/24/2018    Carotid artery disease 04/04/2019    Macular degeneration 07/17/2020    COPD exacerbation  05/01/2023    Acute exacerbation of chronic obstructive pulmonary disease (COPD) 05/01/2023    Parainfluenza infection 05/02/2023    Acute respiratory failure with hypoxia     Acute respiratory failure with hypoxia and hypercapnia 05/21/2023    Mixed hyperlipidemia 05/21/2023    Possible pneumonia of right lower lobe due to infectious organism 05/21/2023    Oropharyngeal dysphagia 05/24/2023    Acute respiratory failure with hypercapnia 07/07/2023    Respiratory insufficiency 07/07/2023     Resolved Ambulatory Problems     Diagnosis Date Noted    Cataract 01/20/2016    Hip fracture 01/20/2016    Medicare annual wellness visit, subsequent 01/20/2016    Swelling of right lower extremity 01/20/2016    Seborrheic dermatitis 01/20/2016     Past Medical History:   Diagnosis Date    Anesthesia complication     Carotid stenosis     History of bronchitis     Swallowing difficulty          PAST SURGICAL HISTORY  Past Surgical History:   Procedure Laterality Date    CAROTID ENDARTERECTOMY Right 9/24/2018    Procedure: RT CAROTID ENDARTERECTOMY WITH INTRA OPERATIVE CAROTID ARTERY DUPLEX SCAN;  Surgeon: Mikaela Galicia Jr., MD;  Location: [facility] OR;  Service: Vascular    CAROTID ENDARTERECTOMY Left 4/4/2019    Procedure: LEFT CAROTID ENDARTERECTOMY;  Surgeon: Mikaela Galicia Jr., MD;  Location: [facility] OR;  Service: Vascular    CATARACT EXTRACTION WITH INTRAOCULAR LENS IMPLANT      LEFT AND RIGHT    ORIF FEMUR FRACTURE Right     HARDWARE    TONSILLECTOMY AND ADENOIDECTOMY      INADVERTANTLY TOOK UVULA AT THIS TIME         FAMILY HISTORY  Family History   Problem Relation Age of Onset    Malig Hyperthermia Neg Hx          SOCIAL HISTORY  Social History     Socioeconomic History    Marital status:    Tobacco Use    Smoking status: Former     Packs/day: 0.25     Years: 40.00     Pack years: 10.00     Types: Cigarettes    Smokeless tobacco: Never    Tobacco comments:     quit 6 yr ago   Vaping Use     Vaping Use: Never used   Substance and Sexual Activity    Alcohol use: Not Currently     Comment: 1-2 drinks day    Drug use: No    Sexual activity: Defer         ALLERGIES  Bee venom          PHYSICAL EXAM  ED Triage Vitals [07/30/23 0032]   Temp Heart Rate Resp BP SpO2   98.4 øF (36.9 øC) 114 22 144/100 96 %      Temp src Heart Rate Source Patient Position BP Location FiO2 (%)   Oral -- -- -- --         Physical Exam      GENERAL: Awake and alert, anxious  HENT: nares patent  EYES: no scleral icterus, PERRL, EOMI  CV: regular rhythm, normal rate, distal pulses symmetric and palpable  RESPIRATORY: n tachypneic with increased work of breathing and pursed lip breathing  ABDOMEN: soft, nondistended nontender with normal bowel sound  MUSCULOSKELETAL: no deformity, no calf tenderness or pedal edema  NEURO: alert, moves all extremities, follows commands  PSYCH:  calm, cooperative  SKIN: warm, dry with no rash        LAB RESULTS  Recent Results (from the past 24 hour(s))   D-dimer, Quantitative    Collection Time: 07/30/23  4:26 PM    Specimen: Hand, Right; Blood   Result Value Ref Range    D-Dimer, Quantitative 1.95 (H) 0.00 - 0.85 MCGFEU/mL   Lipid Panel    Collection Time: 07/31/23  3:24 AM    Specimen: Blood   Result Value Ref Range    Total Cholesterol 206 (H) 0 - 200 mg/dL    Triglycerides 69 0 - 150 mg/dL    HDL Cholesterol 100 (H) 40 - 60 mg/dL    LDL Cholesterol  94 0 - 100 mg/dL    VLDL Cholesterol 12 5 - 40 mg/dL    LDL/HDL Ratio 0.92    TSH Rfx On Abnormal To Free T4    Collection Time: 07/31/23  3:24 AM    Specimen: Blood   Result Value Ref Range    TSH 0.517 0.270 - 4.200 uIU/mL   CBC (No Diff)    Collection Time: 07/31/23  3:24 AM    Specimen: Blood   Result Value Ref Range    WBC 11.68 (H) 3.40 - 10.80 10*3/mm3    RBC 3.26 (L) 3.77 - 5.28 10*6/mm3    Hemoglobin 10.1 (L) 12.0 - 15.9 g/dL    Hematocrit 29.9 (L) 34.0 - 46.6 %    MCV 91.7 79.0 - 97.0 fL    MCH 31.0 26.6 - 33.0 pg    MCHC 33.8 31.5 - 35.7 g/dL     RDW 13.0 12.3 - 15.4 %    RDW-SD 43.1 37.0 - 54.0 fl    MPV 9.1 6.0 - 12.0 fL    Platelets 244 140 - 450 10*3/mm3   Basic Metabolic Panel    Collection Time: 07/31/23  3:24 AM    Specimen: Blood   Result Value Ref Range    Glucose 146 (H) 65 - 99 mg/dL    BUN 25 (H) 8 - 23 mg/dL    Creatinine 0.87 0.57 - 1.00 mg/dL    Sodium 138 136 - 145 mmol/L    Potassium 4.6 3.5 - 5.2 mmol/L    Chloride 94 (L) 98 - 107 mmol/L    CO2 36.0 (H) 22.0 - 29.0 mmol/L    Calcium 8.7 8.6 - 10.5 mg/dL    BUN/Creatinine Ratio 28.7 (H) 7.0 - 25.0    Anion Gap 8.0 5.0 - 15.0 mmol/L    eGFR 65.4 >60.0 mL/min/1.73   Magnesium    Collection Time: 07/31/23  3:24 AM    Specimen: Blood   Result Value Ref Range    Magnesium 1.5 (L) 1.6 - 2.4 mg/dL   Adult Transthoracic Echo Complete W/ Cont if Necessary Per Protocol    Collection Time: 07/31/23  8:06 AM   Result Value Ref Range    LV Sys Vol (BSA corrected) 18.8 cm2    LV Unger Vol (BSA corrected) 45.8 cm2    LVOT area 2.03 cm2    LVOT diam 1.61 cm    EDV(MOD-sp4) 68.0 ml    ESV(MOD-sp4) 28.0 ml    SV(MOD-sp4) 40.0 ml    SI(MOD-sp4) 26.9 ml/m2    EF(MOD-sp4) 58.8 %    MV E max akash 106.3 cm/sec    MV A max akash 108.8 cm/sec    MV dec time 180 msec    MV E/A 0.98     MV A dur 0.14 sec    Med Peak E' Akash 4.8 cm/sec    Lat Peak E' Akash 5.4 cm/sec    Avg E/e' ratio 20.84     SV(LVOT) 37.7 ml    SV(RVOT) 36.9 ml    Qp/Qs 0.98     RV Base 3.1 cm    RV Mid 2.18 cm    RV Length 7.5 cm    RV S' 8.1 cm/sec    LV V1 max 78.8 cm/sec    LV V1 max PG 2.48 mmHg    LV V1 mean PG 1.00 mmHg    LV V1 VTI 18.6 cm    Ao pk akash 147.8 cm/sec    Ao max PG 8.7 mmHg    Ao mean PG 4.3 mmHg    Ao V2 VTI 31.5 cm    ELMIRA(I,D) 1.20 cm2    MV max PG 9.2 mmHg    MV mean PG 3.4 mmHg    MV V2 VTI 36.0 cm    MV P1/2t 60.9 msec    MVA(P1/2t) 3.6 cm2    MVA(VTI) 1.04 cm2    MV dec slope 678.7 cm/sec2    TR max akash 243.0 cm/sec    TR max PG 23.6 mmHg    RVOT diam 1.80 cm    RV V1 max PG 2.22 mmHg    RV V1 max 74.6 cm/sec    RV V1 VTI 14.5  "cm    PA V2 max 80.1 cm/sec    PA acc time 0.12 sec    EF(MOD-bp) 58.6 %    TAPSE (>1.6) 1.20 cm    Dimensionless Index 0.60 (DI)    RVSP(TR) 32 mmHg    RAP systole 8 mmHg       Ordered the above labs and my independent interpretation of these results are discussed in the section labeled \"ED course\" below        RADIOLOGY  Adult Transthoracic Echo Complete W/ Cont if Necessary Per Protocol    Result Date: 7/31/2023    Left ventricular systolic function is normal. Left ventricular ejection fraction appears to be 56 - 60%.   Left ventricular diastolic function is consistent with (grade Ia w/high LAP) impaired relaxation.   Mild aortic valve stenosis is present.  Moderately calcified valve.  Off axis Doppler interrogation due to challenging image acquisition.  Severity of stenosis may be underestimated.   Estimated right ventricular systolic pressure from tricuspid regurgitation is normal (<35 mmHg).     CT Angiogram Chest    Result Date: 7/30/2023  CT ANGIOGRAM OF THE CHEST. MULTIPLE CORONAL, SAGITTAL, AND 3-D RECONSTRUCTIONS.  HISTORY: Pulmonary hypertension. COPD  Elevated D-dimer. .  TECHNIQUE: Radiation dose reduction techniques were utilized, including automated exposure control and exposure modulation based on body size. CT angiogram of the chest was performed following the administration of IV contrast. Coronal, sagittal, and 3-D reconstruction images were obtained.  COMPARISON:  None  FINDINGS: There is no intrapulmonary arterial filling defect to diagnose a pulmonary embolus. There is no evidence for mediastinal or hilar or axillary refugio enlargement. Atherosclerotic calcifications are present and there are coronary arterial calcifications.  There is mild-to-moderate pulmonary emphysema. There is central bronchial wall thickening particularly extending into the right lower lobe consistent with bronchitis. Within the posterior medial right lower lobe there is a focal nodular opacity that measures 2.6 x 1.8 cm " "and this suggests a small area of infiltrate. There are additional hazy nodular opacities within the right lower lobe for which follow-up is needed to assure resolution.  Imaging through the upper abdomen appears within normal limits. Extensive atherosclerotic calcifications are present. There is a scoliotic curvature of the thoracolumbar spine and there is degenerative disc disease particularly within the lumbar spine.      1. No evidence for pulmonary thromboembolic disease. 2. Pulmonary emphysema. Bronchial wall thickening greatest within the right lower lobe where there are patchy irregular peripheral nodular opacities that are most likely inflammatory or infectious in etiology though need follow-up with chest CT to confirm resolution and this could be performed in 3-4 months. 3. Extensive atherosclerotic disease.  Coronary arterial calcifications.  This report was finalized on 7/30/2023 8:41 PM by Dr. Isiah Fuentes M.D.       Ordered the above noted radiological studies.  Independently interpreted by me in PACS.  My independent interpretation of these results are discussed in the section below labeled \"ED course\"        PROCEDURES  Procedures          MEDICATIONS GIVEN IN ER  Medications   sodium chloride 0.9 % flush 10 mL (has no administration in time range)   sodium chloride 0.9 % flush 10 mL (10 mL Intravenous Given 7/31/23 0939)   sodium chloride 0.9 % flush 10 mL (has no administration in time range)   sodium chloride 0.9 % infusion 40 mL (has no administration in time range)   nitroglycerin (NITROSTAT) SL tablet 0.4 mg (has no administration in time range)   acetaminophen (TYLENOL) tablet 650 mg (has no administration in time range)     Or   acetaminophen (TYLENOL) 160 MG/5ML solution 650 mg (has no administration in time range)     Or   acetaminophen (TYLENOL) suppository 650 mg (has no administration in time range)   ondansetron (ZOFRAN) tablet 4 mg (has no administration in time range)     Or "   ondansetron (ZOFRAN) injection 4 mg (has no administration in time range)   calcium carbonate (TUMS) chewable tablet 500 mg (200 mg elemental) (has no administration in time range)   methylPREDNISolone sodium succinate (SOLU-Medrol) injection 40 mg (40 mg Intravenous Given 7/31/23 0929)   guaiFENesin (MUCINEX) 12 hr tablet 1,200 mg (1,200 mg Oral Given 7/31/23 0928)   ipratropium-albuterol (DUO-NEB) nebulizer solution 3 mL (3 mL Nebulization Given 7/31/23 1214)   amLODIPine (NORVASC) tablet 5 mg (5 mg Oral Given 7/31/23 0929)   aspirin EC tablet 81 mg (81 mg Oral Given 7/31/23 0929)   atenolol (TENORMIN) tablet 50 mg (50 mg Oral Given 7/31/23 0929)   calcium carbonate (oyster shell) tablet 500 mg (500 mg Oral Given 7/31/23 0929)   famotidine (PEPCID) tablet 20 mg (has no administration in time range)   budesonide-formoterol (SYMBICORT) 160-4.5 MCG/ACT inhaler 1 puff (1 puff Inhalation Given 7/31/23 0912)   prednisoLONE acetate (PRED FORTE) 1 % ophthalmic suspension 1 drop (1 drop Right Eye Given 7/31/23 1206)   sodium chloride (GREGORY 128) 5 % ophthalmic solution 1 drop (1 drop Right Eye Given 7/31/23 1206)   rosuvastatin (CRESTOR) tablet 40 mg (40 mg Oral Given 7/30/23 2125)   albuterol (PROVENTIL) nebulizer solution 0.083% 2.5 mg/3mL (has no administration in time range)   furosemide (LASIX) tablet 40 mg (has no administration in time range)   ipratropium-albuterol (DUO-NEB) nebulizer solution 3 mL (3 mL Nebulization Given 7/30/23 0127)   methylPREDNISolone sodium succinate (SOLU-Medrol) injection 60 mg (60 mg Intravenous Given 7/30/23 0135)   metoprolol tartrate (LOPRESSOR) injection 5 mg (5 mg Intravenous Given 7/30/23 0232)   furosemide (LASIX) injection 60 mg (60 mg Intravenous Given 7/30/23 0458)   iopamidol (ISOVUE-370) 76 % injection 100 mL (95 mL Intravenous Given 7/30/23 8625)                   MEDICAL DECISION MAKING and INDEPENDENT INTERPRETATIONS      Everything documented below this statement is the  "\"ED course\" section which I have referred to above.  Everything discussed in the following section constitutes MY INDEPENDENT INTERPRETATIONS of the lab work, EKGs, and radiology studies, and all of my personal conversations with other providers, and all of my independent decision making regarding this patient are discussed below.      ED Course as of 07/31/23 1507   Mon Jul 31, 2023   1505 CBC shows a mild leukocytosis and chronic anemia which is unchanged compared to previous [DP]   1506 ABG shows chronic respiratory acidosis which is mostly compensated and pretty consistent with previous [DP]   1506 Respiratory viral panel is negative acute [DP]   1506 Chest x-ray shows perhaps some small bilateral pleural effusion [DP]   1506 EKG on arrival  Sinus rhythm at 115  Normal ND and QT  No acute ST segment changes seen to suggest ischemia, similar to previous from 7/16/2023 but the rate is higher [DP]   1506 I gave the patient her nightly beta-blocker as well as Solu-Medrol and a DuoNeb treatment.  She was improved after this, however I still had her on 4 L by nasal cannula to keep her sats above 90% she still seem to be having some mild dyspnea [DP]   1507 Spoke with nurse practitioner for [facility] who agrees to admit the patient to a telemetry bed for further evaluation management [DP]      ED Course User Index  [DP] Jerry Sanchez MD         Everything documented above with this statement, in the ED course section constitutes my independent interpretation of lab work EKG and radiology studies along with my personal conversations with other providers and my independent medical decision making.  This statement will not be repeated before each data point, but they are all my independent interpretation needs contained within the \"ED course\" section.             All appropriate hygiene and PPE requirements were satisfied with this patient encounter      FINAL DIAGNOSES  Final diagnoses:   COPD exacerbation "           DISPOSITION  Admit to telemetry          Latest Documented Vital Signs:  As of 15:07 EDT  BP- 115/47 HR- 84 Temp- 97.7 øF (36.5 øC) (Oral) O2 sat- 94%        --    Please note that portions of this were completed with a voice recognition program.       Note Disclaimer: At [facility], we believe that sharing information builds trust and better relationships. You are receiving this note because you are receiving care at [facility] or recently visited. It is possible you will see health information before a provider has talked with you about it. This kind of information can be easy to misunderstand. To help you fully understand what it      Jerry Sanchez MD  07/31/23 4732

## 2023-07-30 NOTE — CONSULTS
Patient Care Team:  Traci Hoyos MD as PCP - General (Geriatric Medicine)      Subjective     Patient is a 87 y.o. female.  Asked to see patient in consult for acute on chronic respiratory failure with hypoxia and COPD exacerbation.  I have apparently seen this patient in the hospital a couple of years ago back in May 2021 she has COPD former smoker quit in 2012 but smoked for over 50 years she has chronic respiratory failure and using O2 at home for some time and she has chronic hypercapnic respiratory failure.  She has hypertension cerebrovascular disease she was apparently brought in from the [facility] because her oxygen saturations were 88% on her 2 L home O2 and it looks like she was in the hospital earlier this month with COPD exacerbation and acute on chronic respiratory failure and confusion and was having some delirium at the time.  She went home and has a trilogy noninvasive ventilator at the nursing home she does not know her settings.  She uses the ventilator from 11 PM to 7 AM apparently her shortness of breath or hypoxia was noted before 11 so they did not put her on the ventilator just sent her to the hospital.  Apparently she is on chronic prednisone at home 5 mg daily I do not see a reason for this listed arterial blood gas on 4 L nasal cannula O2 here tonight shows a pH of 7.38 PCO2 of 57 and a PO2 of 76.  She does not know her medications.  She says she has been on diuretics before but had some electrolyte imbalance and just does not like them.  Her family is in the room her daughter says that they have noticed her legs have been swelling for almost 2 weeks gradually worsening they mention to the nursing home staff but nothing has been done about it.  Apparently Dr. Breen and Bhaskar teamed up to put her on the noninvasive ventilator a couple of visits ago.  She has some cough she has a chronic cough does not produce much mucus says she cannot get the mucus up because she  inadvertently had her uvula removed as a child during a TNA.  She has not had any excessive urination or urinary difficulties.  No decreased urination.  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 Baseline Na 128-130 since 2013.    Seborrheic dermatitis 01/20/2016    Swallowing difficulty     D/T INADVERTANT REMOVAL OF UVULA AS CHILD WITH T AND A     Past Surgical History:   Procedure Laterality Date    CAROTID ENDARTERECTOMY Right 9/24/2018    Procedure: RT CAROTID ENDARTERECTOMY WITH INTRA OPERATIVE CAROTID ARTERY DUPLEX SCAN;  Surgeon: Mikaela Galicia Jr., MD;  Location: [facility] OR;  Service: Vascular    CAROTID ENDARTERECTOMY Left 4/4/2019    Procedure: LEFT CAROTID ENDARTERECTOMY;  Surgeon: Mikaela Galicia Jr., MD;  Location: [facility] OR;  Service: Vascular    CATARACT EXTRACTION WITH INTRAOCULAR LENS IMPLANT      LEFT AND RIGHT    ORIF FEMUR FRACTURE Right     HARDWARE    TONSILLECTOMY AND ADENOIDECTOMY      INADVERTANTLY TOOK UVULA AT THIS TIME     Social History     Socioeconomic History    Marital status:    Tobacco Use    Smoking status: Former     Packs/day: 0.25     Years: 40.00     Pack years: 10.00     Types: Cigarettes    Smokeless tobacco: Never    Tobacco comments:     quit 6 yr ago   Vaping Use    Vaping Use: Never used   Substance and Sexual Activity    Alcohol  use: Not Currently     Comment: 1-2 drinks day    Drug use: No    Sexual activity: Defer     Family History   Problem Relation Age of Onset    Malig Hyperthermia Neg Hx          Allergies:  Bee venom    Medications:  Prior to Admission medications    Medication Sig Start Date End Date Taking? Authorizing Provider   acetaminophen (TYLENOL) 325 MG tablet Take 2 tablets by mouth Every 4 (Four) Hours As Needed for Mild Pain. 5/30/23  Yes Kyle Galarza MD   amLODIPine (NORVASC) 5 MG tablet TAKE ONE TABLET BY MOUTH DAILY  Patient taking differently: Take 1 tablet by mouth Daily. 9/14/22  Yes Deric Mendosa MD   calcium carbonate (TUMS) 500 MG chewable tablet Chew 2 tablets 2 (Two) Times a Day As Needed for Heartburn. 5/30/23  Yes Kyle Galarza MD   famotidine (PEPCID) 20 MG tablet Take 1 tablet by mouth 2 (Two) Times a Day As Needed for Heartburn. 5/30/23  Yes Kyle Galarza MD   Fluticasone-Salmeterol (ADVAIR/WIXELA) 100-50 MCG/ACT DISKUS INHALE ONE PUFF BY MOUTH TWICE A DAY  Patient taking differently: Inhale 1 puff 2 (Two) Times a Day. 1/16/23  Yes Deric Mendosa MD   ipratropium-albuterol (DUO-NEB) 0.5-2.5 mg/3 ml nebulizer Take 3 mL by nebulization Every 4 (Four) Hours As Needed for Shortness of Air or Wheezing. 5/30/23  Yes Kyle Galarza MD   ipratropium-albuterol (DUO-NEB) 0.5-2.5 mg/3 ml nebulizer Take 3 mL by nebulization 4 (Four) Times a Day. 5/30/23  Yes Kyle Galarza MD   losartan (COZAAR) 25 MG tablet Take 0.5 tablets by mouth Daily. 5/31/23  Yes Kyle Galarza MD   Multiple Vitamins-Minerals (ICAPS AREDS 2 PO) Take 1 tablet by mouth Daily. HOLD PRIOR TO SURG   Yes Angie Espino MD   Multiple Vitamins-Minerals (MULTIVITAMIN ADULTS 50+ PO) Take 1 tablet by mouth Daily. HOLD PRIOR TO SURG   Yes Angie Espino MD   prednisoLONE acetate (PRED FORTE) 1 % ophthalmic suspension Administer  to the right eye 4 (Four) Times a Day. 3/9/23  Yes Provider, MD Angie    predniSONE (DELTASONE) 10 MG tablet Take 4 tabs daily x 3 days, then take 3 tabs daily x 3 days, then take 2 tabs daily x 3 days, then take 1 tab daily x 3 days 7/11/23  Yes Sonny Mills MD   rosuvastatin (CRESTOR) 5 MG tablet TAKE ONE TABLET BY MOUTH DAILY  Patient taking differently: Take 1 tablet by mouth Daily. 9/20/22  Yes Deric Mendosa MD   sodium chloride (GREGORY 128) 5 % ophthalmic solution Administer 1 drop to the right eye 4 (Four) Times a Day.   Yes Angie Espino MD   bisacodyl (DULCOLAX) 10 MG suppository Insert 1 suppository into the rectum Daily As Needed.  7/30/23 Yes Angie Espino MD   alendronate (FOSAMAX) 70 MG tablet TAKE 1 TABLET BY MOUTH ONCE WEEKLY ON AN EMPTY STOMACH BEFORE BREAKFAST. REMAIN UPRIGHT FOR 30 MINUTES AND TAKE WITH 8 OUNCES OF WATER  Patient taking differently: Take 1 tablet by mouth Every 7 (Seven) Days. Monday 6/12/23   Deric Mendosa MD   aspirin 81 MG EC tablet Take 1 tablet by mouth Daily.    ProviderAngie MD   atenolol (TENORMIN) 50 MG tablet TAKE ONE TABLET BY MOUTH DAILY  Patient taking differently: Take 1 tablet by mouth Daily. 8/9/22   Deric Mendosa MD   calcium carbonate-cholecalciferol 500-400 MG-UNIT tablet tablet Take 1 tablet by mouth 2 (Two) Times a Day.    ProviderAngie MD   docusate sodium 100 MG capsule Take 1 capsule by mouth 2 (Two) Times a Day As Needed for Constipation.  Patient taking differently: Take 1 capsule by mouth 2 (Two) Times a Day. 5/30/23   Kyle Galarza MD   magnesium hydroxide (MILK OF MAGNESIA) 400 MG/5ML suspension Take 30 mL by mouth Daily As Needed for Constipation.    Angie Espino MD   tiotropium (SPIRIVA) 18 MCG per inhalation capsule Place 2 capsules into inhaler and inhale Daily.    Angie Espino MD     ipratropium-albuterol, 3 mL, Nebulization, Q6H While Awake - RT  methylPREDNISolone sodium succinate, 40 mg, Intravenous, Q12H  sodium chloride, 10 mL, Intravenous,  "Q12H           Objective     Vital Signs  Vital Sign Min/Max for last 24 hours  Temp  Min: 98.4 øF (36.9 øC)  Max: 98.5 øF (36.9 øC)   BP  Min: 144/100  Max: 180/81   Pulse  Min: 105  Max: 115   Resp  Min: 22  Max: 22   SpO2  Min: 94 %  Max: 100 %   Flow (L/min)  Min: 4  Max: 4   Weight  Min: 51.8 kg (114 lb 3.2 oz)  Max: 54.4 kg (120 lb)     No intake or output data in the 24 hours ending 07/30/23 0407  No intake/output data recorded.  Last Weight and Admission Weight        07/30/23 0401   Weight: 51.8 kg (114 lb 3.2 oz)     Flowsheet Rows      Flowsheet Row First Filed Value   Admission Height 152.4 cm (60\") Documented at 07/30/2023 0032   Admission Weight 54.4 kg (120 lb) Documented at 07/30/2023 0032            Body mass index is 22.3 kg/mý.           Physical Exam:  General Appearance: Well-developed white female sitting up in bed she is awake she is on 4 L nasal cannula O2 oxygen saturations are 100%, I dropped her to 3 L  Eyes: Conjunctiva are clear and anicteric pupils are equal  ENT: Oral mucous membranes are little dry no erythema no exudates Mallampati type I airway and nasal septum midline  Neck: No lymphadenopathy or thyromegaly she does have prominent jugular venous distention to the angle of the jaw with her sitting upright at about 80 degrees elevation.  Trachea midline  Lungs: Decreased no wheezes no rales no rhonchi no percussible dullness she is not using accessory muscles she is talking in full sentences  Cardiac: Regular rate rhythm no murmur  Abdomen: Soft nontender no palpable hepatosplenomegaly  : Not examined  Musculoskeletal: Mild thoracic kyphosis  Skin: Warm and dry no jaundice no petechiae  Neuro: She is alert and oriented she is cooperative following commands moving all extremities  Extremities/P Vascular: No clubbing no cyanosis she does have edema 3+ pitting lower extremities at least to the knees palpable radial and dorsalis pedis pulses  MSE: Very anxious      Labs:  Results " from last 7 days   Lab Units 07/30/23  0058   GLUCOSE mg/dL 115*   SODIUM mmol/L 140   POTASSIUM mmol/L 4.3   CO2 mmol/L 31.1*   CHLORIDE mmol/L 97*   ANION GAP mmol/L 11.9   CREATININE mg/dL 0.94   BUN mg/dL 18   BUN / CREAT RATIO  19.1   CALCIUM mg/dL 9.4   ALK PHOS U/L 61   TOTAL PROTEIN g/dL 6.4   ALT (SGPT) U/L 16   AST (SGOT) U/L 18   BILIRUBIN mg/dL 0.2   ALBUMIN g/dL 3.4*   GLOBULIN gm/dL 3.0     Estimated Creatinine Clearance: 35.8 mL/min (by C-G formula based on SCr of 0.94 mg/dL).      Results from last 7 days   Lab Units 07/30/23  0058   WBC 10*3/mm3 13.47*   RBC 10*6/mm3 3.46*   HEMOGLOBIN g/dL 10.6*   HEMATOCRIT % 31.7*   MCV fL 91.6   MCH pg 30.6   MCHC g/dL 33.4   RDW % 12.7   RDW-SD fl 41.7   MPV fL 8.9   PLATELETS 10*3/mm3 234   NEUTROPHIL % % 71.8   LYMPHOCYTE % % 17.3*   MONOCYTES % % 8.1   EOSINOPHIL % % 1.9   BASOPHIL % % 0.5   IMM GRAN % % 0.4   NEUTROS ABS 10*3/mm3 9.68*   LYMPHS ABS 10*3/mm3 2.33   MONOS ABS 10*3/mm3 1.09*   EOS ABS 10*3/mm3 0.25   BASOS ABS 10*3/mm3 0.07   IMMATURE GRANS (ABS) 10*3/mm3 0.05   NRBC /100 WBC 0.1     Results from last 7 days   Lab Units 07/30/23  0149   PH, ARTERIAL pH units 7.381   PO2 ART mm Hg 75.5*   PCO2, ARTERIAL mm Hg 56.5*   HCO3 ART mmol/L 33.5*     Results from last 7 days   Lab Units 07/30/23  0058   HSTROP T ng/L 17*     Results from last 7 days   Lab Units 07/30/23  0058   PROBNP pg/mL 2,561.0*         Results from last 7 days   Lab Units 07/30/23  0058   PROCALCITONIN ng/mL 0.12     Results from last 7 days   Lab Units 07/30/23  0058   INR  0.99     Microbiology Results (last 10 days)       Procedure Component Value - Date/Time    Respiratory Panel PCR w/COVID-19(SARS-CoV-2) [facility]IM[facility]I/[facility] In-House, NP Swab in UTM/VTM, 3-4 HR TAT - Swab, Nasopharynx [[id number]]  (Normal) Collected: 07/30/23 0135    Lab Status: Final result Specimen: Swab from Nasopharynx Updated: 07/30/23 0258     ADENOVIRUS, PCR Not Detected     Coronavirus 229E Not  Detected     Coronavirus HKU1 Not Detected     Coronavirus NL63 Not Detected     Coronavirus OC43 Not Detected     COVID19 Not Detected     Human Metapneumovirus Not Detected     Human Rhinovirus/Enterovirus Not Detected     Influenza A PCR Not Detected     Influenza B PCR Not Detected     Parainfluenza Virus 1 Not Detected     Parainfluenza Virus 2 Not Detected     Parainfluenza Virus 3 Not Detected     Parainfluenza Virus 4 Not Detected     RSV, PCR Not Detected     Bordetella pertussis pcr Not Detected     Bordetella parapertussis PCR Not Detected     Chlamydophila pneumoniae PCR Not Detected     Mycoplasma pneumo by PCR Not Detected    Narrative:      In the setting of a positive respiratory panel with a viral infection PLUS a negative procalcitonin without other underlying concern for bacterial infection, consider observing off antibiotics or discontinuation of antibiotics and continue supportive care. If the respiratory panel is positive for atypical bacterial infection (Bordetella pertussis, Chlamydophila pneumoniae, or Mycoplasma pneumoniae), consider antibiotic de-escalation to target atypical bacterial infection.              Diagnostics:  XR Chest 1 View    Result Date: 7/30/2023  Patient: ROCÍO REYNOSO  Time Out: 03:05 Exam(s): XR CXR 1 VIEW EXAM:   XR Chest, 1 View CLINICAL HISTORY:      Shortness of breath. TECHNIQUE:   Frontal view of the chest. COMPARISON:   Chest radiograph 7 6 2023 FINDINGS:   Lungs:  Unremarkable.  No consolidation.   Pleural space:  Blunting of the costophrenic angles may represent pleural scarring or small pleural effusions.  No pneumothorax.   Heart:  Unremarkable.  No cardiomegaly.   Mediastinum:  Unremarkable.   Bones joints:  There are degenerative changes of the spine.  No acute fracture. IMPRESSION:       Blunting of the costophrenic angles may represent pleural scarring or small pleural effusions.     Electronically signed by Karen Munoz MD on 07-30-23 at 0305    XR  Chest 1 View    Result Date: 7/7/2023  Patient: ROCÍO REYNOSO  Time Out: 00:50 Exam(s): XR CXR 1 VIEW EXAM:   XR Chest, 1 View CLINICAL HISTORY:    Reason for exam: soa. TECHNIQUE:   Frontal view of the chest. COMPARISON:   No relevant prior studies available. FINDINGS:   Lungs:  Diffuse changes COPD.   Pleural space:  Unremarkable.  No pneumothorax.   Heart:  Unremarkable.  No cardiomegaly.   Mediastinum:  Unremarkable.   Bones joints:  Unremarkable. IMPRESSION:       No acute findings in the chest.     Electronically signed by Parveen Anderson MD on 07-07-23 at 0050    Results for orders placed during the hospital encounter of 08/31/18    Adult Transthoracic Echo Complete W/ Cont if Necessary Per Protocol    Interpretation Summary  ú Calculated right ventricular systolic pressure from tricuspid regurgitation is 45 mmHg.  ú Left ventricular systolic function is normal. Estimated EF = 63%.    EKG sinus tach with some diffuse inferior lateral ST depression  Chest x-ray reviewed compared to studies from earlier this month and May of this month seems to have some hyperinflation there is a little bit of blunting of the costophrenic angles but this looks to be chronic it may be reflection to the diaphragm based on some older lateral views related to her severe hyperinflation.  No definite acute infiltrates there are some chronic changes    Assessment & Plan     COPD with acute exacerbation respiratory pathogen panel and procalcitonin are normal mild leukocytosis really does not sound like this is infectious  Acute hypoxemic on chronic hypoxemic and hypercapnic respiratory failure looks like patient's had frequent hospitalizations she has chronic hypercapnia her pH is compensated currently.  She does not know her noninvasive ventilator settings we will try and put her on some empiric settings she is quite anxious about getting this.  But for some reason she did not use it last night when she was short of breath at  the nursing home.  Anemia looks like this is chronic and she is at her baseline at least from previous admission  Pulmonary hypertension by echocardiogram from 2018, question the etiology there was no evidence then of diastolic dysfunction question related to who group 3 disease my guess is she has quite severe obstructive lung disease but I do not see any lung functions to actually quantitate this extent of her disease or not, she may need lung functions a repeat echocardiogram at some point to further evaluate.  CHF she does have significant lower extremity edema and has significantly elevated proBNP with a normal creatinine she is probably in some CHF question is this all right-sided failure does she have some left-sided failure as well regardless she probably would benefit from diuresi  Hypertension she is severely hypertensive now they have checked on both arms she is somewhere between 174 and 190 systolic over 100s diastolic she says she has very labile blood pressure she is said she was not anything but it looks like from her med list she is on a couple of blood pressure medications including amlodipine, atenolol and losartan.  She needs better blood pressure control.  Minimally elevated high-sensitivity troponin some question mild ST depression on EKG comparing to a 7/6/2023 study this does not look appreciably different may need some follow-up troponins defer to primary service      Giuliano Pathak Jr, MD  07/30/23  04:07 EDT    Time:

## 2023-07-30 NOTE — H&P
Patient Name:  Lizzy Hicks  YOB: 1935  MRN:  [id number]  Admit Date:  7/30/2023  Patient Care Team:  Traci Hoyos MD as PCP - General (Geriatric Medicine)      Subjective   History Present Illness     Chief Complaint   Patient presents with    Shortness of Breath       Ms. Hicks is a 87 y.o. female with a history of previous smoker of 50 years quit in 2012, advanced COPD and chronic hypoxic and hypercapnic respiratory failure that presents to [facility] complaining of shortness of breath and low O2 sats beginning at bedtime last evening.  She is a resident of [facility].  Apparently had a regular day and evening yesterday.  At bedtime a nurse found her sats to be 80% on her chronic 2 L.  She was giving a breathing treatment which improved her O2 sat and increased her heart rate to the 140s.  She was sent to the ED.  The patient's daughter present during some of her time in the emergency department.  Patient wears 2 L of oxygen chronically and a CPAP at night though not always compliant with the Pap.    I have seen the patient in her room.  She is sleeping/resting and easily arousable.  Wearing the CPAP and somewhat a poor historian, waving her arms and shoeing me away.  She continues to have some mild shortness of breath.  She denies any chest pain or cough or fever or chills.  She denies any other complaints.  Unable to complete review of systems.    Chart review: Patient was admitted in May 2003 with COPD exacerbation.  Admitted again earlier this month, 7/6 through 7/11 for similar presentation of shortness of breath and tachycardia and treated in the intensive care unit with steroids and bronchodilators, followed by her pulmonologist.  Was discharged to rehab center with chronic prednisone 5 mg daily and trilogy noninvasive ventilator.    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 Baseline Na 128-130 since 2013.    Seborrheic dermatitis 01/20/2016    Swallowing difficulty     D/T INADVERTANT REMOVAL OF UVULA AS CHILD WITH T AND A     Past Surgical History:   Procedure Laterality Date    CAROTID ENDARTERECTOMY Right 9/24/2018    Procedure: RT CAROTID ENDARTERECTOMY WITH INTRA OPERATIVE CAROTID ARTERY DUPLEX SCAN;  Surgeon: Mikaela Galicia Jr., MD;  Location: [facility] OR;  Service: Vascular    CAROTID ENDARTERECTOMY Left 4/4/2019    Procedure: LEFT CAROTID ENDARTERECTOMY;  Surgeon: Mikaela Galicia Jr., MD;  Location: [facility] OR;  Service: Vascular    CATARACT EXTRACTION WITH INTRAOCULAR LENS IMPLANT      LEFT AND RIGHT    ORIF FEMUR FRACTURE Right     HARDWARE    TONSILLECTOMY AND ADENOIDECTOMY      INADVERTANTLY TOOK UVULA AT THIS TIME     Family History   Problem Relation Age of Onset    Malig Hyperthermia Neg Hx      Social History     Tobacco Use    Smoking status: Former     Packs/day: 0.25     Years: 40.00     Pack years: 10.00     Types: Cigarettes    Smokeless tobacco: Never    Tobacco comments:     quit 6 yr ago   Vaping Use    Vaping Use: Never used   Substance Use Topics    Alcohol use: Not Currently     Comment: 1-2 drinks day    Drug use: No     No current facility-administered medications on file prior to encounter.     Current Outpatient Medications on File Prior to Encounter   Medication Sig Dispense Refill    acetaminophen (TYLENOL) 325 MG tablet Take 2 tablets by mouth Every 4 (Four) Hours As Needed for Mild Pain.      amLODIPine (NORVASC) 5 MG tablet TAKE ONE TABLET BY MOUTH DAILY  (Patient taking differently: Take 1 tablet by mouth Daily.) 90 tablet 3    calcium carbonate (TUMS) 500 MG chewable tablet Chew 2 tablets 2 (Two) Times a Day As Needed for Heartburn.      famotidine (PEPCID) 20 MG tablet Take 1 tablet by mouth 2 (Two) Times a Day As Needed for Heartburn.      Fluticasone-Salmeterol (ADVAIR/WIXELA) 100-50 MCG/ACT DISKUS INHALE ONE PUFF BY MOUTH TWICE A DAY (Patient taking differently: Inhale 1 puff 2 (Two) Times a Day.) 60 each 2    ipratropium-albuterol (DUO-NEB) 0.5-2.5 mg/3 ml nebulizer Take 3 mL by nebulization Every 4 (Four) Hours As Needed for Shortness of Air or Wheezing. 360 mL     ipratropium-albuterol (DUO-NEB) 0.5-2.5 mg/3 ml nebulizer Take 3 mL by nebulization 4 (Four) Times a Day. 360 mL     losartan (COZAAR) 25 MG tablet Take 0.5 tablets by mouth Daily. 30 tablet 0    Multiple Vitamins-Minerals (ICAPS AREDS 2 PO) Take 1 tablet by mouth Daily. HOLD PRIOR TO SURG      Multiple Vitamins-Minerals (MULTIVITAMIN ADULTS 50+ PO) Take 1 tablet by mouth Daily. HOLD PRIOR TO SURG      prednisoLONE acetate (PRED FORTE) 1 % ophthalmic suspension Administer  to the right eye 4 (Four) Times a Day.      predniSONE (DELTASONE) 10 MG tablet Take 4 tabs daily x 3 days, then take 3 tabs daily x 3 days, then take 2 tabs daily x 3 days, then take 1 tab daily x 3 days 31 tablet 0    rosuvastatin (CRESTOR) 5 MG tablet TAKE ONE TABLET BY MOUTH DAILY (Patient taking differently: Take 1 tablet by mouth Daily.) 90 tablet 3    sodium chloride (GREGORY 128) 5 % ophthalmic solution Administer 1 drop to the right eye 4 (Four) Times a Day.      [DISCONTINUED] bisacodyl (DULCOLAX) 10 MG suppository Insert 1 suppository into the rectum Daily As Needed.      alendronate (FOSAMAX) 70 MG tablet TAKE 1 TABLET BY MOUTH ONCE WEEKLY ON AN EMPTY STOMACH BEFORE BREAKFAST. REMAIN UPRIGHT FOR 30 MINUTES AND TAKE WITH 8 OUNCES OF WATER (Patient taking differently: Take 1 tablet by mouth Every 7 (Seven) Days. Monday) 12  tablet 1    aspirin 81 MG EC tablet Take 1 tablet by mouth Daily.      atenolol (TENORMIN) 50 MG tablet TAKE ONE TABLET BY MOUTH DAILY (Patient taking differently: Take 1 tablet by mouth Daily.) 90 tablet 1    calcium carbonate-cholecalciferol 500-400 MG-UNIT tablet tablet Take 1 tablet by mouth 2 (Two) Times a Day.      docusate sodium 100 MG capsule Take 1 capsule by mouth 2 (Two) Times a Day As Needed for Constipation. (Patient taking differently: Take 1 capsule by mouth 2 (Two) Times a Day.)      magnesium hydroxide (MILK OF MAGNESIA) 400 MG/5ML suspension Take 30 mL by mouth Daily As Needed for Constipation.      tiotropium (SPIRIVA) 18 MCG per inhalation capsule Place 2 capsules into inhaler and inhale Daily.       Allergies   Allergen Reactions    Bee Venom Swelling       Objective    Objective     Vital Signs  Temp:  [98 øF (36.7 øC)-98.5 øF (36.9 øC)] 98 øF (36.7 øC)  Heart Rate:  [] 101  Resp:  [16-22] 18  BP: (136-180)/() 145/63  SpO2:  [89 %-100 %] 96 %  on  Flow (L/min):  [1-4] 1;   Device (Oxygen Therapy): nasal cannula  Body mass index is 22.3 kg/mý.    Physical Exam  Constitutional:       General: She is not in acute distress.     Appearance: She is ill-appearing. She is not toxic-appearing.      Comments: Elderly, frail, chronically ill-appearing.   HENT:      Head: Normocephalic and atraumatic.   Eyes:      Pupils: Pupils are equal, round, and reactive to light.   Cardiovascular:      Rate and Rhythm: Normal rate and regular rhythm.      Pulses: Normal pulses.      Heart sounds: Normal heart sounds. No murmur heard.  Pulmonary:      Comments: Tachypneic with some mild accessory muscle use, mild distress.  Wearing CPAP.  No cough.  Lung sounds diminished throughout.  Wheeze to left upper lobe.  Abdominal:      General: Bowel sounds are normal. There is no distension.      Palpations: Abdomen is soft.      Tenderness: There is no abdominal tenderness.   Musculoskeletal:         General:  Swelling (Moderate BLE) present.      Cervical back: Normal range of motion.   Skin:     General: Skin is warm and dry.      Comments: Thin, frail, fragile   Neurological:      Mental Status: She is alert.      Comments: Limited assessment, oriented to self, appears confused and sleepy though arousable.       Results Review:  I reviewed the patient's new clinical results.      Lab Results (last 24 hours)       Procedure Component Value Units Date/Time    CBC & Differential [[id number]]  (Abnormal) Collected: 07/30/23 0058    Specimen: Blood Updated: 07/30/23 0130    Narrative:      The following orders were created for panel order CBC & Differential.  Procedure                               Abnormality         Status                     ---------                               -----------         ------                     CBC Auto Differential[[id number]]        Abnormal            Final result                 Please view results for these tests on the individual orders.    Comprehensive Metabolic Panel [[id number]]  (Abnormal) Collected: 07/30/23 0058    Specimen: Blood Updated: 07/30/23 0208     Glucose 115 mg/dL      BUN 18 mg/dL      Creatinine 0.94 mg/dL      Sodium 140 mmol/L      Potassium 4.3 mmol/L      Comment: Slight hemolysis detected by analyzer. Results may be affected.        Chloride 97 mmol/L      CO2 31.1 mmol/L      Calcium 9.4 mg/dL      Total Protein 6.4 g/dL      Albumin 3.4 g/dL      ALT (SGPT) 16 U/L      AST (SGOT) 18 U/L      Comment: Slight hemolysis detected by analyzer. Results may be affected.        Alkaline Phosphatase 61 U/L      Total Bilirubin 0.2 mg/dL      Globulin 3.0 gm/dL      A/G Ratio 1.1 g/dL      BUN/Creatinine Ratio 19.1     Anion Gap 11.9 mmol/L      eGFR 59.6 mL/min/1.73     Narrative:      GFR Normal >60  Chronic Kidney Disease <60  Kidney Failure <15    The GFR formula is only valid for adults with stable renal function between ages 18 and 70.    Protime-INR [[id number]]   "(Normal) Collected: 07/30/23 0058    Specimen: Blood Updated: 07/30/23 0149     Protime 13.2 Seconds      INR 0.99    BNP [[id number]]  (Abnormal) Collected: 07/30/23 0058    Specimen: Blood Updated: 07/30/23 0158     proBNP 2,561.0 pg/mL     Narrative:      Among patients with dyspnea, NT-proBNP is highly sensitive for the detection of acute congestive heart failure. In addition NT-proBNP of <300 pg/ml effectively rules out acute congestive heart failure with 99% negative predictive value.    Results may be falsely decreased if patient taking Biotin.      Single High Sensitivity Troponin T [[id number]]  (Abnormal) Collected: 07/30/23 0058    Specimen: Blood Updated: 07/30/23 0158     HS Troponin T 17 ng/L     Narrative:      High Sensitive Troponin T Reference Range:  <10.0 ng/L- Negative Female for AMI  <15.0 ng/L- Negative Male for AMI  >=10 - Abnormal Female indicating possible myocardial injury.  >=15 - Abnormal Male indicating possible myocardial injury.   Clinicians would have to utilize clinical acumen, EKG, Troponin, and serial changes to determine if it is an Acute Myocardial Infarction or myocardial injury due to an underlying chronic condition.         Procalcitonin [[id number]]  (Normal) Collected: 07/30/23 0058    Specimen: Blood Updated: 07/30/23 0158     Procalcitonin 0.12 ng/mL     Narrative:      As a Marker for Sepsis (Non-Neonates):    1. <0.5 ng/mL represents a low risk of severe sepsis and/or septic shock.  2. >2 ng/mL represents a high risk of severe sepsis and/or septic shock.    As a Marker for Lower Respiratory Tract Infections that require antibiotic therapy:    PCT on Admission    Antibiotic Therapy       6-12 Hrs later    >0.5                Strongly Recommended  >0.25 - <0.5        Recommended   0.1 - 0.25          Discouraged              Remeasure/reassess PCT  <0.1                Strongly Discouraged     Remeasure/reassess PCT    As 28 day mortality risk marker: \"Change in Procalcitonin " "Result\" (>80% or <=80%) if Day 0 (or Day 1) and Day 4 values are available. Refer to http://www.[facility]-pct-calculator.com    Change in PCT <=80%  A decrease of PCT levels below or equal to 80% defines a positive change in PCT test result representing a higher risk for 28-day all-cause mortality of patients diagnosed with severe sepsis for septic shock.    Change in PCT >80%  A decrease of PCT levels of more than 80% defines a negative change in PCT result representing a lower risk for 28-day all-cause mortality of patients diagnosed with severe sepsis or septic shock.       CBC Auto Differential [[id number]]  (Abnormal) Collected: 07/30/23 0058    Specimen: Blood Updated: 07/30/23 0130     WBC 13.47 10*3/mm3      RBC 3.46 10*6/mm3      Hemoglobin 10.6 g/dL      Hematocrit 31.7 %      MCV 91.6 fL      MCH 30.6 pg      MCHC 33.4 g/dL      RDW 12.7 %      RDW-SD 41.7 fl      MPV 8.9 fL      Platelets 234 10*3/mm3      Neutrophil % 71.8 %      Lymphocyte % 17.3 %      Monocyte % 8.1 %      Eosinophil % 1.9 %      Basophil % 0.5 %      Immature Grans % 0.4 %      Neutrophils, Absolute 9.68 10*3/mm3      Lymphocytes, Absolute 2.33 10*3/mm3      Monocytes, Absolute 1.09 10*3/mm3      Eosinophils, Absolute 0.25 10*3/mm3      Basophils, Absolute 0.07 10*3/mm3      Immature Grans, Absolute 0.05 10*3/mm3      nRBC 0.1 /100 WBC     Respiratory Panel PCR w/COVID-19(SARS-CoV-2) LUIS CARLOS/JAIMEE/JOI/PAD/COR/MAD/KARLEE In-House, NP Swab in UTM/VTM, 3-4 HR TAT - Swab, Nasopharynx [[id number]]  (Normal) Collected: 07/30/23 0135    Specimen: Swab from Nasopharynx Updated: 07/30/23 0258     ADENOVIRUS, PCR Not Detected     Coronavirus 229E Not Detected     Coronavirus HKU1 Not Detected     Coronavirus NL63 Not Detected     Coronavirus OC43 Not Detected     COVID19 Not Detected     Human Metapneumovirus Not Detected     Human Rhinovirus/Enterovirus Not Detected     Influenza A PCR Not Detected     Influenza B PCR Not Detected     Parainfluenza Virus " 1 Not Detected     Parainfluenza Virus 2 Not Detected     Parainfluenza Virus 3 Not Detected     Parainfluenza Virus 4 Not Detected     RSV, PCR Not Detected     Bordetella pertussis pcr Not Detected     Bordetella parapertussis PCR Not Detected     Chlamydophila pneumoniae PCR Not Detected     Mycoplasma pneumo by PCR Not Detected    Narrative:      In the setting of a positive respiratory panel with a viral infection PLUS a negative procalcitonin without other underlying concern for bacterial infection, consider observing off antibiotics or discontinuation of antibiotics and continue supportive care. If the respiratory panel is positive for atypical bacterial infection (Bordetella pertussis, Chlamydophila pneumoniae, or Mycoplasma pneumoniae), consider antibiotic de-escalation to target atypical bacterial infection.    Blood Gas, Arterial - [[id number]]  (Abnormal) Collected: 07/30/23 0149    Specimen: Arterial Blood Updated: 07/30/23 0203     Site Arterial: right radial     Lukasz's Test Positive     pH, Arterial 7.381 pH units      pCO2, Arterial 56.5 mm Hg      Comment: Sat 98% Meter: [id number] : [id number] Lynnette ReneeCritical:Notify SHAVON Gutiérrez (30-Jul-23 01:55:11)Read back ok        pO2, Arterial 75.5 mm Hg      HCO3, Arterial 33.5 mmol/L      Base Excess, Arterial 6.9 mmol/L      O2 Saturation Calculated 94.3 %      Barometric Pressure for Blood Gas 751.6 mmHg      Modality Cannula     Flow Rate 4 lpm      Rate 22 Breaths/minute     Basic Metabolic Panel [[id number]]  (Abnormal) Collected: 07/30/23 0410    Specimen: Blood Updated: 07/30/23 0513     Glucose 134 mg/dL      BUN 17 mg/dL      Creatinine 0.87 mg/dL      Sodium 138 mmol/L      Potassium 4.1 mmol/L      Chloride 96 mmol/L      CO2 32.1 mmol/L      Calcium 9.9 mg/dL      BUN/Creatinine Ratio 19.5     Anion Gap 9.9 mmol/L      eGFR 65.4 mL/min/1.73     Narrative:      GFR Normal >60  Chronic Kidney Disease <60  Kidney Failure <15    The  GFR formula is only valid for adults with stable renal function between ages 18 and 70.    CBC (No Diff) [[id number]]  (Abnormal) Collected: 07/30/23 0410    Specimen: Blood Updated: 07/30/23 0457     WBC 12.43 10*3/mm3      RBC 3.58 10*6/mm3      Hemoglobin 10.8 g/dL      Hematocrit 32.5 %      MCV 90.8 fL      MCH 30.2 pg      MCHC 33.2 g/dL      RDW 12.4 %      RDW-SD 41.2 fl      MPV 8.8 fL      Platelets 253 10*3/mm3     High Sensitivity Troponin T [[id number]]  (Abnormal) Collected: 07/30/23 0410    Specimen: Blood Updated: 07/30/23 0513     HS Troponin T 22 ng/L     Narrative:      High Sensitive Troponin T Reference Range:  <10.0 ng/L- Negative Female for AMI  <15.0 ng/L- Negative Male for AMI  >=10 - Abnormal Female indicating possible myocardial injury.  >=15 - Abnormal Male indicating possible myocardial injury.   Clinicians would have to utilize clinical acumen, EKG, Troponin, and serial changes to determine if it is an Acute Myocardial Infarction or myocardial injury due to an underlying chronic condition.         High Sensitivity Troponin T 2Hr [[id number]]  (Abnormal) Collected: 07/30/23 0614    Specimen: Blood Updated: 07/30/23 0716     HS Troponin T 27 ng/L      Troponin T Delta 5 ng/L     Narrative:      High Sensitive Troponin T Reference Range:  <10.0 ng/L- Negative Female for AMI  <15.0 ng/L- Negative Male for AMI  >=10 - Abnormal Female indicating possible myocardial injury.  >=15 - Abnormal Male indicating possible myocardial injury.   Clinicians would have to utilize clinical acumen, EKG, Troponin, and serial changes to determine if it is an Acute Myocardial Infarction or myocardial injury due to an underlying chronic condition.                 Imaging Results (Last 24 Hours)       Procedure Component Value Units Date/Time    XR Chest 1 View [[id number]] Collected: 07/30/23 0306     Updated: 07/30/23 0306    Narrative:        Patient: ROCÍO REYNOSO  Time Out: 03:05  Exam(s): XR CXR 1 VIEW      EXAM:    XR Chest, 1 View    CLINICAL HISTORY:       Shortness of breath.    TECHNIQUE:    Frontal view of the chest.    COMPARISON:    Chest radiograph 7 6 2023    FINDINGS:    Lungs:  Unremarkable.  No consolidation.    Pleural space:  Blunting of the costophrenic angles may represent   pleural scarring or small pleural effusions.  No pneumothorax.    Heart:  Unremarkable.  No cardiomegaly.    Mediastinum:  Unremarkable.    Bones joints:  There are degenerative changes of the spine.  No acute   fracture.    IMPRESSION:         Blunting of the costophrenic angles may represent pleural scarring or   small pleural effusions.      Impression:          Electronically signed by Karen Munoz MD on 07-30-23 at 0305            Results for orders placed during the hospital encounter of 08/31/18    Adult Transthoracic Echo Complete W/ Cont if Necessary Per Protocol    Interpretation Summary  ú Calculated right ventricular systolic pressure from tricuspid regurgitation is 45 mmHg.  ú Left ventricular systolic function is normal. Estimated EF = 63%.      ECG 12 Lead Dyspnea   Preliminary Result   HEART RATE= 115  bpm   RR Interval= 522  ms   NV Interval= 161  ms   P Horizontal Axis= -59  deg   P Front Axis= 71  deg   QRSD Interval= 89  ms   QT Interval= 336  ms   QRS Axis= 77  deg   T Wave Axis= 45  deg   - ABNORMAL ECG -   Sinus tachycardia   ST depression, consider ischemia, diffuse lds   Electronically Signed By:    Date and Time of Study: 2023-07-30 01:41:00           Assessment/Plan     Active Hospital Problems    Diagnosis  POA    **COPD with acute exacerbation [J44.1]  Yes    Sleep apnea [G47.30]  Unknown    Acute respiratory failure with hypoxia and hypercapnia [J96.01, J96.02]  Yes    Carotid artery disease [I77.9]  Yes    Hypertension [I10]  Yes      Resolved Hospital Problems   No resolved problems to display.     Acute on chronic hypoxic and hypercapnic respiratory failure   COPD exacerbation  Continue  noninvasive ventilator empiric settings per pulmonology.  Received 60 mg Solu-Medrol and now scheduled 40 mg twice daily.  Scheduled DuoNebs.  RVP is negative.  CHF exacerbation  Questionable pleural effusions. Lower extremity edema.  Received 60 mg furosemide.  Start 40 mg twice daily.  Echocardiogram pending.  Consult cardiology to weigh in.  Elevated troponins questionable mild ST depression  No reports of chest pain, though a poor historian.  Could be related to hypoxia and CHF.  Hypertension  Improving.  Restart home regimen.  Anemia  Stable, baseline.    Appreciate the expertise of Dr. Pathak.     I have been able to speak with the patient's daughter and power of .  We have confirmed DNR/DNI.  Reviewed living will on file, no changes in wishes have occurred since signing of the document.  We have discussed at length prognosis and the high probability of repeated hospitalizations for the rest of this patient's life under the current plan of care.  We have discussed treatment model versus comfort and functioning care model.  Family has been told recently that the patient has approximately 6 months to 2 years to live.  Family has some understanding of palliative care and met with the palliative care team last time but did have many questions regarding how certain scenarios would be handled under a palliative care model, who would provide palliative care, etc. Family is interested in speaking with palliative care team to explore options and clarify care goals for remainder of life.     Palliative care consult has been initiated.    28 minutes Total time on phone with patient's daughter and Jasmin JOHNSON.    I discussed the patient's findings and my recommendations with family and  .    VTE Prophylaxis - SCDs.  Code Status - Full code.       DAVID Welsh  [facility]  07/30/23  15:18 EDT

## 2023-07-30 NOTE — ED TRIAGE NOTES
Pt to ED via EMS from [facility]. Pt has hx of COPD. Pt wears 2L O2 at baseline. Pt's O2 was at 82% at 2L, facility increased O2 to 3L with little improvement, gave breathing treatment. Pt had improvement but then heart rate increased to 140, then O2 decreased again. Pt unable to maintain stats.   Currently pt is on 4L. Duo neb given in route with EMS. Pt does have a nonproductive cough.

## 2023-07-31 ENCOUNTER — APPOINTMENT (OUTPATIENT)
Dept: CARDIOLOGY | Facility: HOSPITAL | Age: 88
DRG: 190 | End: 2023-07-31
Payer: MEDICARE

## 2023-07-31 ENCOUNTER — TELEPHONE (OUTPATIENT)
Dept: INTERNAL MEDICINE | Facility: CLINIC | Age: 88
End: 2023-07-31
Payer: MEDICARE

## 2023-07-31 LAB
ANION GAP SERPL CALCULATED.3IONS-SCNC: 8 MMOL/L (ref 5–15)
AORTIC DIMENSIONLESS INDEX: 0.6 (DI)
BH CV ECHO MEAS - AO MAX PG: 8.7 MMHG
BH CV ECHO MEAS - AO MEAN PG: 4.3 MMHG
BH CV ECHO MEAS - AO V2 MAX: 147.8 CM/SEC
BH CV ECHO MEAS - AO V2 VTI: 31.5 CM
BH CV ECHO MEAS - AVA(I,D): 1.2 CM2
BH CV ECHO MEAS - EDV(MOD-SP4): 68 ML
BH CV ECHO MEAS - EF(MOD-BP): 58.6 %
BH CV ECHO MEAS - EF(MOD-SP4): 58.8 %
BH CV ECHO MEAS - ESV(MOD-SP4): 28 ML
BH CV ECHO MEAS - LAT PEAK E' VEL: 5.4 CM/SEC
BH CV ECHO MEAS - LV DIASTOLIC VOL/BSA (35-75): 45.8 CM2
BH CV ECHO MEAS - LV MAX PG: 2.48 MMHG
BH CV ECHO MEAS - LV MEAN PG: 1 MMHG
BH CV ECHO MEAS - LV SYSTOLIC VOL/BSA (12-30): 18.8 CM2
BH CV ECHO MEAS - LV V1 MAX: 78.8 CM/SEC
BH CV ECHO MEAS - LV V1 VTI: 18.6 CM
BH CV ECHO MEAS - LVOT AREA: 2.03 CM2
BH CV ECHO MEAS - LVOT DIAM: 1.61 CM
BH CV ECHO MEAS - MED PEAK E' VEL: 4.8 CM/SEC
BH CV ECHO MEAS - MV A DUR: 0.14 SEC
BH CV ECHO MEAS - MV A MAX VEL: 108.8 CM/SEC
BH CV ECHO MEAS - MV DEC SLOPE: 678.7 CM/SEC2
BH CV ECHO MEAS - MV DEC TIME: 180 MSEC
BH CV ECHO MEAS - MV E MAX VEL: 106.3 CM/SEC
BH CV ECHO MEAS - MV E/A: 0.98
BH CV ECHO MEAS - MV MAX PG: 9.2 MMHG
BH CV ECHO MEAS - MV MEAN PG: 3.4 MMHG
BH CV ECHO MEAS - MV P1/2T: 60.9 MSEC
BH CV ECHO MEAS - MV V2 VTI: 36 CM
BH CV ECHO MEAS - MVA(P1/2T): 3.6 CM2
BH CV ECHO MEAS - MVA(VTI): 1.04 CM2
BH CV ECHO MEAS - PA ACC TIME: 0.12 SEC
BH CV ECHO MEAS - PA V2 MAX: 80.1 CM/SEC
BH CV ECHO MEAS - QP/QS: 0.98
BH CV ECHO MEAS - RAP SYSTOLE: 8 MMHG
BH CV ECHO MEAS - RV MAX PG: 2.22 MMHG
BH CV ECHO MEAS - RV V1 MAX: 74.6 CM/SEC
BH CV ECHO MEAS - RV V1 VTI: 14.5 CM
BH CV ECHO MEAS - RVOT DIAM: 1.8 CM
BH CV ECHO MEAS - RVSP: 32 MMHG
BH CV ECHO MEAS - SI(MOD-SP4): 26.9 ML/M2
BH CV ECHO MEAS - SV(LVOT): 37.7 ML
BH CV ECHO MEAS - SV(MOD-SP4): 40 ML
BH CV ECHO MEAS - SV(RVOT): 36.9 ML
BH CV ECHO MEAS - TAPSE (>1.6): 1.2 CM
BH CV ECHO MEAS - TR MAX PG: 23.6 MMHG
BH CV ECHO MEAS - TR MAX VEL: 243 CM/SEC
BH CV ECHO MEASUREMENTS AVERAGE E/E' RATIO: 20.84
BH CV XLRA - RV BASE: 3.1 CM
BH CV XLRA - RV LENGTH: 7.5 CM
BH CV XLRA - RV MID: 2.18 CM
BH CV XLRA - TDI S': 8.1 CM/SEC
BUN SERPL-MCNC: 25 MG/DL (ref 8–23)
BUN/CREAT SERPL: 28.7 (ref 7–25)
CALCIUM SPEC-SCNC: 8.7 MG/DL (ref 8.6–10.5)
CHLORIDE SERPL-SCNC: 94 MMOL/L (ref 98–107)
CHOLEST SERPL-MCNC: 206 MG/DL (ref 0–200)
CO2 SERPL-SCNC: 36 MMOL/L (ref 22–29)
CREAT SERPL-MCNC: 0.87 MG/DL (ref 0.57–1)
DEPRECATED RDW RBC AUTO: 43.1 FL (ref 37–54)
EGFRCR SERPLBLD CKD-EPI 2021: 65.4 ML/MIN/1.73
ERYTHROCYTE [DISTWIDTH] IN BLOOD BY AUTOMATED COUNT: 13 % (ref 12.3–15.4)
GLUCOSE SERPL-MCNC: 146 MG/DL (ref 65–99)
HCT VFR BLD AUTO: 29.9 % (ref 34–46.6)
HDLC SERPL-MCNC: 100 MG/DL (ref 40–60)
HGB BLD-MCNC: 10.1 G/DL (ref 12–15.9)
LDLC SERPL CALC-MCNC: 94 MG/DL (ref 0–100)
LDLC/HDLC SERPL: 0.92 {RATIO}
MAGNESIUM SERPL-MCNC: 1.5 MG/DL (ref 1.6–2.4)
MCH RBC QN AUTO: 31 PG (ref 26.6–33)
MCHC RBC AUTO-ENTMCNC: 33.8 G/DL (ref 31.5–35.7)
MCV RBC AUTO: 91.7 FL (ref 79–97)
PLATELET # BLD AUTO: 244 10*3/MM3 (ref 140–450)
PMV BLD AUTO: 9.1 FL (ref 6–12)
POTASSIUM SERPL-SCNC: 4.6 MMOL/L (ref 3.5–5.2)
RBC # BLD AUTO: 3.26 10*6/MM3 (ref 3.77–5.28)
SODIUM SERPL-SCNC: 138 MMOL/L (ref 136–145)
TRIGL SERPL-MCNC: 69 MG/DL (ref 0–150)
TSH SERPL DL<=0.05 MIU/L-ACNC: 0.52 UIU/ML (ref 0.27–4.2)
VLDLC SERPL-MCNC: 12 MG/DL (ref 5–40)
WBC NRBC COR # BLD: 11.68 10*3/MM3 (ref 3.4–10.8)

## 2023-07-31 PROCEDURE — 80048 BASIC METABOLIC PNL TOTAL CA: CPT | Performed by: INTERNAL MEDICINE

## 2023-07-31 PROCEDURE — 99222 1ST HOSP IP/OBS MODERATE 55: CPT | Performed by: INTERNAL MEDICINE

## 2023-07-31 PROCEDURE — 85027 COMPLETE CBC AUTOMATED: CPT | Performed by: INTERNAL MEDICINE

## 2023-07-31 PROCEDURE — 25010000002 METHYLPREDNISOLONE PER 40 MG: Performed by: NURSE PRACTITIONER

## 2023-07-31 PROCEDURE — 94799 UNLISTED PULMONARY SVC/PX: CPT

## 2023-07-31 PROCEDURE — 94761 N-INVAS EAR/PLS OXIMETRY MLT: CPT

## 2023-07-31 PROCEDURE — 92610 EVALUATE SWALLOWING FUNCTION: CPT

## 2023-07-31 PROCEDURE — 93306 TTE W/DOPPLER COMPLETE: CPT

## 2023-07-31 PROCEDURE — 83735 ASSAY OF MAGNESIUM: CPT | Performed by: INTERNAL MEDICINE

## 2023-07-31 PROCEDURE — 93306 TTE W/DOPPLER COMPLETE: CPT | Performed by: INTERNAL MEDICINE

## 2023-07-31 PROCEDURE — 97161 PT EVAL LOW COMPLEX 20 MIN: CPT

## 2023-07-31 PROCEDURE — 25010000002 FUROSEMIDE PER 20 MG: Performed by: NURSE PRACTITIONER

## 2023-07-31 PROCEDURE — 94664 DEMO&/EVAL PT USE INHALER: CPT

## 2023-07-31 PROCEDURE — 97530 THERAPEUTIC ACTIVITIES: CPT

## 2023-07-31 PROCEDURE — 84443 ASSAY THYROID STIM HORMONE: CPT | Performed by: INTERNAL MEDICINE

## 2023-07-31 PROCEDURE — 80061 LIPID PANEL: CPT | Performed by: INTERNAL MEDICINE

## 2023-07-31 RX ORDER — FUROSEMIDE 40 MG/1
40 TABLET ORAL DAILY
Status: DISCONTINUED | OUTPATIENT
Start: 2023-08-01 | End: 2023-08-03 | Stop reason: HOSPADM

## 2023-07-31 RX ADMIN — GUAIFENESIN 1200 MG: 600 TABLET, EXTENDED RELEASE ORAL at 09:28

## 2023-07-31 RX ADMIN — PREDNISOLONE ACETATE 1 DROP: 10 SUSPENSION/ DROPS OPHTHALMIC at 22:44

## 2023-07-31 RX ADMIN — AMLODIPINE BESYLATE 5 MG: 5 TABLET ORAL at 09:29

## 2023-07-31 RX ADMIN — Medication 500 MG: at 09:29

## 2023-07-31 RX ADMIN — PREDNISOLONE ACETATE 1 DROP: 10 SUSPENSION/ DROPS OPHTHALMIC at 17:49

## 2023-07-31 RX ADMIN — PREDNISOLONE ACETATE 1 DROP: 10 SUSPENSION/ DROPS OPHTHALMIC at 09:29

## 2023-07-31 RX ADMIN — GUAIFENESIN 1200 MG: 600 TABLET, EXTENDED RELEASE ORAL at 22:43

## 2023-07-31 RX ADMIN — SODIUM CHLORIDE 1 DROP: 50 SOLUTION OPHTHALMIC at 17:49

## 2023-07-31 RX ADMIN — ATENOLOL 50 MG: 50 TABLET ORAL at 09:29

## 2023-07-31 RX ADMIN — METHYLPREDNISOLONE SODIUM SUCCINATE 40 MG: 40 INJECTION, POWDER, LYOPHILIZED, FOR SOLUTION INTRAMUSCULAR; INTRAVENOUS at 09:29

## 2023-07-31 RX ADMIN — LOSARTAN POTASSIUM 12.5 MG: 25 TABLET, FILM COATED ORAL at 09:29

## 2023-07-31 RX ADMIN — ROSUVASTATIN CALCIUM 40 MG: 40 TABLET, FILM COATED ORAL at 22:43

## 2023-07-31 RX ADMIN — IPRATROPIUM BROMIDE AND ALBUTEROL SULFATE 3 ML: .5; 3 SOLUTION RESPIRATORY (INHALATION) at 12:14

## 2023-07-31 RX ADMIN — SODIUM CHLORIDE 1 DROP: 50 SOLUTION OPHTHALMIC at 22:44

## 2023-07-31 RX ADMIN — BUDESONIDE AND FORMOTEROL FUMARATE DIHYDRATE 1 PUFF: 160; 4.5 AEROSOL RESPIRATORY (INHALATION) at 09:12

## 2023-07-31 RX ADMIN — BUDESONIDE AND FORMOTEROL FUMARATE DIHYDRATE 1 PUFF: 160; 4.5 AEROSOL RESPIRATORY (INHALATION) at 19:10

## 2023-07-31 RX ADMIN — FUROSEMIDE 60 MG: 10 INJECTION, SOLUTION INTRAMUSCULAR; INTRAVENOUS at 09:28

## 2023-07-31 RX ADMIN — IPRATROPIUM BROMIDE AND ALBUTEROL SULFATE 3 ML: .5; 3 SOLUTION RESPIRATORY (INHALATION) at 15:51

## 2023-07-31 RX ADMIN — ASPIRIN 81 MG: 81 TABLET, COATED ORAL at 09:29

## 2023-07-31 RX ADMIN — PREDNISOLONE ACETATE 1 DROP: 10 SUSPENSION/ DROPS OPHTHALMIC at 12:06

## 2023-07-31 RX ADMIN — Medication 10 ML: at 09:39

## 2023-07-31 RX ADMIN — IPRATROPIUM BROMIDE AND ALBUTEROL SULFATE 3 ML: .5; 3 SOLUTION RESPIRATORY (INHALATION) at 19:09

## 2023-07-31 RX ADMIN — SODIUM CHLORIDE 1 DROP: 50 SOLUTION OPHTHALMIC at 09:29

## 2023-07-31 RX ADMIN — IPRATROPIUM BROMIDE AND ALBUTEROL SULFATE 3 ML: .5; 3 SOLUTION RESPIRATORY (INHALATION) at 09:08

## 2023-07-31 RX ADMIN — SODIUM CHLORIDE 1 DROP: 50 SOLUTION OPHTHALMIC at 12:06

## 2023-07-31 NOTE — CONSULTS
Date of Hospital Visit: 2023  Date of consult: 23  Encounter Provider: Félix Sanchez MD  Place of Service: [facility] CARDIOLOGY  Patient Name: Lizzy Hicks  :1935  Referral Provider: Dino Enrique MD    Chief complaint:shortness of breath     Reason for consult: CHF    History of Present Illness Lizzy Hicks is a 87 year old pt with a history of HTN, COPD, carotid artery stenosis, OA, and HLD.     Pt had a carotid duplex in  that showed  moderate disease on the left, and mild disease on the right.      Pt was seen by Dr. Raymond in 2018 for abnormal EKG and low BP. Pt has a long history of HTN and required progressive escalation of therapy over several years. Pt was placed on phentermine in  before being seen by Dr. Raymond and after a few weeks, she was unsteady on her feet, fatigued, mildly lightheaded and just not feeling like her self. Pt was seen by Dr. Mendosa and her BP was 108. Her losartan and phentermine were stopped, and amlodipine was cut in half. She reported was  starting to feel better and was almost back to her baseline. She reported during that period she had an episode of sharp left shoulder pain below her left breast but only with deep breathing. It resolved. She denied any exertional chest pain, orthopnea, PND, syncope or edema.  An EKG was obtained for chest pain by Dr. Mendosa and showed diffuse ST depressions. An ECHO and carotid dopplers were ordered. An ECHO on 18 showed normal LVSF and EF of 63 %.         Pt presented to ER on 23 with complaints of shortness of breath. Pt has chronic shortness of breath and is on home oxygen at 2 L NC. Per her daughter, pt looked fine earlier in the evening and then around bedtime started getting short of breath was hypoxic with sats of 80 %.  Pt was given breathing treatments and HR was in 140s and was sent to ER.   Pt was discharged 2 weeks ago for similar symptoms. In ER, troponin T  17, proBNP 2561, WBC 13.47, HGB 10.6, procal 0.12,       I have been asked to see for CHF.     ECHO 8/31/18  Calculated right ventricular systolic pressure from tricuspid regurgitation is 45 mmHg.  Left ventricular systolic function is normal. Estimated EF = 63%.      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 Baseline Na 128-130 since 2013.    Seborrheic dermatitis 01/20/2016    Swallowing difficulty     D/T INADVERTANT REMOVAL OF UVULA AS CHILD WITH T AND A       Past Surgical History:   Procedure Laterality Date    CAROTID ENDARTERECTOMY Right 9/24/2018    Procedure: RT CAROTID ENDARTERECTOMY WITH INTRA OPERATIVE CAROTID ARTERY DUPLEX SCAN;  Surgeon: Mikaela Galicia Jr., MD;  Location: [facility] OR;  Service: Vascular    CAROTID ENDARTERECTOMY Left 4/4/2019    Procedure: LEFT CAROTID ENDARTERECTOMY;  Surgeon: Mikaela Galicia Jr., MD;  Location: [facility] OR;  Service: Vascular    CATARACT EXTRACTION WITH INTRAOCULAR LENS IMPLANT      LEFT AND RIGHT    ORIF FEMUR FRACTURE Right     HARDWARE    TONSILLECTOMY AND ADENOIDECTOMY      INADVERTANTLY TOOK UVULA AT THIS TIME       Medications Prior to Admission   Medication Sig Dispense Refill Last Dose    acetaminophen (TYLENOL) 325 MG tablet Take 2 tablets by mouth Every 4 (Four) Hours As Needed for Mild Pain.       amLODIPine (NORVASC) 5 MG tablet TAKE ONE TABLET BY MOUTH DAILY (Patient taking differently: Take 1 tablet by mouth Daily.) 90 tablet 3 7/29/2023 at 0800    calcium carbonate (TUMS) 500 MG chewable tablet Chew 2 tablets 2 (Two) Times a Day As Needed for Heartburn.        famotidine (PEPCID) 20 MG tablet Take 1 tablet by mouth 2 (Two) Times a Day As Needed for Heartburn.       Fluticasone-Salmeterol (ADVAIR/WIXELA) 100-50 MCG/ACT DISKUS INHALE ONE PUFF BY MOUTH TWICE A DAY (Patient taking differently: Inhale 1 puff 2 (Two) Times a Day.) 60 each 2     ipratropium-albuterol (DUO-NEB) 0.5-2.5 mg/3 ml nebulizer Take 3 mL by nebulization Every 4 (Four) Hours As Needed for Shortness of Air or Wheezing. 360 mL      ipratropium-albuterol (DUO-NEB) 0.5-2.5 mg/3 ml nebulizer Take 3 mL by nebulization 4 (Four) Times a Day. 360 mL      losartan (COZAAR) 25 MG tablet Take 0.5 tablets by mouth Daily. 30 tablet 0 7/29/2023 at 0800    Multiple Vitamins-Minerals (ICAPS AREDS 2 PO) Take 1 tablet by mouth Daily. HOLD PRIOR TO SURG   7/29/2023 at 0800    Multiple Vitamins-Minerals (MULTIVITAMIN ADULTS 50+ PO) Take 1 tablet by mouth Daily. HOLD PRIOR TO SURG   7/29/2023 at 0800    prednisoLONE acetate (PRED FORTE) 1 % ophthalmic suspension Administer  to the right eye 4 (Four) Times a Day.   7/29/2023    predniSONE (DELTASONE) 10 MG tablet Take 4 tabs daily x 3 days, then take 3 tabs daily x 3 days, then take 2 tabs daily x 3 days, then take 1 tab daily x 3 days 31 tablet 0 7/29/2023 at 0800    rosuvastatin (CRESTOR) 5 MG tablet TAKE ONE TABLET BY MOUTH DAILY (Patient taking differently: Take 1 tablet by mouth Daily.) 90 tablet 3 7/29/2023 at 0800    sodium chloride (GREGORY 128) 5 % ophthalmic solution Administer 1 drop to the right eye 4 (Four) Times a Day.       alendronate (FOSAMAX) 70 MG tablet TAKE 1 TABLET BY MOUTH ONCE WEEKLY ON AN EMPTY STOMACH BEFORE BREAKFAST. REMAIN UPRIGHT FOR 30 MINUTES AND TAKE WITH 8 OUNCES OF WATER (Patient taking differently: Take 1 tablet by mouth Every 7 (Seven) Days. Monday) 12 tablet 1 7/24/2023 at 0800    aspirin 81 MG EC tablet Take 1 tablet by mouth Daily.   7/28/2023 at 0800    atenolol (TENORMIN) 50 MG tablet TAKE ONE TABLET BY MOUTH DAILY (Patient taking  differently: Take 1 tablet by mouth Daily.) 90 tablet 1 7/28/2023 at 0800    calcium carbonate-cholecalciferol 500-400 MG-UNIT tablet tablet Take 1 tablet by mouth 2 (Two) Times a Day.   7/28/2023 at 2000    docusate sodium 100 MG capsule Take 1 capsule by mouth 2 (Two) Times a Day As Needed for Constipation. (Patient taking differently: Take 1 capsule by mouth 2 (Two) Times a Day.)   7/28/2023 at 2000    magnesium hydroxide (MILK OF MAGNESIA) 400 MG/5ML suspension Take 30 mL by mouth Daily As Needed for Constipation.       tiotropium (SPIRIVA) 18 MCG per inhalation capsule Place 2 capsules into inhaler and inhale Daily.          Current Meds  Scheduled Meds:amLODIPine, 5 mg, Oral, Daily  aspirin, 81 mg, Oral, Daily  atenolol, 50 mg, Oral, Daily  budesonide-formoterol, 1 puff, Inhalation, BID - RT  calcium carbonate (oyster shell), 500 mg, Oral, Daily  furosemide, 60 mg, Intravenous, Q12H  guaiFENesin, 1,200 mg, Oral, Q12H  ipratropium-albuterol, 3 mL, Nebulization, 4x Daily - RT  losartan, 12.5 mg, Oral, Q24H  methylPREDNISolone sodium succinate, 40 mg, Intravenous, Q12H  prednisoLONE acetate, 1 drop, Right Eye, 4x Daily  rosuvastatin, 40 mg, Oral, Nightly  sodium chloride, 1 drop, Right Eye, 4x Daily  sodium chloride, 10 mL, Intravenous, Q12H      Continuous Infusions:   PRN Meds:.  acetaminophen **OR** acetaminophen **OR** acetaminophen    albuterol    calcium carbonate    famotidine    nitroglycerin    ondansetron **OR** ondansetron    [COMPLETED] Insert Peripheral IV **AND** sodium chloride    sodium chloride    sodium chloride    Allergies as of 07/30/2023 - Reviewed 07/30/2023   Allergen Reaction Noted    Bee venom Swelling 09/24/2018       Social History     Socioeconomic History    Marital status:    Tobacco Use    Smoking status: Former     Packs/day: 0.25     Years: 40.00     Pack years: 10.00     Types: Cigarettes    Smokeless tobacco: Never    Tobacco comments:     quit 6 yr ago   Vaping Use     "Vaping Use: Never used   Substance and Sexual Activity    Alcohol use: Not Currently     Comment: 1-2 drinks day    Drug use: No    Sexual activity: Defer       Family History   Problem Relation Age of Onset    Malig Hyperthermia Neg Hx        REVIEW OF SYSTEMS:   All systems reviewed and pertinent positives include in HPI otherwise negative review of systems.       Objective:   Temp:  [97.5 øF (36.4 øC)-98.5 øF (36.9 øC)] 98.5 øF (36.9 øC)  Heart Rate:  [] 88  Resp:  [17-20] 20  BP: (130-145)/(49-68) 135/62  Body mass index is 22.78 kg/mý.  Flowsheet Rows      Flowsheet Row First Filed Value   Admission Height 152.4 cm (60\") Documented at 07/30/2023 0032   Admission Weight 54.4 kg (120 lb) Documented at 07/30/2023 0032          Vitals:    07/31/23 0956   BP:    Pulse:    Resp:    Temp: 98.5 øF (36.9 øC)   SpO2:        General Appearance:    Alert, cooperative, in no acute distress   Head:    Normocephalic, without obvious abnormality, atraumatic   Eyes:            Lids and lashes normal, conjunctivae and sclerae normal, no   icterus, no pallor, corneas clear, PERRLA   Ears:    Ears appear intact with no abnormalities noted   Throat:   No oral lesions, no thrush, oral mucosa moist   Neck:   No adenopathy, supple, trachea midline, no thyromegaly, no   carotid bruit, no JVD   Back:     No kyphosis present, no scoliosis present, no skin lesions, erythema or scars, no tenderness to percussion or palpation, range of motion normal   Lungs:     Clear to auscultation,respirations regular, even and unlabored    Heart:    Regular rhythm and normal rate, normal S1 and S2, no murmur, no gallop, no rub, no click   Chest Wall:    No abnormalities observed   Abdomen:     Normal bowel sounds, no masses, no organomegaly, soft nontender, nondistended, no guarding, no rebound  tenderness   Extremities:   Moves all extremities well, no edema, no cyanosis, no redness   Pulses:   Pulses palpable and equal bilaterally. Normal " radial, carotid, femoral, dorsalis pedis and posterior tibial pulses bilaterally. Normal abdominal aorta   Skin:  Neurology:   Psychiatric:   No bleeding, bruising or rash   Normal speech and cranial nerve exam, no focal deficit   Alert and oriented x 3, normal mood and affect             Review of Data:      Results from last 7 days   Lab Units 07/31/23  0324 07/30/23 0410 07/30/23  0058   SODIUM mmol/L 138   < > 140   POTASSIUM mmol/L 4.6   < > 4.3   CHLORIDE mmol/L 94*   < > 97*   CO2 mmol/L 36.0*   < > 31.1*   BUN mg/dL 25*   < > 18   CREATININE mg/dL 0.87   < > 0.94   CALCIUM mg/dL 8.7   < > 9.4   BILIRUBIN mg/dL  --   --  0.2   ALK PHOS U/L  --   --  61   ALT (SGPT) U/L  --   --  16   AST (SGOT) U/L  --   --  18   GLUCOSE mg/dL 146*   < > 115*    < > = values in this interval not displayed.     Results from last 7 days   Lab Units 07/30/23  0614 07/30/23 0410 07/30/23  0058   HSTROP T ng/L 27* 22* 17*     @LABRCNTbnp@  Results from last 7 days   Lab Units 07/31/23  0324 07/30/23 0410 07/30/23  0058   WBC 10*3/mm3 11.68* 12.43* 13.47*   HEMOGLOBIN g/dL 10.1* 10.8* 10.6*   HEMATOCRIT % 29.9* 32.5* 31.7*   PLATELETS 10*3/mm3 244 253 234     Results from last 7 days   Lab Units 07/30/23  0058   INR  0.99     Results from last 7 days   Lab Units 07/31/23 0324   MAGNESIUM mg/dL 1.5*     @LABRCNT(chol,trig,hdl,ldl)                        I personally viewed and interpreted the patient's EKG/Telemetry data  )   COPD with acute exacerbation    Hypertension    Carotid artery disease    Acute respiratory failure with hypoxia and hypercapnia    Sleep apnea        Assessment and Plan:    Acute on chronic hypoxemic/hypercarbic respiratory failure from COPD exacerbation.  Acute on chronic diastolic congestive heart failure  Essential hypertension  Mild elevated troponin without ischemic EKG changes-no chest pain.  Echocardiogram with preserved ventricular ejection fraction and normal regional wall  motion.  Hyperlipidemia/coronary artery calcification.    Probably most of patient's symptoms from underlying lung disease.  She received IV diuresis.  Switch to oral diuretic.  Continue aspirin and statin.  Fair blood pressure control with current regimen   No further cardiac testing recommended at this point.  I have discussed patient with her nurse.    Félix Sanchez MD  07/31/23  11:22 EDT.      Time spent in reviewing chart, discussion and examination:

## 2023-07-31 NOTE — NURSING NOTE
Daily Care Plan Summary: Heart Failure    Diuretic in use (IV or PO):   lasix        Daily weight (up or down):    admitted todaay      Output > Intake (yes/no):yes      O2 Requirements (current, any change?): 1l nc      Symptoms noted with Activity (Respiratory Tolerance, functional state):    antonieta with exertion      Anticipated Discharge Plans:    tbd

## 2023-07-31 NOTE — PLAN OF CARE
Goal Outcome Evaluation:  Plan of Care Reviewed With: patient           Outcome Evaluation: Clinical swallow eval completed. Recommend continue baseline diet of mechanical soft ground with thin liquids. Recommend upright, slow rate, alternate liquids/solids. SLP to follow for diet tolerance.

## 2023-07-31 NOTE — PROGRESS NOTES
PROGRESS NOTE  Patient Name: Lizzy Hicks  Age/Sex: 87 y.o. female  : 1935  MRN: [id number]    Date of Admission: 2023  Date of Encounter Visit: 23   LOS: 0 days   Patient Care Team:  Traci Hoyos MD as PCP - General (Geriatric Medicine)    Chief Complaint: COPD exacerbation, pulmonary hypertension    Hospital course: Patient had repeat echocardiogram awaiting further cardiac work-up.  She is doing better from the COPD standpoint, she is down to 2 L nasal cannula oxygen, she is still having some tightness but she does feel better compared to earlier.  No fever or chills  She is on systemic steroids and no antibiotics      REVIEW OF SYSTEMS:   CONSTITUTIONAL: no fever or chills  CARDIOVASCULAR: No chest pain, chest pressure or chest discomfort. No palpitations, improving edema  RESPIRATORY: Less cough, still short of breath but improved dyspnea.   GASTROINTESTINAL: No anorexia, nausea, vomiting or diarrhea. No abdominal pain or blood.   HEMATOLOGIC: No bleeding or bruising.     Ventilator/Non-Invasive Ventilation Settings (From admission, onward)       Start     Ordered    23 0447  NIPPV (CPAP or BIPAP)  At Bedtime & As Needed-RT        Question Answer Comment   Indication Acute Respiratory Failure    Type AVAPS/PC/PS    Backup Rate 16    Target VT (mL) 350    EPAP/PEEP (cm H2O) 5    Min Pressure (cm H2O) 10    Max Pressure (cm H2O) 25    Titrate Oxygen for SpO2 90 - 95%        23 0448                      Vital Signs  Temp:  [97.5 øF (36.4 øC)-98 øF (36.7 øC)] 97.5 øF (36.4 øC)  Heart Rate:  [] 88  Resp:  [17-20] 20  BP: (130-145)/(49-68) 135/62  SpO2:  [88 %-100 %] 94 %  on  Flow (L/min):  [1-2] 2 Device (Oxygen Therapy): nasal cannula    Intake/Output Summary (Last 24 hours) at 2023 1007  Last data filed at 2023 0600  Gross per 24 hour   Intake 900 ml   Output 600 ml   Net 300 ml     Flowsheet Rows      Flowsheet Row First Filed Value   Admission Height 152.4  "cm (60\") Documented at 07/30/2023 0032   Admission Weight 54.4 kg (120 lb) Documented at 07/30/2023 0032          Body mass index is 22.78 kg/mý.      07/30/23  0401 07/31/23  0605 07/31/23  0704   Weight: 51.8 kg (114 lb 3.2 oz) 52.9 kg (116 lb 9.6 oz) 52.9 kg (116 lb 10 oz)       Physical Exam:  GEN:  No acute distress, alert, cooperative, well developed   EYES:   Sclerae clear. No icterus. PERRL. Normal EOM  ENT:   External ears/nose normal, no oral lesions, no thrush, mucous membranes moist  NECK:  Supple, midline trachea, no JVD  LUNGS: Normal chest on inspection, breath sounds are significantly diminished but there is no significant prolongation of the expiratory phase except on forced exhalation, rhonchi are audible upon coughing.  No wheeze, minimal use of accessory muscles when trying to talk or on minor exertion but not much while at rest  CV:  Regular rhythm and rate. Normal S1/S2. No murmurs, gallops, or rubs noted.  ABD:  Soft, nontender and nondistended. Normal bowel sounds. No guarding  EXT:  Moves all extremities well. No cyanosis. No redness.  Positive 1+ lower extremity pitting edema, improved compared to earlier  Skin: Dry, intact, no bleeding    Results Review:    Results From Last 14 Days   Lab Units 07/31/23  0324 07/30/23  1626   D DIMER QUANT MCGFEU/mL  --  1.95*   TRIGLYCERIDES mg/dL 69  --      Results from last 7 days   Lab Units 07/31/23  0324 07/30/23  0410 07/30/23  0058   SODIUM mmol/L 138 138 140   POTASSIUM mmol/L 4.6 4.1 4.3   CHLORIDE mmol/L 94* 96* 97*   CO2 mmol/L 36.0* 32.1* 31.1*   BUN mg/dL 25* 17 18   CREATININE mg/dL 0.87 0.87 0.94   CALCIUM mg/dL 8.7 9.9 9.4   AST (SGOT) U/L  --   --  18   ALT (SGPT) U/L  --   --  16   ANION GAP mmol/L 8.0 9.9 11.9   ALBUMIN g/dL  --   --  3.4*     Results from last 7 days   Lab Units 07/30/23  1626 07/30/23  0614 07/30/23  0410 07/30/23  0058   HSTROP T ng/L  --  27* 22* 17*   D DIMER QUANT MCGFEU/mL 1.95*  --   --   --      Results from " last 7 days   Lab Units 07/31/23  0324   TSH uIU/mL 0.517     Results from last 7 days   Lab Units 07/30/23  0058   PROBNP pg/mL 2,561.0*     Results from last 7 days   Lab Units 07/31/23  0324 07/30/23  0410 07/30/23  0058   WBC 10*3/mm3 11.68* 12.43* 13.47*   HEMOGLOBIN g/dL 10.1* 10.8* 10.6*   HEMATOCRIT % 29.9* 32.5* 31.7*   PLATELETS 10*3/mm3 244 253 234   MCV fL 91.7 90.8 91.6   NEUTROPHIL % %  --   --  71.8   LYMPHOCYTE % %  --   --  17.3*   MONOCYTES % %  --   --  8.1   EOSINOPHIL % %  --   --  1.9   BASOPHIL % %  --   --  0.5   IMM GRAN % %  --   --  0.4     Results from last 7 days   Lab Units 07/30/23  0058   INR  0.99     Results from last 7 days   Lab Units 07/31/23  0324   MAGNESIUM mg/dL 1.5*     Results from last 7 days   Lab Units 07/31/23  0324   CHOLESTEROL mg/dL 206*   TRIGLYCERIDES mg/dL 69   HDL CHOL mg/dL 100*     Results from last 7 days   Lab Units 07/30/23  0149   PH, ARTERIAL pH units 7.381   PCO2, ARTERIAL mm Hg 56.5*   PO2 ART mm Hg 75.5*   HCO3 ART mmol/L 33.5*         No results found for: POCGLU  Results from last 7 days   Lab Units 07/30/23  0058   PROCALCITONIN ng/mL 0.12             Results from last 7 days   Lab Units 07/30/23  0135   COVID19  Not Detected   ADENOVIRUS DETECTION BY PCR  Not Detected   CORONAVIRUS 229E  Not Detected   CORONAVIRUS HKU1  Not Detected   CORONAVIRUS NL63  Not Detected   CORONAVIRUS OC43  Not Detected   HUMAN METAPNEUMOVIRUS  Not Detected   HUMAN RHINOVIRUS/ENTEROVIRUS  Not Detected   INFLUENZA B PCR  Not Detected   PARAINFLUENZA 1  Not Detected   PARAINFLUENZA VIRUS 2  Not Detected   PARAINFLUENZA VIRUS 3  Not Detected   PARAINFLUENZA VIRUS 4  Not Detected   BORDETELLA PERTUSSIS PCR  Not Detected   BORDETELLA PARAPERTUSSIS PCR  Not Detected   CHLAMYDOPHILA PNEUMONIAE PCR  Not Detected   MYCOPLAMA PNEUMO PCR  Not Detected   RSV, PCR  Not Detected               Echocardiogram7/30/2023     Left ventricular systolic function is normal. Left ventricular  ejection fraction appears to be 56 - 60%.    Left ventricular diastolic function is consistent with (grade Ia w/high LAP) impaired relaxation.    Mild aortic valve stenosis is present.  Moderately calcified valve.  Off axis Doppler interrogation due to challenging image acquisition.  Severity of stenosis may be underestimated.    Estimated right ventricular systolic pressure from tricuspid regurgitation is normal (<35 mmHg).  imaging:   Imaging Results (All)       Procedure Component Value Units Date/Time     I reviewed the patient's new clinical results.  I personally viewed and interpreted the patient's imaging results:        Medication Review:   amLODIPine, 5 mg, Oral, Daily  aspirin, 81 mg, Oral, Daily  atenolol, 50 mg, Oral, Daily  budesonide-formoterol, 1 puff, Inhalation, BID - RT  calcium carbonate (oyster shell), 500 mg, Oral, Daily  furosemide, 60 mg, Intravenous, Q12H  guaiFENesin, 1,200 mg, Oral, Q12H  ipratropium-albuterol, 3 mL, Nebulization, 4x Daily - RT  losartan, 12.5 mg, Oral, Q24H  methylPREDNISolone sodium succinate, 40 mg, Intravenous, Q12H  prednisoLONE acetate, 1 drop, Right Eye, 4x Daily  rosuvastatin, 40 mg, Oral, Nightly  sodium chloride, 1 drop, Right Eye, 4x Daily  sodium chloride, 10 mL, Intravenous, Q12H             ASSESSMENT:   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  Acute hypoxemic on chronic hypoxemic and hypercapnic respiratory failure  Anemia probably chronic  Pulmonary hypertension likely group 3 and group 2, repeat echocardiogram showed normal RVSP  Diastolic congestive heart failure with edema and volume overload and elevated proBNP  Essential hypertension, poorly controlled  Non-ST elevation MI with elevated high-sensitivity troponin      PLAN:  Discussion with parabronchial thickening with emphysematous changes no acute pneumonia  Patient is on systemic steroids  Patient has borderline leukocytosis, the procalcitonin however was negative and patient has no  left shift and no antibiotics are required at this point  Given the improvement we will go ahead and de-escalate from IV to p.o. antibiotic and use a lower dose  Continue with the diuretics as tolerated patient has diastolic heart failure and should get better clinically with correcting the volume overload and controlling the heart rate  Pulmonary hypertension has resolved on  the recent echocardiogram and likely to fluctuate with her volume status  Titrate oxygen  Hopefully home soon    Discussed with patient  Labs/Notes/films were independently reviewed and pertinent results are summarized above  The copied texts in this note were reviewed and they are accurate as of 07/31/23    Disposition:     Ama Christopher MD  07/31/23  10:07 EDT          Dictated utilizing Dragon dictation

## 2023-07-31 NOTE — PLAN OF CARE
Goal Outcome Evaluation:  Plan of Care Reviewed With: patient           Outcome Evaluation: Patient is an 87 y.o female who presents to [facility] with SOA. Patient AO to self and place this date but appears pleasantly confused throughout session. Patient is a LTC resident at [facility]. Patient reports she uses a w/c for mobility at baseline but also reports she ambulates with a rwx. Unclear level of assist patient needs for ADLs. Today patient sat up to EOB with CGA. Patient performed a stand pivot transfer from EOB to BSC with CGA and no AD. Patient requested to stay on BSC for awhile terminating session. Chair set up for patient to go to once bathroom needs addressed with nsg. Patient would continue to benefit from skilled PT intervention to address deficit sin functional mobility and maximize safety and independence. Anticipate return to LTC at d/c. PT will continue to monitor.      Anticipated Discharge Disposition (PT): extended care facility, skilled nursing facility

## 2023-07-31 NOTE — THERAPY EVALUATION
Acute Care - Speech Language Pathology   Swallow Initial Evaluation [facility]     Patient Name: iLzzy Hicks  : 1935  MRN: [id number]  Today's Date: 2023               Admit Date: 2023    Visit Dx:     ICD-10-CM ICD-9-CM   1. COPD exacerbation  J44.1 491.21     Patient Active Problem List   Diagnosis    Hypertension    COPD (chronic obstructive pulmonary disease)    Carotid artery stenosis    Osteoarthritis    Osteoporosis    Hyponatremia    Reset osmostat syndrome    Carotid stenosis, asymptomatic, right    Carotid artery disease    Macular degeneration    COPD exacerbation    Acute exacerbation of chronic obstructive pulmonary disease (COPD)    Parainfluenza infection    Acute respiratory failure with hypoxia    Acute respiratory failure with hypoxia and hypercapnia    Mixed hyperlipidemia    Possible pneumonia of right lower lobe due to infectious organism    Oropharyngeal dysphagia    Acute respiratory failure with hypercapnia    Respiratory insufficiency    COPD with acute exacerbation    Sleep apnea     Past Medical History:   Diagnosis Date    Anesthesia complication     pt states was groggy for a week after surgery    Carotid artery disease     left    Carotid stenosis     RIGHT    COPD (chronic obstructive pulmonary disease)     History of bronchitis     Hypertension     Macular degeneration     Osteoarthritis 2016    Osteoporosis 2016    Reset osmostat syndrome 08/15/2016    Worked up by prior PCP in [facility]. Baseline Na 128-130 since .    Seborrheic dermatitis 2016    Swallowing difficulty     D/T INADVERTANT REMOVAL OF UVULA AS CHILD WITH T AND A     Past Surgical History:   Procedure Laterality Date    CAROTID ENDARTERECTOMY Right 2018    Procedure: RT CAROTID ENDARTERECTOMY WITH INTRA OPERATIVE CAROTID ARTERY DUPLEX SCAN;  Surgeon: Mikaela Galicia Jr., MD;  Location: [facility] OR;  Service: Vascular    CAROTID ENDARTERECTOMY Left 2019     Procedure: LEFT CAROTID ENDARTERECTOMY;  Surgeon: Mikaela Galicia Jr., MD;  Location: [facility] OR;  Service: Vascular    CATARACT EXTRACTION WITH INTRAOCULAR LENS IMPLANT      LEFT AND RIGHT    ORIF FEMUR FRACTURE Right     HARDWARE    TONSILLECTOMY AND ADENOIDECTOMY      INADVERTANTLY TOOK UVULA AT THIS TIME       SLP Recommendation and Plan  SLP Swallowing Diagnosis: mild, oral dysphagia, pharyngeal dysphagia (07/31/23 1015)  SLP Diet Recommendation: soft to chew textures, ground, thin liquids (07/31/23 1015)  Recommended Precautions and Strategies: upright posture during/after eating, small bites of food and sips of liquid, general aspiration precautions (07/31/23 1015)  SLP Rec. for Method of Medication Administration: meds crushed, with puree, as tolerated (07/31/23 1015)     Monitor for Signs of Aspiration: yes, notify SLP if any concerns (07/31/23 1015)     Swallow Criteria for Skilled Therapeutic Interventions Met: demonstrates skilled criteria (07/31/23 1015)  Anticipated Discharge Disposition (SLP): skilled nursing facility (07/31/23 1015)  Rehab Potential/Prognosis, Swallowing: good, to achieve stated therapy goals (07/31/23 1015)  Therapy Frequency (Swallow): PRN (07/31/23 1015)  Predicted Duration Therapy Intervention (Days): until discharge (07/31/23 1015)  Oral Care Recommendations: Oral Care BID/PRN (07/31/23 1015)                                      Oral Care Recommendations: Oral Care BID/PRN (07/31/23 1015)    Plan of Care Reviewed With: patient  Outcome Evaluation: Clinical swallow eval completed. Recommend continue baseline diet of mechanical soft ground with thin liquids. Recommend upright, slow rate, alternate liquids/solids. SLP to follow for diet tolerance.      SWALLOW EVALUATION (last 72 hours)       SLP Adult Swallow Evaluation       Row Name 07/31/23 1015                   Rehab Evaluation    Document Type evaluation  -OC        Subjective Information no complaints  -OC         Patient Observations alert;cooperative;agree to therapy  -OC        Patient Effort good  -OC        Symptoms Noted During/After Treatment none  -OC           General Information    Patient Profile Reviewed yes  -OC        Pertinent History Of Current Problem patient admitted with COPD with acute exacerbation. Hx of dysphagia, recommending mechanical soft ground and thins. Reports removal of uvula as a child.  -OC        Current Method of Nutrition soft to chew textures;ground;thin liquids  -OC        Precautions/Limitations, Vision WFL  -OC        Precautions/Limitations, Hearing WFL  -OC        Prior Level of Function-Communication WFL  -OC        Prior Level of Function-Swallowing soft to chew;ground;thin liquids  -OC        Plans/Goals Discussed with patient;agreed upon  -OC        Barriers to Rehab medically complex  -OC        Patient's Goals for Discharge patient did not state  -OC           Pain    Additional Documentation Pain Scale: Numbers Pre/Post-Treatment (Group)  -OC           Pain Scale: Numbers Pre/Post-Treatment    Pretreatment Pain Rating 0/10 - no pain  -OC        Posttreatment Pain Rating 0/10 - no pain  -OC           Oral Motor Structure and Function    Secretion Management WNL/WFL  -OC        Mucosal Quality moist, healthy  -OC        Volitional Swallow WFL  -OC        Volitional Cough WFL  -OC           Oral Musculature and Cranial Nerve Assessment    Oral Motor General Assessment WFL  -OC        Vocal Impairment, Detail. Cranial Nerve X (Vagus) other (see comments)  weak vocal quality  -OC           Clinical Swallow Eval    Clinical Swallow Evaluation Summary Patient with weak vocal quality. Patient coughing upon SLP entry to room, reports unrelated to PO intake on breakfast try. Patient demonstrated no overt s/s aspiration with thin via cup. Patient declined trials of solids as she just finished breakfast. Patient able to recall VFSS recommendations and understanding of her strategies/recs.   -OC           SLP Evaluation Clinical Impression    SLP Swallowing Diagnosis mild;oral dysphagia;pharyngeal dysphagia  -OC        Functional Impact risk of aspiration/pneumonia  -OC        Rehab Potential/Prognosis, Swallowing good, to achieve stated therapy goals  -OC        Swallow Criteria for Skilled Therapeutic Interventions Met demonstrates skilled criteria  -OC           Recommendations    Therapy Frequency (Swallow) PRN  -OC        Predicted Duration Therapy Intervention (Days) until discharge  -OC        SLP Diet Recommendation soft to chew textures;ground;thin liquids  -OC        Recommended Precautions and Strategies upright posture during/after eating;small bites of food and sips of liquid;general aspiration precautions  -OC        Oral Care Recommendations Oral Care BID/PRN  -OC        SLP Rec. for Method of Medication Administration meds crushed;with puree;as tolerated  -OC        Monitor for Signs of Aspiration yes;notify SLP if any concerns  -OC        Anticipated Discharge Disposition (SLP) skilled nursing facility  -OC           Swallow Goals (SLP)    Swallow LTGs Swallow Long Term Goal (free text)  -OC           (LTG) Swallow    (LTG) Swallow Tolerate least restrictive diet with no overt s/s aspiration.  -OC        [place] (Swallow Long Term Goal) independently (over 90% accuracy)  -OC        Time Frame (Swallow Long Term Goal) by discharge  -OC                  User Key  (r) = Recorded By, (t) = Taken By, (c) = Cosigned By      Initials Name Effective Dates    OC Dilan, BRENDA Cordon,CCC-SLP 06/16/21 -                     EDUCATION  The patient has been educated in the following areas:   Dysphagia (Swallowing Impairment).        SLP GOALS       Row Name 07/31/23 1015             (LTG) Swallow    (LTG) Swallow Tolerate least restrictive diet with no overt s/s aspiration.  -OC      [place] (Swallow Long Term Goal) independently (over 90% accuracy)  -OC      Time Frame (Swallow Long Term Goal)  by discharge  -OC                User Key  (r) = Recorded By, (t) = Taken By, (c) = Cosigned By      Initials Name Provider Type    Neris Gordon MA,CCC-SLP Speech and Language Pathologist                       Time Calculation:    Time Calculation- SLP       Row Name 07/31/23 1135             Time Calculation- SLP    SLP Start Time 1015  -OC      SLP Received On 07/31/23  -OC         Untimed Charges    SLP Eval/Re-eval  ST Eval Oral Pharyng Swallow - 32341  -OC      90926-[id number] Eval Oral Pharyng Swallow Minutes 45  -OC         Total Minutes    Untimed Charges Total Minutes 45  -OC       Total Minutes 45  -OC                User Key  (r) = Recorded By, (t) = Taken By, (c) = Cosigned By      Initials Name Provider Type    Neris Gordon MA,CCC-SLP Speech and Language Pathologist                    Therapy Charges for Today       Code Description Service Date Service Provider Modifiers Qty    [id number] HC ST EVAL ORAL PHARYNG SWALLOW 3 7/31/2023 Neris Kong MA,INES-SLP GN 1                 Neris Kong MA,INES-LISY  7/31/2023

## 2023-07-31 NOTE — PLAN OF CARE
Goal Outcome Evaluation:              Outcome Evaluation: VSS, AO to self, place and situation, some forgetfulness that is her baseline. No new issues reported and no pain complaints. Decent appetite and fluid intake. Up in chair today for meals and bedside commode. WCTM.

## 2023-07-31 NOTE — THERAPY EVALUATION
Patient Name: Lizzy Hicks  : 1935    MRN: [id number]                              Today's Date: 2023       Admit Date: 2023    Visit Dx:     ICD-10-CM ICD-9-CM   1. COPD exacerbation  J44.1 491.21     Patient Active Problem List   Diagnosis    Hypertension    COPD (chronic obstructive pulmonary disease)    Carotid artery stenosis    Osteoarthritis    Osteoporosis    Hyponatremia    Reset osmostat syndrome    Carotid stenosis, asymptomatic, right    Carotid artery disease    Macular degeneration    COPD exacerbation    Acute exacerbation of chronic obstructive pulmonary disease (COPD)    Parainfluenza infection    Acute respiratory failure with hypoxia    Acute respiratory failure with hypoxia and hypercapnia    Mixed hyperlipidemia    Possible pneumonia of right lower lobe due to infectious organism    Oropharyngeal dysphagia    Acute respiratory failure with hypercapnia    Respiratory insufficiency    COPD with acute exacerbation    Sleep apnea     Past Medical History:   Diagnosis Date    Anesthesia complication     pt states was groggy for a week after surgery    Carotid artery disease     left    Carotid stenosis     RIGHT    COPD (chronic obstructive pulmonary disease)     History of bronchitis     Hypertension     Macular degeneration     Osteoarthritis 2016    Osteoporosis 2016    Reset osmostat syndrome 08/15/2016    Worked up by prior PCP in [facility]. Baseline Na 128-130 since .    Seborrheic dermatitis 2016    Swallowing difficulty     D/T INADVERTANT REMOVAL OF UVULA AS CHILD WITH T AND A     Past Surgical History:   Procedure Laterality Date    CAROTID ENDARTERECTOMY Right 2018    Procedure: RT CAROTID ENDARTERECTOMY WITH INTRA OPERATIVE CAROTID ARTERY DUPLEX SCAN;  Surgeon: Mikaela Galicia Jr., MD;  Location: [facility];  Service: Vascular    CAROTID ENDARTERECTOMY Left 2019    Procedure: LEFT CAROTID ENDARTERECTOMY;  Surgeon: Mikaela Galicia  Jeff Pedro MD;  Location: [facility] MAIN OR;  Service: Vascular    CATARACT EXTRACTION WITH INTRAOCULAR LENS IMPLANT      LEFT AND RIGHT    ORIF FEMUR FRACTURE Right     HARDWARE    TONSILLECTOMY AND ADENOIDECTOMY      INADVERTANTLY TOOK UVULA AT THIS TIME      General Information       Row Name 07/31/23 1308          Physical Therapy Time and Intention    Document Type evaluation  -     Mode of Treatment individual therapy;physical therapy  -       Row Name 07/31/23 1308          General Information    Patient Profile Reviewed yes  -     Prior Level of Function min assist:  unable to obtain clear PLOF as patient is a poor historian this date  -     Existing Precautions/Restrictions fall;oxygen therapy device and L/min  -     Barriers to Rehab previous functional deficit;cognitive status  -       Row Name 07/31/23 1308          Living Environment    People in Home facility resident  -       Row Name 07/31/23 1308          Cognition    Orientation Status (Cognition) oriented to;person;place;disoriented to;situation  -       Row Name 07/31/23 1308          Safety Issues, Functional Mobility    Safety Issues Affecting Function (Mobility) judgment;safety precaution awareness;safety precautions follow-through/compliance;insight into deficits/self-awareness;awareness of need for assistance  -     Impairments Affecting Function (Mobility) balance;strength;endurance/activity tolerance  -               User Key  (r) = Recorded By, (t) = Taken By, (c) = Cosigned By      Initials Name Provider Type     Haley Juarez, REINIER Physical Therapist                   Mobility       Row Name 07/31/23 130          Bed Mobility    Bed Mobility supine-sit  -     Supine-Sit [place] (Bed Mobility) contact guard  -     Comment, (Bed Mobility) Patient on BSC at end of session  -       Row Name 07/31/23 1307          Bed-Chair Transfer    Bed-Chair [place] (Transfers) contact guard  -     Assistive  Device (Bed-Chair Transfers) other (see comments)  no AD  -SM       Row Name 07/31/23 1309          Sit-Stand Transfer    Sit-Stand [place] (Transfers) contact guard  -     Assistive Device (Sit-Stand Transfers) other (see comments)  no AD  -               User Key  (r) = Recorded By, (t) = Taken By, (c) = Cosigned By      Initials Name Provider Type     Haley Jaurez PT Physical Therapist                   Obj/Interventions       Row Name 07/31/23 1310          Range of Motion Comprehensive    General Range of Motion bilateral lower extremity ROM WFL  -SM       Row Name 07/31/23 1310          Strength Comprehensive (MMT)    General Manual Muscle Testing (MMT) Assessment lower extremity strength deficits identified  -SM     Comment, General Manual Muscle Testing (MMT) Assessment Generalized LE weakness. B LEs grossly 3+/5  -SM       Row Name 07/31/23 1310          Balance    Balance Assessment sitting static balance;sitting dynamic balance;sit to stand dynamic balance;standing static balance;standing dynamic balance  -SM     Static Sitting Balance supervision  -SM     Dynamic Sitting Balance standby assist  -SM     Position, Sitting Balance sitting edge of bed  -SM     Sit to Stand Dynamic Balance contact guard  -SM     Static Standing Balance contact guard  -SM     Dynamic Standing Balance contact guard  -SM     Position/Device Used, Standing Balance unsupported  -SM     Balance Interventions standing;sit to stand;sitting;static;dynamic  -SM               User Key  (r) = Recorded By, (t) = Taken By, (c) = Cosigned By      Initials Name Provider Type     Haley Juarez PT Physical Therapist                   Goals/Plan       Row Name 07/31/23 1314          Bed Mobility Goal 1 (PT)    Activity/Assistive Device (Bed Mobility Goal 1, PT) bed mobility activities, all  -     [place] Level/Cues Needed (Bed Mobility Goal 1, PT) modified independence  -     Time Frame (Bed Mobility Goal 1, PT)  1 week  -SM       Row Name 07/31/23 1314          Transfer Goal 1 (PT)    Activity/Assistive Device (Transfer Goal 1, PT) sit-to-stand/stand-to-sit;bed-to-chair/chair-to-bed  -SM     [place] Level/Cues Needed (Transfer Goal 1, PT) supervision required  -SM     Time Frame (Transfer Goal 1, PT) 1 week  -SM       Row Name 07/31/23 1314          Gait Training Goal 1 (PT)    Activity/Assistive Device (Gait Training Goal 1, PT) gait (walking locomotion);walker, rolling  -SM     [place] Level (Gait Training Goal 1, PT) standby assist  -SM     Distance (Gait Training Goal 1, PT) 30ft  -SM     Time Frame (Gait Training Goal 1, PT) 1 week  -SM               User Key  (r) = Recorded By, (t) = Taken By, (c) = Cosigned By      Initials Name Provider Type    SM Haley Juarez, PT Physical Therapist                   Clinical Impression       Row Name 07/31/23 1311          Pain    Pretreatment Pain Rating 0/10 - no pain  -SM     Posttreatment Pain Rating 0/10 - no pain  -SM       Row Name 07/31/23 1311          Plan of Care Review    Plan of Care Reviewed With patient  -SM     Outcome Evaluation Patient is an 87 y.o female who presents to [facility] with SOA. Patient AO to self and place this date but appears pleasantly confused throughout session. Patient is a LTC resident at [facility]. Patient reports she uses a w/c for mobility at baseline but also reports she ambulates with a rwx. Unclear level of assist patient needs for ADLs. Today patient sat up to EOB with CGA. Patient performed a stand pivot transfer from EOB to BSC with CGA and no AD. Patient requested to stay on BSC for awhile terminating session. Chair set up for patient to go to once bathroom needs addressed with nsg. Patient would continue to benefit from skilled PT intervention to address deficit sin functional mobility and maximize safety and independence. Anticipate return to LTC at d/c. PT will continue to monitor.  -       Row Name 07/31/23 1311           Therapy Assessment/Plan (PT)    Rehab Potential (PT) fair, will monitor progress closely  -     Criteria for Skilled Interventions Met (PT) yes;skilled treatment is necessary  -     Therapy Frequency (PT) 5 times/wk  -       Row Name 07/31/23 1311          Vital Signs    O2 Delivery Pre Treatment supplemental O2  2L  -SM     O2 Delivery Intra Treatment supplemental O2  -SM     O2 Delivery Post Treatment supplemental O2  -SM     Pre Patient Position Supine  -SM     Intra Patient Position Standing  -SM     Post Patient Position Sitting  -SM       Row Name 07/31/23 1311          Positioning and Restraints    Pre-Treatment Position in bed  -SM     Post Treatment Position bsc  -SM     On BS commode notified nsg;sitting;call light within reach;encouraged to call for assist  -SM               User Key  (r) = Recorded By, (t) = Taken By, (c) = Cosigned By      Initials Name Provider Type     Haley Juarez, PT Physical Therapist                   Outcome Measures       Row Name 07/31/23 1314 07/31/23 1215       How much help from another person do you currently need...    Turning from your back to your side while in flat bed without using bedrails? 4  -SM 3  -AP    Moving from lying on back to sitting on the side of a flat bed without bedrails? 3  -SM 3  -AP    Moving to and from a bed to a chair (including a wheelchair)? 3  -SM 2  -AP    Standing up from a chair using your arms (e.g., wheelchair, bedside chair)? 3  -SM 2  -AP    Climbing 3-5 steps with a railing? 1  -SM 2  -AP    To walk in hospital room? 2  -SM 2  -AP    AM-PAC 6 Clicks Score (PT) 16  -SM 14  -AP    Highest level of mobility 5 --> Static standing  -SM 4 --> Transferred to chair/commode  -AP      Row Name 07/31/23 0930          How much help from another person do you currently need...    Turning from your back to your side while in flat bed without using bedrails? 3  -JS     Moving from lying on back to sitting on the side of a flat bed without  bedrails? 3  -JS     Moving to and from a bed to a chair (including a wheelchair)? 2  -JS     Standing up from a chair using your arms (e.g., wheelchair, bedside chair)? 2  -JS     Climbing 3-5 steps with a railing? 2  -JS     To walk in hospital room? 2  -JS     AM-PAC 6 Clicks Score (PT) 14  -JS     Highest level of mobility 4 --> Transferred to chair/commode  -JS       Row Name 07/31/23 1314          Functional Assessment    Outcome Measure Options AM-PAC 6 Clicks Basic Mobility (PT)  -               User Key  (r) = Recorded By, (t) = Taken By, (c) = Cosigned By      Initials Name Provider Type     Kasey Walters, RN Registered Nurse    Mari Hernandez RN Registered Nurse    Haley Jiménez, REINIER Physical Therapist                                 Physical Therapy Education       Title: PT OT SLP Therapies (In Progress)       Topic: Physical Therapy (In Progress)       Point: Mobility training (In Progress)       Learning Progress Summary             Patient Acceptance, E, NR by  at 7/31/2023 1315                         Point: Home exercise program (In Progress)       Learning Progress Summary             Patient Acceptance, E, NR by  at 7/31/2023 1315                         Point: Body mechanics (In Progress)       Learning Progress Summary             Patient Acceptance, E, NR by  at 7/31/2023 1315                         Point: Precautions (In Progress)       Learning Progress Summary             Patient Acceptance, E, NR by  at 7/31/2023 1315                                         User Key       Initials Effective Dates Name Provider Type Discipline     05/02/22 -  Haley Juarez PT Physical Therapist PT                  PT Recommendation and Plan     Plan of Care Reviewed With: patient  Outcome Evaluation: Patient is an 87 y.o female who presents to [facility] with SOA. Patient AO to self and place this date but appears pleasantly confused throughout session. Patient is a LTC resident at  [facility]. Patient reports she uses a w/c for mobility at baseline but also reports she ambulates with a rwx. Unclear level of assist patient needs for ADLs. Today patient sat up to EOB with CGA. Patient performed a stand pivot transfer from EOB to BSC with CGA and no AD. Patient requested to stay on BSC for awhile terminating session. Chair set up for patient to go to once bathroom needs addressed with nsg. Patient would continue to benefit from skilled PT intervention to address deficit sin functional mobility and maximize safety and independence. Anticipate return to LTC at d/c. PT will continue to monitor.     Time Calculation:         PT Charges       Row Name 07/31/23 1315             Time Calculation    Start Time 1131  -SM      Stop Time 1145  -SM      Time Calculation (min) 14 min  -SM      PT Received On 07/31/23  -      PT - Next Appointment 08/01/23  -SM      PT Goal Re-Cert Due Date 08/07/23  -SM         Time Calculation- PT    Total Timed Code Minutes- PT 8 minute(s)  -SM         Timed Charges    83396 - PT Therapeutic Activity Minutes 8  -SM         Total Minutes    Timed Charges Total Minutes 8  -SM       Total Minutes 8  -SM                User Key  (r) = Recorded By, (t) = Taken By, (c) = Cosigned By      Initials Name Provider Type     Haley Juarez, REINIER Physical Therapist                  Therapy Charges for Today       Code Description Service Date Service Provider Modifiers Qty    24538443699  PT THERAPEUTIC ACT EA 15 MIN 7/31/2023 Haley Juarez, PT GP 1    [id number]  PT EVAL LOW COMPLEXITY 3 7/31/2023 Haley Juarez, PT GP 1            PT G-Codes  Outcome Measure Options: AM-PAC 6 Clicks Basic Mobility (PT)  AM-PAC 6 Clicks Score (PT): 16  PT Discharge Summary  Anticipated Discharge Disposition (PT): extended care facility, skilled nursing facility    Haley Juarez PT  7/31/2023

## 2023-07-31 NOTE — PLAN OF CARE
Goal Outcome Evaluation:  Plan of Care Reviewed With: patient        Progress: improving  Outcome Evaluation: Patient from [facility] for COPD exacerbation. Patient on 2 liters during the day and wears bipap at night. Patient with no c/o pain this pm. Patient b/p 130-140's/40-60's HR 's remains SR/ST. Patient short of air with minimal exertion. Will continue to monitor and await discharge plans.       Diuretic in Use: Lasix 60mg IV Q12   Response to Diuretics (Output greater than intake): yes   Daily Weight (up or down): up slightly 114-116 lbs.   O2 Requirements: 2 liters/Bipap at HS/PRN  Functional Status (Activity level, tolerance and respiratory symptoms): Short of air with minimal exertion   Discharge Plans: TBD- return to LTC at [facility]

## 2023-07-31 NOTE — CONSULTS
"Purpose of the visit was to evaluate for: goals of care/advanced care planning and support for patient/family. Spoke with RN as well as patient and family and discussed palliative care, goals of care, and care options.      Assessment:  Patient is palliative care appropriate given (list diagnosis/symptoms): acute on chronic respiratory failure, COPD, emphysema, pulmonary hypertension, CHF, NSTEMI, anemia, HTN. Patient sitting upright in bed, she states she is feeling fine with no acute complaints. She is on 2L per nasal cannula, denies shortness of breath, pain, nausea. Reports she ate breakfast with good appetite. PPS 40%    Recommendations/Plan: No changes in treatment plan at this time. Code status is DNR/DNI, living will is on file. Palliative care team will follow for ongoing support and goals of care conversation.     Other Comments: Spoke with patient briefly at bedside this morning. With prompting she did remember our conversation from last admission for initial palliative care consult on  7/7/23, and states \"I didn't choose hospice\". I'm not sure if she did get set up with [facility] or not, this referral for outpatient palliative care support was sent last admission. She was not interested in discussing goals of care further and stated was waiting on additional testing. Per RN she has been refusing care intermittently this morning. She did say it would be ok if I follow up with her and and her family later on.     I did call her daughter Jasmin, she was interested in discussion but not available at this time, she stated she would call me back.   "

## 2023-07-31 NOTE — PROGRESS NOTES
Name: Lizzy Hicks ADMIT: 2023   : 1935  PCP: Traci Hoyos MD    MRN: [id number] LOS: 0 days   AGE/SEX: 87 y.o. female  ROOM: [facility]     Subjective   Subjective   She is feeling better today. Down to 2 L which is her baseline oxygen requirement. No chest pain no abdominal pain. Sitting in chair.     Objective   Objective   Vital Signs  Temp:  [97.5 øF (36.4 øC)-98.5 øF (36.9 øC)] 97.7 øF (36.5 øC)  Heart Rate:  [] 84  Resp:  [17-20] 20  BP: (115-141)/(47-68) 115/47  SpO2:  [88 %-100 %] 94 %  on  Flow (L/min):  [1-2] 2;   Device (Oxygen Therapy): nasal cannula  Body mass index is 22.78 kg/mý.    Physical Exam  Constitutional:       General: She is not in acute distress.     Appearance: Normal appearance. She is not toxic-appearing.   HENT:      Head: Normocephalic and atraumatic.   Eyes:      Extraocular Movements: Extraocular movements intact.   Cardiovascular:      Rate and Rhythm: Normal rate and regular rhythm.   Pulmonary:      Effort: Pulmonary effort is normal. No respiratory distress.      Breath sounds: Decreased breath sounds present. No wheezing or rhonchi.   Abdominal:      General: Bowel sounds are normal.      Palpations: Abdomen is soft.      Tenderness: There is no abdominal tenderness. There is no guarding or rebound.   Musculoskeletal:      Cervical back: Normal range of motion.   Skin:     General: Skin is warm and dry.   Neurological:      General: No focal deficit present.      Mental Status: She is alert and oriented to person, place, and time.   Psychiatric:         Mood and Affect: Mood normal.         Behavior: Behavior normal.         Thought Content: Thought content normal.     Results Review  I reviewed the patient's new clinical results.  Results from last 7 days   Lab Units 23  0324 23  0410 23  0058   WBC 10*3/mm3 11.68* 12.43* 13.47*   HEMOGLOBIN g/dL 10.1* 10.8* 10.6*   PLATELETS 10*3/mm3 244 253 234     Results from last 7 days   Lab  Units 07/31/23  0324 07/30/23  0410 07/30/23  0058   SODIUM mmol/L 138 138 140   POTASSIUM mmol/L 4.6 4.1 4.3   CHLORIDE mmol/L 94* 96* 97*   CO2 mmol/L 36.0* 32.1* 31.1*   BUN mg/dL 25* 17 18   CREATININE mg/dL 0.87 0.87 0.94   GLUCOSE mg/dL 146* 134* 115*     Lab Results   Component Value Date    ANIONGAP 8.0 07/31/2023     Estimated Creatinine Clearance: 39.5 mL/min (by C-G formula based on SCr of 0.87 mg/dL).    Results from last 7 days   Lab Units 07/30/23  0058   ALBUMIN g/dL 3.4*   BILIRUBIN mg/dL 0.2   ALK PHOS U/L 61   AST (SGOT) U/L 18   ALT (SGPT) U/L 16     Results from last 7 days   Lab Units 07/31/23  0324 07/30/23  0410 07/30/23  0058   CALCIUM mg/dL 8.7 9.9 9.4   ALBUMIN g/dL  --   --  3.4*   MAGNESIUM mg/dL 1.5*  --   --      Results from last 7 days   Lab Units 07/30/23  0058   PROCALCITONIN ng/mL 0.12     No results found for: HGBA1C, POCGLU    CT Angiogram Chest    Result Date: 7/30/2023  1. No evidence for pulmonary thromboembolic disease. 2. Pulmonary emphysema. Bronchial wall thickening greatest within the right lower lobe where there are patchy irregular peripheral nodular opacities that are most likely inflammatory or infectious in etiology though need follow-up with chest CT to confirm resolution and this could be performed in 3-4 months. 3. Extensive atherosclerotic disease.  Coronary arterial calcifications.  This report was finalized on 7/30/2023 8:41 PM by Dr. Isiah Fuentes M.D.      XR Chest 1 View    Result Date: 7/30/2023  Electronically signed by Karen Munoz MD on 07-30-23 at 0305     Scheduled Meds  amLODIPine, 5 mg, Oral, Daily  aspirin, 81 mg, Oral, Daily  atenolol, 50 mg, Oral, Daily  budesonide-formoterol, 1 puff, Inhalation, BID - RT  calcium carbonate (oyster shell), 500 mg, Oral, Daily  [START ON 8/1/2023] furosemide, 40 mg, Oral, Daily  guaiFENesin, 1,200 mg, Oral, Q12H  ipratropium-albuterol, 3 mL, Nebulization, 4x Daily - RT  methylPREDNISolone sodium succinate, 40  mg, Intravenous, Q12H  prednisoLONE acetate, 1 drop, Right Eye, 4x Daily  rosuvastatin, 40 mg, Oral, Nightly  sodium chloride, 1 drop, Right Eye, 4x Daily  sodium chloride, 10 mL, Intravenous, Q12H    Continuous Infusions   PRN Meds    acetaminophen **OR** acetaminophen **OR** acetaminophen    albuterol    calcium carbonate    famotidine    nitroglycerin    ondansetron **OR** ondansetron    [COMPLETED] Insert Peripheral IV **AND** sodium chloride    sodium chloride    sodium chloride     Diet  Diet: Regular/House Diet; Texture: Mechanical Ground (NDD 2); Fluid Consistency: Thin (IDDSI 0)    I have personally reviewed:  [x]  Medications  [x]  Laboratory   [x]  Microbiology   [x]  Radiology   [x]  EKG/Telemetry SR on telemetry  []  Cardiology/Vascular   []  Pathology   []  Records     Results for orders placed during the hospital encounter of 07/30/23    Adult Transthoracic Echo Complete W/ Cont if Necessary Per Protocol    Interpretation Summary    Left ventricular systolic function is normal. Left ventricular ejection fraction appears to be 56 - 60%.    Left ventricular diastolic function is consistent with (grade Ia w/high LAP) impaired relaxation.    Mild aortic valve stenosis is present.  Moderately calcified valve.  Off axis Doppler interrogation due to challenging image acquisition.  Severity of stenosis may be underestimated.    Estimated right ventricular systolic pressure from tricuspid regurgitation is normal (<35 mmHg).        Assessment/Plan     Active Hospital Problems    Diagnosis  POA    **COPD with acute exacerbation [J44.1]  Yes    Sleep apnea [G47.30]  Unknown    Acute respiratory failure with hypoxia and hypercapnia [J96.01, J96.02]  Yes    Carotid artery disease [I77.9]  Yes    Hypertension [I10]  Yes      Resolved Hospital Problems   No resolved problems to display.     87 y.o. female     Acute exacerbation COPD  Emphysema  Pulmonary hypertension  Acute hypoxemia on chronic hypoxemic and  hypercapnic respiratory failure  -Steroids per pulmonology (I believe they are switching to PO), nebulized breathing treatments  -Radiology recommends follow-up CT chest 3 to 4 months  -Monitoring off antibiotics    Elevated D-dimer  -CT negative for PE  -Check duplex for completeness    Acute on chronic diastolic heart failure  Hypertension  -Status post IV diuresis  -Now on p.o. furosemide  -Echocardiogram above. EF 56 to 60%  -BP is fine    SCDs for DVT prophylaxis    Discussed with patient and nursing staff    Discharge: couple days    Jim Edge MD  [facility]  07/31/23

## 2023-07-31 NOTE — DISCHARGE PLACEMENT REQUEST
"Lizzy Hicks (87 y.o. Female)       Date of Birth   1935    Social Security Number       Address   [address] [facility] [place] [address]    Home Phone   [phone]    MRN   [id number]       Protestant   None    Marital Status                               Admission Date   7/30/23    Admission Type   Emergency    Admitting Provider   Teofilo Tena MD    Attending Provider   Jim Edge MD    Department, Room/Bed   [facility], 3115/1       Discharge Date       Discharge Disposition       Discharge Destination                                 Attending Provider: Jim Edge MD    Allergies: Bee Venom    Isolation: None   Infection: None   Code Status: No CPR    Ht: 152.4 cm (60\")   Wt: 52.9 kg (116 lb 10 oz)    Admission Cmt: None   Principal Problem: COPD with acute exacerbation [J44.1]                   Active Insurance as of 7/30/2023       Primary Coverage       Payor Plan Insurance Group Employer/Plan Group    MEDICARE MEDICARE A & B        Payor Plan Address Payor Plan Phone Number Payor Plan Fax Number Effective Dates    [address] [phone]  9/1/2002 - None Entered    [facility] [id number]         Subscriber Name Subscriber Birth Date Member ID       LIZZY HICKS 1935 [id number]               Secondary Coverage       Payor Plan Insurance Group Employer/Plan Group    [facility] SUPP [id number]       Payor Plan Address Payor Plan Phone Number Payor Plan Fax Number Effective Dates    [address]   12/1/2016 - None Entered    [facility] [id number]         Subscriber Name Subscriber Birth Date Member ID       LIZZY HICKS W 1935 [id number]                     Emergency Contacts        (Rel.) Home Phone Work Phone Mobile Phone    Jasmin Lauren (Daughter) [phone] -- [phone]    Yung Hicks (Son) [phone] -- [phone]                "

## 2023-08-01 ENCOUNTER — APPOINTMENT (OUTPATIENT)
Dept: CARDIOLOGY | Facility: HOSPITAL | Age: 88
DRG: 190 | End: 2023-08-01
Payer: MEDICARE

## 2023-08-01 PROBLEM — I82.409 ACUTE DVT (DEEP VENOUS THROMBOSIS): Status: ACTIVE | Noted: 2023-08-01

## 2023-08-01 PROBLEM — I82.4Z1 DVT, LOWER EXTREMITY, DISTAL, ACUTE, RIGHT: Status: ACTIVE | Noted: 2023-08-01

## 2023-08-01 LAB
BH CV LOW VAS RIGHT GASTRONEMIUS VESSEL: 1
BH CV LOW VAS RIGHT POPLITEAL SPONT: 1
BH CV LOWER VASCULAR LEFT COMMON FEMORAL AUGMENT: NORMAL
BH CV LOWER VASCULAR LEFT COMMON FEMORAL COMPETENT: NORMAL
BH CV LOWER VASCULAR LEFT COMMON FEMORAL COMPRESS: NORMAL
BH CV LOWER VASCULAR LEFT COMMON FEMORAL PHASIC: NORMAL
BH CV LOWER VASCULAR LEFT COMMON FEMORAL SPONT: NORMAL
BH CV LOWER VASCULAR LEFT DISTAL FEMORAL COMPRESS: NORMAL
BH CV LOWER VASCULAR LEFT GASTRONEMIUS COMPRESS: NORMAL
BH CV LOWER VASCULAR LEFT GREATER SAPH AK COMPRESS: NORMAL
BH CV LOWER VASCULAR LEFT GREATER SAPH BK COMPRESS: NORMAL
BH CV LOWER VASCULAR LEFT LESSER SAPH COMPRESS: NORMAL
BH CV LOWER VASCULAR LEFT MID FEMORAL AUGMENT: NORMAL
BH CV LOWER VASCULAR LEFT MID FEMORAL COMPETENT: NORMAL
BH CV LOWER VASCULAR LEFT MID FEMORAL COMPRESS: NORMAL
BH CV LOWER VASCULAR LEFT MID FEMORAL PHASIC: NORMAL
BH CV LOWER VASCULAR LEFT MID FEMORAL SPONT: NORMAL
BH CV LOWER VASCULAR LEFT PERONEAL COMPRESS: NORMAL
BH CV LOWER VASCULAR LEFT POPLITEAL AUGMENT: NORMAL
BH CV LOWER VASCULAR LEFT POPLITEAL COMPETENT: NORMAL
BH CV LOWER VASCULAR LEFT POPLITEAL COMPRESS: NORMAL
BH CV LOWER VASCULAR LEFT POPLITEAL PHASIC: NORMAL
BH CV LOWER VASCULAR LEFT POPLITEAL SPONT: NORMAL
BH CV LOWER VASCULAR LEFT POSTERIOR TIBIAL COMPRESS: NORMAL
BH CV LOWER VASCULAR LEFT PROFUNDA FEMORAL COMPRESS: NORMAL
BH CV LOWER VASCULAR LEFT PROXIMAL FEMORAL COMPRESS: NORMAL
BH CV LOWER VASCULAR LEFT SAPHENOFEMORAL JUNCTION COMPRESS: NORMAL
BH CV LOWER VASCULAR RIGHT COMMON FEMORAL AUGMENT: NORMAL
BH CV LOWER VASCULAR RIGHT COMMON FEMORAL COMPETENT: NORMAL
BH CV LOWER VASCULAR RIGHT COMMON FEMORAL COMPRESS: NORMAL
BH CV LOWER VASCULAR RIGHT COMMON FEMORAL PHASIC: NORMAL
BH CV LOWER VASCULAR RIGHT COMMON FEMORAL SPONT: NORMAL
BH CV LOWER VASCULAR RIGHT DISTAL FEMORAL COMPRESS: NORMAL
BH CV LOWER VASCULAR RIGHT GASTRONEMIUS COMPRESS: NORMAL
BH CV LOWER VASCULAR RIGHT GASTRONEMIUS THROMBUS: NORMAL
BH CV LOWER VASCULAR RIGHT GREATER SAPH AK COMPRESS: NORMAL
BH CV LOWER VASCULAR RIGHT GREATER SAPH BK COMPRESS: NORMAL
BH CV LOWER VASCULAR RIGHT LESSER SAPH COMPRESS: NORMAL
BH CV LOWER VASCULAR RIGHT MID FEMORAL AUGMENT: NORMAL
BH CV LOWER VASCULAR RIGHT MID FEMORAL COMPETENT: NORMAL
BH CV LOWER VASCULAR RIGHT MID FEMORAL COMPRESS: NORMAL
BH CV LOWER VASCULAR RIGHT MID FEMORAL PHASIC: NORMAL
BH CV LOWER VASCULAR RIGHT MID FEMORAL SPONT: NORMAL
BH CV LOWER VASCULAR RIGHT PERONEAL COMPRESS: NORMAL
BH CV LOWER VASCULAR RIGHT POPLITEAL AUGMENT: NORMAL
BH CV LOWER VASCULAR RIGHT POPLITEAL COMPETENT: NORMAL
BH CV LOWER VASCULAR RIGHT POPLITEAL COMPRESS: NORMAL
BH CV LOWER VASCULAR RIGHT POPLITEAL PHASIC: NORMAL
BH CV LOWER VASCULAR RIGHT POPLITEAL SPONT: NORMAL
BH CV LOWER VASCULAR RIGHT POPLITEAL THROMBUS: NORMAL
BH CV LOWER VASCULAR RIGHT POSTERIOR TIBIAL COMPRESS: NORMAL
BH CV LOWER VASCULAR RIGHT PROFUNDA FEMORAL COMPRESS: NORMAL
BH CV LOWER VASCULAR RIGHT PROXIMAL FEMORAL COMPRESS: NORMAL
BH CV LOWER VASCULAR RIGHT SAPHENOFEMORAL JUNCTION COMPRESS: NORMAL
BH CV VAS PRELIMINARY FINDINGS SCRIPTING: 1

## 2023-08-01 PROCEDURE — 99232 SBSQ HOSP IP/OBS MODERATE 35: CPT | Performed by: NURSE PRACTITIONER

## 2023-08-01 PROCEDURE — 97530 THERAPEUTIC ACTIVITIES: CPT

## 2023-08-01 PROCEDURE — 63710000001 PREDNISONE PER 1 MG: Performed by: INTERNAL MEDICINE

## 2023-08-01 PROCEDURE — 94664 DEMO&/EVAL PT USE INHALER: CPT

## 2023-08-01 PROCEDURE — 94799 UNLISTED PULMONARY SVC/PX: CPT

## 2023-08-01 PROCEDURE — 94660 CPAP INITIATION&MGMT: CPT

## 2023-08-01 PROCEDURE — 25010000002 METHYLPREDNISOLONE PER 40 MG: Performed by: NURSE PRACTITIONER

## 2023-08-01 PROCEDURE — 94761 N-INVAS EAR/PLS OXIMETRY MLT: CPT

## 2023-08-01 PROCEDURE — 93970 EXTREMITY STUDY: CPT

## 2023-08-01 RX ORDER — PREDNISONE 20 MG/1
20 TABLET ORAL 2 TIMES DAILY WITH MEALS
Status: DISCONTINUED | OUTPATIENT
Start: 2023-08-01 | End: 2023-08-03 | Stop reason: HOSPADM

## 2023-08-01 RX ADMIN — PREDNISOLONE ACETATE 1 DROP: 10 SUSPENSION/ DROPS OPHTHALMIC at 09:47

## 2023-08-01 RX ADMIN — SODIUM CHLORIDE 1 DROP: 50 SOLUTION OPHTHALMIC at 09:47

## 2023-08-01 RX ADMIN — METHYLPREDNISOLONE SODIUM SUCCINATE 40 MG: 40 INJECTION, POWDER, LYOPHILIZED, FOR SOLUTION INTRAMUSCULAR; INTRAVENOUS at 09:48

## 2023-08-01 RX ADMIN — SODIUM CHLORIDE 1 DROP: 50 SOLUTION OPHTHALMIC at 17:56

## 2023-08-01 RX ADMIN — AMLODIPINE BESYLATE 5 MG: 5 TABLET ORAL at 09:50

## 2023-08-01 RX ADMIN — ASPIRIN 81 MG: 81 TABLET, COATED ORAL at 09:48

## 2023-08-01 RX ADMIN — IPRATROPIUM BROMIDE AND ALBUTEROL SULFATE 3 ML: .5; 3 SOLUTION RESPIRATORY (INHALATION) at 07:47

## 2023-08-01 RX ADMIN — PREDNISOLONE ACETATE 1 DROP: 10 SUSPENSION/ DROPS OPHTHALMIC at 23:10

## 2023-08-01 RX ADMIN — IPRATROPIUM BROMIDE AND ALBUTEROL SULFATE 3 ML: .5; 3 SOLUTION RESPIRATORY (INHALATION) at 16:09

## 2023-08-01 RX ADMIN — APIXABAN 10 MG: 5 TABLET, FILM COATED ORAL at 11:02

## 2023-08-01 RX ADMIN — Medication 10 ML: at 09:50

## 2023-08-01 RX ADMIN — PREDNISOLONE ACETATE 1 DROP: 10 SUSPENSION/ DROPS OPHTHALMIC at 13:49

## 2023-08-01 RX ADMIN — IPRATROPIUM BROMIDE AND ALBUTEROL SULFATE 3 ML: .5; 3 SOLUTION RESPIRATORY (INHALATION) at 20:29

## 2023-08-01 RX ADMIN — Medication 500 MG: at 09:48

## 2023-08-01 RX ADMIN — BUDESONIDE AND FORMOTEROL FUMARATE DIHYDRATE 1 PUFF: 160; 4.5 AEROSOL RESPIRATORY (INHALATION) at 11:13

## 2023-08-01 RX ADMIN — BUDESONIDE AND FORMOTEROL FUMARATE DIHYDRATE 1 PUFF: 160; 4.5 AEROSOL RESPIRATORY (INHALATION) at 20:29

## 2023-08-01 RX ADMIN — GUAIFENESIN 1200 MG: 600 TABLET, EXTENDED RELEASE ORAL at 09:48

## 2023-08-01 RX ADMIN — ATENOLOL 50 MG: 50 TABLET ORAL at 09:48

## 2023-08-01 RX ADMIN — SODIUM CHLORIDE 1 DROP: 50 SOLUTION OPHTHALMIC at 23:09

## 2023-08-01 RX ADMIN — Medication 10 ML: at 23:09

## 2023-08-01 RX ADMIN — SODIUM CHLORIDE 1 DROP: 50 SOLUTION OPHTHALMIC at 13:49

## 2023-08-01 RX ADMIN — PREDNISONE 20 MG: 20 TABLET ORAL at 17:56

## 2023-08-01 RX ADMIN — APIXABAN 10 MG: 5 TABLET, FILM COATED ORAL at 23:08

## 2023-08-01 RX ADMIN — FUROSEMIDE 40 MG: 40 TABLET ORAL at 09:48

## 2023-08-01 RX ADMIN — ROSUVASTATIN CALCIUM 40 MG: 40 TABLET, FILM COATED ORAL at 23:09

## 2023-08-01 RX ADMIN — IPRATROPIUM BROMIDE AND ALBUTEROL SULFATE 3 ML: .5; 3 SOLUTION RESPIRATORY (INHALATION) at 11:10

## 2023-08-01 RX ADMIN — GUAIFENESIN 1200 MG: 600 TABLET, EXTENDED RELEASE ORAL at 23:09

## 2023-08-01 RX ADMIN — PREDNISOLONE ACETATE 1 DROP: 10 SUSPENSION/ DROPS OPHTHALMIC at 17:56

## 2023-08-01 NOTE — PROGRESS NOTES
PROGRESS NOTE  Patient Name: Lizzy Hicks  Age/Sex: 87 y.o. female  : 1935  MRN: [id number]    Date of Admission: 2023  Date of Encounter Visit: 23   LOS: 1 day   Patient Care Team:  Traci Hoyos MD as PCP - General (Geriatric Medicine)    Chief Complaint: COPD exacerbation, pulmonary hypertension    Hospital course: Patient had repeat echocardiogramThat showed diastolic congestive heart failure with normal EF, and no pulmonary hypertension.  Today she had a venous Doppler ultrasound that showed acute right lower extremity DVT noted in the popliteal and gastrocnemius.  She was started on anticoagulation with loading dose apixaban.  She is afebrile  Her oxygen requirements are down to baseline  She denies any significant wheezing  She has no complaints with the noninvasive positive pressure ventilation and      REVIEW OF SYSTEMS:   CONSTITUTIONAL: no fever or chills  CARDIOVASCULAR: No chest pain, chest pressure or chest discomfort. No palpitations, nearly resolved edema  RESPIRATORY: Minimal cough, no significant wheezing, no productive secretions  GASTROINTESTINAL: No anorexia, nausea, vomiting or diarrhea. No abdominal pain or blood.   HEMATOLOGIC: No bleeding or bruising.     Ventilator/Non-Invasive Ventilation Settings (From admission, onward)       Start     Ordered    23 0447  NIPPV (CPAP or BIPAP)  At Bedtime & As Needed-RT        Question Answer Comment   Indication Acute Respiratory Failure    Type AVAPS/PC/PS    Backup Rate 16    Target VT (mL) 350    EPAP/PEEP (cm H2O) 5    Min Pressure (cm H2O) 10    Max Pressure (cm H2O) 25    Titrate Oxygen for SpO2 90 - 95%        23 0448                      Vital Signs  Temp:  [97.4 øF (36.3 øC)-97.9 øF (36.6 øC)] 97.7 øF (36.5 øC)  Heart Rate:  [67-91] 81  Resp:  [17-20] 20  BP: (119-135)/(47-56) 123/50  SpO2:  [90 %-99 %] 96 %  on  Flow (L/min):  [1.5-2] 1.5 Device (Oxygen Therapy): nasal cannula    Intake/Output Summary  "(Last 24 hours) at 8/1/2023 1352  Last data filed at 8/1/2023 1102  Gross per 24 hour   Intake 120 ml   Output 200 ml   Net -80 ml       Flowsheet Rows      Flowsheet Row First Filed Value   Admission Height 152.4 cm (60\") Documented at 07/30/2023 0032   Admission Weight 54.4 kg (120 lb) Documented at 07/30/2023 0032          Body mass index is 22.78 kg/mý.      07/31/23  0605 07/31/23  0704 08/01/23  0635   Weight: 52.9 kg (116 lb 9.6 oz) 52.9 kg (116 lb 10 oz) (pt refused, wants to sleep longer)       Physical Exam:  GEN:  No acute distress, alert, cooperative, well developed   EYES:   Sclerae clear. No icterus. PERRL. Normal EOM  ENT:   External ears/nose normal, no oral lesions, no thrush, mucous membranes moist  NECK:  Supple, midline trachea, no JVD  LUNGS: Normal chest on inspection, significant diminished breath sounds bilaterally, wheezing is only upon coughing, no crackles  CV:  Regular rhythm and rate. Normal S1/S2. No murmurs, gallops, or rubs noted.  ABD:  Soft, nontender and nondistended. Normal bowel sounds. No guarding  EXT:  Moves all extremities well. No cyanosis. No redness.  Positive trace lower extremity pitting edema, improved compared to earlier  Skin: Dry, intact, no bleeding    Results Review:    Results From Last 14 Days   Lab Units 07/31/23  0324 07/30/23  1626   D DIMER QUANT MCGFEU/mL  --  1.95*   TRIGLYCERIDES mg/dL 69  --        Results from last 7 days   Lab Units 07/31/23  0324 07/30/23  0410 07/30/23  0058   SODIUM mmol/L 138 138 140   POTASSIUM mmol/L 4.6 4.1 4.3   CHLORIDE mmol/L 94* 96* 97*   CO2 mmol/L 36.0* 32.1* 31.1*   BUN mg/dL 25* 17 18   CREATININE mg/dL 0.87 0.87 0.94   CALCIUM mg/dL 8.7 9.9 9.4   AST (SGOT) U/L  --   --  18   ALT (SGPT) U/L  --   --  16   ANION GAP mmol/L 8.0 9.9 11.9   ALBUMIN g/dL  --   --  3.4*       Results from last 7 days   Lab Units 07/30/23  1626 07/30/23  0614 07/30/23  0410 07/30/23  0058   HSTROP T ng/L  --  27* 22* 17*   D DIMER QUANT " MCGFEU/mL 1.95*  --   --   --        Results from last 7 days   Lab Units 07/31/23  0324   TSH uIU/mL 0.517       Results from last 7 days   Lab Units 07/30/23  0058   PROBNP pg/mL 2,561.0*       Results from last 7 days   Lab Units 07/31/23  0324 07/30/23  0410 07/30/23  0058   WBC 10*3/mm3 11.68* 12.43* 13.47*   HEMOGLOBIN g/dL 10.1* 10.8* 10.6*   HEMATOCRIT % 29.9* 32.5* 31.7*   PLATELETS 10*3/mm3 244 253 234   MCV fL 91.7 90.8 91.6   NEUTROPHIL % %  --   --  71.8   LYMPHOCYTE % %  --   --  17.3*   MONOCYTES % %  --   --  8.1   EOSINOPHIL % %  --   --  1.9   BASOPHIL % %  --   --  0.5   IMM GRAN % %  --   --  0.4       Results from last 7 days   Lab Units 07/30/23  0058   INR  0.99       Results from last 7 days   Lab Units 07/31/23  0324   MAGNESIUM mg/dL 1.5*       Results from last 7 days   Lab Units 07/31/23  0324   CHOLESTEROL mg/dL 206*   TRIGLYCERIDES mg/dL 69   HDL CHOL mg/dL 100*       Results from last 7 days   Lab Units 07/30/23  0149   PH, ARTERIAL pH units 7.381   PCO2, ARTERIAL mm Hg 56.5*   PO2 ART mm Hg 75.5*   HCO3 ART mmol/L 33.5*           No results found for: POCGLU  Results from last 7 days   Lab Units 07/30/23  0058   PROCALCITONIN ng/mL 0.12               Results from last 7 days   Lab Units 07/30/23  0135   COVID19  Not Detected   ADENOVIRUS DETECTION BY PCR  Not Detected   CORONAVIRUS 229E  Not Detected   CORONAVIRUS HKU1  Not Detected   CORONAVIRUS NL63  Not Detected   CORONAVIRUS OC43  Not Detected   HUMAN METAPNEUMOVIRUS  Not Detected   HUMAN RHINOVIRUS/ENTEROVIRUS  Not Detected   INFLUENZA B PCR  Not Detected   PARAINFLUENZA 1  Not Detected   PARAINFLUENZA VIRUS 2  Not Detected   PARAINFLUENZA VIRUS 3  Not Detected   PARAINFLUENZA VIRUS 4  Not Detected   BORDETELLA PERTUSSIS PCR  Not Detected   BORDETELLA PARAPERTUSSIS PCR  Not Detected   CHLAMYDOPHILA PNEUMONIAE PCR  Not Detected   MYCOPLAMA PNEUMO PCR  Not Detected   RSV, PCR  Not Detected                 Echocardiogram7/30/2023      Left ventricular systolic function is normal. Left ventricular ejection fraction appears to be 56 - 60%.    Left ventricular diastolic function is consistent with (grade Ia w/high LAP) impaired relaxation.    Mild aortic valve stenosis is present.  Moderately calcified valve.  Off axis Doppler interrogation due to challenging image acquisition.  Severity of stenosis may be underestimated.    Estimated right ventricular systolic pressure from tricuspid regurgitation is normal (<35 mmHg).  imaging:   Imaging Results (All)       Procedure Component Value Units Date/Time     I reviewed the patient's new clinical results.  I personally viewed and interpreted the patient's imaging results:        Medication Review:   amLODIPine, 5 mg, Oral, Daily  apixaban, 10 mg, Oral, Q12H   Followed by  [START ON 8/8/2023] apixaban, 5 mg, Oral, Q12H  aspirin, 81 mg, Oral, Daily  atenolol, 50 mg, Oral, Daily  budesonide-formoterol, 1 puff, Inhalation, BID - RT  calcium carbonate (oyster shell), 500 mg, Oral, Daily  furosemide, 40 mg, Oral, Daily  guaiFENesin, 1,200 mg, Oral, Q12H  ipratropium-albuterol, 3 mL, Nebulization, 4x Daily - RT  methylPREDNISolone sodium succinate, 40 mg, Intravenous, Q12H  prednisoLONE acetate, 1 drop, Right Eye, 4x Daily  rosuvastatin, 40 mg, Oral, Nightly  sodium chloride, 1 drop, Right Eye, 4x Daily  sodium chloride, 10 mL, Intravenous, Q12H             ASSESSMENT:   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  Acute hypoxemic on chronic hypoxemic and hypercapnic respiratory failure  Anemia probably chronic  History of pulmonary hypertension by record, with normal RVSP on most recent echo  Diastolic congestive heart failure with edema and volume overload and elevated proBNP  Essential hypertension, poorly controlled  Non-ST elevation MI with elevated high-sensitivity troponin  Acute right lower extremity DVT below the knee    PLAN:  Discussion with parabronchial thickening with emphysematous  changes no acute pneumonia  Patient is on systemic steroids and will transition to prednisone  She was started on anticoagulation for her DVT  Diuretics as tolerated from the cardiac standpoint  Oxygen requirements are minimal at this point and the patient is comfortable with the BiPAP  Continue with the above, we will follow-up on the other consultants recommendation    Hopefully home soon    Discussed with patient  Labs/Notes/films were independently reviewed and pertinent results are summarized above  The copied texts in this note were reviewed and they are accurate as of 08/01/23    Disposition:     Ama Christopher MD  08/01/23  13:52 EDT          Dictated utilizing Dragon dictation

## 2023-08-01 NOTE — THERAPY TREATMENT NOTE
Patient Name: Lizzy Hicks  : 1935    MRN: [id number]                              Today's Date: 2023       Admit Date: 2023    Visit Dx:     ICD-10-CM ICD-9-CM   1. COPD exacerbation  J44.1 491.21     Patient Active Problem List   Diagnosis    Hypertension    COPD (chronic obstructive pulmonary disease)    Carotid artery stenosis    Osteoarthritis    Osteoporosis    Hyponatremia    Reset osmostat syndrome    Carotid stenosis, asymptomatic, right    Carotid artery disease    Macular degeneration    COPD exacerbation    Acute exacerbation of chronic obstructive pulmonary disease (COPD)    Parainfluenza infection    Acute respiratory failure with hypoxia    Acute respiratory failure with hypoxia and hypercapnia    Mixed hyperlipidemia    Possible pneumonia of right lower lobe due to infectious organism    Oropharyngeal dysphagia    Acute respiratory failure with hypercapnia    Respiratory insufficiency    COPD with acute exacerbation    Sleep apnea    Acute DVT (deep venous thrombosis)    DVT, lower extremity, distal, acute, right     Past Medical History:   Diagnosis Date    Anesthesia complication     pt states was groggy for a week after surgery    Carotid artery disease     left    Carotid stenosis     RIGHT    COPD (chronic obstructive pulmonary disease)     History of bronchitis     Hypertension     Macular degeneration     Osteoarthritis 2016    Osteoporosis 2016    Reset osmostat syndrome 08/15/2016    Worked up by prior PCP in [facility]. Baseline Na 128-130 since .    Seborrheic dermatitis 2016    Swallowing difficulty     D/T INADVERTANT REMOVAL OF UVULA AS CHILD WITH T AND A     Past Surgical History:   Procedure Laterality Date    CAROTID ENDARTERECTOMY Right 2018    Procedure: RT CAROTID ENDARTERECTOMY WITH INTRA OPERATIVE CAROTID ARTERY DUPLEX SCAN;  Surgeon: Mikaela Galicia Jr., MD;  Location: [facility];  Service: Vascular    CAROTID ENDARTERECTOMY  Left 4/4/2019    Procedure: LEFT CAROTID ENDARTERECTOMY;  Surgeon: Mikaela Galicia Jr., MD;  Location: [facility] MAIN OR;  Service: Vascular    CATARACT EXTRACTION WITH INTRAOCULAR LENS IMPLANT      LEFT AND RIGHT    ORIF FEMUR FRACTURE Right     HARDWARE    TONSILLECTOMY AND ADENOIDECTOMY      INADVERTANTLY TOOK UVULA AT THIS TIME      General Information       Row Name 08/01/23 1312          Physical Therapy Time and Intention    Document Type therapy note (daily note)  -CB     Mode of Treatment individual therapy;physical therapy  -CB       Row Name 08/01/23 1312          General Information    Existing Precautions/Restrictions fall;oxygen therapy device and L/min  -CB       Row Name 08/01/23 1312          Cognition    Orientation Status (Cognition) oriented to;person;place  -CB       Row Name 08/01/23 1312          Safety Issues, Functional Mobility    Safety Issues Affecting Function (Mobility) judgment;positioning of assistive device;problem-solving;insight into deficits/self-awareness;safety precautions follow-through/compliance;safety precaution awareness  -CB     Impairments Affecting Function (Mobility) balance;strength;endurance/activity tolerance  -CB               User Key  (r) = Recorded By, (t) = Taken By, (c) = Cosigned By      Initials Name Provider Type    CB Kat Maya PT Physical Therapist                   Mobility       Row Name 08/01/23 1314          Bed Mobility    Comment, (Bed Mobility) UIC  -CB       Row Name 08/01/23 1314          Sit-Stand Transfer    Sit-Stand Nash (Transfers) contact guard  -CB     Assistive Device (Sit-Stand Transfers) walker, front-wheeled  -CB     Comment, (Sit-Stand Transfer) x2 STS  -CB       Row Name 08/01/23 1314          Gait/Stairs (Locomotion)    Nash Level (Gait) contact guard;verbal cues  -CB     Assistive Device (Gait) walker, front-wheeled  -CB     Distance in Feet (Gait) 20ft  -CB     Deviations/Abnormal Patterns (Gait) stride  length decreased;trav decreased;gait speed decreased  -CB     Comment, (Gait/Stairs) decreased safety awareness; PT to manage O2 line  -CB               User Key  (r) = Recorded By, (t) = Taken By, (c) = Cosigned By      Initials Name Provider Type    Kat Walsh, REINIER Physical Therapist                   Obj/Interventions       Row Name 08/01/23 1315          Motor Skills    Therapeutic Exercise --  seated AP, LAQ, marching x10 reps  -CB       Row Name 08/01/23 1315          Balance    Balance Assessment standing static balance;standing dynamic balance  -CB     Static Standing Balance contact guard;verbal cues  -CB     Dynamic Standing Balance contact guard;verbal cues  -CB     Position/Device Used, Standing Balance supported;walker, front-wheeled  -CB     Balance Interventions sitting;standing;sit to stand;supported;static;dynamic;minimal challenge  -CB               User Key  (r) = Recorded By, (t) = Taken By, (c) = Cosigned By      Initials Name Provider Type    Kat Walsh, PT Physical Therapist                   Goals/Plan    No documentation.                  Clinical Impression       Row Name 08/01/23 1315          Pain    Pretreatment Pain Rating 0/10 - no pain  -CB     Posttreatment Pain Rating 0/10 - no pain  -CB       Row Name 08/01/23 1315          Plan of Care Review    Plan of Care Reviewed With patient  -CB     Progress improving  -CB     Outcome Evaluation Patient is agreeable to PT this AM. She increased overall functional mobility. Today she ambulated 20ft using rwx requiring CGA and completed x2 STS to rwx with CGA. Pt ambulated on 1L O2 with PT managing line throughout. No overt LOB noted during ambulation. Will continue to progress as pt tolerates.  -CB       Row Name 08/01/23 1315          Positioning and Restraints    Pre-Treatment Position sitting in chair/recliner  -CB     Post Treatment Position chair  -CB     In Chair notified nsg;reclined;call light within reach;encouraged  to call for assist;exit alarm on  -CB               User Key  (r) = Recorded By, (t) = Taken By, (c) = Cosigned By      Initials Name Provider Type    Kat Walsh PT Physical Therapist                   Outcome Measures       Row Name 08/01/23 1316 08/01/23 0945       How much help from another person do you currently need...    Turning from your back to your side while in flat bed without using bedrails? 3  -CB 4  -AP    Moving from lying on back to sitting on the side of a flat bed without bedrails? 3  -CB 3  -AP    Moving to and from a bed to a chair (including a wheelchair)? 3  -CB 3  -AP    Standing up from a chair using your arms (e.g., wheelchair, bedside chair)? 3  -CB 3  -AP    Climbing 3-5 steps with a railing? 2  -CB 1  -AP    To walk in hospital room? 3  -CB 2  -AP    AM-PAC 6 Clicks Score (PT) 17  -CB 16  -AP    Highest level of mobility 5 --> Static standing  -CB 5 --> Static standing  -AP      Row Name 08/01/23 1316          Functional Assessment    Outcome Measure Options AM-PAC 6 Clicks Basic Mobility (PT)  -CB               User Key  (r) = Recorded By, (t) = Taken By, (c) = Cosigned By      Initials Name Provider Type    Kat Walsh, PT Physical Therapist    Mari Hernandez RN Registered Nurse                                 Physical Therapy Education       Title: PT OT SLP Therapies (Done)       Topic: Physical Therapy (Done)       Point: Mobility training (Done)       Learning Progress Summary             Patient Acceptance, E,TB, VU,NR by CB at 8/1/2023 1317    Acceptance, E, NR by SM at 7/31/2023 1315                         Point: Home exercise program (Done)       Learning Progress Summary             Patient Acceptance, E,TB, VU,NR by CB at 8/1/2023 1317    Acceptance, E, NR by SM at 7/31/2023 1315                         Point: Body mechanics (Done)       Learning Progress Summary             Patient Acceptance, E,TB, VU,NR by CB at 8/1/2023 1317    Acceptance, E, NR by SM at  7/31/2023 1315                         Point: Precautions (Done)       Learning Progress Summary             Patient Acceptance, E,TB, VU,NR by  at 8/1/2023 1317    Acceptance, E, NR by  at 7/31/2023 1315                                         User Key       Initials Effective Dates Name Provider Type Discipline     10/22/21 -  Kat Maya, PT Physical Therapist PT    SM 05/02/22 -  Haley Juarez, REINIER Physical Therapist PT                  PT Recommendation and Plan     Plan of Care Reviewed With: patient  Progress: improving  Outcome Evaluation: Patient is agreeable to PT this AM. She increased overall functional mobility. Today she ambulated 20ft using rwx requiring CGA and completed x2 STS to rwx with CGA. Pt ambulated on 1L O2 with PT managing line throughout. No overt LOB noted during ambulation. Will continue to progress as pt tolerates.     Time Calculation:         PT Charges       Row Name 08/01/23 1318             Time Calculation    Start Time 1144  -CB      Stop Time 1156  -CB      Time Calculation (min) 12 min  -CB      PT Received On 08/01/23  -CB      PT - Next Appointment 08/02/23  -CB         Time Calculation- PT    Total Timed Code Minutes- PT 12 minute(s)  -CB         Timed Charges    28947 - PT Therapeutic Activity Minutes 12  -CB         Total Minutes    Timed Charges Total Minutes 12  -CB       Total Minutes 12  -CB                User Key  (r) = Recorded By, (t) = Taken By, (c) = Cosigned By      Initials Name Provider Type    CB Kat Maya, PT Physical Therapist                  Therapy Charges for Today       Code Description Service Date Service Provider Modifiers Qty    [id number] HC PT THERAPEUTIC ACT EA 15 MIN 8/1/2023 Kat Maya, PT GP 1            PT G-Codes  Outcome Measure Options: AM-PAC 6 Clicks Basic Mobility (PT)  AM-PAC 6 Clicks Score (PT): 17  PT Discharge Summary  Anticipated Discharge Disposition (PT): extended care facility, skilled nursing  facility    Kat Maya, PT  8/1/2023

## 2023-08-01 NOTE — PROGRESS NOTES
Name: Lizzy Hicks ADMIT: 2023   : 1935  PCP: Traci Hoyos MD    MRN: [id number] LOS: 1 days   AGE/SEX: 87 y.o. female  ROOM: [facility]     Subjective   Subjective   Overall feeling better. No new complaint today. Working with PT. Daughter at bedside.     Objective   Objective   Vital Signs  Temp:  [97.4 øF (36.3 øC)-97.9 øF (36.6 øC)] 97.7 øF (36.5 øC)  Heart Rate:  [67-91] 81  Resp:  [17-20] 20  BP: (115-135)/(47-56) 123/50  SpO2:  [90 %-99 %] 96 %  on  Flow (L/min):  [1.5-2] 1.5;   Device (Oxygen Therapy): nasal cannula  Body mass index is 22.78 kg/mý.    Physical Exam  Constitutional:       General: She is not in acute distress.     Appearance: Normal appearance. She is not toxic-appearing.   HENT:      Head: Normocephalic and atraumatic.   Eyes:      Extraocular Movements: Extraocular movements intact.   Cardiovascular:      Rate and Rhythm: Normal rate and regular rhythm.   Pulmonary:      Effort: Pulmonary effort is normal. No respiratory distress.      Breath sounds: Decreased breath sounds present. No wheezing or rhonchi.   Abdominal:      General: Bowel sounds are normal.      Palpations: Abdomen is soft.      Tenderness: There is no abdominal tenderness. There is no guarding or rebound.   Musculoskeletal:      Cervical back: Normal range of motion.   Skin:     General: Skin is warm and dry.   Neurological:      General: No focal deficit present.      Mental Status: She is alert and oriented to person, place, and time.   Psychiatric:         Mood and Affect: Mood normal.         Behavior: Behavior normal.         Thought Content: Thought content normal.     Results Review  I reviewed the patient's new clinical results.  Results from last 7 days   Lab Units 23  0324 23  0410 23  0058   WBC 10*3/mm3 11.68* 12.43* 13.47*   HEMOGLOBIN g/dL 10.1* 10.8* 10.6*   PLATELETS 10*3/mm3 244 253 234       Results from last 7 days   Lab Units 23  0324 230  07/30/23  0058   SODIUM mmol/L 138 138 140   POTASSIUM mmol/L 4.6 4.1 4.3   CHLORIDE mmol/L 94* 96* 97*   CO2 mmol/L 36.0* 32.1* 31.1*   BUN mg/dL 25* 17 18   CREATININE mg/dL 0.87 0.87 0.94   GLUCOSE mg/dL 146* 134* 115*       Lab Results   Component Value Date    ANIONGAP 8.0 07/31/2023     Estimated Creatinine Clearance: 39.5 mL/min (by C-G formula based on SCr of 0.87 mg/dL).    Results from last 7 days   Lab Units 07/30/23  0058   ALBUMIN g/dL 3.4*   BILIRUBIN mg/dL 0.2   ALK PHOS U/L 61   AST (SGOT) U/L 18   ALT (SGPT) U/L 16       Results from last 7 days   Lab Units 07/31/23  0324 07/30/23  0410 07/30/23  0058   CALCIUM mg/dL 8.7 9.9 9.4   ALBUMIN g/dL  --   --  3.4*   MAGNESIUM mg/dL 1.5*  --   --        Results from last 7 days   Lab Units 07/30/23  0058   PROCALCITONIN ng/mL 0.12       No results found for: HGBA1C, POCGLU    CT Angiogram Chest    Result Date: 7/30/2023  1. No evidence for pulmonary thromboembolic disease. 2. Pulmonary emphysema. Bronchial wall thickening greatest within the right lower lobe where there are patchy irregular peripheral nodular opacities that are most likely inflammatory or infectious in etiology though need follow-up with chest CT to confirm resolution and this could be performed in 3-4 months. 3. Extensive atherosclerotic disease.  Coronary arterial calcifications.  This report was finalized on 7/30/2023 8:41 PM by Dr. Isiah Fuentes M.D.       Scheduled Meds  amLODIPine, 5 mg, Oral, Daily  apixaban, 10 mg, Oral, Q12H   Followed by  [START ON 8/8/2023] apixaban, 5 mg, Oral, Q12H  aspirin, 81 mg, Oral, Daily  atenolol, 50 mg, Oral, Daily  budesonide-formoterol, 1 puff, Inhalation, BID - RT  calcium carbonate (oyster shell), 500 mg, Oral, Daily  furosemide, 40 mg, Oral, Daily  guaiFENesin, 1,200 mg, Oral, Q12H  ipratropium-albuterol, 3 mL, Nebulization, 4x Daily - RT  methylPREDNISolone sodium succinate, 40 mg, Intravenous, Q12H  prednisoLONE acetate, 1 drop, Right Eye, 4x  Daily  rosuvastatin, 40 mg, Oral, Nightly  sodium chloride, 1 drop, Right Eye, 4x Daily  sodium chloride, 10 mL, Intravenous, Q12H    Continuous Infusions   PRN Meds    acetaminophen **OR** acetaminophen **OR** acetaminophen    albuterol    calcium carbonate    famotidine    nitroglycerin    ondansetron **OR** ondansetron    [COMPLETED] Insert Peripheral IV **AND** sodium chloride    sodium chloride    sodium chloride     Diet  Diet: Regular/House Diet; Texture: Mechanical Ground (NDD 2); Fluid Consistency: Thin (IDDSI 0)    I have personally reviewed:  [x]  Medications  [x]  Laboratory   []  Microbiology   []  Radiology   [x]  EKG/Telemetry SR on telemetry  []  Cardiology/Vascular   []  Pathology   []  Records     Results for orders placed during the hospital encounter of 07/30/23    Adult Transthoracic Echo Complete W/ Cont if Necessary Per Protocol    Interpretation Summary    Left ventricular systolic function is normal. Left ventricular ejection fraction appears to be 56 - 60%.    Left ventricular diastolic function is consistent with (grade Ia w/high LAP) impaired relaxation.    Mild aortic valve stenosis is present.  Moderately calcified valve.  Off axis Doppler interrogation due to challenging image acquisition.  Severity of stenosis may be underestimated.    Estimated right ventricular systolic pressure from tricuspid regurgitation is normal (<35 mmHg).        Assessment/Plan     Active Hospital Problems    Diagnosis  POA    **COPD with acute exacerbation [J44.1]  Yes    Acute DVT (deep venous thrombosis) [I82.409]  Unknown    Sleep apnea [G47.30]  Unknown    Acute respiratory failure with hypoxia and hypercapnia [J96.01, J96.02]  Yes    COPD exacerbation [J44.1]  Yes    Carotid artery disease [I77.9]  Yes    Hypertension [I10]  Yes      Resolved Hospital Problems   No resolved problems to display.     87 y.o. female     Acute exacerbation COPD  Emphysema  Pulmonary hypertension  Acute hypoxemia on chronic  hypoxemic and hypercapnic respiratory failure  -Steroids per pulmonology (decreased), nebulized breathing treatments  -Radiology recommends follow-up CT chest 3 to 4 months  -Monitoring off antibiotics    Elevated D-dimer  Right lower extremity DVT  -CT negative for PE  -Duplex right lower extremity DVT (popliteal and gastrocnemius)  -Started on Eliquis. She'll follow up with PCP Traci Hoyos MD     Acute on chronic diastolic heart failure  Hypertension  -Status post IV diuresis  -Now on p.o. furosemide  -Echocardiogram above. EF 56 to 60%  -BP is fine    SCDs for DVT prophylaxis    Discussed with patient and nursing staff    Discharge: couple days    Jim Edge MD  [facility]  08/01/23

## 2023-08-01 NOTE — PLAN OF CARE
Goal Outcome Evaluation:  Plan of Care Reviewed With: patient        Progress: improving  Outcome Evaluation: Patient is agreeable to PT this AM. She increased overall functional mobility. Today she ambulated 20ft using rwx requiring CGA and completed x2 STS to rwx with CGA. Pt ambulated on 1L O2 with PT managing line throughout. No overt LOB noted during ambulation. Will continue to progress as pt tolerates.      Anticipated Discharge Disposition (PT): extended care facility, skilled nursing facility

## 2023-08-01 NOTE — PROGRESS NOTES
"    Patient Name: Lizzy Hicks  :1935  87 y.o.      Patient Care Team:  Traci Hoyos MD as PCP - General (Geriatric Medicine)    Chief Complaint: follow up shortness of breath    Interval History: she is sitting up in the chair. Not much urine output recorded. She refused weight this morning.        Objective   Vital Signs  Temp:  [97.4 øF (36.3 øC)-97.9 øF (36.6 øC)] 97.7 øF (36.5 øC)  Heart Rate:  [67-87] 81  Resp:  [17-20] 20  BP: (119-135)/(47-56) 123/50    Intake/Output Summary (Last 24 hours) at 2023 1557  Last data filed at 2023 1102  Gross per 24 hour   Intake 120 ml   Output 200 ml   Net -80 ml     Flowsheet Rows      Flowsheet Row First Filed Value   Admission Height 152.4 cm (60\") Documented at 2023   Admission Weight 54.4 kg (120 lb) Documented at 2023 0032            Physical Exam:   General Appearance:    Alert, cooperative, in no acute distress   Lungs:     Clear to auscultation.  Normal respiratory effort and rate.      Heart:    Regular rhythm and normal rate, normal S1 and S2, no murmurs, gallops or rubs.     Chest Wall:    No abnormalities observed   Abdomen:     Soft, nontender, positive bowel sounds.     Extremities:   no cyanosis, clubbing or edema.  No marked joint deformities.  Adequate musculoskeletal strength.       Results Review:    Results from last 7 days   Lab Units 23  0324   SODIUM mmol/L 138   POTASSIUM mmol/L 4.6   CHLORIDE mmol/L 94*   CO2 mmol/L 36.0*   BUN mg/dL 25*   CREATININE mg/dL 0.87   GLUCOSE mg/dL 146*   CALCIUM mg/dL 8.7     Results from last 7 days   Lab Units 23  0614 23  0410 23   HSTROP T ng/L 27* 22* 17*     Results from last 7 days   Lab Units 23  0324   WBC 10*3/mm3 11.68*   HEMOGLOBIN g/dL 10.1*   HEMATOCRIT % 29.9*   PLATELETS 10*3/mm3 244     Results from last 7 days   Lab Units 07/30/23  0058   INR  0.99     Results from last 7 days   Lab Units 23  0324   MAGNESIUM mg/dL 1.5* "     Results from last 7 days   Lab Units 07/31/23  0324   CHOLESTEROL mg/dL 206*   TRIGLYCERIDES mg/dL 69   HDL CHOL mg/dL 100*   LDL CHOL mg/dL 94               Medication Review:   amLODIPine, 5 mg, Oral, Daily  apixaban, 10 mg, Oral, Q12H   Followed by  [START ON 8/8/2023] apixaban, 5 mg, Oral, Q12H  aspirin, 81 mg, Oral, Daily  atenolol, 50 mg, Oral, Daily  budesonide-formoterol, 1 puff, Inhalation, BID - RT  calcium carbonate (oyster shell), 500 mg, Oral, Daily  furosemide, 40 mg, Oral, Daily  guaiFENesin, 1,200 mg, Oral, Q12H  ipratropium-albuterol, 3 mL, Nebulization, 4x Daily - RT  prednisoLONE acetate, 1 drop, Right Eye, 4x Daily  predniSONE, 20 mg, Oral, BID With Meals  rosuvastatin, 40 mg, Oral, Nightly  sodium chloride, 1 drop, Right Eye, 4x Daily  sodium chloride, 10 mL, Intravenous, Q12H              Assessment & Plan   Acute on chronic hypoxemic and hypercapnic respiratory failure from COPD exacerbation  Acute on chronic diastolic congestive heart failure, she has been transitioned to oral diuretics.   Essential hypertension  Mild elevated troponin without ischemic changes on EKG. No chest pain.   Hyperlipidemia  Coronary artery calcification - continue statin.   DVT (acute) started on apixaban.     Tolerating oral diuretics.   Newly diagnosed DVT - now on VTE dosing apixaban. Could stop ASA from cardiac standpoint.     DAVID Herring  [facility]  08/01/23  15:57 EDT

## 2023-08-01 NOTE — PLAN OF CARE
Goal Outcome Evaluation:              Outcome Evaluation: VSS, AOx3, forgetful at times. Decent appetite and fluid intake. 1.5 L o2 nasal cannula at this time. No new issues at this time and no pain complaints. WCTM.

## 2023-08-01 NOTE — CASE MANAGEMENT/SOCIAL WORK
Discharge Planning Assessment  [facility]     Patient Name: Lizzy Hicks  MRN: [id number]  Today's Date: 8/1/2023    Admit Date: 7/30/2023    Plan: Return to [facility]   Discharge Needs Assessment       Row Name 08/01/23 1311       Living Environment    People in Home facility resident  [facility] with plans to transition to Long-term Care.    Current Living Arrangements extended care facility    Potentially Unsafe Housing Conditions none    Primary Care Provided by other (see comments)  staff at [facility] rehab    Provides Primary Care For no one, unable/limited ability to care for self    Family Caregiver if Needed child(ananda), adult    Family Caregiver Names Jasmin/daughter & Yung/son    Quality of Family Relationships helpful;involved;supportive    Able to Return to Prior Arrangements yes       Resource/Environmental Concerns    Resource/Environmental Concerns none    Transportation Concerns none       Food Insecurity    Within the past 12 months, you worried that your food would run out before you got the money to buy more. Never true    Within the past 12 months, the food you bought just didn't last and you didn't have money to get more. Never true       Transition Planning    Patient/Family Anticipates Transition to long-term care facility    Patient/Family Anticipated Services at Transition skilled nursing    Transportation Anticipated health plan transportation       Discharge Needs Assessment    Equipment Currently Used at Home hospital bed;walker, standard;walker, rolling;respiratory supplies;nebulizer    Concerns to be Addressed discharge planning    Anticipated Changes Related to Illness none    Equipment Needed After Discharge none    Discharge Facility/Level of Care Needs nursing facility, skilled    Patient's Choice of Community Agency(s) Per kelliJasmin- the d/c plan is for Pt to return to skilled rehab at [facility].                   Discharge Plan        Row Name 08/01/23 1314       Plan    Plan Return to [facility] SNF    Plan Comments CCP spoke with Pt daughter, Jasmin Lauren ([phone]), in [facility] to complete Pt discharge screening assessment.  CCP introduced self and role.  Daughter confirmed information on Pt face sheet.  Daughter states Pt has been in [facility] skilled rehab since May 4, 2023 and prior to that she was in their independent living apartments.  Jasmin reports that the plan is for Pt to return to [facility] rehab at discharge.  Per Radha, Pt is from [facility] with a private pay bed hold.  Pt uses a wheelchair, rolling walker and nebulizer at rehab.  Packet on CCP desk.  Rehab pharmacy is selected in [employer].  Pt daughter requesting a wheelchair van for transport back to rehab.  CCP will continue to follow...Rowan MCFARLAND /.                  Continued Care and Services - Admitted Since 7/30/2023       Destination Coordination complete.      Service Provider Request Status Selected Services Address Phone Fax Patient Preferred    [facility]  Selected Skilled Nursing [address] [place] [address] [phone] --                  Selected Continued Care - Prior Encounters Includes continued care and service providers with selected services from prior encounters from 5/1/2023 to 8/1/2023      Discharged on 7/11/2023 Admission date: 7/6/2023 - Discharge disposition: Skilled Nursing Facility (DC - External)      Destination       Service Provider Selected Services Address Phone Fax Patient Preferred    [facility] [address] [place] [address] [phone] --                      Discharged on 5/30/2023 Admission date: 5/21/2023 - Discharge disposition: Skilled Nursing Facility (DC - External)      Destination       Service Provider Selected Services Address Phone Fax Patient Preferred    [facility]  [place] [facility] [address] [facility] [address] [phone]                       Discharged on 5/4/2023 Admission date: 5/1/2023 - Discharge disposition: Skilled Nursing Facility (DC - External)      Destination       Service Provider Selected Services Address Phone Fax Patient Preferred    [facility] [address] [facility] [address] [phone] --                          Expected Discharge Date and Time       Expected Discharge Date Expected Discharge Time    Aug 2, 2023            Demographic Summary       Row Name 08/01/23 1310       General Information    Admission Type inpatient    Arrived From emergency department    Required Notices Provided Important Message from Medicare    Referral Source admission list;nursing    Reason for Consult discharge planning    Preferred Language English       Contact Information    Permission Granted to Share Info With family/designee                   Functional Status       Row Name 08/01/23 1310       Functional Status    Usual Activity Tolerance moderate    Current Activity Tolerance fair       Assessment of Health Literacy    Health Literacy Moderate       Functional Status, IADL    Medications assistive person    Meal Preparation completely dependent    Housekeeping completely dependent    Laundry completely dependent    Shopping assistive person    IADL Comments Meals, housekeeping, laundry provided at the [facility].       Mental Status    General Appearance WDL WDL       Mental Status Summary    Recent Changes in Mental Status/Cognitive Functioning no changes       Employment/    Employment Status retired                   Psychosocial    No documentation.                  Abuse/Neglect    No documentation.                  Legal    No documentation.                  Substance Abuse    No documentation.                  Patient Forms    No documentation.                      Rowan Ornelas, RN

## 2023-08-02 PROCEDURE — 94660 CPAP INITIATION&MGMT: CPT

## 2023-08-02 PROCEDURE — 97530 THERAPEUTIC ACTIVITIES: CPT

## 2023-08-02 PROCEDURE — 94799 UNLISTED PULMONARY SVC/PX: CPT

## 2023-08-02 PROCEDURE — 99232 SBSQ HOSP IP/OBS MODERATE 35: CPT | Performed by: NURSE PRACTITIONER

## 2023-08-02 PROCEDURE — 63710000001 PREDNISONE PER 1 MG: Performed by: INTERNAL MEDICINE

## 2023-08-02 PROCEDURE — 94664 DEMO&/EVAL PT USE INHALER: CPT

## 2023-08-02 PROCEDURE — 94761 N-INVAS EAR/PLS OXIMETRY MLT: CPT

## 2023-08-02 RX ORDER — DOCUSATE SODIUM 100 MG/1
100 CAPSULE, LIQUID FILLED ORAL 2 TIMES DAILY
Status: DISCONTINUED | OUTPATIENT
Start: 2023-08-02 | End: 2023-08-03 | Stop reason: HOSPADM

## 2023-08-02 RX ORDER — ECHINACEA PURPUREA EXTRACT 125 MG
2 TABLET ORAL AS NEEDED
Status: DISCONTINUED | OUTPATIENT
Start: 2023-08-02 | End: 2023-08-03 | Stop reason: HOSPADM

## 2023-08-02 RX ADMIN — IPRATROPIUM BROMIDE AND ALBUTEROL SULFATE 3 ML: .5; 3 SOLUTION RESPIRATORY (INHALATION) at 16:07

## 2023-08-02 RX ADMIN — SODIUM CHLORIDE 1 DROP: 50 SOLUTION OPHTHALMIC at 12:09

## 2023-08-02 RX ADMIN — APIXABAN 10 MG: 5 TABLET, FILM COATED ORAL at 09:09

## 2023-08-02 RX ADMIN — ROSUVASTATIN CALCIUM 40 MG: 40 TABLET, FILM COATED ORAL at 21:36

## 2023-08-02 RX ADMIN — GUAIFENESIN 1200 MG: 600 TABLET, EXTENDED RELEASE ORAL at 21:36

## 2023-08-02 RX ADMIN — APIXABAN 10 MG: 5 TABLET, FILM COATED ORAL at 21:36

## 2023-08-02 RX ADMIN — SODIUM CHLORIDE 1 DROP: 50 SOLUTION OPHTHALMIC at 21:35

## 2023-08-02 RX ADMIN — BUDESONIDE AND FORMOTEROL FUMARATE DIHYDRATE 1 PUFF: 160; 4.5 AEROSOL RESPIRATORY (INHALATION) at 06:56

## 2023-08-02 RX ADMIN — Medication 10 ML: at 09:19

## 2023-08-02 RX ADMIN — PREDNISOLONE ACETATE 1 DROP: 10 SUSPENSION/ DROPS OPHTHALMIC at 12:08

## 2023-08-02 RX ADMIN — BUDESONIDE AND FORMOTEROL FUMARATE DIHYDRATE 1 PUFF: 160; 4.5 AEROSOL RESPIRATORY (INHALATION) at 20:09

## 2023-08-02 RX ADMIN — GUAIFENESIN 1200 MG: 600 TABLET, EXTENDED RELEASE ORAL at 09:09

## 2023-08-02 RX ADMIN — PREDNISOLONE ACETATE 1 DROP: 10 SUSPENSION/ DROPS OPHTHALMIC at 09:08

## 2023-08-02 RX ADMIN — PREDNISOLONE ACETATE 1 DROP: 10 SUSPENSION/ DROPS OPHTHALMIC at 17:57

## 2023-08-02 RX ADMIN — Medication 500 MG: at 09:09

## 2023-08-02 RX ADMIN — SODIUM CHLORIDE 1 DROP: 50 SOLUTION OPHTHALMIC at 09:08

## 2023-08-02 RX ADMIN — AMLODIPINE BESYLATE 5 MG: 5 TABLET ORAL at 09:09

## 2023-08-02 RX ADMIN — DOCUSATE SODIUM 100 MG: 100 CAPSULE, LIQUID FILLED ORAL at 21:36

## 2023-08-02 RX ADMIN — IPRATROPIUM BROMIDE AND ALBUTEROL SULFATE 3 ML: .5; 3 SOLUTION RESPIRATORY (INHALATION) at 10:18

## 2023-08-02 RX ADMIN — Medication 10 ML: at 21:36

## 2023-08-02 RX ADMIN — ATENOLOL 50 MG: 50 TABLET ORAL at 09:09

## 2023-08-02 RX ADMIN — IPRATROPIUM BROMIDE AND ALBUTEROL SULFATE 3 ML: .5; 3 SOLUTION RESPIRATORY (INHALATION) at 06:55

## 2023-08-02 RX ADMIN — FUROSEMIDE 40 MG: 40 TABLET ORAL at 09:09

## 2023-08-02 RX ADMIN — PREDNISONE 20 MG: 20 TABLET ORAL at 09:09

## 2023-08-02 RX ADMIN — PREDNISOLONE ACETATE 1 DROP: 10 SUSPENSION/ DROPS OPHTHALMIC at 21:36

## 2023-08-02 RX ADMIN — PREDNISONE 20 MG: 20 TABLET ORAL at 17:57

## 2023-08-02 RX ADMIN — DOCUSATE SODIUM 100 MG: 100 CAPSULE, LIQUID FILLED ORAL at 09:30

## 2023-08-02 RX ADMIN — IPRATROPIUM BROMIDE AND ALBUTEROL SULFATE 3 ML: .5; 3 SOLUTION RESPIRATORY (INHALATION) at 20:05

## 2023-08-02 RX ADMIN — SODIUM CHLORIDE 1 DROP: 50 SOLUTION OPHTHALMIC at 17:57

## 2023-08-02 NOTE — PROGRESS NOTES
PROGRESS NOTE  Patient Name: Lizzy Hicks  Age/Sex: 87 y.o. female  : 1935  MRN: [id number]    Date of Admission: 2023  Date of Encounter Visit: 23   LOS: 2 days   Patient Care Team:  Traci Hoyos MD as PCP - General (Geriatric Medicine)    Chief Complaint: COPD exacerbation, pulmonary hypertension, DVT     Hospital course: Patient is compensating well from the respiratory standpoint at this point and feeling back to her baseline, on nasal cannula oxygen during the daytime and using the BiPAP at night.  She was diagnosed with a DVT and she is on the Eliquis  No fever  No productive cough  No significant chest tightness or wheezing      REVIEW OF SYSTEMS:   CONSTITUTIONAL: no fever or chills  CARDIOVASCULAR: No chest pain, chest pressure or chest discomfort. No palpitations, nearly resolved edema  RESPIRATORY: Nearly resolved cough, no significant wheezing or tightness  GASTROINTESTINAL: No anorexia, nausea, vomiting or diarrhea. No abdominal pain or blood.   HEMATOLOGIC: No bleeding or bruising.     Ventilator/Non-Invasive Ventilation Settings (From admission, onward)       Start     Ordered    23 0447  NIPPV (CPAP or BIPAP)  At Bedtime & As Needed-RT        Question Answer Comment   Indication Acute Respiratory Failure    Type AVAPS/PC/PS    Backup Rate 16    Target VT (mL) 350    EPAP/PEEP (cm H2O) 5    Min Pressure (cm H2O) 10    Max Pressure (cm H2O) 25    Titrate Oxygen for SpO2 90 - 95%        23 0448                      Vital Signs  Temp:  [97.5 øF (36.4 øC)-98.1 øF (36.7 øC)] 98.1 øF (36.7 øC)  Heart Rate:  [74-98] 78  Resp:  [18-28] 18  BP: (105-155)/(43-60) 149/52  SpO2:  [91 %-100 %] 100 %  on  Flow (L/min):  [1.5-2.5] 2.5 Device (Oxygen Therapy): nasal cannula    Intake/Output Summary (Last 24 hours) at 2023 1106  Last data filed at 2023 0520  Gross per 24 hour   Intake 960 ml   Output 25 ml   Net 935 ml       Flowsheet Rows      Flowsheet Row First Filed  "Value   Admission Height 152.4 cm (60\") Documented at 07/30/2023 0032   Admission Weight 54.4 kg (120 lb) Documented at 07/30/2023 0032          Body mass index is 22.78 kg/mý.      07/31/23  0605 07/31/23  0704 08/01/23  0635   Weight: 52.9 kg (116 lb 9.6 oz) 52.9 kg (116 lb 10 oz) (pt refused, wants to sleep longer)       Physical Exam:  GEN:  No acute distress, alert, cooperative, well developed   EYES:   Sclerae clear. No icterus. PERRL. Normal EOM  ENT:   External ears/nose normal, no oral lesions, no thrush, mucous membranes moist  NECK:  Supple, midline trachea, no JVD  LUNGS: Normal chest on inspection, still diminished but improved breath sounds, no wheezing on quiet breathing, no crackles  CV:  Regular rhythm and rate. Normal S1/S2. No murmurs, gallops, or rubs noted.  ABD:  Soft, nontender and nondistended. Normal bowel sounds. No guarding  EXT:  Moves all extremities well. No cyanosis. No redness.  Positive trace lower extremity pitting edema, improved compared to earlier  Skin: Dry, intact, no bleeding    Results Review:    Results From Last 14 Days   Lab Units 07/31/23  0324 07/30/23  1626   D DIMER QUANT MCGFEU/mL  --  1.95*   TRIGLYCERIDES mg/dL 69  --        Results from last 7 days   Lab Units 07/31/23  0324 07/30/23  0410 07/30/23  0058   SODIUM mmol/L 138 138 140   POTASSIUM mmol/L 4.6 4.1 4.3   CHLORIDE mmol/L 94* 96* 97*   CO2 mmol/L 36.0* 32.1* 31.1*   BUN mg/dL 25* 17 18   CREATININE mg/dL 0.87 0.87 0.94   CALCIUM mg/dL 8.7 9.9 9.4   AST (SGOT) U/L  --   --  18   ALT (SGPT) U/L  --   --  16   ANION GAP mmol/L 8.0 9.9 11.9   ALBUMIN g/dL  --   --  3.4*       Results from last 7 days   Lab Units 07/30/23  1626 07/30/23  0614 07/30/23  0410 07/30/23  0058   HSTROP T ng/L  --  27* 22* 17*   D DIMER QUANT MCGFEU/mL 1.95*  --   --   --        Results from last 7 days   Lab Units 07/31/23  0324   TSH uIU/mL 0.517       Results from last 7 days   Lab Units 07/30/23  0058   PROBNP pg/mL 2,561.0* "       Results from last 7 days   Lab Units 07/31/23  0324 07/30/23  0410 07/30/23  0058   WBC 10*3/mm3 11.68* 12.43* 13.47*   HEMOGLOBIN g/dL 10.1* 10.8* 10.6*   HEMATOCRIT % 29.9* 32.5* 31.7*   PLATELETS 10*3/mm3 244 253 234   MCV fL 91.7 90.8 91.6   NEUTROPHIL % %  --   --  71.8   LYMPHOCYTE % %  --   --  17.3*   MONOCYTES % %  --   --  8.1   EOSINOPHIL % %  --   --  1.9   BASOPHIL % %  --   --  0.5   IMM GRAN % %  --   --  0.4       Results from last 7 days   Lab Units 07/30/23  0058   INR  0.99       Results from last 7 days   Lab Units 07/31/23  0324   MAGNESIUM mg/dL 1.5*       Results from last 7 days   Lab Units 07/31/23  0324   CHOLESTEROL mg/dL 206*   TRIGLYCERIDES mg/dL 69   HDL CHOL mg/dL 100*       Results from last 7 days   Lab Units 07/30/23  0149   PH, ARTERIAL pH units 7.381   PCO2, ARTERIAL mm Hg 56.5*   PO2 ART mm Hg 75.5*   HCO3 ART mmol/L 33.5*           No results found for: POCGLU  Results from last 7 days   Lab Units 07/30/23  0058   PROCALCITONIN ng/mL 0.12               Results from last 7 days   Lab Units 07/30/23  0135   COVID19  Not Detected   ADENOVIRUS DETECTION BY PCR  Not Detected   CORONAVIRUS 229E  Not Detected   CORONAVIRUS HKU1  Not Detected   CORONAVIRUS NL63  Not Detected   CORONAVIRUS OC43  Not Detected   HUMAN METAPNEUMOVIRUS  Not Detected   HUMAN RHINOVIRUS/ENTEROVIRUS  Not Detected   INFLUENZA B PCR  Not Detected   PARAINFLUENZA 1  Not Detected   PARAINFLUENZA VIRUS 2  Not Detected   PARAINFLUENZA VIRUS 3  Not Detected   PARAINFLUENZA VIRUS 4  Not Detected   BORDETELLA PERTUSSIS PCR  Not Detected   BORDETELLA PARAPERTUSSIS PCR  Not Detected   CHLAMYDOPHILA PNEUMONIAE PCR  Not Detected   MYCOPLAMA PNEUMO PCR  Not Detected   RSV, PCR  Not Detected                 Echocardiogram7/30/2023     Left ventricular systolic function is normal. Left ventricular ejection fraction appears to be 56 - 60%.    Left ventricular diastolic function is consistent with (grade Ia w/high LAP)  impaired relaxation.    Mild aortic valve stenosis is present.  Moderately calcified valve.  Off axis Doppler interrogation due to challenging image acquisition.  Severity of stenosis may be underestimated.    Estimated right ventricular systolic pressure from tricuspid regurgitation is normal (<35 mmHg).  imaging:   Imaging Results (All)       Procedure Component Value Units Date/Time     I reviewed the patient's new clinical results.  I personally viewed and interpreted the patient's imaging results:        Medication Review:   amLODIPine, 5 mg, Oral, Daily  apixaban, 10 mg, Oral, Q12H   Followed by  [START ON 8/8/2023] apixaban, 5 mg, Oral, Q12H  atenolol, 50 mg, Oral, Daily  budesonide-formoterol, 1 puff, Inhalation, BID - RT  calcium carbonate (oyster shell), 500 mg, Oral, Daily  docusate sodium, 100 mg, Oral, BID  furosemide, 40 mg, Oral, Daily  guaiFENesin, 1,200 mg, Oral, Q12H  ipratropium-albuterol, 3 mL, Nebulization, 4x Daily - RT  prednisoLONE acetate, 1 drop, Right Eye, 4x Daily  predniSONE, 20 mg, Oral, BID With Meals  rosuvastatin, 40 mg, Oral, Nightly  sodium chloride, 1 drop, Right Eye, 4x Daily  sodium chloride, 10 mL, Intravenous, Q12H             ASSESSMENT:   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  Acute hypoxemic on chronic hypoxemic and hypercapnic respiratory failure  Anemia probably chronic  History of pulmonary hypertension by record, with normal RVSP on most recent echo  Diastolic congestive heart failure with edema and volume overload and elevated proBNP  Essential hypertension, poorly controlled  Non-ST elevation MI with elevated high-sensitivity troponin  Acute right lower extremity DVT below the knee    PLAN:   patient continues to improve on systemic steroid and was transitioned to prednisone which can be tapered and discontinued over the next 7 days  She is to follow-up with us in 3 months, she would like us to address the decision on how long to continue with the  anticoagulation.  Patient was physically inactive and this is considered a provoked event and may get better if she is more ambulatory however if she remains limited on her mobility, she might be ongoing risk for another DVT and may require long-term anticoagulation  Hypercoagulable work-up is negative be as reliable now because of the acute thrombosis and being on the anticoagulation and can be done in the future if we decide to get her off the blood thinner.  She is cleared for discharge home from the pulmonary standpoint      Discussed with patient  Labs/Notes/films were independently reviewed and pertinent results are summarized above  The copied texts in this note were reviewed and they are accurate as of 08/02/23    Disposition:     Ama Christopher MD  08/02/23  11:06 EDT          Dictated utilizing Dragon dictation

## 2023-08-02 NOTE — THERAPY TREATMENT NOTE
Patient Name: Lizzy Hicks  : 1935    MRN: [id number]                              Today's Date: 2023       Admit Date: 2023    Visit Dx:     ICD-10-CM ICD-9-CM   1. COPD exacerbation  J44.1 491.21     Patient Active Problem List   Diagnosis    Hypertension    COPD (chronic obstructive pulmonary disease)    Carotid artery stenosis    Osteoarthritis    Osteoporosis    Hyponatremia    Reset osmostat syndrome    Carotid stenosis, asymptomatic, right    Carotid artery disease    Macular degeneration    COPD exacerbation    Acute exacerbation of chronic obstructive pulmonary disease (COPD)    Parainfluenza infection    Acute respiratory failure with hypoxia    Acute respiratory failure with hypoxia and hypercapnia    Mixed hyperlipidemia    Possible pneumonia of right lower lobe due to infectious organism    Oropharyngeal dysphagia    Acute respiratory failure with hypercapnia    Respiratory insufficiency    COPD with acute exacerbation    Sleep apnea    Acute DVT (deep venous thrombosis)    DVT, lower extremity, distal, acute, right     Past Medical History:   Diagnosis Date    Anesthesia complication     pt states was groggy for a week after surgery    Carotid artery disease     left    Carotid stenosis     RIGHT    COPD (chronic obstructive pulmonary disease)     History of bronchitis     Hypertension     Macular degeneration     Osteoarthritis 2016    Osteoporosis 2016    Reset osmostat syndrome 08/15/2016    Worked up by prior PCP in [facility]. Baseline Na 128-130 since .    Seborrheic dermatitis 2016    Swallowing difficulty     D/T INADVERTANT REMOVAL OF UVULA AS CHILD WITH T AND A     Past Surgical History:   Procedure Laterality Date    CAROTID ENDARTERECTOMY Right 2018    Procedure: RT CAROTID ENDARTERECTOMY WITH INTRA OPERATIVE CAROTID ARTERY DUPLEX SCAN;  Surgeon: Mikaela Galicia Jr., MD;  Location: [facility];  Service: Vascular    CAROTID ENDARTERECTOMY  Left 4/4/2019    Procedure: LEFT CAROTID ENDARTERECTOMY;  Surgeon: Mikaela Galicia Jr., MD;  Location: [facility] MAIN OR;  Service: Vascular    CATARACT EXTRACTION WITH INTRAOCULAR LENS IMPLANT      LEFT AND RIGHT    ORIF FEMUR FRACTURE Right     HARDWARE    TONSILLECTOMY AND ADENOIDECTOMY      INADVERTANTLY TOOK UVULA AT THIS TIME      General Information       Row Name 08/02/23 1641          Physical Therapy Time and Intention    Document Type therapy note (daily note)  -     Mode of Treatment individual therapy;physical therapy  -       Row Name 08/02/23 1641          General Information    Patient Profile Reviewed yes  -     Existing Precautions/Restrictions fall;oxygen therapy device and L/min  -       Row Name 08/02/23 1641          Cognition    Orientation Status (Cognition) oriented x 3  -       Row Name 08/02/23 1641          Safety Issues, Functional Mobility    Impairments Affecting Function (Mobility) balance;strength;endurance/activity tolerance  -               User Key  (r) = Recorded By, (t) = Taken By, (c) = Cosigned By      Initials Name Provider Type     Angie Zurita PT Physical Therapist                   Mobility       Row Name 08/02/23 1641          Bed Mobility    Supine-Sit [place] (Bed Mobility) not tested  -     Comment, (Bed Mobility) NT - UIC  -       Row Name 08/02/23 1641          Sit-Stand Transfer    Sit-Stand [place] (Transfers) standby assist  -     Assistive Device (Sit-Stand Transfers) walker, front-wheeled  -       Row Name 08/02/23 1641          Gait/Stairs (Locomotion)    [place] Level (Gait) standby assist  -     Assistive Device (Gait) walker, front-wheeled  -     Distance in Feet (Gait) 12ft + 12ft + 30ft  -     Deviations/Abnormal Patterns (Gait) stride length decreased;trav decreased;gait speed decreased  -               User Key  (r) = Recorded By, (t) = Taken By, (c) = Cosigned By      Initials Name Provider Type      Angie Zurita PT Physical Therapist                   Obj/Interventions    No documentation.                  Goals/Plan    No documentation.                  Clinical Impression       Row Name 08/02/23 1643          Pain    Pretreatment Pain Rating 0/10 - no pain  -     Posttreatment Pain Rating 0/10 - no pain  -[facility] Name 08/02/23 1643          Plan of Care Review    Plan of Care Reviewed With patient  -     Progress improving  -     Outcome Evaluation Pt seen for PT this PM and tolerated mobility well. Pt sitting UIC on arrival and requesting to use bathroom. Pt initially wanting BSC brought to bedside but agreeable to walking to bathroom with encouragement - requesting raised commode over seat. Pt stood with SBA and ambulated to bathroom, SBA for STS from commode, and ambulated back to chair. After seated rest, pt ambulated to door and back with SBA and noted mild SOA though pt satting 92%. Pt assisted with repositioning in chair and left with needs met. PT will continue to follow to progress mobility as tolerated. Anticipate DC to SNF.  -       Row Name 08/02/23 1643          Vital Signs    O2 Delivery Pre Treatment supplemental O2  -     O2 Delivery Intra Treatment supplemental O2  -     O2 Delivery Post Treatment supplemental O2  -       Row Name 08/02/23 1643          Positioning and Restraints    Pre-Treatment Position sitting in chair/recliner  -     Post Treatment Position chair  -     In Chair notified nsg;reclined;call light within reach;encouraged to call for assist;exit alarm on  [facility]               User Key  (r) = Recorded By, (t) = Taken By, (c) = Cosigned By      Initials Name Provider Type     Angie Zurita, PT Physical Therapist                   Outcome Measures       Row Name 08/02/23 1648 08/02/23 0910       How much help from another person do you currently need...    Turning from your back to your side while in flat bed without using bedrails? 3  - 3  -AP     Moving from lying on back to sitting on the side of a flat bed without bedrails? 3  - 3  -AP    Moving to and from a bed to a chair (including a wheelchair)? 3  - 3  -AP    Standing up from a chair using your arms (e.g., wheelchair, bedside chair)? 3  - 3  -AP    Climbing 3-5 steps with a railing? 2  - 2  -AP    To walk in hospital room? 3  - 3  -AP    AM-PAC 6 Clicks Score (PT) 17  - 17  -AP    Highest level of mobility 5 --> Static standing  - 5 --> Static standing  -AP      Row Name 08/02/23 1648          Functional Assessment    Outcome Measure Options AM-PAC 6 Clicks Basic Mobility (PT)  -               User Key  (r) = Recorded By, (t) = Taken By, (c) = Cosigned By      Initials Name Provider Type     Angie Zurita, PT Physical Therapist    Mari Hernandez RN Registered Nurse                                 Physical Therapy Education       Title: PT OT SLP Therapies (Done)       Topic: Physical Therapy (Done)       Point: Mobility training (Done)       Learning Progress Summary             Patient Acceptance, E,TB,D, VU,NR by  at 8/2/2023 1648    Acceptance, E,TB, VU,NR by  at 8/1/2023 1317    Acceptance, E, NR by  at 7/31/2023 1315                         Point: Home exercise program (Done)       Learning Progress Summary             Patient Acceptance, E,TB,D, VU,NR by  at 8/2/2023 1648    Acceptance, E,TB, VU,NR by  at 8/1/2023 1317    Acceptance, E, NR by  at 7/31/2023 1315                         Point: Body mechanics (Done)       Learning Progress Summary             Patient Acceptance, E,TB,D, VU,NR by  at 8/2/2023 1648    Acceptance, E,TB, VU,NR by  at 8/1/2023 1317    Acceptance, E, NR by  at 7/31/2023 1315                         Point: Precautions (Done)       Learning Progress Summary             Patient Acceptance, E,TB,D, VU,NR by  at 8/2/2023 1648    Acceptance, E,TB, VU,NR by CB at 8/1/2023 1317    Acceptance, E, NR by  at 7/31/2023 1315                                          User Key       Initials Effective Dates Name Provider Type Discipline     10/22/21 -  Kat Maya, PT Physical Therapist PT     04/08/22 -  Angie Zurita PT Physical Therapist PT     05/02/22 -  Haley Juarez PT Physical Therapist PT                  PT Recommendation and Plan     Plan of Care Reviewed With: patient  Progress: improving  Outcome Evaluation: Pt seen for PT this PM and tolerated mobility well. Pt sitting UIC on arrival and requesting to use bathroom. Pt initially wanting BSC brought to bedside but agreeable to walking to bathroom with encouragement - requesting raised commode over seat. Pt stood with SBA and ambulated to bathroom, SBA for STS from commode, and ambulated back to chair. After seated rest, pt ambulated to door and back with SBA and noted mild SOA though pt satting 92%. Pt assisted with repositioning in chair and left with needs met. PT will continue to follow to progress mobility as tolerated. Anticipate DC to SNF.     Time Calculation:         PT Charges       Row Name 08/02/23 1648             Time Calculation    Start Time 1450  -      Stop Time 1508  -      Time Calculation (min) 18 min  -      PT Received On 08/02/23  -      PT - Next Appointment 08/03/23  -         Time Calculation- PT    Total Timed Code Minutes- PT 18 minute(s)  -         Timed Charges    59254 - Gait Training Minutes  8  -      91472 - PT Therapeutic Activity Minutes 10  -         Total Minutes    Timed Charges Total Minutes 18  -       Total Minutes 18  -                User Key  (r) = Recorded By, (t) = Taken By, (c) = Cosigned By      Initials Name Provider Type     Angie Zurita PT Physical Therapist                  Therapy Charges for Today       Code Description Service Date Service Provider Modifiers Qty    [id number]  PT THERAPEUTIC ACT EA 15 MIN 8/2/2023 Angie Zurita, PT GP 1            PT G-Codes  Outcome Measure Options:  AM-PAC 6 Clicks Basic Mobility (PT)  AM-PAC 6 Clicks Score (PT): 17  PT Discharge Summary  Anticipated Discharge Disposition (PT): skilled nursing facility    Angie Zurita, PT  8/2/2023

## 2023-08-02 NOTE — PROGRESS NOTES
Name: Lizzy Hicks ADMIT: 2023   : 1935  PCP: Traci Hoyos MD    MRN: [id number] LOS: 2 days   AGE/SEX: 87 y.o. female  ROOM: [facility]     Subjective   Subjective   Feeling improved. She would like to go home tomorrow. Denies any bleeding. No hematemesis, black stools. Some constipation.     Objective   Objective   Vital Signs  Temp:  [97.5 øF (36.4 øC)-98.5 øF (36.9 øC)] 98.5 øF (36.9 øC)  Heart Rate:  [77-98] 85  Resp:  [18-28] 18  BP: (105-155)/(33-60) 111/33  SpO2:  [91 %-100 %] 100 %  on  Flow (L/min):  [1.5-2.5] 2;   Device (Oxygen Therapy): nasal cannula  Body mass index is 22.78 kg/mý.    Physical Exam  Constitutional:       General: She is not in acute distress.     Appearance: Normal appearance. She is not toxic-appearing.   HENT:      Head: Normocephalic and atraumatic.   Eyes:      Extraocular Movements: Extraocular movements intact.   Cardiovascular:      Rate and Rhythm: Normal rate and regular rhythm.   Pulmonary:      Effort: Pulmonary effort is normal. No respiratory distress.      Breath sounds: Decreased breath sounds present. No wheezing or rhonchi.   Abdominal:      General: Bowel sounds are normal.      Palpations: Abdomen is soft.      Tenderness: There is no abdominal tenderness. There is no guarding or rebound.   Musculoskeletal:      Cervical back: Normal range of motion.   Skin:     General: Skin is warm and dry.   Neurological:      General: No focal deficit present.      Mental Status: She is alert and oriented to person, place, and time.   Psychiatric:         Mood and Affect: Mood normal.         Behavior: Behavior normal.         Thought Content: Thought content normal.     Results Review  I reviewed the patient's new clinical results.  Results from last 7 days   Lab Units 23  0324 23  0410 23  0058   WBC 10*3/mm3 11.68* 12.43* 13.47*   HEMOGLOBIN g/dL 10.1* 10.8* 10.6*   PLATELETS 10*3/mm3 244 253 234       Results from last 7 days   Lab Units  07/31/23  0324 07/30/23  0410 07/30/23  0058   SODIUM mmol/L 138 138 140   POTASSIUM mmol/L 4.6 4.1 4.3   CHLORIDE mmol/L 94* 96* 97*   CO2 mmol/L 36.0* 32.1* 31.1*   BUN mg/dL 25* 17 18   CREATININE mg/dL 0.87 0.87 0.94   GLUCOSE mg/dL 146* 134* 115*       Lab Results   Component Value Date    ANIONGAP 8.0 07/31/2023     Estimated Creatinine Clearance: 39.5 mL/min (by C-G formula based on SCr of 0.87 mg/dL).    Results from last 7 days   Lab Units 07/30/23  0058   ALBUMIN g/dL 3.4*   BILIRUBIN mg/dL 0.2   ALK PHOS U/L 61   AST (SGOT) U/L 18   ALT (SGPT) U/L 16       Results from last 7 days   Lab Units 07/31/23  0324 07/30/23  0410 07/30/23  0058   CALCIUM mg/dL 8.7 9.9 9.4   ALBUMIN g/dL  --   --  3.4*   MAGNESIUM mg/dL 1.5*  --   --        Results from last 7 days   Lab Units 07/30/23  0058   PROCALCITONIN ng/mL 0.12       No results found for: HGBA1C, POCGLU    No radiology results for the last day    Scheduled Meds  amLODIPine, 5 mg, Oral, Daily  apixaban, 10 mg, Oral, Q12H   Followed by  [START ON 8/8/2023] apixaban, 5 mg, Oral, Q12H  atenolol, 50 mg, Oral, Daily  budesonide-formoterol, 1 puff, Inhalation, BID - RT  calcium carbonate (oyster shell), 500 mg, Oral, Daily  docusate sodium, 100 mg, Oral, BID  furosemide, 40 mg, Oral, Daily  guaiFENesin, 1,200 mg, Oral, Q12H  ipratropium-albuterol, 3 mL, Nebulization, 4x Daily - RT  prednisoLONE acetate, 1 drop, Right Eye, 4x Daily  predniSONE, 20 mg, Oral, BID With Meals  rosuvastatin, 40 mg, Oral, Nightly  sodium chloride, 1 drop, Right Eye, 4x Daily  sodium chloride, 10 mL, Intravenous, Q12H    Continuous Infusions   PRN Meds    acetaminophen **OR** acetaminophen **OR** acetaminophen    albuterol    calcium carbonate    famotidine    nitroglycerin    ondansetron **OR** ondansetron    [COMPLETED] Insert Peripheral IV **AND** sodium chloride    sodium chloride    sodium chloride     Diet  Diet: Regular/House Diet; Texture: Mechanical Ground (NDD 2); Fluid  Consistency: Thin (IDDSI 0)    I have personally reviewed:  [x]  Medications  []  Laboratory   []  Microbiology   []  Radiology   [x]  EKG/Telemetry SR on telemetry  []  Cardiology/Vascular   []  Pathology   []  Records     Results for orders placed during the hospital encounter of 07/30/23    Adult Transthoracic Echo Complete W/ Cont if Necessary Per Protocol    Interpretation Summary    Left ventricular systolic function is normal. Left ventricular ejection fraction appears to be 56 - 60%.    Left ventricular diastolic function is consistent with (grade Ia w/high LAP) impaired relaxation.    Mild aortic valve stenosis is present.  Moderately calcified valve.  Off axis Doppler interrogation due to challenging image acquisition.  Severity of stenosis may be underestimated.    Estimated right ventricular systolic pressure from tricuspid regurgitation is normal (<35 mmHg).        Assessment/Plan     Active Hospital Problems    Diagnosis  POA    **COPD with acute exacerbation [J44.1]  Yes    Acute DVT (deep venous thrombosis) [I82.409]  Unknown    DVT, lower extremity, distal, acute, right [I82.4Z1]  Unknown    Sleep apnea [G47.30]  Unknown    Acute respiratory failure with hypoxia and hypercapnia [J96.01, J96.02]  Yes    COPD exacerbation [J44.1]  Yes    Carotid artery disease [I77.9]  Yes    Hypertension [I10]  Yes      Resolved Hospital Problems   No resolved problems to display.     87 y.o. female     Acute exacerbation COPD  Emphysema  Pulmonary hypertension  Acute hypoxemia on chronic hypoxemic and hypercapnic respiratory failure  -Steroids per pulmonology (now on prednisone PO), nebulized breathing treatments  -Radiology recommends follow-up CT chest 3 to 4 months  -Monitoring off antibiotics  -Pulmonology has signed off    Elevated D-dimer  Right lower extremity DVT  -CT negative for PE  -Duplex right lower extremity DVT (popliteal and gastrocnemius)  -Started on Eliquis. She'll follow up with PCP Romain,  MD Traci   -She knows to watch for bleeding    Acute on chronic diastolic heart failure  Hypertension  -Status post IV diuresis  -Now on p.o. furosemide  -Echocardiogram above. EF 56 to 60%  -BP is fine    SCDs for DVT prophylaxis    Discussed with patient and nursing staff    Discharge: Probably home tomorrow    Jim Edge MD  [facility]  08/02/23

## 2023-08-02 NOTE — PLAN OF CARE
Goal Outcome Evaluation:  Plan of Care Reviewed With: patient        Progress: improving  Outcome Evaluation: VSS, AOx3, forgetful at times. Decent appetite and fluid intake. 1.5 L o2 nasal cannula at this time. No new issues at this time and no pain complaints. WCTM.

## 2023-08-02 NOTE — PLAN OF CARE
Goal Outcome Evaluation:  Plan of Care Reviewed With: patient        Progress: improving  Outcome Evaluation: Pt seen for PT this PM and tolerated mobility well. Pt sitting UIC on arrival and requesting to use bathroom. Pt initially wanting BSC brought to bedside but agreeable to walking to bathroom with encouragement - requesting raised commode over seat. Pt stood with SBA and ambulated to bathroom, SBA for STS from commode, and ambulated back to chair. After seated rest, pt ambulated to door and back with SBA and noted mild SOA though pt satting 92%. Pt assisted with repositioning in chair and left with needs met. PT will continue to follow to progress mobility as tolerated. Anticipate DC to SNF.      Anticipated Discharge Disposition (PT): skilled nursing facility

## 2023-08-02 NOTE — PROGRESS NOTES
"    Patient Name: Lizzy Hicks  :1935  87 y.o.      Patient Care Team:  Traci Hoyos MD as PCP - General (Geriatric Medicine)    Chief Complaint: follow up shortness of breath    Interval History: a little hypotensive last night with SBp 105. BP stable this morning . Asked for stool softener. Sr on monitor.        Objective   Vital Signs  Temp:  [97.4 øF (36.3 øC)-97.8 øF (36.6 øC)] 97.6 øF (36.4 øC)  Heart Rate:  [74-98] 83  Resp:  [17-28] 18  BP: (105-155)/(43-60) 155/60    Intake/Output Summary (Last 24 hours) at 2023 0838  Last data filed at 2023 0520  Gross per 24 hour   Intake 1080 ml   Output 225 ml   Net 855 ml     Flowsheet Rows      Flowsheet Row First Filed Value   Admission Height 152.4 cm (60\") Documented at 2023   Admission Weight 54.4 kg (120 lb) Documented at 2023            Physical Exam:   General Appearance:    Alert, cooperative, in no acute distress   Lungs:     Clear to auscultation.  Normal respiratory effort and rate.      Heart:    Regular rhythm and normal rate, normal S1 and S2, no murmurs, gallops or rubs.     Chest Wall:    No abnormalities observed   Abdomen:     Soft, nontender, positive bowel sounds.     Extremities:   no cyanosis, clubbing or edema.  No marked joint deformities.  Adequate musculoskeletal strength.       Results Review:    Results from last 7 days   Lab Units 23  0324   SODIUM mmol/L 138   POTASSIUM mmol/L 4.6   CHLORIDE mmol/L 94*   CO2 mmol/L 36.0*   BUN mg/dL 25*   CREATININE mg/dL 0.87   GLUCOSE mg/dL 146*   CALCIUM mg/dL 8.7     Results from last 7 days   Lab Units 23  0614 23  0410 23  0058   HSTROP T ng/L 27* 22* 17*     Results from last 7 days   Lab Units 23  0324   WBC 10*3/mm3 11.68*   HEMOGLOBIN g/dL 10.1*   HEMATOCRIT % 29.9*   PLATELETS 10*3/mm3 244     Results from last 7 days   Lab Units 23  0058   INR  0.99     Results from last 7 days   Lab Units 23  0324 "   MAGNESIUM mg/dL 1.5*     Results from last 7 days   Lab Units 07/31/23  0324   CHOLESTEROL mg/dL 206*   TRIGLYCERIDES mg/dL 69   HDL CHOL mg/dL 100*   LDL CHOL mg/dL 94               Medication Review:   amLODIPine, 5 mg, Oral, Daily  apixaban, 10 mg, Oral, Q12H   Followed by  [START ON 8/8/2023] apixaban, 5 mg, Oral, Q12H  atenolol, 50 mg, Oral, Daily  budesonide-formoterol, 1 puff, Inhalation, BID - RT  calcium carbonate (oyster shell), 500 mg, Oral, Daily  furosemide, 40 mg, Oral, Daily  guaiFENesin, 1,200 mg, Oral, Q12H  ipratropium-albuterol, 3 mL, Nebulization, 4x Daily - RT  prednisoLONE acetate, 1 drop, Right Eye, 4x Daily  predniSONE, 20 mg, Oral, BID With Meals  rosuvastatin, 40 mg, Oral, Nightly  sodium chloride, 1 drop, Right Eye, 4x Daily  sodium chloride, 10 mL, Intravenous, Q12H              Assessment & Plan   Acute on chronic hypoxemic and hypercapnic respiratory failure from COPD exacerbation  Acute on chronic diastolic congestive heart failure, she has been transitioned to oral diuretics.   Essential hypertension  Mild elevated troponin without ischemic changes on EKG. No chest pain.   Hyperlipidemia  Coronary artery calcification - continue statin.   DVT (acute) started on apixaban.   8. Constipation. Colace added.     Tolerating oral diuretics.   Newly diagnosed DVT - now on VTE dosing apixaban. ASA stopped.     Nothing further to add. Will see prn.    DAVID Herring  [facility]  08/02/23  08:38 EDT

## 2023-08-03 ENCOUNTER — READMISSION MANAGEMENT (OUTPATIENT)
Dept: CALL CENTER | Facility: HOSPITAL | Age: 88
End: 2023-08-03
Payer: MEDICARE

## 2023-08-03 VITALS
WEIGHT: 117.5 LBS | TEMPERATURE: 98 F | OXYGEN SATURATION: 97 % | SYSTOLIC BLOOD PRESSURE: 131 MMHG | HEIGHT: 60 IN | DIASTOLIC BLOOD PRESSURE: 48 MMHG | HEART RATE: 85 BPM | RESPIRATION RATE: 18 BRPM | BODY MASS INDEX: 23.07 KG/M2

## 2023-08-03 LAB — SARS-COV-2 RNA RESP QL NAA+PROBE: NOT DETECTED

## 2023-08-03 PROCEDURE — 94799 UNLISTED PULMONARY SVC/PX: CPT

## 2023-08-03 PROCEDURE — 63710000001 PREDNISONE PER 1 MG: Performed by: INTERNAL MEDICINE

## 2023-08-03 PROCEDURE — 87635 SARS-COV-2 COVID-19 AMP PRB: CPT | Performed by: HOSPITALIST

## 2023-08-03 PROCEDURE — 94660 CPAP INITIATION&MGMT: CPT

## 2023-08-03 RX ORDER — ECHINACEA PURPUREA EXTRACT 125 MG
2 TABLET ORAL DAILY PRN
Qty: 88 ML | Refills: 0 | Status: SHIPPED | OUTPATIENT
Start: 2023-08-03 | End: 2023-08-03 | Stop reason: SDUPTHER

## 2023-08-03 RX ORDER — FUROSEMIDE 40 MG/1
40 TABLET ORAL DAILY
Qty: 30 TABLET | Refills: 0 | Status: SHIPPED | OUTPATIENT
Start: 2023-08-04

## 2023-08-03 RX ORDER — FUROSEMIDE 40 MG/1
40 TABLET ORAL DAILY
Qty: 30 TABLET | Refills: 0 | Status: SHIPPED | OUTPATIENT
Start: 2023-08-04 | End: 2023-08-03 | Stop reason: SDUPTHER

## 2023-08-03 RX ORDER — PREDNISONE 5 MG/1
TABLET ORAL
Qty: 20 TABLET | Refills: 0 | Status: SHIPPED | OUTPATIENT
Start: 2023-08-03 | End: 2023-08-03 | Stop reason: SDUPTHER

## 2023-08-03 RX ORDER — APIXABAN 5 MG (74)
5 KIT ORAL TAKE AS DIRECTED
Qty: 74 TABLET | Refills: 0 | Status: SHIPPED | OUTPATIENT
Start: 2023-08-03

## 2023-08-03 RX ORDER — APIXABAN 5 MG (74)
5 KIT ORAL TAKE AS DIRECTED
Qty: 74 TABLET | Refills: 0 | Status: SHIPPED | OUTPATIENT
Start: 2023-08-03 | End: 2023-08-03 | Stop reason: SDUPTHER

## 2023-08-03 RX ORDER — PREDNISONE 5 MG/1
TABLET ORAL
Qty: 20 TABLET | Refills: 0 | Status: SHIPPED | OUTPATIENT
Start: 2023-08-03 | End: 2023-08-11

## 2023-08-03 RX ORDER — ECHINACEA PURPUREA EXTRACT 125 MG
2 TABLET ORAL DAILY PRN
Qty: 88 ML | Refills: 0 | Status: SHIPPED | OUTPATIENT
Start: 2023-08-03

## 2023-08-03 RX ADMIN — IPRATROPIUM BROMIDE AND ALBUTEROL SULFATE 3 ML: .5; 3 SOLUTION RESPIRATORY (INHALATION) at 10:29

## 2023-08-03 RX ADMIN — APIXABAN 10 MG: 5 TABLET, FILM COATED ORAL at 09:24

## 2023-08-03 RX ADMIN — AMLODIPINE BESYLATE 5 MG: 5 TABLET ORAL at 09:24

## 2023-08-03 RX ADMIN — PREDNISOLONE ACETATE 1 DROP: 10 SUSPENSION/ DROPS OPHTHALMIC at 09:27

## 2023-08-03 RX ADMIN — SODIUM CHLORIDE 1 DROP: 50 SOLUTION OPHTHALMIC at 13:14

## 2023-08-03 RX ADMIN — SODIUM CHLORIDE 1 DROP: 50 SOLUTION OPHTHALMIC at 09:28

## 2023-08-03 RX ADMIN — IPRATROPIUM BROMIDE AND ALBUTEROL SULFATE 3 ML: .5; 3 SOLUTION RESPIRATORY (INHALATION) at 15:18

## 2023-08-03 RX ADMIN — FUROSEMIDE 40 MG: 40 TABLET ORAL at 09:24

## 2023-08-03 RX ADMIN — DOCUSATE SODIUM 100 MG: 100 CAPSULE, LIQUID FILLED ORAL at 09:24

## 2023-08-03 RX ADMIN — SALINE NASAL SPRAY 2 SPRAY: 1.5 SOLUTION NASAL at 09:29

## 2023-08-03 RX ADMIN — Medication 10 ML: at 09:28

## 2023-08-03 RX ADMIN — ATENOLOL 50 MG: 50 TABLET ORAL at 09:24

## 2023-08-03 RX ADMIN — PREDNISONE 20 MG: 20 TABLET ORAL at 09:24

## 2023-08-03 RX ADMIN — Medication 500 MG: at 09:24

## 2023-08-03 RX ADMIN — PREDNISOLONE ACETATE 1 DROP: 10 SUSPENSION/ DROPS OPHTHALMIC at 13:14

## 2023-08-03 RX ADMIN — BUDESONIDE AND FORMOTEROL FUMARATE DIHYDRATE 1 PUFF: 160; 4.5 AEROSOL RESPIRATORY (INHALATION) at 07:32

## 2023-08-03 RX ADMIN — IPRATROPIUM BROMIDE AND ALBUTEROL SULFATE 3 ML: .5; 3 SOLUTION RESPIRATORY (INHALATION) at 07:32

## 2023-08-03 RX ADMIN — GUAIFENESIN 1200 MG: 600 TABLET, EXTENDED RELEASE ORAL at 09:24

## 2023-08-03 NOTE — PROGRESS NOTES
"  PROGRESS NOTE  Patient Name: Lizzy Hicks  Age/Sex: 87 y.o. female  : 1935  MRN: [id number]    Date of Admission: 2023  Date of Encounter Visit: 23   LOS: 3 days   Patient Care Team:  Traci Hoyos MD as PCP - General (Geriatric Medicine)    Chief Complaint: COPD exacerbation, pulmonary hypertension, DVT     Hospital course: Patient is feeling good, back to her baseline, she is on anticoagulation for her DVT, no chest tightness or chest pain      REVIEW OF SYSTEMS:   CONSTITUTIONAL: no fever or chills  CARDIOVASCULAR: No chest pain, chest pressure or chest discomfort. No palpitations, resolved edema  RESPIRATORY: Normal cough, denies any unusual shortness of breath being on her baseline  GASTROINTESTINAL: No anorexia, nausea, vomiting or diarrhea. No abdominal pain or blood.   HEMATOLOGIC: No bleeding or bruising.     Ventilator/Non-Invasive Ventilation Settings (From admission, onward)       Start     Ordered    23  NIPPV (CPAP or BIPAP)  At Bedtime & As Needed-RT        Question Answer Comment   Indication Acute Respiratory Failure    Type AVAPS/PC/PS    Backup Rate 16    Target VT (mL) 350    EPAP/PEEP (cm H2O) 5    Min Pressure (cm H2O) 10    Max Pressure (cm H2O) 25    Titrate Oxygen for SpO2 90 - 95%        23                      Vital Signs  Temp:  [97.6 øF (36.4 øC)-98.5 øF (36.9 øC)] 97.8 øF (36.6 øC)  Heart Rate:  [72-85] 72  Resp:  [16-20] 18  BP: (111-155)/(33-61) 148/56  SpO2:  [96 %-100 %] 96 %  on  Flow (L/min):  [2-3] 3 Device (Oxygen Therapy): NPPV/NIV    Intake/Output Summary (Last 24 hours) at 8/3/2023 09  Last data filed at 8/3/2023 0813  Gross per 24 hour   Intake 480 ml   Output 504 ml   Net -24 ml       Flowsheet Rows      Flowsheet Row First Filed Value   Admission Height 152.4 cm (60\") Documented at 2023   Admission Weight 54.4 kg (120 lb) Documented at 2023          Body mass index is 22.95 kg/mý.      23  0704 " 08/01/23  0635 08/03/23  0446   Weight: 52.9 kg (116 lb 10 oz) (pt refused, wants to sleep longer) 53.3 kg (117 lb 8.1 oz)       Physical Exam:  GEN:  No acute distress, alert, cooperative, well developed   EYES:   Sclerae clear. No icterus. PERRL. Normal EOM  ENT:   External ears/nose normal, no oral lesions, no thrush, mucous membranes moist  NECK:  Supple, midline trachea, no JVD  LUNGS: Normal chest on inspection, still diminished but improved breath sounds, no wheezing on quiet breathing, no crackles  CV:  Regular rhythm and rate. Normal S1/S2. No murmurs, gallops, or rubs noted.  ABD:  Soft, nontender and nondistended. Normal bowel sounds. No guarding  EXT:  Moves all extremities well. No cyanosis. No redness.  Nearly resolved edema with minimal residual pitting  Skin: Dry, intact, no bleeding    Results Review:    Results From Last 14 Days   Lab Units 07/31/23 0324 07/30/23  1626   D DIMER QUANT MCGFEU/mL  --  1.95*   TRIGLYCERIDES mg/dL 69  --        Results from last 7 days   Lab Units 07/31/23  0324 07/30/23  0410 07/30/23  0058   SODIUM mmol/L 138 138 140   POTASSIUM mmol/L 4.6 4.1 4.3   CHLORIDE mmol/L 94* 96* 97*   CO2 mmol/L 36.0* 32.1* 31.1*   BUN mg/dL 25* 17 18   CREATININE mg/dL 0.87 0.87 0.94   CALCIUM mg/dL 8.7 9.9 9.4   AST (SGOT) U/L  --   --  18   ALT (SGPT) U/L  --   --  16   ANION GAP mmol/L 8.0 9.9 11.9   ALBUMIN g/dL  --   --  3.4*       Results from last 7 days   Lab Units 07/30/23  1626 07/30/23  0614 07/30/23  0410 07/30/23  0058   HSTROP T ng/L  --  27* 22* 17*   D DIMER QUANT MCGFEU/mL 1.95*  --   --   --        Results from last 7 days   Lab Units 07/31/23  0324   TSH uIU/mL 0.517       Results from last 7 days   Lab Units 07/30/23  0058   PROBNP pg/mL 2,561.0*       Results from last 7 days   Lab Units 07/31/23  0324 07/30/23  0410 07/30/23  0058   WBC 10*3/mm3 11.68* 12.43* 13.47*   HEMOGLOBIN g/dL 10.1* 10.8* 10.6*   HEMATOCRIT % 29.9* 32.5* 31.7*   PLATELETS 10*3/mm3 244 136 234    MCV fL 91.7 90.8 91.6   NEUTROPHIL % %  --   --  71.8   LYMPHOCYTE % %  --   --  17.3*   MONOCYTES % %  --   --  8.1   EOSINOPHIL % %  --   --  1.9   BASOPHIL % %  --   --  0.5   IMM GRAN % %  --   --  0.4       Results from last 7 days   Lab Units 07/30/23  0058   INR  0.99       Results from last 7 days   Lab Units 07/31/23  0324   MAGNESIUM mg/dL 1.5*       Results from last 7 days   Lab Units 07/31/23  0324   CHOLESTEROL mg/dL 206*   TRIGLYCERIDES mg/dL 69   HDL CHOL mg/dL 100*       Results from last 7 days   Lab Units 07/30/23  0149   PH, ARTERIAL pH units 7.381   PCO2, ARTERIAL mm Hg 56.5*   PO2 ART mm Hg 75.5*   HCO3 ART mmol/L 33.5*           No results found for: POCGLU  Results from last 7 days   Lab Units 07/30/23  0058   PROCALCITONIN ng/mL 0.12               Results from last 7 days   Lab Units 07/30/23  0135   COVID19  Not Detected   ADENOVIRUS DETECTION BY PCR  Not Detected   CORONAVIRUS 229E  Not Detected   CORONAVIRUS HKU1  Not Detected   CORONAVIRUS NL63  Not Detected   CORONAVIRUS OC43  Not Detected   HUMAN METAPNEUMOVIRUS  Not Detected   HUMAN RHINOVIRUS/ENTEROVIRUS  Not Detected   INFLUENZA B PCR  Not Detected   PARAINFLUENZA 1  Not Detected   PARAINFLUENZA VIRUS 2  Not Detected   PARAINFLUENZA VIRUS 3  Not Detected   PARAINFLUENZA VIRUS 4  Not Detected   BORDETELLA PERTUSSIS PCR  Not Detected   BORDETELLA PARAPERTUSSIS PCR  Not Detected   CHLAMYDOPHILA PNEUMONIAE PCR  Not Detected   MYCOPLAMA PNEUMO PCR  Not Detected   RSV, PCR  Not Detected                 Echocardiogram7/30/2023     Left ventricular systolic function is normal. Left ventricular ejection fraction appears to be 56 - 60%.    Left ventricular diastolic function is consistent with (grade Ia w/high LAP) impaired relaxation.    Mild aortic valve stenosis is present.  Moderately calcified valve.  Off axis Doppler interrogation due to challenging image acquisition.  Severity of stenosis may be underestimated.    Estimated right  ventricular systolic pressure from tricuspid regurgitation is normal (<35 mmHg).  imaging:   Imaging Results (All)       Procedure Component Value Units Date/Time     I reviewed the patient's new clinical results.  I personally viewed and interpreted the patient's imaging results:        Medication Review:   amLODIPine, 5 mg, Oral, Daily  apixaban, 10 mg, Oral, Q12H   Followed by  [START ON 8/8/2023] apixaban, 5 mg, Oral, Q12H  atenolol, 50 mg, Oral, Daily  budesonide-formoterol, 1 puff, Inhalation, BID - RT  calcium carbonate (oyster shell), 500 mg, Oral, Daily  docusate sodium, 100 mg, Oral, BID  furosemide, 40 mg, Oral, Daily  guaiFENesin, 1,200 mg, Oral, Q12H  ipratropium-albuterol, 3 mL, Nebulization, 4x Daily - RT  prednisoLONE acetate, 1 drop, Right Eye, 4x Daily  predniSONE, 20 mg, Oral, BID With Meals  rosuvastatin, 40 mg, Oral, Nightly  sodium chloride, 1 drop, Right Eye, 4x Daily  sodium chloride, 10 mL, Intravenous, Q12H             ASSESSMENT:   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  Acute hypoxemic on chronic hypoxemic and hypercapnic respiratory failure  Anemia probably chronic  History of pulmonary hypertension by record, with normal RVSP on most recent echo  Diastolic congestive heart failure with edema and volume overload and elevated proBNP  Essential hypertension, poorly controlled  Non-ST elevation MI with elevated high-sensitivity troponin  Acute right lower extremity DVT below the knee    PLAN:   patient is doing much better,  She is cleared for discharge back to her facility from the pulmonary standpoint  She is ready to wean off the prednisone over the next few days we will reduce down to 20 mg daily and then discontinue 3 days later  Continue with the noninvasive nocturnal ventilation only while asleep  We will arrange for follow-up with Dr. Pathak in 3 months to decide on the duration of anticoagulation for her acute DVT, this is a provoked incident since she was bedridden  but not sure about her risks down the road because her activity is going to be limited given her comorbidities.  Reeducated about the risk of bleeding while on Eliquis    Discussed with patient  Labs/Notes/films were independently reviewed and pertinent results are summarized above  The copied texts in this note were reviewed and they are accurate as of 08/03/23    Disposition:     Ama Christopher MD  08/03/23  09:12 EDT          Dictated utilizing Dragon dictation

## 2023-08-03 NOTE — DISCHARGE SUMMARY
[facility]    Date of Discharge:  8/3/2023    PCP: Traci Hoyos MD    Discharge Diagnosis:   Active Hospital Problems    Diagnosis  POA    **COPD with acute exacerbation [J44.1]  Yes    Acute DVT (deep venous thrombosis) [I82.409]  Unknown    DVT, lower extremity, distal, acute, right [I82.4Z1]  Unknown    Sleep apnea [G47.30]  Unknown    Acute respiratory failure with hypoxia and hypercapnia [J96.01, J96.02]  Yes    COPD exacerbation [J44.1]  Yes    Carotid artery disease [I77.9]  Yes    Hypertension [I10]  Yes      Resolved Hospital Problems   No resolved problems to display.          Consults       Date and Time Order Name Status Description    7/30/2023  3:04 PM Inpatient Cardiology Consult Completed     7/30/2023  2:40 AM Inpatient Pulmonology Consult      7/30/2023  2:11 AM [facility] (on-call MD unless specified) Details      7/7/2023  2:51 AM Inpatient Palliative Care MD Consult Completed           Hospital Course  87 y.o. female from [facility] with a history of COPD and chronic hypoxic and hypercapnic respiratory failure on 2 L/min chronically pulmonary hypertension admitted with hypoxia and shortness of breath. Her oxygen saturations were 80% on baseline 2 L/min. She reportedly was noncompliant with CPAP at night.    She was admitted for acute exacerbation of COPD. She was seen in consultation by pulmonology. pH was compensated. Pulmonology recommended a steroid taper over about a week. Radiology recommended follow-up CT of the chest in 3 to 4 months (patchy irregular peripheral nodular opacities likely inflammatory/infectious). Pulmonology did not feel she had respiratory infection. Procalcitonin not elevated.    She also had acute on chronic diastolic heart failure and was diuresed by cardiology. She has been switched to p.o. diuretic. Echocardiogram as below. Recommend BMP in about a week.    She had elevated D-dimer. CTA was negative for PE. Dopplers  did show right lower extremity popliteal and gastrocnemius DVT. She was started on apixaban and will follow-up with PCP. Cardiology recommended stopping aspirin now that she is on anticoagulation. Anticoagulation duration probably 3 months potentially longer she is at risk for DVT since she is immobile/physically inactive.    She is going to [facility] today.    I discussed the patient's findings and my recommendations with patient and nursing staff. She feels improved and ready to go to SNF.    Temp:  [97.6 øF (36.4 øC)-98.5 øF (36.9 øC)] 97.8 øF (36.6 øC)  Heart Rate:  [72-85] 82  Resp:  [16-20] 18  BP: (111-155)/(33-61) 148/56  Body mass index is 22.95 kg/mý.    Physical Exam  Constitutional:       General: She is not in acute distress.     Appearance: She is not toxic-appearing.      Comments: Frail, elderly   HENT:      Head: Normocephalic and atraumatic.   Eyes:      Extraocular Movements: Extraocular movements intact.   Cardiovascular:      Rate and Rhythm: Normal rate and regular rhythm.   Pulmonary:      Effort: Pulmonary effort is normal. No respiratory distress.      Breath sounds: Decreased breath sounds present. No wheezing.   Abdominal:      General: Bowel sounds are normal.      Palpations: Abdomen is soft.      Tenderness: There is no abdominal tenderness. There is no guarding or rebound.   Musculoskeletal:         General: No swelling.      Cervical back: Normal range of motion.   Skin:     General: Skin is warm and dry.   Neurological:      General: No focal deficit present.      Mental Status: She is alert and oriented to person, place, and time.   Psychiatric:         Mood and Affect: Mood normal.         Behavior: Behavior normal.         Thought Content: Thought content normal.     Disposition: Home or Self Care       Discharge Medications        New Medications        Instructions Start Date   Eliquis DVT/PE Starter Pack tablet therapy pack  Generic drug: Apixaban Starter Pack   5 mg, Oral,  Take As Directed, (start on day 3)      furosemide 40 MG tablet  Commonly known as: LASIX   40 mg, Oral, Daily   Start Date: August 4, 2023     sodium chloride 0.65 % nasal spray   2 sprays, Nasal, As Needed             Changes to Medications        Instructions Start Date   alendronate 70 MG tablet  Commonly known as: FOSAMAX  What changed: See the new instructions.   TAKE 1 TABLET BY MOUTH ONCE WEEKLY ON AN EMPTY STOMACH BEFORE BREAKFAST. REMAIN UPRIGHT FOR 30 MINUTES AND TAKE WITH 8 OUNCES OF WATER      Fluticasone-Salmeterol 100-50 MCG/ACT DISKUS  Commonly known as: ADVAIR/WIXELA  What changed: when to take this   INHALE ONE PUFF BY MOUTH TWICE A DAY      predniSONE 5 MG tablet  Commonly known as: DELTASONE  What changed:   medication strength  See the new instructions.   Take 4 tablets by mouth Daily for 2 days, THEN 3 tablets Daily for 2 days, THEN 2 tablets Daily for 2 days, THEN 1 tablet Daily for 2 days.   Start Date: August 3, 2023            Continue These Medications        Instructions Start Date   acetaminophen 325 MG tablet  Commonly known as: TYLENOL   650 mg, Oral, Every 4 Hours PRN      amLODIPine 5 MG tablet  Commonly known as: NORVASC   TAKE ONE TABLET BY MOUTH DAILY      atenolol 50 MG tablet  Commonly known as: TENORMIN   TAKE ONE TABLET BY MOUTH DAILY      calcium carbonate 500 MG chewable tablet  Commonly known as: TUMS   2 tablets, Oral, 2 Times Daily PRN      calcium carbonate-cholecalciferol 500-400 MG-UNIT tablet tablet   1 tablet, Oral, 2 Times Daily      docusate sodium 100 MG capsule   100 mg, Oral, 2 Times Daily PRN      famotidine 20 MG tablet  Commonly known as: PEPCID   20 mg, Oral, 2 Times Daily PRN      ipratropium-albuterol 0.5-2.5 mg/3 ml nebulizer  Commonly known as: DUO-NEB   3 mL, Nebulization, Every 4 Hours PRN      ipratropium-albuterol 0.5-2.5 mg/3 ml nebulizer  Commonly known as: DUO-NEB   3 mL, Nebulization, 4 Times Daily - RT      magnesium hydroxide 400 MG/5ML  suspension  Commonly known as: MILK OF MAGNESIA   30 mL, Oral, Daily PRN      ICAPS AREDS 2 PO   1 tablet, Oral, Daily, HOLD PRIOR TO SURG      multivitamin with minerals tablet tablet   1 tablet, Oral, Daily, HOLD PRIOR TO SURG      prednisoLONE acetate 1 % ophthalmic suspension  Commonly known as: PRED FORTE   Right Eye, 4 Times Daily      rosuvastatin 5 MG tablet  Commonly known as: CRESTOR   TAKE ONE TABLET BY MOUTH DAILY      sodium chloride 5 % ophthalmic solution  Commonly known as: GREGORY 128   1 drop, Right Eye, 4 Times Daily             Stop These Medications      aspirin 81 MG EC tablet     losartan 25 MG tablet  Commonly known as: COZAAR     tiotropium 18 MCG per inhalation capsule  Commonly known as: SPIRIVA             Diet Instructions       Diet: Regular/House Diet      Discharge Diet: Regular/House Diet    Texture: Mechanical Ground (NDD 2)    Fluid Consistency: Thin (IDDSI 0)           Activity Instructions       Activity as Tolerated             Additional Instructions for the Follow-ups that You Need to Schedule       Ambulatory Referral to Case Management Value Based Care   As directed      Which Programs?: Value Based Care        Call MD for problems / concerns.   As directed             Contact information for follow-up providers       Traci Hoyos MD Follow up in 1 week(s).    Specialty: Geriatric Medicine  Why: CT chest 3-4 months. BMP one week., post hospital follow up, Lab check  Contact information:  [address] [facility] [address] [phone]                       Contact information for after-discharge care       Destination       [facility] .    Service: Skilled Nursing  Contact information:  [address]  [facility] [address] [phone]                                No future appointments.  Pending Labs       Order Current Status    COVID PRE-OP / PRE-PROCEDURE SCREENING ORDER (NO ISOLATION) - Swab, Nasopharynx In process     COVID-19,[facility] IN-HOUSE CEPHEID/FERNIE NP SWAB IN TRANSPORT MEDIA 8-12 HR TAT - Swab, Nasopharynx In process           Jim Miles Edge MD  [facility]  08/03/23    Discharge time spent greater than 30 minutes.

## 2023-08-03 NOTE — CASE MANAGEMENT/SOCIAL WORK
Continued Stay [facility]     Patient Name: Lizzy Hicks  MRN: [id number]  Today's Date: 8/3/2023    Admit Date: 7/30/2023    Plan: [facility] via [employer] w/c van at 4PM today, 8/3.   Discharge Plan       Row Name 08/03/23 1012       Plan    Plan [facility] via [employer] w/c van at 4PM today, 8/3.    Plan Comments Pt has d/c orders.  CCP called and s/w [facility].  Pt wheelchair van scheduled for 4PM today ([id number]).  CCP informed [place] that Pt has d/c orders.  Radha advised Pt will be going to [facility], ROOM 115.  Radha advised Pt will need a rapid covid test prior to discharge and CCP notified A coordinator to advise.  Packet on CCP desk................Rowan MCFARLAND/SHAVON CM                   Discharge Codes    No documentation.                 Expected Discharge Date and Time       Expected Discharge Date Expected Discharge Time    Aug 3, 2023               Rowan Ornelas RN

## 2023-08-03 NOTE — PROGRESS NOTES
[facility] Clinical Pharmacy Services: Pharmacy Education - Direct Oral Anticoagulant - Eliquis    Lizzy Hicks has been ordered Eliquis for acute DVT.     Counseling points included the following:  Eliquis' indication, patient's need for the medication, and dosing/frequency of this medication.  Enforced the importance of taking their medication as instructed every day and the reason why the medication is dosed that way.  Explained possible side effects of Eliquis, including increased risk of bleeding, and s/sx of bleeding. Also talked about ways to control bleeding for minor cuts and scrapes.  Emphasized the importance of going to the emergency room if any of the following occur: Falling and hitting your head; noticing bright red blood in urine or dark/tarry stools; vomiting up blood or vomit has a coffee-ground like texture; coughing up blood.  Discussed the importance of informing any physician or dentist that they have been started on Eliquis, in case they need to be taken off for a procedure.  Discussed all important drug interactions, including over-the-counter medications and supplements.  Instructed the patient not to begin or discontinue any medications without informing his/her physician/pharmacist.     I also talked to the patient about her titration schedule and side effects of prednisone, and side effects of furosemide.     Patient expressed understanding and had no further questions.      Mimi Day, Pharm.D., [facility]   Clinical Pharmacist   Phone Extension #[phone]

## 2023-08-04 NOTE — CASE MANAGEMENT/SOCIAL WORK
Case Management Discharge Note      Final Note: Pt discharged to [facility] , 8/3/23 via [facility] AMADOR/DUSTIN MCFARLAND/SHAVON JOEL         Selected Continued Care - Discharged on 8/3/2023 Admission date: 7/30/2023 - Discharge disposition: Home or Self Care      Destination Coordination complete.      Service Provider Selected Services Address Phone Fax Patient Preferred    [facility] [address] [place] [address] [phone] --              Durable Medical Equipment    No services have been selected for the patient.                Dialysis/Infusion    No services have been selected for the patient.                Home Medical Care    No services have been selected for the patient.                Therapy    No services have been selected for the patient.                Community Resources    No services have been selected for the patient.                Community & DME    No services have been selected for the patient.                    Selected Continued Care - Prior Encounters Includes continued care and service providers with selected services from prior encounters from 5/1/2023 to 8/3/2023      Discharged on 7/11/2023 Admission date: 7/6/2023 - Discharge disposition: Skilled Nursing Facility (DC - External)      Destination       Service Provider Selected Services Address Phone Fax Patient Preferred    [facility] [address] [place] [address] [place] [address] [phone] --                      Discharged on 5/30/2023 Admission date: 5/21/2023 - Discharge disposition: Skilled Nursing Facility (DC - External)      Destination       Service Provider Selected Services Address Phone Fax Patient Preferred    [facility] [address] [facility] [place] [address] [phone]                       Discharged on 5/4/2023 Admission date: 5/1/2023 -  Discharge disposition: Skilled Nursing Facility (DC - External)      Destination       Service Provider Selected Services Address Phone Fax Patient Preferred    [facility] [address] [place] [id number] [phone] --                          Transportation Services  W/C Van: [facility]    Final Discharge Disposition Code: 06 - home with home health care

## 2023-08-23 ENCOUNTER — TRANSCRIBE ORDERS (OUTPATIENT)
Dept: ADMINISTRATIVE | Facility: HOSPITAL | Age: 88
End: 2023-08-23
Payer: MEDICARE

## 2023-08-23 DIAGNOSIS — R91.1 LUNG NODULE: Primary | ICD-10-CM

## 2023-10-23 ENCOUNTER — HOSPITAL ENCOUNTER (OUTPATIENT)
Dept: CT IMAGING | Facility: HOSPITAL | Age: 88
Discharge: HOME | End: 2023-10-23
Admitting: INTERNAL MEDICINE
Payer: MEDICARE

## 2023-10-23 DIAGNOSIS — R91.1 LUNG NODULE: ICD-10-CM

## 2023-10-23 PROCEDURE — 71250 CT THORAX DX C-: CPT

## 2023-11-02 ENCOUNTER — TRANSCRIBE ORDERS (OUTPATIENT)
Dept: ADMINISTRATIVE | Facility: HOSPITAL | Age: 88
End: 2023-11-02
Payer: MEDICARE

## 2023-11-02 DIAGNOSIS — R91.8 PULMONARY INFILTRATE: Primary | ICD-10-CM

## 2024-01-01 ENCOUNTER — APPOINTMENT (OUTPATIENT)
Dept: CARDIOLOGY | Facility: HOSPITAL | Age: 89
End: 2024-01-01
Payer: MEDICARE

## 2024-01-01 ENCOUNTER — HOSPITAL ENCOUNTER (OUTPATIENT)
Dept: CARDIOLOGY | Facility: HOSPITAL | Age: 89
Discharge: HOME | End: 2024-04-02
Payer: MEDICARE

## 2024-01-01 ENCOUNTER — OFFICE VISIT (OUTPATIENT)
Dept: CARDIOLOGY | Facility: CLINIC | Age: 89
End: 2024-01-01
Payer: MEDICARE

## 2024-01-01 ENCOUNTER — APPOINTMENT (OUTPATIENT)
Dept: GENERAL RADIOLOGY | Facility: HOSPITAL | Age: 89
End: 2024-01-01
Payer: MEDICARE

## 2024-01-01 ENCOUNTER — HOSPITAL ENCOUNTER (INPATIENT)
Facility: HOSPITAL | Age: 89
LOS: 1 days | End: 2024-04-03
Attending: INTERNAL MEDICINE | Admitting: INTERNAL MEDICINE
Payer: MEDICARE

## 2024-01-01 VITALS — BODY MASS INDEX: 21.6 KG/M2 | WEIGHT: 121.91 LBS | TEMPERATURE: 97.8 F | HEIGHT: 63 IN

## 2024-01-01 VITALS
DIASTOLIC BLOOD PRESSURE: 60 MMHG | BODY MASS INDEX: 21.62 KG/M2 | WEIGHT: 122 LBS | OXYGEN SATURATION: 93 % | HEIGHT: 63 IN | HEART RATE: 82 BPM | SYSTOLIC BLOOD PRESSURE: 120 MMHG

## 2024-01-01 DIAGNOSIS — R06.09 DYSPNEA ON EXERTION: ICD-10-CM

## 2024-01-01 DIAGNOSIS — I25.10 CORONARY ARTERY DISEASE INVOLVING NATIVE CORONARY ARTERY OF NATIVE HEART, UNSPECIFIED WHETHER ANGINA PRESENT: ICD-10-CM

## 2024-01-01 DIAGNOSIS — T14.8XXA HEMATOMA: ICD-10-CM

## 2024-01-01 DIAGNOSIS — I25.10 CORONARY ARTERY DISEASE INVOLVING NATIVE CORONARY ARTERY OF NATIVE HEART, UNSPECIFIED WHETHER ANGINA PRESENT: Primary | ICD-10-CM

## 2024-01-01 DIAGNOSIS — T88.8XXA OTHER SPECIFIED COMPLICATIONS OF SURGICAL AND MEDICAL CARE, NOT ELSEWHERE CLASSIFIED, INITIAL ENCOUNTER: ICD-10-CM

## 2024-01-01 LAB
ALBUMIN SERPL-MCNC: 3.9 G/DL (ref 3.5–5.2)
ALBUMIN SERPL-MCNC: 4 G/DL (ref 3.5–5.2)
ALBUMIN/GLOB SERPL: 1.4 G/DL
ALBUMIN/GLOB SERPL: 1.7 G/DL
ALP SERPL-CCNC: 67 U/L (ref 39–117)
ALP SERPL-CCNC: 78 U/L (ref 39–117)
ALT SERPL W P-5'-P-CCNC: 26 U/L (ref 1–33)
ALT SERPL W P-5'-P-CCNC: 28 U/L (ref 1–33)
ANION GAP SERPL CALCULATED.3IONS-SCNC: 12 MMOL/L (ref 5–15)
ANION GAP SERPL CALCULATED.3IONS-SCNC: 14 MMOL/L (ref 5–15)
ANION GAP SERPL CALCULATED.3IONS-SCNC: 19 MMOL/L (ref 5–15)
APTT PPP: 32.6 SECONDS (ref 22.7–35.4)
ARTERIAL PATENCY WRIST A: ABNORMAL
ARTERIAL PATENCY WRIST A: POSITIVE
AST SERPL-CCNC: 29 U/L (ref 1–32)
AST SERPL-CCNC: 32 U/L (ref 1–32)
ATMOSPHERIC PRESS: 733.6 MMHG
ATMOSPHERIC PRESS: 737.4 MMHG
BASE EXCESS BLDA CALC-SCNC: 1.7 MMOL/L (ref 0–2)
BASE EXCESS BLDA CALC-SCNC: 2.6 MMOL/L (ref 0–2)
BASOPHILS # BLD AUTO: 0.05 10*3/MM3 (ref 0–0.2)
BASOPHILS # BLD AUTO: 0.06 10*3/MM3 (ref 0–0.2)
BASOPHILS NFR BLD AUTO: 0.2 % (ref 0–1.5)
BASOPHILS NFR BLD AUTO: 0.4 % (ref 0–1.5)
BDY SITE: ABNORMAL
BDY SITE: ABNORMAL
BH CV RIGHT GROIN PSA PROCEDURE SCRIPTING LRR: 1
BH CV RIGHT GROIN PSA PROCEDURE SCRIPTING LRR: 1
BH CV VAS PRELIMINARY FINDINGS SCRIPTING: 1
BH CV VAS R ACTIVE AREA: 4.63 CM
BH CV VAS R ACTIVE WIDTH: 3.63 CM
BH CV VAS R CFA VELOCITY EDV: 94.9 CM/SEC
BH CV VAS R PSA NECK LENGTH: 0.34 CM
BH CV VAS R PSA NECK WIDTH: 0.27 CM
BH CV VAS R PSEUDOANEURYSM DEPTH: 0.65 CM
BH CV XLRA MEAS EXT ILIAC A PSV RIGHT: 97.8 CM/SEC
BILIRUB SERPL-MCNC: 0.4 MG/DL (ref 0–1.2)
BILIRUB SERPL-MCNC: 0.5 MG/DL (ref 0–1.2)
BUN SERPL-MCNC: 18 MG/DL (ref 8–23)
BUN SERPL-MCNC: 19 MG/DL (ref 8–23)
BUN SERPL-MCNC: 26 MG/DL (ref 8–23)
BUN/CREAT SERPL: 15.6 (ref 7–25)
BUN/CREAT SERPL: 16.7 (ref 7–25)
BUN/CREAT SERPL: 19.8 (ref 7–25)
CALCIUM SPEC-SCNC: 7.8 MG/DL (ref 8.6–10.5)
CALCIUM SPEC-SCNC: 8.4 MG/DL (ref 8.6–10.5)
CALCIUM SPEC-SCNC: 8.5 MG/DL (ref 8.6–10.5)
CHLORIDE SERPL-SCNC: 102 MMOL/L (ref 98–107)
CHLORIDE SERPL-SCNC: 98 MMOL/L (ref 98–107)
CHLORIDE SERPL-SCNC: 99 MMOL/L (ref 98–107)
CO2 BLDA-SCNC: 30.2 MMOL/L (ref 23–27)
CO2 BLDA-SCNC: 33 MMOL/L (ref 23–27)
CO2 SERPL-SCNC: 21 MMOL/L (ref 22–29)
CO2 SERPL-SCNC: 29 MMOL/L (ref 22–29)
CO2 SERPL-SCNC: 30 MMOL/L (ref 22–29)
CREAT SERPL-MCNC: 0.96 MG/DL (ref 0.57–1)
CREAT SERPL-MCNC: 1.08 MG/DL (ref 0.57–1)
CREAT SERPL-MCNC: 1.67 MG/DL (ref 0.57–1)
D DIMER PPP FEU-MCNC: 4.61 MCGFEU/ML (ref 0–0.86)
DEPRECATED RDW RBC AUTO: 45.4 FL (ref 37–54)
DEPRECATED RDW RBC AUTO: 46 FL (ref 37–54)
DEPRECATED RDW RBC AUTO: 47.9 FL (ref 37–54)
DEPRECATED RDW RBC AUTO: 48.7 FL (ref 37–54)
DEVICE COMMENT 2: ABNORMAL
DEVICE COMMENT: ABNORMAL
DEVICE COMMENT: ABNORMAL
EGFRCR SERPLBLD CKD-EPI 2021: 29.7 ML/MIN/1.73
EGFRCR SERPLBLD CKD-EPI 2021: 50.1 ML/MIN/1.73
EGFRCR SERPLBLD CKD-EPI 2021: 57.7 ML/MIN/1.73
EOSINOPHIL # BLD AUTO: 0 10*3/MM3 (ref 0–0.4)
EOSINOPHIL # BLD AUTO: 0.07 10*3/MM3 (ref 0–0.4)
EOSINOPHIL NFR BLD AUTO: 0 % (ref 0.3–6.2)
EOSINOPHIL NFR BLD AUTO: 0.3 % (ref 0.3–6.2)
ERYTHROCYTE [DISTWIDTH] IN BLOOD BY AUTOMATED COUNT: 14.4 % (ref 12.3–15.4)
ERYTHROCYTE [DISTWIDTH] IN BLOOD BY AUTOMATED COUNT: 14.4 % (ref 12.3–15.4)
ERYTHROCYTE [DISTWIDTH] IN BLOOD BY AUTOMATED COUNT: 14.5 % (ref 12.3–15.4)
ERYTHROCYTE [DISTWIDTH] IN BLOOD BY AUTOMATED COUNT: 14.5 % (ref 12.3–15.4)
GLOBULIN UR ELPH-MCNC: 2.3 GM/DL
GLOBULIN UR ELPH-MCNC: 2.8 GM/DL
GLUCOSE BLDC GLUCOMTR-MCNC: 167 MG/DL (ref 70–130)
GLUCOSE BLDC GLUCOMTR-MCNC: 172 MG/DL (ref 70–130)
GLUCOSE BLDC GLUCOMTR-MCNC: 177 MG/DL (ref 70–130)
GLUCOSE BLDC GLUCOMTR-MCNC: 99 MG/DL (ref 70–130)
GLUCOSE SERPL-MCNC: 143 MG/DL (ref 65–99)
GLUCOSE SERPL-MCNC: 170 MG/DL (ref 65–99)
GLUCOSE SERPL-MCNC: 97 MG/DL (ref 65–99)
HCO3 BLDA-SCNC: 28.7 MMOL/L (ref 22–28)
HCO3 BLDA-SCNC: 30.8 MMOL/L (ref 22–28)
HCT VFR BLD AUTO: 28.1 % (ref 34–46.6)
HCT VFR BLD AUTO: 32.1 % (ref 34–46.6)
HCT VFR BLD AUTO: 32.1 % (ref 34–46.6)
HCT VFR BLD AUTO: 34.9 % (ref 34–46.6)
HEMODILUTION: NO
HEMODILUTION: NO
HGB BLD-MCNC: 10.5 G/DL (ref 12–15.9)
HGB BLD-MCNC: 10.5 G/DL (ref 12–15.9)
HGB BLD-MCNC: 11.4 G/DL (ref 12–15.9)
HGB BLD-MCNC: 9.4 G/DL (ref 12–15.9)
IMM GRANULOCYTES # BLD AUTO: 0.07 10*3/MM3 (ref 0–0.05)
IMM GRANULOCYTES # BLD AUTO: 0.25 10*3/MM3 (ref 0–0.05)
IMM GRANULOCYTES NFR BLD AUTO: 0.4 % (ref 0–0.5)
IMM GRANULOCYTES NFR BLD AUTO: 0.9 % (ref 0–0.5)
INHALED O2 CONCENTRATION: 100 %
INHALED O2 CONCENTRATION: 60 %
INR PPP: 1.51 (ref 0.9–1.1)
LYMPHOCYTES # BLD AUTO: 0.73 10*3/MM3 (ref 0.7–3.1)
LYMPHOCYTES # BLD AUTO: 3.38 10*3/MM3 (ref 0.7–3.1)
LYMPHOCYTES NFR BLD AUTO: 2.6 % (ref 19.6–45.3)
LYMPHOCYTES NFR BLD AUTO: 20.6 % (ref 19.6–45.3)
Lab: 1
Lab: ABNORMAL
MCH RBC QN AUTO: 28.7 PG (ref 26.6–33)
MCH RBC QN AUTO: 29.1 PG (ref 26.6–33)
MCH RBC QN AUTO: 29.7 PG (ref 26.6–33)
MCH RBC QN AUTO: 29.8 PG (ref 26.6–33)
MCHC RBC AUTO-ENTMCNC: 32.7 G/DL (ref 31.5–35.7)
MCHC RBC AUTO-ENTMCNC: 33.5 G/DL (ref 31.5–35.7)
MCV RBC AUTO: 85.9 FL (ref 79–97)
MCV RBC AUTO: 89 FL (ref 79–97)
MCV RBC AUTO: 90.9 FL (ref 79–97)
MCV RBC AUTO: 91.2 FL (ref 79–97)
MODALITY: ABNORMAL
MODALITY: ABNORMAL
MONOCYTES # BLD AUTO: 0.88 10*3/MM3 (ref 0.1–0.9)
MONOCYTES # BLD AUTO: 1.07 10*3/MM3 (ref 0.1–0.9)
MONOCYTES NFR BLD AUTO: 3.9 % (ref 5–12)
MONOCYTES NFR BLD AUTO: 5.4 % (ref 5–12)
NEUTROPHILS NFR BLD AUTO: 12.04 10*3/MM3 (ref 1.7–7)
NEUTROPHILS NFR BLD AUTO: 25.53 10*3/MM3 (ref 1.7–7)
NEUTROPHILS NFR BLD AUTO: 73.2 % (ref 42.7–76)
NEUTROPHILS NFR BLD AUTO: 92.1 % (ref 42.7–76)
NOTIFIED WHO: ABNORMAL
NRBC BLD AUTO-RTO: 0 /100 WBC (ref 0–0.2)
NRBC BLD AUTO-RTO: 0 /100 WBC (ref 0–0.2)
NT-PROBNP SERPL-MCNC: 4305 PG/ML (ref 0–1800)
NT-PROBNP SERPL-MCNC: 6013 PG/ML (ref 0–1800)
O2 A-A PPRESDIFF RESPIRATORY: 0.3 MMHG
O2 A-A PPRESDIFF RESPIRATORY: 0.6 MMHG
PCO2 BLDA: 50.7 MM HG (ref 35–45)
PCO2 BLDA: 70.6 MM HG (ref 35–45)
PEEP RESPIRATORY: 10 CM[H2O]
PH BLDA: 7.25 PH UNITS (ref 7.35–7.45)
PH BLDA: 7.36 PH UNITS (ref 7.35–7.45)
PLATELET # BLD AUTO: 164 10*3/MM3 (ref 140–450)
PLATELET # BLD AUTO: 174 10*3/MM3 (ref 140–450)
PLATELET # BLD AUTO: 193 10*3/MM3 (ref 140–450)
PLATELET # BLD AUTO: 203 10*3/MM3 (ref 140–450)
PMV BLD AUTO: 10 FL (ref 6–12)
PMV BLD AUTO: 10.1 FL (ref 6–12)
PMV BLD AUTO: 11.3 FL (ref 6–12)
PMV BLD AUTO: 9.8 FL (ref 6–12)
PO2 BLDA: 124 MM HG (ref 80–100)
PO2 BLDA: 373.1 MM HG (ref 80–100)
POTASSIUM SERPL-SCNC: 3.5 MMOL/L (ref 3.5–5.2)
POTASSIUM SERPL-SCNC: 3.7 MMOL/L (ref 3.5–5.2)
POTASSIUM SERPL-SCNC: 4.8 MMOL/L (ref 3.5–5.2)
PROCALCITONIN SERPL-MCNC: 0.1 NG/ML (ref 0–0.25)
PROT SERPL-MCNC: 6.2 G/DL (ref 6–8.5)
PROT SERPL-MCNC: 6.8 G/DL (ref 6–8.5)
PROTHROMBIN TIME: 18.4 SECONDS (ref 11.7–14.2)
PROX PFA PSV RIGHT: 134 CM/SEC
PROX PFA PSV RIGHT: 68.6 CM/SEC
PROX SFA PSV RIGHT: 61.3 CM/SEC
PROX SFA PSV RIGHT: 62.5 CM/SEC
QT INTERVAL: 308 MS
QTC INTERVAL: 460 MS
RBC # BLD AUTO: 3.27 10*6/MM3 (ref 3.77–5.28)
RBC # BLD AUTO: 3.52 10*6/MM3 (ref 3.77–5.28)
RBC # BLD AUTO: 3.53 10*6/MM3 (ref 3.77–5.28)
RBC # BLD AUTO: 3.92 10*6/MM3 (ref 3.77–5.28)
READ BACK: YES
RIGHT GROIN CFA SYS: 114 CM/SEC
RIGHT GROIN CFA SYS: 61.3 CM/SEC
SAO2 % BLDCOA: 98.6 % (ref 92–98.5)
SAO2 % BLDCOA: 99.9 % (ref 92–98.5)
SET MECH RESP RATE: 20
SET MECH RESP RATE: 20
SODIUM SERPL-SCNC: 141 MMOL/L (ref 136–145)
SODIUM SERPL-SCNC: 141 MMOL/L (ref 136–145)
SODIUM SERPL-SCNC: 142 MMOL/L (ref 136–145)
TOTAL RATE: 23 BREATHS/MINUTE
TOTAL RATE: 23 BREATHS/MINUTE
VENTILATOR MODE: ABNORMAL
VT ON VENT VENT: 404 ML
WBC NRBC COR # BLD AUTO: 14.29 10*3/MM3 (ref 3.4–10.8)
WBC NRBC COR # BLD AUTO: 16.43 10*3/MM3 (ref 3.4–10.8)
WBC NRBC COR # BLD AUTO: 27.08 10*3/MM3 (ref 3.4–10.8)
WBC NRBC COR # BLD AUTO: 27.7 10*3/MM3 (ref 3.4–10.8)

## 2024-01-01 PROCEDURE — 94761 N-INVAS EAR/PLS OXIMETRY MLT: CPT

## 2024-01-01 PROCEDURE — 94799 UNLISTED PULMONARY SVC/PX: CPT

## 2024-01-01 PROCEDURE — 85730 THROMBOPLASTIN TIME PARTIAL: CPT | Performed by: INTERNAL MEDICINE

## 2024-01-01 PROCEDURE — 82948 REAGENT STRIP/BLOOD GLUCOSE: CPT

## 2024-01-01 PROCEDURE — 82803 BLOOD GASES ANY COMBINATION: CPT | Performed by: INTERNAL MEDICINE

## 2024-01-01 PROCEDURE — 80053 COMPREHEN METABOLIC PANEL: CPT | Performed by: INTERNAL MEDICINE

## 2024-01-01 PROCEDURE — 85027 COMPLETE CBC AUTOMATED: CPT | Performed by: NURSE PRACTITIONER

## 2024-01-01 PROCEDURE — 36002 PSEUDOANEURYSM INJECTION TRT: CPT | Performed by: SURGERY

## 2024-01-01 PROCEDURE — 82803 BLOOD GASES ANY COMBINATION: CPT

## 2024-01-01 PROCEDURE — 2W1NX7Z COMPRESSION OF RIGHT UPPER LEG USING INTERMITTENT PRESSURE DEVICE: ICD-10-PCS | Performed by: SURGERY

## 2024-01-01 PROCEDURE — 80053 COMPREHEN METABOLIC PANEL: CPT | Performed by: NURSE PRACTITIONER

## 2024-01-01 PROCEDURE — 93926 LOWER EXTREMITY STUDY: CPT

## 2024-01-01 PROCEDURE — 71045 X-RAY EXAM CHEST 1 VIEW: CPT

## 2024-01-01 PROCEDURE — 25010000002 ATROPINE PER 0.01 MG: Performed by: INTERNAL MEDICINE

## 2024-01-01 PROCEDURE — 85610 PROTHROMBIN TIME: CPT | Performed by: INTERNAL MEDICINE

## 2024-01-01 PROCEDURE — 83880 ASSAY OF NATRIURETIC PEPTIDE: CPT | Performed by: NURSE PRACTITIONER

## 2024-01-01 PROCEDURE — 85027 COMPLETE CBC AUTOMATED: CPT | Performed by: INTERNAL MEDICINE

## 2024-01-01 PROCEDURE — 99238 HOSP IP/OBS DSCHRG MGMT 30/<: CPT | Performed by: INTERNAL MEDICINE

## 2024-01-01 PROCEDURE — 36002 PSEUDOANEURYSM INJECTION TRT: CPT

## 2024-01-01 PROCEDURE — 76942 ECHO GUIDE FOR BIOPSY: CPT

## 2024-01-01 PROCEDURE — 76942 ECHO GUIDE FOR BIOPSY: CPT | Performed by: SURGERY

## 2024-01-01 PROCEDURE — 93005 ELECTROCARDIOGRAM TRACING: CPT | Performed by: INTERNAL MEDICINE

## 2024-01-01 PROCEDURE — 94660 CPAP INITIATION&MGMT: CPT

## 2024-01-01 PROCEDURE — 25010000002 METHYLPREDNISOLONE PER 125 MG: Performed by: INTERNAL MEDICINE

## 2024-01-01 PROCEDURE — 25810000003 SODIUM CHLORIDE 0.9 % SOLUTION: Performed by: INTERNAL MEDICINE

## 2024-01-01 PROCEDURE — 5A09357 ASSISTANCE WITH RESPIRATORY VENTILATION, LESS THAN 24 CONSECUTIVE HOURS, CONTINUOUS POSITIVE AIRWAY PRESSURE: ICD-10-PCS | Performed by: INTERNAL MEDICINE

## 2024-01-01 PROCEDURE — 36415 COLL VENOUS BLD VENIPUNCTURE: CPT

## 2024-01-01 PROCEDURE — 85379 FIBRIN DEGRADATION QUANT: CPT | Performed by: INTERNAL MEDICINE

## 2024-01-01 PROCEDURE — 25010000002 FUROSEMIDE PER 20 MG: Performed by: INTERNAL MEDICINE

## 2024-01-01 PROCEDURE — G0378 HOSPITAL OBSERVATION PER HR: HCPCS

## 2024-01-01 PROCEDURE — 99223 1ST HOSP IP/OBS HIGH 75: CPT | Performed by: INTERNAL MEDICINE

## 2024-01-01 PROCEDURE — 80048 BASIC METABOLIC PNL TOTAL CA: CPT | Performed by: INTERNAL MEDICINE

## 2024-01-01 PROCEDURE — 83880 ASSAY OF NATRIURETIC PEPTIDE: CPT | Performed by: INTERNAL MEDICINE

## 2024-01-01 PROCEDURE — 36600 WITHDRAWAL OF ARTERIAL BLOOD: CPT | Performed by: INTERNAL MEDICINE

## 2024-01-01 PROCEDURE — 3E053GC INTRODUCTION OF OTHER THERAPEUTIC SUBSTANCE INTO PERIPHERAL ARTERY, PERCUTANEOUS APPROACH: ICD-10-PCS | Performed by: SURGERY

## 2024-01-01 PROCEDURE — 85025 COMPLETE CBC W/AUTO DIFF WBC: CPT | Performed by: INTERNAL MEDICINE

## 2024-01-01 PROCEDURE — 85025 COMPLETE CBC W/AUTO DIFF WBC: CPT | Performed by: NURSE PRACTITIONER

## 2024-01-01 PROCEDURE — 36600 WITHDRAWAL OF ARTERIAL BLOOD: CPT

## 2024-01-01 PROCEDURE — 84145 PROCALCITONIN (PCT): CPT | Performed by: INTERNAL MEDICINE

## 2024-01-01 RX ORDER — ATROPINE SULFATE 0.4 MG/ML
INJECTION, SOLUTION INTRAMUSCULAR; INTRAVENOUS; SUBCUTANEOUS
Status: COMPLETED | OUTPATIENT
Start: 2024-01-01 | End: 2024-01-01

## 2024-01-01 RX ORDER — PREDNISOLONE ACETATE 10 MG/ML
1 SUSPENSION/ DROPS OPHTHALMIC 4 TIMES DAILY
Status: DISCONTINUED | OUTPATIENT
Start: 2024-01-01 | End: 2024-01-01 | Stop reason: HOSPADM

## 2024-01-01 RX ORDER — SILICONE ADHESIVE 1.5" X 3"
1 SHEET (EA) TOPICAL 4 TIMES DAILY
Status: DISCONTINUED | OUTPATIENT
Start: 2024-01-01 | End: 2024-01-01 | Stop reason: HOSPADM

## 2024-01-01 RX ORDER — ACETAMINOPHEN 325 MG/1
650 TABLET ORAL EVERY 4 HOURS PRN
Status: DISCONTINUED | OUTPATIENT
Start: 2024-01-01 | End: 2024-01-01 | Stop reason: HOSPADM

## 2024-01-01 RX ORDER — METHYLPREDNISOLONE SODIUM SUCCINATE 125 MG/2ML
125 INJECTION, POWDER, LYOPHILIZED, FOR SOLUTION INTRAMUSCULAR; INTRAVENOUS ONCE
Status: COMPLETED | OUTPATIENT
Start: 2024-01-01 | End: 2024-01-01

## 2024-01-01 RX ORDER — CLOPIDOGREL BISULFATE 75 MG/1
75 TABLET ORAL DAILY
Status: DISCONTINUED | OUTPATIENT
Start: 2024-01-01 | End: 2024-01-01 | Stop reason: HOSPADM

## 2024-01-01 RX ORDER — BISACODYL 5 MG/1
5 TABLET, DELAYED RELEASE ORAL DAILY PRN
Status: DISCONTINUED | OUTPATIENT
Start: 2024-01-01 | End: 2024-01-01 | Stop reason: HOSPADM

## 2024-01-01 RX ORDER — IPRATROPIUM BROMIDE AND ALBUTEROL SULFATE 2.5; .5 MG/3ML; MG/3ML
3 SOLUTION RESPIRATORY (INHALATION) EVERY 4 HOURS PRN
Status: DISCONTINUED | OUTPATIENT
Start: 2024-01-01 | End: 2024-01-01 | Stop reason: HOSPADM

## 2024-01-01 RX ORDER — SODIUM CHLORIDE 0.9 % (FLUSH) 0.9 %
10 SYRINGE (ML) INJECTION AS NEEDED
Status: DISCONTINUED | OUTPATIENT
Start: 2024-01-01 | End: 2024-01-01 | Stop reason: HOSPADM

## 2024-01-01 RX ORDER — MULTIPLE VITAMINS W/ MINERALS TAB 9MG-400MCG
1 TAB ORAL DAILY
Status: DISCONTINUED | OUTPATIENT
Start: 2024-01-01 | End: 2024-01-01 | Stop reason: HOSPADM

## 2024-01-01 RX ORDER — CLOPIDOGREL BISULFATE 75 MG/1
75 TABLET ORAL DAILY
Qty: 90 TABLET | Refills: 3 | Status: SHIPPED | OUTPATIENT
Start: 2024-01-01 | End: 2024-01-01 | Stop reason: SDUPTHER

## 2024-01-01 RX ORDER — FUROSEMIDE 40 MG/1
40 TABLET ORAL DAILY
Status: DISCONTINUED | OUTPATIENT
Start: 2024-01-01 | End: 2024-01-01 | Stop reason: HOSPADM

## 2024-01-01 RX ORDER — POLYETHYLENE GLYCOL 3350 17 G/17G
17 POWDER, FOR SOLUTION ORAL DAILY PRN
Status: DISCONTINUED | OUTPATIENT
Start: 2024-01-01 | End: 2024-01-01 | Stop reason: HOSPADM

## 2024-01-01 RX ORDER — ATENOLOL 50 MG/1
50 TABLET ORAL DAILY
Status: DISCONTINUED | OUTPATIENT
Start: 2024-01-01 | End: 2024-01-01 | Stop reason: HOSPADM

## 2024-01-01 RX ORDER — BUDESONIDE AND FORMOTEROL FUMARATE DIHYDRATE 160; 4.5 UG/1; UG/1
1 AEROSOL RESPIRATORY (INHALATION)
Status: DISCONTINUED | OUTPATIENT
Start: 2024-01-01 | End: 2024-01-01 | Stop reason: HOSPADM

## 2024-01-01 RX ORDER — GUAIFENESIN 200 MG/10ML
200 LIQUID ORAL 3 TIMES DAILY PRN
Status: DISCONTINUED | OUTPATIENT
Start: 2024-01-01 | End: 2024-01-01 | Stop reason: HOSPADM

## 2024-01-01 RX ORDER — DEXMEDETOMIDINE HYDROCHLORIDE 4 UG/ML
.2-1.5 INJECTION, SOLUTION INTRAVENOUS
Status: DISCONTINUED | OUTPATIENT
Start: 2024-01-01 | End: 2024-01-01 | Stop reason: HOSPADM

## 2024-01-01 RX ORDER — CLOPIDOGREL BISULFATE 75 MG/1
75 TABLET ORAL DAILY
Qty: 90 TABLET | Refills: 3 | Status: SHIPPED | OUTPATIENT
Start: 2024-01-01

## 2024-01-01 RX ORDER — ROSUVASTATIN CALCIUM 5 MG/1
5 TABLET, COATED ORAL NIGHTLY
Status: DISCONTINUED | OUTPATIENT
Start: 2024-01-01 | End: 2024-01-01 | Stop reason: HOSPADM

## 2024-01-01 RX ORDER — ASPIRIN 81 MG/1
81 TABLET, CHEWABLE ORAL DAILY
Status: DISCONTINUED | OUTPATIENT
Start: 2024-01-01 | End: 2024-01-01 | Stop reason: HOSPADM

## 2024-01-01 RX ORDER — AMOXICILLIN 250 MG
2 CAPSULE ORAL 2 TIMES DAILY PRN
Status: DISCONTINUED | OUTPATIENT
Start: 2024-01-01 | End: 2024-01-01 | Stop reason: HOSPADM

## 2024-01-01 RX ORDER — PANTOPRAZOLE SODIUM 40 MG/1
40 TABLET, DELAYED RELEASE ORAL
Status: DISCONTINUED | OUTPATIENT
Start: 2024-01-01 | End: 2024-01-01 | Stop reason: HOSPADM

## 2024-01-01 RX ORDER — AMLODIPINE BESYLATE 5 MG/1
5 TABLET ORAL DAILY
Status: DISCONTINUED | OUTPATIENT
Start: 2024-01-01 | End: 2024-01-01 | Stop reason: HOSPADM

## 2024-01-01 RX ORDER — PREDNISONE 10 MG/1
10 TABLET ORAL
Status: DISCONTINUED | OUTPATIENT
Start: 2024-01-01 | End: 2024-01-01 | Stop reason: HOSPADM

## 2024-01-01 RX ORDER — FUROSEMIDE 10 MG/ML
80 INJECTION INTRAMUSCULAR; INTRAVENOUS ONCE
Status: COMPLETED | OUTPATIENT
Start: 2024-01-01 | End: 2024-01-01

## 2024-01-01 RX ORDER — BISACODYL 10 MG
10 SUPPOSITORY, RECTAL RECTAL DAILY PRN
Status: DISCONTINUED | OUTPATIENT
Start: 2024-01-01 | End: 2024-01-01 | Stop reason: HOSPADM

## 2024-01-01 RX ORDER — CETIRIZINE HYDROCHLORIDE 10 MG/1
10 TABLET ORAL DAILY
Status: DISCONTINUED | OUTPATIENT
Start: 2024-01-01 | End: 2024-01-01 | Stop reason: HOSPADM

## 2024-01-01 RX ORDER — SODIUM CHLORIDE 0.9 % (FLUSH) 0.9 %
10 SYRINGE (ML) INJECTION EVERY 12 HOURS SCHEDULED
Status: DISCONTINUED | OUTPATIENT
Start: 2024-01-01 | End: 2024-01-01 | Stop reason: HOSPADM

## 2024-01-01 RX ORDER — ACETAMINOPHEN 325 MG/1
650 TABLET ORAL EVERY 4 HOURS PRN
Status: DISCONTINUED | OUTPATIENT
Start: 2024-01-01 | End: 2024-01-01

## 2024-01-01 RX ORDER — NOREPINEPHRINE BITARTRATE 0.03 MG/ML
.02-.3 INJECTION, SOLUTION INTRAVENOUS
Status: DISCONTINUED | OUTPATIENT
Start: 2024-01-01 | End: 2024-01-01 | Stop reason: HOSPADM

## 2024-01-01 RX ORDER — NITROGLYCERIN 0.4 MG/1
0.4 TABLET SUBLINGUAL
Status: DISCONTINUED | OUTPATIENT
Start: 2024-01-01 | End: 2024-01-01 | Stop reason: HOSPADM

## 2024-01-01 RX ORDER — SODIUM CHLORIDE 9 MG/ML
40 INJECTION, SOLUTION INTRAVENOUS AS NEEDED
Status: DISCONTINUED | OUTPATIENT
Start: 2024-01-01 | End: 2024-01-01 | Stop reason: HOSPADM

## 2024-01-01 RX ADMIN — SODIUM CHLORIDE 250 ML: 9 INJECTION, SOLUTION INTRAVENOUS at 03:04

## 2024-01-01 RX ADMIN — ATROPINE SULFATE 1 MG: 0.4 INJECTION, SOLUTION INTRAMUSCULAR; INTRAVENOUS; SUBCUTANEOUS at 09:02

## 2024-01-01 RX ADMIN — FUROSEMIDE 80 MG: 10 INJECTION, SOLUTION INTRAMUSCULAR; INTRAVENOUS at 20:00

## 2024-01-01 RX ADMIN — Medication 10 ML: at 23:14

## 2024-01-01 RX ADMIN — METHYLPREDNISOLONE SODIUM SUCCINATE 125 MG: 125 INJECTION, POWDER, FOR SOLUTION INTRAMUSCULAR; INTRAVENOUS at 21:03

## 2024-01-01 RX ADMIN — Medication 0.02 MCG/KG/MIN: at 03:41

## 2024-01-01 RX ADMIN — IPRATROPIUM BROMIDE AND ALBUTEROL SULFATE 3 ML: .5; 3 SOLUTION RESPIRATORY (INHALATION) at 21:33

## 2024-01-01 RX ADMIN — DEXMEDETOMIDINE HYDROCHLORIDE IN SODIUM CHLORIDE 0.2 MCG/KG/HR: 4 INJECTION INTRAVENOUS at 20:32

## 2024-03-07 ENCOUNTER — HOSPITAL ENCOUNTER (INPATIENT)
Facility: HOSPITAL | Age: 89
LOS: 12 days | Discharge: NURSING, REHAB OR LONG-TERM CARE | DRG: 321 | End: 2024-03-19
Attending: EMERGENCY MEDICINE | Admitting: INTERNAL MEDICINE
Payer: MEDICARE

## 2024-03-07 ENCOUNTER — APPOINTMENT (OUTPATIENT)
Dept: GENERAL RADIOLOGY | Facility: HOSPITAL | Age: 89
DRG: 321 | End: 2024-03-07
Payer: MEDICARE

## 2024-03-07 ENCOUNTER — APPOINTMENT (OUTPATIENT)
Dept: CT IMAGING | Facility: HOSPITAL | Age: 89
DRG: 321 | End: 2024-03-07
Payer: MEDICARE

## 2024-03-07 ENCOUNTER — APPOINTMENT (OUTPATIENT)
Dept: CARDIOLOGY | Facility: HOSPITAL | Age: 89
DRG: 321 | End: 2024-03-07
Payer: MEDICARE

## 2024-03-07 DIAGNOSIS — R79.89 ELEVATED TROPONIN: ICD-10-CM

## 2024-03-07 DIAGNOSIS — J18.9 PNEUMONIA OF BOTH LUNGS DUE TO INFECTIOUS ORGANISM, UNSPECIFIED PART OF LUNG: ICD-10-CM

## 2024-03-07 DIAGNOSIS — I50.9 ACUTE ON CHRONIC CONGESTIVE HEART FAILURE, UNSPECIFIED HEART FAILURE TYPE: ICD-10-CM

## 2024-03-07 DIAGNOSIS — J96.01 ACUTE RESPIRATORY FAILURE WITH HYPOXIA: Primary | ICD-10-CM

## 2024-03-07 DIAGNOSIS — R79.89 ELEVATED LACTIC ACID LEVEL: ICD-10-CM

## 2024-03-07 DIAGNOSIS — I25.119 CORONARY ARTERY DISEASE INVOLVING NATIVE CORONARY ARTERY OF NATIVE HEART WITH ANGINA PECTORIS: ICD-10-CM

## 2024-03-07 DIAGNOSIS — I21.4 NSTEMI, INITIAL EPISODE OF CARE: ICD-10-CM

## 2024-03-07 PROBLEM — J96.00 ACUTE RESPIRATORY FAILURE: Status: ACTIVE | Noted: 2024-01-01

## 2024-03-07 LAB
ALBUMIN SERPL-MCNC: 4.1 G/DL (ref 3.5–5.2)
ALBUMIN/GLOB SERPL: 1.7 G/DL
ALP SERPL-CCNC: 64 U/L (ref 39–117)
ALT SERPL W P-5'-P-CCNC: 18 U/L (ref 1–33)
ANION GAP SERPL CALCULATED.3IONS-SCNC: 16.3 MMOL/L (ref 5–15)
APTT PPP: 31.4 SECONDS (ref 22.7–35.4)
ARTERIAL PATENCY WRIST A: POSITIVE
AST SERPL-CCNC: 24 U/L (ref 1–32)
ATMOSPHERIC PRESS: 749.8 MMHG
B PARAPERT DNA SPEC QL NAA+PROBE: NOT DETECTED
B PERT DNA SPEC QL NAA+PROBE: NOT DETECTED
BASE EXCESS BLDA CALC-SCNC: 1.3 MMOL/L (ref 0–2)
BASOPHILS # BLD AUTO: 0.07 10*3/MM3 (ref 0–0.2)
BASOPHILS NFR BLD AUTO: 0.4 % (ref 0–1.5)
BDY SITE: ABNORMAL
BH CV LOWER VASCULAR LEFT COMMON FEMORAL AUGMENT: NORMAL
BH CV LOWER VASCULAR LEFT COMMON FEMORAL COMPETENT: NORMAL
BH CV LOWER VASCULAR LEFT COMMON FEMORAL COMPRESS: NORMAL
BH CV LOWER VASCULAR LEFT COMMON FEMORAL PHASIC: NORMAL
BH CV LOWER VASCULAR LEFT COMMON FEMORAL SPONT: NORMAL
BH CV LOWER VASCULAR LEFT DISTAL FEMORAL COMPRESS: NORMAL
BH CV LOWER VASCULAR LEFT GASTRONEMIUS COMPRESS: NORMAL
BH CV LOWER VASCULAR LEFT GREATER SAPH AK COMPRESS: NORMAL
BH CV LOWER VASCULAR LEFT GREATER SAPH BK COMPRESS: NORMAL
BH CV LOWER VASCULAR LEFT LESSER SAPH COMPRESS: NORMAL
BH CV LOWER VASCULAR LEFT MID FEMORAL AUGMENT: NORMAL
BH CV LOWER VASCULAR LEFT MID FEMORAL COMPETENT: NORMAL
BH CV LOWER VASCULAR LEFT MID FEMORAL COMPRESS: NORMAL
BH CV LOWER VASCULAR LEFT MID FEMORAL PHASIC: NORMAL
BH CV LOWER VASCULAR LEFT MID FEMORAL SPONT: NORMAL
BH CV LOWER VASCULAR LEFT PERONEAL COMPRESS: NORMAL
BH CV LOWER VASCULAR LEFT POPLITEAL AUGMENT: NORMAL
BH CV LOWER VASCULAR LEFT POPLITEAL COMPETENT: NORMAL
BH CV LOWER VASCULAR LEFT POPLITEAL COMPRESS: NORMAL
BH CV LOWER VASCULAR LEFT POPLITEAL PHASIC: NORMAL
BH CV LOWER VASCULAR LEFT POPLITEAL SPONT: NORMAL
BH CV LOWER VASCULAR LEFT POSTERIOR TIBIAL COMPRESS: NORMAL
BH CV LOWER VASCULAR LEFT PROFUNDA FEMORAL COMPRESS: NORMAL
BH CV LOWER VASCULAR LEFT PROXIMAL FEMORAL COMPRESS: NORMAL
BH CV LOWER VASCULAR LEFT SAPHENOFEMORAL JUNCTION COMPRESS: NORMAL
BH CV LOWER VASCULAR RIGHT COMMON FEMORAL AUGMENT: NORMAL
BH CV LOWER VASCULAR RIGHT COMMON FEMORAL COMPETENT: NORMAL
BH CV LOWER VASCULAR RIGHT COMMON FEMORAL COMPRESS: NORMAL
BH CV LOWER VASCULAR RIGHT COMMON FEMORAL PHASIC: NORMAL
BH CV LOWER VASCULAR RIGHT COMMON FEMORAL SPONT: NORMAL
BH CV LOWER VASCULAR RIGHT DISTAL FEMORAL COMPRESS: NORMAL
BH CV LOWER VASCULAR RIGHT GASTRONEMIUS COMPRESS: NORMAL
BH CV LOWER VASCULAR RIGHT GREATER SAPH AK COMPRESS: NORMAL
BH CV LOWER VASCULAR RIGHT GREATER SAPH BK COMPRESS: NORMAL
BH CV LOWER VASCULAR RIGHT LESSER SAPH COMPRESS: NORMAL
BH CV LOWER VASCULAR RIGHT MID FEMORAL AUGMENT: NORMAL
BH CV LOWER VASCULAR RIGHT MID FEMORAL COMPETENT: NORMAL
BH CV LOWER VASCULAR RIGHT MID FEMORAL COMPRESS: NORMAL
BH CV LOWER VASCULAR RIGHT MID FEMORAL PHASIC: NORMAL
BH CV LOWER VASCULAR RIGHT MID FEMORAL SPONT: NORMAL
BH CV LOWER VASCULAR RIGHT PERONEAL COMPRESS: NORMAL
BH CV LOWER VASCULAR RIGHT POPLITEAL AUGMENT: NORMAL
BH CV LOWER VASCULAR RIGHT POPLITEAL COMPETENT: NORMAL
BH CV LOWER VASCULAR RIGHT POPLITEAL COMPRESS: NORMAL
BH CV LOWER VASCULAR RIGHT POPLITEAL PHASIC: NORMAL
BH CV LOWER VASCULAR RIGHT POPLITEAL SPONT: NORMAL
BH CV LOWER VASCULAR RIGHT POSTERIOR TIBIAL COMPRESS: NORMAL
BH CV LOWER VASCULAR RIGHT PROFUNDA FEMORAL COMPRESS: NORMAL
BH CV LOWER VASCULAR RIGHT PROXIMAL FEMORAL COMPRESS: NORMAL
BH CV LOWER VASCULAR RIGHT SAPHENOFEMORAL JUNCTION COMPRESS: NORMAL
BILIRUB SERPL-MCNC: 0.5 MG/DL (ref 0–1.2)
BUN SERPL-MCNC: 23 MG/DL (ref 8–23)
BUN/CREAT SERPL: 17.8 (ref 7–25)
C PNEUM DNA NPH QL NAA+NON-PROBE: NOT DETECTED
CALCIUM SPEC-SCNC: 8.8 MG/DL (ref 8.6–10.5)
CHLORIDE SERPL-SCNC: 93 MMOL/L (ref 98–107)
CO2 BLDA-SCNC: 27.1 MMOL/L (ref 23–27)
CO2 SERPL-SCNC: 24.7 MMOL/L (ref 22–29)
CREAT SERPL-MCNC: 1.29 MG/DL (ref 0.57–1)
D DIMER PPP FEU-MCNC: 3.09 MCGFEU/ML (ref 0–0.86)
D-LACTATE SERPL-SCNC: 1.4 MMOL/L (ref 0.5–2)
D-LACTATE SERPL-SCNC: 2.3 MMOL/L (ref 0.5–2)
DEPRECATED RDW RBC AUTO: 41.8 FL (ref 37–54)
DEVICE COMMENT: ABNORMAL
EGFRCR SERPLBLD CKD-EPI 2021: 40.5 ML/MIN/1.73
EOSINOPHIL # BLD AUTO: 0.13 10*3/MM3 (ref 0–0.4)
EOSINOPHIL NFR BLD AUTO: 0.8 % (ref 0.3–6.2)
ERYTHROCYTE [DISTWIDTH] IN BLOOD BY AUTOMATED COUNT: 13 % (ref 12.3–15.4)
FLUAV SUBTYP SPEC NAA+PROBE: NOT DETECTED
FLUBV RNA ISLT QL NAA+PROBE: NOT DETECTED
GEN 5 2HR TROPONIN T REFLEX: 164 NG/L
GLOBULIN UR ELPH-MCNC: 2.4 GM/DL
GLUCOSE BLDC GLUCOMTR-MCNC: 113 MG/DL (ref 70–130)
GLUCOSE SERPL-MCNC: 199 MG/DL (ref 65–99)
HADV DNA SPEC NAA+PROBE: NOT DETECTED
HCO3 BLDA-SCNC: 25.8 MMOL/L (ref 22–28)
HCOV 229E RNA SPEC QL NAA+PROBE: NOT DETECTED
HCOV HKU1 RNA SPEC QL NAA+PROBE: NOT DETECTED
HCOV NL63 RNA SPEC QL NAA+PROBE: NOT DETECTED
HCOV OC43 RNA SPEC QL NAA+PROBE: NOT DETECTED
HCT VFR BLD AUTO: 33 % (ref 34–46.6)
HEMODILUTION: NO
HGB BLD-MCNC: 10.9 G/DL (ref 12–15.9)
HMPV RNA NPH QL NAA+NON-PROBE: NOT DETECTED
HPIV1 RNA ISLT QL NAA+PROBE: NOT DETECTED
HPIV2 RNA SPEC QL NAA+PROBE: NOT DETECTED
HPIV3 RNA NPH QL NAA+PROBE: NOT DETECTED
HPIV4 P GENE NPH QL NAA+PROBE: NOT DETECTED
IMM GRANULOCYTES # BLD AUTO: 0.07 10*3/MM3 (ref 0–0.05)
IMM GRANULOCYTES NFR BLD AUTO: 0.4 % (ref 0–0.5)
INHALED O2 CONCENTRATION: 60 %
INR PPP: 1.07 (ref 0.9–1.1)
INSPIRATORY TIME: 1
L PNEUMO1 AG UR QL IA: NEGATIVE
LYMPHOCYTES # BLD AUTO: 2.47 10*3/MM3 (ref 0.7–3.1)
LYMPHOCYTES NFR BLD AUTO: 14.7 % (ref 19.6–45.3)
M PNEUMO IGG SER IA-ACNC: NOT DETECTED
MCH RBC QN AUTO: 29.3 PG (ref 26.6–33)
MCHC RBC AUTO-ENTMCNC: 33 G/DL (ref 31.5–35.7)
MCV RBC AUTO: 88.7 FL (ref 79–97)
MODALITY: ABNORMAL
MONOCYTES # BLD AUTO: 1.09 10*3/MM3 (ref 0.1–0.9)
MONOCYTES NFR BLD AUTO: 6.5 % (ref 5–12)
MRSA DNA SPEC QL NAA+PROBE: NORMAL
NEUTROPHILS NFR BLD AUTO: 12.93 10*3/MM3 (ref 1.7–7)
NEUTROPHILS NFR BLD AUTO: 77.2 % (ref 42.7–76)
NRBC BLD AUTO-RTO: 0 /100 WBC (ref 0–0.2)
NT-PROBNP SERPL-MCNC: 6918 PG/ML (ref 0–1800)
O2 A-A PPRESDIFF RESPIRATORY: 0.6 MMHG
PCO2 BLDA: 39.7 MM HG (ref 35–45)
PH BLDA: 7.42 PH UNITS (ref 7.35–7.45)
PLATELET # BLD AUTO: 210 10*3/MM3 (ref 140–450)
PMV BLD AUTO: 9.4 FL (ref 6–12)
PO2 BLDA: 239.9 MM HG (ref 80–100)
POTASSIUM SERPL-SCNC: 4.4 MMOL/L (ref 3.5–5.2)
PROCALCITONIN SERPL-MCNC: 0.12 NG/ML (ref 0–0.25)
PROT SERPL-MCNC: 6.5 G/DL (ref 6–8.5)
PROTHROMBIN TIME: 14.1 SECONDS (ref 11.7–14.2)
QT INTERVAL: 348 MS
QT INTERVAL: 387 MS
QTC INTERVAL: 473 MS
QTC INTERVAL: 507 MS
RBC # BLD AUTO: 3.72 10*6/MM3 (ref 3.77–5.28)
RHINOVIRUS RNA SPEC NAA+PROBE: NOT DETECTED
RSV RNA NPH QL NAA+NON-PROBE: NOT DETECTED
S PNEUM AG SPEC QL LA: NEGATIVE
SAO2 % BLDCOA: 99.8 % (ref 92–98.5)
SARS-COV-2 RNA NPH QL NAA+NON-PROBE: NOT DETECTED
SET MECH RESP RATE: 12
SODIUM SERPL-SCNC: 134 MMOL/L (ref 136–145)
TOTAL RATE: 26 BREATHS/MINUTE
TROPONIN T DELTA: 109 NG/L
TROPONIN T SERPL HS-MCNC: 282 NG/L
TROPONIN T SERPL HS-MCNC: 55 NG/L
VT ON VENT VENT: 480 ML
WBC NRBC COR # BLD AUTO: 16.76 10*3/MM3 (ref 3.4–10.8)

## 2024-03-07 PROCEDURE — 94799 UNLISTED PULMONARY SVC/PX: CPT

## 2024-03-07 PROCEDURE — 85610 PROTHROMBIN TIME: CPT | Performed by: EMERGENCY MEDICINE

## 2024-03-07 PROCEDURE — 84484 ASSAY OF TROPONIN QUANT: CPT | Performed by: EMERGENCY MEDICINE

## 2024-03-07 PROCEDURE — 93005 ELECTROCARDIOGRAM TRACING: CPT

## 2024-03-07 PROCEDURE — 85025 COMPLETE CBC W/AUTO DIFF WBC: CPT | Performed by: EMERGENCY MEDICINE

## 2024-03-07 PROCEDURE — 87641 MR-STAPH DNA AMP PROBE: CPT | Performed by: INTERNAL MEDICINE

## 2024-03-07 PROCEDURE — 82948 REAGENT STRIP/BLOOD GLUCOSE: CPT

## 2024-03-07 PROCEDURE — 94660 CPAP INITIATION&MGMT: CPT

## 2024-03-07 PROCEDURE — 93306 TTE W/DOPPLER COMPLETE: CPT

## 2024-03-07 PROCEDURE — 93005 ELECTROCARDIOGRAM TRACING: CPT | Performed by: EMERGENCY MEDICINE

## 2024-03-07 PROCEDURE — 0202U NFCT DS 22 TRGT SARS-COV-2: CPT | Performed by: EMERGENCY MEDICINE

## 2024-03-07 PROCEDURE — 87449 NOS EACH ORGANISM AG IA: CPT | Performed by: INTERNAL MEDICINE

## 2024-03-07 PROCEDURE — 80053 COMPREHEN METABOLIC PANEL: CPT | Performed by: EMERGENCY MEDICINE

## 2024-03-07 PROCEDURE — 93010 ELECTROCARDIOGRAM REPORT: CPT | Performed by: INTERNAL MEDICINE

## 2024-03-07 PROCEDURE — 82803 BLOOD GASES ANY COMBINATION: CPT

## 2024-03-07 PROCEDURE — 83605 ASSAY OF LACTIC ACID: CPT | Performed by: EMERGENCY MEDICINE

## 2024-03-07 PROCEDURE — 93306 TTE W/DOPPLER COMPLETE: CPT | Performed by: INTERNAL MEDICINE

## 2024-03-07 PROCEDURE — 94640 AIRWAY INHALATION TREATMENT: CPT

## 2024-03-07 PROCEDURE — 85730 THROMBOPLASTIN TIME PARTIAL: CPT | Performed by: EMERGENCY MEDICINE

## 2024-03-07 PROCEDURE — 94760 N-INVAS EAR/PLS OXIMETRY 1: CPT

## 2024-03-07 PROCEDURE — 25810000003 SODIUM CHLORIDE 0.9 % SOLUTION: Performed by: EMERGENCY MEDICINE

## 2024-03-07 PROCEDURE — 25010000002 CEFEPIME PER 500 MG: Performed by: INTERNAL MEDICINE

## 2024-03-07 PROCEDURE — 25010000002 VANCOMYCIN 10 G RECONSTITUTED SOLUTION: Performed by: EMERGENCY MEDICINE

## 2024-03-07 PROCEDURE — 25010000002 METHYLPREDNISOLONE PER 40 MG: Performed by: INTERNAL MEDICINE

## 2024-03-07 PROCEDURE — 94761 N-INVAS EAR/PLS OXIMETRY MLT: CPT

## 2024-03-07 PROCEDURE — 71045 X-RAY EXAM CHEST 1 VIEW: CPT

## 2024-03-07 PROCEDURE — 94664 DEMO&/EVAL PT USE INHALER: CPT

## 2024-03-07 PROCEDURE — 25010000002 CEFEPIME PER 500 MG: Performed by: EMERGENCY MEDICINE

## 2024-03-07 PROCEDURE — 87040 BLOOD CULTURE FOR BACTERIA: CPT | Performed by: EMERGENCY MEDICINE

## 2024-03-07 PROCEDURE — 84484 ASSAY OF TROPONIN QUANT: CPT | Performed by: INTERNAL MEDICINE

## 2024-03-07 PROCEDURE — 25510000001 IOPAMIDOL PER 1 ML: Performed by: INTERNAL MEDICINE

## 2024-03-07 PROCEDURE — 25010000002 FUROSEMIDE PER 20 MG: Performed by: INTERNAL MEDICINE

## 2024-03-07 PROCEDURE — 85379 FIBRIN DEGRADATION QUANT: CPT | Performed by: INTERNAL MEDICINE

## 2024-03-07 PROCEDURE — 71275 CT ANGIOGRAPHY CHEST: CPT

## 2024-03-07 PROCEDURE — 83605 ASSAY OF LACTIC ACID: CPT | Performed by: INTERNAL MEDICINE

## 2024-03-07 PROCEDURE — 84145 PROCALCITONIN (PCT): CPT | Performed by: EMERGENCY MEDICINE

## 2024-03-07 PROCEDURE — 93970 EXTREMITY STUDY: CPT

## 2024-03-07 PROCEDURE — 25010000002 FUROSEMIDE PER 20 MG: Performed by: EMERGENCY MEDICINE

## 2024-03-07 PROCEDURE — 36600 WITHDRAWAL OF ARTERIAL BLOOD: CPT

## 2024-03-07 PROCEDURE — 99291 CRITICAL CARE FIRST HOUR: CPT

## 2024-03-07 PROCEDURE — 99223 1ST HOSP IP/OBS HIGH 75: CPT | Performed by: INTERNAL MEDICINE

## 2024-03-07 PROCEDURE — 83880 ASSAY OF NATRIURETIC PEPTIDE: CPT | Performed by: EMERGENCY MEDICINE

## 2024-03-07 PROCEDURE — 36415 COLL VENOUS BLD VENIPUNCTURE: CPT

## 2024-03-07 RX ORDER — ATENOLOL 50 MG/1
50 TABLET ORAL DAILY
Status: DISCONTINUED | OUTPATIENT
Start: 2024-03-07 | End: 2024-03-18

## 2024-03-07 RX ORDER — PREDNISOLONE ACETATE 10 MG/ML
1 SUSPENSION/ DROPS OPHTHALMIC 4 TIMES DAILY
Status: DISCONTINUED | OUTPATIENT
Start: 2024-03-07 | End: 2024-03-19 | Stop reason: HOSPADM

## 2024-03-07 RX ORDER — ASPIRIN 81 MG/1
81 TABLET ORAL DAILY
Status: DISCONTINUED | OUTPATIENT
Start: 2024-03-07 | End: 2024-03-19 | Stop reason: HOSPADM

## 2024-03-07 RX ORDER — ROSUVASTATIN CALCIUM 5 MG/1
5 TABLET, COATED ORAL DAILY
Status: DISCONTINUED | OUTPATIENT
Start: 2024-03-07 | End: 2024-03-08

## 2024-03-07 RX ORDER — FUROSEMIDE 10 MG/ML
80 INJECTION INTRAMUSCULAR; INTRAVENOUS ONCE
Status: COMPLETED | OUTPATIENT
Start: 2024-03-07 | End: 2024-03-07

## 2024-03-07 RX ORDER — SODIUM CHLORIDE 0.9 % (FLUSH) 0.9 %
10 SYRINGE (ML) INJECTION AS NEEDED
Status: DISCONTINUED | OUTPATIENT
Start: 2024-03-07 | End: 2024-03-19 | Stop reason: HOSPADM

## 2024-03-07 RX ORDER — METHYLPREDNISOLONE SODIUM SUCCINATE 40 MG/ML
40 INJECTION, POWDER, LYOPHILIZED, FOR SOLUTION INTRAMUSCULAR; INTRAVENOUS EVERY 12 HOURS
Status: DISCONTINUED | OUTPATIENT
Start: 2024-03-07 | End: 2024-03-07

## 2024-03-07 RX ORDER — IPRATROPIUM BROMIDE AND ALBUTEROL SULFATE 2.5; .5 MG/3ML; MG/3ML
3 SOLUTION RESPIRATORY (INHALATION)
Status: DISCONTINUED | OUTPATIENT
Start: 2024-03-07 | End: 2024-03-19 | Stop reason: HOSPADM

## 2024-03-07 RX ORDER — FUROSEMIDE 10 MG/ML
40 INJECTION INTRAMUSCULAR; INTRAVENOUS ONCE
Status: COMPLETED | OUTPATIENT
Start: 2024-03-07 | End: 2024-03-07

## 2024-03-07 RX ORDER — POLYETHYLENE GLYCOL 3350 17 G/17G
17 POWDER, FOR SOLUTION ORAL DAILY PRN
Status: DISCONTINUED | OUTPATIENT
Start: 2024-03-07 | End: 2024-03-19 | Stop reason: HOSPADM

## 2024-03-07 RX ORDER — BISACODYL 5 MG/1
5 TABLET, DELAYED RELEASE ORAL DAILY PRN
Status: DISCONTINUED | OUTPATIENT
Start: 2024-03-07 | End: 2024-03-19 | Stop reason: HOSPADM

## 2024-03-07 RX ORDER — DOCUSATE SODIUM 100 MG/1
100 CAPSULE, LIQUID FILLED ORAL 2 TIMES DAILY PRN
Status: DISCONTINUED | OUTPATIENT
Start: 2024-03-07 | End: 2024-03-19 | Stop reason: HOSPADM

## 2024-03-07 RX ORDER — BISACODYL 10 MG
10 SUPPOSITORY, RECTAL RECTAL DAILY PRN
Status: DISCONTINUED | OUTPATIENT
Start: 2024-03-07 | End: 2024-03-19 | Stop reason: HOSPADM

## 2024-03-07 RX ORDER — AMOXICILLIN 250 MG
2 CAPSULE ORAL 2 TIMES DAILY
Status: DISCONTINUED | OUTPATIENT
Start: 2024-03-07 | End: 2024-03-19 | Stop reason: HOSPADM

## 2024-03-07 RX ORDER — ONDANSETRON 2 MG/ML
4 INJECTION INTRAMUSCULAR; INTRAVENOUS EVERY 6 HOURS PRN
Status: DISCONTINUED | OUTPATIENT
Start: 2024-03-07 | End: 2024-03-19 | Stop reason: HOSPADM

## 2024-03-07 RX ORDER — AMLODIPINE BESYLATE 5 MG/1
5 TABLET ORAL DAILY
Status: DISCONTINUED | OUTPATIENT
Start: 2024-03-07 | End: 2024-03-19 | Stop reason: HOSPADM

## 2024-03-07 RX ORDER — OXYBUTYNIN CHLORIDE 5 MG/1
5 TABLET, EXTENDED RELEASE ORAL DAILY
Status: DISCONTINUED | OUTPATIENT
Start: 2024-03-07 | End: 2024-03-19 | Stop reason: HOSPADM

## 2024-03-07 RX ORDER — SILICONE ADHESIVE 1.5" X 3"
1 SHEET (EA) TOPICAL 4 TIMES DAILY
Status: DISCONTINUED | OUTPATIENT
Start: 2024-03-07 | End: 2024-03-19 | Stop reason: HOSPADM

## 2024-03-07 RX ORDER — SODIUM CHLORIDE 9 MG/ML
40 INJECTION, SOLUTION INTRAVENOUS AS NEEDED
Status: DISCONTINUED | OUTPATIENT
Start: 2024-03-07 | End: 2024-03-19 | Stop reason: HOSPADM

## 2024-03-07 RX ORDER — BUDESONIDE AND FORMOTEROL FUMARATE DIHYDRATE 160; 4.5 UG/1; UG/1
2 AEROSOL RESPIRATORY (INHALATION)
Status: DISCONTINUED | OUTPATIENT
Start: 2024-03-07 | End: 2024-03-19 | Stop reason: HOSPADM

## 2024-03-07 RX ORDER — ECHINACEA PURPUREA EXTRACT 125 MG
2 TABLET ORAL DAILY PRN
Status: DISCONTINUED | OUTPATIENT
Start: 2024-03-07 | End: 2024-03-19 | Stop reason: HOSPADM

## 2024-03-07 RX ORDER — SODIUM CHLORIDE 0.9 % (FLUSH) 0.9 %
10 SYRINGE (ML) INJECTION EVERY 12 HOURS SCHEDULED
Status: DISCONTINUED | OUTPATIENT
Start: 2024-03-07 | End: 2024-03-19 | Stop reason: HOSPADM

## 2024-03-07 RX ORDER — FAMOTIDINE 20 MG/1
20 TABLET, FILM COATED ORAL 2 TIMES DAILY PRN
Status: DISCONTINUED | OUTPATIENT
Start: 2024-03-07 | End: 2024-03-19 | Stop reason: HOSPADM

## 2024-03-07 RX ORDER — NITROGLYCERIN 0.4 MG/1
0.4 TABLET SUBLINGUAL
Status: DISCONTINUED | OUTPATIENT
Start: 2024-03-07 | End: 2024-03-19 | Stop reason: HOSPADM

## 2024-03-07 RX ADMIN — PREDNISOLONE ACETATE 1 DROP: 10 SUSPENSION/ DROPS OPHTHALMIC at 21:28

## 2024-03-07 RX ADMIN — ATENOLOL 50 MG: 50 TABLET ORAL at 09:12

## 2024-03-07 RX ADMIN — SENNOSIDES AND DOCUSATE SODIUM 2 TABLET: 50; 8.6 TABLET ORAL at 21:27

## 2024-03-07 RX ADMIN — CEFEPIME 2000 MG: 2 INJECTION, POWDER, FOR SOLUTION INTRAVENOUS at 04:24

## 2024-03-07 RX ADMIN — SODIUM CHLORIDE 1 DROP: 50 SOLUTION OPHTHALMIC at 21:27

## 2024-03-07 RX ADMIN — SENNOSIDES AND DOCUSATE SODIUM 2 TABLET: 50; 8.6 TABLET ORAL at 09:12

## 2024-03-07 RX ADMIN — CEFEPIME 2000 MG: 2 INJECTION, POWDER, FOR SOLUTION INTRAVENOUS at 14:00

## 2024-03-07 RX ADMIN — ROSUVASTATIN CALCIUM 5 MG: 5 TABLET, FILM COATED ORAL at 10:35

## 2024-03-07 RX ADMIN — PREDNISOLONE ACETATE 1 DROP: 10 SUSPENSION/ DROPS OPHTHALMIC at 11:18

## 2024-03-07 RX ADMIN — FUROSEMIDE 80 MG: 10 INJECTION, SOLUTION INTRAMUSCULAR; INTRAVENOUS at 04:21

## 2024-03-07 RX ADMIN — SODIUM CHLORIDE 1 DROP: 50 SOLUTION OPHTHALMIC at 11:18

## 2024-03-07 RX ADMIN — Medication 10 ML: at 21:28

## 2024-03-07 RX ADMIN — METHYLPREDNISOLONE SODIUM SUCCINATE 40 MG: 40 INJECTION, POWDER, FOR SOLUTION INTRAMUSCULAR; INTRAVENOUS at 09:17

## 2024-03-07 RX ADMIN — IPRATROPIUM BROMIDE AND ALBUTEROL SULFATE 3 ML: 2.5; .5 SOLUTION RESPIRATORY (INHALATION) at 08:44

## 2024-03-07 RX ADMIN — FUROSEMIDE 40 MG: 10 INJECTION, SOLUTION INTRAMUSCULAR; INTRAVENOUS at 10:35

## 2024-03-07 RX ADMIN — IPRATROPIUM BROMIDE AND ALBUTEROL SULFATE 3 ML: 2.5; .5 SOLUTION RESPIRATORY (INHALATION) at 16:20

## 2024-03-07 RX ADMIN — AMLODIPINE BESYLATE 5 MG: 5 TABLET ORAL at 09:12

## 2024-03-07 RX ADMIN — IPRATROPIUM BROMIDE AND ALBUTEROL SULFATE 3 ML: 2.5; .5 SOLUTION RESPIRATORY (INHALATION) at 21:13

## 2024-03-07 RX ADMIN — OXYBUTYNIN CHLORIDE 5 MG: 5 TABLET, EXTENDED RELEASE ORAL at 12:30

## 2024-03-07 RX ADMIN — PREDNISOLONE ACETATE 1 DROP: 10 SUSPENSION/ DROPS OPHTHALMIC at 17:02

## 2024-03-07 RX ADMIN — APIXABAN 5 MG: 5 TABLET, FILM COATED ORAL at 09:16

## 2024-03-07 RX ADMIN — VANCOMYCIN HYDROCHLORIDE 1250 MG: 10 INJECTION, POWDER, LYOPHILIZED, FOR SOLUTION INTRAVENOUS at 05:03

## 2024-03-07 RX ADMIN — SODIUM CHLORIDE 1 DROP: 50 SOLUTION OPHTHALMIC at 17:02

## 2024-03-07 RX ADMIN — IPRATROPIUM BROMIDE AND ALBUTEROL SULFATE 3 ML: 2.5; .5 SOLUTION RESPIRATORY (INHALATION) at 12:19

## 2024-03-07 RX ADMIN — IOPAMIDOL 85 ML: 755 INJECTION, SOLUTION INTRAVENOUS at 12:02

## 2024-03-07 RX ADMIN — BUDESONIDE AND FORMOTEROL FUMARATE DIHYDRATE 2 PUFF: 160; 4.5 AEROSOL RESPIRATORY (INHALATION) at 21:19

## 2024-03-07 RX ADMIN — Medication 10 ML: at 09:13

## 2024-03-07 NOTE — Clinical Note
A 7 fr sheath was  inserted using micropuncture technique with ultrasound guidance into the right femoral artery. Sheath insertion not delayed. Angio RFA

## 2024-03-07 NOTE — CONSULTS
Date of Consultation: 24    Referral Provider: Alexis Parks MD     Reason for Consultation: Elevated troponin, CHF.    Encounter Provider: Lopez Mack MD    Group of Service: [facility]     Patient Name: Lizzy Hicks    :1935    Chief complaint: Shortness of breath.    History of Present Illness:      This is a very pleasant 88-year-old female with a history of carotid artery disease, COPD, and diastolic CHF.  She has had bilateral carotid endarterectomies (right CEA in 2018 and left CEA in 2019, both by Dr. Galicia).  She does not have a history of known coronary artery disease.    She was seen in the hospital in 2023 for heart failure. She had an echocardiogram done that showed normal LV systolic function with an EF of 56-60%, grade Ia LV diastolic dysfunction, and mild aortic stenosis with a moderately calcified aortic valve. She was diuresed and ultimately discharged to [place].     The patient presented to the emergency department on 3/7/2024 with worsening shortness of breath and hypoxia.  The patient states that she is usually fairly active for her age and medical condition.  She had been riding her exercise bike earlier in the day and did feel some shortness of breath.  She also felt some vague discomfort in her jaws bilaterally at that time.  However, later in the night, she became very short of breath, and was brought to the emergency department.  Her chest x-ray showed bilateral infiltrates and likely pulmonary edema.  She was given 80 mg of IV Lasix, as well as antibiotics.  She also is being treated for an acute COPD exacerbation.  Her high-sensitivity troponin went from 55-1 64, and then 282.  Her initial EKG showed ST depression in the inferolateral leads which was concerning for ischemia.  She has not had any chest discomfort.  Her D-dimer was also significantly elevated.      ECHO 2023    Left ventricular systolic function is normal.  Left ventricular ejection fraction appears to be 56 - 60%.    Left ventricular diastolic function is consistent with (grade Ia w/high LAP) impaired relaxation.    Mild aortic valve stenosis is present.  Moderately calcified valve.  Off axis Doppler interrogation due to challenging image acquisition.  Severity of stenosis may be underestimated.    Estimated right ventricular systolic pressure from tricuspid regurgitation is normal (<35 mmHg).       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 Baseline Na 128-130 since 2013.    Seborrheic dermatitis 01/20/2016    Swallowing difficulty     D/T INADVERTANT REMOVAL OF UVULA AS CHILD WITH T AND A         Past Surgical History:   Procedure Laterality Date    CAROTID ENDARTERECTOMY Right 9/24/2018    Procedure: RT CAROTID ENDARTERECTOMY WITH INTRA OPERATIVE CAROTID ARTERY DUPLEX SCAN;  Surgeon: Mikaela Galicia Jr., MD;  Location: [facility] OR;  Service: Vascular    CAROTID ENDARTERECTOMY Left 4/4/2019    Procedure: LEFT CAROTID ENDARTERECTOMY;  Surgeon: Mikaela Galicia Jr., MD;  Location: [facility] OR;  Service: Vascular    CATARACT EXTRACTION WITH INTRAOCULAR LENS IMPLANT      LEFT AND RIGHT    ORIF FEMUR FRACTURE Right     HARDWARE    TONSILLECTOMY AND ADENOIDECTOMY      INADVERTANTLY TOOK UVULA AT THIS TIME         Allergies   Allergen Reactions    Bee Venom Swelling         No current facility-administered medications on file prior to encounter.     Current Outpatient Medications on File Prior to Encounter   Medication Sig Dispense Refill    acetaminophen (TYLENOL) 325 MG tablet  Take 2 tablets by mouth Every 4 (Four) Hours As Needed for Mild Pain.      alendronate (FOSAMAX) 70 MG tablet TAKE 1 TABLET BY MOUTH ONCE WEEKLY ON AN EMPTY STOMACH BEFORE BREAKFAST. REMAIN UPRIGHT FOR 30 MINUTES AND TAKE WITH 8 OUNCES OF WATER (Patient taking differently: Take 1 tablet by mouth Every 7 (Seven) Days. Monday) 12 tablet 1    amLODIPine (NORVASC) 5 MG tablet TAKE ONE TABLET BY MOUTH DAILY (Patient taking differently: Take 1 tablet by mouth Daily.) 90 tablet 3    Apixaban Starter Pack (Eliquis DVT/PE Starter Pack) tablet therapy pack Take two 5 mg tablets by mouth every 12 hours for 7 days. Followed by one 5 mg tablet every 12 hours. Start on day 3 74 tablet 0    atenolol (TENORMIN) 50 MG tablet TAKE ONE TABLET BY MOUTH DAILY (Patient taking differently: Take 1 tablet by mouth Daily.) 90 tablet 1    calcium carbonate (TUMS) 500 MG chewable tablet Chew 2 tablets 2 (Two) Times a Day As Needed for Heartburn.      calcium carbonate-cholecalciferol 500-400 MG-UNIT tablet tablet Take 1 tablet by mouth 2 (Two) Times a Day.      docusate sodium 100 MG capsule Take 1 capsule by mouth 2 (Two) Times a Day As Needed for Constipation. (Patient taking differently: Take 1 capsule by mouth 2 (Two) Times a Day.)      famotidine (PEPCID) 20 MG tablet Take 1 tablet by mouth 2 (Two) Times a Day As Needed for Heartburn.      Fluticasone-Salmeterol (ADVAIR/WIXELA) 100-50 MCG/ACT DISKUS INHALE ONE PUFF BY MOUTH TWICE A DAY (Patient taking differently: Inhale 1 puff 2 (Two) Times a Day.) 60 each 2    furosemide (LASIX) 40 MG tablet Take 1 tablet by mouth Daily. 30 tablet 0    ipratropium-albuterol (DUO-NEB) 0.5-2.5 mg/3 ml nebulizer Take 3 mL by nebulization Every 4 (Four) Hours As Needed for Shortness of Air or Wheezing. 360 mL     ipratropium-albuterol (DUO-NEB) 0.5-2.5 mg/3 ml nebulizer Take 3 mL by nebulization 4 (Four) Times a Day. 360 mL     magnesium hydroxide (MILK OF MAGNESIA) 400 MG/5ML suspension Take 30 mL by mouth  "Daily As Needed for Constipation.      Multiple Vitamins-Minerals (ICAPS AREDS 2 PO) Take 1 tablet by mouth Daily. HOLD PRIOR TO SURG      Multiple Vitamins-Minerals (MULTIVITAMIN ADULTS 50+ PO) Take 1 tablet by mouth Daily. HOLD PRIOR TO SURG      prednisoLONE acetate (PRED FORTE) 1 % ophthalmic suspension Administer  to the right eye 4 (Four) Times a Day.      rosuvastatin (CRESTOR) 5 MG tablet TAKE ONE TABLET BY MOUTH DAILY (Patient taking differently: Take 1 tablet by mouth Daily.) 90 tablet 3    sodium chloride (GREGORY 128) 5 % ophthalmic solution Administer 1 drop to the right eye 4 (Four) Times a Day.      sodium chloride 0.65 % nasal spray Instill 2 sprays into the nostril(s) as directed by provider Daily As Needed for Congestion. 88 mL 0         Social History     Socioeconomic History    Marital status:    Tobacco Use    Smoking status: Former     Current packs/day: 0.25     Average packs/day: 0.3 packs/day for 40.0 years (10.0 ttl pk-yrs)     Types: Cigarettes    Smokeless tobacco: Never    Tobacco comments:     quit 6 yr ago   Vaping Use    Vaping status: Never Used   Substance and Sexual Activity    Alcohol use: Not Currently     Comment: 1-2 drinks day    Drug use: No    Sexual activity: Defer         Family History   Problem Relation Age of Onset    Malig Hyperthermia Neg Hx        REVIEW OF SYSTEMS:   Pertinent positives are noted in the HPI above.  Otherwise, all other systems were reviewed, and are negative.     Objective:     Vitals:    03/07/24 1500 03/07/24 1600 03/07/24 1620 03/07/24 1701   BP: 101/40 107/52  115/47   BP Location:       Patient Position:       Pulse: 74 71 74 79   Resp:   18    Temp:   98.2 °F (36.8 °C) 98.1 °F (36.7 °C)   TempSrc:       SpO2: 97% 98% 98% 97%   Weight:       Height:         Body mass index is 26.76 kg/m².  Flowsheet Rows      Flowsheet Row First Filed Value   Admission Height 152.4 cm (60\") Documented at 03/07/2024 0258   Admission Weight 62.3 kg (137 lb " 4.8 oz) Documented at 03/07/2024 0258             General:    No acute distress, alert and oriented x4, pleasant                   Head:    Normocephalic, atraumatic.   Eyes:          Conjunctivae and sclerae normal, no icterus,.   Throat:   No oral lesions, no thrush, oral mucosa moist.    Neck:   Supple, trachea midline.   Lungs:     Faint rales bilaterally.  Faint wheezing on the right.    Heart:    Regular rhythm and normal rate.  II/VI SM RUSB.   Abdomen:     Soft, non-tender, non-distended, positive bowel sounds.    Extremities:   No clubbing, cyanosis, or edema.     Pulses:   Pulses palpable and equal bilaterally.    Skin:   No bleeding or rash.   Neuro:   Non-focal.  Moves all extremities well.    Psychiatric:   Normal mood and affect.             Lab Review:                Results from last 7 days   Lab Units 03/07/24  0302   SODIUM mmol/L 134*   POTASSIUM mmol/L 4.4   CHLORIDE mmol/L 93*   CO2 mmol/L 24.7   BUN mg/dL 23   CREATININE mg/dL 1.29*   GLUCOSE mg/dL 199*   CALCIUM mg/dL 8.8     Results from last 7 days   Lab Units 03/07/24  0956 03/07/24  0510 03/07/24  0302   HSTROP T ng/L 282* 164* 55*     Results from last 7 days   Lab Units 03/07/24  0302   WBC 10*3/mm3 16.76*   HEMOGLOBIN g/dL 10.9*   HEMATOCRIT % 33.0*   PLATELETS 10*3/mm3 210     Results from last 7 days   Lab Units 03/07/24  0302   INR  1.07   APTT seconds 31.4                   EKG (reviewed by me personally):    3/7/2024    7/30/2023        Assessment:   1.  Acute on chronic hypoxic respiratory failure  2.  Right upper and lower lobe patchy opacities, likely infectious or inflammatory (potential aspiration)  3.  COPD with acute exacerbation  4.  Acute on chronic diastolic CHF  5.  Elevated troponin and inferolateral ST depression by EKG (likely type II NSTEMI secondary to #1 - #4)  6.  Anemia, chronic  7.  Mild aortic stenosis  8.  Moderate mitral regurgitation  9.  Status post bilateral carotid endarterectomy  (right CEA in 2018 and  left CEA in 2019, both by Dr. Galicia).  10.  Elevated D-dimer, no PE by CTA  11.  History of DVT in August 2023    Plan:       After I initially saw her, I ordered a stat CT angiogram of her chest given the elevated D-dimer and her constellation of symptoms.  There was no pulmonary embolism, although there were patchy opacities in the right upper and lower lung, possibly infectious versus inflammatory.  Because of the pattern, this was concerning for potential aspiration.  She is being covered with antibiotics for this.    I also feel that she had some component of pulmonary edema on her chest x-ray and likely had acute on chronic diastolic CHF as noted above.  I gave her another 40 mg of IV Lasix today after I saw her.  It was unclear as to whether she was still taking her Eliquis at her assisted living facility, although she did take it for at least 3 months after the DVT in August 2023.  Lower extremity venous Dopplers are also pending at this time.  Her echocardiogram showed mild aortic stenosis and moderate mitral regurgitation.  Her ejection fraction was 65 to 70%.    Her troponin has continued to go up.  She had ischemic appearing changes on her EKG.  This may have been driven by the COPD and potential pneumonia, although the changes are fairly concerning.  She also had prominent coronary artery calcifications on her CT.  I am going to treat this like ACS for now.    I stopped her Eliquis, and I will start aspirin.  She got a dose of Eliquis this morning.  She is on atenolol and Crestor.  If she turns out to have significant coronary artery disease, I will increase the Crestor and potentially add a low-dose ARB if her renal function tolerates.  I am going to recommend proceeding with a left heart catheterization tomorrow.  I discussed this in detail with the patient and her daughter at bedside, and they were in agreement with the plan.  I will see her in the morning and make sure that she is appropriate from  the renal standpoint and medical standpoint to proceed with a heart catheterization tomorrow.  I will keep her n.p.o. after midnight.    Thank you very much for this consult.    Casey Mack MD

## 2024-03-07 NOTE — PROGRESS NOTES
[facility]  [phone]  Dr. Nate Breen    Subjective:  LOS: 0    Chief Complaint: Respiratory failure    Earlier on noninvasive ventilation.  Uses trilogy at home at night.  Currently on low-flow nasal cannula.  She has been more short of breath but not wheezing.  She was also being evaluated by cardiology while I was in the room.    Objective   Vital Signs past 24hrs  Temp range: Temp (24hrs), Av.9 °F (37.2 °C), Min:98.1 °F (36.7 °C), Max:100.3 °F (37.9 °C)    BP range: BP: (117-206)/(51-89) 120/54  Pulse range: Heart Rate:  [] 74  Resp rate range: Resp:  [20-28] 20  Device (Oxygen Therapy): nasal cannulaFlow (L/min):  [2] 2  Oxygen range:SpO2:  [94 %-100 %] 96 %   Mechanical Ventilator:     Physical Exam  Eyes:      Pupils: Pupils are equal, round, and reactive to light.   Cardiovascular:      Rate and Rhythm: Normal rate and regular rhythm.      Heart sounds: No murmur heard.  Pulmonary:      Effort: Accessory muscle usage present.      Breath sounds: Examination of the right-lower field reveals rales. Examination of the left-lower field reveals rales.   Abdominal:      General: Bowel sounds are normal.      Palpations: Abdomen is soft. There is no mass.      Tenderness: There is no abdominal tenderness.   Musculoskeletal:         General: No swelling.   Neurological:      Mental Status: She is alert.       Results Review:    I have reviewed the laboratory and imaging data since the last note by [facility] physician.  My annotations are noted in assessment and plan.      Result Review:  I have personally reviewed the results from last note by [facility] physician to 3/7/2024 14:27 EST and agree with these findings:  [x]  Laboratory list / accordion  [x]  Microbiology  [x]  Radiology  []  EKG/Telemetry   []  Cardiology/Vascular   []  Pathology  []  Old records  []  Other:    Medication Review:  I have reviewed the current MAR.  My annotations are noted in assessment and plan.    amLODIPine, 5 mg,  Oral, Daily  [Held by provider] apixaban, 5 mg, Oral, Q12H  atenolol, 50 mg, Oral, Daily  budesonide-formoterol, 2 puff, Inhalation, BID - RT  cefepime, 2,000 mg, Intravenous, Q24H  ipratropium-albuterol, 3 mL, Nebulization, 4x Daily - RT  methylPREDNISolone sodium succinate, 40 mg, Intravenous, Q12H  oxybutynin XL, 5 mg, Oral, Daily  prednisoLONE acetate, 1 drop, Right Eye, 4x Daily  rosuvastatin, 5 mg, Oral, Daily  senna-docusate sodium, 2 tablet, Oral, BID  sodium chloride, 1 drop, Right Eye, 4x Daily  sodium chloride, 10 mL, Intravenous, Q12H  [START ON 3/8/2024] vancomycin, 750 mg, Intravenous, Q24H        Pharmacy to dose vancomycin,       Lines, Drains & Airways       Active LDAs       Name Placement date Placement time Site Days    Peripheral IV 03/07/24 0300 Left Antecubital 03/07/24  0300  Antecubital  less than 1    Peripheral IV 03/07/24 0516 Left Wrist 03/07/24  0516  Wrist  less than 1    External Urinary Catheter 03/07/24  0436  --  less than 1                  Isolation status: No active isolations    Dietary Orders (From admission, onward)       Start     Ordered    03/07/24 1022  Diet: Regular/House; Texture: Mechanical Ground (NDD 2); Fluid Consistency: Thin (IDDSI 0)  Diet Effective Now        References:    Diet Order [place]   Question Answer Comment   Diets: Regular/House    Texture: Mechanical Ground (NDD 2)    Fluid Consistency: Thin (IDDSI 0)        03/07/24 1021                    PCC Problems  Acute on chronic respiratory failure  Right-sided infiltrates consistent with infectious/inflammatory etiology  COPD  Elevated troponin  Valvular heart disease  Elevated proBNP with HFpEF  WANDA  Anemia      THESE ARE NEW MEDICAL PROBLEMS TO ME.    Plan of Treatment    Continue supplemental O2.  Continue noninvasive ventilation at night.  Patient uses a trilogy machine at home at night.    Right-sided infiltrates consistent with an infectious/inflammatory etiology.  Aspiration is a possibility.   Note normal procal on admission.  Keep on cefepime and DC vancomycin.  Await final cultures.    Elevated troponin and valvular heart disease.  Note cardiology input.  They are considering heart cath.  She received IV Lasix on admission.  Await response.    COPD without wheezing.  Stop IV steroids for now.    She was on Eliquis - not sure if current med. Currently on hold. May have heart cath tomorrow. If no procedure then start prophylactic dose Lovenox at least.    WANDA, will monitor, especially after diuresis.    Anemia without evidence for acute blood loss.      Nate Breen MD  03/07/24  14:27 EST      Part of this note may be an electronic transcription/translation of spoken language to printed text using the Dragon Dictation System.

## 2024-03-07 NOTE — ED NOTES
Nursing report ED to floor  Lizzy Hicks  88 y.o.  female    HPI :  HPI (Adult)  Stated Reason for Visit: suddent onset shortness of breath x 1 hour 78-83% on 2L NC    Chief Complaint  Chief Complaint   Patient presents with    Shortness of Breath   Lizzy Hicks is a 88 y.o. female who presents to the ED c/o sudden onset of severe respiratory distress/shortness of breath that began just a couple of hours ago while at home.  She states that she has had mild symptoms now for the last couple of days but they significantly worsened tonight.  She does report a history of COPD but no obvious CHF history.  She does complain of a nonproductive cough but denies chest pain, back pain, extremity pain, abdominal pain, fever/chills, or nausea/vomiting.             Admitting doctor:   Mark Clemons MD    Admitting diagnosis:   The primary encounter diagnosis was Acute respiratory failure with hypoxia. Diagnoses of Acute on chronic congestive heart failure, unspecified heart failure type, Pneumonia of both lungs due to infectious organism, unspecified part of lung, Elevated lactic acid level, and Elevated troponin were also pertinent to this visit.    Code status:   Current Code Status       Date Active Code Status Order ID Comments User Context       Prior            Allergies:   Bee venom    Isolation:   No active isolations    Intake and Output    Intake/Output Summary (Last 24 hours) at 3/7/2024 0533  Last data filed at 3/7/2024 0503  Gross per 24 hour   Intake 100 ml   Output --   Net 100 ml       Weight:       03/07/24  0258   Weight: 62.3 kg (137 lb 4.8 oz)       Most recent vitals:   Vitals:    03/07/24 0421 03/07/24 0431 03/07/24 0501 03/07/24 0525   BP:  143/56 117/51    BP Location:       Patient Position:       Pulse: 98 101 102 97   Resp:       Temp:       TempSrc:       SpO2:  100% 96% 99%   Weight:       Height:           Active LDAs/IV Access:   Lines, Drains & Airways       Active LDAs       Name  Placement date Placement time Site Days    Peripheral IV 03/07/24 0300 Left Antecubital 03/07/24  0300  Antecubital  less than 1    Peripheral IV 03/07/24 0516 Left Wrist 03/07/24  0516  Wrist  less than 1    External Urinary Catheter 03/07/24  0436  --  less than 1                    Labs (abnormal labs have a star):   Labs Reviewed   COMPREHENSIVE METABOLIC PANEL - Abnormal; Notable for the following components:       Result Value    Glucose 199 (*)     Creatinine 1.29 (*)     Sodium 134 (*)     Chloride 93 (*)     Anion Gap 16.3 (*)     eGFR 40.5 (*)     All other components within normal limits    Narrative:     GFR Normal >60  Chronic Kidney Disease <60  Kidney Failure <15    The GFR formula is only valid for adults with stable renal function between ages 18 and 70.   BNP (IN-HOUSE) - Abnormal; Notable for the following components:    proBNP 6,918.0 (*)     All other components within normal limits    Narrative:     This assay is used as an aid in the diagnosis of individuals suspected of having heart failure. It can be used as an aid in the diagnosis of acute decompensated heart failure (ADHF) in patients presenting with signs and symptoms of ADHF to the emergency department (ED). In addition, NT-proBNP of <300 pg/mL indicates ADHF is not likely.    Age Range Result Interpretation  NT-proBNP Concentration (pg/mL:      <50             Positive            >450                   Gray                 300-450                    Negative             <300    50-75           Positive            >900                  Gray                300-900                  Negative            <300      >75             Positive            >1800                  Gray                300-1800                  Negative            <300   TROPONIN - Abnormal; Notable for the following components:    HS Troponin T 55 (*)     All other components within normal limits    Narrative:     High Sensitive Troponin T Reference Range:  <14.0 ng/L-  Negative Female for AMI  <22.0 ng/L- Negative Male for AMI  >=14 - Abnormal Female indicating possible myocardial injury.  >=22 - Abnormal Male indicating possible myocardial injury.   Clinicians would have to utilize clinical acumen, EKG, Troponin, and serial changes to determine if it is an Acute Myocardial Infarction or myocardial injury due to an underlying chronic condition.        LACTIC ACID, PLASMA - Abnormal; Notable for the following components:    Lactate 2.3 (*)     All other components within normal limits   CBC WITH AUTO DIFFERENTIAL - Abnormal; Notable for the following components:    WBC 16.76 (*)     RBC 3.72 (*)     Hemoglobin 10.9 (*)     Hematocrit 33.0 (*)     Neutrophil % 77.2 (*)     Lymphocyte % 14.7 (*)     Neutrophils, Absolute 12.93 (*)     Monocytes, Absolute 1.09 (*)     Immature Grans, Absolute 0.07 (*)     All other components within normal limits   BLOOD GAS, ARTERIAL - Abnormal; Notable for the following components:    pO2, Arterial 239.9 (*)     O2 Saturation, Arterial 99.8 (*)     CO2 Content 27.1 (*)     All other components within normal limits   RESPIRATORY PANEL PCR W/ COVID-19 (SARS-COV-2), NP SWAB IN UTM/VTP, 2 HR TAT - Normal    Narrative:     In the setting of a positive respiratory panel with a viral infection PLUS a negative procalcitonin without other underlying concern for bacterial infection, consider observing off antibiotics or discontinuation of antibiotics and continue supportive care. If the respiratory panel is positive for atypical bacterial infection (Bordetella pertussis, Chlamydophila pneumoniae, or Mycoplasma pneumoniae), consider antibiotic de-escalation to target atypical bacterial infection.   PROTIME-INR - Normal   APTT - Normal   PROCALCITONIN - Normal    Narrative:     As a Marker for Sepsis (Non-Neonates):    1. <0.5 ng/mL represents a low risk of severe sepsis and/or septic shock.  2. >2 ng/mL represents a high risk of severe sepsis and/or septic  "shock.    As a Marker for Lower Respiratory Tract Infections that require antibiotic therapy:    PCT on Admission    Antibiotic Therapy       6-12 Hrs later    >0.5                Strongly Recommended  >0.25 - <0.5        Recommended   0.1 - 0.25          Discouraged              Remeasure/reassess PCT  <0.1                Strongly Discouraged     Remeasure/reassess PCT    As 28 day mortality risk marker: \"Change in Procalcitonin Result\" (>80% or <=80%) if Day 0 (or Day 1) and Day 4 values are available. Refer to http://www[place] [place]    Change in PCT <=80%  A decrease of PCT levels below or equal to 80% defines a positive change in PCT test result representing a higher risk for 28-day all-cause mortality of patients diagnosed with severe sepsis for septic shock.    Change in PCT >80%  A decrease of PCT levels of more than 80% defines a negative change in PCT result representing a lower risk for 28-day all-cause mortality of patients diagnosed with severe sepsis or septic shock.      BLOOD CULTURE   BLOOD CULTURE   BLOOD GAS, ARTERIAL   LACTIC ACID, REFLEX   HIGH SENSITIVITIY TROPONIN T 2HR   TROPONIN   CBC AND DIFFERENTIAL    Narrative:     The following orders were created for panel order CBC & Differential.  Procedure                               Abnormality         Status                     ---------                               -----------         ------                     CBC Auto Differential[[id number]]        Abnormal            Final result                 Please view results for these tests on the individual orders.       EKG:   ECG 12 Lead Dyspnea   Preliminary Result   HEART RATE= 111  bpm   RR Interval= 541  ms   OK Interval= 162  ms   P Horizontal Axis= -10  deg   P Front Axis= 67  deg   QRSD Interval= 94  ms   QT Interval= 348  ms   QTcB= 473  ms   QRS Axis= 70  deg   T Wave Axis= 35  deg   - ABNORMAL ECG -   Sinus tachycardia   Probable left atrial enlargement   Left ventricular " hypertrophy   Repol abnrm, severe global ischemia (LM/MVD)   Electronically Signed By:    Date and Time of Study: 2024-03-07 03:04:45          Meds given in ED:   Medications   sodium chloride 0.9 % flush 10 mL (has no administration in time range)   vancomycin 1250 mg/250 mL 0.9% NS IVPB ([facility]) (1,250 mg Intravenous New Bag 3/7/24 0503)   furosemide (LASIX) injection 80 mg (80 mg Intravenous Given 3/7/24 0421)   cefepime 2000 mg IVPB in 100 mL NS (VTB) (0 mg Intravenous Stopped 3/7/24 0503)       Imaging results:  XR Chest 1 View    Result Date: 3/7/2024  Bilateral alveolar and interstitial infiltrates noted.  This report was finalized on 3/7/2024 3:33 AM by Dr. Felicitas Milian M.D on Workstation: [id number]       Ambulatory status:   - bedrest    Social issues:   Social History     Socioeconomic History    Marital status:    Tobacco Use    Smoking status: Former     Current packs/day: 0.25     Average packs/day: 0.3 packs/day for 40.0 years (10.0 ttl pk-yrs)     Types: Cigarettes    Smokeless tobacco: Never    Tobacco comments:     quit 6 yr ago   Vaping Use    Vaping status: Never Used   Substance and Sexual Activity    Alcohol use: Not Currently     Comment: 1-2 drinks day    Drug use: No    Sexual activity: Defer       Peripheral Neurovascular  Peripheral Neurovascular (Adult)  Peripheral Neurovascular WDL: WDL    Neuro Cognitive  Neuro Cognitive (Adult)  Cognitive/Neuro/Behavioral WDL: WDL    Learning  Learning Assessment (Adult)  Learning Readiness and Ability: no barriers identified  Education Provided  Person Taught: patient  Teaching Method: verbal instruction  Teaching Focus: symptom/problem overview, diagnostic test  Education Outcome Evaluation: verbalizes understanding    Respiratory  Respiratory WDL  Respiratory WDL: .WDL except, rhythm/pattern  Rhythm/Pattern, Respiratory: labored, shortness of breath  Breath Sounds  Breath Sounds: All Fields  All Lung Fields Breath Sounds: Anterior:,  Posterior:, Lateral:, rhonchi    Abdominal Pain       Pain Assessments  Pain (Adult)  (0-10) Pain Rating: Rest: 0  (0-10) Pain Rating: Activity: 0    NIH Stroke Scale       Teri Solorio RN  03/07/24 05:33 EST

## 2024-03-07 NOTE — Clinical Note
Catheter Pulled back from LV to AO. Measurement captured. Pt called with questions regarding his results. He is wondering what NSAIDS are and what he shouldn't take. Advised not to take Ibuprofen, Aleve, Naproxen. He is currently taking 81mg ASA. Should he discontinue this? He is having surgery on 1/7 and would need to hold his ASA as well.    Mia Artis RN  [facility]

## 2024-03-07 NOTE — H&P
.     Admission Note    Patient Identification:  Lizzy Hicks  88 y.o.  female  1935  [id number]            CC: soa    History of Present Illness:  Patient is a 88-year-old with severe COPD with an FEV1 of 41% and chronic steroid use 10 mg of prednisone on noninvasive positive pressure ventilation since May 2023 who follows with Dr. Muro in our group who is on Advair Spiriva and albuterol 4 times a day who presented to emergency room with worsening shortness of breath.  She denies any chest pain.  No pleuritic chest pain.  Symptoms are very severe.  She feels better than she did upon arrival to the ER after she has been given Lasix and placed on BiPAP.  She is alert she can give good history.  She does say that she has chronic problems with choking when she eats.  Apparently this has been evaluated extensively.  She denies any fever.  She denies any purulent sputum.  No hemoptysis.  She denies any lower extremity swelling abdominal pain or emesis.    Patient evaluated with chest x-ray with bilateral infiltrates.  Patient was given 80 IV Lasix cefepime and vancomycin as well as DuoNebs and we were asked to admit her.    Reviewed clinic notes and spirometry, lung volumes and diffusion from [facility]:  On O2 2-3L/min.  On Astral NIPPV started May 2023 following her hospitalization for COPD exacerbation. She uses it nightly + 2L. No issues with it. Compliant.      PFT 8/22/23: FEV1=41%; Ratio=54 (74%); TLC=95%; GM=889%; DLCO=25%.  Albuterol neb 4 times a day. Advair. Spiriva. Prednisone 10 mg daily.  4 exacerbation this year- May, June, July, August.      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 Baseline Na 128-130 since 2013.    Seborrheic dermatitis 01/20/2016    Swallowing difficulty     D/T INADVERTANT REMOVAL OF UVULA AS CHILD WITH T AND A       Past Surgical History:   Procedure Laterality Date    CAROTID ENDARTERECTOMY Right 9/24/2018    Procedure: RT CAROTID ENDARTERECTOMY WITH INTRA OPERATIVE CAROTID ARTERY DUPLEX SCAN;  Surgeon: Mikaela Galicia Jr., MD;  Location: [facility] MAIN OR;  Service: Vascular    CAROTID ENDARTERECTOMY Left 4/4/2019    Procedure: LEFT CAROTID ENDARTERECTOMY;  Surgeon: Mikaela Galicia Jr., MD;  Location: [facility] MAIN OR;  Service: Vascular    CATARACT EXTRACTION WITH INTRAOCULAR LENS IMPLANT      LEFT AND RIGHT    ORIF FEMUR FRACTURE Right     HARDWARE    TONSILLECTOMY AND ADENOIDECTOMY      INADVERTANTLY TOOK UVULA AT THIS TIME        Social History     Socioeconomic History    Marital status:    Tobacco Use    Smoking status: Former     Current packs/day: 0.25     Average packs/day: 0.3 packs/day for 40.0 years (10.0 ttl pk-yrs)     Types: Cigarettes    Smokeless tobacco: Never    Tobacco comments:     quit 6 yr ago   Vaping Use    Vaping status: Never Used   Substance and Sexual Activity    Alcohol use: Not Currently     Comment: 1-2 drinks day    Drug use: No    Sexual activity: Defer       Family History   Problem Relation Age of Onset    Malig Hyperthermia Neg Hx        (Not in a hospital admission)      Allergies   Allergen Reactions    Bee Venom Swelling       Review of Systems:  As per hpi otherwise a 12 system review of systems performed and all else negative    Physical Exam:  Vitals:  Vitals:    03/07/24 0421 03/07/24 0431 03/07/24 0501 03/07/24 0525   BP:  143/56 117/51    BP Location:       Patient Position:       Pulse: 98 101 102 97   Resp:       Temp:       TempSrc:       SpO2:  100% 96% 99%   Weight:       Height:               Body mass index is 26.81 kg/m².    Intake/Output  Summary (Last 24 hours) at 3/7/2024 0538  Last data filed at 3/7/2024 0503  Gross per 24 hour   Intake 100 ml   Output --   Net 100 ml       Exam:  General appearance: awake chronically ill appearing, conversant   Eyes: Anicteric sclerae, moist conjunctivae;    HENT: Atraumatic; oropharynx limited by bipap  Neck: Trachea midline; supple  Lungs: diminished some crackle at bases no wheeze no rhonchi, with mild increase respiratory effort and no intercostal retractions  CV: RRR, no rub  Abdomen: Soft, nontender; no masses or HSM  Extremities: trace if any peripheral edema or extremity lymphadenopathy  Skin: warm dry no diffuse visible rash  Psych: Appropriate affect, alert   Neuro speech intact moves ext       Scheduled meds:  ipratropium-albuterol, 3 mL, Nebulization, 4x Daily - RT  senna-docusate sodium, 2 tablet, Oral, BID  sodium chloride, 10 mL, Intravenous, Q12H  vancomycin, 20 mg/kg, Intravenous, Once        Data Review:   I reviewed the patient's medications and new clinical results.  Lab Results   Component Value Date    CALCIUM 8.8 03/07/2024     Results from last 7 days   Lab Units 03/07/24  0302   AST (SGOT) U/L 24   ALT (SGPT) U/L 18   SODIUM mmol/L 134*   POTASSIUM mmol/L 4.4   CHLORIDE mmol/L 93*   CO2 mmol/L 24.7   BUN mg/dL 23   CREATININE mg/dL 1.29*   GLUCOSE mg/dL 199*   CALCIUM mg/dL 8.8   WBC 10*3/mm3 16.76*   HEMOGLOBIN g/dL 10.9*   PLATELETS 10*3/mm3 210     Results from last 7 days   Lab Units 03/07/24  0302   HSTROP T ng/L 55*     Results from last 7 days   Lab Units 03/07/24  0312   PH, ARTERIAL pH units 7.422   PO2 ART mm Hg 239.9*   PCO2, ARTERIAL mm Hg 39.7   HCO3 ART mmol/L 25.8     Estimated Creatinine Clearance: 25.8 mL/min (A) (by C-G formula based on SCr of 1.29 mg/dL (H)).    IMAGING:  I have reviewed all imaging studies since my last documentation.  Imaging Results (Most Recent)       Procedure Component Value Units Date/Time    XR Chest 1 View [[id number]] Collected: 03/07/24 0339      Updated: 03/07/24 0336    Narrative:      SINGLE VIEW OF THE CHEST     HISTORY: Shortness of air     COMPARISON: July 30, 2023     FINDINGS:  There is cardiomegaly. Bilateral alveolar and interstitial infiltrates  are seen. While this could reflect edema, correlation with any infection  is recommended.. No pneumothorax is seen. No large effusion is  identified. There are background changes of COPD.       Impression:      Bilateral alveolar and interstitial infiltrates noted.     This report was finalized on 3/7/2024 3:33 AM by Dr. Felicitas Milian M.D on Workstation: [id number]             Tte 2023  eft ventricular systolic function is normal. Left ventricular ejection fraction appears to be 56 - 60%.    Left ventricular diastolic function is consistent with (grade Ia w/high LAP) impaired relaxation.    Mild aortic valve stenosis is present.  Moderately calcified valve.  Off axis Doppler interrogation due to challenging image acquisition.  Severity of stenosis may be underestimated.    Estimated right ventricular systolic pressure from tricuspid regurgitation is normal (<35 mmHg).    ASSESSMENT /   PLAN:  Severe COPD Steroid dependent  Chronic NIPPV Nocturnally  Acute on chronic hypoxemic resp failure (home 2-3LNC was 72% upon arrival)  B infiltrates - edema with bnp up, procal low, wbc elevated but in setting of steroid use  Aortic stenosis - mild in past?underestimated-see note on read  Hypertensive Emergency  Carotid artery stenosis  Coronary artery disease  History of dysphagia  Sleep apnea  History of DVT on apixaban  Management of noninvasive ventilation  Anemia  Hyperglycemia-send hemoglobin A1c  Leukocytosis in the setting of chronic steroid use negative procalcitonin      Cover with abx  Send mrsa  Urine legionella strep pneumo  Cefepime vanco until mrsa swab back  Follow cultures  Reviewed prior CT scans with infiltrates question whether she is having ongoing aspiration  May benefit from speech  evaluation when she is off BiPAP and    Bipap for wob suspect helping with pulm edema and copd  Use nocturnally chronically    Iv solumedrol  Duonebs  Symbicort formulary substitute for home Advair    Monitor response to 80 iv lasix, redose as uop and cr guide  Holding her daily 40 p.o.  Repeat tim - eval aortic valve  Repeat trop   Cards consult - seen piper in past    Blood pressure control  Norvasc 5  Atenolol 50    Crestor 5    Resuming home apixaban 5 mg twice daily    Famotidine 20 for GERD  Continue eyedrops    Dw patients and pt family at bedside.    Dajuan Er MD       Total critical care time was 60 minutes, excluding any separately billable procedure time.    Electronically signed by Mark Clemons MD, 03/07/24, 5:53 AM EST.

## 2024-03-07 NOTE — Clinical Note
First balloon inflation max pressure = 16 gracy. Second inflation of balloon - Max pressure = 18 gracy. 2nd inflation was done at 09:19 EDT. Third inflation of balloon - Max pressure = 18 gracy. 3rd inflation was done at 09:20 EDT.

## 2024-03-07 NOTE — Clinical Note
First balloon inflation max pressure = 16 gracy. Second inflation of balloon - Max pressure = 18 gracy.

## 2024-03-07 NOTE — PROGRESS NOTES
"[facility] Clinical Pharmacy Services: Vancomycin Pharmacokinetic Initial Consult Note    Lizzy Hicks is a 88 y.o. female who is on day 1 of pharmacy to dose vancomycin.    Indication: Pneumonia  Consulting Provider: Dr. Mark Clemons  Planned Duration of Therapy: 7 days  Loading Dose Ordered or Given: 1250 mg on 3/7 at 0500  MRSA PCR performed: no but ordered; Result: pending  Culture/Source: pending  Target: -600 mg/L.hr   Pertinent Vanc Dosing History: n/a  Other Antimicrobials: cefepime    Vitals/Labs  Ht: 152.4 cm (60\"); Wt: 62.3 kg (137 lb 4.8 oz)  Temp Readings from Last 1 Encounters:   03/07/24 100.3 °F (37.9 °C) (Tympanic)    Estimated Creatinine Clearance: 25.8 mL/min (A) (by C-G formula based on SCr of 1.29 mg/dL (H)).       Results from last 7 days   Lab Units 03/07/24  0302   CREATININE mg/dL 1.29*   WBC 10*3/mm3 16.76*     Assessment/Plan:    Vancomycin Dose:   750 mg IV every  24  hours  Predictive AUC level for the dose ordered is 482 mg/L.hr, which is within the target of 400-600 mg/L.hr  Vanc Trough has been ordered for 3/9 at 0400 prior to 3rd overall dose.   Scr will be monitored closely and dose adjusted as needed.    Pharmacy will follow patient's kidney function and will adjust doses and obtain levels as necessary. Thank you for involving pharmacy in this patient's care. Please contact pharmacy with any questions or concerns.                           Judd Soares [facility]  Clinical Pharmacist    "

## 2024-03-07 NOTE — ED PROVIDER NOTES
EMERGENCY DEPARTMENT ENCOUNTER    Room Number:  13/13  PCP: Traci Hoyos MD  Historian: Patient/EMS report      HPI:  Chief Complaint: Shortness of breath  A complete HPI/ROS/PMH/PSH/SH/FH are unobtainable due to: None  Context: Lizzy Hicks is a 88 y.o. female who presents to the ED c/o sudden onset of severe respiratory distress/shortness of breath that began just a couple of hours ago while at home.  She states that she has had mild symptoms now for the last couple of days but they significantly worsened tonight.  She does report a history of COPD but no obvious CHF history.  She does complain of a nonproductive cough but denies chest pain, back pain, extremity pain, abdominal pain, fever/chills, or nausea/vomiting.            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    Carotid stenosis, asymptomatic, right 09/24/2018    Carotid artery disease 04/04/2019    Macular degeneration 07/17/2020    COPD exacerbation 05/01/2023    Acute exacerbation of chronic obstructive pulmonary disease (COPD) 05/01/2023    Parainfluenza infection 05/02/2023    Acute respiratory failure with hypoxia     Acute respiratory failure with hypoxia and hypercapnia 05/21/2023    Mixed hyperlipidemia 05/21/2023    Possible pneumonia of right lower lobe due to infectious organism 05/21/2023    Oropharyngeal dysphagia 05/24/2023    Acute respiratory failure with hypercapnia 07/07/2023    Respiratory insufficiency 07/07/2023    COPD with acute exacerbation 07/30/2023    Sleep apnea 07/30/2023    Acute DVT (deep venous thrombosis) 08/01/2023    DVT, lower extremity, distal, acute, right 08/01/2023     Resolved Ambulatory Problems     Diagnosis Date Noted    Cataract  01/20/2016    Hip fracture 01/20/2016    Medicare annual wellness visit, subsequent 01/20/2016    Swelling of right lower extremity 01/20/2016    Seborrheic dermatitis 01/20/2016     Past Medical History:   Diagnosis Date    Anesthesia complication     Carotid stenosis     History of bronchitis     Swallowing difficulty          PAST SURGICAL HISTORY  Past Surgical History:   Procedure Laterality Date    CAROTID ENDARTERECTOMY Right 9/24/2018    Procedure: RT CAROTID ENDARTERECTOMY WITH INTRA OPERATIVE CAROTID ARTERY DUPLEX SCAN;  Surgeon: Mikaela Galicia Jr., MD;  Location: [facility] MAIN OR;  Service: Vascular    CAROTID ENDARTERECTOMY Left 4/4/2019    Procedure: LEFT CAROTID ENDARTERECTOMY;  Surgeon: Mikaela Galicia Jr., MD;  Location: [facility] OR;  Service: Vascular    CATARACT EXTRACTION WITH INTRAOCULAR LENS IMPLANT      LEFT AND RIGHT    ORIF FEMUR FRACTURE Right     HARDWARE    TONSILLECTOMY AND ADENOIDECTOMY      INADVERTANTLY TOOK UVULA AT THIS TIME         FAMILY HISTORY  Family History   Problem Relation Age of Onset    Malig Hyperthermia Neg Hx          SOCIAL HISTORY  Social History     Socioeconomic History    Marital status:    Tobacco Use    Smoking status: Former     Current packs/day: 0.25     Average packs/day: 0.3 packs/day for 40.0 years (10.0 ttl pk-yrs)     Types: Cigarettes    Smokeless tobacco: Never    Tobacco comments:     quit 6 yr ago   Vaping Use    Vaping status: Never Used   Substance and Sexual Activity    Alcohol use: Not Currently     Comment: 1-2 drinks day    Drug use: No    Sexual activity: Defer         ALLERGIES  Bee venom        REVIEW OF SYSTEMS  Review of Systems   Constitutional:  Negative for fever.   HENT:  Negative for sore throat.    Eyes: Negative.    Respiratory:  Positive for cough and shortness of breath.    Cardiovascular:  Negative for chest pain.   Gastrointestinal:  Negative for abdominal pain, diarrhea and vomiting.   Genitourinary:   Negative for dysuria.   Musculoskeletal:  Negative for neck pain.   Skin:  Negative for rash.   Allergic/Immunologic: Negative.    Neurological:  Negative for weakness, numbness and headaches.   Hematological: Negative.    Psychiatric/Behavioral: Negative.     All other systems reviewed and are negative.         PHYSICAL EXAM  ED Triage Vitals [03/07/24 0258]   Temp Heart Rate Resp BP SpO2   100.3 °F (37.9 °C) (!) 125 28 (!) 206/89 100 %      Temp src Heart Rate Source Patient Position BP Location FiO2 (%)   Tympanic -- Lying Right arm --       Physical Exam  Constitutional:       General: She is in acute distress.      Appearance: Normal appearance. She is ill-appearing. She is not toxic-appearing.   HENT:      Head: Normocephalic and atraumatic.   Eyes:      Extraocular Movements: Extraocular movements intact.      Pupils: Pupils are equal, round, and reactive to light.   Cardiovascular:      Rate and Rhythm: Regular rhythm. Tachycardia present.      Heart sounds: No murmur heard.     No friction rub. No gallop.   Pulmonary:      Effort: Tachypnea and respiratory distress present.      Breath sounds: Rhonchi present.   Abdominal:      General: Abdomen is flat. There is no distension.      Palpations: Abdomen is soft.      Tenderness: There is no abdominal tenderness.   Musculoskeletal:         General: No swelling or tenderness. Normal range of motion.      Cervical back: Normal range of motion and neck supple.   Skin:     General: Skin is warm and dry.   Neurological:      General: No focal deficit present.      Mental Status: She is alert and oriented to person, place, and time.      Sensory: No sensory deficit.      Motor: No weakness.   Psychiatric:         Mood and Affect: Mood normal.         Behavior: Behavior normal.           Vital signs and nursing notes reviewed.          LAB RESULTS  Recent Results (from the past 24 hour(s))   Comprehensive Metabolic Panel    Collection Time: 03/07/24  3:02 AM     Specimen: Blood   Result Value Ref Range    Glucose 199 (H) 65 - 99 mg/dL    BUN 23 8 - 23 mg/dL    Creatinine 1.29 (H) 0.57 - 1.00 mg/dL    Sodium 134 (L) 136 - 145 mmol/L    Potassium 4.4 3.5 - 5.2 mmol/L    Chloride 93 (L) 98 - 107 mmol/L    CO2 24.7 22.0 - 29.0 mmol/L    Calcium 8.8 8.6 - 10.5 mg/dL    Total Protein 6.5 6.0 - 8.5 g/dL    Albumin 4.1 3.5 - 5.2 g/dL    ALT (SGPT) 18 1 - 33 U/L    AST (SGOT) 24 1 - 32 U/L    Alkaline Phosphatase 64 39 - 117 U/L    Total Bilirubin 0.5 0.0 - 1.2 mg/dL    Globulin 2.4 gm/dL    A/G Ratio 1.7 g/dL    BUN/Creatinine Ratio 17.8 7.0 - 25.0    Anion Gap 16.3 (H) 5.0 - 15.0 mmol/L    eGFR 40.5 (L) >60.0 mL/min/1.73   Protime-INR    Collection Time: 03/07/24  3:02 AM    Specimen: Blood   Result Value Ref Range    Protime 14.1 11.7 - 14.2 Seconds    INR 1.07 0.90 - 1.10   aPTT    Collection Time: 03/07/24  3:02 AM    Specimen: Blood   Result Value Ref Range    PTT 31.4 22.7 - 35.4 seconds   BNP    Collection Time: 03/07/24  3:02 AM    Specimen: Blood   Result Value Ref Range    proBNP 6,918.0 (H) 0.0 - 1,800.0 pg/mL   High Sensitivity Troponin T    Collection Time: 03/07/24  3:02 AM    Specimen: Blood   Result Value Ref Range    HS Troponin T 55 (C) <14 ng/L   Lactic Acid, Plasma    Collection Time: 03/07/24  3:02 AM    Specimen: Blood   Result Value Ref Range    Lactate 2.3 (C) 0.5 - 2.0 mmol/L   Procalcitonin    Collection Time: 03/07/24  3:02 AM    Specimen: Blood   Result Value Ref Range    Procalcitonin 0.12 0.00 - 0.25 ng/mL   CBC Auto Differential    Collection Time: 03/07/24  3:02 AM    Specimen: Blood   Result Value Ref Range    WBC 16.76 (H) 3.40 - 10.80 10*3/mm3    RBC 3.72 (L) 3.77 - 5.28 10*6/mm3    Hemoglobin 10.9 (L) 12.0 - 15.9 g/dL    Hematocrit 33.0 (L) 34.0 - 46.6 %    MCV 88.7 79.0 - 97.0 fL    MCH 29.3 26.6 - 33.0 pg    MCHC 33.0 31.5 - 35.7 g/dL    RDW 13.0 12.3 - 15.4 %    RDW-SD 41.8 37.0 - 54.0 fl    MPV 9.4 6.0 - 12.0 fL    Platelets 210 140 - 450  10*3/mm3    Neutrophil % 77.2 (H) 42.7 - 76.0 %    Lymphocyte % 14.7 (L) 19.6 - 45.3 %    Monocyte % 6.5 5.0 - 12.0 %    Eosinophil % 0.8 0.3 - 6.2 %    Basophil % 0.4 0.0 - 1.5 %    Immature Grans % 0.4 0.0 - 0.5 %    Neutrophils, Absolute 12.93 (H) 1.70 - 7.00 10*3/mm3    Lymphocytes, Absolute 2.47 0.70 - 3.10 10*3/mm3    Monocytes, Absolute 1.09 (H) 0.10 - 0.90 10*3/mm3    Eosinophils, Absolute 0.13 0.00 - 0.40 10*3/mm3    Basophils, Absolute 0.07 0.00 - 0.20 10*3/mm3    Immature Grans, Absolute 0.07 (H) 0.00 - 0.05 10*3/mm3    nRBC 0.0 0.0 - 0.2 /100 WBC   ECG 12 Lead Dyspnea    Collection Time: 03/07/24  3:04 AM   Result Value Ref Range    QT Interval 348 ms    QTC Interval 473 ms   Respiratory Panel PCR w/COVID-19(SARS-CoV-2) [facility] CARLOS/JAIMEE/JOI/PAD/COR/KARLEE In-House, NP Swab in UTM/VTM, 2 HR TAT - Swab, Nasopharynx    Collection Time: 03/07/24  3:06 AM    Specimen: Nasopharynx; Swab   Result Value Ref Range    ADENOVIRUS, PCR Not Detected Not Detected    Coronavirus 229E Not Detected Not Detected    Coronavirus HKU1 Not Detected Not Detected    Coronavirus NL63 Not Detected Not Detected    Coronavirus OC43 Not Detected Not Detected    COVID19 Not Detected Not Detected - Ref. Range    Human Metapneumovirus Not Detected Not Detected    Human Rhinovirus/Enterovirus Not Detected Not Detected    Influenza A PCR Not Detected Not Detected    Influenza B PCR Not Detected Not Detected    Parainfluenza Virus 1 Not Detected Not Detected    Parainfluenza Virus 2 Not Detected Not Detected    Parainfluenza Virus 3 Not Detected Not Detected    Parainfluenza Virus 4 Not Detected Not Detected    RSV, PCR Not Detected Not Detected    Bordetella pertussis pcr Not Detected Not Detected    Bordetella parapertussis PCR Not Detected Not Detected    Chlamydophila pneumoniae PCR Not Detected Not Detected    Mycoplasma pneumo by PCR Not Detected Not Detected   Blood Gas, Arterial -    Collection Time: 03/07/24  3:12 AM    Specimen: Arterial  Blood   Result Value Ref Range    Site Left Radial     Lukasz's Test Positive     pH, Arterial 7.422 7.350 - 7.450 pH units    pCO2, Arterial 39.7 35.0 - 45.0 mm Hg    pO2, Arterial 239.9 (H) 80.0 - 100.0 mm Hg    HCO3, Arterial 25.8 22.0 - 28.0 mmol/L    Base Excess, Arterial 1.3 0.0 - 2.0 mmol/L    O2 Saturation, Arterial 99.8 (H) 92.0 - 98.5 %    A-a DO2 0.6 mmHg    CO2 Content 27.1 (H) 23 - 27 mmol/L    Barometric Pressure for Blood Gas 749.8000 mmHg    Modality BiPap     FIO2 60 %    Set Tidal Volume 480     Set Mech Resp Rate 12     Rate 26 Breaths/minute    Hemodilution No     Inspiratory Time 1     Device Comment avaps 26-8/6 sat 100        Ordered the above labs and reviewed the results.        RADIOLOGY  XR Chest 1 View    Result Date: 3/7/2024  SINGLE VIEW OF THE CHEST  HISTORY: Shortness of air  COMPARISON: July 30, 2023  FINDINGS: There is cardiomegaly. Bilateral alveolar and interstitial infiltrates are seen. While this could reflect edema, correlation with any infection is recommended.. No pneumothorax is seen. No large effusion is identified. There are background changes of COPD.      Bilateral alveolar and interstitial infiltrates noted.  This report was finalized on 3/7/2024 3:33 AM by Dr. Felicitas Milian M.D on Workstation: [id number]       Ordered the above noted radiological studies. Reviewed by me in PACS.            PROCEDURES  Critical Care    Performed by: Alexis Parks MD  Authorized by: Alexis Parks MD    Critical care provider statement:     Critical care time (minutes):  35    Critical care time was exclusive of:  Separately billable procedures and treating other patients    Critical care was necessary to treat or prevent imminent or life-threatening deterioration of the following conditions:  Respiratory failure    Critical care was time spent personally by me on the following activities:  Blood draw for specimens, development of treatment plan with patient or  surrogate, discussions with consultants, evaluation of patient's response to treatment, examination of patient, obtaining history from patient or surrogate, ordering and performing treatments and interventions, ordering and review of laboratory studies, ordering and review of radiographic studies, pulse oximetry, re-evaluation of patient's condition and review of old charts    Care discussed with: admitting provider        EKG independently interpreted by myself as follows:    EKG          EKG time: 0304  Rhythm/Rate: sinus tachycardia, 111  P waves and [place]: nml  QRS, axis: nml, nml   ST and T waves: Inferolateral ST depression, normal T waves    Interpreted Contemporaneously by me, independently viewed  changed compared to prior 7/30/23            MEDICATIONS GIVEN IN ER  Medications   sodium chloride 0.9 % flush 10 mL (has no administration in time range)   vancomycin 1250 mg/250 mL 0.9% NS IVPB ([facility]) (1,250 mg Intravenous New Bag 3/7/24 0503)   furosemide (LASIX) injection 80 mg (80 mg Intravenous Given 3/7/24 0421)   cefepime 2000 mg IVPB in 100 mL NS (VTB) (0 mg Intravenous Stopped 3/7/24 0503)                   MEDICAL DECISION MAKING, PROGRESS, and CONSULTS    All labs have been independently reviewed by me.  All radiology studies have been reviewed by me and I have also reviewed the radiology report.   EKG's independently viewed and interpreted by me.  Discussion below represents my analysis of pertinent findings related to patient's condition, differential diagnosis, treatment plan and final disposition.      Additional sources:  - Discussed/ obtained information from independent historians: History obtained from the patient herself at bedside as well as the EMS report.    - External (non-ED) record review: Upon medical records review, the patient was admitted to the hospital from 7/30/2023 through 8/3/2023 where she was treated for an exacerbation of COPD.    - Chronic or social conditions impacting care:  COPD, CAD, respiratory failure    - Shared decision making: Admission decision based on shared conversations have between myself, the patient and family at bedside, as well as Dr. Clemons with pulmonary/ICU.      Orders placed during this visit:  Orders Placed This Encounter   Procedures    Critical Care    Respiratory Panel PCR w/COVID-19(SARS-CoV-2) [facility] CARLOS/JAIMEE/JOI/PAD/COR/KARLEE In-House, NP Swab in UTM/VTM, 2 HR TAT - Swab, Nasopharynx    Blood Culture - Blood,    Blood Culture - Blood,    XR Chest 1 View    Comprehensive Metabolic Panel    Protime-INR    aPTT    Blood Gas, Arterial -    BNP    High Sensitivity Troponin T    Lactic Acid, Plasma    Procalcitonin    CBC Auto Differential    Blood Gas, Arterial -    STAT Lactic Acid, Reflex    High Sensitivity Troponin T 2Hr    High Sensitivity Troponin T    Continuous Pulse Oximetry    Pulmonology (on-call MD unless specified)    Inpatient Cardiology Consult    Discontinue Patient Isolation    NIPPV - Provider Settings    ECG 12 Lead Dyspnea    Adult Transthoracic Echo Complete W/ Cont if Necessary Per Protocol    Insert Peripheral IV    Inpatient Admission    CBC & Differential         Differential diagnosis includes but is not limited to:    Acute hypoxemic respiratory failure, pneumonia, pneumothorax, COPD with acute exacerbation, CHF with an acute exacerbation, pleural effusion, sepsis, COVID-19, or influenza.      Independent interpretation of labs, radiology studies, and discussions with consultants:    Chest x-ray was independently interpreted by myself with my interpretation showing mild cardiomegaly with increased interstitial edema/infiltrates.  No pneumothorax seen.      ED Course as of 03/07/24 0524   Thu Mar 07, 2024   0310 The patient at this point is extremely tachycardic and tachypneic and in moderate to severe respiratory distress.  She does report however since EMS placed the CPAP that her breathing has improved somewhat.  We will transition her to BiPAP  as we begin her workup.  We will monitor and reassess closely. [BM]   0349 Given the patient's elevated lactic acid as well as leukocytosis and fever, would like to begin the patient on IV antibiotics for treatment for potential pneumonia.  Her BNP is also significantly elevated which does raise the question regarding pulmonary edema as well.  In addition to the antibiotics, we will treat with IV diuretics. [BM]   0407 On reevaluation, the patient does report significant improvement in her respiratory status since being placed on the BiPAP machine.  I informed her as well as the family that we are treating potential bilateral pneumonia as well as potential CHF in the emergency room today.  At this point, we will discuss the case with pulmonary for hospital admission.  All questions have been answered. [BM]   0521 The patient's presentation, workup, as well as diagnosis and treatment plan was discussed at length with Dr. Clemons, pulmonary/ICU.  He agrees to admit the patient today to the intensive care unit for further management and treatment. [BM]      ED Course User Index  [BM] Alexis Parks MD           DIAGNOSIS  Final diagnoses:   Acute respiratory failure with hypoxia   Acute on chronic congestive heart failure, unspecified heart failure type   Pneumonia of both lungs due to infectious organism, unspecified part of lung   Elevated lactic acid level   Elevated troponin         DISPOSITION  ADMISSION    Discussed treatment plan and reason for admission with pt/family and admitting physician.  Pt/family voiced understanding of the plan for admission for further testing/treatment as needed.               Latest Documented Vital Signs:  As of 05:24 EST  BP- 117/51 HR- 102 Temp- 100.3 °F (37.9 °C) (Tympanic) O2 sat- 96%              --    Please note that portions of this were completed with a voice recognition program.       Note Disclaimer: At [facility], we believe that sharing information builds trust  and better relationships. You are receiving this note because you are receiving care at [facility] or recently visited. It is possible you will see health information before a provider has talked with you about it. This kind of information can be easy to misunderstand. To help you fully understand what it means for your health, we urge you to discuss this note with your provider.             Alexis Parks MD  03/07/24 0559

## 2024-03-07 NOTE — Clinical Note
The right DP pulse is +1. The right PT pulse is +1. The right radial pulse is +2. The right ulnar pulse is +1. The right femoral pulse is +1.

## 2024-03-07 NOTE — Clinical Note
Hemostasis started on the right radial artery. R-Band was used in achieving hemostasis. Radial compression device applied to vessel. Hemostasis achieved successfully. Closure device additional comment: TR Band applied-14atm

## 2024-03-07 NOTE — PLAN OF CARE
Goal Outcome Evaluation:         Pt arrived to unit at 0648, Tachycardic and didn't appear to be in sinus rhythm EKG ordered, echo at bedside now.Pt seems to be tolerating bipap, AxOx4.

## 2024-03-08 LAB
ALBUMIN SERPL-MCNC: 3.4 G/DL (ref 3.5–5.2)
ANION GAP SERPL CALCULATED.3IONS-SCNC: 15 MMOL/L (ref 5–15)
BASOPHILS # BLD AUTO: 0.01 10*3/MM3 (ref 0–0.2)
BASOPHILS NFR BLD AUTO: 0.1 % (ref 0–1.5)
BUN SERPL-MCNC: 35 MG/DL (ref 8–23)
BUN/CREAT SERPL: 31 (ref 7–25)
CALCIUM SPEC-SCNC: 8.4 MG/DL (ref 8.6–10.5)
CHLORIDE SERPL-SCNC: 94 MMOL/L (ref 98–107)
CHOLEST SERPL-MCNC: 195 MG/DL (ref 0–200)
CO2 SERPL-SCNC: 24 MMOL/L (ref 22–29)
CREAT SERPL-MCNC: 1.13 MG/DL (ref 0.57–1)
DEPRECATED RDW RBC AUTO: 45.7 FL (ref 37–54)
EGFRCR SERPLBLD CKD-EPI 2021: 47.5 ML/MIN/1.73
EOSINOPHIL # BLD AUTO: 0 10*3/MM3 (ref 0–0.4)
EOSINOPHIL NFR BLD AUTO: 0 % (ref 0.3–6.2)
ERYTHROCYTE [DISTWIDTH] IN BLOOD BY AUTOMATED COUNT: 13.7 % (ref 12.3–15.4)
GLUCOSE SERPL-MCNC: 112 MG/DL (ref 65–99)
HCT VFR BLD AUTO: 28.2 % (ref 34–46.6)
HDLC SERPL-MCNC: 114 MG/DL (ref 40–60)
HGB BLD-MCNC: 9.4 G/DL (ref 12–15.9)
IMM GRANULOCYTES # BLD AUTO: 0.05 10*3/MM3 (ref 0–0.05)
IMM GRANULOCYTES NFR BLD AUTO: 0.5 % (ref 0–0.5)
LDLC SERPL CALC-MCNC: 69 MG/DL (ref 0–100)
LDLC/HDLC SERPL: 0.6 {RATIO}
LYMPHOCYTES # BLD AUTO: 1.01 10*3/MM3 (ref 0.7–3.1)
LYMPHOCYTES NFR BLD AUTO: 9.4 % (ref 19.6–45.3)
MCH RBC QN AUTO: 30.2 PG (ref 26.6–33)
MCHC RBC AUTO-ENTMCNC: 33.3 G/DL (ref 31.5–35.7)
MCV RBC AUTO: 90.7 FL (ref 79–97)
MONOCYTES # BLD AUTO: 0.95 10*3/MM3 (ref 0.1–0.9)
MONOCYTES NFR BLD AUTO: 8.8 % (ref 5–12)
NEUTROPHILS NFR BLD AUTO: 8.78 10*3/MM3 (ref 1.7–7)
NEUTROPHILS NFR BLD AUTO: 81.2 % (ref 42.7–76)
NRBC BLD AUTO-RTO: 0 /100 WBC (ref 0–0.2)
PHOSPHATE SERPL-MCNC: 4.4 MG/DL (ref 2.5–4.5)
PLATELET # BLD AUTO: 167 10*3/MM3 (ref 140–450)
PMV BLD AUTO: 10 FL (ref 6–12)
POTASSIUM SERPL-SCNC: 3.6 MMOL/L (ref 3.5–5.2)
PROCALCITONIN SERPL-MCNC: 2.46 NG/ML (ref 0–0.25)
RBC # BLD AUTO: 3.11 10*6/MM3 (ref 3.77–5.28)
SODIUM SERPL-SCNC: 133 MMOL/L (ref 136–145)
TRIGL SERPL-MCNC: 63 MG/DL (ref 0–150)
VLDLC SERPL-MCNC: 12 MG/DL (ref 5–40)
WBC NRBC COR # BLD AUTO: 10.8 10*3/MM3 (ref 3.4–10.8)

## 2024-03-08 PROCEDURE — 4A023N7 MEASUREMENT OF CARDIAC SAMPLING AND PRESSURE, LEFT HEART, PERCUTANEOUS APPROACH: ICD-10-PCS | Performed by: INTERNAL MEDICINE

## 2024-03-08 PROCEDURE — 94799 UNLISTED PULMONARY SVC/PX: CPT

## 2024-03-08 PROCEDURE — 80061 LIPID PANEL: CPT | Performed by: INTERNAL MEDICINE

## 2024-03-08 PROCEDURE — 25010000002 HEPARIN (PORCINE) PER 1000 UNITS: Performed by: INTERNAL MEDICINE

## 2024-03-08 PROCEDURE — 25010000002 FENTANYL CITRATE (PF) 50 MCG/ML SOLUTION: Performed by: INTERNAL MEDICINE

## 2024-03-08 PROCEDURE — B2111ZZ FLUOROSCOPY OF MULTIPLE CORONARY ARTERIES USING LOW OSMOLAR CONTRAST: ICD-10-PCS | Performed by: INTERNAL MEDICINE

## 2024-03-08 PROCEDURE — 85025 COMPLETE CBC W/AUTO DIFF WBC: CPT | Performed by: INTERNAL MEDICINE

## 2024-03-08 PROCEDURE — C1769 GUIDE WIRE: HCPCS | Performed by: INTERNAL MEDICINE

## 2024-03-08 PROCEDURE — 94660 CPAP INITIATION&MGMT: CPT

## 2024-03-08 PROCEDURE — 94760 N-INVAS EAR/PLS OXIMETRY 1: CPT

## 2024-03-08 PROCEDURE — 25010000002 CEFEPIME PER 500 MG: Performed by: INTERNAL MEDICINE

## 2024-03-08 PROCEDURE — B2151ZZ FLUOROSCOPY OF LEFT HEART USING LOW OSMOLAR CONTRAST: ICD-10-PCS | Performed by: INTERNAL MEDICINE

## 2024-03-08 PROCEDURE — 94761 N-INVAS EAR/PLS OXIMETRY MLT: CPT

## 2024-03-08 PROCEDURE — 25510000001 IOPAMIDOL PER 1 ML: Performed by: INTERNAL MEDICINE

## 2024-03-08 PROCEDURE — 84145 PROCALCITONIN (PCT): CPT | Performed by: INTERNAL MEDICINE

## 2024-03-08 PROCEDURE — C1894 INTRO/SHEATH, NON-LASER: HCPCS | Performed by: INTERNAL MEDICINE

## 2024-03-08 PROCEDURE — 99222 1ST HOSP IP/OBS MODERATE 55: CPT | Performed by: THORACIC SURGERY (CARDIOTHORACIC VASCULAR SURGERY)

## 2024-03-08 PROCEDURE — 93458 L HRT ARTERY/VENTRICLE ANGIO: CPT | Performed by: INTERNAL MEDICINE

## 2024-03-08 PROCEDURE — 80069 RENAL FUNCTION PANEL: CPT | Performed by: INTERNAL MEDICINE

## 2024-03-08 PROCEDURE — 94664 DEMO&/EVAL PT USE INHALER: CPT

## 2024-03-08 PROCEDURE — 99232 SBSQ HOSP IP/OBS MODERATE 35: CPT | Performed by: INTERNAL MEDICINE

## 2024-03-08 PROCEDURE — 25010000002 MIDAZOLAM PER 1 MG: Performed by: INTERNAL MEDICINE

## 2024-03-08 RX ORDER — ROSUVASTATIN CALCIUM 10 MG/1
10 TABLET, COATED ORAL DAILY
Status: DISCONTINUED | OUTPATIENT
Start: 2024-03-08 | End: 2024-03-12

## 2024-03-08 RX ORDER — VERAPAMIL HYDROCHLORIDE 2.5 MG/ML
INJECTION, SOLUTION INTRAVENOUS
Status: DISCONTINUED | OUTPATIENT
Start: 2024-03-08 | End: 2024-03-08 | Stop reason: HOSPADM

## 2024-03-08 RX ORDER — CLOPIDOGREL BISULFATE 75 MG/1
75 TABLET ORAL DAILY
Status: DISCONTINUED | OUTPATIENT
Start: 2024-03-08 | End: 2024-03-19 | Stop reason: HOSPADM

## 2024-03-08 RX ORDER — LIDOCAINE HYDROCHLORIDE 20 MG/ML
INJECTION, SOLUTION INFILTRATION; PERINEURAL
Status: DISCONTINUED | OUTPATIENT
Start: 2024-03-08 | End: 2024-03-08 | Stop reason: HOSPADM

## 2024-03-08 RX ORDER — LOSARTAN POTASSIUM 25 MG/1
12.5 TABLET ORAL
Status: DISCONTINUED | OUTPATIENT
Start: 2024-03-08 | End: 2024-03-16

## 2024-03-08 RX ORDER — HEPARIN SODIUM 1000 [USP'U]/ML
INJECTION, SOLUTION INTRAVENOUS; SUBCUTANEOUS
Status: DISCONTINUED | OUTPATIENT
Start: 2024-03-08 | End: 2024-03-08 | Stop reason: HOSPADM

## 2024-03-08 RX ORDER — MIDAZOLAM HYDROCHLORIDE 1 MG/ML
INJECTION INTRAMUSCULAR; INTRAVENOUS
Status: DISCONTINUED | OUTPATIENT
Start: 2024-03-08 | End: 2024-03-08 | Stop reason: HOSPADM

## 2024-03-08 RX ORDER — FENTANYL CITRATE 50 UG/ML
INJECTION, SOLUTION INTRAMUSCULAR; INTRAVENOUS
Status: DISCONTINUED | OUTPATIENT
Start: 2024-03-08 | End: 2024-03-08 | Stop reason: HOSPADM

## 2024-03-08 RX ADMIN — SODIUM CHLORIDE 1 DROP: 50 SOLUTION OPHTHALMIC at 18:09

## 2024-03-08 RX ADMIN — SENNOSIDES AND DOCUSATE SODIUM 2 TABLET: 50; 8.6 TABLET ORAL at 22:01

## 2024-03-08 RX ADMIN — IPRATROPIUM BROMIDE AND ALBUTEROL SULFATE 3 ML: 2.5; .5 SOLUTION RESPIRATORY (INHALATION) at 08:00

## 2024-03-08 RX ADMIN — PREDNISOLONE ACETATE 1 DROP: 10 SUSPENSION/ DROPS OPHTHALMIC at 08:24

## 2024-03-08 RX ADMIN — AMLODIPINE BESYLATE 5 MG: 5 TABLET ORAL at 09:00

## 2024-03-08 RX ADMIN — ATENOLOL 50 MG: 50 TABLET ORAL at 09:00

## 2024-03-08 RX ADMIN — Medication 10 ML: at 08:25

## 2024-03-08 RX ADMIN — OXYBUTYNIN CHLORIDE 5 MG: 5 TABLET, EXTENDED RELEASE ORAL at 09:00

## 2024-03-08 RX ADMIN — ROSUVASTATIN CALCIUM 10 MG: 10 TABLET, FILM COATED ORAL at 22:01

## 2024-03-08 RX ADMIN — LOSARTAN POTASSIUM 12.5 MG: 25 TABLET, FILM COATED ORAL at 22:01

## 2024-03-08 RX ADMIN — PREDNISOLONE ACETATE 1 DROP: 10 SUSPENSION/ DROPS OPHTHALMIC at 18:09

## 2024-03-08 RX ADMIN — CEFEPIME 2000 MG: 2 INJECTION, POWDER, FOR SOLUTION INTRAVENOUS at 14:38

## 2024-03-08 RX ADMIN — SODIUM CHLORIDE 1 DROP: 50 SOLUTION OPHTHALMIC at 12:10

## 2024-03-08 RX ADMIN — PREDNISOLONE ACETATE 1 DROP: 10 SUSPENSION/ DROPS OPHTHALMIC at 22:01

## 2024-03-08 RX ADMIN — IPRATROPIUM BROMIDE AND ALBUTEROL SULFATE 3 ML: 2.5; .5 SOLUTION RESPIRATORY (INHALATION) at 14:50

## 2024-03-08 RX ADMIN — PREDNISOLONE ACETATE 1 DROP: 10 SUSPENSION/ DROPS OPHTHALMIC at 12:16

## 2024-03-08 RX ADMIN — SODIUM CHLORIDE 1 DROP: 50 SOLUTION OPHTHALMIC at 08:24

## 2024-03-08 RX ADMIN — CLOPIDOGREL BISULFATE 75 MG: 75 TABLET, FILM COATED ORAL at 14:38

## 2024-03-08 RX ADMIN — IPRATROPIUM BROMIDE AND ALBUTEROL SULFATE 3 ML: 2.5; .5 SOLUTION RESPIRATORY (INHALATION) at 11:18

## 2024-03-08 RX ADMIN — Medication 10 ML: at 22:02

## 2024-03-08 RX ADMIN — IPRATROPIUM BROMIDE AND ALBUTEROL SULFATE 3 ML: 2.5; .5 SOLUTION RESPIRATORY (INHALATION) at 20:13

## 2024-03-08 RX ADMIN — SODIUM CHLORIDE 1 DROP: 50 SOLUTION OPHTHALMIC at 22:01

## 2024-03-08 RX ADMIN — ASPIRIN 81 MG: 81 TABLET, COATED ORAL at 09:00

## 2024-03-08 RX ADMIN — BUDESONIDE AND FORMOTEROL FUMARATE DIHYDRATE 2 PUFF: 160; 4.5 AEROSOL RESPIRATORY (INHALATION) at 08:03

## 2024-03-08 NOTE — CONSULTS
Patient Care Team:  Traci Hoyos MD as PCP - General (Geriatric Medicine)    Chief complaint: shortness of breath    Subjective     History of Present Illness  Patient is a 88 y.o. female with a past medical history including hx of carotid disease s/p bilateral endarterectomies, COPD, and diastolic CHF who presented to the ER yesterday with worsening shortness of breath.  She was given lasix as well as antibiotics and was admitted for treatment of pulmonary edema and acute COPD exacerbation. Her troponin was noted to be elevated.  Her EKG showed ST depression in the inferolateral leads which was concerning for ischemia.  She underwent a cardiac catheterization which revealed ostial left main stenosis. We were asked to see for cardiac surgery evaluation.      Review of Systems   Constitutional:  Positive for activity change and fatigue.   Respiratory:  Positive for shortness of breath and wheezing. Negative for chest tightness.    All other systems reviewed and are negative.       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 Baseline Na 128-130 since 2013.    Seborrheic dermatitis 01/20/2016    Swallowing difficulty     D/T INADVERTANT REMOVAL OF UVULA AS CHILD WITH T AND A     Past Surgical History:   Procedure Laterality Date    CAROTID ENDARTERECTOMY Right 9/24/2018    Procedure: RT CAROTID ENDARTERECTOMY WITH INTRA OPERATIVE CAROTID ARTERY DUPLEX SCAN;  Surgeon: Mikaela Galicia Jr., MD;  Location: [facility];  Service: Vascular    CAROTID ENDARTERECTOMY Left 4/4/2019    Procedure: LEFT CAROTID ENDARTERECTOMY;  Surgeon: Grayson  Mikaela Aguilar Jr., MD;  Location: [facility] MAIN OR;  Service: Vascular    CATARACT EXTRACTION WITH INTRAOCULAR LENS IMPLANT      LEFT AND RIGHT    ORIF FEMUR FRACTURE Right     HARDWARE    TONSILLECTOMY AND ADENOIDECTOMY      INADVERTANTLY TOOK UVULA AT THIS TIME     Family History   Problem Relation Age of Onset    Malig Hyperthermia Neg Hx      Social History     Tobacco Use    Smoking status: Former     Current packs/day: 0.25     Average packs/day: 0.3 packs/day for 40.0 years (10.0 ttl pk-yrs)     Types: Cigarettes    Smokeless tobacco: Never    Tobacco comments:     quit 6 yr ago   Vaping Use    Vaping status: Never Used   Substance Use Topics    Alcohol use: Not Currently     Comment: 1-2 drinks day    Drug use: No     Medications Prior to Admission   Medication Sig Dispense Refill Last Dose    acetaminophen (TYLENOL) 325 MG tablet Take 2 tablets by mouth Every 4 (Four) Hours As Needed for Mild Pain.       alendronate (FOSAMAX) 70 MG tablet TAKE 1 TABLET BY MOUTH ONCE WEEKLY ON AN EMPTY STOMACH BEFORE BREAKFAST. REMAIN UPRIGHT FOR 30 MINUTES AND TAKE WITH 8 OUNCES OF WATER (Patient taking differently: Take 1 tablet by mouth Every 7 (Seven) Days. Monday) 12 tablet 1     amLODIPine (NORVASC) 5 MG tablet TAKE ONE TABLET BY MOUTH DAILY (Patient taking differently: Take 1 tablet by mouth Daily.) 90 tablet 3     Apixaban Starter Pack (Eliquis DVT/PE Starter Pack) tablet therapy pack Take two 5 mg tablets by mouth every 12 hours for 7 days. Followed by one 5 mg tablet every 12 hours. Start on day 3 74 tablet 0     atenolol (TENORMIN) 50 MG tablet TAKE ONE TABLET BY MOUTH DAILY (Patient taking differently: Take 1 tablet by mouth Daily.) 90 tablet 1     calcium carbonate (TUMS) 500 MG chewable tablet Chew 2 tablets 2 (Two) Times a Day As Needed for Heartburn.       calcium carbonate-cholecalciferol 500-400 MG-UNIT tablet tablet Take 1 tablet by mouth 2 (Two) Times a Day.       docusate sodium 100 MG capsule Take 1  capsule by mouth 2 (Two) Times a Day As Needed for Constipation. (Patient taking differently: Take 1 capsule by mouth 2 (Two) Times a Day.)       famotidine (PEPCID) 20 MG tablet Take 1 tablet by mouth 2 (Two) Times a Day As Needed for Heartburn.       Fluticasone-Salmeterol (ADVAIR/WIXELA) 100-50 MCG/ACT DISKUS INHALE ONE PUFF BY MOUTH TWICE A DAY (Patient taking differently: Inhale 1 puff 2 (Two) Times a Day.) 60 each 2     furosemide (LASIX) 40 MG tablet Take 1 tablet by mouth Daily. 30 tablet 0     ipratropium-albuterol (DUO-NEB) 0.5-2.5 mg/3 ml nebulizer Take 3 mL by nebulization Every 4 (Four) Hours As Needed for Shortness of Air or Wheezing. 360 mL      ipratropium-albuterol (DUO-NEB) 0.5-2.5 mg/3 ml nebulizer Take 3 mL by nebulization 4 (Four) Times a Day. 360 mL      magnesium hydroxide (MILK OF MAGNESIA) 400 MG/5ML suspension Take 30 mL by mouth Daily As Needed for Constipation.       Multiple Vitamins-Minerals (ICAPS AREDS 2 PO) Take 1 tablet by mouth Daily. HOLD PRIOR TO SURG       Multiple Vitamins-Minerals (MULTIVITAMIN ADULTS 50+ PO) Take 1 tablet by mouth Daily. HOLD PRIOR TO SURG       prednisoLONE acetate (PRED FORTE) 1 % ophthalmic suspension Administer  to the right eye 4 (Four) Times a Day.       rosuvastatin (CRESTOR) 5 MG tablet TAKE ONE TABLET BY MOUTH DAILY (Patient taking differently: Take 1 tablet by mouth Daily.) 90 tablet 3     sodium chloride (GREGORY 128) 5 % ophthalmic solution Administer 1 drop to the right eye 4 (Four) Times a Day.       sodium chloride 0.65 % nasal spray Instill 2 sprays into the nostril(s) as directed by provider Daily As Needed for Congestion. 88 mL 0      amLODIPine, 5 mg, Oral, Daily  [Transfer Hold] aspirin, 81 mg, Oral, Daily  atenolol, 50 mg, Oral, Daily  [Transfer Hold] budesonide-formoterol, 2 puff, Inhalation, BID - RT  cefepime, 2,000 mg, Intravenous, Q24H  [Transfer Hold] ipratropium-albuterol, 3 mL, Nebulization, 4x Daily - RT  [Transfer Hold] oxybutynin  "XL, 5 mg, Oral, Daily  [Transfer Hold] prednisoLONE acetate, 1 drop, Right Eye, 4x Daily  [Transfer Hold] rosuvastatin, 5 mg, Oral, Daily  [Transfer Hold] senna-docusate sodium, 2 tablet, Oral, BID  [Transfer Hold] sodium chloride, 1 drop, Right Eye, 4x Daily  [Transfer Hold] sodium chloride, 10 mL, Intravenous, Q12H      Allergies:  Bee venom    Objective      Vital Signs  Temp:  [97.3 °F (36.3 °C)-98.2 °F (36.8 °C)] 97.4 °F (36.3 °C)  Heart Rate:  [] 69  Resp:  [12-25] 16  BP: ()/(40-66) 145/63    Flowsheet Rows      Flowsheet Row First Filed Value   Admission Height 152.4 cm (60\") Documented at 03/07/2024 0258   Admission Weight 62.3 kg (137 lb 4.8 oz) Documented at 03/07/2024 0258          152.4 cm (60\")    Physical Exam  Vitals and nursing note reviewed.   Constitutional:       Appearance: Normal appearance. She is normal weight. She is not ill-appearing.   Neurological:      Mental Status: She is alert.         Results Review:   Lab Results (last 24 hours)       Procedure Component Value Units Date/Time    Procalcitonin [[id number]]  (Abnormal) Collected: 03/08/24 0316    Specimen: Blood Updated: 03/08/24 0402     Procalcitonin 2.46 ng/mL     Narrative:      As a Marker for Sepsis (Non-Neonates):    1. <0.5 ng/mL represents a low risk of severe sepsis and/or septic shock.  2. >2 ng/mL represents a high risk of severe sepsis and/or septic shock.    As a Marker for Lower Respiratory Tract Infections that require antibiotic therapy:    PCT on Admission    Antibiotic Therapy       6-12 Hrs later    >0.5                Strongly Recommended  >0.25 - <0.5        Recommended   0.1 - 0.25          Discouraged              Remeasure/reassess PCT  <0.1                Strongly Discouraged     Remeasure/reassess PCT    As 28 day mortality risk marker: \"Change in Procalcitonin Result\" (>80% or <=80%) if Day 0 (or Day 1) and Day 4 values are available. Refer to http://www.[facility]-pct-calculator.com    Change in PCT " <=80%  A decrease of PCT levels below or equal to 80% defines a positive change in PCT test result representing a higher risk for 28-day all-cause mortality of patients diagnosed with severe sepsis for septic shock.    Change in PCT >80%  A decrease of PCT levels of more than 80% defines a negative change in PCT result representing a lower risk for 28-day all-cause mortality of patients diagnosed with severe sepsis or septic shock.       Blood Culture - Blood, Arm, Right [[id number]]  (Normal) Collected: 03/07/24 0346    Specimen: Blood from Arm, Right Updated: 03/08/24 0400     Blood Culture No growth at 24 hours    Blood Culture - Blood, Arm, Right [[id number]]  (Normal) Collected: 03/07/24 0346    Specimen: Blood from Arm, Right Updated: 03/08/24 0400     Blood Culture No growth at 24 hours    Renal Function Panel [[id number]]  (Abnormal) Collected: 03/08/24 0316    Specimen: Blood Updated: 03/08/24 0359     Glucose 112 mg/dL      BUN 35 mg/dL      Creatinine 1.13 mg/dL      Sodium 133 mmol/L      Potassium 3.6 mmol/L      Chloride 94 mmol/L      CO2 24.0 mmol/L      Calcium 8.4 mg/dL      Albumin 3.4 g/dL      Phosphorus 4.4 mg/dL      Anion Gap 15.0 mmol/L      BUN/Creatinine Ratio 31.0     eGFR 47.5 mL/min/1.73     Narrative:      GFR Normal >60  Chronic Kidney Disease <60  Kidney Failure <15    The GFR formula is only valid for adults with stable renal function between ages 18 and 70.    Lipid Panel [[id number]]  (Abnormal) Collected: 03/08/24 0316    Specimen: Blood Updated: 03/08/24 0355     Total Cholesterol 195 mg/dL      Triglycerides 63 mg/dL      HDL Cholesterol 114 mg/dL      LDL Cholesterol  69 mg/dL      VLDL Cholesterol 12 mg/dL      LDL/HDL Ratio 0.60    Narrative:      Cholesterol Reference Ranges  (U.S. Department of Health and Human Services ATP III Classifications)    Desirable          <200 mg/dL  Borderline High    200-239 mg/dL  High Risk          >240 mg/dL      Triglyceride Reference  Ranges  (U.S. Department of Health and Human Services ATP III Classifications)    Normal           <150 mg/dL  Borderline High  150-199 mg/dL  High             200-499 mg/dL  Very High        >500 mg/dL    HDL Reference Ranges  (U.S. Department of Health and Human Services ATP III Classifications)    Low     <40 mg/dl (major risk factor for CHD)  High    >60 mg/dl ('negative' risk factor for CHD)        LDL Reference Ranges  (U.S. Department of Health and Human Services ATP III Classifications)    Optimal          <100 mg/dL  Near Optimal     100-129 mg/dL  Borderline High  130-159 mg/dL  High             160-189 mg/dL  Very High        >189 mg/dL    CBC & Differential [[id number]]  (Abnormal) Collected: 03/08/24 0316    Specimen: Blood Updated: 03/08/24 0332    Narrative:      The following orders were created for panel order CBC & Differential.  Procedure                               Abnormality         Status                     ---------                               -----------         ------                     CBC Auto Differential[[id number]]        Abnormal            Final result                 Please view results for these tests on the individual orders.    CBC Auto Differential [[id number]]  (Abnormal) Collected: 03/08/24 0316    Specimen: Blood Updated: 03/08/24 0332     WBC 10.80 10*3/mm3      RBC 3.11 10*6/mm3      Hemoglobin 9.4 g/dL      Hematocrit 28.2 %      MCV 90.7 fL      MCH 30.2 pg      MCHC 33.3 g/dL      RDW 13.7 %      RDW-SD 45.7 fl      MPV 10.0 fL      Platelets 167 10*3/mm3      Neutrophil % 81.2 %      Lymphocyte % 9.4 %      Monocyte % 8.8 %      Eosinophil % 0.0 %      Basophil % 0.1 %      Immature Grans % 0.5 %      Neutrophils, Absolute 8.78 10*3/mm3      Lymphocytes, Absolute 1.01 10*3/mm3      Monocytes, Absolute 0.95 10*3/mm3      Eosinophils, Absolute 0.00 10*3/mm3      Basophils, Absolute 0.01 10*3/mm3      Immature Grans, Absolute 0.05 10*3/mm3      nRBC 0.0 /100 WBC     S.  Pneumo Ag Urine or CSF - Urine, Urine, Clean Catch [[id number]]  (Normal) Collected: 03/07/24 0929    Specimen: Urine, Clean Catch Updated: 03/07/24 1545     Strep Pneumo Ag Negative    Legionella Antigen, Urine - Urine, Urine, Clean Catch [[id number]]  (Normal) Collected: 03/07/24 0929    Specimen: Urine, Clean Catch Updated: 03/07/24 1545     LEGIONELLA ANTIGEN, URINE Negative                Assessment & Plan       Acute respiratory failure      Assessment & Plan    -CAD with left main stenosis  -acute on chronic hypoxic respiratory failure  -COPD with acute exacerbration  -acute on chronic diastolic CHF  -frailty     Dr. Hook reviewed films and does not recommend surgical revascularization.  Due to her age, frailty and comorbid conditions she would not do well with surgery.       Thank you for allowing us to participate in the care of this patient.      DAVID Leon  03/08/24  13:13 EST

## 2024-03-08 NOTE — PLAN OF CARE
Goal Outcome Evaluation:      Pt VSS this shift, placed back on Bipap for sleep, no bm this shift, AxOx4, no SOB reported, 300mL urine o/p this shift, planned for cath lab this AM

## 2024-03-08 NOTE — PLAN OF CARE
Goal Outcome Evaluation:           Progress: improving  Outcome Evaluation: VSS, AOx4 upon admission to unit from CATH lab. TR band on right radial, currently has 10 cc air in band. Chronic 2L O2 at this time. Denies pain at this time. Decent appetite and fluid intake. No new issues. WCTM.

## 2024-03-08 NOTE — PROGRESS NOTES
[facility]  [phone]  Dr. Nate Breen    Subjective:  LOS: 1    Chief Complaint: Respiratory failure    Overall doing well since yesterday. No new soa. Using hospital V60 at night, Trilogy at home. Currently on low flow O2. No chest pain.    Objective   Vital Signs past 24hrs  Temp range: Temp (24hrs), Av.8 °F (36.6 °C), Min:97.3 °F (36.3 °C), Max:98.2 °F (36.8 °C)    BP range: BP: ()/(40-66) 131/66  Pulse range: Heart Rate:  [] 77  Resp rate range: Resp:  [15-25] 18  Device (Oxygen Therapy): nasal cannulaFlow (L/min):  [2] 2  Oxygen range:SpO2:  [85 %-100 %] 94 %   Mechanical Ventilator:     Physical Exam  Eyes:      Pupils: Pupils are equal, round, and reactive to light.   Cardiovascular:      Rate and Rhythm: Normal rate and regular rhythm.      Heart sounds: No murmur heard.  Pulmonary:      Breath sounds: Decreased breath sounds present.   Abdominal:      General: Bowel sounds are normal.      Palpations: Abdomen is soft. There is no mass.      Tenderness: There is no abdominal tenderness.   Musculoskeletal:         General: No swelling.   Neurological:      Mental Status: She is alert.       Results Review:    I have reviewed the laboratory and imaging data since the last note by [facility] physician.  My annotations are noted in assessment and plan.      Result Review:  I have personally reviewed the results from last note by [facility] physician to 3/8/2024 11:15 EST and agree with these findings:  [x]  Laboratory list / accordion  [x]  Microbiology  [x]  Radiology  []  EKG/Telemetry   []  Cardiology/Vascular   []  Pathology  []  Old records  []  Other:    Medication Review:  I have reviewed the current MAR.  My annotations are noted in assessment and plan.    amLODIPine, 5 mg, Oral, Daily  aspirin, 81 mg, Oral, Daily  atenolol, 50 mg, Oral, Daily  budesonide-formoterol, 2 puff, Inhalation, BID - RT  cefepime, 2,000 mg, Intravenous, Q24H  ipratropium-albuterol, 3 mL, Nebulization, 4x  Daily - RT  oxybutynin XL, 5 mg, Oral, Daily  prednisoLONE acetate, 1 drop, Right Eye, 4x Daily  rosuvastatin, 5 mg, Oral, Daily  senna-docusate sodium, 2 tablet, Oral, BID  sodium chloride, 1 drop, Right Eye, 4x Daily  sodium chloride, 10 mL, Intravenous, Q12H             Lines, Drains & Airways       Active LDAs       Name Placement date Placement time Site Days    Peripheral IV 03/07/24 0300 Left Antecubital 03/07/24  0300  Antecubital  less than 1    Peripheral IV 03/07/24 0516 Left Wrist 03/07/24  0516  Wrist  less than 1    External Urinary Catheter 03/07/24  0436  --  less than 1                  Isolation status: No active isolations    Dietary Orders (From admission, onward)       Start     Ordered    03/08/24 0001  NPO Diet NPO Type: Sips with Meds  Diet Effective Midnight        Question:  NPO Type  Answer:  Sips with Meds    03/07/24 1911                    PCCM Problems  Acute on chronic respiratory failure  Right-sided infiltrates consistent with infectious/inflammatory etiology  COPD  Elevated troponin  Valvular heart disease  Elevated proBNP with HFpEF  WANDA  Anemia  Hyponatremia        Plan of Treatment    Continue supplemental O2.  Continue noninvasive ventilation at night.  Patient uses a trilogy machine at home at night.    Right-sided infiltrates consistent with an infectious/inflammatory etiology.  Aspiration is a possibility.  Note normal procal on admission but now high.  Keep on cefepime.  Await final cultures.    Elevated troponin and valvular heart disease.  Discussed with cardiology today.  Heart cath today.    She received IV Lasix on admission.    COPD without wheezing.  On nebs.    She was on Eliquis (? For hx DVT) - not sure if current med. Currently on hold.     Start prophylactic dose Lovenox at least after procedure. No PE on CTA and no venous thrombosis on duplex.    WANDA, will monitor, got diuretics.    Anemia without evidence for acute blood loss.    Low sodium, monitor. BNP also  repeat in AM.    Transfer to CVI after heart cath.    Nate Breen MD  03/08/24  11:15 EST      Part of this note may be an electronic transcription/translation of spoken language to printed text using the Dragon Dictation System.

## 2024-03-08 NOTE — PROGRESS NOTES
LOS: 1 day   Patient Care Team:  Traci Hoyos MD as PCP - General (Geriatric Medicine)    Chief Complaint: Follow-up acute on chronic diastolic CHF, elevated troponin, inferolateral ST changes, valvular heart disease.    Interval History: She feels better in general.  She is on her baseline of 2 L of oxygen.  Shortness of breath has significantly improved since yesterday.  No chest or jaw discomfort.    Vital Signs:  Temp:  [97.3 °F (36.3 °C)-98.2 °F (36.8 °C)] 97.4 °F (36.3 °C)  Heart Rate:  [] 69  Resp:  [15-25] 18  BP: ()/(40-66) 127/58    Intake/Output Summary (Last 24 hours) at 3/8/2024 1143  Last data filed at 3/8/2024 0400  Gross per 24 hour   Intake 480 ml   Output 1200 ml   Net -720 ml       Physical Exam:   General Appearance:    No acute distress, alert and oriented x4   Lungs:     Decreased breath sounds bilaterally.    Heart:    Regular rhythm and normal rate.  II/VI SM RUSB.    Abdomen:     Soft, nontender, nondistended.    Extremities:   No clubbing, cyanosis, or edema.     Results Review:    Results from last 7 days   Lab Units 03/08/24  0316   SODIUM mmol/L 133*   POTASSIUM mmol/L 3.6   CHLORIDE mmol/L 94*   CO2 mmol/L 24.0   BUN mg/dL 35*   CREATININE mg/dL 1.13*   GLUCOSE mg/dL 112*   CALCIUM mg/dL 8.4*     Results from last 7 days   Lab Units 03/07/24  0956 03/07/24  0510 03/07/24  0302   HSTROP T ng/L 282* 164* 55*     Results from last 7 days   Lab Units 03/08/24  0316   WBC 10*3/mm3 10.80   HEMOGLOBIN g/dL 9.4*   HEMATOCRIT % 28.2*   PLATELETS 10*3/mm3 167     Results from last 7 days   Lab Units 03/07/24  0302   INR  1.07   APTT seconds 31.4     Results from last 7 days   Lab Units 03/08/24  0316   CHOLESTEROL mg/dL 195         Results from last 7 days   Lab Units 03/08/24  0316   CHOLESTEROL mg/dL 195   TRIGLYCERIDES mg/dL 63   HDL CHOL mg/dL 114*   LDL CHOL mg/dL 69       I reviewed the patient's new clinical results.        Assessment:  1.  Acute on chronic hypoxic  respiratory failure  2.  Right upper and lower lobe patchy opacities, likely infectious or inflammatory (potential aspiration)  3.  COPD with acute exacerbation  4.  Acute on chronic diastolic CHF  5.  Elevated troponin and inferolateral ST depression by EKG (likely type II NSTEMI secondary to #1 - #4)  6.  Anemia, chronic  7.  Mild aortic stenosis  8.  Moderate mitral regurgitation  9.  Status post bilateral carotid endarterectomy  (right CEA in 2018 and left CEA in 2019, both by Dr. Galicia).  10.  Elevated D-dimer, no PE by CTA  11.  History of DVT in August 2023 - negative venous Doppler ultrasound on this admission    Plan:  -Again, reviewed CT angiogram.  No pulmonary embolism.  Possible aspiration pneumonia or pneumonitis in the right upper and right lower lung.    -Lower extremity Doppler was negative for DVT.  The prior DVT from August 2023 has resolved.  She likely does not need to be on Eliquis going forward, and it was unclear if she had been taking this recently as an outpatient.    -Diuresed well, and lungs sound better.  Hold further Lasix today for now.    -Troponin went up to 282 yesterday.  She had inferolateral ischemic changes on her initial EKG, as well as prominent coronary artery calcifications on her CT.  I suspect that she has underlying coronary artery disease, and the worsening hypoxia and other issues caused a type II NSTEMI.    -We again discussed a heart catheterization today.  She does wish to proceed.  The risks were explained.  I did have her lie flat for over 1 hour to ensure that she can tolerate the heart catheterization, and she did well with this.    -Continue aspirin, Crestor, and atenolol.  Hold on adding a potential ARB and increasing the Crestor until after catheterization results are known.    -Spoke in detail with the patient and her daughter at bedside, as well as her son via speaker phone in the room.    Lopez Mack MD  03/08/24  11:43 EST

## 2024-03-09 LAB
ALBUMIN SERPL-MCNC: 3.5 G/DL (ref 3.5–5.2)
ANION GAP SERPL CALCULATED.3IONS-SCNC: 13.3 MMOL/L (ref 5–15)
BUN SERPL-MCNC: 35 MG/DL (ref 8–23)
BUN/CREAT SERPL: 35 (ref 7–25)
CALCIUM SPEC-SCNC: 8.7 MG/DL (ref 8.6–10.5)
CHLORIDE SERPL-SCNC: 97 MMOL/L (ref 98–107)
CO2 SERPL-SCNC: 25.7 MMOL/L (ref 22–29)
CREAT SERPL-MCNC: 1 MG/DL (ref 0.57–1)
DEPRECATED RDW RBC AUTO: 46 FL (ref 37–54)
EGFRCR SERPLBLD CKD-EPI 2021: 55 ML/MIN/1.73
ERYTHROCYTE [DISTWIDTH] IN BLOOD BY AUTOMATED COUNT: 13.7 % (ref 12.3–15.4)
GLUCOSE SERPL-MCNC: 94 MG/DL (ref 65–99)
HCT VFR BLD AUTO: 30.1 % (ref 34–46.6)
HGB BLD-MCNC: 9.7 G/DL (ref 12–15.9)
MCH RBC QN AUTO: 29.4 PG (ref 26.6–33)
MCHC RBC AUTO-ENTMCNC: 32.2 G/DL (ref 31.5–35.7)
MCV RBC AUTO: 91.2 FL (ref 79–97)
PHOSPHATE SERPL-MCNC: 3.3 MG/DL (ref 2.5–4.5)
PLATELET # BLD AUTO: 212 10*3/MM3 (ref 140–450)
PMV BLD AUTO: 10.2 FL (ref 6–12)
POTASSIUM SERPL-SCNC: 3.5 MMOL/L (ref 3.5–5.2)
RBC # BLD AUTO: 3.3 10*6/MM3 (ref 3.77–5.28)
SODIUM SERPL-SCNC: 136 MMOL/L (ref 136–145)
WBC NRBC COR # BLD AUTO: 12.05 10*3/MM3 (ref 3.4–10.8)

## 2024-03-09 PROCEDURE — 25010000002 CEFEPIME PER 500 MG: Performed by: INTERNAL MEDICINE

## 2024-03-09 PROCEDURE — 94799 UNLISTED PULMONARY SVC/PX: CPT

## 2024-03-09 PROCEDURE — 93005 ELECTROCARDIOGRAM TRACING: CPT | Performed by: INTERNAL MEDICINE

## 2024-03-09 PROCEDURE — 85027 COMPLETE CBC AUTOMATED: CPT | Performed by: INTERNAL MEDICINE

## 2024-03-09 PROCEDURE — 99232 SBSQ HOSP IP/OBS MODERATE 35: CPT | Performed by: STUDENT IN AN ORGANIZED HEALTH CARE EDUCATION/TRAINING PROGRAM

## 2024-03-09 PROCEDURE — 94660 CPAP INITIATION&MGMT: CPT

## 2024-03-09 PROCEDURE — 93010 ELECTROCARDIOGRAM REPORT: CPT | Performed by: INTERNAL MEDICINE

## 2024-03-09 PROCEDURE — 94761 N-INVAS EAR/PLS OXIMETRY MLT: CPT

## 2024-03-09 PROCEDURE — 94760 N-INVAS EAR/PLS OXIMETRY 1: CPT

## 2024-03-09 PROCEDURE — 80069 RENAL FUNCTION PANEL: CPT | Performed by: INTERNAL MEDICINE

## 2024-03-09 RX ADMIN — SODIUM CHLORIDE 1 DROP: 50 SOLUTION OPHTHALMIC at 07:53

## 2024-03-09 RX ADMIN — CLOPIDOGREL BISULFATE 75 MG: 75 TABLET, FILM COATED ORAL at 08:23

## 2024-03-09 RX ADMIN — PREDNISOLONE ACETATE 1 DROP: 10 SUSPENSION/ DROPS OPHTHALMIC at 20:59

## 2024-03-09 RX ADMIN — SODIUM CHLORIDE 1 DROP: 50 SOLUTION OPHTHALMIC at 17:21

## 2024-03-09 RX ADMIN — SENNOSIDES AND DOCUSATE SODIUM 2 TABLET: 50; 8.6 TABLET ORAL at 20:58

## 2024-03-09 RX ADMIN — PREDNISOLONE ACETATE 1 DROP: 10 SUSPENSION/ DROPS OPHTHALMIC at 17:21

## 2024-03-09 RX ADMIN — ASPIRIN 81 MG: 81 TABLET, COATED ORAL at 08:23

## 2024-03-09 RX ADMIN — ATENOLOL 50 MG: 50 TABLET ORAL at 08:23

## 2024-03-09 RX ADMIN — ROSUVASTATIN CALCIUM 10 MG: 10 TABLET, FILM COATED ORAL at 20:58

## 2024-03-09 RX ADMIN — IPRATROPIUM BROMIDE AND ALBUTEROL SULFATE 3 ML: 2.5; .5 SOLUTION RESPIRATORY (INHALATION) at 14:16

## 2024-03-09 RX ADMIN — PREDNISOLONE ACETATE 1 DROP: 10 SUSPENSION/ DROPS OPHTHALMIC at 07:53

## 2024-03-09 RX ADMIN — SODIUM CHLORIDE 1 DROP: 50 SOLUTION OPHTHALMIC at 11:40

## 2024-03-09 RX ADMIN — PREDNISOLONE ACETATE 1 DROP: 10 SUSPENSION/ DROPS OPHTHALMIC at 11:40

## 2024-03-09 RX ADMIN — Medication 10 ML: at 09:09

## 2024-03-09 RX ADMIN — IPRATROPIUM BROMIDE AND ALBUTEROL SULFATE 3 ML: 2.5; .5 SOLUTION RESPIRATORY (INHALATION) at 10:24

## 2024-03-09 RX ADMIN — CEFEPIME 2000 MG: 2 INJECTION, POWDER, FOR SOLUTION INTRAVENOUS at 23:56

## 2024-03-09 RX ADMIN — IPRATROPIUM BROMIDE AND ALBUTEROL SULFATE 3 ML: 2.5; .5 SOLUTION RESPIRATORY (INHALATION) at 07:24

## 2024-03-09 RX ADMIN — OXYBUTYNIN CHLORIDE 5 MG: 5 TABLET, EXTENDED RELEASE ORAL at 08:23

## 2024-03-09 RX ADMIN — SENNOSIDES AND DOCUSATE SODIUM 2 TABLET: 50; 8.6 TABLET ORAL at 08:22

## 2024-03-09 RX ADMIN — CEFEPIME 2000 MG: 2 INJECTION, POWDER, FOR SOLUTION INTRAVENOUS at 12:29

## 2024-03-09 RX ADMIN — AMLODIPINE BESYLATE 5 MG: 5 TABLET ORAL at 08:23

## 2024-03-09 RX ADMIN — LOSARTAN POTASSIUM 12.5 MG: 25 TABLET, FILM COATED ORAL at 08:23

## 2024-03-09 RX ADMIN — Medication 10 ML: at 20:59

## 2024-03-09 RX ADMIN — IPRATROPIUM BROMIDE AND ALBUTEROL SULFATE 3 ML: 2.5; .5 SOLUTION RESPIRATORY (INHALATION) at 19:43

## 2024-03-09 RX ADMIN — BUDESONIDE AND FORMOTEROL FUMARATE DIHYDRATE 2 PUFF: 160; 4.5 AEROSOL RESPIRATORY (INHALATION) at 07:24

## 2024-03-09 RX ADMIN — BUDESONIDE AND FORMOTEROL FUMARATE DIHYDRATE 2 PUFF: 160; 4.5 AEROSOL RESPIRATORY (INHALATION) at 19:48

## 2024-03-09 RX ADMIN — SODIUM CHLORIDE 1 DROP: 50 SOLUTION OPHTHALMIC at 20:59

## 2024-03-09 NOTE — PLAN OF CARE
Goal Outcome Evaluation:  Plan of Care Reviewed With: patient        Progress: improving  Outcome Evaluation: Pt s/p heart cath, with left main disease, no interventions however. Plans for CT over weekend, possible intervention on early next week. Right radial area to pressure dressing. Pt does wear CPAP at home, using hospital unit during her stay. Pt denies chest pain, VSS. Pure wick in place. No acute concerns at this time. Will continue to monitor

## 2024-03-09 NOTE — PLAN OF CARE
Goal Outcome Evaluation:  Plan of Care Reviewed With: patient        Progress: improving  Outcome Evaluation: VSS, AOx4. Up in chair for good part of day, tolerating well. No new complaints or pain complaints. CT of abdomen ordered for this weekend. Right radial site is clean, dry, no issues at this time. Decent appetite and fluid intake. WCTM.

## 2024-03-09 NOTE — PROGRESS NOTES
LOS: 2 days   Patient Care Team:  Traci Hoyos MD as PCP - General (Geriatric Medicine)    Chief Complaint:  Following for troponin elevation, left main disease    Interval History:     No new complaints.  No recurrent chest pain.  She does still get winded when she moves around.    Objective   Vital Signs  Temp:  [97.6 °F (36.4 °C)-98.6 °F (37 °C)] 98 °F (36.7 °C)  Heart Rate:  [66-78] 78  Resp:  [12-20] 18  BP: ()/() 96/68    Intake/Output Summary (Last 24 hours) at 3/9/2024 0910  Last data filed at 3/9/2024 0407  Gross per 24 hour   Intake 240 ml   Output 350 ml   Net -110 ml       Comfortable NAD, resting in bed, appears frail  PERRL, conjunctivae clear  Neck supple, no JVD or thyromegaly appreciated  S1/S2 RRR occasional ectopy, no m/r/g  Scattered expiratory wheezes, normal work of breathing normal effort  Abdomen S/NT/ND (+) BS, no HSM appreciated  Extremities warm, no clubbing, cyanosis, trace lower extremity edema  Moves all 4 extremities without gross deficits  No visible or palpable skin lesions  A/Ox4, mood and affect appropriate    Results Review:      Results from last 7 days   Lab Units 03/09/24  0641 03/08/24 0316 03/07/24  0302   SODIUM mmol/L 136 133* 134*   POTASSIUM mmol/L 3.5 3.6 4.4   CHLORIDE mmol/L 97* 94* 93*   CO2 mmol/L 25.7 24.0 24.7   BUN mg/dL 35* 35* 23   CREATININE mg/dL 1.00 1.13* 1.29*   GLUCOSE mg/dL 94 112* 199*   CALCIUM mg/dL 8.7 8.4* 8.8     Results from last 7 days   Lab Units 03/07/24  0956 03/07/24  0510 03/07/24  0302   HSTROP T ng/L 282* 164* 55*     Results from last 7 days   Lab Units 03/09/24  0641 03/08/24  0316 03/07/24  0302   WBC 10*3/mm3 12.05* 10.80 16.76*   HEMOGLOBIN g/dL 9.7* 9.4* 10.9*   HEMATOCRIT % 30.1* 28.2* 33.0*   PLATELETS 10*3/mm3 212 167 210     Results from last 7 days   Lab Units 03/07/24  0302   INR  1.07   APTT seconds 31.4     Results from last 7 days   Lab Units 03/08/24  0316   CHOLESTEROL mg/dL 195         Results from last 7  days   Lab Units 03/08/24  0316   CHOLESTEROL mg/dL 195   TRIGLYCERIDES mg/dL 63   HDL CHOL mg/dL 114*   LDL CHOL mg/dL 69       I reviewed the patient's new clinical results.  I personally viewed and interpreted the patient's EKG/Telemetry data        Medication Review:   amLODIPine, 5 mg, Oral, Daily  aspirin, 81 mg, Oral, Daily  atenolol, 50 mg, Oral, Daily  budesonide-formoterol, 2 puff, Inhalation, BID - RT  cefepime, 2,000 mg, Intravenous, Q24H  clopidogrel, 75 mg, Oral, Daily  ipratropium-albuterol, 3 mL, Nebulization, 4x Daily - RT  losartan, 12.5 mg, Oral, Q24H  oxybutynin XL, 5 mg, Oral, Daily  prednisoLONE acetate, 1 drop, Right Eye, 4x Daily  rosuvastatin, 10 mg, Oral, Daily  senna-docusate sodium, 2 tablet, Oral, BID  sodium chloride, 1 drop, Right Eye, 4x Daily  sodium chloride, 10 mL, Intravenous, Q12H             Assessment & Plan       Acute respiratory failure    1.  Acute on chronic hypoxic respiratory failure  2.  Right upper and lower lobe patchy opacities, likely infectious or inflammatory (potential aspiration)  3.  COPD with acute exacerbation  4.  Acute on chronic diastolic CHF  5.  Elevated troponin and inferolateral ST depression by EKG due to type II NSTEMI secondary to #1 - #4), due to multivessel coronary artery disease with left main involvement  6.  Anemia, chronic  7.  Mild aortic stenosis  8.  Moderate mitral regurgitation  9.  Status post bilateral carotid endarterectomy  (right CEA in 2018 and left CEA in 2019, both by Dr. Galicia).  10.  Elevated D-dimer, no PE by CTA  11.  History of DVT in August 2023 - negative venous Doppler ultrasound on this admission    She is not a surgical candidate per CV surgery.  Her lungs are better and overall she is improving, but still some wheezes. No plans for any acute intervention for now as this was likely a type II event there is no acute plaque rupture.    Her symptoms do sound consistent with crescendo angina and a type II NSTEMI in the  "setting of left main coronary disease and increased demand from COPD exacerbation.  Patient does note that her advanced age makes things higher risk, but she does want to \"live longer and better\" and is amenable to proceeding with a high risk PCI of her coronary disease.    Difficult right radial access.  Will proceed with a noncontrasted CT abdomen pelvis to evaluate for any significant calcifications in her iliac and femoral arteries that might cause her femoral access to be difficult before an intervention is pursued.  May need  CT angiography if there is high suspicion of obstructive disease given her known PVD.  Holding off on contrast exam for now given her just now resolved WANDA    She is otherwise tolerating the DAPT challenge this weekend, monitoring for any recurrent anemia.    Khris Campbell MD  03/09/24  09:10 EST      Part of this note may be an electronic transcription/translation of spoken language to printed text using the Dragon Dictation System.  "

## 2024-03-10 ENCOUNTER — APPOINTMENT (OUTPATIENT)
Dept: CT IMAGING | Facility: HOSPITAL | Age: 89
DRG: 321 | End: 2024-03-10
Payer: MEDICARE

## 2024-03-10 LAB
ALBUMIN SERPL-MCNC: 3.4 G/DL (ref 3.5–5.2)
ANION GAP SERPL CALCULATED.3IONS-SCNC: 7 MMOL/L (ref 5–15)
BUN SERPL-MCNC: 37 MG/DL (ref 8–23)
BUN/CREAT SERPL: 37.4 (ref 7–25)
CALCIUM SPEC-SCNC: 8.8 MG/DL (ref 8.6–10.5)
CHLORIDE SERPL-SCNC: 100 MMOL/L (ref 98–107)
CO2 SERPL-SCNC: 29 MMOL/L (ref 22–29)
CREAT SERPL-MCNC: 0.99 MG/DL (ref 0.57–1)
DEPRECATED RDW RBC AUTO: 45 FL (ref 37–54)
EGFRCR SERPLBLD CKD-EPI 2021: 55.6 ML/MIN/1.73
ERYTHROCYTE [DISTWIDTH] IN BLOOD BY AUTOMATED COUNT: 13.5 % (ref 12.3–15.4)
GLUCOSE SERPL-MCNC: 110 MG/DL (ref 65–99)
HCT VFR BLD AUTO: 27.9 % (ref 34–46.6)
HGB BLD-MCNC: 9.2 G/DL (ref 12–15.9)
MCH RBC QN AUTO: 29.8 PG (ref 26.6–33)
MCHC RBC AUTO-ENTMCNC: 33 G/DL (ref 31.5–35.7)
MCV RBC AUTO: 90.3 FL (ref 79–97)
PHOSPHATE SERPL-MCNC: 3.5 MG/DL (ref 2.5–4.5)
PLATELET # BLD AUTO: 212 10*3/MM3 (ref 140–450)
PMV BLD AUTO: 9.8 FL (ref 6–12)
POTASSIUM SERPL-SCNC: 3.9 MMOL/L (ref 3.5–5.2)
RBC # BLD AUTO: 3.09 10*6/MM3 (ref 3.77–5.28)
SODIUM SERPL-SCNC: 136 MMOL/L (ref 136–145)
WBC NRBC COR # BLD AUTO: 9.11 10*3/MM3 (ref 3.4–10.8)

## 2024-03-10 PROCEDURE — 25010000002 CEFEPIME PER 500 MG: Performed by: INTERNAL MEDICINE

## 2024-03-10 PROCEDURE — 85027 COMPLETE CBC AUTOMATED: CPT | Performed by: INTERNAL MEDICINE

## 2024-03-10 PROCEDURE — 99232 SBSQ HOSP IP/OBS MODERATE 35: CPT

## 2024-03-10 PROCEDURE — 74176 CT ABD & PELVIS W/O CONTRAST: CPT

## 2024-03-10 PROCEDURE — 94799 UNLISTED PULMONARY SVC/PX: CPT

## 2024-03-10 PROCEDURE — 94660 CPAP INITIATION&MGMT: CPT

## 2024-03-10 PROCEDURE — 94761 N-INVAS EAR/PLS OXIMETRY MLT: CPT

## 2024-03-10 PROCEDURE — 80069 RENAL FUNCTION PANEL: CPT | Performed by: INTERNAL MEDICINE

## 2024-03-10 RX ADMIN — OXYBUTYNIN CHLORIDE 5 MG: 5 TABLET, EXTENDED RELEASE ORAL at 11:01

## 2024-03-10 RX ADMIN — PREDNISOLONE ACETATE 1 DROP: 10 SUSPENSION/ DROPS OPHTHALMIC at 12:32

## 2024-03-10 RX ADMIN — IPRATROPIUM BROMIDE AND ALBUTEROL SULFATE 3 ML: 2.5; .5 SOLUTION RESPIRATORY (INHALATION) at 19:59

## 2024-03-10 RX ADMIN — ROSUVASTATIN CALCIUM 10 MG: 10 TABLET, FILM COATED ORAL at 23:57

## 2024-03-10 RX ADMIN — CLOPIDOGREL BISULFATE 75 MG: 75 TABLET, FILM COATED ORAL at 08:47

## 2024-03-10 RX ADMIN — SENNOSIDES AND DOCUSATE SODIUM 2 TABLET: 50; 8.6 TABLET ORAL at 23:58

## 2024-03-10 RX ADMIN — BUDESONIDE AND FORMOTEROL FUMARATE DIHYDRATE 2 PUFF: 160; 4.5 AEROSOL RESPIRATORY (INHALATION) at 20:02

## 2024-03-10 RX ADMIN — SODIUM CHLORIDE 1 DROP: 50 SOLUTION OPHTHALMIC at 17:27

## 2024-03-10 RX ADMIN — SODIUM CHLORIDE 1 DROP: 50 SOLUTION OPHTHALMIC at 08:47

## 2024-03-10 RX ADMIN — ATENOLOL 50 MG: 50 TABLET ORAL at 08:48

## 2024-03-10 RX ADMIN — IPRATROPIUM BROMIDE AND ALBUTEROL SULFATE 3 ML: 2.5; .5 SOLUTION RESPIRATORY (INHALATION) at 14:28

## 2024-03-10 RX ADMIN — PREDNISOLONE ACETATE 1 DROP: 10 SUSPENSION/ DROPS OPHTHALMIC at 17:23

## 2024-03-10 RX ADMIN — SODIUM CHLORIDE 1 DROP: 50 SOLUTION OPHTHALMIC at 12:37

## 2024-03-10 RX ADMIN — PREDNISOLONE ACETATE 1 DROP: 10 SUSPENSION/ DROPS OPHTHALMIC at 08:42

## 2024-03-10 RX ADMIN — AMLODIPINE BESYLATE 5 MG: 5 TABLET ORAL at 08:47

## 2024-03-10 RX ADMIN — SODIUM CHLORIDE 1 DROP: 50 SOLUTION OPHTHALMIC at 23:59

## 2024-03-10 RX ADMIN — SENNOSIDES AND DOCUSATE SODIUM 2 TABLET: 50; 8.6 TABLET ORAL at 08:47

## 2024-03-10 RX ADMIN — ASPIRIN 81 MG: 81 TABLET, COATED ORAL at 08:47

## 2024-03-10 RX ADMIN — BUDESONIDE AND FORMOTEROL FUMARATE DIHYDRATE 2 PUFF: 160; 4.5 AEROSOL RESPIRATORY (INHALATION) at 06:56

## 2024-03-10 RX ADMIN — CEFEPIME 2000 MG: 2 INJECTION, POWDER, FOR SOLUTION INTRAVENOUS at 10:51

## 2024-03-10 RX ADMIN — CEFEPIME 2000 MG: 2 INJECTION, POWDER, FOR SOLUTION INTRAVENOUS at 23:59

## 2024-03-10 RX ADMIN — IPRATROPIUM BROMIDE AND ALBUTEROL SULFATE 3 ML: 2.5; .5 SOLUTION RESPIRATORY (INHALATION) at 10:02

## 2024-03-10 RX ADMIN — PREDNISOLONE ACETATE 1 DROP: 10 SUSPENSION/ DROPS OPHTHALMIC at 23:58

## 2024-03-10 RX ADMIN — IPRATROPIUM BROMIDE AND ALBUTEROL SULFATE 3 ML: 2.5; .5 SOLUTION RESPIRATORY (INHALATION) at 06:56

## 2024-03-10 RX ADMIN — Medication 10 ML: at 08:52

## 2024-03-10 RX ADMIN — LOSARTAN POTASSIUM 12.5 MG: 25 TABLET, FILM COATED ORAL at 08:48

## 2024-03-10 NOTE — PROGRESS NOTES
"                                              LOS: 2 days   Patient Care Team:  Traci Hoyos MD as PCP - General (Geriatric Medicine)    Chief Complaint:  F/up respiratory failure, COPD and pneumonia.    Subjective   Interval History  I reviewed the admission note, progress notes, PMH, PSH, Family hx, social history, imagings and prior records on this admission, summarized the finding in my note and formulated a transition of care plan.      On 3L/min. Using NIPPV at night. She denies dyspnea or cough.     REVIEW OF SYSTEMS:   CARDIOVASCULAR: No chest pain, chest pressure or chest discomfort. No palpitations or edema.   GASTROINTESTINAL: No anorexia, nausea, vomiting or diarrhea. No abdominal pain.  CONSTITUTIONAL: No fever or chills.     Ventilator/Non-Invasive Ventilation Settings (From admission, onward)       Start     Ordered    03/07/24 0316  NIPPV - Provider Settings  (CPAP / BiPAP)  Until Discontinued        Question Answer Comment   Indication Acute Respiratory Failure    Type AVAPS/PC/PS    Backup Rate 12    Target VT (mL) 450    EPAP/PEEP (cm H2O) 6    Min Pressure (cm H2O) 8    Max Pressure (cm H2O) 25    Titrate Oxygen for SpO2 90 - 95%        03/07/24 0316                          Physical Exam:     Vital Signs  Temp:  [97.6 °F (36.4 °C)-98.6 °F (37 °C)] 97.6 °F (36.4 °C)  Heart Rate:  [68-78] 72  Resp:  [15-20] 18  BP: ()/(42-85) 146/52    Intake/Output Summary (Last 24 hours) at 3/9/2024 1915  Last data filed at 3/9/2024 1848  Gross per 24 hour   Intake 720 ml   Output 1750 ml   Net -1030 ml     Flowsheet Rows      Flowsheet Row First Filed Value   Admission Height 152.4 cm (60\") Documented at 03/07/2024 0258   Admission Weight 62.3 kg (137 lb 4.8 oz) Documented at 03/07/2024 0258            PPE used per hospital policy    General Appearance:   Alert, cooperative, in no acute distress   ENMT:  Mallampati score 3, moist mucous membrane   Eyes:  Pupils equal and reactive to light. EOMI   Neck:  "  Large. Trachea midline. No thyromegaly.   Lungs:    Coarse bilaterally and diminished    Heart:   Regular rhythm and normal rate, normal S1 and S2, no         murmur   Skin:   No rash or ecchymosis   Abdomen:    Obese. Soft. No tenderness. No HSM.   Neuro/psych:  Conscious, alert, oriented x3. Strength 5/5 in upper and lower  ext.  Appropriate mood and affect   Extremities:  No cyanosis, clubbing but edema.  Warm extremities and well-perfused          Results Review:        Results from last 7 days   Lab Units 03/09/24  0641 03/08/24 0316 03/07/24  0302   SODIUM mmol/L 136 133* 134*   POTASSIUM mmol/L 3.5 3.6 4.4   CHLORIDE mmol/L 97* 94* 93*   CO2 mmol/L 25.7 24.0 24.7   BUN mg/dL 35* 35* 23   CREATININE mg/dL 1.00 1.13* 1.29*   GLUCOSE mg/dL 94 112* 199*   CALCIUM mg/dL 8.7 8.4* 8.8     Results from last 7 days   Lab Units 03/07/24  0956 03/07/24  0510 03/07/24  0302   HSTROP T ng/L 282* 164* 55*     Results from last 7 days   Lab Units 03/09/24  0641 03/08/24 0316 03/07/24  0302   WBC 10*3/mm3 12.05* 10.80 16.76*   HEMOGLOBIN g/dL 9.7* 9.4* 10.9*   HEMATOCRIT % 30.1* 28.2* 33.0*   PLATELETS 10*3/mm3 212 167 210     Results from last 7 days   Lab Units 03/07/24  0302   INR  1.07   APTT seconds 31.4     Results from last 7 days   Lab Units 03/07/24  0302   PROBNP pg/mL 6,918.0*       Results from last 7 days   Lab Units 03/07/24  0302   D DIMER QUANT MCGFEU/mL 3.09*             Results from last 7 days   Lab Units 03/07/24  0312   PH, ARTERIAL pH units 7.422   PCO2, ARTERIAL mm Hg 39.7   PO2 ART mm Hg 239.9*   O2 SATURATION ART % 99.8*   MODALITY  BiPap         I reviewed the patient's new clinical results.        Medication Review:   amLODIPine, 5 mg, Oral, Daily  aspirin, 81 mg, Oral, Daily  atenolol, 50 mg, Oral, Daily  budesonide-formoterol, 2 puff, Inhalation, BID - RT  cefepime, 2,000 mg, Intravenous, Q12H  clopidogrel, 75 mg, Oral, Daily  ipratropium-albuterol, 3 mL, Nebulization, 4x Daily - RT  losartan,  12.5 mg, Oral, Q24H  oxybutynin XL, 5 mg, Oral, Daily  prednisoLONE acetate, 1 drop, Right Eye, 4x Daily  rosuvastatin, 10 mg, Oral, Daily  senna-docusate sodium, 2 tablet, Oral, BID  sodium chloride, 1 drop, Right Eye, 4x Daily  sodium chloride, 10 mL, Intravenous, Q12H             Diagnostic imaging:  I personally and independently reviewed the following images:  CTA chest 3/7/2024: Nodular and groundglass infiltrates bilaterally, R >L.  Right lower lobe consolidation, probably atelectasis    Assessment     Acute on chronic hypoxic and hypercapnic respiratory failure, on O2 2-3 L/min and NIPPV at baseline.   Pneumonia, bilateral, R >L  COPD exacerbation  Non-STEMI type II  CAD with severe 95% proximal left main stenosis.  S/p LHC 3/8/2024  History of DVT, on AC with Eliquis    All problems new to me    Plan     NIPPV at night and as needed during the day.  Oxygen by NC and titrate keep SpO2 less than 90%  Antibiotics with cefepime  DuoNeb 4 times a day and Symbicort 2 puffs twice daily  CHF management: Beta-blocker.  ARB.  Cardiology following  Resume full AC if no procedures      Felix Muro MD  03/09/24  19:15 EST          This note was dictated utilizing Dragon dictation

## 2024-03-10 NOTE — DISCHARGE PLACEMENT REQUEST
"Lizzy Hicks (88 y.o. Female)       Date of Birth   1935    Social Security Number       Address   [address] [place] [address] [place] [address]    Home Phone   [phone]    MRN   [id number]       Church   None    Marital Status                               Admission Date   3/7/24    Admission Type   Emergency    Admitting Provider   Mark Clemons MD    Attending Provider   Nate Breen MD    Department, Room/Bed   [facility], 3110/1       Discharge Date       Discharge Disposition       Discharge Destination                                 Attending Provider: Nate Breen MD    Allergies: Bee Venom    Isolation: None   Infection: None   Code Status: No CPR    Ht: 152.4 cm (60\")   Wt: 61.4 kg (135 lb 6.4 oz)    Admission Cmt: None   Principal Problem: Acute respiratory failure [J96.00]                   Active Insurance as of 3/7/2024       Primary Coverage       Payor Plan Insurance Group Employer/Plan Group    MEDICARE MEDICARE A & B        Payor Plan Address Payor Plan Phone Number Payor Plan Fax Number Effective Dates    [address] [phone]  9/1/2002 - None Entered    [facility] [id number]         Subscriber Name Subscriber Birth Date Member ID       LIZZY HICSK 1935 [id number]               Secondary Coverage       Payor Plan Insurance Group Employer/Plan Group    [facility] SUPP [id number]       Payor Plan Address Payor Plan Phone Number Payor Plan Fax Number Effective Dates    [address]   12/1/2016 - None Entered    [facility] [id number]         Subscriber Name Subscriber Birth Date Member ID       LIZZY HICKS W 1935 [id number]                     Emergency Contacts        (Rel.) Home Phone Work Phone Mobile Phone    Jasmin Lauren (Daughter) [phone] -- [phone]    Yung Hicks (Son) [phone] -- [phone]                "

## 2024-03-10 NOTE — PLAN OF CARE
Goal Outcome Evaluation:         Patient denies chest pain nor discomfort, vital signs stable, on oxygen at 2 lite , uses Bi Pap at night,  cont on  antibiotics, pt up with assist, pt educate to call for assist, call light in tact, SR on the monitor, cont to monitor

## 2024-03-10 NOTE — PROGRESS NOTES
LOS: 3 days   Patient Care Team:  Traci Hoyos MD as PCP - General (Geriatric Medicine)    Chief Complaint: Follow-up troponin elevation, left main disease    Interval History: She denies chest pain or discomfort, palpitations, or shortness of breath.  She is scheduled for a CT of the abdomen and pelvis today.    Vital Signs:  Temp:  [97.2 °F (36.2 °C)-98 °F (36.7 °C)] 97.4 °F (36.3 °C)  Heart Rate:  [67-84] 72  Resp:  [17-20] 18  BP: (113-158)/(46-74) 133/46    Intake/Output Summary (Last 24 hours) at 3/10/2024 0858  Last data filed at 3/9/2024 1848  Gross per 24 hour   Intake 720 ml   Output 1400 ml   Net -680 ml      Physical Exam  Vitals reviewed.   Constitutional:       General: She is not in acute distress.  HENT:      Head: Normocephalic.      Nose: Nose normal.   Eyes:      Extraocular Movements: Extraocular movements intact.      Pupils: Pupils are equal, round, and reactive to light.   Cardiovascular:      Rate and Rhythm: Normal rate and regular rhythm.      Pulses: Normal pulses.      Heart sounds: Normal heart sounds. Heart sounds not distant. No murmur heard.     No friction rub. No gallop. No S3 or S4 sounds.   Pulmonary:      Effort: Pulmonary effort is normal.      Breath sounds: Normal breath sounds.   Abdominal:      General: Abdomen is flat. Bowel sounds are normal.      Palpations: Abdomen is soft.      Tenderness: There is no abdominal tenderness.   Skin:     General: Skin is warm and dry.   Neurological:      General: No focal deficit present.      Mental Status: She is alert and oriented to person, place, and time. Mental status is at baseline.   Psychiatric:         Mood and Affect: Mood normal.         Behavior: Behavior normal.         Results Review:    Results from last 7 days   Lab Units 03/10/24  0546   SODIUM mmol/L 136   POTASSIUM mmol/L 3.9   CHLORIDE mmol/L 100   CO2 mmol/L 29.0   BUN mg/dL 37*   CREATININE mg/dL 0.99   GLUCOSE mg/dL 110*   CALCIUM mg/dL 8.8     Results from  last 7 days   Lab Units 03/07/24  0956 03/07/24  0510 03/07/24  0302   HSTROP T ng/L 282* 164* 55*     Results from last 7 days   Lab Units 03/10/24  0546   WBC 10*3/mm3 9.11   HEMOGLOBIN g/dL 9.2*   HEMATOCRIT % 27.9*   PLATELETS 10*3/mm3 212     Results from last 7 days   Lab Units 03/07/24  0302   INR  1.07   APTT seconds 31.4     Results from last 7 days   Lab Units 03/08/24  0316   CHOLESTEROL mg/dL 195         Results from last 7 days   Lab Units 03/08/24  0316   CHOLESTEROL mg/dL 195   TRIGLYCERIDES mg/dL 63   HDL CHOL mg/dL 114*   LDL CHOL mg/dL 69       I reviewed the patient's new clinical results.        Assessment & Plan:  Acute on chronic hypoxic respiratory failure  Right upper and lower lobe patchy opacities, likely infectious or inflammatory (potential aspiration)  COPD with acute exacerbation  Acute on chronic diastolic CHF  Elevated troponin and inferior lateral ST depression by EKG due to type II NSTEMI secondary to #1 through #4  Cardiac cath revealed multivessel coronary artery disease with left main involvement  She is not a surgical candidate per CV surgery  She is amendable to proceeding with a high risk PCI, a noncontrasted CT of the abdomen and pelvis has been ordered to evaluate for any significant calcifications in her iliac and femoral arteries that might cause issue with femoral access.  She was noted to have difficult right radial access during her recent catheterization.  DAPT challenge this weekend which she is tolerating well, no evidence of bleeding   Anemia, chronic  Mild aortic stenosis  Moderate mitral regurgitation  History of bilateral carotid endarterectomy  Right CEA in 2018 and left CEA in 2019, both by Dr. Galicia  Elevated D-dimer, no PE by CTA  History of DVT in August 2023  Negative venous Doppler ultrasound on this admission    Silvia Lawton, APRN  03/10/24  08:58 EDT

## 2024-03-10 NOTE — PROGRESS NOTES
Continued Stay Note  [facility]     Patient Name: Lizzy Hicks  MRN: [id number]  Today's Date: 3/10/2024    Admit Date: 3/7/2024        Discharge Plan       Row Name 03/10/24 1141       Plan    Plan Comments Inbound call from Adrianna to notify CCP patient is from [facility] long term care bed with private pay bed hold...........Roger SANDS                   Discharge Codes    No documentation.                       Pauly Boudreaux, RN

## 2024-03-11 LAB
ALBUMIN SERPL-MCNC: 3.3 G/DL (ref 3.5–5.2)
ANION GAP SERPL CALCULATED.3IONS-SCNC: 8.6 MMOL/L (ref 5–15)
BUN SERPL-MCNC: 27 MG/DL (ref 8–23)
BUN/CREAT SERPL: 30 (ref 7–25)
CALCIUM SPEC-SCNC: 8.5 MG/DL (ref 8.6–10.5)
CHLORIDE SERPL-SCNC: 101 MMOL/L (ref 98–107)
CO2 SERPL-SCNC: 25.4 MMOL/L (ref 22–29)
CREAT SERPL-MCNC: 0.9 MG/DL (ref 0.57–1)
DEPRECATED RDW RBC AUTO: 43.5 FL (ref 37–54)
EGFRCR SERPLBLD CKD-EPI 2021: 62.4 ML/MIN/1.73
ERYTHROCYTE [DISTWIDTH] IN BLOOD BY AUTOMATED COUNT: 13.2 % (ref 12.3–15.4)
GLUCOSE BLDC GLUCOMTR-MCNC: 162 MG/DL (ref 70–130)
GLUCOSE SERPL-MCNC: 107 MG/DL (ref 65–99)
HCT VFR BLD AUTO: 28.4 % (ref 34–46.6)
HGB BLD-MCNC: 9.3 G/DL (ref 12–15.9)
MCH RBC QN AUTO: 29.6 PG (ref 26.6–33)
MCHC RBC AUTO-ENTMCNC: 32.7 G/DL (ref 31.5–35.7)
MCV RBC AUTO: 90.4 FL (ref 79–97)
PHOSPHATE SERPL-MCNC: 2.9 MG/DL (ref 2.5–4.5)
PLATELET # BLD AUTO: 216 10*3/MM3 (ref 140–450)
PMV BLD AUTO: 9.7 FL (ref 6–12)
POTASSIUM SERPL-SCNC: 4.2 MMOL/L (ref 3.5–5.2)
QT INTERVAL: 415 MS
QTC INTERVAL: 445 MS
RBC # BLD AUTO: 3.14 10*6/MM3 (ref 3.77–5.28)
SODIUM SERPL-SCNC: 135 MMOL/L (ref 136–145)
WBC NRBC COR # BLD AUTO: 9.55 10*3/MM3 (ref 3.4–10.8)

## 2024-03-11 PROCEDURE — 94799 UNLISTED PULMONARY SVC/PX: CPT

## 2024-03-11 PROCEDURE — 82948 REAGENT STRIP/BLOOD GLUCOSE: CPT

## 2024-03-11 PROCEDURE — 80069 RENAL FUNCTION PANEL: CPT | Performed by: INTERNAL MEDICINE

## 2024-03-11 PROCEDURE — 94760 N-INVAS EAR/PLS OXIMETRY 1: CPT

## 2024-03-11 PROCEDURE — 25010000002 CEFEPIME PER 500 MG: Performed by: INTERNAL MEDICINE

## 2024-03-11 PROCEDURE — 94664 DEMO&/EVAL PT USE INHALER: CPT

## 2024-03-11 PROCEDURE — 94761 N-INVAS EAR/PLS OXIMETRY MLT: CPT

## 2024-03-11 PROCEDURE — 99232 SBSQ HOSP IP/OBS MODERATE 35: CPT | Performed by: NURSE PRACTITIONER

## 2024-03-11 PROCEDURE — 85027 COMPLETE CBC AUTOMATED: CPT | Performed by: INTERNAL MEDICINE

## 2024-03-11 PROCEDURE — 027034Z DILATION OF CORONARY ARTERY, ONE ARTERY WITH DRUG-ELUTING INTRALUMINAL DEVICE, PERCUTANEOUS APPROACH: ICD-10-PCS | Performed by: INTERNAL MEDICINE

## 2024-03-11 PROCEDURE — 94660 CPAP INITIATION&MGMT: CPT

## 2024-03-11 PROCEDURE — B240ZZ3 ULTRASONOGRAPHY OF SINGLE CORONARY ARTERY, INTRAVASCULAR: ICD-10-PCS | Performed by: INTERNAL MEDICINE

## 2024-03-11 RX ORDER — CETIRIZINE HYDROCHLORIDE 10 MG/1
10 TABLET ORAL DAILY
Status: ON HOLD | COMMUNITY
End: 2024-03-12

## 2024-03-11 RX ORDER — PREDNISONE 10 MG/1
10 TABLET ORAL DAILY
COMMUNITY
Start: 2024-02-16

## 2024-03-11 RX ORDER — ASPIRIN 81 MG/1
81 TABLET, CHEWABLE ORAL DAILY
COMMUNITY

## 2024-03-11 RX ORDER — ALBUTEROL SULFATE 2.5 MG/3ML
2.5 SOLUTION RESPIRATORY (INHALATION) ONCE
Status: COMPLETED | OUTPATIENT
Start: 2024-03-11 | End: 2024-03-11

## 2024-03-11 RX ORDER — OMEPRAZOLE 20 MG/1
20 CAPSULE, DELAYED RELEASE ORAL DAILY
COMMUNITY

## 2024-03-11 RX ORDER — METHYLPREDNISOLONE SODIUM SUCCINATE 40 MG/ML
40 INJECTION, POWDER, LYOPHILIZED, FOR SOLUTION INTRAMUSCULAR; INTRAVENOUS ONCE
Status: DISCONTINUED | OUTPATIENT
Start: 2024-03-11 | End: 2024-03-19 | Stop reason: HOSPADM

## 2024-03-11 RX ADMIN — ALBUTEROL SULFATE 2.5 MG: 2.5 SOLUTION RESPIRATORY (INHALATION) at 18:45

## 2024-03-11 RX ADMIN — AMLODIPINE BESYLATE 5 MG: 5 TABLET ORAL at 09:21

## 2024-03-11 RX ADMIN — SENNOSIDES AND DOCUSATE SODIUM 2 TABLET: 50; 8.6 TABLET ORAL at 20:43

## 2024-03-11 RX ADMIN — ATENOLOL 50 MG: 50 TABLET ORAL at 09:21

## 2024-03-11 RX ADMIN — PREDNISOLONE ACETATE 1 DROP: 10 SUSPENSION/ DROPS OPHTHALMIC at 20:43

## 2024-03-11 RX ADMIN — Medication 10 ML: at 00:01

## 2024-03-11 RX ADMIN — Medication 10 ML: at 20:44

## 2024-03-11 RX ADMIN — SODIUM CHLORIDE 1 DROP: 50 SOLUTION OPHTHALMIC at 09:21

## 2024-03-11 RX ADMIN — PREDNISOLONE ACETATE 1 DROP: 10 SUSPENSION/ DROPS OPHTHALMIC at 13:06

## 2024-03-11 RX ADMIN — IPRATROPIUM BROMIDE AND ALBUTEROL SULFATE 3 ML: 2.5; .5 SOLUTION RESPIRATORY (INHALATION) at 06:49

## 2024-03-11 RX ADMIN — ASPIRIN 81 MG: 81 TABLET, COATED ORAL at 09:21

## 2024-03-11 RX ADMIN — CEFEPIME 2000 MG: 2 INJECTION, POWDER, FOR SOLUTION INTRAVENOUS at 09:20

## 2024-03-11 RX ADMIN — IPRATROPIUM BROMIDE AND ALBUTEROL SULFATE 3 ML: 2.5; .5 SOLUTION RESPIRATORY (INHALATION) at 10:18

## 2024-03-11 RX ADMIN — SODIUM CHLORIDE 1 DROP: 50 SOLUTION OPHTHALMIC at 20:43

## 2024-03-11 RX ADMIN — ROSUVASTATIN CALCIUM 10 MG: 10 TABLET, FILM COATED ORAL at 20:43

## 2024-03-11 RX ADMIN — Medication 10 ML: at 09:26

## 2024-03-11 RX ADMIN — IPRATROPIUM BROMIDE AND ALBUTEROL SULFATE 3 ML: 2.5; .5 SOLUTION RESPIRATORY (INHALATION) at 14:23

## 2024-03-11 RX ADMIN — OXYBUTYNIN CHLORIDE 5 MG: 5 TABLET, EXTENDED RELEASE ORAL at 09:21

## 2024-03-11 RX ADMIN — SODIUM CHLORIDE 1 DROP: 50 SOLUTION OPHTHALMIC at 13:07

## 2024-03-11 RX ADMIN — BUDESONIDE AND FORMOTEROL FUMARATE DIHYDRATE 2 PUFF: 160; 4.5 AEROSOL RESPIRATORY (INHALATION) at 06:50

## 2024-03-11 RX ADMIN — LOSARTAN POTASSIUM 12.5 MG: 25 TABLET, FILM COATED ORAL at 09:21

## 2024-03-11 RX ADMIN — PREDNISOLONE ACETATE 1 DROP: 10 SUSPENSION/ DROPS OPHTHALMIC at 09:21

## 2024-03-11 RX ADMIN — CLOPIDOGREL BISULFATE 75 MG: 75 TABLET, FILM COATED ORAL at 09:21

## 2024-03-11 RX ADMIN — BUDESONIDE AND FORMOTEROL FUMARATE DIHYDRATE 2 PUFF: 160; 4.5 AEROSOL RESPIRATORY (INHALATION) at 18:50

## 2024-03-11 NOTE — PROGRESS NOTES
"                                              LOS: 3 days   Patient Care Team:  Traci Hoyos MD as PCP - General (Geriatric Medicine)    Chief Complaint:  F/up respiratory failure, COPD and pneumonia.    Subjective   Interval History        On 3L/min. Using NIPPV at night. She denies dyspnea at rest or cough.     REVIEW OF SYSTEMS:   CARDIOVASCULAR: No chest pain, chest pressure or chest discomfort. No palpitations but edema.   GASTROINTESTINAL: No anorexia, nausea, vomiting or diarrhea. No abdominal pain.  CONSTITUTIONAL: No fever or chills.     Ventilator/Non-Invasive Ventilation Settings (From admission, onward)       Start     Ordered    03/07/24 0316  NIPPV - Provider Settings  (CPAP / BiPAP)  Until Discontinued        Question Answer Comment   Indication Acute Respiratory Failure    Type AVAPS/PC/PS    Backup Rate 12    Target VT (mL) 450    EPAP/PEEP (cm H2O) 6    Min Pressure (cm H2O) 8    Max Pressure (cm H2O) 25    Titrate Oxygen for SpO2 90 - 95%        03/07/24 0316                          Physical Exam:     Vital Signs  Temp:  [97.2 °F (36.2 °C)-98.6 °F (37 °C)] 98.6 °F (37 °C)  Heart Rate:  [67-86] 86  Resp:  [17-20] 20  BP: (127-158)/(46-63) 127/50    Intake/Output Summary (Last 24 hours) at 3/10/2024 2054  Last data filed at 3/10/2024 1736  Gross per 24 hour   Intake 240 ml   Output 1 ml   Net 239 ml     Flowsheet Rows      Flowsheet Row First Filed Value   Admission Height 152.4 cm (60\") Documented at 03/07/2024 0258   Admission Weight 62.3 kg (137 lb 4.8 oz) Documented at 03/07/2024 0258            PPE used per hospital policy    General Appearance:   Alert, cooperative, in no acute distress   ENMT:  Mallampati score 3, moist mucous membrane   Eyes:  Pupils equal and reactive to light. EOMI   Neck:   Large. Trachea midline. No thyromegaly.   Lungs:    Coarse bilaterally and diminished overall. Bibasilar crackles    Heart:   Regular rhythm and normal rate, normal S1 and S2, no         murmur "   Skin:   No rash or ecchymosis   Abdomen:    Obese. Soft. No tenderness. No HSM.   Neuro/psych:  Conscious, alert, oriented x3. Strength 5/5 in upper and lower  ext.  Appropriate mood and affect   Extremities:  No cyanosis, clubbing but edema.  Warm extremities and well-perfused          Results Review:        Results from last 7 days   Lab Units 03/10/24  0546 03/09/24  0641 03/08/24  0316   SODIUM mmol/L 136 136 133*   POTASSIUM mmol/L 3.9 3.5 3.6   CHLORIDE mmol/L 100 97* 94*   CO2 mmol/L 29.0 25.7 24.0   BUN mg/dL 37* 35* 35*   CREATININE mg/dL 0.99 1.00 1.13*   GLUCOSE mg/dL 110* 94 112*   CALCIUM mg/dL 8.8 8.7 8.4*     Results from last 7 days   Lab Units 03/07/24  0956 03/07/24  0510 03/07/24  0302   HSTROP T ng/L 282* 164* 55*     Results from last 7 days   Lab Units 03/10/24  0546 03/09/24  0641 03/08/24  0316   WBC 10*3/mm3 9.11 12.05* 10.80   HEMOGLOBIN g/dL 9.2* 9.7* 9.4*   HEMATOCRIT % 27.9* 30.1* 28.2*   PLATELETS 10*3/mm3 212 212 167     Results from last 7 days   Lab Units 03/07/24  0302   INR  1.07   APTT seconds 31.4     Results from last 7 days   Lab Units 03/07/24  0302   PROBNP pg/mL 6,918.0*       Results from last 7 days   Lab Units 03/07/24  0302   D DIMER QUANT MCGFEU/mL 3.09*             Results from last 7 days   Lab Units 03/07/24  0312   PH, ARTERIAL pH units 7.422   PCO2, ARTERIAL mm Hg 39.7   PO2 ART mm Hg 239.9*   O2 SATURATION ART % 99.8*   MODALITY  BiPap         I reviewed the patient's new clinical results.        Medication Review:   amLODIPine, 5 mg, Oral, Daily  aspirin, 81 mg, Oral, Daily  atenolol, 50 mg, Oral, Daily  budesonide-formoterol, 2 puff, Inhalation, BID - RT  cefepime, 2,000 mg, Intravenous, Q12H  clopidogrel, 75 mg, Oral, Daily  ipratropium-albuterol, 3 mL, Nebulization, 4x Daily - RT  losartan, 12.5 mg, Oral, Q24H  oxybutynin XL, 5 mg, Oral, Daily  prednisoLONE acetate, 1 drop, Right Eye, 4x Daily  rosuvastatin, 10 mg, Oral, Daily  senna-docusate sodium, 2  tablet, Oral, BID  sodium chloride, 1 drop, Right Eye, 4x Daily  sodium chloride, 10 mL, Intravenous, Q12H             Diagnostic imaging:  I personally and independently reviewed the following images:  CTA chest 3/7/2024: Nodular and groundglass infiltrates bilaterally, R >L.  Right lower lobe consolidation, probably atelectasis.  CT abdomen 3/10/24: fndings as below    Assessment     Acute on chronic hypoxic and hypercapnic respiratory failure, on O2 2-3 L/min and NIPPV at baseline.   Pneumonia, bilateral, R >L  Minimal right pleural effusion   COPD exacerbation, improved  Non-STEMI type II  CAD with severe 95% proximal left main stenosis.  S/p LHC 3/8/2024. Not a surgical candiate  History of DVT, on AC with Eliquis   Prominent calcifications of the abdominal aorta, iliac arteries, and  femoral arteries        Plan     NIPPV at night and as needed during the day.  Oxygen by NC and titrate keep SpO2 less than 90%  Antibiotics with cefepime  DuoNeb 4 times a day and Symbicort 2 puffs twice daily  CHF management: Beta-blocker.  ARB.  Cardiology following  Aspirin and Plavix  Resume full AC after the heart cath  Ntoed plan for high risk PCI per cardiology      Felix Muro MD  03/10/24  20:54 EDT          This note was dictated utilizing Dragon dictation

## 2024-03-11 NOTE — CASE MANAGEMENT/SOCIAL WORK
Discharge Planning Assessment  [facility]     Patient Name: Lizzy Hicks  MRN: [id number]  Today's Date: 3/11/2024    Admit Date: 3/7/2024    Plan: [facility] skilled.  Will need W/C van transport at d/c.   Discharge Needs Assessment       Row Name 03/11/24 1553       Living Environment    People in Home facility resident  [facility] Long term care with a private pay bedhold per savannah Garcia.    Current Living Arrangements extended care facility    Potentially Unsafe Housing Conditions none    In the past 12 months has the electric, gas, oil, or water company threatened to shut off services in your home? No    Primary Care Provided by other (see comments)  staff at [facility]    Provides Primary Care For no one, unable/limited ability to care for self    Family Caregiver if Needed child(ananda), adult    Family Caregiver Names Jasmin Lauren/kelli    Quality of Family Relationships helpful;involved;supportive    Able to Return to Prior Arrangements yes       Resource/Environmental Concerns    Resource/Environmental Concerns none    Transportation Concerns none       Transportation Needs    In the past 12 months, has lack of transportation kept you from medical appointments or from getting medications? no    In the past 12 months, has lack of transportation kept you from meetings, work, or from getting things needed for daily living? No       Food Insecurity    Within the past 12 months, you worried that your food would run out before you got the money to buy more. Never true    Within the past 12 months, the food you bought just didn't last and you didn't have money to get more. Never true       Transition Planning    Patient/Family Anticipated Services at Transition skilled nursing    Transportation Anticipated health plan transportation;family or friend will provide       Discharge Needs Assessment    Readmission Within the Last 30 Days no previous admission in last 30 days     Equipment Currently Used at Home bipap;nebulizer;walker, rolling;wheelchair;hospital bed    Concerns to be Addressed discharge planning    Anticipated Changes Related to Illness none    Equipment Needed After Discharge none    Discharge Facility/Level of Care Needs nursing facility, skilled  Pt is from long term care but they are requesting she return to [facility] with skilled rehab for few weeks.    Provided Post Acute Provider List? N/A    Patient's Choice of Community Agency(s) Pt currently lives in LTC at [facility].                   Discharge Plan       Row Name 03/11/24 1603       Plan    Plan [facility] skilled.  Will need W/C van transport at d/c.    Plan Comments CCP spoke with patient and her daughter/Jasmin Lauren, at bedside.  CCP role explained and discharge planning discussed.  Face sheet verified.  Patient stated she is independent with some of her ADL's.  Patient lives in long-term care at [facility] and has a private pay bed hold per savannah Garcia.  Patient uses the nursing home pharmacy ([facility]  Patient ambulates with a rolling walker and standard wheelchair.  Pt has BiPAP.  Patient and daughter are requesting that she go back to [facility] with her skilled rehab benefits.  CCP called and relayed this to savannah Garcia and she approved.  Pt d/c plan is to d/c to [facility] with skilled rehab.  Pt will need a wheelchair van at discharge (she is on continuous oxygen).  Packet on CCP desk.  CCP will continue to follow…….Rowan MCFARLAND /LIO.                  Continued Care and Services - Admitted Since 3/7/2024       Destination Coordination complete.      Service Provider Request Status Selected Services Address Phone Fax Patient Preferred    [facility]  Selected Skilled Nursing [address], [facility] [address] [phone] --                  Expected Discharge Date and Time       Expected  Discharge Date Expected Discharge Time    Mar 13, 2024            Demographic Summary       Row Name 03/11/24 1552       General Information    Referral Source admission list    Reason for Consult discharge planning    Preferred Language English       Contact Information    Permission Granted to Share Info With family/designee      Row Name 03/11/24 1551       General Information    Admission Type inpatient    Arrived From emergency department    Required Notices Provided Important Message from Medicare                   Functional Status       Row Name 03/11/24 1552       Functional Status    Current Activity Tolerance fair       Assessment of Health Literacy    Health Literacy Good       Functional Status, IADL    Medications completely dependent    Meal Preparation completely dependent    Housekeeping assistive person    Laundry completely dependent    Shopping completely dependent    IADL Comments Pt lives in Long term care- they provide meals and assist with her medications.       Mental Status    General Appearance WDL WDL       Mental Status Summary    Recent Changes in Mental Status/Cognitive Functioning no changes       Employment/    Employment Status retired                   Psychosocial    No documentation.                  Abuse/Neglect    No documentation.                  Legal    No documentation.                  Substance Abuse    No documentation.                  Patient Forms    No documentation.                     Rowan Ornelas RN

## 2024-03-11 NOTE — NURSING NOTE
CNA assisted pt up to BR without O2 @ 2lpm; Pt's COPD was exacerbated; O2 sats trended down in low 80's; Put pt on her BiPap that was in room; O2 sats recovered above 90%; Rapid Response called;     BSC is the only recommended activity at this time; PT consult was ordered today; no notes seen yet    Pt's son  [phone] in room; requesting to speak with Pulm if possible

## 2024-03-11 NOTE — DISCHARGE PLACEMENT REQUEST
"Lizzy Hicks (88 y.o. Female)       Date of Birth   1935    Social Security Number       Address   [address] [place] [address] [place] [address]    Home Phone   [phone]    MRN   [id number]       Hindu   None    Marital Status                               Admission Date   3/7/24    Admission Type   Emergency    Admitting Provider   Mark Clemons MD    Attending Provider   Nate Breen MD    Department, Room/Bed   [facility], 3110/1       Discharge Date       Discharge Disposition       Discharge Destination                                 Attending Provider: Nate Breen MD    Allergies: Bee Venom    Isolation: None   Infection: None   Code Status: No CPR    Ht: 152.4 cm (60\")   Wt: 59.9 kg (132 lb)    Admission Cmt: None   Principal Problem: Acute respiratory failure [J96.00]                   Active Insurance as of 3/7/2024       Primary Coverage       Payor Plan Insurance Group Employer/Plan Group    MEDICARE MEDICARE A & B        Payor Plan Address Payor Plan Phone Number Payor Plan Fax Number Effective Dates    [address] [phone]  9/1/2002 - None Entered    [facility] [id number]         Subscriber Name Subscriber Birth Date Member ID       LIZZY HICKS 1935 [id number]               Secondary Coverage       Payor Plan Insurance Group Employer/Plan Group    [facility] SUPP [id number]       Payor Plan Address Payor Plan Phone Number Payor Plan Fax Number Effective Dates    [address]   12/1/2016 - None Entered    [facility] [id number]         Subscriber Name Subscriber Birth Date Member ID       LIZZY HICKS 1935 [id number]                     Emergency Contacts        (Rel.) Home Phone Work Phone Mobile Phone    Jasmin Lauren (Daughter) [phone]    Yung Hicks (Son) [phone] -- [phone]                "

## 2024-03-11 NOTE — PROGRESS NOTES
[facility] Progress Note       Encounter Date:24  Patient:Lizzy Hicks  :1935  MRN:[id number]      Chief Complaint: f/u respiratory failure, NSTEMI      Subjective:      No acute complaints, denies chest pain      Medications:  Scheduled Meds:  amLODIPine, 5 mg, Oral, Daily  aspirin, 81 mg, Oral, Daily  atenolol, 50 mg, Oral, Daily  budesonide-formoterol, 2 puff, Inhalation, BID - RT  cefepime, 2,000 mg, Intravenous, Q12H  clopidogrel, 75 mg, Oral, Daily  ipratropium-albuterol, 3 mL, Nebulization, 4x Daily - RT  losartan, 12.5 mg, Oral, Q24H  oxybutynin XL, 5 mg, Oral, Daily  prednisoLONE acetate, 1 drop, Right Eye, 4x Daily  rosuvastatin, 10 mg, Oral, Daily  senna-docusate sodium, 2 tablet, Oral, BID  sodium chloride, 1 drop, Right Eye, 4x Daily  sodium chloride, 10 mL, Intravenous, Q12H    Continuous Infusions:   PRN Meds:    senna-docusate sodium **AND** polyethylene glycol **AND** bisacodyl **AND** bisacodyl    docusate sodium    famotidine    nitroglycerin    ondansetron    [COMPLETED] Insert Peripheral IV **AND** sodium chloride    sodium chloride    sodium chloride    sodium chloride         Objective:       Vitals:    24 0500 24 0649 24 0700 24 0804   BP:   157/61 145/78   BP Location:    Left arm   Patient Position:    Lying   Pulse:   80 86   Resp:  18  18   Temp:    98.3 °F (36.8 °C)   TempSrc:    Oral   SpO2:   99% 91%   Weight: 59.9 kg (132 lb)      Height:               Physical Exam:  Constitutional: Alert and conversant  HENT: Oropharynx clear and membrane moist  Eyes: Normal conjunctiva, no sclera icterus.  Neck: Supple, no carotid bruit bilaterally.  Cardiovascular: Regular rate and rhythm, No Murmur, No bilateral lower extremity edema.  Pulmonary: Normal respiratory effort, + wheeze  Abdominal: Soft, nontender  Neurological: Alert and orient x 3.   Skin: Warm, dry, no ecchymosis, no rash.  Psych: Appropriate mood and affect. Normal  judgment and insight.           Lab Review:   Results from last 7 days   Lab Units 03/11/24  0431 03/10/24  0546 03/09/24  0641 03/08/24  0316 03/07/24  0302   SODIUM mmol/L 135* 136 136 133* 134*   POTASSIUM mmol/L 4.2 3.9 3.5 3.6 4.4   CHLORIDE mmol/L 101 100 97* 94* 93*   CO2 mmol/L 25.4 29.0 25.7 24.0 24.7   BUN mg/dL 27* 37* 35* 35* 23   CREATININE mg/dL 0.90 0.99 1.00 1.13* 1.29*   GLUCOSE mg/dL 107* 110* 94 112* 199*   CALCIUM mg/dL 8.5* 8.8 8.7 8.4* 8.8   AST (SGOT) U/L  --   --   --   --  24   ALT (SGPT) U/L  --   --   --   --  18     Results from last 7 days   Lab Units 03/07/24  0956 03/07/24  0510 03/07/24  0302   HSTROP T ng/L 282* 164* 55*     Results from last 7 days   Lab Units 03/11/24  0431 03/10/24  0546 03/09/24  0641 03/08/24  0316 03/07/24  0302   WBC 10*3/mm3 9.55 9.11 12.05* 10.80 16.76*   HEMOGLOBIN g/dL 9.3* 9.2* 9.7* 9.4* 10.9*   HEMATOCRIT % 28.4* 27.9* 30.1* 28.2* 33.0*   PLATELETS 10*3/mm3 216 212 212 167 210     Results from last 7 days   Lab Units 03/07/24  0302   INR  1.07   APTT seconds 31.4         Results from last 7 days   Lab Units 03/08/24  0316   CHOLESTEROL mg/dL 195   TRIGLYCERIDES mg/dL 63   HDL CHOL mg/dL 114*     Results from last 7 days   Lab Units 03/07/24  0302   PROBNP pg/mL 6,918.0*                Assessment:          Diagnosis Plan   1. Acute respiratory failure with hypoxia        2. Acute on chronic congestive heart failure, unspecified heart failure type        3. Pneumonia of both lungs due to infectious organism, unspecified part of lung        4. Elevated lactic acid level        5. Elevated troponin        6. NSTEMI, initial episode of care  Ambulatory Referral to Cardiac Rehab      7. Coronary artery disease involving native coronary artery of native heart with angina pectoris  Case Request Cath Lab: Left Heart Cath    Case Request Cath Lab: Left Heart Cath             Plan:       NSTEMI / Coronary artery disease- felt type II MI with acute on chronic  hypoxia related to pneumonia.  LHC on 3/8/24 did demonstrate LM disease. Her EF is normal with no rWMAs.  Continue aspirin and high potency statin. She tolerated DAPT over the weekend. CT yesterday with noted calcific femoral arteries, iliacs and abdominal aorta noted. Will plan for PCI tomorrow  Acute on chronic diastolic congestive heart failure - diuresed this admission  Chronic anemia - stable with addition of DAPT  Nonrheumatic valvular heart disease - mild AS, moderate MR on echo  Carotid artery disease - hx of right CEA 2018, left CEA 2019. Follows with Dr Grayson Hamilton of DVT - August 2023      Abiola HERNANDEZ  [facility]  03/11/24  10:03 EDT

## 2024-03-11 NOTE — PROGRESS NOTES
"                                              LOS: 4 days   Patient Care Team:  Traci Hoyos MD as PCP - General (Geriatric Medicine)    Chief Complaint:  F/up respiratory failure, COPD and pneumonia.    Subjective   Interval History        Feels more short of breath today.  She is on 3 L.  She denies cough.  She's using the NIPPV overnight.    REVIEW OF SYSTEMS:   CARDIOVASCULAR: No chest pain, chest pressure or chest discomfort. No palpitations but edema.   CONSTITUTIONAL: No fever or chills.     Ventilator/Non-Invasive Ventilation Settings (From admission, onward)       Start     Ordered    03/07/24 0316  NIPPV - Provider Settings  (CPAP / BiPAP)  Until Discontinued        Question Answer Comment   Indication Acute Respiratory Failure    Type AVAPS/PC/PS    Backup Rate 12    Target VT (mL) 450    EPAP/PEEP (cm H2O) 6    Min Pressure (cm H2O) 8    Max Pressure (cm H2O) 25    Titrate Oxygen for SpO2 90 - 95%        03/07/24 0316                          Physical Exam:     Vital Signs  Temp:  [98.3 °F (36.8 °C)-98.6 °F (37 °C)] 98.4 °F (36.9 °C)  Heart Rate:  [73-89] 75  Resp:  [17-22] 20  BP: (127-157)/(50-80) 129/62    Intake/Output Summary (Last 24 hours) at 3/11/2024 1526  Last data filed at 3/11/2024 1150  Gross per 24 hour   Intake 440 ml   Output 200 ml   Net 240 ml     Flowsheet Rows      Flowsheet Row First Filed Value   Admission Height 152.4 cm (60\") Documented at 03/07/2024 0258   Admission Weight 62.3 kg (137 lb 4.8 oz) Documented at 03/07/2024 0258            PPE used per hospital policy    General Appearance:   Alert, cooperative, in no acute distress   ENMT:  Mallampati score 3, moist mucous membrane   Eyes:  Pupils equal and reactive to light. EOMI   Neck:   Large. Trachea midline. No thyromegaly.   Lungs:   Bibasilar crackles more prominent on the right.  Bilateral expiratory wheezing.    Heart:   Regular rhythm and normal rate, normal S1 and S2, no         murmur   Skin:   No rash or ecchymosis "   Abdomen:    Obese. Soft. No tenderness. No HSM.   Neuro/psych:  Conscious, alert, oriented x3. Strength 5/5 in upper and lower  ext.  Appropriate mood and affect   Extremities:  No cyanosis, clubbing but edema.  Warm extremities and well-perfused          Results Review:        Results from last 7 days   Lab Units 03/11/24  0431 03/10/24  0546 03/09/24  0641   SODIUM mmol/L 135* 136 136   POTASSIUM mmol/L 4.2 3.9 3.5   CHLORIDE mmol/L 101 100 97*   CO2 mmol/L 25.4 29.0 25.7   BUN mg/dL 27* 37* 35*   CREATININE mg/dL 0.90 0.99 1.00   GLUCOSE mg/dL 107* 110* 94   CALCIUM mg/dL 8.5* 8.8 8.7     Results from last 7 days   Lab Units 03/07/24  0956 03/07/24  0510 03/07/24  0302   HSTROP T ng/L 282* 164* 55*     Results from last 7 days   Lab Units 03/11/24  0431 03/10/24  0546 03/09/24  0641   WBC 10*3/mm3 9.55 9.11 12.05*   HEMOGLOBIN g/dL 9.3* 9.2* 9.7*   HEMATOCRIT % 28.4* 27.9* 30.1*   PLATELETS 10*3/mm3 216 212 212     Results from last 7 days   Lab Units 03/07/24  0302   INR  1.07   APTT seconds 31.4     Results from last 7 days   Lab Units 03/07/24  0302   PROBNP pg/mL 6,918.0*       Results from last 7 days   Lab Units 03/07/24  0302   D DIMER QUANT MCGFEU/mL 3.09*             Results from last 7 days   Lab Units 03/07/24  0312   PH, ARTERIAL pH units 7.422   PCO2, ARTERIAL mm Hg 39.7   PO2 ART mm Hg 239.9*   O2 SATURATION ART % 99.8*   MODALITY  BiPap         I reviewed the patient's new clinical results.        Medication Review:   amLODIPine, 5 mg, Oral, Daily  aspirin, 81 mg, Oral, Daily  atenolol, 50 mg, Oral, Daily  budesonide-formoterol, 2 puff, Inhalation, BID - RT  cefepime, 2,000 mg, Intravenous, Q12H  clopidogrel, 75 mg, Oral, Daily  ipratropium-albuterol, 3 mL, Nebulization, 4x Daily - RT  losartan, 12.5 mg, Oral, Q24H  oxybutynin XL, 5 mg, Oral, Daily  prednisoLONE acetate, 1 drop, Right Eye, 4x Daily  rosuvastatin, 10 mg, Oral, Daily  senna-docusate sodium, 2 tablet, Oral, BID  sodium chloride, 1  drop, Right Eye, 4x Daily  sodium chloride, 10 mL, Intravenous, Q12H             Diagnostic imaging:  I personally and independently reviewed the following images:  CTA chest 3/7/2024: Nodular and groundglass infiltrates bilaterally, R >L.  Right lower lobe consolidation, probably atelectasis.  CT abdomen 3/10/24: fndings as below    Assessment     Acute on chronic hypoxic and hypercapnic respiratory failure, on O2 2-3 L/min and NIPPV at baseline.   Pneumonia, bilateral, R >L  Minimal right pleural effusion   COPD exacerbation, improved  Non-STEMI type II  CAD with severe 95% proximal left main stenosis.  S/p LHC 3/8/2024. Not a surgical candiate  History of DVT, on AC with Eliquis   Prominent calcifications of the abdominal aorta, iliac arteries, and  femoral arteries        Plan     NIPPV at night and as needed during the day.  Oxygen by NC and titrate keep SpO2 less than 90%  Antibiotics with cefepime, last dose today  DuoNeb 4 times a day and Symbicort 2 puffs twice daily  Give Solu-Medrol 40 mg IV x 1 due to wheezing  CHF management: Beta-blocker.  ARB.  Cardiology following  Aspirin and Plavix  Resume full AC after the heart cath  Ntoed plan for high risk PCI per cardiology      Felix Muro MD  03/11/24  15:26 EDT          This note was dictated utilizing Dragon dictation

## 2024-03-11 NOTE — CONSULTS
Met with patient and her son to discuss the benefits of cardiac rehab. Provided phase II information along with the contact information for cardiac rehab here at [facility]. Pt reports that she daily with pushes her wheelchair with her oxygen to ride a Nustep for 25 minutes. Concerned that she has not done anything since being in hospital. Reports only getting up to BSC.

## 2024-03-12 LAB
ALBUMIN SERPL-MCNC: 3.6 G/DL (ref 3.5–5.2)
ANION GAP SERPL CALCULATED.3IONS-SCNC: 12.7 MMOL/L (ref 5–15)
BACTERIA SPEC AEROBE CULT: NORMAL
BACTERIA SPEC AEROBE CULT: NORMAL
BUN SERPL-MCNC: 26 MG/DL (ref 8–23)
BUN/CREAT SERPL: 31 (ref 7–25)
CALCIUM SPEC-SCNC: 9.2 MG/DL (ref 8.6–10.5)
CHLORIDE SERPL-SCNC: 101 MMOL/L (ref 98–107)
CO2 SERPL-SCNC: 24.3 MMOL/L (ref 22–29)
CREAT SERPL-MCNC: 0.84 MG/DL (ref 0.57–1)
DEPRECATED RDW RBC AUTO: 43.8 FL (ref 37–54)
EGFRCR SERPLBLD CKD-EPI 2021: 67.8 ML/MIN/1.73
ERYTHROCYTE [DISTWIDTH] IN BLOOD BY AUTOMATED COUNT: 13.1 % (ref 12.3–15.4)
GLUCOSE SERPL-MCNC: 149 MG/DL (ref 65–99)
HCT VFR BLD AUTO: 31 % (ref 34–46.6)
HGB BLD-MCNC: 10.2 G/DL (ref 12–15.9)
MCH RBC QN AUTO: 30.2 PG (ref 26.6–33)
MCHC RBC AUTO-ENTMCNC: 32.9 G/DL (ref 31.5–35.7)
MCV RBC AUTO: 91.7 FL (ref 79–97)
PHOSPHATE SERPL-MCNC: 4.2 MG/DL (ref 2.5–4.5)
PLATELET # BLD AUTO: 244 10*3/MM3 (ref 140–450)
PMV BLD AUTO: 9.7 FL (ref 6–12)
POTASSIUM SERPL-SCNC: 4.7 MMOL/L (ref 3.5–5.2)
RBC # BLD AUTO: 3.38 10*6/MM3 (ref 3.77–5.28)
SODIUM SERPL-SCNC: 138 MMOL/L (ref 136–145)
WBC NRBC COR # BLD AUTO: 8.36 10*3/MM3 (ref 3.4–10.8)

## 2024-03-12 PROCEDURE — 25010000002 FENTANYL CITRATE (PF) 50 MCG/ML SOLUTION: Performed by: INTERNAL MEDICINE

## 2024-03-12 PROCEDURE — 92978 ENDOLUMINL IVUS OCT C 1ST: CPT | Performed by: INTERNAL MEDICINE

## 2024-03-12 PROCEDURE — C1725 CATH, TRANSLUMIN NON-LASER: HCPCS | Performed by: INTERNAL MEDICINE

## 2024-03-12 PROCEDURE — 25510000001 IOPAMIDOL PER 1 ML: Performed by: INTERNAL MEDICINE

## 2024-03-12 PROCEDURE — C1769 GUIDE WIRE: HCPCS | Performed by: INTERNAL MEDICINE

## 2024-03-12 PROCEDURE — C1760 CLOSURE DEV, VASC: HCPCS | Performed by: INTERNAL MEDICINE

## 2024-03-12 PROCEDURE — 85027 COMPLETE CBC AUTOMATED: CPT | Performed by: INTERNAL MEDICINE

## 2024-03-12 PROCEDURE — 80069 RENAL FUNCTION PANEL: CPT | Performed by: INTERNAL MEDICINE

## 2024-03-12 PROCEDURE — 94799 UNLISTED PULMONARY SVC/PX: CPT

## 2024-03-12 PROCEDURE — C1753 CATH, INTRAVAS ULTRASOUND: HCPCS | Performed by: INTERNAL MEDICINE

## 2024-03-12 PROCEDURE — 25010000002 PROTAMINE SULFATE PER 10 MG: Performed by: INTERNAL MEDICINE

## 2024-03-12 PROCEDURE — C1887 CATHETER, GUIDING: HCPCS | Performed by: INTERNAL MEDICINE

## 2024-03-12 PROCEDURE — 85347 COAGULATION TIME ACTIVATED: CPT

## 2024-03-12 PROCEDURE — 25010000002 MIDAZOLAM PER 1 MG: Performed by: INTERNAL MEDICINE

## 2024-03-12 PROCEDURE — C1894 INTRO/SHEATH, NON-LASER: HCPCS | Performed by: INTERNAL MEDICINE

## 2024-03-12 PROCEDURE — 25010000002 CEFEPIME PER 500 MG: Performed by: INTERNAL MEDICINE

## 2024-03-12 PROCEDURE — C9600 PERC DRUG-EL COR STENT SING: HCPCS | Performed by: INTERNAL MEDICINE

## 2024-03-12 PROCEDURE — 94761 N-INVAS EAR/PLS OXIMETRY MLT: CPT

## 2024-03-12 PROCEDURE — 99232 SBSQ HOSP IP/OBS MODERATE 35: CPT | Performed by: INTERNAL MEDICINE

## 2024-03-12 PROCEDURE — C1874 STENT, COATED/COV W/DEL SYS: HCPCS | Performed by: INTERNAL MEDICINE

## 2024-03-12 PROCEDURE — 92928 PRQ TCAT PLMT NTRAC ST 1 LES: CPT | Performed by: INTERNAL MEDICINE

## 2024-03-12 PROCEDURE — 94664 DEMO&/EVAL PT USE INHALER: CPT

## 2024-03-12 PROCEDURE — 25010000002 HEPARIN (PORCINE) PER 1000 UNITS: Performed by: INTERNAL MEDICINE

## 2024-03-12 DEVICE — XIENCE SKYPOINT™ EVEROLIMUS ELUTING CORONARY STENT SYSTEM 4.00 MM X 18 MM / RAPID-EXCHANGE
Type: IMPLANTABLE DEVICE | Site: CORONARY | Status: FUNCTIONAL
Brand: XIENCE SKYPOINT™

## 2024-03-12 RX ORDER — HEPARIN SODIUM 1000 [USP'U]/ML
INJECTION, SOLUTION INTRAVENOUS; SUBCUTANEOUS
Status: DISCONTINUED | OUTPATIENT
Start: 2024-03-12 | End: 2024-03-12 | Stop reason: HOSPADM

## 2024-03-12 RX ORDER — ROSUVASTATIN CALCIUM 20 MG/1
20 TABLET, COATED ORAL DAILY
Status: DISCONTINUED | OUTPATIENT
Start: 2024-03-12 | End: 2024-03-19 | Stop reason: HOSPADM

## 2024-03-12 RX ORDER — PROTAMINE SULFATE 10 MG/ML
10 INJECTION, SOLUTION INTRAVENOUS ONCE
Status: COMPLETED | OUTPATIENT
Start: 2024-03-12 | End: 2024-03-12

## 2024-03-12 RX ORDER — MIDAZOLAM HYDROCHLORIDE 1 MG/ML
INJECTION INTRAMUSCULAR; INTRAVENOUS
Status: DISCONTINUED | OUTPATIENT
Start: 2024-03-12 | End: 2024-03-12 | Stop reason: HOSPADM

## 2024-03-12 RX ORDER — LIDOCAINE HYDROCHLORIDE 20 MG/ML
INJECTION, SOLUTION INFILTRATION; PERINEURAL
Status: DISCONTINUED | OUTPATIENT
Start: 2024-03-12 | End: 2024-03-12 | Stop reason: HOSPADM

## 2024-03-12 RX ORDER — LORATADINE 10 MG/1
10 CAPSULE, LIQUID FILLED ORAL DAILY
COMMUNITY

## 2024-03-12 RX ORDER — LIDOCAINE HYDROCHLORIDE AND EPINEPHRINE 10; 10 MG/ML; UG/ML
INJECTION, SOLUTION INFILTRATION; PERINEURAL
Status: DISCONTINUED | OUTPATIENT
Start: 2024-03-12 | End: 2024-03-12 | Stop reason: HOSPADM

## 2024-03-12 RX ORDER — GUAIFENESIN 200 MG/10ML
200 LIQUID ORAL 3 TIMES DAILY PRN
COMMUNITY

## 2024-03-12 RX ORDER — SODIUM CHLORIDE 9 MG/ML
250 INJECTION, SOLUTION INTRAVENOUS ONCE AS NEEDED
Status: DISCONTINUED | OUTPATIENT
Start: 2024-03-12 | End: 2024-03-19 | Stop reason: HOSPADM

## 2024-03-12 RX ORDER — FENTANYL CITRATE 50 UG/ML
INJECTION, SOLUTION INTRAMUSCULAR; INTRAVENOUS
Status: DISCONTINUED | OUTPATIENT
Start: 2024-03-12 | End: 2024-03-12 | Stop reason: HOSPADM

## 2024-03-12 RX ADMIN — AMLODIPINE BESYLATE 5 MG: 5 TABLET ORAL at 22:22

## 2024-03-12 RX ADMIN — IPRATROPIUM BROMIDE AND ALBUTEROL SULFATE 3 ML: 2.5; .5 SOLUTION RESPIRATORY (INHALATION) at 20:28

## 2024-03-12 RX ADMIN — PREDNISOLONE ACETATE 1 DROP: 10 SUSPENSION/ DROPS OPHTHALMIC at 15:23

## 2024-03-12 RX ADMIN — CEFEPIME 2000 MG: 2 INJECTION, POWDER, FOR SOLUTION INTRAVENOUS at 22:02

## 2024-03-12 RX ADMIN — BUDESONIDE AND FORMOTEROL FUMARATE DIHYDRATE 2 PUFF: 160; 4.5 AEROSOL RESPIRATORY (INHALATION) at 20:31

## 2024-03-12 RX ADMIN — LOSARTAN POTASSIUM 12.5 MG: 25 TABLET, FILM COATED ORAL at 17:10

## 2024-03-12 RX ADMIN — DOCUSATE SODIUM 100 MG: 100 CAPSULE, LIQUID FILLED ORAL at 17:11

## 2024-03-12 RX ADMIN — IPRATROPIUM BROMIDE AND ALBUTEROL SULFATE 3 ML: 2.5; .5 SOLUTION RESPIRATORY (INHALATION) at 15:51

## 2024-03-12 RX ADMIN — PREDNISOLONE ACETATE 1 DROP: 10 SUSPENSION/ DROPS OPHTHALMIC at 22:14

## 2024-03-12 RX ADMIN — IPRATROPIUM BROMIDE AND ALBUTEROL SULFATE 3 ML: 2.5; .5 SOLUTION RESPIRATORY (INHALATION) at 07:14

## 2024-03-12 RX ADMIN — ASPIRIN 81 MG: 81 TABLET, COATED ORAL at 17:09

## 2024-03-12 RX ADMIN — SODIUM CHLORIDE 1 DROP: 50 SOLUTION OPHTHALMIC at 22:14

## 2024-03-12 RX ADMIN — ROSUVASTATIN CALCIUM 20 MG: 20 TABLET, FILM COATED ORAL at 22:14

## 2024-03-12 RX ADMIN — Medication 10 ML: at 22:15

## 2024-03-12 RX ADMIN — ATENOLOL 50 MG: 50 TABLET ORAL at 17:10

## 2024-03-12 RX ADMIN — SODIUM CHLORIDE 1 DROP: 50 SOLUTION OPHTHALMIC at 15:28

## 2024-03-12 RX ADMIN — BUDESONIDE AND FORMOTEROL FUMARATE DIHYDRATE 2 PUFF: 160; 4.5 AEROSOL RESPIRATORY (INHALATION) at 07:15

## 2024-03-12 RX ADMIN — OXYBUTYNIN CHLORIDE 5 MG: 5 TABLET, EXTENDED RELEASE ORAL at 17:09

## 2024-03-12 RX ADMIN — CLOPIDOGREL BISULFATE 75 MG: 75 TABLET, FILM COATED ORAL at 17:10

## 2024-03-12 RX ADMIN — PROTAMINE SULFATE 10 MG: 10 INJECTION, SOLUTION INTRAVENOUS at 10:18

## 2024-03-12 NOTE — NURSING NOTE
Updated Pt Med Rec from home; missing prednisone 10 mg daily, Claritin once daily, Omeprazole 20 mg daily

## 2024-03-12 NOTE — DISCHARGE INSTRUCTIONS
[facility]  [address] [place] [address]    Coronary Angiogram with or without Stent (Femoral Approach) After Care    Refer to this sheet in the next few weeks. These instructions provide you with information on caring for yourself after your procedure. Your health care provider may also give you more specific instructions. Your treatment has been planned according to current medical practices, but problems sometimes occur. Call your health care provider if you have any problems or questions after your procedure.    WHAT TO EXPECT AFTER THE PROCEDURE    The insertion site may be tender for a few days after your procedure.  Minor discomfort or tenderness and a small bump at the catheter insertion site.  The bump should decrease in size and tenderness within 1 to 2 weeks.     HOME CARE INSTRUCTIONS      Take medicines only as directed by your health care provider. Blood thinners may be prescribed after your procedure to improve blood flow through the stent.  Metformin or any medications containing Metformin should not be taken for 48 hours after your procedure.    Cardiac Rehab may or may not be ordered.  Please consult with your physician.   Do not apply powder or lotion to the site.    Check your insertion site every day for redness, swelling, or fluid leaking from the insertion site.    Increase your fluid intake for the next 2 days.    Hold direct pressure over the site when you cough, sneeze, laugh or change positions.  Do this for the next 2 days.    Avoid strenuous activity as directed by your physician.  Follow instructions about when you can drive and return to work as directed by your physician.     Do not take baths, swim, or use a hot tub until your health care provider approves.   You may shower 24 hours after the procedure. Remove the bandage (dressing) and gently wash the site with plain soap and water.  Gently pat the site dry.  Do not rub the insertion area with a washcloth or  towel.  You may apply a band aid daily for 2 days if desired.   Limit your activity for the first 48 hours.  Do not bend, squat, or lift anything over 20lb. (9 kg) or as directed by your health care provider.  However, we recommend lifting nothing heavier than a gallon of milk.   Do not operate machinery or power tools for 24 hours.  A responsible adult should be with you for the first 24 hours after you arrive home. Do not make any important legal decisions or sign legal papers for 24 hours.  Do not drink alcohol for 24 hours.    Eat a heart-healthy diet. This should include plenty of fresh fruits and vegetables. Meat should be lean cuts. Avoid the following types of food:    Food that is high in salt.    Canned or highly processed food.    Food that is high in saturated fat or sugar.    Fried food.    Make any other lifestyle changes recommended by your health care provider. This may include:    Not using any tobacco products including cigarettes, chewing tobacco, or electronic cigarettes.   Managing your weight.    Getting regular exercise.    Managing your blood pressure.    Limiting your alcohol intake.    Managing other health problems, such as diabetes.    If you need an MRI after your heart stent was placed, be sure to tell the health care provider who orders the MRI that you have a heart stent.    Keep all follow-up visits as directed by your health care provider.      Call your doctor if:    You have heavy bleeding from the site, lie down flat, hold manual pressure and call 911 immediately.     You develop chest pain, shortness of breath, feel faint, or pass out.  You have bleeding, swelling larger than a walnut, or drainage from the catheter insertion site.  You develop pain, discoloration, coldness, numbness, tingling, or severe bruising in the leg that held the catheter.  You develop bleeding from any other place such as from the bowels.   You have a fever > 101 or chills.    Call Your Doctor If:      You have any symptoms of a stroke.  Remember BE FAST  B-balance. Sudden trouble walking or loss of balance.  E-eyes.  Sudden changes in how you see or a sudden onset of a very bad headache.   F-face. Sudden weakness or loss of feeling of the face or facial droop on one side.   A-arms Sudden weakness or numbness in one arm.  One arm drifts down if they are both held out in front of you. This happens suddenly and usually on one side of the body.  S-speech.  Sudden trouble speaking, slurred speech or trouble understanding what people are saying.   T-time  Time to call emergency services.  Write down the symptoms and the time they started.    MAKE SURE YOU:  Understand these instructions.  Will watch your condition.  Will get help right away if you are not doing well or get worse.

## 2024-03-12 NOTE — PROGRESS NOTES
[facility] Progress Note       Encounter Date:24  Patient:Lizzy Hicks  :1935  MRN:[id number]      Chief Complaint: HUTCHINS      Subjective:        Breathing better today    Review of Systems:  Review of Systems   Constitutional: Positive for malaise/fatigue.   Cardiovascular:  Negative for chest pain.   Respiratory:  Positive for shortness of breath.        Medications:  Scheduled Meds:  amLODIPine, 5 mg, Oral, Daily  aspirin, 81 mg, Oral, Daily  atenolol, 50 mg, Oral, Daily  budesonide-formoterol, 2 puff, Inhalation, BID - RT  cefepime, 2,000 mg, Intravenous, Q12H  clopidogrel, 75 mg, Oral, Daily  ipratropium-albuterol, 3 mL, Nebulization, 4x Daily - RT  losartan, 12.5 mg, Oral, Q24H  methylPREDNISolone sodium succinate, 40 mg, Intravenous, Once  oxybutynin XL, 5 mg, Oral, Daily  prednisoLONE acetate, 1 drop, Right Eye, 4x Daily  rosuvastatin, 20 mg, Oral, Daily  senna-docusate sodium, 2 tablet, Oral, BID  sodium chloride, 1 drop, Right Eye, 4x Daily  sodium chloride, 10 mL, Intravenous, Q12H    Continuous Infusions:   PRN Meds:    atropine    senna-docusate sodium **AND** polyethylene glycol **AND** bisacodyl **AND** bisacodyl    docusate sodium    famotidine    fentaNYL citrate (PF)    heparin (porcine)    iopamidol    lidocaine    lidocaine 1% - EPINEPHrine [id number]    midazolam    nitroglycerin    ondansetron    [COMPLETED] Insert Peripheral IV **AND** sodium chloride    sodium chloride    sodium chloride    sodium chloride    sodium chloride         Objective:       Vitals:    24 0917 24 0922 24 0928 24 0932   BP:       BP Location:       Patient Position:       Pulse:       Resp: 12 17 16 16   Temp:       TempSrc:       SpO2:       Weight:       Height:               Physical Exam:  Constitutional: Well appearing, frail, no acute distress   HENT: Oropharynx clear and membrane moist  Eyes: Normal conjunctiva, no sclera icterus.  Neck: Supple, no  carotid bruit bilaterally.  Cardiovascular: Regular rate and rhythm, Early peaking systolic murmur over the right upper sternal border, No bilateral lower extremity edema.  Pulmonary: Normal respiratory effort, normal lung sounds, no wheezing.  Neurological: Alert and orient x 3.   Skin: Warm, dry, no ecchymosis, no rash.  Psych: Appropriate mood and affect. Normal judgment and insight.           Lab Review:   Results from last 7 days   Lab Units 03/12/24  0419 03/11/24  0431 03/10/24  0546 03/09/24  0641 03/08/24  0316 03/07/24  0302   SODIUM mmol/L 138 135* 136 136 133* 134*   POTASSIUM mmol/L 4.7 4.2 3.9 3.5 3.6 4.4   CHLORIDE mmol/L 101 101 100 97* 94* 93*   CO2 mmol/L 24.3 25.4 29.0 25.7 24.0 24.7   BUN mg/dL 26* 27* 37* 35* 35* 23   CREATININE mg/dL 0.84 0.90 0.99 1.00 1.13* 1.29*   GLUCOSE mg/dL 149* 107* 110* 94 112* 199*   CALCIUM mg/dL 9.2 8.5* 8.8 8.7 8.4* 8.8   AST (SGOT) U/L  --   --   --   --   --  24   ALT (SGPT) U/L  --   --   --   --   --  18     Results from last 7 days   Lab Units 03/07/24  0956 03/07/24  0510 03/07/24  0302   HSTROP T ng/L 282* 164* 55*     Results from last 7 days   Lab Units 03/12/24  0419 03/11/24  0431 03/10/24  0546 03/09/24  0641 03/08/24  0316 03/07/24  0302   WBC 10*3/mm3 8.36 9.55 9.11 12.05* 10.80 16.76*   HEMOGLOBIN g/dL 10.2* 9.3* 9.2* 9.7* 9.4* 10.9*   HEMATOCRIT % 31.0* 28.4* 27.9* 30.1* 28.2* 33.0*   PLATELETS 10*3/mm3 244 216 212 212 167 210     Results from last 7 days   Lab Units 03/07/24  0302   INR  1.07   APTT seconds 31.4         Results from last 7 days   Lab Units 03/08/24  0316   CHOLESTEROL mg/dL 195   TRIGLYCERIDES mg/dL 63   HDL CHOL mg/dL 114*     Results from last 7 days   Lab Units 03/07/24  0302   PROBNP pg/mL 6,918.0*               Cardiac Catheterization 3/8/2024:  Severe single-vessel coronary artery disease with 95% proximal left main stenoses which is heavily calcified.  Tortuous LAD containing luminal regularities along with a tortuous  circumflex does have a small less than 2 mm diameter mid marginal branch with a 70% calcified stenoses.  RCA with 50% calcified mid segment stenoses supplying a large PDA and posterolateral branch.  Mildly elevated ventricular filling pressures of 18 mmHg  Small right radial artery barely accommodating 5 [place] diagnostic catheter    Echocardiogram 3/7/2024:  Left ventricular systolic function is normal. Left ventricular ejection fraction appears to be 66 - 70%.  Left ventricular wall thickness is consistent with mild concentric hypertrophy.  Left ventricular diastolic function is consistent with (grade II w/high LAP) pseudonormalization.  Normal right ventricular cavity size and systolic function noted.  The left atrial cavity is moderately dilated.  Mild aortic valve stenosis is present. The aortic valve leaflets are heavily calcified  No significant mitral valve stenosis is present. There is severe mitral annular calcification. There are several calcified chordae.  There is at least moderate mitral regurgitation (potentially underestimated)  There is no evidence of pericardial effusion       Assessment:          Diagnosis Plan   1. Acute respiratory failure with hypoxia        2. Acute on chronic congestive heart failure, unspecified heart failure type        3. Pneumonia of both lungs due to infectious organism, unspecified part of lung        4. Elevated lactic acid level        5. Elevated troponin        6. NSTEMI, initial episode of care  Ambulatory Referral to Cardiac Rehab    Ambulatory Referral to Cardiac Rehab      7. Coronary artery disease involving native coronary artery of native heart with angina pectoris               Plan:       Ms. Hicks is a 88 y.o. woman with past medical history notable for prior carotid stenoses with prior CEA, COPD, hypertension, and frailty who presents to the hospital with acute on chronic diastolic congestive heart failure flash pulmonary edema and concerns for an  NSTEMI.  Further evaluation with heart catheterization identified severe 90% left main stenoses with heavy calcification.  Given at challenging access and complex nature of the disease we decided to take a staged approach after ensuring that she was not a good surgical candidate.  She was not given her heavy calcification and age we thus planned our PCI for today.  She underwent PCI via right femoral approach with successful stent to the left main.  Unfortunately due to heavy calcification disease of the iliac we are unable to close an 8 [place] sheath was placed and plans for manual pressure once ACT less than 175.  She did well with the procedure without any acute complications.    Non-ST elation myocardial infarction:  Status post PCI 3/12/2024 via right femoral artery approach  Continue dual endplate therapy  Continue beta-blocker  Continue high potency statin  Referral for cardiac rehab    Acute on chronic diastolic ingestive heart failure:  Related to coronary disease  Doing better after volume status improved and hopefully will do better long-term with revascularization    Essential hypertension:  Continue current medical occasions will allow for some permissive hypertension in light of patient's age and peripheral vascular disease    Mixed upper lipidemia:  Will uptitrate to high potency statin     Peripheral vascular disease without claudication:  Patient with severe peripheral vascular disease no obvious claudication but also not as active no obvious sores  Does complicate makes access for PCI challenging limited our ability to consider hemodynamic support during PCI  Continue dual antiplatelet therapy and statin             Sheng Jaimes MD  [facility]  03/12/24  10:00 EDT

## 2024-03-12 NOTE — PROGRESS NOTES
"                                              LOS: 5 days   Patient Care Team:  Traci Hoyos MD as PCP - General (Geriatric Medicine)    Chief Complaint:  F/up respiratory failure, COPD and pneumonia.    Subjective   Interval History        Dyspnea improved significantly today..  She is on 2 L.  She denies significant cough.    REVIEW OF SYSTEMS:   CARDIOVASCULAR: No chest pain, chest pressure or chest discomfort. No palpitations but edema.   CONSTITUTIONAL: No fever or chills.     Ventilator/Non-Invasive Ventilation Settings (From admission, onward)       Start     Ordered    03/07/24 0316  NIPPV - Provider Settings  (CPAP / BiPAP)  Until Discontinued        Question Answer Comment   Indication Acute Respiratory Failure    Type AVAPS/PC/PS    Backup Rate 12    Target VT (mL) 450    EPAP/PEEP (cm H2O) 6    Min Pressure (cm H2O) 8    Max Pressure (cm H2O) 25    Titrate Oxygen for SpO2 90 - 95%        03/07/24 0316                          Physical Exam:     Vital Signs  Temp:  [97.8 °F (36.6 °C)-98.4 °F (36.9 °C)] 98.4 °F (36.9 °C)  Heart Rate:  [76-90] 84  Resp:  [11-24] 18  BP: (111-156)/() 128/49    Intake/Output Summary (Last 24 hours) at 3/12/2024 1719  Last data filed at 3/12/2024 1200  Gross per 24 hour   Intake 320 ml   Output 200 ml   Net 120 ml     Flowsheet Rows      Flowsheet Row First Filed Value   Admission Height 152.4 cm (60\") Documented at 03/07/2024 0258   Admission Weight 62.3 kg (137 lb 4.8 oz) Documented at 03/07/2024 0258            PPE used per hospital policy    General Appearance:   Alert, cooperative, in no acute distress   ENMT:  Mallampati score 3, moist mucous membrane   Eyes:  Pupils equal and reactive to light. EOMI   Neck:   Large. Trachea midline. No thyromegaly.   Lungs:   Clear but diminished anteriorly.  No audible crackles or wheezing.    Heart:   Regular rhythm and normal rate, normal S1 and S2, no         murmur   Skin:   No rash or ecchymosis   Abdomen:    Obese. Soft. " No tenderness. No HSM.   Neuro/psych:  Conscious, alert, oriented x3. Strength 5/5 in upper and lower  ext.  Appropriate mood and affect   Extremities:  No cyanosis, clubbing but edema.  Warm extremities and well-perfused          Results Review:        Results from last 7 days   Lab Units 03/12/24 0419 03/11/24  0431 03/10/24  0546   SODIUM mmol/L 138 135* 136   POTASSIUM mmol/L 4.7 4.2 3.9   CHLORIDE mmol/L 101 101 100   CO2 mmol/L 24.3 25.4 29.0   BUN mg/dL 26* 27* 37*   CREATININE mg/dL 0.84 0.90 0.99   GLUCOSE mg/dL 149* 107* 110*   CALCIUM mg/dL 9.2 8.5* 8.8     Results from last 7 days   Lab Units 03/07/24  0956 03/07/24  0510 03/07/24  0302   HSTROP T ng/L 282* 164* 55*     Results from last 7 days   Lab Units 03/12/24 0419 03/11/24  0431 03/10/24  0546   WBC 10*3/mm3 8.36 9.55 9.11   HEMOGLOBIN g/dL 10.2* 9.3* 9.2*   HEMATOCRIT % 31.0* 28.4* 27.9*   PLATELETS 10*3/mm3 244 216 212     Results from last 7 days   Lab Units 03/07/24  0302   INR  1.07   APTT seconds 31.4     Results from last 7 days   Lab Units 03/07/24  0302   PROBNP pg/mL 6,918.0*       Results from last 7 days   Lab Units 03/07/24  0302   D DIMER QUANT MCGFEU/mL 3.09*             Results from last 7 days   Lab Units 03/07/24  0312   PH, ARTERIAL pH units 7.422   PCO2, ARTERIAL mm Hg 39.7   PO2 ART mm Hg 239.9*   O2 SATURATION ART % 99.8*   MODALITY  BiPap         I reviewed the patient's new clinical results.        Medication Review:   amLODIPine, 5 mg, Oral, Daily  aspirin, 81 mg, Oral, Daily  atenolol, 50 mg, Oral, Daily  budesonide-formoterol, 2 puff, Inhalation, BID - RT  cefepime, 2,000 mg, Intravenous, Q12H  clopidogrel, 75 mg, Oral, Daily  ipratropium-albuterol, 3 mL, Nebulization, 4x Daily - RT  losartan, 12.5 mg, Oral, Q24H  methylPREDNISolone sodium succinate, 40 mg, Intravenous, Once  oxybutynin XL, 5 mg, Oral, Daily  prednisoLONE acetate, 1 drop, Right Eye, 4x Daily  rosuvastatin, 20 mg, Oral, Daily  senna-docusate sodium, 2  tablet, Oral, BID  sodium chloride, 1 drop, Right Eye, 4x Daily  sodium chloride, 10 mL, Intravenous, Q12H             Diagnostic imaging:  I personally and independently reviewed the following images:  CTA chest 3/7/2024: Nodular and groundglass infiltrates bilaterally, R >L.  Right lower lobe consolidation, probably atelectasis.  CT abdomen 3/10/24: fndings as below    Assessment     Acute on chronic hypoxic and hypercapnic respiratory failure, on O2 2-3 L/min and NIPPV at baseline.   Pneumonia, bilateral, R >L  Minimal right pleural effusion   COPD exacerbation, improved  Non-STEMI type II  CAD with severe 95% proximal left main stenosis.  S/p [facility] 3/8/2024. Not a surgical candiate.  S/p [facility] 3/12/2024 with stent placement  History of DVT, on AC with Eliquis   Prominent calcifications of the abdominal aorta, iliac arteries, and  femoral arteries        Plan     NIPPV at night and as needed during the day.  Oxygen by NC and titrate keep SpO2 less than 90%  Finished cefepime  DuoNeb 4 times a day and Symbicort 2 puffs twice daily  CHF management: Atenolol 50 mg daily.  Losartan 12.5 mg daily  Aspirin and Plavix  Will discuss with cardiology whether we could resume Eliquis tomorrow    Discussed with her son.  Dispo: Can probably be discharged tomorrow if okay with cardiology.      Felix Muro MD  03/12/24  17:19 EDT          This note was dictated utilizing [employer] dictation

## 2024-03-12 NOTE — PLAN OF CARE
Goal Outcome Evaluation:         Pt alert and oriented and follows commands.S/P PCI today rt femoral site bruised but soft no hematoma noted, gauze and tagaderm in place, no active bleeding noted. Bedrest is off at 7pm. Vitals stable plan of care in progress

## 2024-03-12 NOTE — NURSING NOTE
Dr. Jaimes at bedside to reassess.  Small hematoma approx. 2 cm in diameter noted.  Orders received for Safeguard placement.

## 2024-03-12 NOTE — PROGRESS NOTES
Enter Query Response Below      Query Response: Heart failure due to hypertension             If applicable, please update the problem list.   Patient: Lizzy Hicks        : 1935  Account: [id number]           Admit Date:         How to Respond to this query:       a. Click New Note     b. Answer query within the yellow box.                c. Update the Problem List, if applicable.      If you have any questions about this query contact me at: [email or web address]      Ms. Guerrero,    Patient admitted with Type II NSTEMI due to acute on chronic diastolic CHF, acute on chronic hypoxic/hypercapnic respiratory failure, COPD exacerbation, hypertensive emergency, and nonrheumatic valvular heart disease.  Treatment included cardiac catheterization, monitoring labs, amlodipine, atenolol, losartan, IV lasix, and monitoring vital signs.    Please clarify the etiology of the patient’s heart failure:    Heart failure due to hypertension  Heart failure due to valvular disease  Heart failure due to hypertension and valvular disease  Other- specify__________  Unable to determine      By submitting this query, we are merely seeking further clarification of documentation to accurately reflect all conditions that you are monitoring, evaluating, treating or that extend the hospitalization or utilize additional resources of care. Please utilize your independent clinical judgment when addressing the question(s) above.     This query and your response, once completed, will be entered into the legal medical record.    Sincerely,  Jasmin Solares  Clinical Documentation Integrity Program

## 2024-03-12 NOTE — NURSING NOTE
Patient arrived from cath lab with RFA sheath in place and oozing.  Site assessed, dressing removed and manual pressure applied by Thelma Austin RN.  Dr. Jaimes brought to room by another RN for site assessment. MD removed sheath and initiated manual pressure. MD aware of pulse assessment.

## 2024-03-13 LAB
ACT BLD: 228 SECONDS (ref 82–152)
ALBUMIN SERPL-MCNC: 2.7 G/DL (ref 3.5–5.2)
ANION GAP SERPL CALCULATED.3IONS-SCNC: 11.5 MMOL/L (ref 5–15)
BUN SERPL-MCNC: 28 MG/DL (ref 8–23)
BUN/CREAT SERPL: 32.9 (ref 7–25)
CALCIUM SPEC-SCNC: 8.1 MG/DL (ref 8.6–10.5)
CHLORIDE SERPL-SCNC: 104 MMOL/L (ref 98–107)
CO2 SERPL-SCNC: 24.5 MMOL/L (ref 22–29)
CREAT SERPL-MCNC: 0.85 MG/DL (ref 0.57–1)
DEPRECATED RDW RBC AUTO: 44.6 FL (ref 37–54)
EGFRCR SERPLBLD CKD-EPI 2021: 66.8 ML/MIN/1.73
ERYTHROCYTE [DISTWIDTH] IN BLOOD BY AUTOMATED COUNT: 13.2 % (ref 12.3–15.4)
GLUCOSE BLDC GLUCOMTR-MCNC: 152 MG/DL (ref 70–130)
GLUCOSE SERPL-MCNC: 110 MG/DL (ref 65–99)
HCT VFR BLD AUTO: 21.9 % (ref 34–46.6)
HCT VFR BLD AUTO: 22.6 % (ref 34–46.6)
HCT VFR BLD AUTO: 23.2 % (ref 34–46.6)
HGB BLD-MCNC: 7.3 G/DL (ref 12–15.9)
HGB BLD-MCNC: 7.4 G/DL (ref 12–15.9)
HGB BLD-MCNC: 7.5 G/DL (ref 12–15.9)
IRON 24H UR-MRATE: 28 MCG/DL (ref 37–145)
IRON SATN MFR SERPL: 12 % (ref 20–50)
MCH RBC QN AUTO: 29.6 PG (ref 26.6–33)
MCHC RBC AUTO-ENTMCNC: 32.3 G/DL (ref 31.5–35.7)
MCV RBC AUTO: 91.7 FL (ref 79–97)
PHOSPHATE SERPL-MCNC: 2.7 MG/DL (ref 2.5–4.5)
PLATELET # BLD AUTO: 180 10*3/MM3 (ref 140–450)
PMV BLD AUTO: 10.4 FL (ref 6–12)
POTASSIUM SERPL-SCNC: 4.3 MMOL/L (ref 3.5–5.2)
QT INTERVAL: 417 MS
QTC INTERVAL: 457 MS
RBC # BLD AUTO: 2.53 10*6/MM3 (ref 3.77–5.28)
SODIUM SERPL-SCNC: 140 MMOL/L (ref 136–145)
TIBC SERPL-MCNC: 232 MCG/DL (ref 298–536)
TRANSFERRIN SERPL-MCNC: 156 MG/DL (ref 200–360)
WBC NRBC COR # BLD AUTO: 14.25 10*3/MM3 (ref 3.4–10.8)

## 2024-03-13 PROCEDURE — 63710000001 PREDNISONE PER 5 MG: Performed by: INTERNAL MEDICINE

## 2024-03-13 PROCEDURE — 85027 COMPLETE CBC AUTOMATED: CPT | Performed by: INTERNAL MEDICINE

## 2024-03-13 PROCEDURE — 84466 ASSAY OF TRANSFERRIN: CPT | Performed by: NURSE PRACTITIONER

## 2024-03-13 PROCEDURE — 85018 HEMOGLOBIN: CPT | Performed by: NURSE PRACTITIONER

## 2024-03-13 PROCEDURE — 85014 HEMATOCRIT: CPT | Performed by: NURSE PRACTITIONER

## 2024-03-13 PROCEDURE — 94660 CPAP INITIATION&MGMT: CPT

## 2024-03-13 PROCEDURE — 94799 UNLISTED PULMONARY SVC/PX: CPT

## 2024-03-13 PROCEDURE — 93005 ELECTROCARDIOGRAM TRACING: CPT | Performed by: INTERNAL MEDICINE

## 2024-03-13 PROCEDURE — 94664 DEMO&/EVAL PT USE INHALER: CPT

## 2024-03-13 PROCEDURE — 97162 PT EVAL MOD COMPLEX 30 MIN: CPT

## 2024-03-13 PROCEDURE — 80069 RENAL FUNCTION PANEL: CPT | Performed by: INTERNAL MEDICINE

## 2024-03-13 PROCEDURE — 83540 ASSAY OF IRON: CPT | Performed by: NURSE PRACTITIONER

## 2024-03-13 PROCEDURE — 97530 THERAPEUTIC ACTIVITIES: CPT

## 2024-03-13 PROCEDURE — 82948 REAGENT STRIP/BLOOD GLUCOSE: CPT

## 2024-03-13 PROCEDURE — 94761 N-INVAS EAR/PLS OXIMETRY MLT: CPT

## 2024-03-13 PROCEDURE — 99232 SBSQ HOSP IP/OBS MODERATE 35: CPT | Performed by: NURSE PRACTITIONER

## 2024-03-13 RX ORDER — FERROUS SULFATE 325(65) MG
325 TABLET ORAL
Status: DISCONTINUED | OUTPATIENT
Start: 2024-03-13 | End: 2024-03-19 | Stop reason: HOSPADM

## 2024-03-13 RX ORDER — PREDNISONE 10 MG/1
10 TABLET ORAL DAILY
Status: DISCONTINUED | OUTPATIENT
Start: 2024-03-13 | End: 2024-03-19 | Stop reason: HOSPADM

## 2024-03-13 RX ADMIN — SODIUM CHLORIDE 1 DROP: 50 SOLUTION OPHTHALMIC at 17:28

## 2024-03-13 RX ADMIN — SODIUM CHLORIDE 1 DROP: 50 SOLUTION OPHTHALMIC at 22:01

## 2024-03-13 RX ADMIN — PREDNISOLONE ACETATE 1 DROP: 10 SUSPENSION/ DROPS OPHTHALMIC at 11:47

## 2024-03-13 RX ADMIN — Medication 10 ML: at 09:11

## 2024-03-13 RX ADMIN — PREDNISOLONE ACETATE 1 DROP: 10 SUSPENSION/ DROPS OPHTHALMIC at 09:10

## 2024-03-13 RX ADMIN — SENNOSIDES AND DOCUSATE SODIUM 2 TABLET: 50; 8.6 TABLET ORAL at 09:10

## 2024-03-13 RX ADMIN — PREDNISOLONE ACETATE 1 DROP: 10 SUSPENSION/ DROPS OPHTHALMIC at 22:03

## 2024-03-13 RX ADMIN — CLOPIDOGREL BISULFATE 75 MG: 75 TABLET, FILM COATED ORAL at 09:10

## 2024-03-13 RX ADMIN — SODIUM CHLORIDE 1 DROP: 50 SOLUTION OPHTHALMIC at 09:05

## 2024-03-13 RX ADMIN — IPRATROPIUM BROMIDE AND ALBUTEROL SULFATE 3 ML: 2.5; .5 SOLUTION RESPIRATORY (INHALATION) at 07:06

## 2024-03-13 RX ADMIN — SODIUM CHLORIDE 1 DROP: 50 SOLUTION OPHTHALMIC at 11:47

## 2024-03-13 RX ADMIN — SENNOSIDES AND DOCUSATE SODIUM 2 TABLET: 50; 8.6 TABLET ORAL at 22:00

## 2024-03-13 RX ADMIN — ROSUVASTATIN CALCIUM 20 MG: 20 TABLET, FILM COATED ORAL at 22:01

## 2024-03-13 RX ADMIN — Medication 10 ML: at 22:03

## 2024-03-13 RX ADMIN — FERROUS SULFATE TAB 325 MG (65 MG ELEMENTAL FE) 325 MG: 325 (65 FE) TAB at 09:10

## 2024-03-13 RX ADMIN — BUDESONIDE AND FORMOTEROL FUMARATE DIHYDRATE 2 PUFF: 160; 4.5 AEROSOL RESPIRATORY (INHALATION) at 07:07

## 2024-03-13 RX ADMIN — PREDNISONE 10 MG: 10 TABLET ORAL at 17:28

## 2024-03-13 RX ADMIN — IPRATROPIUM BROMIDE AND ALBUTEROL SULFATE 3 ML: 2.5; .5 SOLUTION RESPIRATORY (INHALATION) at 10:31

## 2024-03-13 RX ADMIN — ASPIRIN 81 MG: 81 TABLET, COATED ORAL at 09:10

## 2024-03-13 RX ADMIN — PREDNISOLONE ACETATE 1 DROP: 10 SUSPENSION/ DROPS OPHTHALMIC at 17:28

## 2024-03-13 RX ADMIN — BUDESONIDE AND FORMOTEROL FUMARATE DIHYDRATE 2 PUFF: 160; 4.5 AEROSOL RESPIRATORY (INHALATION) at 20:11

## 2024-03-13 RX ADMIN — IPRATROPIUM BROMIDE AND ALBUTEROL SULFATE 3 ML: 2.5; .5 SOLUTION RESPIRATORY (INHALATION) at 17:55

## 2024-03-13 RX ADMIN — IPRATROPIUM BROMIDE AND ALBUTEROL SULFATE 3 ML: 2.5; .5 SOLUTION RESPIRATORY (INHALATION) at 20:10

## 2024-03-13 RX ADMIN — OXYBUTYNIN CHLORIDE 5 MG: 5 TABLET, EXTENDED RELEASE ORAL at 09:10

## 2024-03-13 NOTE — THERAPY EVALUATION
Patient Name: Lizzy Hicks  : 1935    MRN: [id number]                              Today's Date: 3/13/2024       Admit Date: 3/7/2024    Visit Dx:     ICD-10-CM ICD-9-CM   1. Acute respiratory failure with hypoxia  J96.01 518.81   2. Acute on chronic congestive heart failure, unspecified heart failure type  I50.9 428.0   3. Pneumonia of both lungs due to infectious organism, unspecified part of lung  J18.9 483.8   4. Elevated lactic acid level  R79.89 276.2   5. Elevated troponin  R79.89 790.6   6. NSTEMI, initial episode of care  I21.4 410.71   7. Coronary artery disease involving native coronary artery of native heart with angina pectoris  I25.119 414.01     413.9     Patient Active Problem List   Diagnosis    Hypertension    COPD (chronic obstructive pulmonary disease)    Carotid artery stenosis    Osteoarthritis    Osteoporosis    Hyponatremia    Reset osmostat syndrome    Carotid stenosis, asymptomatic, right    Carotid artery disease    Macular degeneration    COPD exacerbation    Acute exacerbation of chronic obstructive pulmonary disease (COPD)    Parainfluenza infection    Acute respiratory failure with hypoxia    Acute respiratory failure with hypoxia and hypercapnia    Mixed hyperlipidemia    Possible pneumonia of right lower lobe due to infectious organism    Oropharyngeal dysphagia    Acute respiratory failure with hypercapnia    Respiratory insufficiency    COPD with acute exacerbation    Sleep apnea    Acute DVT (deep venous thrombosis)    DVT, lower extremity, distal, acute, right    Acute respiratory failure     Past Medical History:   Diagnosis Date    Anesthesia complication     pt states was groggy for a week after surgery    Carotid artery disease     left    Carotid stenosis     RIGHT    COPD (chronic obstructive pulmonary disease)     History of bronchitis     Hypertension     Macular degeneration     Osteoarthritis 2016    Osteoporosis 2016    Reset osmostat syndrome  08/15/2016    Worked up by prior PCP in [facility]. Baseline Na 128-130 since 2013.    Seborrheic dermatitis 01/20/2016    Swallowing difficulty     D/T INADVERTANT REMOVAL OF UVULA AS CHILD WITH T AND A     Past Surgical History:   Procedure Laterality Date    CARDIAC CATHETERIZATION N/A 3/8/2024    Procedure: Left Heart Cath;  Surgeon: Sheng Jaimes MD;  [facility];  Service: Cardiovascular;  Laterality: N/A;    CARDIAC CATHETERIZATION N/A 3/8/2024    Procedure: Coronary angiography;  Surgeon: Sheng Jaimes MD;  Location:  [facility] CATH INVASIVE LOCATION;  Service: Cardiovascular;  Laterality: N/A;    CARDIAC CATHETERIZATION N/A 3/12/2024    Procedure: Percutaneous Coronary Intervention;  Surgeon: Sheng Jaimes MD;  Location:  [facility] CATH INVASIVE LOCATION;  Service: Cardiovascular;  Laterality: N/A;    CARDIAC CATHETERIZATION N/A 3/12/2024    Procedure: Stent LAVELLE coronary;  Surgeon: Sheng Jaimes MD;  [facility];  Service: Cardiovascular;  Laterality: N/A;    CAROTID ENDARTERECTOMY Right 9/24/2018    Procedure: RT CAROTID ENDARTERECTOMY WITH INTRA OPERATIVE CAROTID ARTERY DUPLEX SCAN;  Surgeon: Mikaela Galicia Jr., MD;  Location: [facility] MAIN OR;  Service: Vascular    CAROTID ENDARTERECTOMY Left 4/4/2019    Procedure: LEFT CAROTID ENDARTERECTOMY;  Surgeon: Mikaela Galicia Jr., MD;  Location: [facility] MAIN OR;  Service: Vascular    CATARACT EXTRACTION WITH INTRAOCULAR LENS IMPLANT      LEFT AND RIGHT    INTERVENTIONAL RADIOLOGY PROCEDURE N/A 3/12/2024    Procedure: Intravascular Ultrasound;  Surgeon: Sheng Jaimes MD;  Location: [facility] CATH INVASIVE LOCATION;  Service: Cardiovascular;  Laterality: N/A;    ORIF FEMUR FRACTURE Right     HARDWARE    TONSILLECTOMY AND ADENOIDECTOMY      INADVERTANTLY TOOK UVULA AT THIS TIME      General Information       Row Name 03/13/24 1502          Physical Therapy Time and Intention    Document Type  evaluation  Pt. admitted with Acute Respiratory Failure  -MS     Mode of Treatment physical therapy;individual therapy  -MS       Row Name 03/13/24 1502          General Information    Patient Profile Reviewed yes  -MS     Prior Level of Function --  use of Rwx for ambulation  -MS     Existing Precautions/Restrictions fall   Exit alarm  -MS     Barriers to Rehab none identified  -MS       Row Name 03/13/24 1502          Cognition    Orientation Status (Cognition) oriented x 3  -MS       Row Name 03/13/24 1502          Safety Issues, Functional Mobility    Comment, Safety Issues/Impairments (Mobility) Gait belt used for safety.  -MS               User Key  (r) = Recorded By, (t) = Taken By, (c) = Cosigned By      Initials Name Provider Type    Isidoro Liu PT Physical Therapist                   Mobility       Row Name 03/13/24 1503          Bed Mobility    Bed Mobility supine-sit;sit-supine  -MS     Supine-Sit [place] (Bed Mobility) minimum assist (75% patient effort)  -MS       Row Name 03/13/24 1503          Sit-Stand Transfer    Sit-Stand [place] (Transfers) minimum assist (75% patient effort);2 person assist  -MS     Assistive Device (Sit-Stand Transfers) --  HHA x 2  -MS       Row Name 03/13/24 1503          Gait/Stairs (Locomotion)    [place] Level (Gait) minimum assist (75% patient effort);2 person assist  -MS     Assistive Device (Gait) --  HHA x 2  -MS     Distance in Feet (Gait) 5  -MS     Deviations/Abnormal Patterns (Gait) trav decreased  -MS     Bilateral Gait Deviations forward flexed posture  -MS     Comment, (Gait/Stairs) Verbal/tactile cues given for posture correction  -MS               User Key  (r) = Recorded By, (t) = Taken By, (c) = Cosigned By      Initials Name Provider Type    Isidoro Liu PT Physical Therapist                   Obj/Interventions       Row Name 03/13/24 1505          Range of Motion Comprehensive    Comment, General Range of Motion BUE/LE  (Imp. 25%)  -MS       Row Name 03/13/24 1505          Strength Comprehensive (MMT)    Comment, General Manual Muscle Testing (MMT) Assessment BUE/LE (3-/5)  -MS       Row Name 03/13/24 1505          Motor Skills    Therapeutic Exercise --  BLE ther. ex. program x 5 reps completed (Hip Flexion, LAQ's)  -MS               User Key  (r) = Recorded By, (t) = Taken By, (c) = Cosigned By      Initials Name Provider Type    Isidoro Liu, PT Physical Therapist                   Goals/Plan       Row Name 03/13/24 1507          Bed Mobility Goal 1 (PT)    Activity/Assistive Device (Bed Mobility Goal 1, PT) bed mobility activities, all  -MS     [place] Level/Cues Needed (Bed Mobility Goal 1, PT) contact guard required  -MS     Time Frame (Bed Mobility Goal 1, PT) long term goal (LTG);1 week  -MS       Row Name 03/13/24 1507          Transfer Goal 1 (PT)    Activity/Assistive Device (Transfer Goal 1, PT) transfers, all;walker, rolling  -MS     [place] Level/Cues Needed (Transfer Goal 1, PT) contact guard required  -MS     Time Frame (Transfer Goal 1, PT) long term goal (LTG);1 week  -MS       Row Name 03/13/24 1507          Gait Training Goal 1 (PT)    Activity/Assistive Device (Gait Training Goal 1, PT) gait (walking locomotion);walker, rolling  -MS     [place] Level (Gait Training Goal 1, PT) contact guard required  -MS     Distance (Gait Training Goal 1, PT) 30 feet  -MS     Time Frame (Gait Training Goal 1, PT) long term goal (LTG);1 week  -MS       Row Name 03/13/24 1507          Therapy Assessment/Plan (PT)    Planned Therapy Interventions (PT) balance training;bed mobility training;gait training;home exercise program;patient/family education;postural re-education;transfer training;strengthening;ROM (range of motion)  -MS               User Key  (r) = Recorded By, (t) = Taken By, (c) = Cosigned By      Initials Name Provider Type    Isidoro Liu, PT Physical Therapist                    Clinical Impression       Row Name 03/13/24 1506          Pain    Pretreatment Pain Rating 3/10  -MS     Posttreatment Pain Rating 3/10  -MS     Pain Location - Side/Orientation Right  -MS     Pain Location - groin  -MS       Row Name 03/13/24 1506          Plan of Care Review    Plan of Care Reviewed With patient  -MS       Row Name 03/13/24 1506          Therapy Assessment/Plan (PT)    Rehab Potential (PT) good, to achieve stated therapy goals  -MS     Criteria for Skilled Interventions Met (PT) skilled treatment is necessary  -MS     Therapy Frequency (PT) 6 times/wk  -MS       Row Name 03/13/24 1506          Positioning and Restraints    Pre-Treatment Position in bed  -MS     Post Treatment Position bsc  -MS     On BS commode notified nsg;sitting;call light within reach;encouraged to call for assist;with nsg  Nursing staff present in room.  -MS               User Key  (r) = Recorded By, (t) = Taken By, (c) = Cosigned By      Initials Name Provider Type    Isidoro Liu, PT Physical Therapist                   Outcome Measures       Row Name 03/13/24 1507 03/13/24 0845       How much help from another person do you currently need...    Turning from your back to your side while in flat bed without using bedrails? 3  -MS 3  -MK    Moving from lying on back to sitting on the side of a flat bed without bedrails? 3  -MS 3  -MK    Moving to and from a bed to a chair (including a wheelchair)? 2  -MS 2  -MK    Standing up from a chair using your arms (e.g., wheelchair, bedside chair)? 2  -MS 2  -MK    Climbing 3-5 steps with a railing? 2  -MS 2  -MK    To walk in hospital room? 2  -MS 2  -MK    AM-PAC 6 Clicks Score (PT) 14  -MS 14  -MK    Highest Level of Mobility Goal 4 --> Transfer to chair/commode  -MS 4 --> Transfer to chair/commode  -MK      Row Name 03/13/24 1507          Functional Assessment    Outcome Measure Options AM-PAC 6 Clicks Basic Mobility (PT)  -MS               User Key  (r) = Recorded By, (t)  = Taken By, (c) = Cosigned By      Initials Name Provider Type    MS Isidoro Godinez, PT Physical Therapist    Dior Stephens RN Registered Nurse                                 Physical Therapy Education       Title: PT OT SLP Therapies (Done)       Topic: Physical Therapy (Done)       Point: Mobility training (Done)       Learning Progress Summary             Patient Acceptance, E,D, VU,NR by MS at 3/13/2024 1509                         Point: Home exercise program (Done)       Learning Progress Summary             Patient Acceptance, E,D, VU,NR by MS at 3/13/2024 1509                         Point: Body mechanics (Done)       Learning Progress Summary             Patient Acceptance, E,D, VU,NR by MS at 3/13/2024 1509                         Point: Precautions (Done)       Learning Progress Summary             Patient Acceptance, E,D, VU,NR by MS at 3/13/2024 1509                                         User Key       Initials Effective Dates Name Provider Type Discipline    MS 06/16/21 -  GodinezIsidoro, PT Physical Therapist PT                  PT Recommendation and Plan  Planned Therapy Interventions (PT): balance training, bed mobility training, gait training, home exercise program, patient/family education, postural re-education, transfer training, strengthening, ROM (range of motion)  Plan of Care Reviewed With: patient  Outcome Evaluation: Pt. is an 88 year old Female admitted with Acute respiratory failure.  Pt. reports that prior to admission she was using a Rwx for ambulation. Pt. currently presents with decreased strength, decreased balance, decreased ROM, and decreased tolerance to functional activity. This PM, pt. able to ambulate 5 feet, Min. assist x2, with HHA x 2.  Pt. requires Min. assist x 1 for bed mobility and Min. assist x 2 for sit <-> stand transfers.  BLE ther. ex. program x 5 reps completed. Verbal/tactile cues given throughout for posture correction. Pt. will benefit from  skilled inpt. P.T. to address her functional deficits and to assist pt. in regaining her maximum level of independence with functional mobility.     Time Calculation:         PT Charges       Row Name 03/13/24 1513             Time Calculation    Start Time 1355  -MS      Stop Time 1415  -MS      Time Calculation (min) 20 min  -MS      PT Received On 03/13/24  -MS      PT - Next Appointment 03/14/24  -MS      PT Goal Re-Cert Due Date 03/20/24  -MS         Time Calculation- PT    Total Timed Code Minutes- PT 18 minute(s)  -MS                User Key  (r) = Recorded By, (t) = Taken By, (c) = Cosigned By      Initials Name Provider Type    Isidoro Liu, PT Physical Therapist                  Therapy Charges for Today       Code Description Service Date Service Provider Modifiers Qty    04480059341 HC PT EVAL MOD COMPLEXITY 2 3/13/2024 Isidoro Godinez, PT GP 1    42061101543 HC PT THERAPEUTIC ACT EA 15 MIN 3/13/2024 Isidoro Godinez, PT GP 1    59939829511 HC PT THER SUPP EA 15 MIN 3/13/2024 Isidoro Godinez, PT GP 1            PT G-Codes  Outcome Measure Options: AM-PAC 6 Clicks Basic Mobility (PT)  AM-PAC 6 Clicks Score (PT): 14  PT Discharge Summary  Anticipated Discharge Disposition (PT): skilled nursing facility    Isidoro Godinez, PT  3/13/2024

## 2024-03-13 NOTE — PROGRESS NOTES
[facility] Progress Note       Encounter Date:24  Patient:Lizzy Hicks  :1935  MRN:[id number]      Chief Complaint: f/u respiratory failure, NSTEMI      Subjective:      PCI of left main yesterday  Patient reports she did not sleep well with poor CPAP mask fit    Medications:  Scheduled Meds:  amLODIPine, 5 mg, Oral, Daily  aspirin, 81 mg, Oral, Daily  atenolol, 50 mg, Oral, Daily  budesonide-formoterol, 2 puff, Inhalation, BID - RT  clopidogrel, 75 mg, Oral, Daily  ferrous sulfate, 325 mg, Oral, Daily With Breakfast  ipratropium-albuterol, 3 mL, Nebulization, 4x Daily - RT  losartan, 12.5 mg, Oral, Q24H  methylPREDNISolone sodium succinate, 40 mg, Intravenous, Once  oxybutynin XL, 5 mg, Oral, Daily  prednisoLONE acetate, 1 drop, Right Eye, 4x Daily  rosuvastatin, 20 mg, Oral, Daily  senna-docusate sodium, 2 tablet, Oral, BID  sodium chloride, 1 drop, Right Eye, 4x Daily  sodium chloride, 10 mL, Intravenous, Q12H    Continuous Infusions:   PRN Meds:    atropine    senna-docusate sodium **AND** polyethylene glycol **AND** bisacodyl **AND** bisacodyl    docusate sodium    famotidine    nitroglycerin    ondansetron    [COMPLETED] Insert Peripheral IV **AND** sodium chloride    sodium chloride    sodium chloride    sodium chloride    sodium chloride         Objective:       Vitals:    24 0301 24 0336 24 0512 24 0706   BP:  136/46     BP Location:  Left arm     Patient Position:  Lying     Pulse: 71   77   Resp: 15 15  16   Temp:  98.7 °F (37.1 °C)     TempSrc:  Oral     SpO2: 100%   100%   Weight:   47.4 kg (104 lb 8 oz)    Height:               Physical Exam:  Constitutional: Alert and conversant, no acute distress  HENT: Oropharynx clear and membrane moist  Eyes: Normal conjunctiva, no sclera icterus.  Neck: Supple, no carotid bruit bilaterally.  Cardiovascular: Regular rate and rhythm, No Murmur, trace bilateral lower extremity edema.  Pulmonary: Normal  respiratory effort, + wheeze  Abdominal: Soft, nontender  Neurological: Alert and orient x 3.   Skin: Warm, dry. Right groin hematoma marked, site is nontender  Psych: Appropriate mood and affect. Normal judgment and insight.           Lab Review:   Results from last 7 days   Lab Units 03/13/24 0417 03/12/24 0419 03/11/24  0431 03/10/24  0546 03/09/24  0641 03/08/24  0316 03/07/24  0302   SODIUM mmol/L 140 138 135* 136 136 133* 134*   POTASSIUM mmol/L 4.3 4.7 4.2 3.9 3.5 3.6 4.4   CHLORIDE mmol/L 104 101 101 100 97* 94* 93*   CO2 mmol/L 24.5 24.3 25.4 29.0 25.7 24.0 24.7   BUN mg/dL 28* 26* 27* 37* 35* 35* 23   CREATININE mg/dL 0.85 0.84 0.90 0.99 1.00 1.13* 1.29*   GLUCOSE mg/dL 110* 149* 107* 110* 94 112* 199*   CALCIUM mg/dL 8.1* 9.2 8.5* 8.8 8.7 8.4* 8.8   AST (SGOT) U/L  --   --   --   --   --   --  24   ALT (SGPT) U/L  --   --   --   --   --   --  18     Results from last 7 days   Lab Units 03/07/24  0956 03/07/24  0510 03/07/24  0302   HSTROP T ng/L 282* 164* 55*     Results from last 7 days   Lab Units 03/13/24 0417 03/12/24  0419 03/11/24  0431 03/10/24  0546 03/09/24  0641 03/08/24  0316 03/07/24  0302   WBC 10*3/mm3 14.25* 8.36 9.55 9.11 12.05* 10.80 16.76*   HEMOGLOBIN g/dL 7.5* 10.2* 9.3* 9.2* 9.7* 9.4* 10.9*   HEMATOCRIT % 23.2* 31.0* 28.4* 27.9* 30.1* 28.2* 33.0*   PLATELETS 10*3/mm3 180 244 216 212 212 167 210     Results from last 7 days   Lab Units 03/07/24  0302   INR  1.07   APTT seconds 31.4         Results from last 7 days   Lab Units 03/08/24  0316   CHOLESTEROL mg/dL 195   TRIGLYCERIDES mg/dL 63   HDL CHOL mg/dL 114*     Results from last 7 days   Lab Units 03/07/24  0302   PROBNP pg/mL 6,918.0*                Assessment:          Diagnosis Plan   1. Acute respiratory failure with hypoxia        2. Acute on chronic congestive heart failure, unspecified heart failure type        3. Pneumonia of both lungs due to infectious organism, unspecified part of lung        4. Elevated lactic acid  level        5. Elevated troponin        6. NSTEMI, initial episode of care  Ambulatory Referral to Cardiac Rehab    Ambulatory Referral to Cardiac Rehab      7. Coronary artery disease involving native coronary artery of native heart with angina pectoris               Plan:       NSTEMI / Coronary artery disease - felt type II MI with acute on chronic hypoxia related to pneumonia.  LHC on 3/8/24 did demonstrate LM disease s/p PCI yesterday. Her EF is normal with no rWMAs.  Continue aspirin, Plavix and high potency statin  Acute on chronic diastolic congestive heart failure - diuresed this admission  Acute on chronic anemia - drop in hemoglobin post-PCI - likely represents loss during procedure and groin hematoma. Will repeat now and trend today to ensure stability. Add iron panel and start oral supplement. With further drop or hypotension would consider transfusion  Nonrheumatic valvular heart disease - mild AS, moderate MR on echo  Carotid artery disease - hx of right CEA 2018, left CEA 2019. Follows with Dr Grayson Hamilton of DVT - August 2023      Drop in hemoglobin likely represents procedural loss and groin hematoma which appears fairly stable this morning. Will repeat and trend hemoglobin. Add iron panel and iron supplement. With further loss or hypotension would consider transfusion. Her BP is a little soft this morning and will hold appropriate medications    Abiola HERNANDEZ  [facility]  03/13/24  09:47 EDT

## 2024-03-13 NOTE — PROGRESS NOTES
"                                              LOS: 6 days   Patient Care Team:  Traci Hoyos MD as PCP - General (Geriatric Medicine)    Chief Complaint:  F/up respiratory failure, COPD and pneumonia.    Subjective   Interval History      No worsening dyspnea.  Has mild nonproductive cough.  On oxygen 2 L.  Using NIPPV at night.    REVIEW OF SYSTEMS:   CARDIOVASCULAR: No chest pain, chest pressure or chest discomfort. No palpitations but edema.   CONSTITUTIONAL: No fever or chills.     Ventilator/Non-Invasive Ventilation Settings (From admission, onward)       Start     Ordered    03/07/24 0316  NIPPV - Provider Settings  (CPAP / BiPAP)  Until Discontinued        Question Answer Comment   Indication Acute Respiratory Failure    Type AVAPS/PC/PS    Backup Rate 12    Target VT (mL) 450    EPAP/PEEP (cm H2O) 6    Min Pressure (cm H2O) 8    Max Pressure (cm H2O) 25    Titrate Oxygen for SpO2 90 - 95%        03/07/24 0316                          Physical Exam:     Vital Signs  Temp:  [97.9 °F (36.6 °C)-98.7 °F (37.1 °C)] 98.7 °F (37.1 °C)  Heart Rate:  [71-90] 78  Resp:  [14-18] 16  BP: ()/(46-62) 136/46    Intake/Output Summary (Last 24 hours) at 3/13/2024 1622  Last data filed at 3/13/2024 0900  Gross per 24 hour   Intake 120 ml   Output --   Net 120 ml     Flowsheet Rows      Flowsheet Row First Filed Value   Admission Height 152.4 cm (60\") Documented at 03/07/2024 0258   Admission Weight 62.3 kg (137 lb 4.8 oz) Documented at 03/07/2024 0258            PPE used per hospital policy    General Appearance:   Alert, cooperative, in no acute distress   ENMT:  Mallampati score 3, moist mucous membrane   Eyes:  Pupils equal and reactive to light. EOMI   Neck:   Large. Trachea midline. No thyromegaly.   Lungs:   Clear but diminished to anterior auscultation.    Heart:   Regular rhythm and normal rate, normal S1 and S2, no         murmur   Skin:   No rash or ecchymosis   Abdomen:    Obese. Soft. No tenderness. No HSM. "   Neuro/psych:  Conscious, alert, oriented x3. Strength 5/5 in upper and lower  ext.  Appropriate mood and affect   Extremities:  No cyanosis, clubbing but edema in legs.  Warm extremities and well-perfused.  Right groin ecchymosis and mild swelling          Results Review:        Results from last 7 days   Lab Units 03/13/24 0417 03/12/24 0419 03/11/24  0431   SODIUM mmol/L 140 138 135*   POTASSIUM mmol/L 4.3 4.7 4.2   CHLORIDE mmol/L 104 101 101   CO2 mmol/L 24.5 24.3 25.4   BUN mg/dL 28* 26* 27*   CREATININE mg/dL 0.85 0.84 0.90   GLUCOSE mg/dL 110* 149* 107*   CALCIUM mg/dL 8.1* 9.2 8.5*     Results from last 7 days   Lab Units 03/07/24  0956 03/07/24  0510 03/07/24  0302   HSTROP T ng/L 282* 164* 55*     Results from last 7 days   Lab Units 03/13/24  0948 03/13/24 0417 03/12/24 0419 03/11/24  0431   WBC 10*3/mm3  --  14.25* 8.36 9.55   HEMOGLOBIN g/dL 7.3* 7.5* 10.2* 9.3*   HEMATOCRIT % 21.9* 23.2* 31.0* 28.4*   PLATELETS 10*3/mm3  --  180 244 216     Results from last 7 days   Lab Units 03/07/24  0302   INR  1.07   APTT seconds 31.4     Results from last 7 days   Lab Units 03/07/24  0302   PROBNP pg/mL 6,918.0*       Results from last 7 days   Lab Units 03/07/24  0302   D DIMER QUANT MCGFEU/mL 3.09*             Results from last 7 days   Lab Units 03/07/24  0312   PH, ARTERIAL pH units 7.422   PCO2, ARTERIAL mm Hg 39.7   PO2 ART mm Hg 239.9*   O2 SATURATION ART % 99.8*   MODALITY  BiPap         I reviewed the patient's new clinical results.        Medication Review:   amLODIPine, 5 mg, Oral, Daily  aspirin, 81 mg, Oral, Daily  atenolol, 50 mg, Oral, Daily  budesonide-formoterol, 2 puff, Inhalation, BID - RT  clopidogrel, 75 mg, Oral, Daily  ferrous sulfate, 325 mg, Oral, Daily With Breakfast  ipratropium-albuterol, 3 mL, Nebulization, 4x Daily - RT  losartan, 12.5 mg, Oral, Q24H  methylPREDNISolone sodium succinate, 40 mg, Intravenous, Once  oxybutynin XL, 5 mg, Oral, Daily  prednisoLONE acetate, 1 drop,  Right Eye, 4x Daily  predniSONE, 10 mg, Oral, Daily  rosuvastatin, 20 mg, Oral, Daily  senna-docusate sodium, 2 tablet, Oral, BID  sodium chloride, 1 drop, Right Eye, 4x Daily  sodium chloride, 10 mL, Intravenous, Q12H             Diagnostic imaging:  I personally and independently reviewed the following images:  CTA chest 3/7/2024: Nodular and groundglass infiltrates bilaterally, R >L.  Right lower lobe consolidation, probably atelectasis.  CT abdomen 3/10/24: fndings as below    Assessment     Acute on chronic hypoxic and hypercapnic respiratory failure, on O2 2-3 L/min and NIPPV at baseline.   Pneumonia, bilateral, R >L  Minimal right pleural effusion   COPD exacerbation, improved  Non-STEMI type II  CAD with severe 95% proximal left main stenosis.  S/p [facility] 3/8/2024. Not a surgical candiate.  S/p [facility] 3/12/2024 with stent placement  History of DVT, on AC with Eliquis   Prominent calcifications of the abdominal aorta, iliac arteries, and  femoral arteries  ABLA secondary to right groin hematoma        Plan     NIPPV at night and as needed during the day.  Oxygen by NC and titrate keep SpO2 less than 90%  Finished cefepime  DuoNeb 4 times a day and Symbicort 2 puffs twice daily  CHF management: Atenolol 50 mg daily.  Losartan 12.5 mg daily  Aspirin and Plavix  H&H every 6 hours.  Transfuse as needed to keep Hb >7.  Bedrest  Continue holding anticoagulation (Eliquis) due to blood loss.  Resumed her chronic prednisone 10 mg daily    Discussed with her son.  Dispo: When she is stable and there is no evidence of further bleeding then she can be discharged.      Felix Muro MD  03/13/24  16:22 EDT          This note was dictated utilizing Dragon dictation

## 2024-03-13 NOTE — PLAN OF CARE
Goal Outcome Evaluation:  Plan of Care Reviewed With: patient           Outcome Evaluation: Pt. is an 88 year old Female admitted with Acute respiratory failure.  Pt. reports that prior to admission she was using a Rwx for ambulation. Pt. currently presents with decreased strength, decreased balance, decreased ROM, and decreased tolerance to functional activity. This PM, pt. able to ambulate 5 feet, Min. assist x2, with HHA x 2.  Pt. requires Min. assist x 1 for bed mobility and Min. assist x 2 for sit <-> stand transfers.  BLE ther. ex. program x 5 reps completed. Verbal/tactile cues given throughout for posture correction. Pt. will benefit from skilled inpt. P.T. to address her functional deficits and to assist pt. in regaining her maximum level of independence with functional mobility.      Anticipated Discharge Disposition (PT): skilled nursing facility

## 2024-03-14 LAB
ABO GROUP BLD: NORMAL
ABO GROUP BLD: NORMAL
ACT BLD: 260 SECONDS (ref 82–152)
ACT BLD: 266 SECONDS (ref 82–152)
ALBUMIN SERPL-MCNC: 3.1 G/DL (ref 3.5–5.2)
ANION GAP SERPL CALCULATED.3IONS-SCNC: 7 MMOL/L (ref 5–15)
BLD GP AB SCN SERPL QL: NEGATIVE
BUN SERPL-MCNC: 27 MG/DL (ref 8–23)
BUN/CREAT SERPL: 30 (ref 7–25)
CALCIUM SPEC-SCNC: 8.3 MG/DL (ref 8.6–10.5)
CHLORIDE SERPL-SCNC: 107 MMOL/L (ref 98–107)
CO2 SERPL-SCNC: 25 MMOL/L (ref 22–29)
CREAT SERPL-MCNC: 0.9 MG/DL (ref 0.57–1)
DEPRECATED RDW RBC AUTO: 44.5 FL (ref 37–54)
EGFRCR SERPLBLD CKD-EPI 2021: 62.4 ML/MIN/1.73
ERYTHROCYTE [DISTWIDTH] IN BLOOD BY AUTOMATED COUNT: 13.3 % (ref 12.3–15.4)
GLUCOSE SERPL-MCNC: 117 MG/DL (ref 65–99)
HCT VFR BLD AUTO: 19.4 % (ref 34–46.6)
HCT VFR BLD AUTO: 34 % (ref 34–46.6)
HGB BLD-MCNC: 11.5 G/DL (ref 12–15.9)
HGB BLD-MCNC: 6.4 G/DL (ref 12–15.9)
MCH RBC QN AUTO: 29.8 PG (ref 26.6–33)
MCHC RBC AUTO-ENTMCNC: 33 G/DL (ref 31.5–35.7)
MCV RBC AUTO: 90.2 FL (ref 79–97)
PHOSPHATE SERPL-MCNC: 3.9 MG/DL (ref 2.5–4.5)
PLATELET # BLD AUTO: 157 10*3/MM3 (ref 140–450)
PMV BLD AUTO: 9.8 FL (ref 6–12)
POTASSIUM SERPL-SCNC: 4.5 MMOL/L (ref 3.5–5.2)
RBC # BLD AUTO: 2.15 10*6/MM3 (ref 3.77–5.28)
RH BLD: NEGATIVE
RH BLD: NEGATIVE
SODIUM SERPL-SCNC: 139 MMOL/L (ref 136–145)
T&S EXPIRATION DATE: NORMAL
WBC NRBC COR # BLD AUTO: 11.56 10*3/MM3 (ref 3.4–10.8)

## 2024-03-14 PROCEDURE — 80069 RENAL FUNCTION PANEL: CPT | Performed by: INTERNAL MEDICINE

## 2024-03-14 PROCEDURE — 94799 UNLISTED PULMONARY SVC/PX: CPT

## 2024-03-14 PROCEDURE — 86901 BLOOD TYPING SEROLOGIC RH(D): CPT | Performed by: NURSE PRACTITIONER

## 2024-03-14 PROCEDURE — 85027 COMPLETE CBC AUTOMATED: CPT | Performed by: INTERNAL MEDICINE

## 2024-03-14 PROCEDURE — 86923 COMPATIBILITY TEST ELECTRIC: CPT

## 2024-03-14 PROCEDURE — 85018 HEMOGLOBIN: CPT | Performed by: INTERNAL MEDICINE

## 2024-03-14 PROCEDURE — 25010000002 FUROSEMIDE PER 20 MG: Performed by: NURSE PRACTITIONER

## 2024-03-14 PROCEDURE — 86900 BLOOD TYPING SEROLOGIC ABO: CPT

## 2024-03-14 PROCEDURE — 99232 SBSQ HOSP IP/OBS MODERATE 35: CPT | Performed by: NURSE PRACTITIONER

## 2024-03-14 PROCEDURE — 86850 RBC ANTIBODY SCREEN: CPT | Performed by: NURSE PRACTITIONER

## 2024-03-14 PROCEDURE — 86901 BLOOD TYPING SEROLOGIC RH(D): CPT

## 2024-03-14 PROCEDURE — 94660 CPAP INITIATION&MGMT: CPT

## 2024-03-14 PROCEDURE — 86900 BLOOD TYPING SEROLOGIC ABO: CPT | Performed by: NURSE PRACTITIONER

## 2024-03-14 PROCEDURE — 63710000001 PREDNISONE PER 5 MG: Performed by: INTERNAL MEDICINE

## 2024-03-14 PROCEDURE — 85014 HEMATOCRIT: CPT | Performed by: INTERNAL MEDICINE

## 2024-03-14 PROCEDURE — P9016 RBC LEUKOCYTES REDUCED: HCPCS

## 2024-03-14 PROCEDURE — 36430 TRANSFUSION BLD/BLD COMPNT: CPT

## 2024-03-14 RX ORDER — FUROSEMIDE 10 MG/ML
40 INJECTION INTRAMUSCULAR; INTRAVENOUS ONCE
Status: COMPLETED | OUTPATIENT
Start: 2024-03-14 | End: 2024-03-14

## 2024-03-14 RX ADMIN — Medication 10 ML: at 20:23

## 2024-03-14 RX ADMIN — PREDNISONE 10 MG: 10 TABLET ORAL at 08:54

## 2024-03-14 RX ADMIN — CLOPIDOGREL BISULFATE 75 MG: 75 TABLET, FILM COATED ORAL at 08:54

## 2024-03-14 RX ADMIN — IPRATROPIUM BROMIDE AND ALBUTEROL SULFATE 3 ML: 2.5; .5 SOLUTION RESPIRATORY (INHALATION) at 19:25

## 2024-03-14 RX ADMIN — SODIUM CHLORIDE 1 DROP: 50 SOLUTION OPHTHALMIC at 08:56

## 2024-03-14 RX ADMIN — BUDESONIDE AND FORMOTEROL FUMARATE DIHYDRATE 2 PUFF: 160; 4.5 AEROSOL RESPIRATORY (INHALATION) at 07:20

## 2024-03-14 RX ADMIN — ASPIRIN 81 MG: 81 TABLET, COATED ORAL at 08:54

## 2024-03-14 RX ADMIN — IPRATROPIUM BROMIDE AND ALBUTEROL SULFATE 3 ML: 2.5; .5 SOLUTION RESPIRATORY (INHALATION) at 14:23

## 2024-03-14 RX ADMIN — ATENOLOL 50 MG: 50 TABLET ORAL at 08:54

## 2024-03-14 RX ADMIN — IPRATROPIUM BROMIDE AND ALBUTEROL SULFATE 3 ML: 2.5; .5 SOLUTION RESPIRATORY (INHALATION) at 07:20

## 2024-03-14 RX ADMIN — IPRATROPIUM BROMIDE AND ALBUTEROL SULFATE 3 ML: 2.5; .5 SOLUTION RESPIRATORY (INHALATION) at 11:20

## 2024-03-14 RX ADMIN — ROSUVASTATIN CALCIUM 20 MG: 20 TABLET, FILM COATED ORAL at 20:22

## 2024-03-14 RX ADMIN — OXYBUTYNIN CHLORIDE 5 MG: 5 TABLET, EXTENDED RELEASE ORAL at 08:54

## 2024-03-14 RX ADMIN — AMLODIPINE BESYLATE 5 MG: 5 TABLET ORAL at 08:54

## 2024-03-14 RX ADMIN — BUDESONIDE AND FORMOTEROL FUMARATE DIHYDRATE 2 PUFF: 160; 4.5 AEROSOL RESPIRATORY (INHALATION) at 19:26

## 2024-03-14 RX ADMIN — FERROUS SULFATE TAB 325 MG (65 MG ELEMENTAL FE) 325 MG: 325 (65 FE) TAB at 08:54

## 2024-03-14 RX ADMIN — PREDNISOLONE ACETATE 1 DROP: 10 SUSPENSION/ DROPS OPHTHALMIC at 17:17

## 2024-03-14 RX ADMIN — SENNOSIDES AND DOCUSATE SODIUM 2 TABLET: 50; 8.6 TABLET ORAL at 20:22

## 2024-03-14 RX ADMIN — PREDNISOLONE ACETATE 1 DROP: 10 SUSPENSION/ DROPS OPHTHALMIC at 08:54

## 2024-03-14 RX ADMIN — SODIUM CHLORIDE 1 DROP: 50 SOLUTION OPHTHALMIC at 17:18

## 2024-03-14 RX ADMIN — PREDNISOLONE ACETATE 1 DROP: 10 SUSPENSION/ DROPS OPHTHALMIC at 11:10

## 2024-03-14 RX ADMIN — LOSARTAN POTASSIUM 12.5 MG: 25 TABLET, FILM COATED ORAL at 08:54

## 2024-03-14 RX ADMIN — PREDNISOLONE ACETATE 1 DROP: 10 SUSPENSION/ DROPS OPHTHALMIC at 20:21

## 2024-03-14 RX ADMIN — SENNOSIDES AND DOCUSATE SODIUM 2 TABLET: 50; 8.6 TABLET ORAL at 08:54

## 2024-03-14 RX ADMIN — FUROSEMIDE 40 MG: 10 INJECTION, SOLUTION INTRAMUSCULAR; INTRAVENOUS at 16:22

## 2024-03-14 RX ADMIN — Medication 10 ML: at 09:08

## 2024-03-14 RX ADMIN — SODIUM CHLORIDE 1 DROP: 50 SOLUTION OPHTHALMIC at 20:22

## 2024-03-14 RX ADMIN — SODIUM CHLORIDE 1 DROP: 50 SOLUTION OPHTHALMIC at 11:10

## 2024-03-14 NOTE — PROGRESS NOTES
[facility] Progress Note       Encounter Date:24  Patient:Lizzy Hicks  :1935  MRN:[id number]      Chief Complaint: f/u respiratory failure, NSTEMI      Subjective:      Patient feels better today. Her son is at bedside  Hemoglobin downtrend today, groin site appears stable    Medications:  Scheduled Meds:  amLODIPine, 5 mg, Oral, Daily  aspirin, 81 mg, Oral, Daily  atenolol, 50 mg, Oral, Daily  budesonide-formoterol, 2 puff, Inhalation, BID - RT  clopidogrel, 75 mg, Oral, Daily  ferrous sulfate, 325 mg, Oral, Daily With Breakfast  ipratropium-albuterol, 3 mL, Nebulization, 4x Daily - RT  losartan, 12.5 mg, Oral, Q24H  methylPREDNISolone sodium succinate, 40 mg, Intravenous, Once  oxybutynin XL, 5 mg, Oral, Daily  prednisoLONE acetate, 1 drop, Right Eye, 4x Daily  predniSONE, 10 mg, Oral, Daily  rosuvastatin, 20 mg, Oral, Daily  senna-docusate sodium, 2 tablet, Oral, BID  sodium chloride, 1 drop, Right Eye, 4x Daily  sodium chloride, 10 mL, Intravenous, Q12H    Continuous Infusions:   PRN Meds:    atropine    senna-docusate sodium **AND** polyethylene glycol **AND** bisacodyl **AND** bisacodyl    docusate sodium    famotidine    nitroglycerin    ondansetron    [COMPLETED] Insert Peripheral IV **AND** sodium chloride    sodium chloride    sodium chloride    sodium chloride    sodium chloride         Objective:       Vitals:    24 0500 24 0720 24 0743 24 0820   BP:   121/80    BP Location:   Left arm    Patient Position:   Lying    Pulse:  77 79    Resp:  15 15    Temp:   97.9 °F (36.6 °C)    TempSrc:   Oral    SpO2:  100% 100% 91%   Weight: 46.8 kg (103 lb 1.6 oz)      Height:               Physical Exam:  Constitutional: Alert and conversant, no acute distress  HENT: Oropharynx clear and membrane moist  Eyes: Normal conjunctiva, no sclera icterus.  Neck: Supple, no carotid bruit bilaterally.  Cardiovascular: Regular rate and rhythm, No Murmur, trace  bilateral lower extremity edema.  Pulmonary: Normal respiratory effort, + wheeze  Abdominal: Soft, nontender  Neurological: Alert and orient x 3.   Skin: Warm, dry. Right groin hematoma marked and appears relatively stable, site is nontender  Psych: Appropriate mood and affect. Normal judgment and insight.           Lab Review:   Results from last 7 days   Lab Units 03/14/24  0340 03/13/24  0417 03/12/24  0419 03/11/24  0431 03/10/24  0546 03/09/24  0641 03/08/24  0316   SODIUM mmol/L 139 140 138 135* 136 136 133*   POTASSIUM mmol/L 4.5 4.3 4.7 4.2 3.9 3.5 3.6   CHLORIDE mmol/L 107 104 101 101 100 97* 94*   CO2 mmol/L 25.0 24.5 24.3 25.4 29.0 25.7 24.0   BUN mg/dL 27* 28* 26* 27* 37* 35* 35*   CREATININE mg/dL 0.90 0.85 0.84 0.90 0.99 1.00 1.13*   GLUCOSE mg/dL 117* 110* 149* 107* 110* 94 112*   CALCIUM mg/dL 8.3* 8.1* 9.2 8.5* 8.8 8.7 8.4*     Results from last 7 days   Lab Units 03/07/24  0956   HSTROP T ng/L 282*     Results from last 7 days   Lab Units 03/14/24  0340 03/13/24  1558 03/13/24  0948 03/13/24  0417 03/12/24  0419 03/11/24  0431 03/10/24  0546 03/09/24  0641 03/08/24  0316   WBC 10*3/mm3 11.56*  --   --  14.25* 8.36 9.55 9.11 12.05* 10.80   HEMOGLOBIN g/dL 6.4* 7.4* 7.3* 7.5* 10.2* 9.3* 9.2* 9.7* 9.4*   HEMATOCRIT % 19.4* 22.6* 21.9* 23.2* 31.0* 28.4* 27.9* 30.1* 28.2*   PLATELETS 10*3/mm3 157  --   --  180 244 216 212 212 167               Results from last 7 days   Lab Units 03/08/24  0316   CHOLESTEROL mg/dL 195   TRIGLYCERIDES mg/dL 63   HDL CHOL mg/dL 114*                      Assessment:          Diagnosis Plan   1. Acute respiratory failure with hypoxia        2. Acute on chronic congestive heart failure, unspecified heart failure type        3. Pneumonia of both lungs due to infectious organism, unspecified part of lung        4. Elevated lactic acid level        5. Elevated troponin        6. NSTEMI, initial episode of care  Ambulatory Referral to Cardiac Rehab    Ambulatory Referral to  Cardiac Rehab      7. Coronary artery disease involving native coronary artery of native heart with angina pectoris               Plan:       NSTEMI / Coronary artery disease - felt type II MI with acute on chronic hypoxia related to pneumonia.  LHC on 3/8/24 did demonstrate LM disease s/p PCI 3/12. Her EF is normal with no rWMAs.  Continue aspirin, Plavix and high potency statin  Acute on chronic diastolic congestive heart failure - diuresed this admission. Will give 40mg IV Lasix after blood  Acute on chronic anemia - drop in hemoglobin post-PCI with stable groin hematoma. Decrease in hemoglobin overnight from 7.4--> 6.4. Transfuse 2 units and trend. Iron also started   Non-rheumatic valvular heart disease - mild AS, moderate MR on echo  Carotid artery disease - hx of right CEA 2018, left CEA 2019. Follows with Dr Grayson Hamilton of DVT - August 2023      Drop in hemoglobin with stable groin site hematoma. Plan transfuse 2 units followed by 40mg IV Lasix    Abiola HERNANDEZ  [facility]  03/14/24  08:55 EDT

## 2024-03-14 NOTE — SIGNIFICANT NOTE
03/14/24 1417   OTHER   Discipline physical therapist   Rehab Time/Intention   Session Not Performed other (see comments)  (Spoke with SHAVON Castorena- low Hgb and planning to receive second transfusion soon. In agreement to hold PT today and follow up tomorrow as appropriate.)   Recommendation   PT - Next Appointment 03/15/24

## 2024-03-14 NOTE — PROGRESS NOTES
"                                              LOS: 7 days   Patient Care Team:  Traci Hoyos MD as PCP - General (Geriatric Medicine)    Chief Complaint:  F/up respiratory failure, COPD and pneumonia.    Subjective   Interval History      No worsening dyspnea.  Has mild nonproductive cough.  On oxygen 2 L.  Using NIPPV at night.  Hb today was 6.7.    REVIEW OF SYSTEMS:   CARDIOVASCULAR: No chest pain, chest pressure or chest discomfort. No palpitations but edema.   CONSTITUTIONAL: No fever or chills.     Ventilator/Non-Invasive Ventilation Settings (From admission, onward)       Start     Ordered    03/07/24 0316  NIPPV - Provider Settings  (CPAP / BiPAP)  Until Discontinued        Question Answer Comment   Indication Acute Respiratory Failure    Type AVAPS/PC/PS    Backup Rate 12    Target VT (mL) 450    EPAP/PEEP (cm H2O) 6    Min Pressure (cm H2O) 8    Max Pressure (cm H2O) 25    Titrate Oxygen for SpO2 90 - 95%        03/07/24 0316                          Physical Exam:     Vital Signs  Temp:  [97.9 °F (36.6 °C)-98.7 °F (37.1 °C)] 98.2 °F (36.8 °C)  Heart Rate:  [] 84  Resp:  [15-22] 18  BP: (113-147)/(44-80) 147/58    Intake/Output Summary (Last 24 hours) at 3/14/2024 1458  Last data filed at 3/14/2024 1301  Gross per 24 hour   Intake 831 ml   Output 150 ml   Net 681 ml     Flowsheet Rows      Flowsheet Row First Filed Value   Admission Height 152.4 cm (60\") Documented at 03/07/2024 0258   Admission Weight 62.3 kg (137 lb 4.8 oz) Documented at 03/07/2024 0258            PPE used per hospital policy    General Appearance:   Alert, cooperative, in no acute distress   ENMT:  Mallampati score 3, moist mucous membrane   Eyes:  Pupils equal and reactive to light. EOMI   Neck:    Trachea midline. No thyromegaly.   Lungs:   Equal but diminished air entry. ,mild bilateral exp wheezing/ .    Heart:   Regular rhythm and normal rate, normal S1 and S2, no         murmur   Skin:   No rash or ecchymosis   Abdomen:    " Obese. Soft. No tenderness. No HSM.   Neuro/psych:  Conscious, alert, oriented x3. Strength 5/5 in upper and lower  ext.  Appropriate mood and affect   Extremities:  No cyanosis, clubbing but edema in legs.  Warm extremities and well-perfused.  Right groin ecchymosis and mild swelling. No palpable mass.           Results Review:        Results from last 7 days   Lab Units 03/14/24  0340 03/13/24  0417 03/12/24  0419   SODIUM mmol/L 139 140 138   POTASSIUM mmol/L 4.5 4.3 4.7   CHLORIDE mmol/L 107 104 101   CO2 mmol/L 25.0 24.5 24.3   BUN mg/dL 27* 28* 26*   CREATININE mg/dL 0.90 0.85 0.84   GLUCOSE mg/dL 117* 110* 149*   CALCIUM mg/dL 8.3* 8.1* 9.2           Results from last 7 days   Lab Units 03/14/24  0340 03/13/24  1558 03/13/24  0948 03/13/24  0417 03/12/24  0419   WBC 10*3/mm3 11.56*  --   --  14.25* 8.36   HEMOGLOBIN g/dL 6.4* 7.4* 7.3* 7.5* 10.2*   HEMATOCRIT % 19.4* 22.6* 21.9* 23.2* 31.0*   PLATELETS 10*3/mm3 157  --   --  180 244                                           I reviewed the patient's new clinical results.        Medication Review:   amLODIPine, 5 mg, Oral, Daily  aspirin, 81 mg, Oral, Daily  atenolol, 50 mg, Oral, Daily  budesonide-formoterol, 2 puff, Inhalation, BID - RT  clopidogrel, 75 mg, Oral, Daily  ferrous sulfate, 325 mg, Oral, Daily With Breakfast  furosemide, 40 mg, Intravenous, Once  ipratropium-albuterol, 3 mL, Nebulization, 4x Daily - RT  losartan, 12.5 mg, Oral, Q24H  methylPREDNISolone sodium succinate, 40 mg, Intravenous, Once  oxybutynin XL, 5 mg, Oral, Daily  prednisoLONE acetate, 1 drop, Right Eye, 4x Daily  predniSONE, 10 mg, Oral, Daily  rosuvastatin, 20 mg, Oral, Daily  senna-docusate sodium, 2 tablet, Oral, BID  sodium chloride, 1 drop, Right Eye, 4x Daily  sodium chloride, 10 mL, Intravenous, Q12H             Assessment     Acute on chronic hypoxic and hypercapnic respiratory failure, on O2 2-3 L/min and NIPPV at baseline.   Pneumonia, bilateral, R >L  Minimal right  pleural effusion   COPD exacerbation, improved  Non-STEMI type II  CAD with severe 95% proximal left main stenosis.  S/p [facility] 3/8/2024. Not a surgical candiate.  S/p [facility] 3/12/2024 with stent placement  History of DVT, on AC with Eliquis   Prominent calcifications of the abdominal aorta, iliac arteries, and  femoral arteries  ABLA secondary to right groin hematoma        Plan     NIPPV at night and as needed during the day.  Oxygen by NC and titrate keep SpO2 less than 90%  Finished cefepime  DuoNeb 4 times a day and Symbicort 2 puffs twice daily  CHF management: Atenolol 50 mg daily.  Losartan 12.5 mg daily  Aspirin and Plavix  H&H every 6 hours.  Transfuse 2 units PRBC.  Administered Lasix 40 mg after transfusion.  Continue holding anticoagulation (Eliquis) due to blood loss.  Resumed her chronic prednisone 10 mg daily      Dispo: When she is stable and there is no evidence of further bleeding then she can be discharged.      Felix Muro MD  03/14/24  14:58 EDT          This note was dictated utilizing Dragon dictation

## 2024-03-14 NOTE — PLAN OF CARE
Goal Outcome Evaluation:              Outcome Evaluation: Pt VSS, NSR on tele, on 2-3 L NC, no c/o pain. Pt on bedrest with purewick in place. Will continue POC and update as needed.

## 2024-03-14 NOTE — PLAN OF CARE
Goal Outcome Evaluation:         Patient uses her Bi pap at night , right groin hematoma , still bruise , vital signs stable, pt on bed rest turn and reposition as needed,patient hemoglobin 6.4, Cesar SINGLETARY RN called  said she will put new order ,  educate to call for assist, cont to monitor .

## 2024-03-15 ENCOUNTER — APPOINTMENT (OUTPATIENT)
Dept: GENERAL RADIOLOGY | Facility: HOSPITAL | Age: 89
DRG: 321 | End: 2024-03-15
Payer: MEDICARE

## 2024-03-15 LAB
ALBUMIN SERPL-MCNC: 3.4 G/DL (ref 3.5–5.2)
ANION GAP SERPL CALCULATED.3IONS-SCNC: 12 MMOL/L (ref 5–15)
BH BB BLOOD EXPIRATION DATE: NORMAL
BH BB BLOOD EXPIRATION DATE: NORMAL
BH BB BLOOD TYPE BARCODE: 600
BH BB BLOOD TYPE BARCODE: 600
BH BB DISPENSE STATUS: NORMAL
BH BB DISPENSE STATUS: NORMAL
BH BB PRODUCT CODE: NORMAL
BH BB PRODUCT CODE: NORMAL
BH BB UNIT NUMBER: NORMAL
BH BB UNIT NUMBER: NORMAL
BUN SERPL-MCNC: 27 MG/DL (ref 8–23)
BUN/CREAT SERPL: 29.7 (ref 7–25)
CALCIUM SPEC-SCNC: 8.6 MG/DL (ref 8.6–10.5)
CHLORIDE SERPL-SCNC: 101 MMOL/L (ref 98–107)
CO2 SERPL-SCNC: 26 MMOL/L (ref 22–29)
CREAT SERPL-MCNC: 0.91 MG/DL (ref 0.57–1)
CROSSMATCH INTERPRETATION: NORMAL
CROSSMATCH INTERPRETATION: NORMAL
DEPRECATED RDW RBC AUTO: 48.7 FL (ref 37–54)
EGFRCR SERPLBLD CKD-EPI 2021: 61.6 ML/MIN/1.73
ERYTHROCYTE [DISTWIDTH] IN BLOOD BY AUTOMATED COUNT: 15.5 % (ref 12.3–15.4)
GLUCOSE SERPL-MCNC: 99 MG/DL (ref 65–99)
HCT VFR BLD AUTO: 34.1 % (ref 34–46.6)
HGB BLD-MCNC: 11.4 G/DL (ref 12–15.9)
MCH RBC QN AUTO: 28.6 PG (ref 26.6–33)
MCHC RBC AUTO-ENTMCNC: 33.4 G/DL (ref 31.5–35.7)
MCV RBC AUTO: 85.5 FL (ref 79–97)
PHOSPHATE SERPL-MCNC: 3.4 MG/DL (ref 2.5–4.5)
PLATELET # BLD AUTO: 186 10*3/MM3 (ref 140–450)
PMV BLD AUTO: 10 FL (ref 6–12)
POTASSIUM SERPL-SCNC: 4.4 MMOL/L (ref 3.5–5.2)
QT INTERVAL: 369 MS
QTC INTERVAL: 469 MS
RBC # BLD AUTO: 3.99 10*6/MM3 (ref 3.77–5.28)
SODIUM SERPL-SCNC: 139 MMOL/L (ref 136–145)
UNIT  ABO: NORMAL
UNIT  ABO: NORMAL
UNIT  RH: NORMAL
UNIT  RH: NORMAL
WBC NRBC COR # BLD AUTO: 15.97 10*3/MM3 (ref 3.4–10.8)

## 2024-03-15 PROCEDURE — 71045 X-RAY EXAM CHEST 1 VIEW: CPT

## 2024-03-15 PROCEDURE — 80069 RENAL FUNCTION PANEL: CPT | Performed by: INTERNAL MEDICINE

## 2024-03-15 PROCEDURE — 25010000002 FUROSEMIDE PER 20 MG

## 2024-03-15 PROCEDURE — 94799 UNLISTED PULMONARY SVC/PX: CPT

## 2024-03-15 PROCEDURE — 93010 ELECTROCARDIOGRAM REPORT: CPT | Performed by: INTERNAL MEDICINE

## 2024-03-15 PROCEDURE — 99232 SBSQ HOSP IP/OBS MODERATE 35: CPT | Performed by: NURSE PRACTITIONER

## 2024-03-15 PROCEDURE — 63710000001 PREDNISONE PER 5 MG: Performed by: INTERNAL MEDICINE

## 2024-03-15 PROCEDURE — 93005 ELECTROCARDIOGRAM TRACING: CPT | Performed by: INTERNAL MEDICINE

## 2024-03-15 PROCEDURE — 85027 COMPLETE CBC AUTOMATED: CPT | Performed by: INTERNAL MEDICINE

## 2024-03-15 RX ORDER — FUROSEMIDE 10 MG/ML
40 INJECTION INTRAMUSCULAR; INTRAVENOUS ONCE
Status: COMPLETED | OUTPATIENT
Start: 2024-03-16 | End: 2024-03-16

## 2024-03-15 RX ORDER — FUROSEMIDE 10 MG/ML
40 INJECTION INTRAMUSCULAR; INTRAVENOUS ONCE
Status: COMPLETED | OUTPATIENT
Start: 2024-03-15 | End: 2024-03-15

## 2024-03-15 RX ADMIN — PREDNISOLONE ACETATE 1 DROP: 10 SUSPENSION/ DROPS OPHTHALMIC at 18:25

## 2024-03-15 RX ADMIN — PREDNISOLONE ACETATE 1 DROP: 10 SUSPENSION/ DROPS OPHTHALMIC at 20:17

## 2024-03-15 RX ADMIN — SENNOSIDES AND DOCUSATE SODIUM 2 TABLET: 50; 8.6 TABLET ORAL at 20:16

## 2024-03-15 RX ADMIN — CLOPIDOGREL BISULFATE 75 MG: 75 TABLET, FILM COATED ORAL at 09:14

## 2024-03-15 RX ADMIN — Medication 10 ML: at 09:14

## 2024-03-15 RX ADMIN — SODIUM CHLORIDE 1 DROP: 50 SOLUTION OPHTHALMIC at 09:15

## 2024-03-15 RX ADMIN — IPRATROPIUM BROMIDE AND ALBUTEROL SULFATE 3 ML: 2.5; .5 SOLUTION RESPIRATORY (INHALATION) at 07:30

## 2024-03-15 RX ADMIN — SODIUM CHLORIDE 1 DROP: 50 SOLUTION OPHTHALMIC at 20:17

## 2024-03-15 RX ADMIN — LOSARTAN POTASSIUM 12.5 MG: 25 TABLET, FILM COATED ORAL at 09:14

## 2024-03-15 RX ADMIN — SODIUM CHLORIDE 1 DROP: 50 SOLUTION OPHTHALMIC at 18:25

## 2024-03-15 RX ADMIN — IPRATROPIUM BROMIDE AND ALBUTEROL SULFATE 3 ML: 2.5; .5 SOLUTION RESPIRATORY (INHALATION) at 21:23

## 2024-03-15 RX ADMIN — BUDESONIDE AND FORMOTEROL FUMARATE DIHYDRATE 2 PUFF: 160; 4.5 AEROSOL RESPIRATORY (INHALATION) at 21:23

## 2024-03-15 RX ADMIN — OXYBUTYNIN CHLORIDE 5 MG: 5 TABLET, EXTENDED RELEASE ORAL at 09:14

## 2024-03-15 RX ADMIN — IPRATROPIUM BROMIDE AND ALBUTEROL SULFATE 3 ML: 2.5; .5 SOLUTION RESPIRATORY (INHALATION) at 09:57

## 2024-03-15 RX ADMIN — ASPIRIN 81 MG: 81 TABLET, COATED ORAL at 09:14

## 2024-03-15 RX ADMIN — IPRATROPIUM BROMIDE AND ALBUTEROL SULFATE 3 ML: 2.5; .5 SOLUTION RESPIRATORY (INHALATION) at 15:09

## 2024-03-15 RX ADMIN — Medication 10 ML: at 20:17

## 2024-03-15 RX ADMIN — ROSUVASTATIN CALCIUM 20 MG: 20 TABLET, FILM COATED ORAL at 20:16

## 2024-03-15 RX ADMIN — PREDNISONE 10 MG: 10 TABLET ORAL at 09:14

## 2024-03-15 RX ADMIN — FAMOTIDINE 20 MG: 20 TABLET, FILM COATED ORAL at 18:25

## 2024-03-15 RX ADMIN — BUDESONIDE AND FORMOTEROL FUMARATE DIHYDRATE 2 PUFF: 160; 4.5 AEROSOL RESPIRATORY (INHALATION) at 07:30

## 2024-03-15 RX ADMIN — PREDNISOLONE ACETATE 1 DROP: 10 SUSPENSION/ DROPS OPHTHALMIC at 12:49

## 2024-03-15 RX ADMIN — PREDNISOLONE ACETATE 1 DROP: 10 SUSPENSION/ DROPS OPHTHALMIC at 09:15

## 2024-03-15 RX ADMIN — AMLODIPINE BESYLATE 5 MG: 5 TABLET ORAL at 09:14

## 2024-03-15 RX ADMIN — ATENOLOL 50 MG: 50 TABLET ORAL at 09:14

## 2024-03-15 RX ADMIN — FERROUS SULFATE TAB 325 MG (65 MG ELEMENTAL FE) 325 MG: 325 (65 FE) TAB at 09:14

## 2024-03-15 RX ADMIN — SODIUM CHLORIDE 1 DROP: 50 SOLUTION OPHTHALMIC at 12:49

## 2024-03-15 RX ADMIN — FUROSEMIDE 40 MG: 10 INJECTION, SOLUTION INTRAMUSCULAR; INTRAVENOUS at 07:06

## 2024-03-15 NOTE — NURSING NOTE
0603- Rapid team called- Pt complaints SOB, Labor breathing, SpO2-80's and HR increased to 120's. Radha Whaley at bedside. Chest xray ordered. Lasix given per order. Pt is resting now. All vs stable. No complaints of SOB now. On BiPAP Sp02 100%. Will continue to monitor.

## 2024-03-15 NOTE — CODE DOCUMENTATION
"Patient Name:  Lizzy Hicks  YOB: 1935  MRN:  [id number]  Admit Date:  3/7/2024    Visit Diagnoses:     ICD-10-CM ICD-9-CM   1. Acute respiratory failure with hypoxia  J96.01 518.81   2. Acute on chronic congestive heart failure, unspecified heart failure type  I50.9 428.0   3. Pneumonia of both lungs due to infectious organism, unspecified part of lung  J18.9 483.8   4. Elevated lactic acid level  R79.89 276.2   5. Elevated troponin  R79.89 790.6   6. NSTEMI, initial episode of care  I21.4 410.71   7. Coronary artery disease involving native coronary artery of native heart with angina pectoris  I25.119 414.01     413.9       Reason For Rapid: increased wob    RN Communicated With: primary RN, RT and LPC      Rapid Outcome: Remain on unit    Communication From Rapid Team: See orders.  Chest xray ordered. Lasix given.     Most Recent Vital Signs  Temp:  [97.9 °F (36.6 °C)-98.7 °F (37.1 °C)] 98 °F (36.7 °C)  Heart Rate:  [] 105  Resp:  [15-28] 20  BP: ()/() 146/79  SpO2:  [87 %-100 %] 94 %  on  Flow (L/min):  [2-40] 40;   Device (Oxygen Therapy): NPPV/NIV    Labs:      Glucose   Date/Time Value Ref Range Status   03/13/2024 2032 152 (H) 70 - 130 mg/dL Final     No results found for: \"SITE\", \"ALLENTEST\", \"PHART\", \"ENJ2ORZ\", \"PO2ART\", \"HVN3JIB\", \"BASEEXCESS\", \"T9FVECWV\", \"HGBBG\", \"HCTABG\", \"OXYHEMOGLOBI\", \"METHHGBN\", \"CARBOXYHGB\", \"CO2CT\", \"BAROMETRIC\", \"MODALITY\", \"FIO2\"  Results from last 7 days   Lab Units 03/15/24  0358 03/14/24  1635 03/14/24  0340 03/13/24  1558 03/13/24  0948 03/13/24  0417 03/12/24  0419   WBC 10*3/mm3 15.97*  --  11.56*  --   --  14.25* 8.36   HEMOGLOBIN g/dL 11.4* 11.5* 6.4* 7.4*   < > 7.5* 10.2*   PLATELETS 10*3/mm3 186  --  157  --   --  180 244    < > = values in this interval not displayed.     Results from last 7 days   Lab Units 03/15/24  0358 03/14/24  0340 03/13/24  0417   SODIUM mmol/L 139 139 140   POTASSIUM mmol/L 4.4 4.5 4.3   CHLORIDE mmol/L " 101 107 104   CO2 mmol/L 26.0 25.0 24.5   BUN mg/dL 27* 27* 28*   CREATININE mg/dL 0.91 0.90 0.85   GLUCOSE mg/dL 99 117* 110*   ALBUMIN g/dL 3.4* 3.1* 2.7*   Estimated Creatinine Clearance: 32.8 mL/min (by C-G formula based on SCr of 0.91 mg/dL).            Lab Results   Component Value Date    STREPPNEUAG Negative 03/07/2024    LEGANTIGENUR Negative 03/07/2024           NIH Stroke Scale:                                                         Please refer to full rapid documentation on summary page under Index / Code Timeline

## 2024-03-15 NOTE — PROGRESS NOTES
[facility] Progress Note       Encounter Date:03/15/24  Patient:Lizzy Hicks  :1935  MRN:[id number]      Chief Complaint: f/u respiratory failure, NSTEMI      Subjective:      Still feels short of breath but improved from overnight    CXR with evidence of volume overload. Received additional 40mg IV Lasix this morning      Medications:  Scheduled Meds:  amLODIPine, 5 mg, Oral, Daily  aspirin, 81 mg, Oral, Daily  atenolol, 50 mg, Oral, Daily  budesonide-formoterol, 2 puff, Inhalation, BID - RT  clopidogrel, 75 mg, Oral, Daily  ferrous sulfate, 325 mg, Oral, Daily With Breakfast  ipratropium-albuterol, 3 mL, Nebulization, 4x Daily - RT  losartan, 12.5 mg, Oral, Q24H  methylPREDNISolone sodium succinate, 40 mg, Intravenous, Once  oxybutynin XL, 5 mg, Oral, Daily  prednisoLONE acetate, 1 drop, Right Eye, 4x Daily  predniSONE, 10 mg, Oral, Daily  rosuvastatin, 20 mg, Oral, Daily  senna-docusate sodium, 2 tablet, Oral, BID  sodium chloride, 1 drop, Right Eye, 4x Daily  sodium chloride, 10 mL, Intravenous, Q12H    Continuous Infusions:   PRN Meds:    atropine    senna-docusate sodium **AND** polyethylene glycol **AND** bisacodyl **AND** bisacodyl    docusate sodium    famotidine    nitroglycerin    ondansetron    [COMPLETED] Insert Peripheral IV **AND** sodium chloride    sodium chloride    sodium chloride    sodium chloride    sodium chloride         Objective:       Vitals:    03/15/24 0705 03/15/24 0706 03/15/24 0815 03/15/24 0957   BP:  146/79 (!) 181/64    BP Location:   Right arm    Patient Position:   Lying    Pulse: 103  98 90   Resp:   19 18   Temp:   100 °F (37.8 °C)    TempSrc:   Axillary    SpO2:   100% 91%   Weight:       Height:               Physical Exam:  Constitutional: Alert and conversant, dyspneic  HENT: Oropharynx clear and membrane moist  Eyes: Normal conjunctiva, no sclera icterus.  Neck: Supple  Cardiovascular: Regular rate and rhythm, No Murmur, trace bilateral  "lower extremity edema.  Pulmonary: Normal respiratory effort, + bilateral crackles  Abdominal: Soft, nontender  Neurological: Alert and orient x 3.   Skin: Warm, dry. Right groin hematoma marked and appears relatively stable, site is nontender  Psych: Appropriate mood and affect. Normal judgment and insight.           Lab Review:   Results from last 7 days   Lab Units 03/15/24  0358 03/14/24  0340 03/13/24  0417 03/12/24  0419 03/11/24  0431 03/10/24  0546 03/09/24  0641   SODIUM mmol/L 139 139 140 138 135* 136 136   POTASSIUM mmol/L 4.4 4.5 4.3 4.7 4.2 3.9 3.5   CHLORIDE mmol/L 101 107 104 101 101 100 97*   CO2 mmol/L 26.0 25.0 24.5 24.3 25.4 29.0 25.7   BUN mg/dL 27* 27* 28* 26* 27* 37* 35*   CREATININE mg/dL 0.91 0.90 0.85 0.84 0.90 0.99 1.00   GLUCOSE mg/dL 99 117* 110* 149* 107* 110* 94   CALCIUM mg/dL 8.6 8.3* 8.1* 9.2 8.5* 8.8 8.7           Results from last 7 days   Lab Units 03/15/24  0358 03/14/24  1635 03/14/24  0340 03/13/24  1558 03/13/24  0948 03/13/24  0417 03/12/24  0419 03/11/24  0431 03/10/24  0546 03/09/24  0641   WBC 10*3/mm3 15.97*  --  11.56*  --   --  14.25* 8.36 9.55 9.11 12.05*   HEMOGLOBIN g/dL 11.4* 11.5* 6.4* 7.4* 7.3* 7.5* 10.2* 9.3* 9.2* 9.7*   HEMATOCRIT % 34.1 34.0 19.4* 22.6* 21.9* 23.2* 31.0* 28.4* 27.9* 30.1*   PLATELETS 10*3/mm3 186  --  157  --   --  180 244 216 212 212                     Invalid input(s): \"LDLCALC\"                     Assessment:          Diagnosis Plan   1. Acute respiratory failure with hypoxia        2. Acute on chronic congestive heart failure, unspecified heart failure type        3. Pneumonia of both lungs due to infectious organism, unspecified part of lung        4. Elevated lactic acid level        5. Elevated troponin        6. NSTEMI, initial episode of care  Ambulatory Referral to Cardiac Rehab    Ambulatory Referral to Cardiac Rehab      7. Coronary artery disease involving native coronary artery of native heart with angina pectoris             "   Plan:       NSTEMI / Coronary artery disease - felt type II MI with acute on chronic hypoxia related to pneumonia.  LHC on 3/8/24 did demonstrate LM disease s/p PCI 3/12. Her EF is normal with no rWMAs.  Continue aspirin, Plavix and high potency statin  Acute on chronic diastolic congestive heart failure - received Lasix after blood yesterday. CXR with mild edema, increased effusions. Received 40mg Lasix this morning and will likely need ongoing diuretics  Acute on chronic anemia - drop in hemoglobin post-PCI with stable groin hematoma. Transfused 2 units yesterday for hgb 6.4 with appropriate response  Non-rheumatic valvular heart disease - mild AS, moderate MR on echo  Carotid artery disease - hx of right CEA 2018, left CEA 2019. Follows with Dr Grayson Hamilton of DVT - August 2023, below the knee.  Prior to hospitalization patient was on 5 mg apixaban twice daily.  She had repeat venous Dopplers 3/7/2024 with no evidence of DVT.  She has completed 6 months of anticoagulation.  Given need for DAPT with new PCI will not restart anticoagulation   Hypertension - BP is high. Just received all of her morning medications. Will monitor and may adjust if remains elevated    Increased work of breathing -- she is weaned to 2L NC however appears dyspneic    Responding well to IV Lasix. Will monitor as may require repeat dosing this afternoon    BP elevated, follow for improvement after just receiving morning medications          Abiola HERNANDEZ  [facility]  03/15/24  10:04 EDT

## 2024-03-15 NOTE — PROGRESS NOTES
"                                              LOS: 8 days   Patient Care Team:  Traci Hoyos MD as PCP - General (Geriatric Medicine)    Chief Complaint:  F/up respiratory failure, COPD and pneumonia.    Subjective   Interval History      Dyspnea worsened today.  She went into respiratory distress.  RRT called and evaluated her.  After receiving Lasix, she felt much better.  She denies cough.  She is using the NIPPV overnight.  During the day she is on 2 L oxygen.      REVIEW OF SYSTEMS:   CARDIOVASCULAR: No chest pain, chest pressure or chest discomfort. No palpitations but edema.   CONSTITUTIONAL: No fever or chills.  Gastrointestinal: No nausea or vomiting.    Ventilator/Non-Invasive Ventilation Settings (From admission, onward)       Start     Ordered    03/07/24 0316  NIPPV - Provider Settings  (CPAP / BiPAP)  Until Discontinued        Question Answer Comment   Indication Acute Respiratory Failure    Type AVAPS/PC/PS    Backup Rate 12    Target VT (mL) 450    EPAP/PEEP (cm H2O) 6    Min Pressure (cm H2O) 8    Max Pressure (cm H2O) 25    Titrate Oxygen for SpO2 90 - 95%        03/07/24 0316                          Physical Exam:     Vital Signs  Temp:  [97.7 °F (36.5 °C)-100 °F (37.8 °C)] 97.7 °F (36.5 °C)  Heart Rate:  [] 79  Resp:  [15-28] 18  BP: ()/() 120/49    Intake/Output Summary (Last 24 hours) at 3/15/2024 1459  Last data filed at 3/15/2024 1338  Gross per 24 hour   Intake 778.75 ml   Output 2050 ml   Net -1271.25 ml     Flowsheet Rows      Flowsheet Row First Filed Value   Admission Height 152.4 cm (60\") Documented at 03/07/2024 0258   Admission Weight 62.3 kg (137 lb 4.8 oz) Documented at 03/07/2024 0258            PPE used per hospital policy    General Appearance:   Alert, cooperative, in no acute distress   ENMT:  Mallampati score 3, moist mucous membrane   Eyes:  Pupils equal and reactive to light. EOMI   Neck:    Trachea midline. No thyromegaly.   Lungs:   Equal but " diminished air entry.  Bilateral crackles up to third of the chest posteriorly.  Prolonged expiratory time.    Heart:   Regular rhythm and normal rate, normal S1 and S2, no         murmur   Skin:   No rash or ecchymosis   Abdomen:    Obese. Soft. No tenderness. No HSM.   Neuro/psych:  Conscious, alert, oriented x3. Strength 5/5 in upper and lower  ext.  Appropriate mood and affect   Extremities:  No cyanosis, clubbing but edema in legs.  Warm extremities and well-perfused.  Right groin ecchymosis and mild swelling. No palpable mass.           Results Review:        Results from last 7 days   Lab Units 03/15/24  0358 03/14/24  0340 03/13/24  0417   SODIUM mmol/L 139 139 140   POTASSIUM mmol/L 4.4 4.5 4.3   CHLORIDE mmol/L 101 107 104   CO2 mmol/L 26.0 25.0 24.5   BUN mg/dL 27* 27* 28*   CREATININE mg/dL 0.91 0.90 0.85   GLUCOSE mg/dL 99 117* 110*   CALCIUM mg/dL 8.6 8.3* 8.1*           Results from last 7 days   Lab Units 03/15/24  0358 03/14/24  1635 03/14/24  0340 03/13/24  0948 03/13/24  0417   WBC 10*3/mm3 15.97*  --  11.56*  --  14.25*   HEMOGLOBIN g/dL 11.4* 11.5* 6.4*   < > 7.5*   HEMATOCRIT % 34.1 34.0 19.4*   < > 23.2*   PLATELETS 10*3/mm3 186  --  157  --  180    < > = values in this interval not displayed.                                           I reviewed the patient's new clinical results.        Medication Review:   amLODIPine, 5 mg, Oral, Daily  aspirin, 81 mg, Oral, Daily  atenolol, 50 mg, Oral, Daily  budesonide-formoterol, 2 puff, Inhalation, BID - RT  clopidogrel, 75 mg, Oral, Daily  ferrous sulfate, 325 mg, Oral, Daily With Breakfast  ipratropium-albuterol, 3 mL, Nebulization, 4x Daily - RT  losartan, 12.5 mg, Oral, Q24H  methylPREDNISolone sodium succinate, 40 mg, Intravenous, Once  oxybutynin XL, 5 mg, Oral, Daily  prednisoLONE acetate, 1 drop, Right Eye, 4x Daily  predniSONE, 10 mg, Oral, Daily  rosuvastatin, 20 mg, Oral, Daily  senna-docusate sodium, 2 tablet, Oral, BID  sodium chloride, 1  drop, Right Eye, 4x Daily  sodium chloride, 10 mL, Intravenous, Q12H      Diagnostic imaging:  I personally and Independently reviewed and interpreted the following images:  CXR 3/15/2024: Increased pulmonary vascular markings/mild interstitial edema and bilateral pleural effusion.         Assessment     Acute on chronic hypoxic and hypercapnic respiratory failure, on O2 2-3 L/min and NIPPV at baseline.   Pneumonia, bilateral, R >L  Bilateral pleural effusion, mild.  Pulmonary vascular congestion  COPD exacerbation, improved  Non-STEMI type II  CAD with severe 95% proximal left main stenosis.  S/p [facility] 3/8/2024. Not a surgical candiate.  S/p [facility] 3/12/2024 with stent placement  History of DVT, on AC with Eliquis.  Venous Doppler 3/7/2024 negative for DVT.   Prominent calcifications of the abdominal aorta, iliac arteries, and  femoral arteries  ABLA secondary to right groin hematoma        Plan     NIPPV at night and as needed during the day.  Oxygen by NC and titrate keep SpO2 less than 90%  Finished cefepime  DuoNeb 4 times a day and Symbicort 2 puffs twice daily  CHF management: Atenolol 50 mg daily.  Losartan 12.5 mg daily  Lasix 40 mg IV x 1.  May repeat again.  Aspirin and Plavix  Check hemoglobin in a.m.  Do not resume Eliquis on discharge due to patient being on DAPT and lack of DVT.  Continue chronic prednisone 10 mg daily    Dispo: Hopefully sometimes early in the next week.  She will require rehab.  PT following      Felix Muro MD  03/15/24  14:59 EDT          This note was dictated utilizing Dragon dictation

## 2024-03-15 NOTE — PLAN OF CARE
Goal Outcome Evaluation:  Plan of Care Reviewed With: patient  Progress: no changeOutcome Evaluation: Pt is AOx4. All vs stable. NSR on tele and ON BPAP overnight. No compliaints  of pain or SOA. Safety maintained. Wc  Problem: Skin Injury Risk Increased  Goal: Skin Health and Integrity  Intervention: Optimize Skin Protection  Description: Perform a full pressure injury risk assessment, as indicated by screening, upon admission to care unit.  Reassess skin (injury risk, full inspection) frequently (e.g., scheduled interval, with change in condition) to provide optimal early detection and prevention.  Maintain adequate tissue perfusion (e.g., encourage fluid balance; avoid crossing legs, constrictive clothing or devices) to promote tissue oxygenation.  Maintain head of bed at lowest degree of elevation tolerated, considering medical condition and other restrictions.  Avoid positioning onto an area that remains reddened.  Minimize incontinence and moisture (e.g., toileting schedule; moisture-wicking pad, diaper or incontinence collection device; skin moisture barrier).  Cleanse skin promptly and gently when soiled utilizing a pH-balanced cleanser.  Relieve and redistribute pressure (e.g., scheduled position changes, weight shifts, use of support surface, medical device repositioning, protective dressing application, use of positioning device, microclimate control, use of pressure-injury-monitor  Encourage increased activity, such as sitting in a chair at the bedside or early mobilization, when able to tolerate.  Recent Flowsheet Documentation  Taken 3/15/2024 0000 by Ry Harp RN  Head of Bed (HOB) Positioning: HOB elevated  Taken 3/14/2024 2200 by Ry Harp RN  Head of Bed (HOB) Positioning: HOB elevated  Taken 3/14/2024 2030 by Ry Harp RN  Head of Bed (HOB) Positioning: HOB elevated     Problem: Skin Injury Risk Increased  Goal: Skin Health and Integrity  Outcome: Ongoing, Progressing  Intervention:  Optimize Skin Protection  Description: Perform a full pressure injury risk assessment, as indicated by screening, upon admission to care unit.  Reassess skin (injury risk, full inspection) frequently (e.g., scheduled interval, with change in condition) to provide optimal early detection and prevention.  Maintain adequate tissue perfusion (e.g., encourage fluid balance; avoid crossing legs, constrictive clothing or devices) to promote tissue oxygenation.  Maintain head of bed at lowest degree of elevation tolerated, considering medical condition and other restrictions.  Avoid positioning onto an area that remains reddened.  Minimize incontinence and moisture (e.g., toileting schedule; moisture-wicking pad, diaper or incontinence collection device; skin moisture barrier).  Cleanse skin promptly and gently when soiled utilizing a pH-balanced cleanser.  Relieve and redistribute pressure (e.g., scheduled position changes, weight shifts, use of support surface, medical device repositioning, protective dressing application, use of positioning device, microclimate control, use of pressure-injury-monitor  Encourage increased activity, such as sitting in a chair at the bedside or early mobilization, when able to tolerate.  Recent Flowsheet Documentation  Taken 3/15/2024 0000 by Ry Harp RN  Head of Bed (HOB) Positioning: HOB elevated  Taken 3/14/2024 2200 by Ry Harp RN  Head of Bed (HOB) Positioning: HOB elevated  Taken 3/14/2024 2030 by Ry Harp RN  Head of Bed (HOB) Positioning: HOB elevated     Problem: Adjustment to Illness (Sepsis/Septic Shock)  Goal: Optimal Coping  Intervention: Optimize Psychosocial Adjustment to Illness  Description: Acknowledge, normalize, validate intensity and complexity of patient and support system response to situation.  Provide opportunity for expression of thoughts, feelings and concerns; respond with compassion and reassurance.  Decrease stress and anxiety by providing  information about patient’s status and treatment.  Facilitate support system presence and participation in care; consider providing a diary in intensive care situation.  Support coping by recognizing current coping strategies; provide aid in developing new strategies.  Acknowledge and normalize difficulty in managing life-long lifestyle changes and expectations.  Assess and monitor for signs and symptoms of psychologic distress, anxiety and depression.  Consider palliative care consult for goals of care conversation, if the condition is worsening despite treatment.  Recent Flowsheet Documentation  Taken 3/15/2024 0235 by Ry Harp RN  Family/Support System Care:   self-care encouraged   support provided  Taken 3/14/2024 2030 by Ry Harp RN  Family/Support System Care:   self-care encouraged   support provided     Problem: Bleeding (Sepsis/Septic Shock)  Goal: Absence of Bleeding  Outcome: Ongoing, Progressing     Problem: Infection Progression (Sepsis/Septic Shock)  Goal: Absence of Infection Signs and Symptoms  Outcome: Ongoing, Progressing  Intervention: Initiate Sepsis Management  Description: Provide fluid therapy, such as crystalloid or albumin, to increase intravascular volume, organ perfusion and oxygen delivery.  Provide respiratory support, such as oxygen therapy, noninvasive or invasive positive pressure ventilation, to achieve oxygenation and ventilation goal; avoid hyperoxemia.  Obtain cultures prior to initiating antimicrobial therapy when possible. Do not delay for laboratory results in the presence of high suspicion or clinical indicators.  Administer intravenous broad-spectrum antimicrobial therapy promptly.  Implement hemodynamic monitoring to guide intravascular support based on individual targeted parameters.  Determine and address underlying source of infection aggressively; implement transmission-based precautions and isolation, as indicated.  Recent Flowsheet Documentation  Taken  3/15/2024 0417 by Ry Harp RN  Infection Prevention:   visitors restricted/screened   single patient room provided   rest/sleep promoted   hand hygiene promoted   equipment surfaces disinfected   cohorting utilized  Isolation Precautions: precautions maintained  Taken 3/15/2024 0235 by Ry Harp RN  Infection Prevention:   visitors restricted/screened   personal protective equipment utilized   hand hygiene promoted   equipment surfaces disinfected   environmental surveillance performed   cohorting utilized   single patient room provided   rest/sleep promoted  Isolation Precautions: precautions maintained  Taken 3/15/2024 0000 by Ry Harp RN  Infection Prevention:   visitors restricted/screened   rest/sleep promoted   single patient room provided   personal protective equipment utilized   hand hygiene promoted   equipment surfaces disinfected   environmental surveillance performed   cohorting utilized  Taken 3/14/2024 2030 by Ry Harp RN  Infection Prevention:   visitors restricted/screened   single patient room provided   hand hygiene promoted   environmental surveillance performed   cohorting utilized   equipment surfaces disinfected   personal protective equipment utilized   rest/sleep promoted  Intervention: Promote Recovery  Description: Encourage pulmonary hygiene, such as cough-enhancement and airway-clearance techniques, that may include use of incentive spirometry, deep breathing and cough.  Encourage early rehabilitation and physical activity to optimize functional ability and activity tolerance, as well as minimize delirium.  Promote energy conservation; minimize oxygen demand and consumption by adjusting environment, decreasing stimulation, maintaining normothermia and treating pain.  Optimize fluid balance, nutrition intake, sleep and glycemic control to maintain tissue perfusion and enhance immune response.  Recent Flowsheet Documentation  Taken 3/15/2024 0417 by Ry Harp  RN  Activity Management: activity encouraged  Taken 3/15/2024 0235 by Ry Harp RN  Activity Management: activity encouraged  Taken 3/15/2024 0000 by Ry Harp, RN  Activity Management: activity encouraged  Taken 3/14/2024 2200 by Ry Harp, RN  Activity Management: activity encouraged  Taken 3/14/2024 2030 by Ry Harp, RN  Activity Management: activity encouraged

## 2024-03-15 NOTE — CASE MANAGEMENT/SOCIAL WORK
Continued Stay Note  [facility]     Patient Name: Lizzy Hicks  MRN: [id number]  Today's Date: 3/15/2024    Admit Date: 3/7/2024    Plan: [facility] skilled;  Will need W/C van transport   Discharge Plan       Row Name 03/15/24 1457       Plan    Plan [facility] skilled;  Will need W/C van transport    Plan Comments Pt not ready for discharge.  CCP placed Pt packet in her folder on [facility] just in case ready over the weekend.  [facility] will accept back skilled bed.  Pt will need wheelchair van at discharge.  Pt is on continuous oxygen.  CCP following............Rowan MCFARLAND/FAISAL                   Discharge Codes    No documentation.                 Expected Discharge Date and Time       Expected Discharge Date Expected Discharge Time    Mar 18, 2024               Rowan Ornelas RN

## 2024-03-16 LAB
ALBUMIN SERPL-MCNC: 3.1 G/DL (ref 3.5–5.2)
ANION GAP SERPL CALCULATED.3IONS-SCNC: 9.8 MMOL/L (ref 5–15)
BUN SERPL-MCNC: 28 MG/DL (ref 8–23)
BUN/CREAT SERPL: 33.7 (ref 7–25)
CALCIUM SPEC-SCNC: 8.3 MG/DL (ref 8.6–10.5)
CHLORIDE SERPL-SCNC: 102 MMOL/L (ref 98–107)
CO2 SERPL-SCNC: 28.2 MMOL/L (ref 22–29)
CREAT SERPL-MCNC: 0.83 MG/DL (ref 0.57–1)
DEPRECATED RDW RBC AUTO: 46 FL (ref 37–54)
EGFRCR SERPLBLD CKD-EPI 2021: 68.8 ML/MIN/1.73
ERYTHROCYTE [DISTWIDTH] IN BLOOD BY AUTOMATED COUNT: 14.8 % (ref 12.3–15.4)
GLUCOSE SERPL-MCNC: 99 MG/DL (ref 65–99)
HCT VFR BLD AUTO: 30.4 % (ref 34–46.6)
HGB BLD-MCNC: 10.3 G/DL (ref 12–15.9)
MCH RBC QN AUTO: 29 PG (ref 26.6–33)
MCHC RBC AUTO-ENTMCNC: 33.9 G/DL (ref 31.5–35.7)
MCV RBC AUTO: 85.6 FL (ref 79–97)
PHOSPHATE SERPL-MCNC: 2.9 MG/DL (ref 2.5–4.5)
PLATELET # BLD AUTO: 185 10*3/MM3 (ref 140–450)
PMV BLD AUTO: 9.9 FL (ref 6–12)
POTASSIUM SERPL-SCNC: 4 MMOL/L (ref 3.5–5.2)
RBC # BLD AUTO: 3.55 10*6/MM3 (ref 3.77–5.28)
SODIUM SERPL-SCNC: 140 MMOL/L (ref 136–145)
WBC NRBC COR # BLD AUTO: 14.12 10*3/MM3 (ref 3.4–10.8)

## 2024-03-16 PROCEDURE — 25010000002 FUROSEMIDE PER 20 MG: Performed by: INTERNAL MEDICINE

## 2024-03-16 PROCEDURE — 80069 RENAL FUNCTION PANEL: CPT | Performed by: INTERNAL MEDICINE

## 2024-03-16 PROCEDURE — 85027 COMPLETE CBC AUTOMATED: CPT | Performed by: INTERNAL MEDICINE

## 2024-03-16 PROCEDURE — 99232 SBSQ HOSP IP/OBS MODERATE 35: CPT | Performed by: INTERNAL MEDICINE

## 2024-03-16 PROCEDURE — 25010000002 FUROSEMIDE PER 20 MG: Performed by: NURSE PRACTITIONER

## 2024-03-16 PROCEDURE — 94799 UNLISTED PULMONARY SVC/PX: CPT

## 2024-03-16 PROCEDURE — 97110 THERAPEUTIC EXERCISES: CPT

## 2024-03-16 PROCEDURE — 63710000001 PREDNISONE PER 5 MG: Performed by: INTERNAL MEDICINE

## 2024-03-16 RX ORDER — LOSARTAN POTASSIUM 25 MG/1
25 TABLET ORAL
Status: DISCONTINUED | OUTPATIENT
Start: 2024-03-17 | End: 2024-03-17

## 2024-03-16 RX ORDER — FUROSEMIDE 10 MG/ML
40 INJECTION INTRAMUSCULAR; INTRAVENOUS EVERY 12 HOURS
Status: DISCONTINUED | OUTPATIENT
Start: 2024-03-16 | End: 2024-03-18

## 2024-03-16 RX ADMIN — PREDNISOLONE ACETATE 1 DROP: 10 SUSPENSION/ DROPS OPHTHALMIC at 22:18

## 2024-03-16 RX ADMIN — PREDNISONE 10 MG: 10 TABLET ORAL at 10:23

## 2024-03-16 RX ADMIN — SENNOSIDES AND DOCUSATE SODIUM 2 TABLET: 50; 8.6 TABLET ORAL at 22:17

## 2024-03-16 RX ADMIN — PREDNISOLONE ACETATE 1 DROP: 10 SUSPENSION/ DROPS OPHTHALMIC at 10:25

## 2024-03-16 RX ADMIN — SODIUM CHLORIDE 1 DROP: 50 SOLUTION OPHTHALMIC at 22:18

## 2024-03-16 RX ADMIN — SODIUM CHLORIDE 1 DROP: 50 SOLUTION OPHTHALMIC at 14:12

## 2024-03-16 RX ADMIN — FUROSEMIDE 40 MG: 10 INJECTION, SOLUTION INTRAMUSCULAR; INTRAVENOUS at 04:57

## 2024-03-16 RX ADMIN — OXYBUTYNIN CHLORIDE 5 MG: 5 TABLET, EXTENDED RELEASE ORAL at 10:23

## 2024-03-16 RX ADMIN — IPRATROPIUM BROMIDE AND ALBUTEROL SULFATE 3 ML: 2.5; .5 SOLUTION RESPIRATORY (INHALATION) at 07:06

## 2024-03-16 RX ADMIN — PREDNISOLONE ACETATE 1 DROP: 10 SUSPENSION/ DROPS OPHTHALMIC at 14:12

## 2024-03-16 RX ADMIN — ASPIRIN 81 MG: 81 TABLET, COATED ORAL at 10:23

## 2024-03-16 RX ADMIN — IPRATROPIUM BROMIDE AND ALBUTEROL SULFATE 3 ML: 2.5; .5 SOLUTION RESPIRATORY (INHALATION) at 20:26

## 2024-03-16 RX ADMIN — PREDNISOLONE ACETATE 1 DROP: 10 SUSPENSION/ DROPS OPHTHALMIC at 17:44

## 2024-03-16 RX ADMIN — Medication 10 ML: at 22:18

## 2024-03-16 RX ADMIN — Medication 10 ML: at 10:26

## 2024-03-16 RX ADMIN — ROSUVASTATIN CALCIUM 20 MG: 20 TABLET, FILM COATED ORAL at 22:17

## 2024-03-16 RX ADMIN — IPRATROPIUM BROMIDE AND ALBUTEROL SULFATE 3 ML: 2.5; .5 SOLUTION RESPIRATORY (INHALATION) at 14:00

## 2024-03-16 RX ADMIN — ATENOLOL 50 MG: 50 TABLET ORAL at 10:23

## 2024-03-16 RX ADMIN — FAMOTIDINE 20 MG: 20 TABLET, FILM COATED ORAL at 20:52

## 2024-03-16 RX ADMIN — BUDESONIDE AND FORMOTEROL FUMARATE DIHYDRATE 2 PUFF: 160; 4.5 AEROSOL RESPIRATORY (INHALATION) at 07:06

## 2024-03-16 RX ADMIN — FERROUS SULFATE TAB 325 MG (65 MG ELEMENTAL FE) 325 MG: 325 (65 FE) TAB at 10:24

## 2024-03-16 RX ADMIN — SODIUM CHLORIDE 1 DROP: 50 SOLUTION OPHTHALMIC at 10:25

## 2024-03-16 RX ADMIN — IPRATROPIUM BROMIDE AND ALBUTEROL SULFATE 3 ML: 2.5; .5 SOLUTION RESPIRATORY (INHALATION) at 10:38

## 2024-03-16 RX ADMIN — CLOPIDOGREL BISULFATE 75 MG: 75 TABLET, FILM COATED ORAL at 10:23

## 2024-03-16 RX ADMIN — BUDESONIDE AND FORMOTEROL FUMARATE DIHYDRATE 2 PUFF: 160; 4.5 AEROSOL RESPIRATORY (INHALATION) at 20:26

## 2024-03-16 RX ADMIN — SENNOSIDES AND DOCUSATE SODIUM 2 TABLET: 50; 8.6 TABLET ORAL at 10:25

## 2024-03-16 RX ADMIN — SODIUM CHLORIDE 1 DROP: 50 SOLUTION OPHTHALMIC at 17:44

## 2024-03-16 RX ADMIN — AMLODIPINE BESYLATE 5 MG: 5 TABLET ORAL at 10:24

## 2024-03-16 RX ADMIN — FUROSEMIDE 40 MG: 10 INJECTION, SOLUTION INTRAMUSCULAR; INTRAVENOUS at 22:17

## 2024-03-16 NOTE — PLAN OF CARE
Goal Outcome Evaluation:  Plan of Care Reviewed With: patient        Progress: improving    Outcome Evaluation: Pt is AOx4. All vs stable. NSR on tele and ON BPAP overnight. Lasix given per order. No compliaints of pain or SOA. Safety maintained. Wctm       Problem: Skin Injury Risk Increased  Goal: Skin Health and Integrity  Intervention: Optimize Skin Protection  Description: Perform a full pressure injury risk assessment, as indicated by screening, upon admission to care unit.  Reassess skin (injury risk, full inspection) frequently (e.g., scheduled interval, with change in condition) to provide optimal early detection and prevention.  Maintain adequate tissue perfusion (e.g., encourage fluid balance; avoid crossing legs, constrictive clothing or devices) to promote tissue oxygenation.  Maintain head of bed at lowest degree of elevation tolerated, considering medical condition and other restrictions.  Avoid positioning onto an area that remains reddened.  Minimize incontinence and moisture (e.g., toileting schedule; moisture-wicking pad, diaper or incontinence collection device; skin moisture barrier).  Cleanse skin promptly and gently when soiled utilizing a pH-balanced cleanser.  Relieve and redistribute pressure (e.g., scheduled position changes, weight shifts, use of support surface, medical device repositioning, protective dressing application, use of positioning device, microclimate control, use of pressure-injury-monitor  Encourage increased activity, such as sitting in a chair at the bedside or early mobilization, when able to tolerate.  Recent Flowsheet Documentation  Taken 3/16/2024 0250 by Ry Harp RN  Head of Bed (HOB) Positioning: HOB elevated  Taken 3/15/2024 2024 by Ry Harp RN  Head of Bed (HOB) Positioning:   HOB elevated   HOB at 45 degrees     Problem: Skin Injury Risk Increased  Goal: Skin Health and Integrity  Outcome: Ongoing, Progressing  Intervention: Optimize Skin  Protection  Description: Perform a full pressure injury risk assessment, as indicated by screening, upon admission to care unit.  Reassess skin (injury risk, full inspection) frequently (e.g., scheduled interval, with change in condition) to provide optimal early detection and prevention.  Maintain adequate tissue perfusion (e.g., encourage fluid balance; avoid crossing legs, constrictive clothing or devices) to promote tissue oxygenation.  Maintain head of bed at lowest degree of elevation tolerated, considering medical condition and other restrictions.  Avoid positioning onto an area that remains reddened.  Minimize incontinence and moisture (e.g., toileting schedule; moisture-wicking pad, diaper or incontinence collection device; skin moisture barrier).  Cleanse skin promptly and gently when soiled utilizing a pH-balanced cleanser.  Relieve and redistribute pressure (e.g., scheduled position changes, weight shifts, use of support surface, medical device repositioning, protective dressing application, use of positioning device, microclimate control, use of pressure-injury-monitor  Encourage increased activity, such as sitting in a chair at the bedside or early mobilization, when able to tolerate.  Recent Flowsheet Documentation  Taken 3/16/2024 0250 by Ry Harp, RN  Head of Bed (HOB) Positioning: HOB elevated  Taken 3/15/2024 2024 by Ry Harp, SHAVON  Head of Bed (HOB) Positioning:   HOB elevated   HOB at 45 degrees     Problem: Adjustment to Illness (Sepsis/Septic Shock)  Goal: Optimal Coping  Intervention: Optimize Psychosocial Adjustment to Illness  Description: Acknowledge, normalize, validate intensity and complexity of patient and support system response to situation.  Provide opportunity for expression of thoughts, feelings and concerns; respond with compassion and reassurance.  Decrease stress and anxiety by providing information about patient’s status and treatment.  Facilitate support system presence  and participation in care; consider providing a diary in intensive care situation.  Support coping by recognizing current coping strategies; provide aid in developing new strategies.  Acknowledge and normalize difficulty in managing life-long lifestyle changes and expectations.  Assess and monitor for signs and symptoms of psychologic distress, anxiety and depression.  Consider palliative care consult for goals of care conversation, if the condition is worsening despite treatment.  Recent Flowsheet Documentation  Taken 3/16/2024 0250 by Ry Harp RN  Family/Support System Care:   support provided   self-care encouraged  Taken 3/15/2024 2024 by Ry Harp, RN  Family/Support System Care:   self-care encouraged   support provided     Problem: Bleeding (Sepsis/Septic Shock)  Goal: Absence of Bleeding  Outcome: Ongoing, Progressing     Problem: Glycemic Control Impaired (Sepsis/Septic Shock)  Goal: Blood Glucose Level Within Desired Range  Outcome: Ongoing, Progressing     Problem: Infection Progression (Sepsis/Septic Shock)  Goal: Absence of Infection Signs and Symptoms  Outcome: Ongoing, Progressing  Intervention: Initiate Sepsis Management  Description: Provide fluid therapy, such as crystalloid or albumin, to increase intravascular volume, organ perfusion and oxygen delivery.  Provide respiratory support, such as oxygen therapy, noninvasive or invasive positive pressure ventilation, to achieve oxygenation and ventilation goal; avoid hyperoxemia.  Obtain cultures prior to initiating antimicrobial therapy when possible. Do not delay for laboratory results in the presence of high suspicion or clinical indicators.  Administer intravenous broad-spectrum antimicrobial therapy promptly.  Implement hemodynamic monitoring to guide intravascular support based on individual targeted parameters.  Determine and address underlying source of infection aggressively; implement transmission-based precautions and isolation, as  indicated.  Recent Flowsheet Documentation  Taken 3/16/2024 0626 by Ry Harp RN  Infection Prevention:   visitors restricted/screened   single patient room provided   rest/sleep promoted   personal protective equipment utilized   hand hygiene promoted   equipment surfaces disinfected   environmental surveillance performed   cohorting utilized  Taken 3/16/2024 0250 by Ry Harp RN  Infection Prevention:   visitors restricted/screened   single patient room provided   rest/sleep promoted   equipment surfaces disinfected   environmental surveillance performed   cohorting utilized   hand hygiene promoted   personal protective equipment utilized  Taken 3/16/2024 0030 by Ry Harp RN  Infection Prevention:   visitors restricted/screened   single patient room provided   rest/sleep promoted   personal protective equipment utilized   hand hygiene promoted   equipment surfaces disinfected   environmental surveillance performed   cohorting utilized  Taken 3/15/2024 2024 by Ry Harp RN  Infection Prevention:   visitors restricted/screened   single patient room provided   personal protective equipment utilized   hand hygiene promoted   equipment surfaces disinfected   environmental surveillance performed   cohorting utilized   rest/sleep promoted  Intervention: Promote Recovery  Description: Encourage pulmonary hygiene, such as cough-enhancement and airway-clearance techniques, that may include use of incentive spirometry, deep breathing and cough.  Encourage early rehabilitation and physical activity to optimize functional ability and activity tolerance, as well as minimize delirium.  Promote energy conservation; minimize oxygen demand and consumption by adjusting environment, decreasing stimulation, maintaining normothermia and treating pain.  Optimize fluid balance, nutrition intake, sleep and glycemic control to maintain tissue perfusion and enhance immune response.  Recent Flowsheet Documentation  Taken  3/16/2024 0626 by Ry Harp, RN  Activity Management: activity encouraged  Taken 3/16/2024 0250 by Ry Harp, RN  Activity Management: activity encouraged  Taken 3/16/2024 0030 by Ry Harp, RN  Activity Management: activity encouraged  Taken 3/15/2024 2200 by Ry Harp, RN  Activity Management: activity encouraged  Taken 3/15/2024 2024 by Ry Harp, RN  Activity Management: activity encouraged

## 2024-03-16 NOTE — PLAN OF CARE
Goal Outcome Evaluation:      Pt on bipap overnight . Bipap off during day and placed on nasal cannula 2L and tolerated well. Continue  to wear  Bipap at night.

## 2024-03-16 NOTE — PLAN OF CARE
Goal Outcome Evaluation:  Plan of Care Reviewed With: patient        Progress: improving  Outcome Evaluation: Pt is now allowed to resume mobility, she did well and was able to walk 30 ft in room on o2 with a rolling walker, required min assist, pt cont to demonstrate weakness with impaired funcitonal mobility below baseline, she will benefit from PT and rec SNF at discharge      Anticipated Discharge Disposition (PT): skilled nursing facility

## 2024-03-16 NOTE — PROGRESS NOTES
"  Daily Progress Note.   [facility]  3/16/2024    Patient Name:  Lizzy Hicks  MRN:  [id number]  YOB: 1935  Age: 88 y.o.  Sex: female         Reason for Admission / Chief Complaint:  Respiratory failure, COPD, pneumonia    Hospital Course:   88-year-old female with a history of severe COPD and chronic steroid use who presented with shortness of breath and found to have bilateral pneumonia with COPD exacerbation.    Interval History:  No acute events overnight  Hemodynamically stable and  Leukocytosis improving  On 3 L nasal cannula  On evaluation states that she feels good  Sitting up in chair comfortably  Breathing is drastically improved  No chest pain or palpitations  No nausea or vomiting    Physical Exam:  /49 (BP Location: Left arm, Patient Position: Sitting)   Pulse 72   Temp 97.9 °F (36.6 °C) (Oral)   Resp 18   Ht 152.4 cm (60\")   Wt 59.2 kg (130 lb 9.6 oz)   LMP  (LMP Unknown)   SpO2 98%   BMI 25.51 kg/m²   Body mass index is 25.51 kg/m².    Intake/Output    Intake/Output Summary (Last 24 hours) at 3/16/2024 1729  Last data filed at 3/16/2024 1718  Gross per 24 hour   Intake 720 ml   Output 700 ml   Net 20 ml     General: Alert, nontoxic, NAD, elderly  HEENT: NC/AT, EOMI, MMM  Neck: Supple, trachea midline  Cardiac: RRR, no murmur, gallops, rubs  Pulmonary: Diminished bilaterally  GI: Soft, non-tender, non-distended, normal bowel sounds  Extremities: Warm, well perfused, no LE edema  Skin: no visible rash  Neuro: CN II - XII grossly intact  Psychiatry: Normal mood and affect      Data Review:  Notable Labs:  Results from last 7 days   Lab Units 03/16/24  0406 03/15/24  0358 03/14/24  1635 03/14/24  0340 03/13/24  1558 03/13/24  0948 03/13/24  0417 03/12/24  0419 03/11/24  0431 03/10/24  0546   WBC 10*3/mm3 14.12* 15.97*  --  11.56*  --   --  14.25* 8.36 9.55 9.11   HEMOGLOBIN g/dL 10.3* 11.4* 11.5* 6.4* 7.4* 7.3* 7.5* 10.2* 9.3* 9.2*   PLATELETS 10*3/mm3 " 185 186  --  157  --   --  180 244 216 212     Results from last 7 days   Lab Units 03/16/24  0406 03/15/24  0358 03/14/24  0340 03/13/24  0417 03/12/24  0419 03/11/24  0431 03/10/24  0546   SODIUM mmol/L 140 139 139 140 138 135* 136   POTASSIUM mmol/L 4.0 4.4 4.5 4.3 4.7 4.2 3.9   CHLORIDE mmol/L 102 101 107 104 101 101 100   CO2 mmol/L 28.2 26.0 25.0 24.5 24.3 25.4 29.0   BUN mg/dL 28* 27* 27* 28* 26* 27* 37*   CREATININE mg/dL 0.83 0.91 0.90 0.85 0.84 0.90 0.99   GLUCOSE mg/dL 99 99 117* 110* 149* 107* 110*   CALCIUM mg/dL 8.3* 8.6 8.3* 8.1* 9.2 8.5* 8.8   PHOSPHORUS mg/dL 2.9 3.4 3.9 2.7 4.2 2.9 3.5   Estimated Creatinine Clearance: 39.2 mL/min (by C-G formula based on SCr of 0.83 mg/dL).    Results from last 7 days   Lab Units 03/16/24  0406 03/15/24  0358 03/14/24  0340   PLATELETS 10*3/mm3 185 186 157             Imaging:  Reviewed chest images personally from past 3 days    ASSESSMENT  /  PLAN:    Acute on chronic hypoxic and hypercapnic respiratory failure, on O2 2-3 L/min and NIPPV at baseline.   Pneumonia, bilateral, R >L  Bilateral pleural effusion, mild.  Pulmonary vascular congestion  COPD exacerbation, improved  Non-STEMI type II  CAD with severe 95% proximal left main stenosis.  S/p [facility] 3/8/2024. Not a surgical candiate.  S/p [facility] 3/12/2024 with stent placement  History of DVT, on AC with Eliquis.  Venous Doppler 3/7/2024 negative for DVT.   Prominent calcifications of the abdominal aorta, iliac arteries, and  femoral arteries  ABLA secondary to right groin hematoma    -Continue NIPPV at night and as needed during the day for naps.  -On 3 L nasal cannula, wean for SpO2 goal 88 to 92%.  -Completed course of antibiotics with cefepime  -Continue bronchodilator therapy with Symbicort and DuoNebs Q6  -Cardiology following, ongoing diuresis.  -Continue aspirin and Plavix  -Mild drop in hemoglobin, continue to monitor.  -Continue prednisone 10 mg daily  -No indication for anticoagulation at discharge    All  issues new to me today.  Prior hospital course, labs and imaging reviewed.    GI prophylaxis: Not indicated  DVT prophylaxis: SCDs  Reagan catheter: No  Bowel regimen: Ordered  Diet: Ordered    Discharge: Floor.  PT recommends SNF placement, anticipate discharge is greater than 48 hours away.  Internal medicine consulted to assume care.    Chas Jose MD  [facility]  Pulmonary and Critical Care Medicine, Interventional Pulmonology    Parts of this note may be an electronic transcription/translation of spoken language to printed text using the Dragon dictation system.

## 2024-03-16 NOTE — PROGRESS NOTES
"Lizzy Hicks  1935 88 y.o.  [id number]      Patient Care Team:  Traci Hoyos MD as PCP - General (Geriatric Medicine)    CC: Frail, elderly, respiratory insufficiency, non-ST elevation MI, status post left main PCI, malignant mitral annular calcification, normal LV systolic function without wall motion abnormality, heart failure secondary to diastolic dysfunction    Interval History: She feels much improved today as far as her breathing goes      Objective   Vital Signs  Temp:  [97.5 °F (36.4 °C)-98.1 °F (36.7 °C)] 97.5 °F (36.4 °C)  Heart Rate:  [70-90] 75  Resp:  [18-24] 18  BP: (120-155)/(45-55) 145/54    Intake/Output Summary (Last 24 hours) at 3/16/2024 0909  Last data filed at 3/16/2024 0836  Gross per 24 hour   Intake 720 ml   Output 1300 ml   Net -580 ml     Flowsheet Rows      Flowsheet Row First Filed Value   Admission Height 152.4 cm (60\") Documented at 03/07/2024 0258   Admission Weight 62.3 kg (137 lb 4.8 oz) Documented at 03/07/2024 0258            Physical Exam:   General Appearance:    Alert,oriented, in no acute distress   Lungs:     Clear to auscultation,BS are equal    Heart:    Normal S1 and S2, RRR without murmur, gallop or rub   HEENT:    Sclerae are clear, no JVD or adenopathy   Abdomen:     Normal bowel sounds, soft nontender, nondistended, no HSM   Extremities:   Moves all extremities well, no edema, no cyanosis, no             Redness, no rash     Medication Review:      amLODIPine, 5 mg, Oral, Daily  aspirin, 81 mg, Oral, Daily  atenolol, 50 mg, Oral, Daily  budesonide-formoterol, 2 puff, Inhalation, BID - RT  clopidogrel, 75 mg, Oral, Daily  ferrous sulfate, 325 mg, Oral, Daily With Breakfast  furosemide, 40 mg, Intravenous, Q12H  ipratropium-albuterol, 3 mL, Nebulization, 4x Daily - RT  [START ON 3/17/2024] losartan, 25 mg, Oral, Q24H  methylPREDNISolone sodium succinate, 40 mg, Intravenous, Once  oxybutynin XL, 5 mg, Oral, Daily  prednisoLONE acetate, 1 drop, Right Eye, 4x " Daily  predniSONE, 10 mg, Oral, Daily  rosuvastatin, 20 mg, Oral, Daily  senna-docusate sodium, 2 tablet, Oral, BID  sodium chloride, 1 drop, Right Eye, 4x Daily  sodium chloride, 10 mL, Intravenous, Q12H             I reviewed the patient's new clinical results.  I personally viewed and interpreted the patient's EKG/Telemetry data    Assessment/Plan  Active Hospital Problems    Diagnosis  POA    **Acute respiratory failure [J96.00]  Yes      Resolved Hospital Problems   No resolved problems to display.     She looks like she is doing better today we will going to continue to diurese her with some IV Lasix today.  I am going to increase her losartan.  We need to get her up and moving in her room she is very elderly and frail    Malik White MD  03/16/24  09:09 EDT

## 2024-03-16 NOTE — THERAPY TREATMENT NOTE
Patient Name: Lizzy Hicks  : 1935    MRN: [id number]                              Today's Date: 3/16/2024       Admit Date: 3/7/2024    Visit Dx:     ICD-10-CM ICD-9-CM   1. Acute respiratory failure with hypoxia  J96.01 518.81   2. Acute on chronic congestive heart failure, unspecified heart failure type  I50.9 428.0   3. Pneumonia of both lungs due to infectious organism, unspecified part of lung  J18.9 483.8   4. Elevated lactic acid level  R79.89 276.2   5. Elevated troponin  R79.89 790.6   6. NSTEMI, initial episode of care  I21.4 410.71   7. Coronary artery disease involving native coronary artery of native heart with angina pectoris  I25.119 414.01     413.9     Patient Active Problem List   Diagnosis    Hypertension    COPD (chronic obstructive pulmonary disease)    Carotid artery stenosis    Osteoarthritis    Osteoporosis    Hyponatremia    Reset osmostat syndrome    Carotid stenosis, asymptomatic, right    Carotid artery disease    Macular degeneration    COPD exacerbation    Acute exacerbation of chronic obstructive pulmonary disease (COPD)    Parainfluenza infection    Acute respiratory failure with hypoxia    Acute respiratory failure with hypoxia and hypercapnia    Mixed hyperlipidemia    Possible pneumonia of right lower lobe due to infectious organism    Oropharyngeal dysphagia    Acute respiratory failure with hypercapnia    Respiratory insufficiency    COPD with acute exacerbation    Sleep apnea    Acute DVT (deep venous thrombosis)    DVT, lower extremity, distal, acute, right    Acute respiratory failure     Past Medical History:   Diagnosis Date    Anesthesia complication     pt states was groggy for a week after surgery    Carotid artery disease     left    Carotid stenosis     RIGHT    COPD (chronic obstructive pulmonary disease)     History of bronchitis     Hypertension     Macular degeneration     Osteoarthritis 2016    Osteoporosis 2016    Reset osmostat syndrome  08/15/2016    Worked up by prior PCP in [facility]. Baseline Na 128-130 since 2013.    Seborrheic dermatitis 01/20/2016    Swallowing difficulty     D/T INADVERTANT REMOVAL OF UVULA AS CHILD WITH T AND A     Past Surgical History:   Procedure Laterality Date    CARDIAC CATHETERIZATION N/A 3/8/2024    Procedure: Left Heart Cath;  Surgeon: Sheng Jaimes MD;  Location:  [facility] CATH INVASIVE LOCATION;  Service: Cardiovascular;  Laterality: N/A;    CARDIAC CATHETERIZATION N/A 3/8/2024    Procedure: Coronary angiography;  Surgeon: Sheng Jaimes MD;  Location:  [facility] CATH INVASIVE LOCATION;  Service: Cardiovascular;  Laterality: N/A;    CARDIAC CATHETERIZATION N/A 3/12/2024    Procedure: Percutaneous Coronary Intervention;  Surgeon: Sheng Jaimes MD;  Location:  [facility] CATH INVASIVE LOCATION;  Service: Cardiovascular;  Laterality: N/A;    CARDIAC CATHETERIZATION N/A 3/12/2024    Procedure: Stent LAVELLE coronary;  Surgeon: Sheng Jaimes MD;  [facility];  Service: Cardiovascular;  Laterality: N/A;    CAROTID ENDARTERECTOMY Right 9/24/2018    Procedure: RT CAROTID ENDARTERECTOMY WITH INTRA OPERATIVE CAROTID ARTERY DUPLEX SCAN;  Surgeon: Mikaela Galicia Jr., MD;  Location: [facility] MAIN OR;  Service: Vascular    CAROTID ENDARTERECTOMY Left 4/4/2019    Procedure: LEFT CAROTID ENDARTERECTOMY;  Surgeon: Mikaela Galicia Jr., MD;  Location: [facility] MAIN OR;  Service: Vascular    CATARACT EXTRACTION WITH INTRAOCULAR LENS IMPLANT      LEFT AND RIGHT    INTERVENTIONAL RADIOLOGY PROCEDURE N/A 3/12/2024    Procedure: Intravascular Ultrasound;  Surgeon: Sheng Jaimes MD;  Location: [facility] CATH INVASIVE LOCATION;  Service: Cardiovascular;  Laterality: N/A;    ORIF FEMUR FRACTURE Right     HARDWARE    TONSILLECTOMY AND ADENOIDECTOMY      INADVERTANTLY TOOK UVULA AT THIS TIME      General Information       Row Name 03/16/24 5428          Physical Therapy Time and Intention    Document Type  therapy note (daily note)  -PC     Mode of Treatment physical therapy  -PC       Row Name 03/16/24 1638          General Information    Existing Precautions/Restrictions fall  -PC       Row Name 03/16/24 1638          Cognition    Orientation Status (Cognition) oriented x 4  -PC               User Key  (r) = Recorded By, (t) = Taken By, (c) = Cosigned By      Initials Name Provider Type    PC Kathleen Lara, PT Physical Therapist                   Mobility       Row Name 03/16/24 1638          Bed Mobility    Supine-Sit Yale (Bed Mobility) minimum assist (75% patient effort)  -PC       Row Name 03/16/24 1638          Sit-Stand Transfer    Sit-Stand Yale (Transfers) minimum assist (75% patient effort)  -PC     Assistive Device (Sit-Stand Transfers) walker, front-wheeled  -PC       Row Name 03/16/24 1638          Gait/Stairs (Locomotion)    Yale Level (Gait) minimum assist (75% patient effort)  -PC     Assistive Device (Gait) walker, front-wheeled  -PC     Distance in Feet (Gait) 30  -PC     Pattern (Gait) step-through  -PC     Deviations/Abnormal Patterns (Gait) gait speed decreased;trav decreased  -PC     Bilateral Gait Deviations forward flexed posture  -PC               User Key  (r) = Recorded By, (t) = Taken By, (c) = Cosigned By      Initials Name Provider Type    PC Kathleen Lara, PT Physical Therapist                   Obj/Interventions    No documentation.                  Goals/Plan    No documentation.                  Clinical Impression       Row Name 03/16/24 1639          Pain    Pretreatment Pain Rating 0/10 - no pain  -PC       Row Name 03/16/24 1639          Plan of Care Review    Plan of Care Reviewed With patient  -PC     Progress improving  -PC     Outcome Evaluation Pt is now allowed to resume mobility, she did well and was able to walk 30 ft in room on o2 with a rolling walker, required min assist, pt cont to demonstrate weakness with impaired funcitonal mobility  below baseline, she will benefit from PT and rec SNF at discharge  -PC       Row Name 03/16/24 1639          Positioning and Restraints    Pre-Treatment Position in bed  -PC     Post Treatment Position chair  -PC     In Chair reclined;call light within reach;encouraged to call for assist;exit alarm on  -PC               User Key  (r) = Recorded By, (t) = Taken By, (c) = Cosigned By      Initials Name Provider Type    PC Kathleen Lara PT Physical Therapist                   Outcome Measures       Row Name 03/16/24 1641          How much help from another person do you currently need...    Turning from your back to your side while in flat bed without using bedrails? 3  -PC     Moving from lying on back to sitting on the side of a flat bed without bedrails? 3  -PC     Moving to and from a bed to a chair (including a wheelchair)? 3  -PC     Standing up from a chair using your arms (e.g., wheelchair, bedside chair)? 3  -PC     Climbing 3-5 steps with a railing? 2  -PC     To walk in hospital room? 3  -PC     AM-PAC 6 Clicks Score (PT) 17  -PC     Highest Level of Mobility Goal 5 --> Static standing  -PC               User Key  (r) = Recorded By, (t) = Taken By, (c) = Cosigned By      Initials Name Provider Type    PC Kathleen Lara PT Physical Therapist                                 Physical Therapy Education       Title: PT OT SLP Therapies (Done)       Topic: Physical Therapy (Done)       Point: Mobility training (Done)       Learning Progress Summary             Patient Acceptance, E,D, DU,NR by  at 3/16/2024 1643    Acceptance, E,TB, VU by  at 3/15/2024 1757    Acceptance, E,D, VU,NR by MS at 3/13/2024 1509                         Point: Home exercise program (Done)       Learning Progress Summary             Patient Acceptance, E,TB, VU by  at 3/15/2024 1757    Acceptance, E,D, VU,NR by MS at 3/13/2024 1509                         Point: Body mechanics (Done)       Learning Progress Summary              Patient Acceptance, E,D, DU,NR by  at 3/16/2024 1643    Acceptance, E,TB, VU by  at 3/15/2024 1757    Acceptance, E,D, VU,NR by MS at 3/13/2024 1509                         Point: Precautions (Done)       Learning Progress Summary             Patient Acceptance, E,D, DU,NR by  at 3/16/2024 1643    Acceptance, E,TB, VU by  at 3/15/2024 1757    Acceptance, E,D, VU,NR by MS at 3/13/2024 1509                                         User Key       Initials Effective Dates Name Provider Type Discipline     06/16/21 -  Kathleen Lara, PT Physical Therapist PT    MS 06/16/21 -  Isidoro Godinez, PT Physical Therapist PT     06/16/21 -  Anne-Marie Rwoan, RN Registered Nurse Nurse                  PT Recommendation and Plan     Plan of Care Reviewed With: patient  Progress: improving  Outcome Evaluation: Pt is now allowed to resume mobility, she did well and was able to walk 30 ft in room on o2 with a rolling walker, required min assist, pt cont to demonstrate weakness with impaired funcitonal mobility below baseline, she will benefit from PT and rec SNF at discharge     Time Calculation:         PT Charges       Row Name 03/16/24 1643             Time Calculation    Start Time 1457  -PC      Stop Time 1520  -PC      Time Calculation (min) 23 min  -PC      PT Received On 03/16/24  -PC      PT - Next Appointment 03/17/24  -PC                User Key  (r) = Recorded By, (t) = Taken By, (c) = Cosigned By      Initials Name Provider Type     Kathleen Lara, PT Physical Therapist                  Therapy Charges for Today       Code Description Service Date Service Provider Modifiers Qty    70410980264 HC PT THER PROC EA 15 MIN 3/16/2024 Kathleen Lara, PT GP 2            PT G-Codes  Outcome Measure Options: AM-PAC 6 Clicks Basic Mobility (PT)  AM-PAC 6 Clicks Score (PT): 17  PT Discharge Summary  Anticipated Discharge Disposition (PT): skilled nursing facility    Kathleen aLra PT  3/16/2024

## 2024-03-17 LAB
ALBUMIN SERPL-MCNC: 3.4 G/DL (ref 3.5–5.2)
ANION GAP SERPL CALCULATED.3IONS-SCNC: 10.1 MMOL/L (ref 5–15)
BUN SERPL-MCNC: 34 MG/DL (ref 8–23)
BUN/CREAT SERPL: 33.7 (ref 7–25)
CALCIUM SPEC-SCNC: 8.5 MG/DL (ref 8.6–10.5)
CHLORIDE SERPL-SCNC: 96 MMOL/L (ref 98–107)
CO2 SERPL-SCNC: 29.9 MMOL/L (ref 22–29)
CREAT SERPL-MCNC: 1.01 MG/DL (ref 0.57–1)
DEPRECATED RDW RBC AUTO: 44 FL (ref 37–54)
EGFRCR SERPLBLD CKD-EPI 2021: 54.3 ML/MIN/1.73
ERYTHROCYTE [DISTWIDTH] IN BLOOD BY AUTOMATED COUNT: 14.4 % (ref 12.3–15.4)
GLUCOSE SERPL-MCNC: 108 MG/DL (ref 65–99)
HCT VFR BLD AUTO: 34.6 % (ref 34–46.6)
HGB BLD-MCNC: 11.5 G/DL (ref 12–15.9)
MCH RBC QN AUTO: 28 PG (ref 26.6–33)
MCHC RBC AUTO-ENTMCNC: 33.2 G/DL (ref 31.5–35.7)
MCV RBC AUTO: 84.4 FL (ref 79–97)
PHOSPHATE SERPL-MCNC: 2.9 MG/DL (ref 2.5–4.5)
PLATELET # BLD AUTO: 245 10*3/MM3 (ref 140–450)
PMV BLD AUTO: 9.6 FL (ref 6–12)
POTASSIUM SERPL-SCNC: 3.4 MMOL/L (ref 3.5–5.2)
POTASSIUM SERPL-SCNC: 4.2 MMOL/L (ref 3.5–5.2)
RBC # BLD AUTO: 4.1 10*6/MM3 (ref 3.77–5.28)
SODIUM SERPL-SCNC: 136 MMOL/L (ref 136–145)
WBC NRBC COR # BLD AUTO: 14.61 10*3/MM3 (ref 3.4–10.8)

## 2024-03-17 PROCEDURE — 99232 SBSQ HOSP IP/OBS MODERATE 35: CPT | Performed by: INTERNAL MEDICINE

## 2024-03-17 PROCEDURE — 94799 UNLISTED PULMONARY SVC/PX: CPT

## 2024-03-17 PROCEDURE — 84132 ASSAY OF SERUM POTASSIUM: CPT | Performed by: INTERNAL MEDICINE

## 2024-03-17 PROCEDURE — 80069 RENAL FUNCTION PANEL: CPT | Performed by: INTERNAL MEDICINE

## 2024-03-17 PROCEDURE — 25010000002 FUROSEMIDE PER 20 MG: Performed by: INTERNAL MEDICINE

## 2024-03-17 PROCEDURE — 85027 COMPLETE CBC AUTOMATED: CPT | Performed by: INTERNAL MEDICINE

## 2024-03-17 PROCEDURE — 63710000001 PREDNISONE PER 5 MG: Performed by: INTERNAL MEDICINE

## 2024-03-17 RX ORDER — LOSARTAN POTASSIUM 50 MG/1
50 TABLET ORAL
Status: DISCONTINUED | OUTPATIENT
Start: 2024-03-18 | End: 2024-03-18

## 2024-03-17 RX ADMIN — PREDNISOLONE ACETATE 1 DROP: 10 SUSPENSION/ DROPS OPHTHALMIC at 10:37

## 2024-03-17 RX ADMIN — AMLODIPINE BESYLATE 5 MG: 5 TABLET ORAL at 10:37

## 2024-03-17 RX ADMIN — SENNOSIDES AND DOCUSATE SODIUM 2 TABLET: 50; 8.6 TABLET ORAL at 10:36

## 2024-03-17 RX ADMIN — LOSARTAN POTASSIUM 25 MG: 25 TABLET, FILM COATED ORAL at 10:37

## 2024-03-17 RX ADMIN — IPRATROPIUM BROMIDE AND ALBUTEROL SULFATE 3 ML: 2.5; .5 SOLUTION RESPIRATORY (INHALATION) at 06:59

## 2024-03-17 RX ADMIN — SODIUM CHLORIDE 1 DROP: 50 SOLUTION OPHTHALMIC at 20:54

## 2024-03-17 RX ADMIN — PREDNISOLONE ACETATE 1 DROP: 10 SUSPENSION/ DROPS OPHTHALMIC at 17:09

## 2024-03-17 RX ADMIN — BUDESONIDE AND FORMOTEROL FUMARATE DIHYDRATE 2 PUFF: 160; 4.5 AEROSOL RESPIRATORY (INHALATION) at 20:27

## 2024-03-17 RX ADMIN — ATENOLOL 50 MG: 50 TABLET ORAL at 10:37

## 2024-03-17 RX ADMIN — Medication 10 ML: at 10:40

## 2024-03-17 RX ADMIN — BUDESONIDE AND FORMOTEROL FUMARATE DIHYDRATE 2 PUFF: 160; 4.5 AEROSOL RESPIRATORY (INHALATION) at 06:59

## 2024-03-17 RX ADMIN — SODIUM CHLORIDE 1 DROP: 50 SOLUTION OPHTHALMIC at 12:00

## 2024-03-17 RX ADMIN — SENNOSIDES AND DOCUSATE SODIUM 2 TABLET: 50; 8.6 TABLET ORAL at 20:54

## 2024-03-17 RX ADMIN — ASPIRIN 81 MG: 81 TABLET, COATED ORAL at 10:36

## 2024-03-17 RX ADMIN — OXYBUTYNIN CHLORIDE 5 MG: 5 TABLET, EXTENDED RELEASE ORAL at 10:37

## 2024-03-17 RX ADMIN — ROSUVASTATIN CALCIUM 20 MG: 20 TABLET, FILM COATED ORAL at 20:54

## 2024-03-17 RX ADMIN — IPRATROPIUM BROMIDE AND ALBUTEROL SULFATE 3 ML: 2.5; .5 SOLUTION RESPIRATORY (INHALATION) at 10:53

## 2024-03-17 RX ADMIN — CLOPIDOGREL BISULFATE 75 MG: 75 TABLET, FILM COATED ORAL at 10:37

## 2024-03-17 RX ADMIN — IPRATROPIUM BROMIDE AND ALBUTEROL SULFATE 3 ML: 2.5; .5 SOLUTION RESPIRATORY (INHALATION) at 20:27

## 2024-03-17 RX ADMIN — PREDNISOLONE ACETATE 1 DROP: 10 SUSPENSION/ DROPS OPHTHALMIC at 12:00

## 2024-03-17 RX ADMIN — FUROSEMIDE 40 MG: 10 INJECTION, SOLUTION INTRAMUSCULAR; INTRAVENOUS at 22:22

## 2024-03-17 RX ADMIN — PREDNISOLONE ACETATE 1 DROP: 10 SUSPENSION/ DROPS OPHTHALMIC at 20:54

## 2024-03-17 RX ADMIN — FUROSEMIDE 40 MG: 10 INJECTION, SOLUTION INTRAMUSCULAR; INTRAVENOUS at 10:37

## 2024-03-17 RX ADMIN — FERROUS SULFATE TAB 325 MG (65 MG ELEMENTAL FE) 325 MG: 325 (65 FE) TAB at 10:37

## 2024-03-17 RX ADMIN — PREDNISONE 10 MG: 10 TABLET ORAL at 10:37

## 2024-03-17 RX ADMIN — IPRATROPIUM BROMIDE AND ALBUTEROL SULFATE 3 ML: 2.5; .5 SOLUTION RESPIRATORY (INHALATION) at 14:27

## 2024-03-17 RX ADMIN — SODIUM CHLORIDE 1 DROP: 50 SOLUTION OPHTHALMIC at 17:09

## 2024-03-17 RX ADMIN — Medication 10 ML: at 20:54

## 2024-03-17 RX ADMIN — SODIUM CHLORIDE 1 DROP: 50 SOLUTION OPHTHALMIC at 10:37

## 2024-03-17 NOTE — PROGRESS NOTES
"Lizzy Hicks  1935 88 y.o.  [id number]      Patient Care Team:  Traci Hoyos MD as PCP - General (Geriatric Medicine)    CC: Frail, elderly, respiratory insufficiency, non-ST elevation MI, status post left main PCI, malignant mitral annular calcification, normal LV systolic function without wall motion abnormality, heart failure secondary to diastolic dysfunction    Interval History: She continues to feel better      Objective   Vital Signs  Temp:  [97.7 °F (36.5 °C)-98.6 °F (37 °C)] 98 °F (36.7 °C)  Heart Rate:  [69-87] 78  Resp:  [18-24] 18  BP: (110-159)/(43-61) 144/43    Intake/Output Summary (Last 24 hours) at 3/17/2024 1235  Last data filed at 3/17/2024 1209  Gross per 24 hour   Intake 840 ml   Output 1000 ml   Net -160 ml     Flowsheet Rows      Flowsheet Row First Filed Value   Admission Height 152.4 cm (60\") Documented at 03/07/2024 0258   Admission Weight 62.3 kg (137 lb 4.8 oz) Documented at 03/07/2024 0258            Physical Exam:   General Appearance:    Alert,oriented, in no acute distress   Lungs:     Clear to auscultation,BS are equal    Heart:    Normal S1 and S2, RRR without murmur, gallop or rub   HEENT:    Sclerae are clear, no JVD or adenopathy   Abdomen:     Normal bowel sounds, soft nontender, nondistended, no HSM   Extremities:   Moves all extremities well, no edema, no cyanosis, no             Redness, no rash     Medication Review:      amLODIPine, 5 mg, Oral, Daily  aspirin, 81 mg, Oral, Daily  atenolol, 50 mg, Oral, Daily  budesonide-formoterol, 2 puff, Inhalation, BID - RT  clopidogrel, 75 mg, Oral, Daily  ferrous sulfate, 325 mg, Oral, Daily With Breakfast  furosemide, 40 mg, Intravenous, Q12H  ipratropium-albuterol, 3 mL, Nebulization, 4x Daily - RT  [START ON 3/18/2024] losartan, 50 mg, Oral, Q24H  methylPREDNISolone sodium succinate, 40 mg, Intravenous, Once  oxybutynin XL, 5 mg, Oral, Daily  prednisoLONE acetate, 1 drop, Right Eye, 4x Daily  predniSONE, 10 mg, Oral, " Daily  rosuvastatin, 20 mg, Oral, Daily  senna-docusate sodium, 2 tablet, Oral, BID  sodium chloride, 1 drop, Right Eye, 4x Daily  sodium chloride, 10 mL, Intravenous, Q12H             I reviewed the patient's new clinical results.  I personally viewed and interpreted the patient's EKG/Telemetry data    Assessment/Plan  Active Hospital Problems    Diagnosis  POA    **Acute respiratory failure [J96.00]  Yes      Resolved Hospital Problems   No resolved problems to display.     She looks like she is doing better today we will going to continue to diurese her with some IV Lasix today.  She really has made a big improvement but awfully frail and weak we are going to have her start moving around get some PT OT and I will also increase her losartan to 50 mg a day    Malik White MD  03/17/24  12:35 EDT

## 2024-03-17 NOTE — PLAN OF CARE
Goal Outcome Evaluation:  Plan of Care Reviewed With: patient        Progress: improving  Outcome Evaluation: Pt is AOx4. All vs stable. NSR on tele and ON BPAP overnight. No compliaints  of pain or SOA. Safety maintained. Wc    Problem: Skin Injury Risk Increased  Goal: Skin Health and Integrity  Intervention: Optimize Skin Protection  Description: Perform a full pressure injury risk assessment, as indicated by screening, upon admission to care unit.  Reassess skin (injury risk, full inspection) frequently (e.g., scheduled interval, with change in condition) to provide optimal early detection and prevention.  Maintain adequate tissue perfusion (e.g., encourage fluid balance; avoid crossing legs, constrictive clothing or devices) to promote tissue oxygenation.  Maintain head of bed at lowest degree of elevation tolerated, considering medical condition and other restrictions.  Avoid positioning onto an area that remains reddened.  Minimize incontinence and moisture (e.g., toileting schedule; moisture-wicking pad, diaper or incontinence collection device; skin moisture barrier).  Cleanse skin promptly and gently when soiled utilizing a pH-balanced cleanser.  Relieve and redistribute pressure (e.g., scheduled position changes, weight shifts, use of support surface, medical device repositioning, protective dressing application, use of positioning device, microclimate control, use of pressure-injury-monitor  Encourage increased activity, such as sitting in a chair at the bedside or early mobilization, when able to tolerate.  Recent Flowsheet Documentation  Taken 3/17/2024 0450 by Ry Harp, RN  Head of Bed (HOB) Positioning: HOB elevated  Taken 3/16/2024 2045 by Ry Harp RN  Head of Bed (HOB) Positioning: HOB elevated

## 2024-03-17 NOTE — CONSULTS
[facility]               [facility]    Inpatient Internal Medicine Consult  Consult performed by: Dino Enrique MD  Consult ordered by: Chas Jose MD      Date of Admit: 3/7/2024  Date of Consult: 03/17/24    Subjective   88 y.o. female with past medical history significant for COPD, hypertension, macular degeneration, osteoporosis, is seen after ICU attending asked asked to see patient for medical management and hypertension management.  Patient will be seen by hospitalist service for further workup of her symptoms.  Patient appears stable at the time of my evaluation.  She was in ICU, she will be moving to medicine service.    Review of Systems   Constitutional: Negative for activity change and appetite change.   HENT: Negative for congestion and dental problem.    Eyes: Negative for discharge and itching.   Respiratory: Negative for apnea and chest tightness.    Cardiovascular: Negative for chest pain and palpitations.   Gastrointestinal: Negative for abdominal distention and abdominal pain.   Endocrine: Negative for cold intolerance and heat intolerance.   Genitourinary: Negative for difficulty urinating and frequency.   Musculoskeletal: Negative for arthralgias and back pain.   Skin: Negative for color change and pallor.   Allergic/Immunologic: Negative for environmental allergies and food allergies.   Neurological: Negative for dizziness and facial asymmetry.   Hematological: Negative for adenopathy. Does not bruise/bleed easily.   Psychiatric/Behavioral: Negative for agitation and suicidal ideas.       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 Baseline Na 128-130 since 2013.    Seborrheic dermatitis 01/20/2016    Swallowing difficulty     D/T INADVERTANT REMOVAL OF UVULA AS CHILD WITH T AND A     Past Surgical History:   Procedure Laterality Date    CARDIAC CATHETERIZATION N/A 3/8/2024    Procedure: Left Heart Cath;  Surgeon: Sheng Jaimes MD;  [facility];  Service: Cardiovascular;  Laterality: N/A;    CARDIAC CATHETERIZATION N/A 3/8/2024    Procedure: Coronary angiography;  Surgeon: Sheng Jaimes MD;  Location:  [facility] CATH INVASIVE LOCATION;  Service: Cardiovascular;  Laterality: N/A;    CARDIAC CATHETERIZATION N/A 3/12/2024    Procedure: Percutaneous Coronary Intervention;  Surgeon: Sheng Jaimes MD;  Location:  [facility] CATH INVASIVE LOCATION;  Service: Cardiovascular;  Laterality: N/A;    CARDIAC CATHETERIZATION N/A 3/12/2024    Procedure: Stent LAVELLE coronary;  Surgeon: Sheng Jaimes MD;  [facility];  Service: Cardiovascular;  Laterality: N/A;    CAROTID ENDARTERECTOMY Right 9/24/2018    Procedure: RT CAROTID ENDARTERECTOMY WITH INTRA OPERATIVE CAROTID ARTERY DUPLEX SCAN;  Surgeon: Mikaela Galicia Jr., MD;  Location: [facility] MAIN OR;  Service: Vascular    CAROTID ENDARTERECTOMY Left 4/4/2019    Procedure: LEFT CAROTID ENDARTERECTOMY;  Surgeon: Mikaela Galicia Jr., MD;  Location: [facility] MAIN OR;  Service: Vascular    CATARACT EXTRACTION WITH INTRAOCULAR LENS IMPLANT      LEFT AND RIGHT    INTERVENTIONAL RADIOLOGY PROCEDURE N/A 3/12/2024    Procedure: Intravascular Ultrasound;  Surgeon: Sheng Jaimes MD;  Location: [facility] CATH INVASIVE LOCATION;  Service: Cardiovascular;  Laterality: N/A;    ORIF FEMUR FRACTURE Right     HARDWARE    TONSILLECTOMY AND ADENOIDECTOMY      INADVERTANTLY TOOK UVULA AT THIS TIME       Family History   Problem Relation Age of Onset    Malig Hyperthermia Neg Hx      Social History      Tobacco Use    Smoking status: Former     Current packs/day: 0.25     Average packs/day: 0.3 packs/day for 40.0 years (10.0 ttl pk-yrs)     Types: Cigarettes    Smokeless tobacco: Never    Tobacco comments:     quit 6 yr ago   Vaping Use    Vaping status: Never Used   Substance Use Topics    Alcohol use: Not Currently     Comment: 1-2 drinks day    Drug use: No     Objective      Vital Signs  Temp:  [97.7 °F (36.5 °C)-98.6 °F (37 °C)] 98 °F (36.7 °C)  Heart Rate:  [69-87] 72  Resp:  [18-24] 18  BP: (110-159)/(43-61) 144/43    Physical Exam  General, awake and alert.  Head and ENT, normocephalic and atraumatic.  Lungs, symmetric expansion, equal air entry bilaterally.  Heart, regular rate and rhythm.  Abdomen, soft and nontender.  Extremities, no clubbing or cyanosis.  Neuro, no focal deficits.  Skin: Warm and no rash.  Psych, normal mood and affect.  Musculoskeletal, joint examination is grossly normal.    Estimated Creatinine Clearance: 31.9 mL/min (A) (by C-G formula based on SCr of 1.01 mg/dL (H)).    Assessment & Plan   Active Hospital Problems    Diagnosis  POA    **Acute respiratory failure [J96.00]  Yes    Acute DVT (deep venous thrombosis) [I82.409]  Yes    Mixed hyperlipidemia [E78.2]  Yes    Hypertension [I10]  Yes    COPD (chronic obstructive pulmonary disease) [J44.9]  Yes      Resolved Hospital Problems   No resolved problems to display.     88 y.o. female    Assessment and plan  1.  Acute on chronic respiratory failure with hypoxia, pulmonary on board, follow management recommendations.    2.  Pneumonia, pleural effusion, COPD exacerbation, clinically she has been improving, management per pulmonary.    3.  Non-STEMI, coronary disease, cardiology on board.    4.  History of DVT, on anticoagulation with Eliquis.    5.  Acute blood loss anemia, monitor hemoglobin trend.    6.  CODE STATUS is DNR.      Dino Enrique MD  [facility]  03/17/24

## 2024-03-17 NOTE — PROGRESS NOTES
"  Daily Progress Note.   [facility] CVI  3/17/2024    Patient Name:  Lizzy Hicks  MRN:  [id number]  YOB: 1935  Age: 88 y.o.  Sex: female         Reason for Admission / Chief Complaint:  Respiratory failure, COPD, pneumonia    Hospital Course:   88-year-old female with a history of severe COPD and chronic steroid use who presented with shortness of breath and found to have bilateral pneumonia with COPD exacerbation.    Interval History:  No acute events overnight  Stable respiratory status, on 2 L nasal cannula  Leukocytosis stable  Mild worsening of serum creatinine  Ongoing diuresis  Patient states that her breathing is improved  Mild cough, no sputum production  No nausea vomiting    Physical Exam:  /43 (BP Location: Left arm, Patient Position: Lying)   Pulse 72   Temp 98 °F (36.7 °C) (Oral)   Resp 18   Ht 152.4 cm (60\")   Wt 58.3 kg (128 lb 8 oz) Comment: pt refused wt on scale  LMP  (LMP Unknown)   SpO2 98%   BMI 25.10 kg/m²   Body mass index is 25.1 kg/m².    Intake/Output    Intake/Output Summary (Last 24 hours) at 3/17/2024 1448  Last data filed at 3/17/2024 1209  Gross per 24 hour   Intake 600 ml   Output 1000 ml   Net -400 ml     General: Alert, nontoxic, NAD, elderly  HEENT: NC/AT, EOMI, MMM  Neck: Supple, trachea midline  Cardiac: RRR, no murmur, gallops, rubs  Pulmonary: Diminished bilaterally  GI: Soft, non-tender, non-distended, normal bowel sounds  Extremities: Warm, well perfused, no LE edema  Skin: no visible rash  Neuro: CN II - XII grossly intact  Psychiatry: Normal mood and affect    Data Review:  Notable Labs:  Results from last 7 days   Lab Units 03/17/24  0328 03/16/24  0406 03/15/24  0358 03/14/24  1635 03/14/24  0340 03/13/24  1558 03/13/24  0948 03/13/24  0417 03/12/24  0419 03/11/24  0431   WBC 10*3/mm3 14.61* 14.12* 15.97*  --  11.56*  --   --  14.25* 8.36 9.55   HEMOGLOBIN g/dL 11.5* 10.3* 11.4* 11.5* 6.4* 7.4* 7.3* 7.5* 10.2* 9.3* "   PLATELETS 10*3/mm3 245 185 186  --  157  --   --  180 244 216     Results from last 7 days   Lab Units 03/17/24  0328 03/16/24  0406 03/15/24  0358 03/14/24  0340 03/13/24  0417 03/12/24  0419 03/11/24  0431   SODIUM mmol/L 136 140 139 139 140 138 135*   POTASSIUM mmol/L 3.4* 4.0 4.4 4.5 4.3 4.7 4.2   CHLORIDE mmol/L 96* 102 101 107 104 101 101   CO2 mmol/L 29.9* 28.2 26.0 25.0 24.5 24.3 25.4   BUN mg/dL 34* 28* 27* 27* 28* 26* 27*   CREATININE mg/dL 1.01* 0.83 0.91 0.90 0.85 0.84 0.90   GLUCOSE mg/dL 108* 99 99 117* 110* 149* 107*   CALCIUM mg/dL 8.5* 8.3* 8.6 8.3* 8.1* 9.2 8.5*   PHOSPHORUS mg/dL 2.9 2.9 3.4 3.9 2.7 4.2 2.9   Estimated Creatinine Clearance: 31.9 mL/min (A) (by C-G formula based on SCr of 1.01 mg/dL (H)).    Results from last 7 days   Lab Units 03/17/24 0328 03/16/24 0406 03/15/24  0358   PLATELETS 10*3/mm3 245 185 186             Imaging:  Reviewed chest images personally from past 3 days    ASSESSMENT  /  PLAN:    Acute on chronic hypoxic and hypercapnic respiratory failure, on O2 2-3 L/min and NIPPV at baseline.   Pneumonia, bilateral, R >L  Bilateral pleural effusion, mild.  Pulmonary vascular congestion  COPD exacerbation, improved  Non-STEMI type II  CAD with severe 95% proximal left main stenosis.  S/p C 3/8/2024. Not a surgical candiate.  S/p [facility] 3/12/2024 with stent placement  History of DVT, on AC with Eliquis.  Venous Doppler 3/7/2024 negative for DVT.   Prominent calcifications of the abdominal aorta, iliac arteries, and  femoral arteries  ABLA secondary to right groin hematoma    -Continue NIPPV at night and as needed during the day for naps.  -On 2-3 L nasal cannula, wean for SpO2 goal 88 to 92%.  -Completed course of antibiotics with cefepime  -Continue bronchodilator therapy with Symbicort and DuoNebs Q6  -Cardiology following, ongoing diuresis.  -Continue aspirin and Plavix  -Hemoglobin improving  -Continue chronic prednisone 10 mg daily  -No indication for anticoagulation at  discharge  -Remainder as per hospitalist primary    Thank you for allowing us to participate in this patient's care.  Pulmonary will continue to follow.    Chas Jose MD  [facility]  Pulmonary and Critical Care Medicine, Interventional Pulmonology    Parts of this note may be an electronic transcription/translation of spoken language to printed text using the Dragon dictation system.

## 2024-03-18 LAB
ALBUMIN SERPL-MCNC: 3.2 G/DL (ref 3.5–5.2)
ANION GAP SERPL CALCULATED.3IONS-SCNC: 11 MMOL/L (ref 5–15)
BUN SERPL-MCNC: 33 MG/DL (ref 8–23)
BUN/CREAT SERPL: 33 (ref 7–25)
CALCIUM SPEC-SCNC: 8.7 MG/DL (ref 8.6–10.5)
CHLORIDE SERPL-SCNC: 96 MMOL/L (ref 98–107)
CO2 SERPL-SCNC: 29 MMOL/L (ref 22–29)
CREAT SERPL-MCNC: 1 MG/DL (ref 0.57–1)
DEPRECATED RDW RBC AUTO: 44.1 FL (ref 37–54)
EGFRCR SERPLBLD CKD-EPI 2021: 55 ML/MIN/1.73
ERYTHROCYTE [DISTWIDTH] IN BLOOD BY AUTOMATED COUNT: 14 % (ref 12.3–15.4)
GLUCOSE SERPL-MCNC: 91 MG/DL (ref 65–99)
HCT VFR BLD AUTO: 35 % (ref 34–46.6)
HGB BLD-MCNC: 11.3 G/DL (ref 12–15.9)
MCH RBC QN AUTO: 28 PG (ref 26.6–33)
MCHC RBC AUTO-ENTMCNC: 32.3 G/DL (ref 31.5–35.7)
MCV RBC AUTO: 86.6 FL (ref 79–97)
PHOSPHATE SERPL-MCNC: 3.8 MG/DL (ref 2.5–4.5)
PLATELET # BLD AUTO: 174 10*3/MM3 (ref 140–450)
PMV BLD AUTO: 9.9 FL (ref 6–12)
POTASSIUM SERPL-SCNC: 4.1 MMOL/L (ref 3.5–5.2)
RBC # BLD AUTO: 4.04 10*6/MM3 (ref 3.77–5.28)
SODIUM SERPL-SCNC: 136 MMOL/L (ref 136–145)
WBC NRBC COR # BLD AUTO: 13.56 10*3/MM3 (ref 3.4–10.8)

## 2024-03-18 PROCEDURE — 63710000001 PREDNISONE PER 5 MG: Performed by: INTERNAL MEDICINE

## 2024-03-18 PROCEDURE — 97530 THERAPEUTIC ACTIVITIES: CPT

## 2024-03-18 PROCEDURE — 94799 UNLISTED PULMONARY SVC/PX: CPT

## 2024-03-18 PROCEDURE — 94664 DEMO&/EVAL PT USE INHALER: CPT

## 2024-03-18 PROCEDURE — 80069 RENAL FUNCTION PANEL: CPT | Performed by: INTERNAL MEDICINE

## 2024-03-18 PROCEDURE — 85027 COMPLETE CBC AUTOMATED: CPT | Performed by: INTERNAL MEDICINE

## 2024-03-18 PROCEDURE — 99232 SBSQ HOSP IP/OBS MODERATE 35: CPT | Performed by: INTERNAL MEDICINE

## 2024-03-18 PROCEDURE — 94761 N-INVAS EAR/PLS OXIMETRY MLT: CPT

## 2024-03-18 RX ORDER — BUMETANIDE 2 MG/1
2 TABLET ORAL DAILY
Status: DISCONTINUED | OUTPATIENT
Start: 2024-03-18 | End: 2024-03-19 | Stop reason: HOSPADM

## 2024-03-18 RX ORDER — ATENOLOL 25 MG/1
25 TABLET ORAL DAILY
Status: DISCONTINUED | OUTPATIENT
Start: 2024-03-19 | End: 2024-03-19 | Stop reason: HOSPADM

## 2024-03-18 RX ADMIN — BUMETANIDE 2 MG: 2 TABLET ORAL at 18:48

## 2024-03-18 RX ADMIN — SENNOSIDES AND DOCUSATE SODIUM 2 TABLET: 50; 8.6 TABLET ORAL at 10:33

## 2024-03-18 RX ADMIN — IPRATROPIUM BROMIDE AND ALBUTEROL SULFATE 3 ML: 2.5; .5 SOLUTION RESPIRATORY (INHALATION) at 20:35

## 2024-03-18 RX ADMIN — AMLODIPINE BESYLATE 5 MG: 5 TABLET ORAL at 10:33

## 2024-03-18 RX ADMIN — BUDESONIDE AND FORMOTEROL FUMARATE DIHYDRATE 2 PUFF: 160; 4.5 AEROSOL RESPIRATORY (INHALATION) at 20:36

## 2024-03-18 RX ADMIN — Medication 10 ML: at 22:46

## 2024-03-18 RX ADMIN — SODIUM CHLORIDE 1 DROP: 50 SOLUTION OPHTHALMIC at 10:36

## 2024-03-18 RX ADMIN — BUDESONIDE AND FORMOTEROL FUMARATE DIHYDRATE 2 PUFF: 160; 4.5 AEROSOL RESPIRATORY (INHALATION) at 08:08

## 2024-03-18 RX ADMIN — Medication 10 ML: at 10:37

## 2024-03-18 RX ADMIN — SENNOSIDES AND DOCUSATE SODIUM 2 TABLET: 50; 8.6 TABLET ORAL at 22:46

## 2024-03-18 RX ADMIN — OXYBUTYNIN CHLORIDE 5 MG: 5 TABLET, EXTENDED RELEASE ORAL at 10:33

## 2024-03-18 RX ADMIN — ROSUVASTATIN CALCIUM 20 MG: 20 TABLET, FILM COATED ORAL at 22:46

## 2024-03-18 RX ADMIN — FERROUS SULFATE TAB 325 MG (65 MG ELEMENTAL FE) 325 MG: 325 (65 FE) TAB at 10:34

## 2024-03-18 RX ADMIN — IPRATROPIUM BROMIDE AND ALBUTEROL SULFATE 3 ML: 2.5; .5 SOLUTION RESPIRATORY (INHALATION) at 08:02

## 2024-03-18 RX ADMIN — PREDNISOLONE ACETATE 1 DROP: 10 SUSPENSION/ DROPS OPHTHALMIC at 22:46

## 2024-03-18 RX ADMIN — IPRATROPIUM BROMIDE AND ALBUTEROL SULFATE 3 ML: 2.5; .5 SOLUTION RESPIRATORY (INHALATION) at 12:22

## 2024-03-18 RX ADMIN — IPRATROPIUM BROMIDE AND ALBUTEROL SULFATE 3 ML: 2.5; .5 SOLUTION RESPIRATORY (INHALATION) at 15:54

## 2024-03-18 RX ADMIN — PREDNISOLONE ACETATE 1 DROP: 10 SUSPENSION/ DROPS OPHTHALMIC at 10:36

## 2024-03-18 RX ADMIN — SODIUM CHLORIDE 1 DROP: 50 SOLUTION OPHTHALMIC at 17:08

## 2024-03-18 RX ADMIN — PREDNISOLONE ACETATE 1 DROP: 10 SUSPENSION/ DROPS OPHTHALMIC at 17:08

## 2024-03-18 RX ADMIN — ASPIRIN 81 MG: 81 TABLET, COATED ORAL at 10:33

## 2024-03-18 RX ADMIN — ATENOLOL 50 MG: 50 TABLET ORAL at 10:33

## 2024-03-18 RX ADMIN — PREDNISONE 10 MG: 10 TABLET ORAL at 10:33

## 2024-03-18 RX ADMIN — SODIUM CHLORIDE 1 DROP: 50 SOLUTION OPHTHALMIC at 22:46

## 2024-03-18 RX ADMIN — SACUBITRIL AND VALSARTAN 1 TABLET: 24; 26 TABLET, FILM COATED ORAL at 22:45

## 2024-03-18 RX ADMIN — CLOPIDOGREL BISULFATE 75 MG: 75 TABLET, FILM COATED ORAL at 10:33

## 2024-03-18 NOTE — PLAN OF CARE
Goal Outcome Evaluation:  Plan of Care Reviewed With: patient, daughter        Progress: improving  Outcome Evaluation: Pt agreeable to PT session this date. Pt UIC upon entrance with daughter at bedside. Pt required SBA during static sitting edge of chair while performing LE exercises. Pt required CGA with RW for STS transfers this date and during chair>BSC transfer. Pt needed mod VCs for sequencing of transfers. Pt on 3 L NC O2 during the entirety of session, per nsg request. Pt then was able to ambulate 100 ft with CGA and RW, needed mod VCs while navigating turns. PT will follow along peripherally as pt progresses. Pt left in room sitting in chair with chair alarm activated and call light in reach. Pt encouraged to get up with nsg atleast 3 times a day.      Anticipated Discharge Disposition (PT): skilled nursing facility

## 2024-03-18 NOTE — PROGRESS NOTES
"  Daily Progress Note.   [facility] CVI  3/18/2024    Patient:  Name:  Lizzy Hicks  MRN:  [id number]  1935  88 y.o.  female         Reason for Admission / Chief Complaint:  Respiratory failure, COPD, pneumonia     Hospital Course:   88-year-old female with a history of severe COPD and chronic steroid use who presented with shortness of breath and found to have bilateral pneumonia with COPD exacerbation.       Interval History:  C/p constipation no emesis  Feels breathing is better. No worsening cough sputum.  No cp no palpitations  No lethargy no confusion  Dana nippv well, wears at home     Physical Exam:  /52 (BP Location: Left arm, Patient Position: Lying)   Pulse 69   Temp 98 °F (36.7 °C) (Oral)   Resp 20   Ht 152.4 cm (60\")   Wt 59 kg (130 lb)   LMP  (LMP Unknown)   SpO2 100%   BMI 25.39 kg/m²   Body mass index is 25.39 kg/m².    Intake/Output Summary (Last 24 hours) at 3/18/2024 1247  Last data filed at 3/18/2024 1205  Gross per 24 hour   Intake 240 ml   Output 1750 ml   Net -1510 ml     General appearance: Nontoxic, conversant   Eyes: anicteric sclerae, moist conjunctivae; no lidlag;    HENT: Atraumatic; oropharynx clear with moist mucous membranes    Neck: Trachea midline;  supple   Lungs: mild rhonchi no wheeze, with normal respiratory effort and no intercostal retractions  CV: RRR, no rub   Abdomen: Soft, nontender; BS+  Extremities: No peripheral edema    Skin: WWP no diffuse visible rash  Psych: Appropriate affect, alert   Neuro: Moves all ext. CN II-XII. Speech intact.    Data Review:  Notable Labs:  Results from last 7 days   Lab Units 03/18/24  0432 03/17/24  0328 03/16/24  0406 03/15/24  0358 03/14/24  1635 03/14/24  0340 03/13/24  1558 03/13/24  0948 03/13/24  0417 03/12/24  0419   WBC 10*3/mm3 13.56* 14.61* 14.12* 15.97*  --  11.56*  --   --  14.25* 8.36   HEMOGLOBIN g/dL 11.3* 11.5* 10.3* 11.4* 11.5* 6.4* 7.4*   < > 7.5* 10.2*   PLATELETS 10*3/mm3 174 245 185 " 186  --  157  --   --  180 244    < > = values in this interval not displayed.     Results from last 7 days   Lab Units 03/18/24  0432 03/17/24  2130 03/17/24  0328 03/16/24  0406 03/15/24  0358 03/14/24  0340 03/13/24  0417 03/12/24  0419   SODIUM mmol/L 136  --  136 140 139 139 140 138   POTASSIUM mmol/L 4.1 4.2 3.4* 4.0 4.4 4.5 4.3 4.7   CHLORIDE mmol/L 96*  --  96* 102 101 107 104 101   CO2 mmol/L 29.0  --  29.9* 28.2 26.0 25.0 24.5 24.3   BUN mg/dL 33*  --  34* 28* 27* 27* 28* 26*   CREATININE mg/dL 1.00  --  1.01* 0.83 0.91 0.90 0.85 0.84   GLUCOSE mg/dL 91  --  108* 99 99 117* 110* 149*   CALCIUM mg/dL 8.7  --  8.5* 8.3* 8.6 8.3* 8.1* 9.2   PHOSPHORUS mg/dL 3.8  --  2.9 2.9 3.4 3.9 2.7 4.2   Estimated Creatinine Clearance: 32.4 mL/min (by C-G formula based on SCr of 1 mg/dL).    Results from last 7 days   Lab Units 03/18/24  0432 03/17/24 0328 03/16/24  0406   PLATELETS 10*3/mm3 174 245 185       Imaging:  Reviewed chest images personally from past 3 days    ASSESSMENT  /  PLAN:  Acute on chronic hypoxic and hypercapnic respiratory failure, on O2 2-3 L/min and NIPPV at baseline.   Pneumonia, bilateral, R >L  Bilateral pleural effusion, mild.  Pulmonary vascular congestion  COPD exacerbation, improved  Non-STEMI type II  CAD with severe 95% proximal left main stenosis.  S/p [facility] 3/8/2024. Not a surgical candiate.  S/p [facility] 3/12/2024 with stent placement  History of DVT, on AC with Eliquis.  Venous Doppler 3/7/2024 negative for DVT.   Prominent calcifications of the abdominal aorta, iliac arteries, and  femoral arteries  ABLA secondary to right groin hematoma    From a pulmonary perspective  Continue NIPPV at night and as needed during the day for naps.  Patient wears this at home.  -On 2-3 L nasal cannula, wean for SpO2 goal 88 to 92%.  Wears oxygen at home  -Completed course of antibiotics with cefepime for pneumonia  -Continue bronchodilator therapy with Symbicort and DuoNebs Q6  Add incentive spirometer and  flutter valve for airway clearance.    Diuresis per cardiology.     -Continue chronic prednisone 10 mg daily    Otherwise medical care per hospitalist.    Taking over from Dr. Jose  Reviewed prior hospital course lab values and imaging.    Electronically signed by Mark Clemons MD, 03/18/24, 12:47 PM EDT.  [facility]

## 2024-03-18 NOTE — PROGRESS NOTES
Name: Lizzy Hicks ADMIT: 3/7/2024   : 1935  PCP: Traci Hoyos MD    MRN: [id number] LOS: 11 days   AGE/SEX: 88 y.o. female  ROOM: [facility]     Subjective   Subjective   Patient is seen at bedside, no new complaints.       Objective   Objective   Vital Signs  Temp:  [97.5 °F (36.4 °C)-98 °F (36.7 °C)] 98 °F (36.7 °C)  Heart Rate:  [64-77] 69  Resp:  [18-20] 20  BP: (131-145)/(52-91) 131/52  SpO2:  [100 %] 100 %  on  Flow (L/min):  [2] 2;   Device (Oxygen Therapy): nasal cannula  Body mass index is 25.39 kg/m².  Physical Exam          General, awake and alert.  Head and ENT, normocephalic and atraumatic.  Lungs, symmetric expansion, equal air entry bilaterally.  Heart, regular rate and rhythm.  Abdomen, soft and nontender.  Extremities, no clubbing or cyanosis.  Neuro, no focal deficits.  Skin: Warm and no rash.  Psych, normal mood and affect.  Musculoskeletal, joint examination is grossly normal.      Copied text material from yesterday's note has been reviewed for appropriate changes and remains accurate as of 3/18/24.      Results Review     I reviewed the patient's new clinical results.  Results from last 7 days   Lab Units 24  0432 03/17/24  0328 03/16/24  0406 03/15/24  0358   WBC 10*3/mm3 13.56* 14.61* 14.12* 15.97*   HEMOGLOBIN g/dL 11.3* 11.5* 10.3* 11.4*   PLATELETS 10*3/mm3 174 245 185 186     Results from last 7 days   Lab Units 24  0432 24  2130 246 03/15/24  0358   SODIUM mmol/L 136  --  136 140 139   POTASSIUM mmol/L 4.1 4.2 3.4* 4.0 4.4   CHLORIDE mmol/L 96*  --  96* 102 101   CO2 mmol/L 29.0  --  29.9* 28.2 26.0   BUN mg/dL 33*  --  34* 28* 27*   CREATININE mg/dL 1.00  --  1.01* 0.83 0.91   GLUCOSE mg/dL 91  --  108* 99 99   EGFR mL/min/1.73 55.0*  --  54.3* 68.8 61.6     Results from last 7 days   Lab Units 24  0432 24  0328 24  0406 03/15/24  0358   ALBUMIN g/dL 3.2* 3.4* 3.1* 3.4*     Results from last 7 days   Lab Units  "03/18/24  0432 03/17/24  0328 03/16/24  0406 03/15/24  0358   CALCIUM mg/dL 8.7 8.5* 8.3* 8.6   ALBUMIN g/dL 3.2* 3.4* 3.1* 3.4*   PHOSPHORUS mg/dL 3.8 2.9 2.9 3.4       No results found for: \"HGBA1C\", \"POCGLU\"    No radiology results for the last day    I have personally reviewed all medications:  Scheduled Medications  amLODIPine, 5 mg, Oral, Daily  aspirin, 81 mg, Oral, Daily  atenolol, 50 mg, Oral, Daily  budesonide-formoterol, 2 puff, Inhalation, BID - RT  bumetanide, 2 mg, Oral, Daily  clopidogrel, 75 mg, Oral, Daily  ferrous sulfate, 325 mg, Oral, Daily With Breakfast  ipratropium-albuterol, 3 mL, Nebulization, 4x Daily - RT  methylPREDNISolone sodium succinate, 40 mg, Intravenous, Once  oxybutynin XL, 5 mg, Oral, Daily  prednisoLONE acetate, 1 drop, Right Eye, 4x Daily  predniSONE, 10 mg, Oral, Daily  rosuvastatin, 20 mg, Oral, Daily  sacubitril-valsartan, 1 tablet, Oral, Q12H  senna-docusate sodium, 2 tablet, Oral, BID  sodium chloride, 1 drop, Right Eye, 4x Daily  sodium chloride, 10 mL, Intravenous, Q12H    Infusions   Diet  Diet: Regular/House; Texture: Mechanical Ground (NDD 2); Fluid Consistency: Thin (IDDSI 0)    I have personally reviewed:  [x]  Laboratory   [x]  Microbiology   [x]  Radiology   [x]  EKG/Telemetry  [x]  Cardiology/Vascular   []  Pathology    []  Records       Assessment/Plan     Active Hospital Problems    Diagnosis  POA    **Acute respiratory failure [J96.00]  Yes    Acute DVT (deep venous thrombosis) [I82.409]  Yes    Mixed hyperlipidemia [E78.2]  Yes    Hypertension [I10]  Yes    COPD (chronic obstructive pulmonary disease) [J44.9]  Yes      Resolved Hospital Problems   No resolved problems to display.       88 y.o. female admitted with Acute respiratory failure.    Assessment and plan  1.  Acute on chronic respiratory failure with hypoxia, pulmonary on board, follow management recommendations.     2.  Pneumonia, pleural effusion, COPD exacerbation, clinically she has been " improving, management per pulmonary.     3.  Non-STEMI, coronary disease, cardiology on board.     4.  History of DVT, on anticoagulation with Eliquis.     5.  Acute blood loss anemia, monitor hemoglobin trend.     6.  CODE STATUS is DNR.         Dino Enrique MD  [facility]  03/18/24  14:27 EDT

## 2024-03-18 NOTE — PROGRESS NOTES
"CC: Congestive heart failure    Interval History: No acute events overnight      Vital Signs  Temp:  [97.5 °F (36.4 °C)-98 °F (36.7 °C)] 97.6 °F (36.4 °C)  Heart Rate:  [64-78] 67  Resp:  [18-20] 20  BP: (143-145)/(43-91) 143/91    Intake/Output Summary (Last 24 hours) at 3/18/2024 0833  Last data filed at 3/18/2024 0500  Gross per 24 hour   Intake 120 ml   Output 1750 ml   Net -1630 ml     Flowsheet Rows      Flowsheet Row First Filed Value   Admission Height 152.4 cm (60\") Documented at 03/07/2024 0258   Admission Weight 62.3 kg (137 lb 4.8 oz) Documented at 03/07/2024 0258            PHYSICAL EXAM:  General: No acute distress  Resp:NL Rate, symmetric chest expansion,unlabored, clear  CV:NL rate and rhythm, NL PMI, NL S1 and S2, no Murmur, no gallop, no rub, No JVD.   ABD:Nl sounds, no masses or tenderness, nondistended, no guarding or rebound  Neuro: alert,cooperative and oriented  Extr:Normal pedal pulses, No edema or cyanosis, moves all extremities      Results Review:    Results from last 7 days   Lab Units 03/18/24  0432   SODIUM mmol/L 136   POTASSIUM mmol/L 4.1   CHLORIDE mmol/L 96*   CO2 mmol/L 29.0   BUN mg/dL 33*   CREATININE mg/dL 1.00   GLUCOSE mg/dL 91   CALCIUM mg/dL 8.7         Results from last 7 days   Lab Units 03/18/24  0432   WBC 10*3/mm3 13.56*   HEMOGLOBIN g/dL 11.3*   HEMATOCRIT % 35.0   PLATELETS 10*3/mm3 174                     I reviewed the patient's new clinical results.  I personally viewed and interpreted the patient's EKG/Telemetry data        Medication Review:   Meds reviewed         Assessment/Plan    NSTEMI / Coronary artery disease - felt type II MI with acute on chronic hypoxia related to pneumonia.  LHC on 3/8/24 did demonstrate LM disease s/p PCI 3/12. Her EF is normal with no rWMAs.  Continue aspirin, Plavix and high potency statin  Acute on chronic diastolic congestive heart failure - received Lasix after blood yesterday. CXR with mild edema, increased effusions. Received " 40mg Lasix this morning and will likely need ongoing diuretics  Acute on chronic anemia - drop in hemoglobin post-PCI with stable groin hematoma.  Status posttransfusion of 2 units of packed RBC  Non-rheumatic valvular heart disease - mild AS, moderate MR on echo  Carotid artery disease - hx of right CEA 2018, left CEA 2019. Follows with Dr Grayson Hamilton of DVT - August 2023, below the knee.  Prior to hospitalization patient was on 5 mg apixaban twice daily.  She had repeat venous Dopplers 3/7/2024 with no evidence of DVT.  She has completed 6 months of anticoagulation.  Given need for DAPT with new PCI will not restart anticoagulation   Hypertension -BP slightly above goal  Chronic hypoxemic respiratory failure-on supplemental oxygen at home and nighttime BiPAP    No new acute events overnight.  Patient reports getting close to baseline.  Get PT/OT  Switch diuretic to oral  Switch losartan to Entresto  Morning surveillance labs    Félix Sanchez MD  03/18/24  08:33 EDT

## 2024-03-18 NOTE — THERAPY EVALUATION
Patient Name: Lizzy Hicks  : 1935    MRN: [id number]                              Today's Date: 3/18/2024       Admit Date: 3/7/2024    Visit Dx:     ICD-10-CM ICD-9-CM   1. Acute respiratory failure with hypoxia  J96.01 518.81   2. Acute on chronic congestive heart failure, unspecified heart failure type  I50.9 428.0   3. Pneumonia of both lungs due to infectious organism, unspecified part of lung  J18.9 483.8   4. Elevated lactic acid level  R79.89 276.2   5. Elevated troponin  R79.89 790.6   6. NSTEMI, initial episode of care  I21.4 410.71   7. Coronary artery disease involving native coronary artery of native heart with angina pectoris  I25.119 414.01     413.9     Patient Active Problem List   Diagnosis    Hypertension    COPD (chronic obstructive pulmonary disease)    Carotid artery stenosis    Osteoarthritis    Osteoporosis    Hyponatremia    Reset osmostat syndrome    Carotid stenosis, asymptomatic, right    Carotid artery disease    Macular degeneration    COPD exacerbation    Acute exacerbation of chronic obstructive pulmonary disease (COPD)    Parainfluenza infection    Acute respiratory failure with hypoxia    Acute respiratory failure with hypoxia and hypercapnia    Mixed hyperlipidemia    Possible pneumonia of right lower lobe due to infectious organism    Oropharyngeal dysphagia    Acute respiratory failure with hypercapnia    Respiratory insufficiency    COPD with acute exacerbation    Sleep apnea    Acute DVT (deep venous thrombosis)    DVT, lower extremity, distal, acute, right    Acute respiratory failure     Past Medical History:   Diagnosis Date    Anesthesia complication     pt states was groggy for a week after surgery    Carotid artery disease     left    Carotid stenosis     RIGHT    COPD (chronic obstructive pulmonary disease)     History of bronchitis     Hypertension     Macular degeneration     Osteoarthritis 2016    Osteoporosis 2016    Reset osmostat syndrome  08/15/2016    Worked up by prior PCP in [facility]. Baseline Na 128-130 since 2013.    Seborrheic dermatitis 01/20/2016    Swallowing difficulty     D/T INADVERTANT REMOVAL OF UVULA AS CHILD WITH T AND A     Past Surgical History:   Procedure Laterality Date    CARDIAC CATHETERIZATION N/A 3/8/2024    Procedure: Left Heart Cath;  Surgeon: Sheng Jaimes MD;  [facility];  Service: Cardiovascular;  Laterality: N/A;    CARDIAC CATHETERIZATION N/A 3/8/2024    Procedure: Coronary angiography;  Surgeon: Sheng Jaimes MD;  Location:  [facility] CATH INVASIVE LOCATION;  Service: Cardiovascular;  Laterality: N/A;    CARDIAC CATHETERIZATION N/A 3/12/2024    Procedure: Percutaneous Coronary Intervention;  Surgeon: Sheng Jaimes MD;  Location:  [facility] CATH INVASIVE LOCATION;  Service: Cardiovascular;  Laterality: N/A;    CARDIAC CATHETERIZATION N/A 3/12/2024    Procedure: Stent LAVELLE coronary;  Surgeon: Sheng Jaimes MD;  [facility];  Service: Cardiovascular;  Laterality: N/A;    CAROTID ENDARTERECTOMY Right 9/24/2018    Procedure: RT CAROTID ENDARTERECTOMY WITH INTRA OPERATIVE CAROTID ARTERY DUPLEX SCAN;  Surgeon: Mikaela Galicia Jr., MD;  Location: [facility] MAIN OR;  Service: Vascular    CAROTID ENDARTERECTOMY Left 4/4/2019    Procedure: LEFT CAROTID ENDARTERECTOMY;  Surgeon: Mikaela Galicia Jr., MD;  Location: [facility] MAIN OR;  Service: Vascular    CATARACT EXTRACTION WITH INTRAOCULAR LENS IMPLANT      LEFT AND RIGHT    INTERVENTIONAL RADIOLOGY PROCEDURE N/A 3/12/2024    Procedure: Intravascular Ultrasound;  Surgeon: Sheng Jaimes MD;  Location: [facility] CATH INVASIVE LOCATION;  Service: Cardiovascular;  Laterality: N/A;    ORIF FEMUR FRACTURE Right     HARDWARE    TONSILLECTOMY AND ADENOIDECTOMY      INADVERTANTLY TOOK UVULA AT THIS TIME      General Information       Row Name 03/18/24 1546          Physical Therapy Time and Intention    Document Type  therapy note (daily note)  -MS (r) MH (t) MS (c)     Mode of Treatment physical therapy  -MS (r) MH (t) MS (c)       Row Name 03/18/24 1546          General Information    Patient Profile Reviewed yes  -MS (r) MH (t) MS (c)     Existing Precautions/Restrictions fall  -MS (r) MH (t) MS (c)       Row Name 03/18/24 1546          Cognition    Orientation Status (Cognition) oriented x 4  -MS (r) MH (t) MS (c)       Row Name 03/18/24 1546          Safety Issues, Functional Mobility    Safety Issues Affecting Function (Mobility) sequencing abilities;safety precautions follow-through/compliance;safety precaution awareness  -MS (r) MH (t) MS (c)     Impairments Affecting Function (Mobility) postural/trunk control;endurance/activity tolerance;balance  -MS (r) MH (t) MS (c)     Comment, Safety Issues/Impairments (Mobility) Gait belt and non-skid socks donned for safety  -MS (r) MH (t) MS (c)               User Key  (r) = Recorded By, (t) = Taken By, (c) = Cosigned By      Initials Name Provider Type    Deanna Villasenor, PT Physical Therapist     Sj Foley, PT Student PT Student                   Mobility       Row Name 03/18/24 1547          Bed Mobility    Supine-Sit [place] (Bed Mobility) not tested  -MS (r) MH (t) MS (c)     Sit-Supine [place] (Bed Mobility) not tested  -MS (r) MH (t) MS (c)     Comment, (Bed Mobility) Pt [facility] this date. Bed mobility not tested.  -MS (r) MH (t) MS (c)       Row Name 03/18/24 1547          Transfers    Comment, (Transfers) Chair>BSC with CGA and RW  -MS (r) MH (t) MS (c)       Row Name 03/18/24 1547          Bed-Chair Transfer    Bed-Chair [place] (Transfers) not tested  -MS (r) MH (t) MS (c)       Row Name 03/18/24 1547          Sit-Stand Transfer    Sit-Stand [place] (Transfers) contact guard;nonverbal cues (demo/gesture);verbal cues  -MS (r) MH (t) MS (c)     Assistive Device (Sit-Stand Transfers) walker, front-wheeled  -MS (r) MH (t) MS (c)     Comment,  (Sit-Stand Transfer) Pt needed cueing for sequencing.  -MS (r) MH (t) MS (c)       Row Name 03/18/24 1547          Gait/Stairs (Locomotion)    [place] Level (Gait) contact guard;nonverbal cues (demo/gesture);verbal cues  -MS (r) MH (t) MS (c)     Assistive Device (Gait) walker, front-wheeled  -MS (r) MH (t) MS (c)     Patient was able to Ambulate yes  -MS (r) MH (t) MS (c)     Distance in Feet (Gait) 100  -MS (r) MH (t) MS (c)     Deviations/Abnormal Patterns (Gait) gait speed decreased;trav decreased;stride length decreased  -MS (r) MH (t) MS (c)     Bilateral Gait Deviations forward flexed posture  -MS (r) MH (t) MS (c)     Comment, (Gait/Stairs) No overt LOB or veering noted this date. Pt needed mod VCs while navigating turns. Per nsg request, pt on 3 L O2 NC during ambulation.  -MS (r) MH (t) MS (c)               User Key  (r) = Recorded By, (t) = Taken By, (c) = Cosigned By      Initials Name Provider Type    Deanna Villasenor, PT Physical Therapist    Sj Arnold, PT Student PT Student                   Obj/Interventions       Row Name 03/18/24 1544          Motor Skills    Therapeutic Exercise --  1 x 10 LAQ, 2 STS  -MS (r) MH (t) MS (c)       Row Name 03/18/24 1540          Balance    Balance Assessment sitting static balance;sit to stand dynamic balance;standing static balance  -MS (r) MH (t) MS (c)     Static Sitting Balance non-verbal cues (demo/gesture);verbal cues;standby assist  -MS (r) MH (t) MS (c)     Position, Sitting Balance sitting in chair;unsupported  -MS (r) MH (t) MS (c)     Sit to Stand Dynamic Balance verbal cues;non-verbal cues (demo/gesture);contact guard  -MS (r) MH (t) MS (c)     Static Standing Balance verbal cues;non-verbal cues (demo/gesture);contact guard  -MS (r) MH (t) MS (c)     Position/Device Used, Standing Balance walker, front-wheeled  -MS (r) MH (t) MS (c)     Balance Interventions sitting;standing;sit to stand;supported;static;dynamic  -MS (r) MH (t)  MS (c)     Comment, Balance No overt LOB or veering noted.  -MS (r) MH (t) MS (c)               User Key  (r) = Recorded By, (t) = Taken By, (c) = Cosigned By      Initials Name Provider Type    Deanna Villasenor CLIFF, PT Physical Therapist    Sj Arnold, PT Student PT Student                   Goals/Plan    No documentation.                  Clinical Impression       Row Name 03/18/24 1549          Pain    Pre/Posttreatment Pain Comment No pain this date.  -MS (r) MH (t) MS (c)       Row Name 03/18/24 1547          Plan of Care Review    Plan of Care Reviewed With patient;daughter  -MS (r) MH (t) MS (c)     Progress improving  -MS (r) MH (t) MS (c)     Outcome Evaluation Pt agreeable to PT session this date. Pt UIC upon entrance with daughter at bedside. Pt required SBA during static sitting edge of chair while performing LE exercises. Pt required CGA with RW for STS transfers this date and during chair>BSC transfer. Pt needed mod VCs for sequencing of transfers. Pt on 3 L NC O2 during the entirety of session, per nsg request. Pt then was able to ambulate 100 ft with CGA and RW, needed mod VCs while navigating turns. PT will follow along peripherally as pt progresses. Pt left in room sitting in chair with chair alarm activated and call light in reach. Pt encouraged to get up with nsg atleast 3 times a day.  -MS (r) MH (t) MS (c)       Row Name 03/18/24 1545          Therapy Assessment/Plan (PT)    Therapy Frequency (PT) 5 times/wk  -MS (r) MH (t) MS (c)       Row Name 03/18/24 1546          Vital Signs    Pretreatment Heart Rate (beats/min) 75  -MS (r) MH (t) MS (c)     Pre SpO2 (%) 94  -MS (r) MH (t) MS (c)     O2 Delivery Pre Treatment nasal cannula  1 L  -MS (r) MH (t) MS (c)     O2 Delivery Intra Treatment nasal cannula  3 L  -MS (r) MH (t) MS (c)     O2 Delivery Post Treatment nasal cannula  3 L  -MS (r) MH (t) MS (c)     Pre Patient Position Sitting  -MS (r) MH (t) MS (c)     Intra Patient Position  Standing  -MS (r) MH (t) MS (c)     Post Patient Position Sitting  -MS (r) MH (t) MS (c)     Rest Breaks  2  -MS (r) MH (t) MS (c)       Row Name 03/18/24 7668          Positioning and Restraints    Pre-Treatment Position sitting in chair/recliner  -MS (r) MH (t) MS (c)     Post Treatment Position chair  -MS (r) MH (t) MS (c)     In Chair notified nsg;sitting;call light within reach;encouraged to call for assist;exit alarm on;with family/caregiver  -MS (r) MH (t) MS (c)               User Key  (r) = Recorded By, (t) = Taken By, (c) = Cosigned By      Initials Name Provider Type    Deanna Villasenor, PT Physical Therapist    Sj Arnold, PT Student PT Student                   Outcome Measures       Row Name 03/18/24 6319          How much help from another person do you currently need...    Turning from your back to your side while in flat bed without using bedrails? 3  -MS (r) MH (t) MS (c)     Moving from lying on back to sitting on the side of a flat bed without bedrails? 3  -MS (r) MH (t) MS (c)     Moving to and from a bed to a chair (including a wheelchair)? 3  -MS (r) MH (t) MS (c)     Standing up from a chair using your arms (e.g., wheelchair, bedside chair)? 3  -MS (r) MH (t) MS (c)     Climbing 3-5 steps with a railing? 2  -MS (r) MH (t) MS (c)     To walk in hospital room? 3  -MS (r) MH (t) MS (c)     AM-PAC 6 Clicks Score (PT) 17  -MS (r) MH (t)     Highest Level of Mobility Goal 5 --> Static standing  -MS (r) MH (t)       Row Name 03/18/24 1379          Functional Assessment    Outcome Measure Options AM-PAC 6 Clicks Basic Mobility (PT)  -MS (r) MH (t) MS (c)               User Key  (r) = Recorded By, (t) = Taken By, (c) = Cosigned By      Initials Name Provider Type    Deanna Villasenor, PT Physical Therapist    Sj Arnold, PT Student PT Student                                 Physical Therapy Education       Title: PT OT SLP Therapies (Done)       Topic: Physical Therapy  (Done)       Point: Mobility training (Done)       Learning Progress Summary             Patient Acceptance, E,TB, VU by  at 3/18/2024 1553    Acceptance, E,D, DU,NR by  at 3/16/2024 1643    Acceptance, E,TB, VU by  at 3/15/2024 1757    Acceptance, E,D, VU,NR by MS at 3/13/2024 1509   Family Acceptance, E,TB, VU by  at 3/18/2024 1553                         Point: Home exercise program (Done)       Learning Progress Summary             Patient Acceptance, E,TB, VU by  at 3/18/2024 1553    Acceptance, E,TB, VU by  at 3/15/2024 1757    Acceptance, E,D, VU,NR by MS at 3/13/2024 1509   Family Acceptance, E,TB, VU by  at 3/18/2024 1553                         Point: Body mechanics (Done)       Learning Progress Summary             Patient Acceptance, E,TB, VU by  at 3/18/2024 1553    Acceptance, E,D, DU,NR by  at 3/16/2024 1643    Acceptance, E,TB, VU by  at 3/15/2024 1757    Acceptance, E,D, VU,NR by MS at 3/13/2024 1509   Family Acceptance, E,TB, VU by  at 3/18/2024 1553                         Point: Precautions (Done)       Learning Progress Summary             Patient Acceptance, E,TB, VU by  at 3/18/2024 1553    Acceptance, E,D, DU,NR by  at 3/16/2024 1643    Acceptance, E,TB, VU by  at 3/15/2024 1757    Acceptance, E,D, VU,NR by MS at 3/13/2024 1509   Family Acceptance, E,TB, VU by  at 3/18/2024 1553                                         User Key       Initials Effective Dates Name Provider Type Discipline     06/16/21 -  Kathleen Lara, PT Physical Therapist PT    MS 06/16/21 -  Isidoro Godniez, PT Physical Therapist PT     06/16/21 -  Anne-Marie Rowan, RN Registered Nurse Nurse     01/24/24 -  Sj Foley, PT Student PT Student PT                  PT Recommendation and Plan     Plan of Care Reviewed With: patient, daughter  Progress: improving  Outcome Evaluation: Pt agreeable to PT session this date. Pt UIC upon entrance with daughter at bedside. Pt required SBA  during static sitting edge of chair while performing LE exercises. Pt required CGA with RW for STS transfers this date and during chair>BSC transfer. Pt needed mod VCs for sequencing of transfers. Pt on 3 L NC O2 during the entirety of session, per nsg request. Pt then was able to ambulate 100 ft with CGA and RW, needed mod VCs while navigating turns. PT will follow along peripherally as pt progresses. Pt left in room sitting in chair with chair alarm activated and call light in reach. Pt encouraged to get up with nsg atleast 3 times a day.     Time Calculation:         PT Charges       Row Name 03/18/24 1554             Time Calculation    Start Time 1406  -MS (r) MH (t) MS (c)      Stop Time 1420  -MS (r) MH (t) MS (c)      Time Calculation (min) 14 min  -MS (r) MH (t)      PT Received On 03/18/24  -MS (r) MH (t) MS (c)      PT - Next Appointment 03/19/24  -MS (r) MH (t) MS (c)         Time Calculation- PT    Total Timed Code Minutes- PT 14 minute(s)  -MS (r) MH (t) MS (c)         Timed Charges    33929 - PT Therapeutic Exercise Minutes 3  -MS (r) MH (t) MS (c)      68982 - PT Therapeutic Activity Minutes 11  -MS (r) MH (t) MS (c)         Total Minutes    Timed Charges Total Minutes 14  -MS (r) MH (t)       Total Minutes 14  -MS (r) MH (t)                User Key  (r) = Recorded By, (t) = Taken By, (c) = Cosigned By      Initials Name Provider Type    MS Hernandez Deanna CLIFF, PT Physical Therapist     Sj Foley, PT Student PT Student                  Therapy Charges for Today       Code Description Service Date Service Provider Modifiers Qty    07645736403 HC PT THERAPEUTIC ACT EA 15 MIN 3/18/2024 Sj Foley, PT Student GP 1            PT G-Codes  Outcome Measure Options: AM-PAC 6 Clicks Basic Mobility (PT)  AM-PAC 6 Clicks Score (PT): 17  PT Discharge Summary  Anticipated Discharge Disposition (PT): skilled nursing facility    Sj Foley PT Student  3/18/2024

## 2024-03-19 ENCOUNTER — READMISSION MANAGEMENT (OUTPATIENT)
Dept: CALL CENTER | Facility: HOSPITAL | Age: 89
End: 2024-03-19
Payer: MEDICARE

## 2024-03-19 VITALS
DIASTOLIC BLOOD PRESSURE: 43 MMHG | BODY MASS INDEX: 25.13 KG/M2 | RESPIRATION RATE: 18 BRPM | HEIGHT: 60 IN | OXYGEN SATURATION: 98 % | TEMPERATURE: 97.5 F | SYSTOLIC BLOOD PRESSURE: 107 MMHG | WEIGHT: 128 LBS | HEART RATE: 78 BPM

## 2024-03-19 LAB
ALBUMIN SERPL-MCNC: 3.1 G/DL (ref 3.5–5.2)
ANION GAP SERPL CALCULATED.3IONS-SCNC: 11.9 MMOL/L (ref 5–15)
BUN SERPL-MCNC: 42 MG/DL (ref 8–23)
BUN/CREAT SERPL: 38.9 (ref 7–25)
CALCIUM SPEC-SCNC: 8.2 MG/DL (ref 8.6–10.5)
CHLORIDE SERPL-SCNC: 95 MMOL/L (ref 98–107)
CO2 SERPL-SCNC: 29.1 MMOL/L (ref 22–29)
CREAT SERPL-MCNC: 1.08 MG/DL (ref 0.57–1)
DEPRECATED RDW RBC AUTO: 44.8 FL (ref 37–54)
EGFRCR SERPLBLD CKD-EPI 2021: 50.1 ML/MIN/1.73
ERYTHROCYTE [DISTWIDTH] IN BLOOD BY AUTOMATED COUNT: 14.5 % (ref 12.3–15.4)
GLUCOSE SERPL-MCNC: 98 MG/DL (ref 65–99)
HCT VFR BLD AUTO: 32.9 % (ref 34–46.6)
HGB BLD-MCNC: 11.2 G/DL (ref 12–15.9)
MCH RBC QN AUTO: 29.2 PG (ref 26.6–33)
MCHC RBC AUTO-ENTMCNC: 34 G/DL (ref 31.5–35.7)
MCV RBC AUTO: 85.7 FL (ref 79–97)
PHOSPHATE SERPL-MCNC: 3.6 MG/DL (ref 2.5–4.5)
PLATELET # BLD AUTO: 252 10*3/MM3 (ref 140–450)
PMV BLD AUTO: 9.8 FL (ref 6–12)
POTASSIUM SERPL-SCNC: 3.1 MMOL/L (ref 3.5–5.2)
RBC # BLD AUTO: 3.84 10*6/MM3 (ref 3.77–5.28)
SARS-COV-2 RNA RESP QL NAA+PROBE: NOT DETECTED
SODIUM SERPL-SCNC: 136 MMOL/L (ref 136–145)
WBC NRBC COR # BLD AUTO: 14.19 10*3/MM3 (ref 3.4–10.8)

## 2024-03-19 PROCEDURE — 99232 SBSQ HOSP IP/OBS MODERATE 35: CPT | Performed by: INTERNAL MEDICINE

## 2024-03-19 PROCEDURE — 94799 UNLISTED PULMONARY SVC/PX: CPT

## 2024-03-19 PROCEDURE — 94618 PULMONARY STRESS TESTING: CPT

## 2024-03-19 PROCEDURE — 85027 COMPLETE CBC AUTOMATED: CPT | Performed by: INTERNAL MEDICINE

## 2024-03-19 PROCEDURE — 97110 THERAPEUTIC EXERCISES: CPT

## 2024-03-19 PROCEDURE — 87635 SARS-COV-2 COVID-19 AMP PRB: CPT | Performed by: INTERNAL MEDICINE

## 2024-03-19 PROCEDURE — 94664 DEMO&/EVAL PT USE INHALER: CPT

## 2024-03-19 PROCEDURE — 80069 RENAL FUNCTION PANEL: CPT | Performed by: INTERNAL MEDICINE

## 2024-03-19 PROCEDURE — 94761 N-INVAS EAR/PLS OXIMETRY MLT: CPT

## 2024-03-19 PROCEDURE — 63710000001 PREDNISONE PER 5 MG: Performed by: INTERNAL MEDICINE

## 2024-03-19 RX ORDER — POTASSIUM CHLORIDE 750 MG/1
40 TABLET, FILM COATED, EXTENDED RELEASE ORAL DAILY
Status: DISCONTINUED | OUTPATIENT
Start: 2024-03-19 | End: 2024-03-19 | Stop reason: HOSPADM

## 2024-03-19 RX ADMIN — SODIUM CHLORIDE 1 DROP: 50 SOLUTION OPHTHALMIC at 08:09

## 2024-03-19 RX ADMIN — OXYBUTYNIN CHLORIDE 5 MG: 5 TABLET, EXTENDED RELEASE ORAL at 08:09

## 2024-03-19 RX ADMIN — ATENOLOL 25 MG: 25 TABLET ORAL at 08:10

## 2024-03-19 RX ADMIN — SENNOSIDES AND DOCUSATE SODIUM 2 TABLET: 50; 8.6 TABLET ORAL at 08:09

## 2024-03-19 RX ADMIN — PREDNISONE 10 MG: 10 TABLET ORAL at 08:10

## 2024-03-19 RX ADMIN — PREDNISOLONE ACETATE 1 DROP: 10 SUSPENSION/ DROPS OPHTHALMIC at 08:09

## 2024-03-19 RX ADMIN — BUMETANIDE 2 MG: 2 TABLET ORAL at 08:10

## 2024-03-19 RX ADMIN — BUDESONIDE AND FORMOTEROL FUMARATE DIHYDRATE 2 PUFF: 160; 4.5 AEROSOL RESPIRATORY (INHALATION) at 08:28

## 2024-03-19 RX ADMIN — ASPIRIN 81 MG: 81 TABLET, COATED ORAL at 08:10

## 2024-03-19 RX ADMIN — SODIUM CHLORIDE 1 DROP: 50 SOLUTION OPHTHALMIC at 11:28

## 2024-03-19 RX ADMIN — FERROUS SULFATE TAB 325 MG (65 MG ELEMENTAL FE) 325 MG: 325 (65 FE) TAB at 08:09

## 2024-03-19 RX ADMIN — CLOPIDOGREL BISULFATE 75 MG: 75 TABLET, FILM COATED ORAL at 08:10

## 2024-03-19 RX ADMIN — IPRATROPIUM BROMIDE AND ALBUTEROL SULFATE 3 ML: 2.5; .5 SOLUTION RESPIRATORY (INHALATION) at 08:24

## 2024-03-19 RX ADMIN — POTASSIUM CHLORIDE 40 MEQ: 750 TABLET, EXTENDED RELEASE ORAL at 08:09

## 2024-03-19 RX ADMIN — IPRATROPIUM BROMIDE AND ALBUTEROL SULFATE 3 ML: 2.5; .5 SOLUTION RESPIRATORY (INHALATION) at 15:15

## 2024-03-19 RX ADMIN — IPRATROPIUM BROMIDE AND ALBUTEROL SULFATE 3 ML: 2.5; .5 SOLUTION RESPIRATORY (INHALATION) at 11:07

## 2024-03-19 RX ADMIN — SACUBITRIL AND VALSARTAN 1 TABLET: 24; 26 TABLET, FILM COATED ORAL at 08:09

## 2024-03-19 RX ADMIN — PREDNISOLONE ACETATE 1 DROP: 10 SUSPENSION/ DROPS OPHTHALMIC at 11:28

## 2024-03-19 RX ADMIN — Medication 10 ML: at 08:11

## 2024-03-19 NOTE — PLAN OF CARE
Goal Outcome Evaluation:  Plan of Care Reviewed With: patient           Outcome Evaluation: Patient pleasant and agreeable to PT this afternoon. CGA to a RW and ambulated 75' CGA total with a RW. One seated rest break during ambulation trial. 2L supplemental O2 donned throughout activity. PT will continue to follow and advance as able.      Anticipated Discharge Disposition (PT): skilled nursing facility

## 2024-03-19 NOTE — PLAN OF CARE
Goal Outcome Evaluation:  Plan of Care Reviewed With: patient        Progress: no change  Outcome Evaluation: A/O, no complaints of pain, up with assistance to BSC with 02 on, uses trilogy at home, cpap at night while in the hospital, right groin bruising noted from procedure, VSS, will DC to [facility], will need WC van and continuous 02, VSS, SR on the monitor, continue plan of care.

## 2024-03-19 NOTE — SIGNIFICANT NOTE
Exercise Oximetry    Patient Name:Lizzy Hicks   MRN: [id number]   Date: 03/19/24             ROOM AIR BASELINE   SpO2%                 96   Heart Rate            82   Blood Pressure      EXERCISE ON ROOM AIR SpO2% EXERCISE ON O2 @      LPM SpO2%   1 MINUTE       96 1 MINUTE    2 MINUTES       92 2 MINUTES    3 MINUTES       91 3 MINUTES    4 MINUTES       96 4 MINUTES    5 MINUTES       95 5 MINUTES    6 MINUTES       97 6 MINUTES               Distance Walked   Distance Walked   Dyspnea (Alcira Scale)   Dyspnea (Alcira Scale)   Fatigue (Alcira Scale)   Fatigue (Alcira Scale)   SpO2% Post Exercise    99 SpO2% Post Exercise   HR Post Exercise           82 HR Post Exercise   Time to Recovery   Time to Recovery     Comments: Walked pt with assist of a walker as well as nursing following behind with a wheelchair (per pt request)  in case she tired and needed to sit. Besides a small wardrobe malfunction, pt did really well during walk. Never did tire to where she needed to rest and talked quite a bit. Sats remained 91%-97% throughout oxymetry walk. Assisted pt to bedside commode, placing call light in reach prior to leaving room. Notified her nurse before leaving the floor that pt was on the bedside commode.     Will continue to monitor respiratory needs.

## 2024-03-19 NOTE — PROGRESS NOTES
"CC: Congestive heart failure    Interval History: No new acute events overnight      Vital Signs  Temp:  [97.2 °F (36.2 °C)-98.5 °F (36.9 °C)] 97.2 °F (36.2 °C)  Heart Rate:  [67-79] 79  Resp:  [17-22] 18  BP: (113-137)/(40-59) 136/51    Intake/Output Summary (Last 24 hours) at 3/19/2024 0814  Last data filed at 3/18/2024 2118  Gross per 24 hour   Intake 240 ml   Output 300 ml   Net -60 ml     Flowsheet Rows      Flowsheet Row First Filed Value   Admission Height 152.4 cm (60\") Documented at 03/07/2024 0258   Admission Weight 62.3 kg (137 lb 4.8 oz) Documented at 03/07/2024 0258            PHYSICAL EXAM:  General: No acute distress  Resp:NL Rate, symmetric chest expansion,unlabored, clear  CV:NL rate and rhythm, NL PMI, NL S1 and S2, no Murmur, no gallop, no rub, No JVD.   ABD:Nl sounds, no masses or tenderness, nondistended, no guarding or rebound  Neuro: alert,cooperative and oriented  Extr:Normal pedal pulses, No edema or cyanosis, moves all extremities      Results Review:    Results from last 7 days   Lab Units 03/19/24  0350   SODIUM mmol/L 136   POTASSIUM mmol/L 3.1*   CHLORIDE mmol/L 95*   CO2 mmol/L 29.1*   BUN mg/dL 42*   CREATININE mg/dL 1.08*   GLUCOSE mg/dL 98   CALCIUM mg/dL 8.2*         Results from last 7 days   Lab Units 03/19/24  0350   WBC 10*3/mm3 14.19*   HEMOGLOBIN g/dL 11.2*   HEMATOCRIT % 32.9*   PLATELETS 10*3/mm3 252                     I reviewed the patient's new clinical results.  I personally viewed and interpreted the patient's EKG/Telemetry data        Medication Review:   Meds reviewed         Assessment/Plan       NSTEMI / Coronary artery disease - felt type II MI with acute on chronic hypoxia related to pneumonia.  LHC on 3/8/24 did demonstrate LM disease s/p PCI 3/12. Her EF is normal with no rWMAs.  Continue aspirin, Plavix and high potency statin  Acute on chronic diastolic congestive heart failure - received Lasix after blood during current admission. CXR with mild edema, " increased effusions.  Received IV diuretic  Acute on chronic anemia - drop in hemoglobin post-PCI with stable groin hematoma.  Status posttransfusion of 2 units of packed RBC  Non-rheumatic valvular heart disease - mild AS, moderate MR on echo  Carotid artery disease - hx of right CEA 2018, left CEA 2019. Follows with Dr Grayson Hamilton of DVT - August 2023, below the knee.  Prior to hospitalization patient was on 5 mg apixaban twice daily.  She had repeat venous Dopplers 3/7/2024 with no evidence of DVT.  She has completed 6 months of anticoagulation.  Given need for DAPT with new PCI will not restart anticoagulation   Hypertension -BP slightly above goal  Chronic hypoxemic respiratory failure-on supplemental oxygen at home and nighttime BiPAP     Breathing almost back to baseline and she was sitting on chair and having breakfast.  She complains of abdominal bloating/distention and has not had bowel movement since admission.  Diastolic blood pressure on the lower side-hold amlodipine and monitor( restart for BP> 140/90)  Continue atenolol, Bumex, dual antiplatelet therapy and statin.  From cardiology standpoint she can be discharged to skilled nursing facility where she can get PT.  FU in cardiology clinic 2 weeks after dc   I have discussed patient with her daughter and nurse.      Félix Sanchez MD  03/19/24  08:14 EDT

## 2024-03-19 NOTE — PROGRESS NOTES
Nutrition Services    Patient Name:  Lizzy Hicks  YOB: 1935  MRN: [id number]  Admit Date:  3/7/2024    Assessment Date:  03/19/24    Summary: Initial Visit for LOS  Pt is a 87 y/o female who presented with SOB. Pt has a hx of HTN, COPD, osteoarthritis, osteoporosis, hyponatremia, carotid artery disease, HLD.  Pt was sitting up in her chair eating breakfast when I visited. Pt reports she typically eats about 50-75% of meals. Pt had eaten about 50% of her breakfast this morning.   Pt reported a normal weight of 120 lbs and was unsure of any recent weight loss. Per EMR, pt weight appears to be stable around 130 lbs. Pt currently receiving bumex. Pt has not had a BM since 3/14 and is on senna. Pt on mechanical ground and thins and denies any chew/swallow issues or n/v. RD performed NFPE which revealed mild muscle wasting and fat loss (non significant). RD to follow.     CLINICAL NUTRITION ASSESSMENT      Reason for Assessment Length of Stay     Diagnosis/Problem   Pt has a hx of HTN, COPD, osteoarthritis, osteoporosis, hyponatremia, carotid artery disease, HLD.   Medical/Surgical History Past Medical History:   Diagnosis Date    Anesthesia complication     pt states was groggy for a week after surgery    Carotid artery disease     left    Carotid stenosis     RIGHT    COPD (chronic obstructive pulmonary disease)     History of bronchitis     Hypertension     Macular degeneration     Osteoarthritis 1/20/2016    Osteoporosis 1/20/2016    Reset osmostat syndrome 08/15/2016    Worked up by prior PCP in [facility]. Baseline Na 128-130 since 2013.    Seborrheic dermatitis 01/20/2016    Swallowing difficulty     D/T INADVERTANT REMOVAL OF UVULA AS CHILD WITH T AND A       Past Surgical History:   Procedure Laterality Date    CARDIAC CATHETERIZATION N/A 3/8/2024    Procedure: Left Heart Cath;  Surgeon: Sheng Jaimes MD;  Location: [facility] CATH INVASIVE LOCATION;  Service: Cardiovascular;  Laterality: N/A;     "CARDIAC CATHETERIZATION N/A 3/8/2024    Procedure: Coronary angiography;  Surgeon: Sheng Jaimes MD;  Location:  [facility] CATH INVASIVE LOCATION;  Service: Cardiovascular;  Laterality: N/A;    CARDIAC CATHETERIZATION N/A 3/12/2024    Procedure: Percutaneous Coronary Intervention;  Surgeon: Sheng Jaimes MD;  Location:  [facility] CATH INVASIVE LOCATION;  Service: Cardiovascular;  Laterality: N/A;    CARDIAC CATHETERIZATION N/A 3/12/2024    Procedure: Stent LAVELLE coronary;  Surgeon: Sheng Jaimes MD;  Location:  [facility] CATH INVASIVE LOCATION;  Service: Cardiovascular;  Laterality: N/A;    CAROTID ENDARTERECTOMY Right 9/24/2018    Procedure: RT CAROTID ENDARTERECTOMY WITH INTRA OPERATIVE CAROTID ARTERY DUPLEX SCAN;  Surgeon: Mikaela Galicia Jr., MD;  Location[facility] MA[facility] OR;  Service: Vascular    CAROTID ENDARTERECTOMY Left 4/4/2019    Procedure: LEFT CAROTID ENDARTERECTOMY;  Surgeon: Mikaela Galicia Jr., MD;  Location: [facility] MAIN OR;  Service: Vascular    CATARACT EXTRACTION WITH INTRAOCULAR LENS IMPLANT      LEFT AND RIGHT    INTERVENTIONAL RADIOLOGY PROCEDURE N/A 3/12/2024    Procedure: Intravascular Ultrasound;  Surgeon: Sheng Jaimes MD;  Location:  [facility] CATH INVASIVE LOCATION;  Service: Cardiovascular;  Laterality: N/A;    ORIF FEMUR FRACTURE Right     HARDWARE    TONSILLECTOMY AND ADENOIDECTOMY      INADVERTANTLY TOOK UVULA AT THIS TIME        Anthropometrics        Current Height  Current Weight  BMI kg/m2 Height: 152.4 cm (60\")  Weight: 58.1 kg (128 lb) (03/19/24 0431)  Body mass index is 25 kg/m².   Adjusted BMI (if applicable)    BMI Category Normal/Healthy (18.4 - 24.9)   Ideal Body Weight (IBW) 100 lbs   Usual Body Weight (UBW) 120 lbs   Weight Trend Stable   Weight History Wt Readings from Last 30 Encounters:   03/19/24 0431 58.1 kg (128 lb)   03/18/24 0500 59 kg (130 lb)   03/17/24 0725 58.3 kg (128 lb 8 oz)   03/16/24 0322 59.2 kg (130 lb 9.6 oz)   03/14/24 0500 46.8 kg (103 lb " 1.6 oz)   03/13/24 0512 47.4 kg (104 lb 8 oz)   03/12/24 0406 46.9 kg (103 lb 8 oz)   03/11/24 0500 59.9 kg (132 lb)   03/09/24 0407 61.4 kg (135 lb 6.4 oz)   03/07/24 1900 56.6 kg (124 lb 12.5 oz)   03/07/24 0902 62.1 kg (137 lb)   03/07/24 0820 62.1 kg (137 lb)   03/07/24 0258 62.3 kg (137 lb 4.8 oz)   08/03/23 0446 53.3 kg (117 lb 8.1 oz)   07/31/23 0704 52.9 kg (116 lb 10 oz)   07/31/23 0605 52.9 kg (116 lb 9.6 oz)   07/30/23 0401 51.8 kg (114 lb 3.2 oz)   07/30/23 0032 54.4 kg (120 lb)   07/11/23 0300 52.7 kg (116 lb 2.9 oz)   07/10/23 0633 53.3 kg (117 lb 8.1 oz)   07/09/23 0542 53.2 kg (117 lb 4.6 oz)   07/08/23 0600 54.5 kg (120 lb 2.4 oz)   07/08/23 0200 54.5 kg (120 lb 2.4 oz)   07/07/23 0600 57.9 kg (127 lb 10.3 oz)   07/06/23 2325 58.1 kg (128 lb)   05/28/23 0500 58.8 kg (129 lb 10.1 oz)   05/25/23 0500 64.7 kg (142 lb 10.2 oz)   05/21/23 0336 57.2 kg (126 lb 1.7 oz)   05/21/23 0059 58.1 kg (128 lb)   05/04/23 0434 58.5 kg (128 lb 15.5 oz)   05/01/23 2027 57.9 kg (127 lb 10.3 oz)   05/01/23 1605 63 kg (139 lb)   05/01/23 1537 58.1 kg (128 lb)   03/21/23 1046 59 kg (130 lb)   07/29/22 1158 61.2 kg (135 lb)   12/20/21 1157 58.5 kg (129 lb)   07/23/21 1015 58.5 kg (129 lb)   07/17/20 0910 64 kg (141 lb)   10/11/19 1113 62.6 kg (138 lb)   04/11/19 1224 62.6 kg (138 lb)   04/04/19 0912 61.5 kg (135 lb 8 oz)   03/29/19 0924 61.7 kg (136 lb)   10/10/18 1351 60.8 kg (134 lb)   09/19/18 1252 59 kg (130 lb)   08/31/18 1120 61.2 kg (134 lb 14.7 oz)   08/22/18 1415 61.2 kg (135 lb)   08/20/18 1505 61.2 kg (135 lb)   07/03/18 1432 65.1 kg (143 lb 9.6 oz)   09/13/17 1224 62.1 kg (137 lb)   01/24/17 1103 58.1 kg (128 lb)   01/01/17 1331 54.4 kg (120 lb)   10/17/16 1151 54.4 kg (120 lb)   08/15/16 1225 57.2 kg (126 lb)   07/20/16 1053 56.2 kg (124 lb)   01/20/16 1603 51.7 kg (114 lb)        Estimated/Assessed Needs        Energy Requirements    Weight for Calculation 58 kg   Method for Estimation  30 kcal/kg   EST Needs  (kcal/day) 1740       Protein Requirements    Weight for Calculation 58 kg   EST Protein Needs (g/kg) 1.0 gm/kg   EST Daily Needs (g/day) 58       Fluid Requirements     Method for Estimation 1 mL/kcal    Estimated Needs (mL/day)        Fluid Deficit    Current Na Level (mEq/L)    Desired Na Level (mEq/L)    Estimated Fluid Deficit (L)       Labs       Pertinent Labs    Results from last 7 days   Lab Units 03/19/24  0350 03/18/24  0432 03/17/24  2130 03/17/24  0328   SODIUM mmol/L 136 136  --  136   POTASSIUM mmol/L 3.1* 4.1 4.2 3.4*   CHLORIDE mmol/L 95* 96*  --  96*   CO2 mmol/L 29.1* 29.0  --  29.9*   BUN mg/dL 42* 33*  --  34*   CREATININE mg/dL 1.08* 1.00  --  1.01*   CALCIUM mg/dL 8.2* 8.7  --  8.5*   GLUCOSE mg/dL 98 91  --  108*     Results from last 7 days   Lab Units 03/19/24  0350   PHOSPHORUS mg/dL 3.6   HEMOGLOBIN g/dL 11.2*   HEMATOCRIT % 32.9*   WBC 10*3/mm3 14.19*   ALBUMIN g/dL 3.1*     Results from last 7 days   Lab Units 03/19/24  0350 03/18/24  0432 03/17/24  0328 03/16/24  0406 03/15/24  0358   PLATELETS 10*3/mm3 252 174 245 185 186     COVID19   Date Value Ref Range Status   03/07/2024 Not Detected Not Detected - Ref. Range Final     Lab Results   Component Value Date    HGBA1C 6.0 (H) 07/29/2022          Medications           Scheduled Medications amLODIPine, 5 mg, Oral, Daily  aspirin, 81 mg, Oral, Daily  atenolol, 25 mg, Oral, Daily  budesonide-formoterol, 2 puff, Inhalation, BID - RT  bumetanide, 2 mg, Oral, Daily  clopidogrel, 75 mg, Oral, Daily  ferrous sulfate, 325 mg, Oral, Daily With Breakfast  ipratropium-albuterol, 3 mL, Nebulization, 4x Daily - RT  methylPREDNISolone sodium succinate, 40 mg, Intravenous, Once  oxybutynin XL, 5 mg, Oral, Daily  potassium chloride, 40 mEq, Oral, Daily  prednisoLONE acetate, 1 drop, Right Eye, 4x Daily  predniSONE, 10 mg, Oral, Daily  rosuvastatin, 20 mg, Oral, Daily  sacubitril-valsartan, 1 tablet, Oral, Q12H  senna-docusate sodium, 2 tablet, Oral,  BID  sodium chloride, 1 drop, Right Eye, 4x Daily  sodium chloride, 10 mL, Intravenous, Q12H       Infusions     PRN Medications   atropine    senna-docusate sodium **AND** polyethylene glycol **AND** bisacodyl **AND** bisacodyl    Calcium Replacement - Follow Nurse / BPA Driven Protocol    docusate sodium    famotidine    Magnesium Cardiology Dose Replacement - Follow Nurse / BPA Driven Protocol    nitroglycerin    ondansetron    Phosphorus Replacement - Follow Nurse / BPA Driven Protocol    Potassium Replacement - Follow Nurse / BPA Driven Protocol    Potassium Replacement - Follow Nurse / BPA Driven Protocol    [COMPLETED] Insert Peripheral IV **AND** sodium chloride    sodium chloride    sodium chloride    sodium chloride    sodium chloride     Physical Findings          General Findings alert, oriented, on oxygen therapy   Oral/Mouth Cavity tooth or teeth missing   Edema  not assessed   Gastrointestinal constipation, last bowel movement: 3/14   Skin  skin intact   Tubes/Drains/Lines none   NFPE No clinical signs of muscle wasting or fat loss   --  Current Nutrition Orders & Evaluation of Intake       Oral Nutrition     Food Allergies NKFA   Current PO Diet Diet: Regular/House; Texture: Mechanical Ground (NDD 2); Fluid Consistency: Thin (IDDSI 0)   Supplement n/a   PO Evaluation     % PO Intake 50-75% of 3 meals/day    Factors Affecting Intake: altered respiratory status, decreased appetite     PES STATEMENT / NUTRITION DIAGNOSIS      Nutrition Dx Problem  Problem: Inadequate Oral Intake  Etiology: Medical Diagnosis - Pt has a hx of HTN, COPD, osteoarthritis, osteoporosis, hyponatremia, carotid artery disease, HLD.    Signs/Symptoms: Report of Minimal PO Intake   --  NUTRITION INTERVENTION / PLAN OF CARE      Intervention Goal(s) Maintain nutrition status, Establish goals of care, Reduce/improve symptoms, Meet estimated needs, Increase intake, Maintain weight, No significant weight loss, and PO intake goal %:  75%         RD Intervention/Action Encourage intake, Continue to monitor, and Care plan reviewed         Prescription/Orders:       PO Diet       Supplements       Enteral Nutrition       Parenteral Nutrition    New Prescription Ordered? Continue same per protocol, No changes at this time   --      Monitor/Evaluation Per protocol, PO intake, Weight, Skin status, GI status, Symptoms, POC/GOC, Swallow function   Discharge Plan/Needs No discharge needs identified at this time   --    RD to follow per protocol.      Electronically signed by:  Mina Shelton  03/19/24 08:25 EDT

## 2024-03-19 NOTE — THERAPY TREATMENT NOTE
Patient Name: Lizzy Hicks  : 1935    MRN: [id number]                              Today's Date: 3/19/2024       Admit Date: 3/7/2024    Visit Dx:     ICD-10-CM ICD-9-CM   1. Acute respiratory failure with hypoxia  J96.01 518.81   2. Acute on chronic congestive heart failure, unspecified heart failure type  I50.9 428.0   3. Pneumonia of both lungs due to infectious organism, unspecified part of lung  J18.9 483.8   4. Elevated lactic acid level  R79.89 276.2   5. Elevated troponin  R79.89 790.6   6. NSTEMI, initial episode of care  I21.4 410.71   7. Coronary artery disease involving native coronary artery of native heart with angina pectoris  I25.119 414.01     413.9     Patient Active Problem List   Diagnosis    Hypertension    COPD (chronic obstructive pulmonary disease)    Carotid artery stenosis    Osteoarthritis    Osteoporosis    Hyponatremia    Reset osmostat syndrome    Carotid stenosis, asymptomatic, right    Carotid artery disease    Macular degeneration    COPD exacerbation    Acute exacerbation of chronic obstructive pulmonary disease (COPD)    Parainfluenza infection    Acute respiratory failure with hypoxia    Acute respiratory failure with hypoxia and hypercapnia    Mixed hyperlipidemia    Possible pneumonia of right lower lobe due to infectious organism    Oropharyngeal dysphagia    Acute respiratory failure with hypercapnia    Respiratory insufficiency    COPD with acute exacerbation    Sleep apnea    Acute DVT (deep venous thrombosis)    DVT, lower extremity, distal, acute, right    Acute respiratory failure     Past Medical History:   Diagnosis Date    Anesthesia complication     pt states was groggy for a week after surgery    Carotid artery disease     left    Carotid stenosis     RIGHT    COPD (chronic obstructive pulmonary disease)     History of bronchitis     Hypertension     Macular degeneration     Osteoarthritis 2016    Osteoporosis 2016    Reset osmostat syndrome  08/15/2016    Worked up by prior PCP in [facility]. Baseline Na 128-130 since 2013.    Seborrheic dermatitis 01/20/2016    Swallowing difficulty     D/T INADVERTANT REMOVAL OF UVULA AS CHILD WITH T AND A     Past Surgical History:   Procedure Laterality Date    CARDIAC CATHETERIZATION N/A 3/8/2024    Procedure: Left Heart Cath;  Surgeon: Sheng Jaimes MD;  Location[facility] C[facility] LOC[facility];  Service: Cardiovascular;  Laterality: N/A;    CARDIAC CATHETERIZATION N/A 3/8/2024    Procedure: Coronary angiography;  Surgeon: Sheng Jaimes MD;  Location:  [facility] CATH INVASIVE LOCATION;  Service: Cardiovascular;  Laterality: N/A;    CARDIAC CATHETERIZATION N/A 3/12/2024    Procedure: Percutaneous Coronary Intervention;  Surgeon: Sheng Jaimes MD;  Location:  [facility] CATH INVASIVE LOCATION;  Service: Cardiovascular;  Laterality: N/A;    CARDIAC CATHETERIZATION N/A 3/12/2024    Procedure: Stent LAVELLE coronary;  Surgeon: Sheng Jaimes MD;  [facility];  Service: Cardiovascular;  Laterality: N/A;    CAROTID ENDARTERECTOMY Right 9/24/2018    Procedure: RT CAROTID ENDARTERECTOMY WITH INTRA OPERATIVE CAROTID ARTERY DUPLEX SCAN;  Surgeon: Mikaela Galicia Jr., MD;  Location: [facility] MAIN OR;  Service: Vascular    CAROTID ENDARTERECTOMY Left 4/4/2019    Procedure: LEFT CAROTID ENDARTERECTOMY;  Surgeon: Mikaela Galicia Jr., MD;  Location: [facility] MAIN OR;  Service: Vascular    CATARACT EXTRACTION WITH INTRAOCULAR LENS IMPLANT      LEFT AND RIGHT    INTERVENTIONAL RADIOLOGY PROCEDURE N/A 3/12/2024    Procedure: Intravascular Ultrasound;  Surgeon: Sheng Jaimes MD;  Location: [facility] CATH INVASIVE LOCATION;  Service: Cardiovascular;  Laterality: N/A;    ORIF FEMUR FRACTURE Right     HARDWARE    TONSILLECTOMY AND ADENOIDECTOMY      INADVERTANTLY TOOK UVULA AT THIS TIME      General Information       Row Name 03/19/24 1537          Physical Therapy Time and Intention    Document Type  therapy note (daily note)  -MS     Mode of Treatment physical therapy  -MS       Row Name 03/19/24 1537          General Information    Existing Precautions/Restrictions fall  -MS       Row Name 03/19/24 1537          Cognition    Orientation Status (Cognition) oriented x 4  -MS       Row Name 03/19/24 1537          Safety Issues, Functional Mobility    Impairments Affecting Function (Mobility) postural/trunk control;endurance/activity tolerance;balance  -MS     Comment, Safety Issues/Impairments (Mobility) Non skid socks donned.  -MS               User Key  (r) = Recorded By, (t) = Taken By, (c) = Cosigned By      Initials Name Provider Type    MS Deanna Hernandez, PT Physical Therapist                   Mobility       Row Name 03/19/24 1537          Bed Mobility    Comment, (Bed Mobility) NT  -MS       Row Name 03/19/24 1537          Transfers    Comment, (Transfers) Safety awareness and sequencing cues.  -MS       Row Name 03/19/24 1537          Sit-Stand Transfer    Sit-Stand [place] (Transfers) contact guard;verbal cues;nonverbal cues (demo/gesture)  -MS     Assistive Device (Sit-Stand Transfers) walker, front-wheeled  -MS       Row Name 03/19/24 1537          Gait/Stairs (Locomotion)    [place] Level (Gait) contact guard;verbal cues;nonverbal cues (demo/gesture)  -MS     Assistive Device (Gait) walker, front-wheeled  -MS     Distance in Feet (Gait) 75  -MS     Deviations/Abnormal Patterns (Gait) gait speed decreased;trav decreased;stride length decreased  -MS     Bilateral Gait Deviations forward flexed posture  -MS     Comment, (Gait/Stairs) No overt LOB or veering noted. One seated rest break during trial.  -MS               User Key  (r) = Recorded By, (t) = Taken By, (c) = Cosigned By      Initials Name Provider Type    MS Deanna Hernandez, PT Physical Therapist                   Obj/Interventions       Row Name 03/19/24 1540          Motor Skills    Therapeutic Exercise --  Seated LAQs x  10 reps  -MS       Row Name 03/19/24 1540          Balance    Static Sitting Balance standby assist  -MS     Position, Sitting Balance unsupported  -MS     Static Standing Balance contact guard  -MS     Position/Device Used, Standing Balance supported  -MS               User Key  (r) = Recorded By, (t) = Taken By, (c) = Cosigned By      Initials Name Provider Type    Deanna Villasenor, PT Physical Therapist                   Goals/Plan    No documentation.                  Clinical Impression       Row Name 03/19/24 1541          Pain    Pretreatment Pain Rating 0/10 - no pain  -MS     Posttreatment Pain Rating 0/10 - no pain  -MS       Row Name 03/19/24 1541          Plan of Care Review    Plan of Care Reviewed With patient  -MS     Outcome Evaluation Patient pleasant and agreeable to PT this afternoon. CGA to a RW and ambulated 75' CGA total with a RW. One seated rest break during ambulation trial. 2L supplemental O2 donned throughout activity. PT will continue to follow and advance as able.  -MS       Row Name 03/19/24 1541          Vital Signs    Pre SpO2 (%) 90  -MS     O2 Delivery Pre Treatment supplemental O2  -MS     O2 Delivery Intra Treatment supplemental O2  -MS     Post SpO2 (%) 92  -MS     O2 Delivery Post Treatment supplemental O2  -MS       Row Name 03/19/24 1541          Positioning and Restraints    Pre-Treatment Position bedside commode  -MS     Post Treatment Position chair  -MS     In Chair notified nsg;reclined;call light within reach;encouraged to call for assist;exit alarm on  -MS               User Key  (r) = Recorded By, (t) = Taken By, (c) = Cosigned By      Initials Name Provider Type    Deanna Villasenor, PT Physical Therapist                   Outcome Measures       Row Name 03/19/24 1544 03/19/24 0808       How much help from another person do you currently need...    Turning from your back to your side while in flat bed without using bedrails? 3  -MS 3  -SF    Moving from lying  on back to sitting on the side of a flat bed without bedrails? 3  -MS 3  -SF    Moving to and from a bed to a chair (including a wheelchair)? 2  -MS 3  -SF    Standing up from a chair using your arms (e.g., wheelchair, bedside chair)? 2  -MS 3  -SF    Climbing 3-5 steps with a railing? 2  -MS 2  -SF    To walk in hospital room? 3  -MS 3  -SF    AM-PAC 6 Clicks Score (PT) 15  -MS 17  -SF    Highest Level of Mobility Goal 4 --> Transfer to chair/commode  -MS 5 --> Static standing  -SF              User Key  (r) = Recorded By, (t) = Taken By, (c) = Cosigned By      Initials Name Provider Type    MS PickettsDeanna, PT Physical Therapist    Darline Galeano, RN Registered Nurse                                 Physical Therapy Education       Title: PT OT SLP Therapies (Done)       Topic: Physical Therapy (Done)       Point: Mobility training (Done)       Learning Progress Summary             Patient Acceptance, E,TB, VU by MS at 3/19/2024 1544    Acceptance, E,TB, VU by  at 3/18/2024 1553    Acceptance, E,D, DU,NR by  at 3/16/2024 1643    Acceptance, E,TB, VU by  at 3/15/2024 1757    Acceptance, E,D, VU,NR by [facility] at 3/13/2024 1509   Family Acceptance, E,TB, VU by  at 3/18/2024 1553                         Point: Home exercise program (Done)       Learning Progress Summary             Patient Acceptance, E,TB, VU by MS at 3/19/2024 1544    Acceptance, E,TB, VU by  at 3/18/2024 1553    Acceptance, E,TB, VU by  at 3/15/2024 1757    Acceptance, E,D, VU,NR by [facility] at 3/13/2024 1509   Family Acceptance, E,TB, VU by  at 3/18/2024 1553                         Point: Body mechanics (Done)       Learning Progress Summary             Patient Acceptance, E,TB, VU by MS at 3/19/2024 1544    Acceptance, E,TB, VU by  at 3/18/2024 1553    Acceptance, E,D, DU,NR by  at 3/16/2024 1643    Acceptance, E,TB, VU by  at 3/15/2024 1757    Acceptance, E,D, VU,NR by MS1 at 3/13/2024 1509   Family Acceptance, E,TB, VU by   at 3/18/2024 1553                         Point: Precautions (Done)       Learning Progress Summary             Patient Acceptance, E,TB, VU by MS at 3/19/2024 1544    Acceptance, E,TB, VU by  at 3/18/2024 1553    Acceptance, E,D, DU,NR by  at 3/16/2024 1643    Acceptance, E,TB, VU by  at 3/15/2024 1757    Acceptance, E,D, VU,NR by [facility] at 3/13/2024 1509   Family Acceptance, E,TB, VU by  at 3/18/2024 1553                                         User Key       Initials Effective Dates Name Provider Type Discipline     06/16/21 -  Kathleen Lara, PT Physical Therapist PT    [facility] 06/16/21 -  Isidoro Godinez, PT Physical Therapist PT     06/16/21 -  Anne-Marie Rowan, RN Registered Nurse Nurse    MS 06/16/21 -  Deanna Hernandez, PT Physical Therapist PT     01/24/24 -  Sj Foley PT Student PT Student PT                  PT Recommendation and Plan     Plan of Care Reviewed With: patient  Outcome Evaluation: Patient pleasant and agreeable to PT this afternoon. CGA to a RW and ambulated 75' CGA total with a RW. One seated rest break during ambulation trial. 2L supplemental O2 donned throughout activity. PT will continue to follow and advance as able.     Time Calculation:         PT Charges       Row Name 03/19/24 1534             Time Calculation    Start Time 1407  -MS      Stop Time 1421  -MS      Time Calculation (min) 14 min  -MS      PT Received On 03/19/24  -MS      PT - Next Appointment 03/20/24  -MS      PT Goal Re-Cert Due Date 03/26/24  -MS                User Key  (r) = Recorded By, (t) = Taken By, (c) = Cosigned By      Initials Name Provider Type    MS Deanna Hernandez, PT Physical Therapist                  Therapy Charges for Today       Code Description Service Date Service Provider Modifiers Qty    [id number] HC PT THER PROC EA 15 MIN 3/19/2024 Deanna Hernandez, PT GP 1            PT G-Codes  Outcome Measure Options: AM-PAC 6 Clicks Basic Mobility (PT)  AM-PAC 6 Clicks  Score (PT): 15  PT Discharge Summary  Anticipated Discharge Disposition (PT): skilled nursing facility    Deanna Hernandez, PT  3/19/2024

## 2024-03-19 NOTE — DISCHARGE SUMMARY
[facility]    Date of Discharge:  3/19/2024    PCP: Traci Hoyos MD    Discharge Diagnosis:   Active Hospital Problems    Diagnosis  POA    **Acute respiratory failure [J96.00]  Yes    Acute DVT (deep venous thrombosis) [I82.409]  Yes    Mixed hyperlipidemia [E78.2]  Yes    Hypertension [I10]  Yes    COPD (chronic obstructive pulmonary disease) [J44.9]  Yes      Resolved Hospital Problems   No resolved problems to display.     Procedure(s):  Percutaneous Coronary Intervention  Stent LAVELLE coronary  Intravascular Ultrasound     Consults       Date and Time Order Name Status Description    3/16/2024  5:57 PM Inpatient Internal Medicine Consult Completed     3/8/2024  1:17 PM Inpatient Cardiothoracic Surgery Consult      3/7/2024  5:20 AM Inpatient Cardiology Consult Completed     3/7/2024  5:05 AM Pulmonology (on-call MD unless specified)            Hospital Course  88 y.o. female with past medical history significant for CHF, anemia, carotid artery disease, is seen in the hospital with non-STEMI, coronary disease,  felt type II MI with acute on chronic hypoxia related to pneumonia.  LHC on 3/8/24 did demonstrate LM disease s/p PCI 3/12. Her EF is normal with no rWMAs.  Continue aspirin, Plavix and high potency statin.  felt type II MI with acute on chronic hypoxia related to pneumonia.  LHC on 3/8/24 did demonstrate LM disease s/p PCI 3/12. Her EF is normal with no rWMAs.  Continue aspirin, Plavix and high potency statin.  Patient has been cleared by cardiology for discharge.  I have seen and examined patient at bedside, total time spent is more than 30 minutes.        Temp:  [97.2 °F (36.2 °C)-97.5 °F (36.4 °C)] 97.5 °F (36.4 °C)  Heart Rate:  [68-82] 75  Resp:  [17-22] 18  BP: (122-137)/(47-59) 122/47  Body mass index is 25 kg/m².    Physical Exam  General, awake and alert.  Head and ENT, normocephalic and atraumatic.  Lungs, symmetric expansion, equal air  entry bilaterally.  Heart, regular rate and rhythm.  Abdomen, soft and nontender.  Extremities, no clubbing or cyanosis.  Neuro, no focal deficits.  Skin: Warm and no rash.  Psych, normal mood and affect.  Musculoskeletal, joint examination is grossly normal.      Disposition: Home or Self Care       Discharge Medications        Changes to Medications        Instructions Start Date   alendronate 70 MG tablet  Commonly known as: FOSAMAX  What changed:   when to take this  additional instructions   TAKE 1 TABLET BY MOUTH ONCE WEEKLY ON AN EMPTY STOMACH BEFORE BREAKFAST. REMAIN UPRIGHT FOR 30 MINUTES AND TAKE WITH 8 OUNCES OF WATER      docusate sodium 100 MG capsule  What changed: when to take this   100 mg, Oral, 2 Times Daily PRN      Eliquis DVT/PE Starter Pack tablet therapy pack  Generic drug: Apixaban Starter Pack  What changed: when to take this   Take two 5 mg tablets by mouth every 12 hours for 7 days. Followed by one 5 mg tablet every 12 hours. Start on day 3      Fluticasone-Salmeterol 100-50 MCG/ACT DISKUS  Commonly known as: ADVAIR/WIXELA  What changed: when to take this   INHALE ONE PUFF BY MOUTH TWICE A DAY             Continue These Medications        Instructions Start Date   acetaminophen 325 MG tablet  Commonly known as: TYLENOL   650 mg, Oral, Every 4 Hours PRN      amLODIPine 5 MG tablet  Commonly known as: NORVASC   TAKE ONE TABLET BY MOUTH DAILY      aspirin 81 MG chewable tablet   81 mg, Oral, Daily      atenolol 50 MG tablet  Commonly known as: TENORMIN   TAKE ONE TABLET BY MOUTH DAILY      calcium carbonate-cholecalciferol 500-400 MG-UNIT tablet tablet   1 tablet, Oral, 2 Times Daily      furosemide 40 MG tablet  Commonly known as: LASIX   40 mg, Oral, Daily      guaifenesin 100 MG/5ML liquid  Commonly known as: ROBITUSSIN   200 mg, Oral, 3 Times Daily PRN      ipratropium-albuterol 0.5-2.5 mg/3 ml nebulizer  Commonly known as: DUO-NEB   3 mL, Nebulization, Every 4 Hours PRN      Loratadine  10 MG capsule   10 mg, Oral, Daily      ICAPS AREDS 2 PO   1 tablet, Oral, Daily, HOLD PRIOR TO SURG      multivitamin with minerals tablet tablet   1 tablet, Oral, Daily, HOLD PRIOR TO SURG      omeprazole 20 MG capsule  Commonly known as: priLOSEC   20 mg, Oral, Daily      prednisoLONE acetate 1 % ophthalmic suspension  Commonly known as: PRED FORTE   Right Eye, 4 Times Daily      predniSONE 10 MG tablet  Commonly known as: DELTASONE   10 mg, Oral, Daily      rosuvastatin 5 MG tablet  Commonly known as: CRESTOR   TAKE ONE TABLET BY MOUTH DAILY      sodium chloride 5 % ophthalmic solution  Commonly known as: GREGORY 128   1 drop, Right Eye, 4 Times Daily      tiotropium bromide monohydrate 2.5 MCG/ACT aerosol solution inhaler  Commonly known as: SPIRIVA RESPIMAT   2 puffs, Inhalation, Daily - RT                Additional Instructions for the Follow-ups that You Need to Schedule       Ambulatory Referral to Cardiac Rehab   As directed      Ambulatory Referral to Cardiac Rehab   As directed      Discharge Follow-up with PCP   As directed       Currently Documented PCP:    Traci Hoyos MD    PCP Phone Number:    [phone]     Follow Up Details: Follow-up with PCP in 7 days.               Contact information for follow-up providers       [facility] .    Specialty: Cardiac Rehabilitation  Contact information:  [address] [facility] [address] [phone]             [facility] .    Specialty: Cardiac Rehabilitation  Contact information:  [address] [facility] [address] [phone]             Traci Hoyos MD .    Specialty: Geriatric Medicine  Why: Follow-up with PCP in 7 days.  Contact information:  [address] [place] [address] [phone]                       Contact information for after-discharge care       Destination       [facility] .    Service: Skilled Nursing  Contact  information:  [address]  [facility] [address]  [phone]                                  Future Appointments   Date Time Provider Department Center   4/2/2024 10:30 AM Abiola Guerrero APRN [facility]   4/8/2024  2:15 PM Félix Sanchez MD [facility]   4/30/2024  2:00 PM [facility] CT 1 BH [facility] CT M [place]   10/16/2024  1:30 PM [facility] OP VAS RM 2 BH [facility] None   10/16/2024  2:00 PM Nancy Wiley MD [facility]      Dino Enrique MD  [facility]  03/19/24    Discharge time spent greater than 30 minutes.

## 2024-03-19 NOTE — PROGRESS NOTES
"  Daily Progress Note.   [facility]  3/19/2024    Patient:  Name:  Lizzy Hicks  MRN:  [id number]  1935  88 y.o.  female         Reason for Admission / Chief Complaint:  Respiratory failure, COPD, pneumonia     Hospital Course:   88-year-old female with a history of severe COPD and chronic steroid use who presented with shortness of breath and found to have bilateral pneumonia with COPD exacerbation.       Interval History:  Febrile 1 L nasal cannula saturation 100% for noninvasive ventilation overnight.  Blood pressure stable patient is in no respiratory distress  She feels that she is doing well  No cp no worsening cough no lethargy no confusion beatriz diet no emesis     Physical Exam:  /51   Pulse 73   Temp 97.2 °F (36.2 °C) (Axillary)   Resp 18   Ht 152.4 cm (60\")   Wt 58.1 kg (128 lb)   LMP  (LMP Unknown)   SpO2 100%   BMI 25.00 kg/m²   Body mass index is 25 kg/m².    Intake/Output Summary (Last 24 hours) at 3/19/2024 1007  Last data filed at 3/19/2024 0839  Gross per 24 hour   Intake 600 ml   Output 300 ml   Net 300 ml     General appearance: Nontoxic, conversant   Eyes: anicteric sclerae, moist conjunctivae; no lidlag;    HENT: Atraumatic; oropharynx clear with moist mucous membranes    Neck: Trachea midline;  supple   Lungs: no sig rhonchi no wheeze, with normal respiratory effort and no intercostal retractions  CV: RRR, no rub   Abdomen: Soft, nontender; BS+  Extremities: No peripheral edema    Skin: WWP no diffuse visible rash  Psych: Appropriate affect, alert   Neuro: Moves all ext. CN II-XII. Speech intact.    Data Review:  Notable Labs:  Results from last 7 days   Lab Units 03/19/24  0350 03/18/24  0432 03/17/24  0328 03/16/24  0406 03/15/24  0358 03/14/24  1635 03/14/24  0340 03/13/24  0948 03/13/24  0417   WBC 10*3/mm3 14.19* 13.56* 14.61* 14.12* 15.97*  --  11.56*  --  14.25*   HEMOGLOBIN g/dL 11.2* 11.3* 11.5* 10.3* 11.4* 11.5* 6.4*   < > 7.5*   PLATELETS " 10*3/mm3 252 174 245 185 186  --  157  --  180    < > = values in this interval not displayed.     Results from last 7 days   Lab Units 03/19/24  0350 03/18/24  0432 03/17/24  2130 03/17/24  0328 03/16/24  0406 03/15/24  0358 03/14/24  0340 03/13/24  0417   SODIUM mmol/L 136 136  --  136 140 139 139 140   POTASSIUM mmol/L 3.1* 4.1 4.2 3.4* 4.0 4.4 4.5 4.3   CHLORIDE mmol/L 95* 96*  --  96* 102 101 107 104   CO2 mmol/L 29.1* 29.0  --  29.9* 28.2 26.0 25.0 24.5   BUN mg/dL 42* 33*  --  34* 28* 27* 27* 28*   CREATININE mg/dL 1.08* 1.00  --  1.01* 0.83 0.91 0.90 0.85   GLUCOSE mg/dL 98 91  --  108* 99 99 117* 110*   CALCIUM mg/dL 8.2* 8.7  --  8.5* 8.3* 8.6 8.3* 8.1*   PHOSPHORUS mg/dL 3.6 3.8  --  2.9 2.9 3.4 3.9 2.7   Estimated Creatinine Clearance: 29.8 mL/min (A) (by C-G formula based on SCr of 1.08 mg/dL (H)).    Results from last 7 days   Lab Units 03/19/24  0350 03/18/24 0432 03/17/24  0328   PLATELETS 10*3/mm3 252 174 245       Imaging:  Reviewed chest images personally from past 3 days    ASSESSMENT  /  PLAN:  Acute on chronic hypoxic and hypercapnic respiratory failure, on O2 2-3 L/min and NIPPV at baseline.   Pneumonia, bilateral, R >L  Bilateral pleural effusion, mild.  Pulmonary vascular congestion  COPD exacerbation, improved  Non-STEMI type II  CAD with severe 95% proximal left main stenosis.  S/p [facility] 3/8/2024. Not a surgical candiate.  S/p [facility] 3/12/2024 with stent placement  History of DVT, on AC with Eliquis.  Venous Doppler 3/7/2024 negative for DVT.   Prominent calcifications of the abdominal aorta, iliac arteries, and  femoral arteries  ABLA secondary to right groin hematoma    From a pulmonary perspective  Continue NIPPV at night and as needed during the day for naps.  Patient wears this at home.    -Patient on low-flow oxygen saturations very good patient wears oxygen at home chronically.    Patient is status post antibiotic course with cefepime for  pneumonia.    Bronchodilators  Symbicort  Scheduled DuoNebs    Airway clearance  Sinus problem  Flutter valve    Prednisone 10 chronically    Diuresis per cardiology.  Otherwise medical care per A    Dw family at bedside all questions answered.    Seems at baseline from pulm perspective.  Walking ox prior to dc.    Electronically signed by Mark Clemons MD, 03/19/24, 10:42 AM EDT.

## 2024-03-19 NOTE — CASE MANAGEMENT/SOCIAL WORK
Continued Stay Note  [facility]     Patient Name: Lizzy Hicks  MRN: [id number]  Today's Date: 3/19/2024    Admit Date: 3/7/2024    Plan: [place] [facility].  Daughter will transport   Discharge Plan       Row Name 03/19/24 1614       Plan    Plan [place] skilled.  Daughter will transport    Plan Comments Packet given to SHAVON Gregorio to fax d/c summary and call report.  [facility] spoke with Pt and daughter/Jasmin at bedside.  Jasmin will transport Pt back to [facility]........Rowan MCFARLAND/FAISAL                   Discharge Codes    No documentation.                 Expected Discharge Date and Time       Expected Discharge Date Expected Discharge Time    Mar 20, 2024               Rowan Ornelas RN

## 2024-03-20 NOTE — OUTREACH NOTE
Prep Survey      Flowsheet Row Responses   [facility] facility patient discharged from? [facility]   Is LACE score < 7 ? No   Eligibility Not Eligible   What are the reasons patient is not eligible? [facility]   Does the patient have one of the following disease processes/diagnoses(primary or secondary)? Other   Prep survey completed? Yes            MARLENY CORONADO - Registered Nurse

## 2024-03-21 NOTE — CASE MANAGEMENT/SOCIAL WORK
Case Management Discharge Note      Final Note: Pt discharged to [facility], 3/19/24 via private auto……..Rowan MCFARLAND/SHAVON CM    Provided Post Acute Provider List?: N/A    Selected Continued Care - Discharged on 3/19/2024 Admission date: 3/7/2024 - Discharge disposition: Home or Self Care      Destination Coordination complete.      Service Provider Selected Services Address Phone Fax Patient Preferred    [facility] [address] [address] [phone] --              Durable Medical Equipment    No services have been selected for the patient.                Dialysis/Infusion    No services have been selected for the patient.                Home Medical Care    No services have been selected for the patient.                Therapy    No services have been selected for the patient.                Community Resources    No services have been selected for the patient.                Community & DME    No services have been selected for the patient.                         Final Discharge Disposition Code: 03 - skilled nursing facility (SNF)

## 2024-03-22 DIAGNOSIS — I65.23 BILATERAL CAROTID ARTERY STENOSIS: Primary | ICD-10-CM

## 2024-04-02 PROBLEM — T81.718A FEMORAL ARTERY PSEUDOANEURYSM COMPLICATING CARDIAC CATHETERIZATION: Status: ACTIVE | Noted: 2024-01-01

## 2024-04-02 PROBLEM — I72.9 PSEUDOANEURYSM: Status: ACTIVE | Noted: 2024-01-01

## 2024-04-02 PROBLEM — I72.4 FEMORAL ARTERY PSEUDOANEURYSM COMPLICATING CARDIAC CATHETERIZATION: Status: ACTIVE | Noted: 2024-01-01

## 2024-04-02 NOTE — PROGRESS NOTES
[facility] Follow Up Office Note     Encounter Date:24  Patient:Lizzy Hicks  :1935  MRN:[id number]      Chief Complaint: No chief complaint on file.        History of Presenting Illness:        Lizzy Hicks is a 88 y.o. female who is here for follow-up.  She is a patient of Dr Sanchez.    Patient has past medical history that is significant for coronary artery disease, diastolic heart failure, hypertension, carotid artery stenosis status post CEA  and , nonrheumatic aortic stenosis, history of below-knee DVT status post 6 months of anticoagulation, COPD, osteoarthritis, hyperlipidemia.    Patient was recently hospitalized with worsening shortness of breath and hypoxia.  She was treated for pneumonia, diuresed for acute heart failure.  She had troponin elevation felt more likely type II MI, however warranting left heart cath for further evaluation.  Diagnostic heart cath 3/8/2024 with 95% proximal left main stenosis with heavy calcification, otherwise 70% small mid marginal branch disease, 40 to 50% mid RCA disease.  She was taken back to the Cath Lab on 3/12/2024 and is status post LAVELLE of left main.  Following heart cath she developed significant anemia requiring transfusion.  Groin site was stable without evidence of active bleeding.  She was IV diuresed and transition to an oral regimen.  She was discharged to skilled nursing facility for physical therapy.  Patient is here today for follow-up.    Patient reports she feels she has done well since hospital discharge.  She is working with physical therapy.  Her breathing feels better and she is weaned to 1 L nasal cannula.  She has not had any chest pain.  She reports she has lower extremity edema that has worsened.  She also noted a hard spot at her cath stick site.        Review of Systems:  Review of Systems   Constitutional: Positive for malaise/fatigue.   Cardiovascular:  Positive for dyspnea on exertion and leg  swelling. Negative for chest pain, orthopnea and palpitations.   Respiratory:  Negative for shortness of breath.        Current Outpatient Medications on File Prior to Visit   Medication Sig Dispense Refill    acetaminophen (TYLENOL) 325 MG tablet Take 2 tablets by mouth Every 4 (Four) Hours As Needed for Mild Pain.      alendronate (FOSAMAX) 70 MG tablet TAKE 1 TABLET BY MOUTH ONCE WEEKLY ON AN EMPTY STOMACH BEFORE BREAKFAST. REMAIN UPRIGHT FOR 30 MINUTES AND TAKE WITH 8 OUNCES OF WATER (Patient taking differently: Take 1 tablet by mouth Every 7 (Seven) Days. Monday) 12 tablet 1    amLODIPine (NORVASC) 5 MG tablet TAKE ONE TABLET BY MOUTH DAILY (Patient taking differently: Take 1 tablet by mouth Daily.) 90 tablet 3    aspirin 81 MG chewable tablet Chew 1 tablet Daily.      atenolol (TENORMIN) 50 MG tablet TAKE ONE TABLET BY MOUTH DAILY (Patient taking differently: Take 1 tablet by mouth Daily.) 90 tablet 1    calcium carbonate-cholecalciferol 500-400 MG-UNIT tablet tablet Take 1 tablet by mouth 2 (Two) Times a Day.      docusate sodium 100 MG capsule Take 1 capsule by mouth 2 (Two) Times a Day As Needed for Constipation.      Fluticasone-Salmeterol (ADVAIR/WIXELA) 100-50 MCG/ACT DISKUS INHALE ONE PUFF BY MOUTH TWICE A DAY (Patient taking differently: Inhale 1 puff 2 (Two) Times a Day.) 60 each 2    furosemide (LASIX) 40 MG tablet Take 1 tablet by mouth Daily. 30 tablet 0    guaifenesin (ROBITUSSIN) 100 MG/5ML liquid Take 10 mL by mouth 3 (Three) Times a Day As Needed for Cough.      ipratropium-albuterol (DUO-NEB) 0.5-2.5 mg/3 ml nebulizer Take 3 mL by nebulization Every 4 (Four) Hours As Needed for Shortness of Air or Wheezing. 360 mL     Loratadine 10 MG capsule Take 1 capsule by mouth Daily.      Multiple Vitamins-Minerals (ICAPS AREDS 2 PO) Take 1 tablet by mouth Daily. HOLD PRIOR TO SURG      Multiple Vitamins-Minerals (MULTIVITAMIN ADULTS 50+ PO) Take 1 tablet by mouth Daily. HOLD PRIOR TO SURG       omeprazole (priLOSEC) 20 MG capsule Take 1 capsule by mouth Daily.      prednisoLONE acetate (PRED FORTE) 1 % ophthalmic suspension Administer  to the right eye 4 (Four) Times a Day.      predniSONE (DELTASONE) 10 MG tablet Take 1 tablet by mouth Daily.      rosuvastatin (CRESTOR) 5 MG tablet TAKE ONE TABLET BY MOUTH DAILY (Patient taking differently: Take 1 tablet by mouth Daily.) 90 tablet 3    sodium chloride (GREGORY 128) 5 % ophthalmic solution Administer 1 drop to the right eye 4 (Four) Times a Day.      tiotropium bromide monohydrate (SPIRIVA RESPIMAT) 2.5 MCG/ACT aerosol solution inhaler Inhale 2 puffs Daily.      [DISCONTINUED] Apixaban Starter Pack (Eliquis DVT/PE Starter Pack) tablet therapy pack Take two 5 mg tablets by mouth every 12 hours for 7 days. Followed by one 5 mg tablet every 12 hours. Start on day 3 74 tablet 0     No current facility-administered medications on file prior to visit.       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 Baseline Na 128-130 since 2013.    Seborrheic dermatitis 01/20/2016    Swallowing difficulty     D/T INADVERTANT REMOVAL OF UVULA AS CHILD WITH T AND A       Past Surgical History:   Procedure Laterality Date    CARDIAC CATHETERIZATION N/A 3/8/2024    Procedure: Left Heart Cath;  Surgeon: Sheng Jaimes MD;  Location: [facility] CATH INVASIVE LOCATION;  Service: Cardiovascular;  Laterality: N/A;    CARDIAC CATHETERIZATION N/A 3/8/2024    Procedure: Coronary angiography;  Surgeon: Sheng Jaimes MD;  Location:  [facility] CATH INVASIVE  LOCATION;  Service: Cardiovascular;  Laterality: N/A;    CARDIAC CATHETERIZATION N/A 3/12/2024    Procedure: Percutaneous Coronary Intervention;  Surgeon: Sheng Jaimes MD;  Location:  [facility] CATH INVASIVE LOCATION;  Service: Cardiovascular;  Laterality: N/A;    CARDIAC CATHETERIZATION N/A 3/12/2024    Procedure: Stent LAVELLE coronary;  Surgeon: Sheng Jaimes MD;  Location:  [facility] CATH INVASIVE LOCATION;  Service: Cardiovascular;  Laterality: N/A;    CAROTID ENDARTERECTOMY Right 9/24/2018    Procedure: RT CAROTID ENDARTERECTOMY WITH INTRA OPERATIVE CAROTID ARTERY DUPLEX SCAN;  Surgeon: Mikaela Galicia Jr., MD;  Location: [facility] MAIN OR;  Service: Vascular    CAROTID ENDARTERECTOMY Left 4/4/2019    Procedure: LEFT CAROTID ENDARTERECTOMY;  Surgeon: Mikaela Galicia Jr., MD;  Location: [facility] MAIN OR;  Service: Vascular    CATARACT EXTRACTION WITH INTRAOCULAR LENS IMPLANT      LEFT AND RIGHT    INTERVENTIONAL RADIOLOGY PROCEDURE N/A 3/12/2024    Procedure: Intravascular Ultrasound;  Surgeon: Sheng Jaimes MD;  Location:  [facility] CATH INVASIVE LOCATION;  Service: Cardiovascular;  Laterality: N/A;    ORIF FEMUR FRACTURE Right     HARDWARE    TONSILLECTOMY AND ADENOIDECTOMY      INADVERTANTLY TOOK UVULA AT THIS TIME       Social History     Socioeconomic History    Marital status:    Tobacco Use    Smoking status: Former     Current packs/day: 0.25     Average packs/day: 0.3 packs/day for 40.0 years (10.0 ttl pk-yrs)     Types: Cigarettes    Smokeless tobacco: Never    Tobacco comments:     quit 6 yr ago   Vaping Use    Vaping status: Never Used   Substance and Sexual Activity    Alcohol use: Not Currently     Comment: 1-2 drinks day    Drug use: No    Sexual activity: Defer       Family History   Problem Relation Age of Onset    Malig Hyperthermia Neg Hx        The following portions of the patient's history were reviewed and updated as appropriate: allergies, current medications, past family  "history, past medical history, past social history, past surgical history and problem list.       Objective:       Vitals:    04/02/24 1324   BP: 120/60   Pulse: 82   SpO2: 93%   Weight: 55.3 kg (122 lb)   Height: 160 cm (63\")         Physical Exam:  Constitutional: Well appearing, well developed, no acute distress   HENT: Oropharynx clear and membrane moist  Eyes: Normal conjunctiva, no sclera icterus  Neck: Supple, no carotid bruit bilaterally  Cardiovascular: Regular rate and rhythm, No Murmur, 2+ bilateral lower extremity edema  Pulmonary: Normal respiratory effort, normal lung sounds, no wheezing  Neurological: Alert and orient x 3  Skin: Warm, dry, no ecchymosis, no rash  Psych: Appropriate mood and affect. Normal judgment and insight         Lab Results   Component Value Date     03/19/2024     03/18/2024    K 3.1 (L) 03/19/2024    K 4.1 03/18/2024    CL 95 (L) 03/19/2024    CL 96 (L) 03/18/2024    CO2 29.1 (H) 03/19/2024    CO2 29.0 03/18/2024    BUN 42 (H) 03/19/2024    BUN 33 (H) 03/18/2024    CREATININE 1.08 (H) 03/19/2024    CREATININE 1.00 03/18/2024    EGFRIFNONA 69 07/23/2021    EGFRIFNONA 73 07/17/2020    EGFRIFAFRI 80 07/23/2021    EGFRIFAFRI 88 07/17/2020    GLUCOSE 98 03/19/2024    GLUCOSE 91 03/18/2024    CALCIUM 8.2 (L) 03/19/2024    CALCIUM 8.7 03/18/2024    PROTENTOTREF 7.8 07/29/2022    PROTENTOTREF 7.1 07/23/2021    ALBUMIN 3.1 (L) 03/19/2024    ALBUMIN 3.2 (L) 03/18/2024    BILITOT 0.5 03/07/2024    BILITOT 0.2 07/30/2023    AST 24 03/07/2024    AST 18 07/30/2023    ALT 18 03/07/2024    ALT 16 07/30/2023     Lab Results   Component Value Date    WBC 14.29 (H) 04/02/2024    WBC 14.19 (H) 03/19/2024    HGB 9.4 (L) 04/02/2024    HGB 11.2 (L) 03/19/2024    HCT 28.1 (L) 04/02/2024    HCT 32.9 (L) 03/19/2024    MCV 85.9 04/02/2024    MCV 85.7 03/19/2024     04/02/2024     03/19/2024     Lab Results   Component Value Date    CHOL 195 03/08/2024    CHOL 206 (H) 07/31/2023 "    TRIG 63 03/08/2024    TRIG 69 07/31/2023     (H) 03/08/2024     (H) 07/31/2023    LDL 69 03/08/2024    LDL 94 07/31/2023     Lab Results   Component Value Date    PROBNP 6,918.0 (H) 03/07/2024    PROBNP 2,561.0 (H) 07/30/2023     Lab Results   Component Value Date    TROPONINT 282 (C) 03/07/2024     Lab Results   Component Value Date    TSH 0.517 07/31/2023    TSH 4.090 07/29/2022           ECG 12 Lead    Date/Time: 4/2/2024 1:35 PM  Performed by: Abiola Guerrero APRN    Authorized by: Abiola Guerrero APRN  Comparison: compared with previous ECG from 3/15/2024  Similar to previous ECG  Rhythm: sinus rhythm  Rate: normal  BPM: 79  ST Depression: II, III, V4, V5 and V6             Assessment:          Diagnosis Plan   1. Coronary artery disease involving native coronary artery of native heart, unspecified whether angina present  ECG 12 Lead    CBC (No Diff)    Comprehensive Metabolic Panel      2. Dyspnea on exertion  proBNP      3. Hematoma  Duplex Pseudoaneurysm CAR      4. Other specified complications of surgical and medical care, not elsewhere classified, initial encounter  Duplex Pseudoaneurysm CAR             Plan:       Coronary artery disease -recent LAVELLE of left main 3/12/2024.  DAPT with aspirin and high potency statin.  EF is normal. It is unclear why, however instead of being discharged on pitta grill her Eliquis was resumed.  Since her below the knee DVT was treated for 6 months, recommend restarting clopidogrel.  I also discussed this with Dr. Sanchez  Chronic diastolic congestive heart failure -diuresed during admission and transition to oral torsemide at discharge.  He has lower extremity swelling on exam, I think this is likely multifactorial.  She needs repeat labs since being started on torsemide, will also add proBNP and consider increasing torsemide based on results  Acute on chronic anemia -received 2 units during hospitalization.  Her right groin site is hard  consistent with hematoma.  Given blood loss during hospitalization and exam findings I have ordered a stat groin ultrasound to rule out pseudoaneurysm  Nonrheumatic valvular heart disease -noted, continue surveillance echo monitoring as outpatient  Carotid artery disease -history of right CEA 2018, left CEA 2019.  Follows with Dr Galicia.  Continue DAPT and statin  Hypertension  COPD/chronic hypoxemic respiratory failure -continue supplemental O2, nighttime BiPAP    Patient is seen today for follow-up after hospital discharge  Groin ultrasound ordered to rule out pseudoaneurysm --further recommendations pending results  Change apixaban back to clopidogrel  Lab evaluation including CMP, proBNP, CBC      Return to clinic in 1 month to see Dr. Sanchez    Orders Placed This Encounter   Procedures    CBC (No Diff)     Standing Status:   Future     Number of Occurrences:   1     Standing Expiration Date:   4/2/2025     Order Specific Question:   Release to patient     Answer:   Routine Release [[id number]]    Comprehensive Metabolic Panel     Standing Status:   Future     Number of Occurrences:   1     Standing Expiration Date:   4/2/2025     Order Specific Question:   Release to patient     Answer:   Routine Release [[id number]]    proBNP     Standing Status:   Future     Number of Occurrences:   1     Standing Expiration Date:   4/2/2025     Order Specific Question:   Release to patient     Answer:   Routine Release [[id number]]    ECG 12 Lead     This order was created via procedure documentation     Order Specific Question:   Release to patient     Answer:   Routine Release [[id number]]            DAVID Gonzalez  [facility]  04/02/24  14:54 EDT

## 2024-04-02 NOTE — CONSULTS
Name: Lizzy Hicks ADMIT: 2024   : 1935  PCP: Traci Hoyos MD    MRN: [id number] LOS: 0 days   AGE/SEX: 88 y.o. female  ROOM: [facility] 4179/     Consults  CC: Right femoral artery pseudoaneurysm  Subjective     History of Present Illness  88 y.o. female direct admitted by cardiology after finding a 5 cm pseudoaneurysm after high risk PCI 3 weeks ago.  Patient is on dual antiplatelet therapy and as well as Eliquis.  She took her last dose this morning.      Review of Systems  Minimal pain in the groin actually but aware of a pulsatile hematoma    History Review Reviewed Comments   Past Medical History:  [x]  Carotid artery disease status post CEA, heart disease, COPD   Past Surgical History: [x]  Multiple cardiac catheterizations, bilateral carotid endarterectomy    Family History: [x]     Social History: [x]       Scheduled Meds:       IV Meds:   No current facility-administered medications for this encounter.      Allergies: Bee venom    Objective   Temp:  [97.6 °F (36.4 °C)] 97.6 °F (36.4 °C)  Heart Rate:  [74-82] 74  Resp:  [16] 16  BP: (120-143)/(60-66) 143/66    No intake/output data recorded.    Physical Exam  Vitals reviewed.   Constitutional:       Appearance: She is well-developed. She is ill-appearing.   Eyes:      Conjunctiva/sclera: Conjunctivae normal.   Cardiovascular:      Rate and Rhythm: Normal rate.      Comments: Patient with a large pulsatile hematoma in the right groin with a bruit and ecchymosis.  Right lower extremity edema but palpable pulses when he pushed through the edema  Pulmonary:      Effort: Pulmonary effort is normal.   Abdominal:      Palpations: Abdomen is soft.      Tenderness: There is no abdominal tenderness.   Musculoskeletal:      Right lower leg: Edema present.   Neurological:      Mental Status: She is alert and oriented to person, place, and time.       Data Reviewed:  CBC    Results from last 7 days   Lab Units 24  1405   WBC 10*3/mm3 14.29*    HEMOGLOBIN g/dL 9.4*   PLATELETS 10*3/mm3 193     BMP   Results from last 7 days   Lab Units 04/02/24  1405   SODIUM mmol/L 141   POTASSIUM mmol/L 3.5   CHLORIDE mmol/L 99   CO2 mmol/L 30.0*   BUN mg/dL 19   CREATININE mg/dL 0.96   GLUCOSE mg/dL 143*     Coag     HbA1C   Lab Results   Component Value Date    HGBA1C 6.0 (H) 07/29/2022    HGBA1C 5.6 07/23/2021    HGBA1C 7.90 (H) 07/17/2020     Radiology(recent) No radiology results for the last day    Labs significant for: Hemoglobin 9.4.    Imaging Studies:  Pseudoaneurysm images reviewed.  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       Data Points:  During this visit the following were done:  Labs Reviewed [x]  []  []  Labs Ordered []  []  []  Radiology Reports Reviewed [x]    Radiology Ordered []    EKG, echo, and/or stress test reviewed []    Vascular lab results reviewed  [x]    Vascular lab images reviewed and interpreted per myself   []    Referring Provider Records Reviewed []    ER Records Reviewed []    Hospital Records Reviewed/Summarized [x]    History Obtained From Family [x]    Radiological images view and Interpreted per myself [x]    Case Discussed with referring provider [x]     Decision to obtain and request outside records  []        Active Hospital Problems:   Active Hospital Problems    Diagnosis  POA    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      Assessment & Plan   Assessment / Plan     88 y.o. female with a history of carotid artery disease status post CEAs by my partner, severe coronary artery disease who is status post high risk PCI about 3 weeks ago.  She had an attempted closure device that failed  due to calcific disease.  She was started on dual antiplatelet therapy and then at some point also started on Eliquis.  She had some complaints of a pulsatile hematoma in the right groin that led to a duplex scan after exam during a routine office visit.  This study demonstrated a large pseudoaneurysm that appears to originate off the distal external iliac artery.  Size of the aneurysm is about 5 cm.  It does look like it is bilobed with a smaller deeper lobe over the artery.  There also appears to be an AV fistula.  On exam large pulsatile hematoma without threatened skin.  She does have edema of the ipsilateral right leg.    Surgical management should be performed when a pseudoaneurysm occurs at the site of a vascular anastomosis, becomes very large and threatens or causes skin necrosis, is expanding rapidly, occurs spontaneously, as it may be infected, or there is a failure of a minimally invasive procedure. Expanding hematomas have a propensity towards eventual rupture, especially if the patient is maintained on anticoagulation.  Treatment of smaller pseudoaneurysms may be accomplished with either direct ultrasound-guided compression or with ultrasound-guided local injection of thrombin or collagen into the pseudoaneurysm cavity, which has a higher success rate. Although an effective approach, ultrasound-guided compression is uncomfortable for both the patient and the person applying compression.    The success rate of ultrasound-guided injection of bovine thrombin has been illustrated in multiple series with simple or even complex pseudoaneurysms. The primary success rate is >90%  percent with simple pseudoaneurysms and >80 percent with complex pseudoaneurysms.    The high success rate of ultrasound-guided injection has made it the treatment of choice for most postcatheterization pseudoaneurysms. However, the procedure should not be performed when, as defined above, there is an indication for surgical  management.    In rare cases, pseudoaneurysms less than 2 cm can be managed conservatively and monitored by serial imaging to confirm spontaneous resolution.    Discussed with Dr. White as well as the family and patient.  Agree to proceed.    I discussed the patients findings and my recommendations with patient, nursing staff, and consulting provider.  Please call my office with any question: [phone]

## 2024-04-02 NOTE — DISCHARGE PLACEMENT REQUEST
"Lizzy Hicks (88 y.o. Female)       Date of Birth   1935    Social Security Number       Address   [address] [place] [address] [facility] [place] [address]    Home Phone       MRN   [id number]       Pentecostalism   None    Marital Status                               Admission Date   4/2/24    Admission Type   Urgent    Admitting Provider   Malik White MD    Attending Provider   Malik White MD    Department, Room/Bed   [facility] CVI, 3109/1       Discharge Date       Discharge Disposition       Discharge Destination                                 Attending Provider: Malik White MD    Allergies: Bee Venom    Isolation: None   Infection: None   Code Status: Prior    Ht: 160 cm (63\")   Wt: 55.3 kg (122 lb)    Admission Cmt: None   Principal Problem: None                  Active Insurance as of 4/2/2024       Primary Coverage       Payor Plan Insurance Group Employer/Plan Group    MEDICARE MEDICARE A & B        Payor Plan Address Payor Plan Phone Number Payor Plan Fax Number Effective Dates    [address] [phone]  9/1/2002 - None Entered    [facility] [id number]         Subscriber Name Subscriber Birth Date Member ID       LIZZY HICKS W 1935 [id number]               Secondary Coverage       Payor Plan Insurance Group Employer/Plan Group    [facility] SUPP [id number]       Payor Plan Address Payor Plan Phone Number Payor Plan Fax Number Effective Dates    [address]   12/1/2016 - None Entered    [facility] [id number]         Subscriber Name Subscriber Birth Date Member ID       LIZZY HICKS W 1935 [id number]                     Emergency Contacts        (Rel.) Home Phone Work Phone Mobile Phone    LaurenJasmin morales (Daughter) [phone]    KurtYung (Son) [phone] -- [phone]                "

## 2024-04-02 NOTE — PLAN OF CARE
Goal Outcome Evaluation:           Progress: no change  Outcome Evaluation: VSS, AOx4 upon admission to unit as direct admission for pseudoaneurysm. Oriented to room and prepared for bedside procedure, consent obtained. Awaiting orders. Strict bedrest per orders. WCTM.

## 2024-04-02 NOTE — CONSULTS
Operative Note  Location: Psychiatric  Date of Admission:  4/2/2024  OR Date: 4/2/2024    Pre-op Diagnosis: Right femoral artery pseudoaneurysm    Post-op Diagnosis: Same    Procedure:   1) ultrasound-guided thrombin injection    Surgeon: Chong Valladares MD    Estimated Blood Loss: None    Specimen: None    Complications: None    Findings: Thrombosed pseudoaneurysm except for a second small 5 mm area.  Palpable pedal pulses with documented flow on ultrasound        Procedure:    The right groin was prepped and draped with ChloraPrep.  Timeout was observed.  Under ultrasound I immediately entered the large portion of the aneurysm sac under direct ultrasound guidance.  Under ultrasound guidance I injected a cc of thrombin into the large aneurysm and was able to induce a fair amount of thrombosis.  I then used some ultrasound-guided compression to see if I could completely stop flow and get the aneurysm to thrombose completely.  However, there was still flow near the base so I reaccessed this and injected another entire cc before I could get cessation of flow because of the large size of the cavity.  Flow was documented in the native artery and below.  I rescanned and there was still a 5 mm small lobe that was immediately anterior to the artery that I elected not to reinject as I think it could resolve on its own especially if we are able to hold her anticoagulation.  I know that it is important to continue her dual antiplatelet therapy and have no intention of stopping it.  Discussed with Dr. White as well.        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      Chogn Valladares MD     Date: 4/2/2024  Time: 18:18 EDT

## 2024-04-02 NOTE — OP NOTE
Operative Note  Location: [facility]  Date of Admission:  4/2/2024  OR Date: 4/2/2024    Pre-op Diagnosis: Right femoral pseudoaneurysm    Post-op Diagnosis: Same    Procedure:   1) ultrasound-guided thrombin injection    Surgeon: Chong Valladares MD    Assistant: None    Complications: None    Findings: Thrombosed pseudoaneurysm although there was a 5 mm fragment was flow just anterior to the iliac    Implants: * No surgical log found *    Indications:    The patient is an 88 y.o. female seen for evaluation iatrogenic right groin pseudoaneurysm.       Procedure:    Timeout was observed.  Right groin was prepped and draped with ChloraPrep.  Under direct ultrasound guidance micropuncture needle was used to access the large pseudoaneurysm.  Under ultrasound guidance I injected about a cc of thrombin and was able to induce thrombosis of about 80% of the aneurysm.  I attempted to hold ultrasound-guided compression to see if I could get the rest to clot off but was unable to do so.  For this reason, I reaccessed down near the base where there was still some residual flow.  Still had to inject another cc in order to induce complete thrombosis of this area.  However, there still was a very small 4 to 5 mm lobe just immediately anterior to the iliac that had flow but I elected not to access this as I felt it to be high risk and at that it could resolve spontaneously.  Flow was documented in the native arteries below the repair and at the ankle.    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      Chong Valladares MD     Date: 4/2/2024  Time: 18:22 EDT

## 2024-04-02 NOTE — H&P
Encounter Date:24  Patient:Lizzy Hicks  :1935  MRN:[id number]        Chief Complaint: No chief complaint on file.           History of Presenting Illness:      Subjective  Lizzy Hicks is a 88 y.o. female who is here for follow-up.  She is a patient of Dr Sanchez.     Patient has past medical history that is significant for coronary artery disease, diastolic heart failure, hypertension, carotid artery stenosis status post CEA  and , nonrheumatic aortic stenosis, history of below-knee DVT status post 6 months of anticoagulation, COPD, osteoarthritis, hyperlipidemia.     Patient was recently hospitalized with worsening shortness of breath and hypoxia.  She was treated for pneumonia, diuresed for acute heart failure.  She had troponin elevation felt more likely type II MI, however warranting left heart cath for further evaluation.  Diagnostic heart cath 3/8/2024 with 95% proximal left main stenosis with heavy calcification, otherwise 70% small mid marginal branch disease, 40 to 50% mid RCA disease.  She was taken back to the Cath Lab on 3/12/2024 and is status post LAVELLE of left main.  Following heart cath she developed significant anemia requiring transfusion.  Groin site was stable without evidence of active bleeding.  She was IV diuresed and transition to an oral regimen.  She was discharged to skilled nursing facility for physical therapy.  Patient is here today for follow-up.     Patient reports she feels she has done well since hospital discharge.  She is working with physical therapy.  Her breathing feels better and she is weaned to 1 L nasal cannula.  She has not had any chest pain.  She reports she has lower extremity edema that has worsened.  She also noted a hard spot at her cath stick site.           Review of Systems:  Review of Systems   Constitutional: Positive for malaise/fatigue.   Cardiovascular:  Positive for dyspnea on exertion and leg swelling. Negative for chest  pain, orthopnea and palpitations.   Respiratory:  Negative for shortness of breath.          Medications Ordered Prior to Encounter          Current Outpatient Medications on File Prior to Visit   Medication Sig Dispense Refill    acetaminophen (TYLENOL) 325 MG tablet Take 2 tablets by mouth Every 4 (Four) Hours As Needed for Mild Pain.        alendronate (FOSAMAX) 70 MG tablet TAKE 1 TABLET BY MOUTH ONCE WEEKLY ON AN EMPTY STOMACH BEFORE BREAKFAST. REMAIN UPRIGHT FOR 30 MINUTES AND TAKE WITH 8 OUNCES OF WATER (Patient taking differently: Take 1 tablet by mouth Every 7 (Seven) Days. Monday) 12 tablet 1    amLODIPine (NORVASC) 5 MG tablet TAKE ONE TABLET BY MOUTH DAILY (Patient taking differently: Take 1 tablet by mouth Daily.) 90 tablet 3    aspirin 81 MG chewable tablet Chew 1 tablet Daily.        atenolol (TENORMIN) 50 MG tablet TAKE ONE TABLET BY MOUTH DAILY (Patient taking differently: Take 1 tablet by mouth Daily.) 90 tablet 1    calcium carbonate-cholecalciferol 500-400 MG-UNIT tablet tablet Take 1 tablet by mouth 2 (Two) Times a Day.        docusate sodium 100 MG capsule Take 1 capsule by mouth 2 (Two) Times a Day As Needed for Constipation.        Fluticasone-Salmeterol (ADVAIR/WIXELA) 100-50 MCG/ACT DISKUS INHALE ONE PUFF BY MOUTH TWICE A DAY (Patient taking differently: Inhale 1 puff 2 (Two) Times a Day.) 60 each 2    furosemide (LASIX) 40 MG tablet Take 1 tablet by mouth Daily. 30 tablet 0    guaifenesin (ROBITUSSIN) 100 MG/5ML liquid Take 10 mL by mouth 3 (Three) Times a Day As Needed for Cough.        ipratropium-albuterol (DUO-NEB) 0.5-2.5 mg/3 ml nebulizer Take 3 mL by nebulization Every 4 (Four) Hours As Needed for Shortness of Air or Wheezing. 360 mL      Loratadine 10 MG capsule Take 1 capsule by mouth Daily.        Multiple Vitamins-Minerals (ICAPS AREDS 2 PO) Take 1 tablet by mouth Daily. HOLD PRIOR TO SURG        Multiple Vitamins-Minerals (MULTIVITAMIN ADULTS 50+ PO) Take 1 tablet by mouth  Daily. HOLD PRIOR TO SURG        omeprazole (priLOSEC) 20 MG capsule Take 1 capsule by mouth Daily.        prednisoLONE acetate (PRED FORTE) 1 % ophthalmic suspension Administer  to the right eye 4 (Four) Times a Day.        predniSONE (DELTASONE) 10 MG tablet Take 1 tablet by mouth Daily.        rosuvastatin (CRESTOR) 5 MG tablet TAKE ONE TABLET BY MOUTH DAILY (Patient taking differently: Take 1 tablet by mouth Daily.) 90 tablet 3    sodium chloride (GREGORY 128) 5 % ophthalmic solution Administer 1 drop to the right eye 4 (Four) Times a Day.        tiotropium bromide monohydrate (SPIRIVA RESPIMAT) 2.5 MCG/ACT aerosol solution inhaler Inhale 2 puffs Daily.        [DISCONTINUED] Apixaban Starter Pack (Eliquis DVT/PE Starter Pack) tablet therapy pack Take two 5 mg tablets by mouth every 12 hours for 7 days. Followed by one 5 mg tablet every 12 hours. Start on day 3 74 tablet 0      No current facility-administered medications on file prior to visit.            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 Baseline Na 128-130 since 2013.    Seborrheic dermatitis 01/20/2016    Swallowing difficulty       D/T INADVERTANT REMOVAL OF UVULA AS CHILD WITH T AND A            Surgical History         Past Surgical History:   Procedure Laterality Date    CARDIAC CATHETERIZATION N/A 3/8/2024     Procedure: Left Heart Cath;  Surgeon: Sheng Jaimes MD;  Location: [facility] CATH INVASIVE LOC[facility];  Service: Cardiovascular;  Laterality: N/A;     CARDIAC CATHETERIZATION N/A 3/8/2024     Procedure: Coronary angiography;  Surgeon: Sheng Jaimes MD;  Location:  [facility] CATH INVASIVE LOCATION;  Service: Cardiovascular;  Laterality: N/A;    CARDIAC CATHETERIZATION N/A 3/12/2024     Procedure: Percutaneous Coronary Intervention;  Surgeon: Sheng Jaimes MD;  Location:  [facility] CATH INVASIVE LOCATION;  Service: Cardiovascular;  Laterality: N/A;    CARDIAC CATHETERIZATION N/A 3/12/2024     Procedure: Stent LAVELLE coronary;  Surgeon: Sheng Jaimes MD;  Location:  [facility] CATH INVASIVE LOCATION;  Service: Cardiovascular;  Laterality: N/A;    CAROTID ENDARTERECTOMY Right 9/24/2018     Procedure: RT CAROTID ENDARTERECTOMY WITH INTRA OPERATIVE CAROTID ARTERY DUPLEX SCAN;  Surgeon: Mikaela Galicia Jr., MD;  [facility];  Service: Vascular    CAROTID ENDARTERECTOMY Left 4/4/2019     Procedure: LEFT CAROTID ENDARTERECTOMY;  Surgeon: Mikaela Galicia Jr., MD;  [facility];  Service: Vascular    CATARACT EXTRACTION WITH INTRAOCULAR LENS IMPLANT         LEFT AND RIGHT    INTERVENTIONAL RADIOLOGY PROCEDURE N/A 3/12/2024     Procedure: Intravascular Ultrasound;  Surgeon: Sheng Jaimes MD;  Location:  [facility] CATH INVASIVE LOCATION;  Service: Cardiovascular;  Laterality: N/A;    ORIF FEMUR FRACTURE Right       HARDWARE    TONSILLECTOMY AND ADENOIDECTOMY         INADVERTANTLY TOOK UVULA AT THIS TIME            Social History   Social History            Socioeconomic History    Marital status:    Tobacco Use    Smoking status: Former       Current packs/day: 0.25       Average packs/day: 0.3 packs/day for 40.0 years (10.0 ttl pk-yrs)       Types: Cigarettes    Smokeless tobacco: Never    Tobacco comments:       quit 6 yr ago   Vaping Use    Vaping status: Never Used   Substance and Sexual Activity    Alcohol use: Not Currently       Comment: 1-2 drinks day    Drug use: No    Sexual activity: Defer                  Family History  "  Problem Relation Age of Onset    Malig Hyperthermia Neg Hx           The following portions of the patient's history were reviewed and updated as appropriate: allergies, current medications, past family history, past medical history, past social history, past surgical history and problem list.           Objective:      Objective  Vitals       Vitals:     04/02/24 1324   BP: 120/60   Pulse: 82   SpO2: 93%   Weight: 55.3 kg (122 lb)   Height: 160 cm (63\")               Physical Exam:  Constitutional: Well appearing, well developed, no acute distress   HENT: Oropharynx clear and membrane moist  Eyes: Normal conjunctiva, no sclera icterus  Neck: Supple, no carotid bruit bilaterally  Cardiovascular: Regular rate and rhythm, No Murmur, 2+ bilateral lower extremity edema  Pulmonary: Normal respiratory effort, normal lung sounds, no wheezing  Neurological: Alert and orient x 3  Skin: Warm, dry, no ecchymosis, no rash  Psych: Appropriate mood and affect. Normal judgment and insight                 Lab Results   Component Value Date      03/19/2024      03/18/2024     K 3.1 (L) 03/19/2024     K 4.1 03/18/2024     CL 95 (L) 03/19/2024     CL 96 (L) 03/18/2024     CO2 29.1 (H) 03/19/2024     CO2 29.0 03/18/2024     BUN 42 (H) 03/19/2024     BUN 33 (H) 03/18/2024     CREATININE 1.08 (H) 03/19/2024     CREATININE 1.00 03/18/2024     EGFRIFNONA 69 07/23/2021     EGFRIFNONA 73 07/17/2020     EGFRIFAFRI 80 07/23/2021     EGFRIFAFRI 88 07/17/2020     GLUCOSE 98 03/19/2024     GLUCOSE 91 03/18/2024     CALCIUM 8.2 (L) 03/19/2024     CALCIUM 8.7 03/18/2024     PROTENTOTREF 7.8 07/29/2022     PROTENTOTREF 7.1 07/23/2021     ALBUMIN 3.1 (L) 03/19/2024     ALBUMIN 3.2 (L) 03/18/2024     BILITOT 0.5 03/07/2024     BILITOT 0.2 07/30/2023     AST 24 03/07/2024     AST 18 07/30/2023     ALT 18 03/07/2024     ALT 16 07/30/2023            Lab Results   Component Value Date     WBC 14.29 (H) 04/02/2024     WBC 14.19 (H) 03/19/2024 "     HGB 9.4 (L) 04/02/2024     HGB 11.2 (L) 03/19/2024     HCT 28.1 (L) 04/02/2024     HCT 32.9 (L) 03/19/2024     MCV 85.9 04/02/2024     MCV 85.7 03/19/2024      04/02/2024      03/19/2024            Lab Results   Component Value Date     CHOL 195 03/08/2024     CHOL 206 (H) 07/31/2023     TRIG 63 03/08/2024     TRIG 69 07/31/2023      (H) 03/08/2024      (H) 07/31/2023     LDL 69 03/08/2024     LDL 94 07/31/2023            Lab Results   Component Value Date     PROBNP 6,918.0 (H) 03/07/2024     PROBNP 2,561.0 (H) 07/30/2023            Lab Results   Component Value Date     TROPONINT 282 (C) 03/07/2024            Lab Results   Component Value Date     TSH 0.517 07/31/2023     TSH 4.090 07/29/2022               ECG 12 Lead     Date/Time: 4/2/2024 1:35 PM  Performed by: Abiola Guerrero APRN     Authorized by: Abiola Guerrero APRN  Comparison: compared with previous ECG from 3/15/2024  Similar to previous ECG  Rhythm: sinus rhythm  Rate: normal  BPM: 79  ST Depression: II, III, V4, V5 and V6                    Assessment:      Assessment    Diagnosis Plan   1. Coronary artery disease involving native coronary artery of native heart, unspecified whether angina present  ECG 12 Lead     CBC (No Diff)     Comprehensive Metabolic Panel       2. Dyspnea on exertion  proBNP       3. Hematoma  Duplex Pseudoaneurysm CAR       4. Other specified complications of surgical and medical care, not elsewhere classified, initial encounter  Duplex Pseudoaneurysm CAR                   Plan:      Plan  Coronary artery disease -recent LAVELLE of left main 3/12/2024.  DAPT with aspirin and high potency statin.  EF is normal. It is unclear why, however instead of being discharged on pitta grill her Eliquis was resumed.  Since her below the knee DVT was treated for 6 months, recommend restarting clopidogrel.  I also discussed this with Dr. Sanchez  [facility] and transition to oral torsemide at discharge.  He has lower extremity swelling on exam, I think this is likely multifactorial.  She needs repeat labs since being started on torsemide, will also add proBNP and consider increasing torsemide based on results  Acute on chronic anemia -received 2 units during hospitalization.  Her right groin site is hard consistent with hematoma.  Given blood loss during hospitalization and exam findings I have ordered a stat groin ultrasound to rule out pseudoaneurysm  Nonrheumatic valvular heart disease -noted, continue surveillance echo monitoring as outpatient  Carotid artery disease -history of right CEA 2018, left CEA 2019.  Follows with Dr Galicia.  Continue DAPT and statin  Hypertension  COPD/chronic hypoxemic respiratory failure -continue supplemental O2, nighttime BiPAP     Patient is seen today for follow-up after hospital discharge  Groin ultrasound ordered to rule out pseudoaneurysm --further recommendations pending results  Change apixaban back to clopidogrel  Lab evaluation including CMP, proBNP, CBC        Return to clinic in 1 month to see Dr. Sanchez           Orders Placed This Encounter   Procedures    CBC (No Diff)       Standing Status:   Future       Number of Occurrences:   1       Standing Expiration Date:   4/2/2025       Order Specific Question:   Release to patient       Answer:   Routine Release [[id number]]    Comprehensive Metabolic Panel       Standing Status:   Future       Number of Occurrences:   1       Standing Expiration Date:   4/2/2025       Order Specific Question:   Release to patient       Answer:   Routine Release [[id number]]    proBNP       Standing Status:   Future       Number of Occurrences:   1       Standing Expiration Date:   4/2/2025       Order Specific Question:   Release to patient       Answer:   Routine Release [[id number]]    ECG 12 Lead       This order was created via procedure documentation       Order  Specific Question:   Release to patient       Answer:   Routine Release [[id number]]               DAVID Gonzalez  [facility]  04/02/24  14:54 EDT     She was seen by Abiola today and had a lot of bruising and a pulsatile mass in her right groin was sent over for an ultrasound and she had a large pseudoaneurysm.  We have admitted her to the hospital and actually asked the vascular surgeons to come by and see them and earlier this evening they injected the pseudoaneurysm.  I think we will stay off of Eliquis permanently she was on it for a DVT and had been treated for 6 months but she just recently had intervention on her left main so we need to be on antiplatelet therapy.  We will reultrasound her leg tomorrow if it is okay probably let her go home she does have an AV fistula and those often will heal up on their own so we will watch that conservatively

## 2024-04-03 PROBLEM — R00.1 BRADYCARDIA: Status: ACTIVE | Noted: 2024-01-01

## 2024-04-03 NOTE — NURSING NOTE
0730 found awake and restless. Sats difficult to . Has linen off  pt. Refused to put sheet and blanket on. Skin  cool to touch. Asking for her eye gtts. Bp remains low on Levophed gtt titrating for decreased bp. Pt requests to have bipap off. Restless in bed.     Rt at bedside and told me pt refused to have treatments and the bipap off.    Cont to titrate levophed per protocol.     0830 bipap off. Cont with difficulty picking up sats and hypotention.   Daughter at bedside. Pt responding to her..     0900 sats unable to pickup found pt unrespoonsive and not reponding to sternal rub. Severe bradycardia noted on monitorl charge nurse at bedside. RT and Cards called to notify of pt status. Dr Watson at bedside informed daughter that pt is dying and will not survive. Pt progressed to PEA Daughter called her Brother and both takled to dr Watson and decided to not cont invasive care.     Pt time of death 9:05.     Physicians notified.

## 2024-04-03 NOTE — DISCHARGE SUMMARY
Lizzy Hicks  [id number]    Date of Admit: 2024  Date of Discharge:  4/3/2024    Discharge Diagnosis:  Active Hospital Problems    Diagnosis  POA    **Pseudoaneurysm [I72.9]  Yes    Bradycardia [R00.1]  Yes    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       Hospital Course: This is an elderly frail woman who lives in  extended living facility.  She had a complex PCI done to her left main about 2 weeks ago and on follow-up visit was noticed to have a pretty hard pulsatile mass in her right groin at the access site.  She has severe COPD and is very frail.  We admitted her to the hospital to address the issue in the right groin which ended up being a large pseudoaneurysm.  The vascular surgeons came and saw her promptly and injected the pseudoaneurysm under ultrasound guidance I was there to observe it and everything seemed like it worked great.  A couple of hours after that she started to develop respiratory distress blood gas was done and showed pCO2 of 70 and she was moved to the CCU.  She was kept on BiPAP.  As she previously had been a DNR/DNI.  Early this morning she developed severe bradycardia and peacefully .  Her daughter was at the bedside with her.  Believe the cause of death is going to be respiratory failure.  Contributing factors are coronary artery disease and frailty.  I have spoken with her son and daughter.    Procedures Performed         Consults       Date and Time Order Name Status Description    4/3/2024  7:33 AM Inpatient Vascular Surgery Consult Completed     2024 11:23 PM Inpatient Pulmonology Consult      2024  7:42 PM Inpatient Pulmonology Consult Completed     3/16/2024  5:57 PM Inpatient Internal Medicine Consult Completed     3/7/2024  5:20 AM Inpatient Cardiology Consult Completed             Discharge Medications     Your medication list        ASK your  doctor about these medications        Instructions Last Dose Given Next Dose Due   acetaminophen 325 MG tablet  Commonly known as: TYLENOL      Take 2 tablets by mouth Every 4 (Four) Hours As Needed for Mild Pain.       alendronate 70 MG tablet  Commonly known as: FOSAMAX      TAKE 1 TABLET BY MOUTH ONCE WEEKLY ON AN EMPTY STOMACH BEFORE BREAKFAST. REMAIN UPRIGHT FOR 30 MINUTES AND TAKE WITH 8 OUNCES OF WATER       amLODIPine 5 MG tablet  Commonly known as: NORVASC      TAKE ONE TABLET BY MOUTH DAILY       aspirin 81 MG chewable tablet      Chew 1 tablet Daily.       atenolol 50 MG tablet  Commonly known as: TENORMIN      TAKE ONE TABLET BY MOUTH DAILY       calcium carbonate-cholecalciferol 500-400 MG-UNIT tablet tablet      Take 1 tablet by mouth 2 (Two) Times a Day.       clopidogrel 75 MG tablet  Commonly known as: PLAVIX      Take 1 tablet by mouth Daily.       docusate sodium 100 MG capsule      Take 1 capsule by mouth 2 (Two) Times a Day As Needed for Constipation.       Fluticasone-Salmeterol 100-50 MCG/ACT DISKUS  Commonly known as: ADVAIR/WIXELA      INHALE ONE PUFF BY MOUTH TWICE A DAY       furosemide 40 MG tablet  Commonly known as: LASIX      Take 1 tablet by mouth Daily.       guaifenesin 100 MG/5ML liquid  Commonly known as: ROBITUSSIN      Take 10 mL by mouth 3 (Three) Times a Day As Needed for Cough.       ipratropium-albuterol 0.5-2.5 mg/3 ml nebulizer  Commonly known as: DUO-NEB      Take 3 mL by nebulization Every 4 (Four) Hours As Needed for Shortness of Air or Wheezing.       Loratadine 10 MG capsule      Take 1 capsule by mouth Daily.       ICAPS AREDS 2 PO      Take 1 tablet by mouth Daily. HOLD PRIOR TO SURG       multivitamin with minerals tablet tablet      Take 1 tablet by mouth Daily. HOLD PRIOR TO SURG       omeprazole 20 MG capsule  Commonly known as: priLOSEC      Take 1 capsule by mouth Daily.       prednisoLONE acetate 1 % ophthalmic suspension  Commonly known as: PRED FORTE       Administer  to the right eye 4 (Four) Times a Day.       predniSONE 10 MG tablet  Commonly known as: DELTASONE      Take 1 tablet by mouth Daily.       rosuvastatin 5 MG tablet  Commonly known as: CRESTOR      TAKE ONE TABLET BY MOUTH DAILY       sodium chloride 5 % ophthalmic solution  Commonly known as: GREGORY 128      Administer 1 drop to the right eye 4 (Four) Times a Day.       tiotropium bromide monohydrate 2.5 MCG/ACT aerosol solution inhaler  Commonly known as: SPIRIVA RESPIMAT      Inhale 2 puffs Daily.                Discharge Diet:     Activity at Discharge:     Discharge disposition: Morgue    Condition on Discharge:     Follow-up Appointments  Future Appointments   Date Time Provider Department Center   2024  2:00 PM [facility] CT 1  [facility] [place]         Test Results Pending at Discharge       Malik White MD  24  11:09 EDT

## 2024-04-03 NOTE — NURSING NOTE
This RN came on shift at 2200 when patient had already arrived to CCU from a RR on a different unit. Did bedside assesment with charge Jasmin Frost RN. Pt on bipap at 60% pt restless requiring this RN to sit bedside. No new findings. VSS.

## 2024-04-03 NOTE — PROGRESS NOTES
Responded to calls from CCU patient declining with bradycardia.  Came to bedside.  Patient bradycardic heart rate in the 20s.  On noninvasive ventilation.  Events noted chart extensively reviewed.  Daughter at bedside.  Patient obtunded  Hypotensive and no dopplerable pulses at this time  Detailed discussion with patient's daughter and we at the patient's son on the phone also.  Both son and daughter agreed to let patient pass away peacefully.  Patient was a DNR/DNI  She was on noninvasive ventilation  Patient was pronounced at bedside with no dopplerable pulses or spontaneous respiration.    Critical care time 35 minutes

## 2024-04-03 NOTE — CONSULTS
Pulmonary Consultation     Patient Name: Lizzy Hicks  Age/Sex: 88 y.o. female  : 1935  MRN: [id number]    Date of Admission: 2024  Date of Encounter Visit: 24  Encounter Provider: Ama Christopher MD  Referring Provider: No ref. provider found  Place of Service: [facility]  Patient Care Team:  Traci Hoyos MD as PCP - General (Geriatric Medicine)      Subjective:     Consulted for: Respiratory failure    Chief Complaint: Respiratory distress    History of Present Illness:  Lizzy Hicks is a 88 y.o. female with history of COPD and coronary artery disease, had a recent femoral artery access and developed hematoma, was found with pseudoaneurysm and was admitted for a procedure by vascular surgery  with thrombin injection.  Patient has been on Eliquis until earlier this morning because of history of DVT in the past  She developed sudden onset worsening dyspnea this afternoon and that continued to exacerbate further until we were consulted for further evaluation in the late evening.  At the time of my assessment patient was already on the BiPAP  Patient was unable to provide me with any history because of the dyspnea, the daughter was at the bedside and she was very helpful and she is pretty detailed when it comes to medical information's.  Patient is afebrile, there is no recent sick encounter  Until this morning when she came in because of the right groin hematoma she was doing pretty well from the respiratory standpoint  Despite her chronic hypoxemic respiratory failure she was compensating at her baseline until this afternoon when she had her surgical procedure  Last PFT from 2023 was reviewed, patient had FEV1/FVC at 54% with FEV1 of 0.70/41% consistent with severe obstructive airway disease, total lung capacity was 95% RV was 136% consistent with air trapping with diffusion capacity at 25 % consistent with severe emphysema  Record from last office visit from 2023 was  "reviewed, the patient is currently on 2-3 L/min nasal cannula, has more than 50 pack years smoking history but quit in 2013.  She had a CAT scan from October 2023 that showed right lower lobe opacity that was probably atelectatic(2.6 cm density in the right lower lobe which has been stable compared to 3 months prior)  Patient is on chronic steroid therapy with 10 mg of prednisone given persistent symptoms despite optimal treatment with triple regimen and short acting bronchodilator  Pulmonary Functions Testing Results:    No results found for: \"FEV1\", \"FVC\", \"MGI2YVW\", \"TLC\", \"DLCO\"    Review of Systems:   Review of Systems      Past Medical History:  Past Medical History:   Diagnosis Date    Anesthesia complication     pt states was groggy for a week after surgery    Carotid artery disease     left    Carotid stenosis     RIGHT    COPD (chronic obstructive pulmonary disease)     History of bronchitis     Hypertension     Macular degeneration     Osteoarthritis 1/20/2016    Osteoporosis 1/20/2016    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 Baseline Na 128-130 since 2013.    Seborrheic dermatitis 01/20/2016    Swallowing difficulty     D/T INADVERTANT REMOVAL OF UVULA AS CHILD WITH T AND A       Past Surgical History:   Procedure Laterality Date    CARDIAC CATHETERIZATION N/A 3/8/2024    Procedure: Left Heart Cath;  Surgeon: Sheng Jaimes MD;  Location:  [facility];  Service: Cardiovascular;  Laterality: N/A;    CARDIAC CATHETERIZATION N/A 3/8/2024    Procedure: Coronary angiography;  Surgeon: Sheng Jaimes MD;  Location:  [facility];  Service: Cardiovascular;  Laterality: N/A;    CARDIAC CATHETERIZATION N/A 3/12/2024    Procedure: Percutaneous Coronary Intervention;  Surgeon: Sheng Jaimes MD;  Location:  [facility];  Service: Cardiovascular;  Laterality: N/A;    CARDIAC CATHETERIZATION N/A 3/12/2024    Procedure: Stent " LAVELLE coronary;  Surgeon: Sheng Jaimes MD;  Location:  [facility];  Service: Cardiovascular;  Laterality: N/A;    CAROTID ENDARTERECTOMY Right 9/24/2018    Procedure: RT CAROTID ENDARTERECTOMY WITH INTRA OPERATIVE CAROTID ARTERY DUPLEX SCAN;  Surgeon: Mikaela Galicia Jr., MD;  Location:  [facility];  Service: Vascular    CAROTID ENDARTERECTOMY Left 4/4/2019    Procedure: LEFT CAROTID ENDARTERECTOMY;  Surgeon: Mikaela Galicia Jr., MD;  Location:  [facility];  Service: Vascular    CATARACT EXTRACTION WITH INTRAOCULAR LENS IMPLANT      LEFT AND RIGHT    INTERVENTIONAL RADIOLOGY PROCEDURE N/A 3/12/2024    Procedure: Intravascular Ultrasound;  Surgeon: Sheng Jaimes MD;  Location:  [facility];  Service: Cardiovascular;  Laterality: N/A;    ORIF FEMUR FRACTURE Right     HARDWARE    TONSILLECTOMY AND ADENOIDECTOMY      INADVERTANTLY TOOK UVULA AT THIS TIME       Home Medications:   Medications Prior to Admission   Medication Sig Dispense Refill Last Dose    acetaminophen (TYLENOL) 325 MG tablet Take 2 tablets by mouth Every 4 (Four) Hours As Needed for Mild Pain.   Past Month    alendronate (FOSAMAX) 70 MG tablet TAKE 1 TABLET BY MOUTH ONCE WEEKLY ON AN EMPTY STOMACH BEFORE BREAKFAST. REMAIN UPRIGHT FOR 30 MINUTES AND TAKE WITH 8 OUNCES OF WATER (Patient taking differently: Take 1 tablet by mouth Every 7 (Seven) Days. Monday) 12 tablet 1 Past Week    amLODIPine (NORVASC) 5 MG tablet TAKE ONE TABLET BY MOUTH DAILY (Patient taking differently: Take 1 tablet by mouth Daily.) 90 tablet 3 4/2/2024    aspirin 81 MG chewable tablet Chew 1 tablet Daily.   4/2/2024    atenolol (TENORMIN) 50 MG tablet TAKE ONE TABLET BY MOUTH DAILY (Patient taking differently: Take 1 tablet by mouth Daily.) 90 tablet 1 4/2/2024    calcium carbonate-cholecalciferol 500-400 MG-UNIT tablet tablet Take 1 tablet by mouth 2 (Two) Times a Day.   4/2/2024    docusate sodium 100 MG capsule Take 1  capsule by mouth 2 (Two) Times a Day As Needed for Constipation.   4/1/2024    Fluticasone-Salmeterol (ADVAIR/WIXELA) 100-50 MCG/ACT DISKUS INHALE ONE PUFF BY MOUTH TWICE A DAY (Patient taking differently: Inhale 1 puff 2 (Two) Times a Day.) 60 each 2 4/2/2024    furosemide (LASIX) 40 MG tablet Take 1 tablet by mouth Daily. 30 tablet 0 4/2/2024    guaifenesin (ROBITUSSIN) 100 MG/5ML liquid Take 10 mL by mouth 3 (Three) Times a Day As Needed for Cough.   Past Month    ipratropium-albuterol (DUO-NEB) 0.5-2.5 mg/3 ml nebulizer Take 3 mL by nebulization Every 4 (Four) Hours As Needed for Shortness of Air or Wheezing. 360 mL  Past Month    Loratadine 10 MG capsule Take 1 capsule by mouth Daily.   4/2/2024    Multiple Vitamins-Minerals (ICAPS AREDS 2 PO) Take 1 tablet by mouth Daily. HOLD PRIOR TO SURG   4/2/2024    Multiple Vitamins-Minerals (MULTIVITAMIN ADULTS 50+ PO) Take 1 tablet by mouth Daily. HOLD PRIOR TO SURG   4/2/2024    omeprazole (priLOSEC) 20 MG capsule Take 1 capsule by mouth Daily.   4/2/2024    prednisoLONE acetate (PRED FORTE) 1 % ophthalmic suspension Administer  to the right eye 4 (Four) Times a Day.   4/2/2024    predniSONE (DELTASONE) 10 MG tablet Take 1 tablet by mouth Daily.   4/2/2024    rosuvastatin (CRESTOR) 5 MG tablet TAKE ONE TABLET BY MOUTH DAILY (Patient taking differently: Take 1 tablet by mouth Daily.) 90 tablet 3 4/1/2024    sodium chloride (GREGORY 128) 5 % ophthalmic solution Administer 1 drop to the right eye 4 (Four) Times a Day.   4/2/2024    tiotropium bromide monohydrate (SPIRIVA RESPIMAT) 2.5 MCG/ACT aerosol solution inhaler Inhale 2 puffs Daily.   4/2/2024    clopidogrel (PLAVIX) 75 MG tablet Take 1 tablet by mouth Daily. 90 tablet 3        Inpatient Medications:  Scheduled Meds:amLODIPine, 5 mg, Oral, Daily  [START ON 4/3/2024] aspirin, 81 mg, Oral, Daily  atenolol, 50 mg, Oral, Daily  budesonide-formoterol, 1 puff, Inhalation, BID - RT  [START ON 4/3/2024] cetirizine, 10 mg,  Oral, Daily  [START ON 4/3/2024] clopidogrel, 75 mg, Oral, Daily  [START ON 4/3/2024] furosemide, 40 mg, Oral, Daily  [START ON 4/3/2024] multivitamin with minerals, 1 tablet, Oral, Daily  [START ON 4/3/2024] pantoprazole, 40 mg, Oral, Q AM  prednisoLONE acetate, 1 drop, Right Eye, 4x Daily  [START ON 4/3/2024] predniSONE, 10 mg, Oral, Daily With Breakfast  rosuvastatin, 5 mg, Oral, Nightly  sodium chloride, 1 drop, Right Eye, 4x Daily  sodium chloride, 10 mL, Intravenous, Q12H  [START ON 4/3/2024] tiotropium bromide monohydrate, 2 puff, Inhalation, Daily - RT      Continuous Infusions:dexmedetomidine, 0.2-1.5 mcg/kg/hr, Last Rate: 0.7 mcg/kg/hr (04/02/24 2055)      PRN Meds:.  acetaminophen    senna-docusate sodium **AND** polyethylene glycol **AND** bisacodyl **AND** bisacodyl    guaifenesin    ipratropium-albuterol    nitroglycerin    sodium chloride    sodium chloride    Allergies:  Allergies   Allergen Reactions    Bee Venom Swelling       Past Social History:  Social History     Socioeconomic History    Marital status:    Tobacco Use    Smoking status: Former     Current packs/day: 0.25     Average packs/day: 0.3 packs/day for 40.0 years (10.0 ttl pk-yrs)     Types: Cigarettes    Smokeless tobacco: Never    Tobacco comments:     quit 6 yr ago   Vaping Use    Vaping status: Never Used   Substance and Sexual Activity    Alcohol use: Yes     Comment: 1-2 per week    Drug use: No    Sexual activity: Defer     Birth control/protection: Post-menopausal       Past Family History:  Family History   Problem Relation Age of Onset    Malig Hyperthermia Neg Hx            Objective:   Temp:  [97.6 °F (36.4 °C)] 97.6 °F (36.4 °C)  Heart Rate:  [] 131  Resp:  [16] 16  BP: (116-155)/() 136/97  SpO2:  [85 %-100 %] 100 %  on  Flow (L/min):  [1] 1 Device (Oxygen Therapy): NPPV/NIV   No intake or output data in the 24 hours ending 04/02/24 2130  Body mass index is 21.61 kg/m².      04/02/24  1719 04/02/24  6499    Weight: 55.3 kg (122 lb) 55.3 kg (122 lb)     Weight change:     Physical Exam:   Physical Exam   General:     sick looking lady, confused, restless, on the BiPAP, agitated, and respiratory distress                   Head:    Normocephalic, atraumatic. External ears and nose are normal   Eyes:          Conjunctivae and sclerae normal, no icterus, PERRLA, no  discharge   Throat:   No oral lesions, no thrush, oral mucosa moist.    Neck:   Supple, trachea midline. No JVD, no cervical or supraclavicular lymphadenopathy    Lungs:     Normal chest on inspection, with significant decrease of the expiratory flow sounds obvious wheezes, she does have very faint crackles posteriorly, the breathing is labored.      Heart:  The rhythm is irregular and the patient is tachycardic.  Positive systolic murmur grade 2-3/6 s, no  gallops, or rubs noted.   Abdomen:     Soft, nontender, nondistended, positive bowel sounds. No hepatosplenomegaly    Extremities:   No clubbing, cyanosis, 2+ bilateral lower extremities pitting edema.     Pulses:   Pulses palpable and equal bilaterally.    Skin:   No bleeding or rash. No bumps, good turgor pressure    Neuro:   Nonfocal.  Moves all extremities well. Strength 5/5 and symmetrical, no sensory deficit however patient is confused restless and agitated   Psychiatric: Unable to assess     Lab Review:   Results from last 7 days   Lab Units 04/02/24 1954 04/02/24  1405   SODIUM mmol/L 141 141   POTASSIUM mmol/L 3.7 3.5   CHLORIDE mmol/L 98 99   CO2 mmol/L 29.0 30.0*   BUN mg/dL 18 19   CREATININE mg/dL 1.08* 0.96   GLUCOSE mg/dL 170* 143*   CALCIUM mg/dL 8.5* 8.4*   AST (SGOT) U/L 29 32   ALT (SGPT) U/L 28 26   ALBUMIN g/dL 4.0 3.9         Results from last 7 days   Lab Units 04/02/24 1954 04/02/24  1405   WBC 10*3/mm3 16.43* 14.29*   HEMOGLOBIN g/dL 11.4* 9.4*   HEMATOCRIT % 34.9 28.1*   PLATELETS 10*3/mm3 203 193   MCV fL 89.0 85.9   MCH pg 29.1 28.7   MCHC g/dL 32.7 33.5   RDW % 14.4 14.5  "  RDW-SD fl 46.0 45.4   MPV fL 10.0 9.8   NEUTROPHIL % % 73.2  --    LYMPHOCYTE % % 20.6  --    MONOCYTES % % 5.4  --    EOSINOPHIL % % 0.0*  --    BASOPHIL % % 0.4  --    IMM GRAN % % 0.4  --    NEUTROS ABS 10*3/mm3 12.04*  --    LYMPHS ABS 10*3/mm3 3.38*  --    MONOS ABS 10*3/mm3 0.88  --    EOS ABS 10*3/mm3 0.00  --    BASOS ABS 10*3/mm3 0.06  --    IMMATURE GRANS (ABS) 10*3/mm3 0.07*  --    NRBC /100 WBC 0.0  --      Results from last 7 days   Lab Units 04/02/24 1954   INR  1.51*   APTT seconds 32.6               Invalid input(s): \"LDLCALC\"  Results from last 7 days   Lab Units 04/02/24 1954 04/02/24  1405   PROBNP pg/mL 6,013.0* 4,305.0*   D DIMER QUANT MCGFEU/mL 4.61*  --          Results from last 7 days   Lab Units 04/02/24  2108   PH, ARTERIAL pH units 7.248*   PCO2, ARTERIAL mm Hg 70.6*   PO2 ART mm Hg 373.1*   HCO3 ART mmol/L 30.8*                                     Imaging:  Imaging Results (Most Recent)       Procedure Component Value Units Date/Time    XR Chest 1 View [[id number]] Collected: 04/1935     Updated: 04/02/24 1942    Narrative:      XR CHEST 1 VW-     HISTORY: Female who is 88 years-old, change in status     TECHNIQUE: Frontal view of the chest     COMPARISON: 3/15/2024     FINDINGS: The heart size is borderline with mild vascular congestion.  Aeration of the bases is partially improved with minimal left basilar  atelectasis or infiltrate. No pleural effusion, or pneumothorax. No  acute osseous process.       Impression:      Partially improved aeration at the bases with minimal left  basilar atelectasis or infiltrate. Borderline heart size with mild  vascular congestion.     This report was finalized on 4/2/2024 7:39 PM by Dr. Grayson Martin M.D on Workstation: [id number]               I personally viewed and interpreted the patient's imaging studies.    Assessment:   Acute hypercapnic respiratory failure with acute hypoxemia  Pseudoaneurysm status post  repair by vascular " surgery  Respiratory acidosis COPD exacerbation  Edema with decompensated heart failure  Coronary artery disease  Nonrheumatic valvular heart disease  Essential hypertension  Chronic hypoxemia on nasal oxygen 2 L/min at home currently requiring 100% FiO2  Leukocytosis, likely stress related  History of DVT with known pre-existing history of PE that we know of      Plan:     Patient is an respiratory failure, she did have a stat ABG done at the bedside that showed evidence of significant respiratory acidosis with acute elevation of the pCO2 in the 70s with a pH around 7.25  Definitely the current BiPAP is not working but that is in part due to her increased respiratory drive and tachypnea causing her to cause more air trapping and dyspnea.  Patient needs to have better sedation but that needs to be done in a more controlled setting where it can be safely administered while being closely monitored.  She needs to be transferred to the intensive care unit and will start the patient on the Precedex drips  The BiPAP setting was switched to an AVAPS setting  She needs to have better air leak control and hopefully that would be achieved once she is less restless and not moving her head as often.  No pneumonia on the chest x-ray she does have some fluffiness and may be mild element of interstitial edema but she already have orders for Lasix she does have other evidence of volume overload on exam like lower extremity edema and patient will have a bladder scan to make sure there is no urinary obstruction as for VTE are very small because she was on the Eliquis until this morning when it was discontinued  No need for any wide spectrum antibiotic at this point but we will reevaluate and consider further measures accordingly  Patient had significant airflow limitation during exhalation with a picture consistent with acute COPD exacerbation, she will be given 1 dose of Solu-Medrol will consider further steroids by tomorrow morning  on reassessment  Will check procalcitonin level and decide if any antibiotic necessary  She usually follows with Dr. Muro in our practice who will follow-up on her in a.m.  Patient is to be on DVT prophylaxis  Further measures per vascular surgery and  Patient is on chronic steroid use, consider stress dose of steroids in case of any hemodynamic decompensation    Time: Critical care 40 min    Thank you for allowing me to participate in the care of Lizzy Hicks. Feel free to contact me directly with any further questions or concerns.    Ama Christopher MD  [facility]   04/02/24  21:30 EDT    Dictated utilizing Dragon dictation   98.2

## 2024-04-03 NOTE — PLAN OF CARE
Goal Outcome Evaluation:  Plan of Care Reviewed With: patient        Progress: declining                                Pt to CCU from Rapid response at the beginning of the shift for bipap and precedex. Pt very restless and tachypnic  most of night, could not titrate Dex due to a drop in BP. MD paged for this, no new orders. Despite stopping the precedex pt BP did not improve. MD ordered a 250cc bolus and levophed if not improved. Levophed has been titrated to maintain a MAP at or above 65. R groin site sill has hematoma. Able to titrate FiO2 on bipap to 45%.

## 2024-04-03 NOTE — CODE DOCUMENTATION
Patient Name:  Lizzy Hicks  YOB: 1935  MRN:  [id number]  Admit Date:  4/2/2024    Visit Diagnoses: No diagnosis found.    Reason:  bradycardia, agonal breathing; Pt is DNR/DNI.       RN Communicated With:  Dr. Castanon to bedside; DAVID Tillman/ cardiology paged      Rapid Outcome:  Pt death - 4/3/24 @ 0905    Communication From Rapid Team:  Tech notified of difficultly obtaining O2 sat, he was attempting to replaced O2 sat probe when heart rhythm changed. Monitor tech notified charge RN (myself) and primary RN Ginny of rhythm change. I immediately placed pt back on BIPAP at 100%. Primary RN max'd levophed gtt. Stat call to [facility], Dr. Castanno to bedside. Pt is DNR/DNI. We were unable to obtain pulse via palpation. Atropine given x1. Unable to obtain pulse with doppler. Termination of resuscitation efforts at 0905 by Dr. Castanon. Daughter at bedside during events.       Most Recent Vital Signs  Temp:  [97.6 °F (36.4 °C)-98 °F (36.7 °C)] 97.8 °F (36.6 °C)  Heart Rate:  [0-134] 0  Resp:  [0-27] 0  BP: (0-155)/(0-142) 0/0  SpO2:  [0 %-100 %] 0 %  on  Flow (L/min):  [1] 1;   Device (Oxygen Therapy): NPPV/NIV    Labs:      Glucose   Date/Time Value Ref Range Status   04/03/2024 0408 99 70 - 130 mg/dL Final   04/02/2024 2152 177 (H) 70 - 130 mg/dL Final   04/02/2024 2126 172 (H) 70 - 130 mg/dL Final   04/02/2024 1916 167 (H) 70 - 130 mg/dL Final     Site   Date Value Ref Range Status   04/02/2024 Right Brachial  Final     Lukasz's Test   Date Value Ref Range Status   04/02/2024 N/A  Final     pH, Arterial   Date Value Ref Range Status   04/02/2024 7.361 7.350 - 7.450 pH units Final     pCO2, Arterial   Date Value Ref Range Status   04/02/2024 50.7 (H) 35.0 - 45.0 mm Hg Final     pO2, Arterial   Date Value Ref Range Status   04/02/2024 124.0 (H) 80.0 - 100.0 mm Hg Final     HCO3, Arterial   Date Value Ref Range Status   04/02/2024 28.7 (H) 22.0 - 28.0 mmol/L Final     Base Excess,  Arterial   Date Value Ref Range Status   04/02/2024 2.6 (H) 0.0 - 2.0 mmol/L Final     Comment:     Serial Number: [id number]:  [id number]     O2 Saturation, Arterial   Date Value Ref Range Status   04/02/2024 98.6 (H) 92.0 - 98.5 % Final     CO2 Content   Date Value Ref Range Status   04/02/2024 30.2 (H) 23 - 27 mmol/L Final     Barometric Pressure for Blood Gas   Date Value Ref Range Status   04/02/2024 737.4000 mmHg Final     Modality   Date Value Ref Range Status   04/02/2024 BiPap  Final     FIO2   Date Value Ref Range Status   04/02/2024 60 % Final     Results from last 7 days   Lab Units 04/03/24  0520 04/02/24 1954 04/02/24  1405   WBC 10*3/mm3 27.70*  27.08* 16.43* 14.29*   HEMOGLOBIN g/dL 10.5*  10.5* 11.4* 9.4*   PLATELETS 10*3/mm3 174  164 203 193     Results from last 7 days   Lab Units 04/03/24  0419 04/02/24 1954 04/02/24  1405   SODIUM mmol/L 142 141 141   POTASSIUM mmol/L 4.8 3.7 3.5   CHLORIDE mmol/L 102 98 99   CO2 mmol/L 21.0* 29.0 30.0*   BUN mg/dL 26* 18 19   CREATININE mg/dL 1.67* 1.08* 0.96   GLUCOSE mg/dL 97 170* 143*   ALBUMIN g/dL  --  4.0 3.9   BILIRUBIN mg/dL  --  0.5 0.4   ALK PHOS U/L  --  78 67   AST (SGOT) U/L  --  29 32   ALT (SGPT) U/L  --  28 26   Estimated Creatinine Clearance: 21.1 mL/min (A) (by C-G formula based on SCr of 1.67 mg/dL (H)).  Results from last 7 days   Lab Units 04/02/24 1954 04/02/24  1405   PROBNP pg/mL 6,013.0* 4,305.0*   D DIMER QUANT MCGFEU/mL 4.61*  --      Results from last 7 days   Lab Units 04/02/24 1954   PROCALCITONIN ng/mL 0.10     Results from last 7 days   Lab Units 04/02/24 2254 04/02/24  2108   PH, ARTERIAL pH units 7.361 7.248*   PO2 ART mm Hg 124.0* 373.1*   PCO2, ARTERIAL mm Hg 50.7* 70.6*   HCO3 ART mmol/L 28.7* 30.8*   O2 SATURATION ART % 98.6* 99.9*   MODALITY  BiPap BiPap     Lab Results   Component Value Date    STREPPNEUAG Negative 03/07/2024    LEGANTIGENUR Negative 03/07/2024                                                                   Please refer to full rapid documentation on summary page under Index / Code Timeline

## 2024-04-04 NOTE — CASE MANAGEMENT/SOCIAL WORK
Case Management Discharge Note      Final Note: Patient  4/3/24. Carmina MOELLER         Selected Continued Care - Discharged on 4/3/2024 Admission date: 2024 - Discharge disposition:  in Medical Facility      Destination    No services have been selected for the patient.                Durable Medical Equipment    No services have been selected for the patient.                Dialysis/Infusion    No services have been selected for the patient.                Home Medical Care    No services have been selected for the patient.                Therapy    No services have been selected for the patient.                Community Resources    No services have been selected for the patient.                Community & DME    No services have been selected for the patient.                    Selected Continued Care - Prior Encounters Includes continued care and service providers with selected services from prior encounters from 1/3/2024 to 4/3/2024      Discharged on 3/19/2024 Admission date: 3/7/2024 - Discharge disposition: Skilled Nursing Facility (DC - External)      Destination       Service Provider Selected Services Address Phone Fax Patient Preferred    [address] [address] [phone] --                               Final Discharge Disposition Code: 20 -

## (undated) DEVICE — SUT PROLN 6/0 C1 D/A 30IN 8706H

## (undated) DEVICE — ANTIBACTERIAL UNDYED BRAIDED (POLYGLACTIN 910), SYNTHETIC ABSORBABLE SUTURE: Brand: COATED VICRYL

## (undated) DEVICE — ADHS SKIN DERMABOND TOP ADVANCED

## (undated) DEVICE — SUT SILK 3/0 SH CR5 18IN C0135

## (undated) DEVICE — PINNACLE INTRODUCER SHEATH: Brand: PINNACLE

## (undated) DEVICE — GLIDESHEATH SLENDER STAINLESS STEEL KIT: Brand: GLIDESHEATH SLENDER

## (undated) DEVICE — GOWN,PREVENTION PLUS,XLONG/XLARGE,STRL: Brand: MEDLINE

## (undated) DEVICE — STPLR SKIN VISISTAT WD 35CT

## (undated) DEVICE — PK ENDART CARTOID 40

## (undated) DEVICE — Device: Brand: PROWATER

## (undated) DEVICE — SUT SILK 4/0 TIES 18IN A183H

## (undated) DEVICE — SUT SILK 2/0 TIES 18IN A185H

## (undated) DEVICE — GLV SURG SENSICARE MICRO PF LF 8 STRL

## (undated) DEVICE — CATH IV INSYTE AUTOGARD 14G 1 1/2IN ORNG

## (undated) DEVICE — NDL HYPO PRECISIONGLIDE REG 25G 1 1/2

## (undated) DEVICE — SUT PROLN 6/0 BV1 D/A 30IN 8709H

## (undated) DEVICE — GLV SURG TRIUMPH CLASSIC PF LTX 8 STRL

## (undated) DEVICE — SUT SILK 3/0 TIES 18IN A184H

## (undated) DEVICE — BALN PTCA TAKERURX DIL 2X15MM

## (undated) DEVICE — PERCLOSE PROGLIDE™ SUTURE-MEDIATED CLOSURE SYSTEM: Brand: PERCLOSE PROGLIDE™

## (undated) DEVICE — PK CATH CARD 40

## (undated) DEVICE — CORONARY IMAGING CATHETER: Brand: OPTICROSS™ 6 HD

## (undated) DEVICE — TR BAND RADIAL ARTERY COMPRESSION DEVICE: Brand: TR BAND

## (undated) DEVICE — TREK CORONARY DILATATION CATHETER 3.50 MM X 12 MM / RAPID-EXCHANGE: Brand: TREK

## (undated) DEVICE — GUIDE CATHETER: Brand: MACH1™

## (undated) DEVICE — RADIFOCUS OPTITORQUE ANGIOGRAPHIC CATHETER: Brand: OPTITORQUE

## (undated) DEVICE — CATH GUIDE GUIDELINERV3 5.5F 150CM

## (undated) DEVICE — FLEXCEL CAROTID SHUNT (8F, 10F, 12F, 14F): Brand: FLEXCEL CAROTID SHUNT

## (undated) DEVICE — BALN PTCA TAKERU RX NC 3.5X12MM

## (undated) DEVICE — RUNTHROUGH NS EXTRA FLOPPY PTCA GUIDEWIRE: Brand: RUNTHROUGH

## (undated) DEVICE — SOL NS 500ML

## (undated) DEVICE — CATHETER,URETHRAL,REDRUBBER,STERILE,20FR: Brand: MEDLINE

## (undated) DEVICE — CATH DIAG CARD PERFORMA MPA1 BT 4F 100CM

## (undated) DEVICE — TP UMB COTN 1/8X36 U12T

## (undated) DEVICE — Device

## (undated) DEVICE — NC TREK CORONARY DILATATION CATHETER 4.5 MM X 8 MM / RAPID-EXCHANGE: Brand: NC TREK

## (undated) DEVICE — STERILE COTTON TIP 6IN 10PK: Brand: MEDLINE

## (undated) DEVICE — DESTINATION RENAL GUIDING SHEATH: Brand: DESTINATION

## (undated) DEVICE — DGW .035 FC J3MM 260CM TEF: Brand: EMERALD

## (undated) DEVICE — DEV INDEFLATOR P/N 580289

## (undated) DEVICE — VSI MICRO-INTRODUCER KITS ARE INTENDED FOR USE IN PERCUTANEOUS INTRODUCTION OF UP TO A 0.018 INCH OR 0.038 INCH GUIDEWIRE OR CATHETER INTO THE VASCULAR SYSTEM FOLLOWING A SMALL GAUGE NEEDLE STICK.: Brand: VSI MICRO-INTRODUCER KIT

## (undated) DEVICE — KT MANIFLD CARDIAC

## (undated) DEVICE — Device: Brand: CORSAIR PRO

## (undated) DEVICE — GW AMPLTZ SUPERSTIFF SHT/TPR STR .035IN 145CM